# Patient Record
Sex: FEMALE | Race: WHITE | NOT HISPANIC OR LATINO | Employment: OTHER | URBAN - METROPOLITAN AREA
[De-identification: names, ages, dates, MRNs, and addresses within clinical notes are randomized per-mention and may not be internally consistent; named-entity substitution may affect disease eponyms.]

---

## 2017-02-19 ENCOUNTER — DOCUMENTATION ONLY (OUTPATIENT)
Dept: OTHER | Facility: CLINIC | Age: 67
End: 2017-02-19

## 2017-02-19 DIAGNOSIS — Z71.89 ACP (ADVANCE CARE PLANNING): Chronic | ICD-10-CM

## 2017-04-13 ENCOUNTER — TRANSFERRED RECORDS (OUTPATIENT)
Dept: HEALTH INFORMATION MANAGEMENT | Facility: CLINIC | Age: 67
End: 2017-04-13

## 2017-05-09 DIAGNOSIS — R25.1 TREMOR: Primary | ICD-10-CM

## 2017-05-09 DIAGNOSIS — R20.0 NUMBNESS OF FEET: ICD-10-CM

## 2017-05-10 ENCOUNTER — HOSPITAL ENCOUNTER (OUTPATIENT)
Dept: LAB | Facility: CLINIC | Age: 67
Discharge: HOME | End: 2017-05-10
Attending: INTERNAL MEDICINE | Admitting: PSYCHIATRY & NEUROLOGY
Payer: MEDICARE

## 2017-05-10 ENCOUNTER — TELEPHONE (OUTPATIENT)
Dept: PEDIATRICS | Facility: CLINIC | Age: 67
End: 2017-05-10

## 2017-05-10 ENCOUNTER — RADIANT APPOINTMENT (OUTPATIENT)
Dept: GENERAL RADIOLOGY | Facility: CLINIC | Age: 67
End: 2017-05-10
Attending: INTERNAL MEDICINE
Payer: COMMERCIAL

## 2017-05-10 ENCOUNTER — OFFICE VISIT (OUTPATIENT)
Dept: PEDIATRICS | Facility: CLINIC | Age: 67
End: 2017-05-10
Payer: COMMERCIAL

## 2017-05-10 VITALS
BODY MASS INDEX: 30.03 KG/M2 | SYSTOLIC BLOOD PRESSURE: 100 MMHG | WEIGHT: 163.2 LBS | DIASTOLIC BLOOD PRESSURE: 72 MMHG | HEIGHT: 62 IN | HEART RATE: 100 BPM | TEMPERATURE: 97.5 F

## 2017-05-10 DIAGNOSIS — I48.20 CHRONIC ATRIAL FIBRILLATION (H): ICD-10-CM

## 2017-05-10 DIAGNOSIS — Z01.818 PREOP GENERAL PHYSICAL EXAM: Primary | ICD-10-CM

## 2017-05-10 DIAGNOSIS — I48.91 ATRIAL FIBRILLATION, UNSPECIFIED TYPE (H): ICD-10-CM

## 2017-05-10 DIAGNOSIS — Z01.818 PREOP GENERAL PHYSICAL EXAM: ICD-10-CM

## 2017-05-10 DIAGNOSIS — R20.0 NUMBNESS OF FEET: ICD-10-CM

## 2017-05-10 DIAGNOSIS — S83.207D TEAR OF MENISCUS OF LEFT KNEE AS CURRENT INJURY, UNSPECIFIED MENISCUS, UNSPECIFIED TEAR TYPE, SUBSEQUENT ENCOUNTER: ICD-10-CM

## 2017-05-10 DIAGNOSIS — R25.1 TREMOR: ICD-10-CM

## 2017-05-10 DIAGNOSIS — I42.9 CARDIOMYOPATHY, UNSPECIFIED (H): ICD-10-CM

## 2017-05-10 LAB
ALBUMIN SERPL-MCNC: 3.6 G/DL (ref 3.4–5)
ALP SERPL-CCNC: 71 U/L (ref 40–150)
ALT SERPL W P-5'-P-CCNC: 24 U/L (ref 0–50)
ANION GAP SERPL CALCULATED.3IONS-SCNC: 8 MMOL/L (ref 3–14)
AST SERPL W P-5'-P-CCNC: 19 U/L (ref 0–45)
BILIRUB SERPL-MCNC: 1 MG/DL (ref 0.2–1.3)
BUN SERPL-MCNC: 19 MG/DL (ref 7–30)
CALCIUM SERPL-MCNC: 9.1 MG/DL (ref 8.5–10.1)
CHLORIDE SERPL-SCNC: 106 MMOL/L (ref 94–109)
CO2 SERPL-SCNC: 27 MMOL/L (ref 20–32)
CREAT SERPL-MCNC: 0.8 MG/DL (ref 0.52–1.04)
ERYTHROCYTE [DISTWIDTH] IN BLOOD BY AUTOMATED COUNT: 12.5 % (ref 10–15)
ERYTHROCYTE [SEDIMENTATION RATE] IN BLOOD BY WESTERGREN METHOD: 47 MM/H (ref 0–30)
GFR SERPL CREATININE-BSD FRML MDRD: 72 ML/MIN/1.7M2
GLUCOSE SERPL-MCNC: 98 MG/DL (ref 70–99)
HCT VFR BLD AUTO: 38.5 % (ref 35–47)
HGB BLD-MCNC: 12.5 G/DL (ref 11.7–15.7)
INR PPP: 1.01 (ref 0.86–1.14)
MCH RBC QN AUTO: 31.4 PG (ref 26.5–33)
MCHC RBC AUTO-ENTMCNC: 32.5 G/DL (ref 31.5–36.5)
MCV RBC AUTO: 97 FL (ref 78–100)
PLATELET # BLD AUTO: 234 10E9/L (ref 150–450)
POTASSIUM SERPL-SCNC: 4.2 MMOL/L (ref 3.4–5.3)
PROT SERPL-MCNC: 7.2 G/DL (ref 6.8–8.8)
RBC # BLD AUTO: 3.98 10E12/L (ref 3.8–5.2)
SODIUM SERPL-SCNC: 141 MMOL/L (ref 133–144)
TSH SERPL DL<=0.005 MIU/L-ACNC: 0.41 MU/L (ref 0.4–4)
WBC # BLD AUTO: 5.7 10E9/L (ref 4–11)

## 2017-05-10 PROCEDURE — 85610 PROTHROMBIN TIME: CPT | Performed by: PSYCHIATRY & NEUROLOGY

## 2017-05-10 PROCEDURE — 36415 COLL VENOUS BLD VENIPUNCTURE: CPT | Performed by: PSYCHIATRY & NEUROLOGY

## 2017-05-10 PROCEDURE — 85652 RBC SED RATE AUTOMATED: CPT | Performed by: PSYCHIATRY & NEUROLOGY

## 2017-05-10 PROCEDURE — 99215 OFFICE O/P EST HI 40 MIN: CPT | Performed by: INTERNAL MEDICINE

## 2017-05-10 PROCEDURE — 83880 ASSAY OF NATRIURETIC PEPTIDE: CPT | Performed by: INTERNAL MEDICINE

## 2017-05-10 PROCEDURE — 93000 ELECTROCARDIOGRAM COMPLETE: CPT | Performed by: INTERNAL MEDICINE

## 2017-05-10 PROCEDURE — 71020 XR CHEST 2 VW: CPT

## 2017-05-10 PROCEDURE — 85027 COMPLETE CBC AUTOMATED: CPT | Performed by: PSYCHIATRY & NEUROLOGY

## 2017-05-10 PROCEDURE — 84443 ASSAY THYROID STIM HORMONE: CPT | Performed by: PSYCHIATRY & NEUROLOGY

## 2017-05-10 PROCEDURE — 80053 COMPREHEN METABOLIC PANEL: CPT | Performed by: PSYCHIATRY & NEUROLOGY

## 2017-05-10 ASSESSMENT — ANXIETY QUESTIONNAIRES
IF YOU CHECKED OFF ANY PROBLEMS ON THIS QUESTIONNAIRE, HOW DIFFICULT HAVE THESE PROBLEMS MADE IT FOR YOU TO DO YOUR WORK, TAKE CARE OF THINGS AT HOME, OR GET ALONG WITH OTHER PEOPLE: NOT DIFFICULT AT ALL
5. BEING SO RESTLESS THAT IT IS HARD TO SIT STILL: NOT AT ALL
2. NOT BEING ABLE TO STOP OR CONTROL WORRYING: NOT AT ALL
1. FEELING NERVOUS, ANXIOUS, OR ON EDGE: NOT AT ALL
GAD7 TOTAL SCORE: 0
3. WORRYING TOO MUCH ABOUT DIFFERENT THINGS: NOT AT ALL
7. FEELING AFRAID AS IF SOMETHING AWFUL MIGHT HAPPEN: NOT AT ALL
6. BECOMING EASILY ANNOYED OR IRRITABLE: NOT AT ALL

## 2017-05-10 ASSESSMENT — PATIENT HEALTH QUESTIONNAIRE - PHQ9: 5. POOR APPETITE OR OVEREATING: NOT AT ALL

## 2017-05-10 NOTE — PROGRESS NOTES
[facility]  [address]  [address]  [place] [address]  [phone]  Dept: [phone]    PRE-OP EVALUATION:  Today's date: 5/10/2017    Ely Humphreys (: 1950) presents for pre-operative evaluation assessment as requested by Dr. Jaison Eugene.  She requires evaluation and anesthesia risk assessment prior to undergoing surgery/procedure for treatment of left knee, torn meniscus .  Proposed procedure: scope of left knee with repair of meniscus    Date of Surgery/ Procedure: 17  Time of Surgery/ Procedure: 940  Hospital/Surgical Facility: [facility]  Fax number for surgical facility:   Primary Physician: Shadia Munroe  Type of Anesthesia Anticipated: to be determined    Patient has a Health Care Directive or Living Will:  YES     Preop Questions 5/10/2017   1.  Do you have a history of heart attack, stroke, stent, bypass or surgery on an artery in the head, neck, heart or legs? No   2.  Do you ever have any pain or discomfort in your chest? YES - with stress gets chest tightness   3.  Do you have a history of  Heart Failure? No   4.   Are you troubled by shortness of breath when:  walking on a level surface, or up a slight hill, or at night? No   5.  Do you currently have a cold, bronchitis or other respiratory infection? No   6.  Do you have a cough, shortness of breath, or wheezing? No   7.  Do you sometimes get pains in the calves of your legs when you walk? No   8. Do you or anyone in your family have previous history of blood clots? YES - her Dad   9.  Do you or does anyone in your family have a serious bleeding problem such as prolonged bleeding following surgeries or cuts? No   10. Have you ever had problems with anemia or been told to take iron pills? YES - long time ago was on iron pills   11. Have you had any abnormal blood loss such as black, tarry or bloody stools, or abnormal vaginal bleeding? No   12. Have you ever had a blood transfusion? No   13. Have you  or any of your relatives ever had problems with anesthesia? YES - her Mom had hallej     14. Do you have sleep apnea, excessive snoring or daytime drowsiness? YES - snoring   15. Do you have any prosthetic heart valves? No   16. Do you have prosthetic joints? No   17. Is there any chance that you may be pregnant? No         HPI:                                                      Brief HPI related to upcoming procedure: 3 years of knee pain. In the last week in March went to [place] and did a lot of walking. Had worsening of her L knee pain.     Brings in [facility] screening results today from 11/22/2016. Was noted at that time to be in atrial fibrillation. Has not come in for evaluation. Does report occasional palpitations, usually lasting only a few minutes. Seems to be stress-related and slows down with deep breathing. No chest pain or shortness of breath. No nausea or diaphoresis.       See problem list for active medical problems.  Problems all longstanding and stable, except as noted/documented.  See ROS for pertinent symptoms related to these conditions.                                                                                                    HYPOTHYROIDISM - Patient has a longstanding history of chronic Hypothyroidism. Patient has been doing well, noting no tremor, insomnia, hair loss or changes in skin texture. Last TSH value of   TSH   Date Value Ref Range Status   09/06/2016 1.14 0.40 - 4.00 mU/L Final   11/09/2015 0.71 0.40 - 4.00 mU/L Final   10/19/2011 1.49 0.4 - 5.0 mU/L Final   07/23/2004 5.39 (H) 0.4 - 5.0 mU/L Final   07/28/2003 <0.03 (L) 0.4 - 5.0 mU/L Final     . Continues to take medications as directed, without adverse reactions or side effects.                                                                                                                                                                                                                          A-FIB - Patient has a  new diagnosis of atrial fibrillation, not currently anticoagulated.                                                                                                                            .    MEDICAL HISTORY:                                                      Patient Active Problem List    Diagnosis Date Noted     Hypothyroidism, unspecified type 08/29/2016     Priority: Medium     Meralgia paresthetica 07/30/2012     Priority: Medium     ACP (advance care planning) 10/19/2011     Priority: Medium     Advance Care Planning 2/19/2017: Receipt of ACP document:  Received: Health Care Directive which was witnessed or notarized on 11/17/14.  Document previously scanned on 12/7/16.  Validation form completed and sent to be scanned.  Code Status reflects choices in most recent ACP document.  Confirmed/documented designated decision maker(s).  Added by Ericka Baker   Advance Care Planning Liaison  Advance Care Planning 11/15/2016: ACP Review of Chart / Resources Provided:  Reviewed chart for advance care plan.  Ely Humphreys has no plan or code status on file however states presence of ACP document. Copy requested. Code status updated and sent to provider for review pending receipt of document/advance care plan review.  Confirmed/documented legally designated decision makers.  Added by Bailey Ndiaye  Advance Care Planning 10/19/2011:  Patient states has Advance Directive and will bring in a copy to clinic.        Health Care Home 08/22/2011     Priority: Medium     X  EMERGENCY CARE PLAN  Presenting Problem Signs and Symptoms Treatment Plan    Questions or conerns during clinic hours    I will call the clinic directly     Questions or conerns outside clinic hours    I will call the 24 hour nurse line at [phone]    Patient needs to schedule an appointment    I will call the 24 hour scheduling team at [phone] or clinic directly    Same day treatment     I will call the clinic first, nurse line if after  hours, urgent care and express care if needed                            DX V65.8 REPLACED WITH 35417 HEALTH CARE HOME (04/08/2013)       Abnormal Pap smear 11/16/2010     Priority: Medium     Moderate recurrent major depression (H) 09/20/2010     Priority: Medium     HYPERLIPIDEMIA LDL GOAL <160 02/10/2010     Priority: Medium     Invasive ductal carcinoma of breast (H)      Priority: Medium     [place] grade 2 fo 3. T1b Clinical N, N0, M0, stGstrstastdstest:st st1st Receptor status: ER+, CA+ HER 2 IVAN - by FISH  Completed radiation and chemotherapy.  On anastrazole for at least 5 years. Needs yearly dexa scans. Followed by Dr. Marie.       Postmenopausal atrophic vaginitis 02/21/2007     Priority: Medium     Disorder of bone and cartilage 07/27/2004     Priority: Medium     Problem list name updated by automated process. Provider to review       Hypothyroidism 07/23/2004     Priority: Medium     Problem list name updated by automated process. Provider to review        Past Medical History:   Diagnosis Date     Invasive ductal carcinoma of breast (H) 6/09    left breast ca     Major depressive disorder, recurrent episode, in full remission (H) 10/19/2011     osteopenia 2002     Unspecified hypothyroidism      Past Surgical History:   Procedure Laterality Date     C NONSPECIFIC PROCEDURE      Tubal Ligation    Abstracted 07/12/02     C THYROID ABLATION       COLONOSCOPY  10/03     COLONOSCOPY  5/9/2014     COLONOSCOPY  6/9/2014    Procedure: COLONOSCOPY;  Surgeon: Garrett Villaseñor MD;  Location:  GI     HC ECHO HEART XTHORACIC, STRESS/REST  7/22/09    normal     HC EXCISION BREAST LESION, OPEN >=1  7/09    re-excision 8/17/09     Current Outpatient Prescriptions   Medication Sig Dispense Refill     levothyroxine (SYNTHROID) 137 MCG tablet Take 1 tablet (137 mcg) by mouth daily 90 tablet 1     liothyronine (CYTOMEL) 5 MCG tablet Take 1 tablet (5 mcg) by mouth daily 90 tablet 1     Probiotic Product (PROBIOTIC DAILY) CAPS Take  "1 capsule by mouth daily       Omega-3 Fatty Acids (FISH OIL) 500 MG CAPS Take 2 capsules by mouth daily.       magnesium 200 MG TABS Take 1 tablet by mouth daily.       Cholecalciferol (VITAMIN D) 1000 UNIT capsule take 2 Caps by mouth daily.       ASPIRIN 81 MG OR TABS ONE DAILY 100 3     CALCIUM 500 MG OR TABS 2 tabs daily       VITAMINS/MINERALS OR TABS 1 TABLET DAILY       OTC products: None, except as noted above    Allergies   Allergen Reactions     No Known Drug Allergies       Latex Allergy: NO    Social History   Substance Use Topics     Smoking status: Never Smoker     Smokeless tobacco: Never Used     Alcohol use Yes      Comment: wkends     History   Drug Use No       REVIEW OF SYSTEMS:                                                    C: NEGATIVE for fever, chills, change in weight  I: NEGATIVE for worrisome rashes, moles or lesions  E: NEGATIVE for vision changes or irritation  E/M: NEGATIVE for ear, mouth and throat problems  R: NEGATIVE for significant cough or SOB  B: NEGATIVE for masses, tenderness or discharge  CV: NEGATIVE for chest pain, palpitations or peripheral edema  GI: NEGATIVE for nausea, abdominal pain, heartburn, or change in bowel habits  : NEGATIVE for frequency, dysuria, or hematuria  M: NEGATIVE for significant arthralgias or myalgia  N: NEGATIVE for weakness, dizziness or paresthesias  E: NEGATIVE for temperature intolerance, skin/hair changes  H: NEGATIVE for bleeding problems  P: NEGATIVE for changes in mood or affect    EXAM:                                                    /72 (Cuff Size: Adult Regular)  Pulse 80  Temp 97.5  F (36.4  C) (Oral)  Ht 5' 2.25\" (1.581 m)  Wt 163 lb 3.2 oz (74 kg)  BMI 29.61 kg/m2  GENERAL APPEARANCE: healthy, alert and no distress  HENT: ear canals and TM's normal and nose and mouth without ulcers or lesions  RESP: lungs clear to auscultation - no rales, rhonchi or wheezes  CV: normal S1 S2, no S3 or S4, no murmur, click or rub and " irregularly irregular rhythm  ABDOMEN: soft, nontender, no HSM or masses and bowel sounds normal  NEURO: Normal strength and tone, sensory exam grossly normal, mentation intact and speech normal    DIAGNOSTICS:                                                      EKG: atrial fibrillation, rate 106, normal axis, normal intervals, no acute ST/T changes c/w ischemia, no LVH by voltage criteria, unchanged from previous tracings  Chest XRay: mild cardiomegaly  Labs Resulted Today:   Results for orders placed or performed in visit on 11/15/16   Comprehensive metabolic panel   Result Value Ref Range    Sodium 140 133 - 144 mmol/L    Potassium 4.2 3.4 - 5.3 mmol/L    Chloride 105 94 - 109 mmol/L    Carbon Dioxide 27 20 - 32 mmol/L    Anion Gap 8 3 - 14 mmol/L    Glucose 92 70 - 99 mg/dL    Urea Nitrogen 16 7 - 30 mg/dL    Creatinine 0.80 0.52 - 1.04 mg/dL    GFR Estimate 72 >60 mL/min/1.7m2    GFR Estimate If Black 87 >60 mL/min/1.7m2    Calcium 9.1 8.5 - 10.1 mg/dL    Bilirubin Total 1.6 (H) 0.2 - 1.3 mg/dL    Albumin 4.0 3.4 - 5.0 g/dL    Protein Total 7.7 6.8 - 8.8 g/dL    Alkaline Phosphatase 69 40 - 150 U/L    ALT 29 0 - 50 U/L    AST 18 0 - 45 U/L   Lipid panel reflex to direct LDL   Result Value Ref Range    Cholesterol 281 (H) <200 mg/dL    Triglycerides 99 <150 mg/dL    HDL Cholesterol 78 >49 mg/dL    LDL Cholesterol Calculated 183 (H) <100 mg/dL    Non HDL Cholesterol 203 (H) <130 mg/dL       Recent Labs   Lab Test  11/15/16   1030  09/06/16   0802  11/09/15   1047   06/04/12   0741  10/19/11   1727   HGB   --   13.6   --    --    --   13.3   PLT   --   261   --    --    --   300   NA  140   --   140   < >  144   --    POTASSIUM  4.2   --   4.2   < >  4.0   --    CR  0.80   --   0.70   < >  0.80   --    A1C   --    --   5.4   --   5.6   --     < > = values in this interval not displayed.        IMPRESSION:                                                    Reason for surgery/procedure: knee pain  Diagnosis/reason  for consult: preoperative evaluation    The proposed surgical procedure is considered INTERMEDIATE risk.    The patient has the following additional risks for perioperative complications:  No identified additional risks      ICD-10-CM    1. Preop general physical exam Z01.818        RECOMMENDATIONS:                                                      Surgery is not recommended at this time due to new atrial fibrillation, borderline heart size on CXR, and uncontrolled rate. Plan echocardiogram, initiate low dose beta blockers, and follow up with cardiology. Will also initiate anticoagulation due to her risk factors.        Signed Electronically by: Shadia Simmons MD    Copy of this evaluation report is provided to requesting physician.    [facility] Preop Guidelines

## 2017-05-10 NOTE — PATIENT INSTRUCTIONS
Before Your Surgery      Call your surgeon if there is any change in your health. This includes signs of a cold or flu (such as a sore throat, runny nose, cough, rash or fever).    Do not smoke, drink alcohol or take over the counter medicine (unless your surgeon or primary care doctor tells you to) for the 24 hours before and after surgery.    If you take prescribed drugs: Follow your doctor s orders about which medicines to take and which to stop until after surgery.    Eating and drinking prior to surgery: follow the instructions from your surgeon    Take a shower or bath the night before surgery. Use the soap your surgeon gave you to gently clean your skin. If you do not have soap from your surgeon, use your regular soap. Do not shave or scrub the surgery site.  Wear clean pajamas and have clean sheets on your bed.     Before surgery, we should do an echocardiogram. We will try to get you in to see a cardiologist.  Start eliquis 5 mg twice daily. Do not take this the night before or the morning of surgery. You can resume this after surgery with agreement from your surgeon.    Stop your aspirin now.

## 2017-05-10 NOTE — TELEPHONE ENCOUNTER
Called and lm for Ely that we set her up for an appt with cardiology on Friday 5/12/17 at 12:45 with Dr Lockhart  At [facility] in [facility] in [place] [address]   Its [address]  Lm to return call and verify that this works for her.  Jackelyn Adler LPN

## 2017-05-10 NOTE — TELEPHONE ENCOUNTER
Patient called back and verified that the appointment works for her.    Eileen Pate,   [facility]

## 2017-05-10 NOTE — MR AVS SNAPSHOT
After Visit Summary   5/10/2017    Ely Humphreys    MRN: [id number]           Patient Information     Date Of Birth          1950        Visit Information        Provider Department      5/10/2017 1:50 PM Shadia Munroe MD [facility]        Today's Diagnoses     Preop general physical exam    -  1    Tear of meniscus of left knee as current injury, unspecified meniscus, unspecified tear type, subsequent encounter        Chronic atrial fibrillation (H)          Care Instructions      Before Your Surgery      Call your surgeon if there is any change in your health. This includes signs of a cold or flu (such as a sore throat, runny nose, cough, rash or fever).    Do not smoke, drink alcohol or take over the counter medicine (unless your surgeon or primary care doctor tells you to) for the 24 hours before and after surgery.    If you take prescribed drugs: Follow your doctor s orders about which medicines to take and which to stop until after surgery.    Eating and drinking prior to surgery: follow the instructions from your surgeon    Take a shower or bath the night before surgery. Use the soap your surgeon gave you to gently clean your skin. If you do not have soap from your surgeon, use your regular soap. Do not shave or scrub the surgery site.  Wear clean pajamas and have clean sheets on your bed.     Before surgery, we should do an echocardiogram. We will try to get you in to see a cardiologist.  Start eliquis 5 mg twice daily. Do not take this the night before or the morning of surgery. You can resume this after surgery with agreement from your surgeon.    Stop your aspirin now.            Follow-ups after your visit        Future tests that were ordered for you today     Open Future Orders        Priority Expected Expires Ordered    **Comprehensive metabolic panel FUTURE anytime Routine 5/10/2017 6/10/2017 5/10/2017    **TSH with free T4 reflex FUTURE anytime Routine 5/10/2017  "6/10/2017 5/10/2017    **CBC with platelets FUTURE anytime Routine 5/10/2017 6/10/2017 5/10/2017    INR Routine  6/10/2017 5/10/2017    Echocardiogram Complete Routine  5/10/2018 5/10/2017    Erythrocyte sedimentation rate auto Routine  5/9/2018 5/9/2017            Who to contact     If you have questions or need follow up information about today's clinic visit or your schedule please contact [facility] directly at [phone].  Normal or non-critical lab and imaging results will be communicated to you by Docracyhart, letter or phone within 4 business days after the clinic has received the results. If you do not hear from us within 7 days, please contact the clinic through Deemelo or phone. If you have a critical or abnormal lab result, we will notify you by phone as soon as possible.  Submit refill requests through Deemelo or call your pharmacy and they will forward the refill request to us. Please allow 3 business days for your refill to be completed.          Additional Information About Your Visit        Deemelo Information     Deemelo gives you secure access to your electronic health record. If you see a primary care provider, you can also send messages to your care team and make appointments. If you have questions, please call your primary care clinic.  If you do not have a primary care provider, please call [phone] and they will assist you.        Care EveryWhere ID     This is your Care EveryWhere ID. This could be used by other organizations to access your [facility] medical records  [id number]        Your Vitals Were     Pulse Temperature Height BMI (Body Mass Index)          100 97.5  F (36.4  C) (Oral) 5' 2.25\" (1.581 m) 29.61 kg/m2         Blood Pressure from Last 3 Encounters:   05/10/17 100/72   11/15/16 108/74   11/09/15 118/80    Weight from Last 3 Encounters:   05/10/17 163 lb 3.2 oz (74 kg)   11/15/16 170 lb 3.2 oz (77.2 kg)   11/09/15 166 lb 9.6 oz (75.6 kg)              We " Performed the Following     EKG 12-lead complete w/read - Clinics          Today's Medication Changes          These changes are accurate as of: 5/10/17  3:33 PM.  If you have any questions, ask your nurse or doctor.               Start taking these medicines.        Dose/Directions    apixaban ANTICOAGULANT 5 MG tablet   Commonly known as:  ELIQUIS   Used for:  Chronic atrial fibrillation (H)   Started by:  Shadia Munroe MD        Dose:  5 mg   Take 1 tablet (5 mg) by mouth 2 times daily   Quantity:  60 tablet   Refills:  1            Where to get your medicines      These medications were sent to [address] [place], [place] - [address] [facility]  [address] [place] [address] [place] [address]     Phone:  [phone]     apixaban ANTICOAGULANT 5 MG tablet                Primary Care Provider Office Phone # Fax #    Shadia Munroe [phone]       [address] [place] [address]        Thank you!     Thank you for choosing [facility]  for your care. Our goal is always to provide you with excellent care. Hearing back from our patients is one way we can continue to improve our services. Please take a few minutes to complete the written survey that you may receive in the mail after your visit with us. Thank you!             Your Updated Medication List - Protect others around you: Learn how to safely use, store and throw away your medicines at www.disposemymeds.org.          This list is accurate as of: 5/10/17  3:33 PM.  Always use your most recent med list.                   Brand Name Dispense Instructions for use    apixaban ANTICOAGULANT 5 MG tablet    ELIQUIS    60 tablet    Take 1 tablet (5 mg) by mouth 2 times daily       aspirin 81 MG tablet     100    ONE DAILY       calcium carbonate 500 MG tablet    OS-MARTITA 500 mg [place]. [place]     2 tabs daily       Fish Oil 500 MG Caps      Take 2 capsules by mouth daily.       levothyroxine 137 MCG tablet     SYNTHROID    90 tablet    Take 1 tablet (137 mcg) by mouth daily       liothyronine 5 MCG tablet    CYTOMEL    90 tablet    Take 1 tablet (5 mcg) by mouth daily       magnesium 200 MG Tabs      Take 1 tablet by mouth daily.       PROBIOTIC DAILY Caps      Take 1 capsule by mouth daily       vitamin  s/Minerals Tabs      1 TABLET DAILY       vitamin D 1000 UNITS capsule      take 2 Caps by mouth daily.

## 2017-05-10 NOTE — NURSING NOTE
"Chief Complaint   Patient presents with     Pre-Op Exam       Initial /72 (Cuff Size: Adult Regular)  Pulse 80  Temp 97.5  F (36.4  C) (Oral)  Ht 5' 2.25\" (1.581 m)  Wt 163 lb 3.2 oz (74 kg)  BMI 29.61 kg/m2 Estimated body mass index is 29.61 kg/(m^2) as calculated from the following:    Height as of this encounter: 5' 2.25\" (1.581 m).    Weight as of this encounter: 163 lb 3.2 oz (74 kg).  Medication Reconciliation: complete   Jackelyn Adler LPN    "

## 2017-05-11 ASSESSMENT — PATIENT HEALTH QUESTIONNAIRE - PHQ9: SUM OF ALL RESPONSES TO PHQ QUESTIONS 1-9: 1

## 2017-05-11 ASSESSMENT — ANXIETY QUESTIONNAIRES: GAD7 TOTAL SCORE: 0

## 2017-05-12 ENCOUNTER — OFFICE VISIT (OUTPATIENT)
Dept: CARDIOLOGY | Facility: CLINIC | Age: 67
End: 2017-05-12
Attending: INTERNAL MEDICINE
Payer: COMMERCIAL

## 2017-05-12 ENCOUNTER — HOSPITAL ENCOUNTER (OUTPATIENT)
Dept: CARDIOLOGY | Facility: CLINIC | Age: 67
Discharge: HOME | End: 2017-05-12
Attending: INTERNAL MEDICINE | Admitting: INTERNAL MEDICINE
Payer: MEDICARE

## 2017-05-12 VITALS
SYSTOLIC BLOOD PRESSURE: 119 MMHG | BODY MASS INDEX: 29.79 KG/M2 | HEART RATE: 81 BPM | WEIGHT: 161.9 LBS | HEIGHT: 62 IN | DIASTOLIC BLOOD PRESSURE: 80 MMHG

## 2017-05-12 DIAGNOSIS — E78.5 HYPERLIPIDEMIA LDL GOAL <160: ICD-10-CM

## 2017-05-12 DIAGNOSIS — I48.91 ATRIAL FIBRILLATION, UNSPECIFIED TYPE (H): ICD-10-CM

## 2017-05-12 DIAGNOSIS — I48.20 CHRONIC ATRIAL FIBRILLATION (H): ICD-10-CM

## 2017-05-12 DIAGNOSIS — I42.9 CARDIOMYOPATHY, UNSPECIFIED (H): ICD-10-CM

## 2017-05-12 DIAGNOSIS — I48.91 ATRIAL FIBRILLATION, UNSPECIFIED TYPE (H): Primary | ICD-10-CM

## 2017-05-12 DIAGNOSIS — R53.83 FATIGUE, UNSPECIFIED TYPE: ICD-10-CM

## 2017-05-12 DIAGNOSIS — R06.83 SNORING: ICD-10-CM

## 2017-05-12 LAB — NT-PROBNP SERPL-MCNC: 2202 PG/ML (ref 0–125)

## 2017-05-12 PROCEDURE — 93306 TTE W/DOPPLER COMPLETE: CPT | Mod: 26 | Performed by: INTERNAL MEDICINE

## 2017-05-12 PROCEDURE — 93227 XTRNL ECG REC<48 HR R&I: CPT | Performed by: INTERNAL MEDICINE

## 2017-05-12 PROCEDURE — 93306 TTE W/DOPPLER COMPLETE: CPT

## 2017-05-12 PROCEDURE — 93225 XTRNL ECG REC<48 HRS REC: CPT

## 2017-05-12 PROCEDURE — 99204 OFFICE O/P NEW MOD 45 MIN: CPT | Performed by: INTERNAL MEDICINE

## 2017-05-12 RX ORDER — METOPROLOL TARTRATE 25 MG/1
25 TABLET, FILM COATED ORAL 2 TIMES DAILY
Qty: 30 TABLET | Refills: 1 | Status: SHIPPED | OUTPATIENT
Start: 2017-05-12 | End: 2017-05-17

## 2017-05-12 NOTE — MR AVS SNAPSHOT
After Visit Summary   5/12/2017    Ely Humphreys    MRN: [id number]           Patient Information     Date Of Birth          1950        Visit Information        Provider Department      5/12/2017 2:15 PM Juan Manuel Don MD [facility] [place]        Today's Diagnoses     Atrial fibrillation, unspecified type (H)    -  1    Cardiomyopathy, unspecified (H)        Fatigue, unspecified type        Snoring        Hyperlipidemia LDL goal <160          Care Instructions    If you have any questions or concerns, please call my nurse Ely Deyanira at [phone].    1.  I recommend postponing the elective knee surgery until we adequately treat the atrial fibrillation and cardiomyopathy.  2.  Start metoprolol tartrate, 25 mg, one tablet two times daily after you have handed individual Holter.  3.  24 hour heart rhythm or Holter monitor.  To be put on today.  4.  Referral for sleep study.  5.  Blood test to be done today.  INTproBNP.  We will check if this can be added to your recent lab draw.  6.  I agree with the anticoagulation that your doctor as recommended.  7.  Follow up with the advanced practice provide at [facility], arterial assess rate control and go up on the metoprolol dosage, if needed.  ECG to be done prior to visit.  8.  Follow-up with me in a month.  9.  Transesophageal echocardiogram and cardioversion in 6 weeks.  10. Cut back on alcohol intake.        Follow-ups after your visit        Additional Services     Follow-Up with Cardiac Advanced Practice Provider       Please schedule with Ainsley ALCANTARA on Monday, 5/22 at [facility], preferably in the afternoon.            Follow-Up with Cardiologist                 Your next 10 appointments already scheduled     May 12, 2017  2:15 PM CDT   New Visit with Juan Manuel Don MD   [facility] [place] ([facility])    [address] [place] [id number]    [phone]              Future tests that were ordered for you today     Open Future Orders        Priority Expected Expires Ordered    EKG 12-lead complete w/read - Clinics (to be scheduled) Routine 5/22/2017 5/22/2017 5/12/2017    N terminal pro BNP outpatient Routine 5/12/2017 5/14/2017 5/12/2017    Holter Monitor 24 hour - Adult Routine 5/12/2017 5/12/2018 5/12/2017    Follow-Up with Cardiac Advanced Practice Provider Routine 5/22/2017 5/22/2017 5/12/2017    Follow-Up with Cardiologist Routine 6/12/2017 5/12/2018 5/12/2017    Transesophageal Echocardiogram Routine 6/19/2017 5/12/2018 5/12/2017    Cardioversion Routine 5/19/2017 5/12/2018 5/12/2017            Who to contact     If you have questions or need follow up information about today's clinic visit or your schedule please contact [facility] directly at [phone].  Normal or non-critical lab and imaging results will be communicated to you by [employer], letter or phone within 4 business days after the clinic has received the results. If you do not hear from us within 7 days, please contact the clinic through [employer] or phone. If you have a critical or abnormal lab result, we will notify you by phone as soon as possible.  Submit refill requests through [employer] or call your pharmacy and they will forward the refill request to us. Please allow 3 business days for your refill to be completed.          Additional Information About Your Visit        [employer] Information     [employer] gives you secure access to your electronic health record. If you see a primary care provider, you can also send messages to your care team and make appointments. If you have questions, please call your primary care clinic.  If you do not have a primary care provider, please call [phone] and they will assist you.        Care EveryWhere ID     This is your Care EveryWhere ID. This could be used by other organizations to access your  "[place] medical records  [id number]        Your Vitals Were     Pulse Height BMI (Body Mass Index)             81 1.581 m (5' 2.25\") 29.37 kg/m2          Blood Pressure from Last 3 Encounters:   05/12/17 119/80   05/10/17 100/72   11/15/16 108/74    Weight from Last 3 Encounters:   05/12/17 73.4 kg (161 lb 14.4 oz)   05/10/17 74 kg (163 lb 3.2 oz)   11/15/16 77.2 kg (170 lb 3.2 oz)                 Today's Medication Changes          These changes are accurate as of: 5/12/17  1:37 PM.  If you have any questions, ask your nurse or doctor.               Start taking these medicines.        Dose/Directions    metoprolol 25 MG tablet   Commonly known as:  LOPRESSOR   Used for:  Atrial fibrillation, unspecified type (H)   Started by:  Juan Manuel Don MD        Dose:  25 mg   Take 1 tablet (25 mg) by mouth 2 times daily   Quantity:  30 tablet   Refills:  1         Stop taking these medicines if you haven't already. Please contact your care team if you have questions.     Fish Oil 500 MG Caps   Stopped by:  Juan Manuel Don MD           magnesium 200 MG Tabs   Stopped by:  Juan Manuel Don MD                Where to get your medicines      These medications were sent to [facility] [address] [place] [id number]     Phone:  [phone]     metoprolol 25 MG tablet                Primary Care Provider Office Phone # Fax #    Shadia Munroe [phone]       [address] [place] [address]        Thank you!     Thank you for choosing [facility] [place]  for your care. Our goal is always to provide you with excellent care. Hearing back from our patients is one way we can continue to improve our services. Please take a few minutes to complete the written survey that you may receive in the mail after your visit with us. Thank you!             Your Updated Medication List - Protect " others around you: Learn how to safely use, store and throw away your medicines at www.disposemymeds.org.          This list is accurate as of: 5/12/17  1:37 PM.  Always use your most recent med list.                   Brand Name Dispense Instructions for use    apixaban ANTICOAGULANT 5 MG tablet    ELIQUIS    60 tablet    Take 1 tablet (5 mg) by mouth 2 times daily       calcium carbonate 500 MG tablet    OS-MARTITA 500 mg [place]. Ca     2 tabs daily       levothyroxine 137 MCG tablet    SYNTHROID    90 tablet    Take 1 tablet (137 mcg) by mouth daily       liothyronine 5 MCG tablet    CYTOMEL    90 tablet    Take 1 tablet (5 mcg) by mouth daily       metoprolol 25 MG tablet    LOPRESSOR    30 tablet    Take 1 tablet (25 mg) by mouth 2 times daily       PROBIOTIC DAILY Caps      Take 1 capsule by mouth daily       vitamin  s/Minerals Tabs      1 TABLET DAILY       vitamin D 1000 UNITS capsule      take 2 Caps by mouth daily.

## 2017-05-12 NOTE — PATIENT INSTRUCTIONS
If you have any questions or concerns, please call my nurse Ely Mcmillan at [phone].    1.  I recommend postponing the elective knee surgery until we adequately treat the atrial fibrillation and cardiomyopathy.  2.  Start metoprolol tartrate, 25 mg, one tablet two times daily after you have handed individual Holter.  3.  24 hour heart rhythm or Holter monitor.  To be put on today.  4.  Referral for sleep study.  5.  Blood test to be done today.  INTproBNP.  We will check if this can be added to your recent lab draw.  6.  I agree with the anticoagulation that your doctor as recommended.  7.  Follow up with the advanced practice provide at [facility], arterial assess rate control and go up on the metoprolol dosage, if needed.  ECG to be done prior to visit.  8.  Follow-up with me in a month.  9.  Transesophageal echocardiogram and cardioversion in 6 weeks.  10. Cut back on alcohol intake.

## 2017-05-12 NOTE — LETTER
5/12/2017    Shadia Munroe MD  [address] [place] [address]    RE: Ely Humphreys       Dear Colleague,    I had the pleasure of seeing Ely Humphreys in the [facility].    REFERRAL SOURCE:  Shadia Munroe MD      REASON FOR REFERRAL:     1.  New diagnosis of atrial fibrillation.   2.  Preoperative cardiac evaluation prior to elective left knee meniscal tear repair scheduled for 05/17/2017.      Patient is a delightful 67-year-old  lady who was accompanied by her  today.  Her medical history is significant for Graves disease status post radioablation several years ago and stable on thyroxine replacement therapy and a left meniscal tear a few months ago for which she is scheduled to have elective surgical repair next week.  She is a lifelong never smoker.  BMI 29.4 kg/m2.      1.  New diagnosis of atrial fibrillation.   2.  Preoperative cardiac evaluation.      The patient is scheduled for the above surgery on 05/17/2017.  As part of the preop eval, she was seen by Dr. Shadia Munroe a couple of days ago on 05/10/2017.  The patient did not report any symptoms but routine ECG showed a new diagnosis of atrial fibrillation with ventricular rate in the 100-110 BPM.  She was commenced on anticoagulation with apixaban 5 mg two times daily and referred to Cardiology.  The patient's primary symptom is that of knee pain which limits her exercise.  In fact, they recently returned from a trip to [place] where she did extensive walking and hiking causing aggravation of her knee pain, but she had no cardiac limitation.  She does report that intermittently over several years she has had brief palpitations lasting a few seconds and not associated with any other symptoms.  They classically happen under situations of significant emotional stress but never during exercise and have not woken her up from sleep.  She denies chest pain, exertional dyspnea,  dizziness, presyncope.  Never been syncopal in the past.  She is not noted to be hypertensive or diabetic and has never had a stroke, although there is a strong family history of stroke in both parents and paternal grandmother.     The patient was evaluated for palpitations in 2013 by my cardiologist colleague, Dr. Pereira and underwent an exercise echocardiogram in 07/2009 which showed above average exercise capacity of 13.0 METS, normal valves and no evidence of myocardial ischemia. Likewise, she had a 24-hour Holter which showed sinus rhythm throughout and no patient reported symptoms.  She does not consume much in terms of caffeine, has never used tobacco products, walks regularly.  She does drink about 1 to 2 glasses of wine during the weekdays and 2-3 during the weekends.      3.  History of snoring.   4.  History of fatigue and daytime somnolence.      The patient has had these symptoms for several years.  She reports nonrestorative sleep, daytime somnolence, easy drowsability.  She was advised sleep screening in the past but did not proceed because she had private insurance but is willing to do it now.      PHYSICAL EXAMINATION:   VITAL SIGNS:  Pulse 100-120 beats per minute at rest.  Blood pressure 124/80 mmHg.  Height 1.5 meters, weight 73.4 kg, BMI 29.3 kg/m2, respiratory rate 14 per minute.   A detailed examination is documented below.   GENERAL:  In brief, she appeared very comfortable at rest, cooperative, alert, well developed, well nourished.   NECK:  No jugular venous distention.  Carotid pulses are full and equal bilaterally.   CARDIOVASCULAR:  Apical impulse is normal to palpation without parasternal heave or thrill.  Heart sounds irregularly irregular, consistent with atrial fibrillation.  She has a soft pansystolic murmur at the left lower sternal border and at the apex consistent with mild to moderate tricuspid and mitral regurgitation.  No S3, S4 or friction rub.   LUNGS:  Bilateral normal breath  sounds with no rales or wheeze.   EXTREMITIES:  No edema.      DIAGNOSTIC DATA:  I reviewed lab data and imaging studies including ECG, echocardiogram and previous tests.  Results were discussed with the patient and .      LABORATORY DATA:  Total cholesterol 281, triglycerides 99, HDL 78, , hemoglobin 12.5, platelets 234,000.  Sodium 141, potassium 4.2, BUN 19, creatinine 0.8 with an estimated GFR 74, hemoglobin A1c 5.4, INR 1.01.      ECG -- 05/10/2017 -- atrial fibrillation.  Ventricular rate 110 beats per minute.  The atrial fibrillation is new compared to the previous ECG in 08/2013.      Transthoracic echocardiogram was done today and I personally reviewed the images.  It is consistent with nonischemic, probably tachycardia-mediated cardiomyopathy.  The left ventricle is borderline dilated with moderately reduced systolic function and biplane ejection fraction of 40% with moderate global hypokinesis of the left ventricle.  Normal right ventricular size and systolic function.  Estimated RVSP is about 20 mmHg.  The left atrium is moderate to severely dilated.  There is moderate mitral regurgitation, moderate tricuspid regurgitation.  Compared to the stress echocardiogram in 08/2013 when she was in sinus rhythm the LVEF has decreased from 60% to 40%.      DIAGNOSES:     1.  New diagnosis of atrial fibrillation with rapid ventricular rates.    2.  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   3.  New diagnosis of mitral regurgitation.   4.  New diagnosis of moderate tricuspid regurgitation.   5.  Preoperative cardiac evaluation prior to elective left knee meniscal repair surgery.   6.  Hypothyroidism, status post radioiodine treatment of Graves disease with therapeutic TSH.   7.  History of snoring and daytime somnolence with likely suspicion of sleep apnea.     Outpatient Encounter Prescriptions as of  5/12/2017   Medication Sig Dispense Refill     [DISCONTINUED] metoprolol (LOPRESSOR) 25 MG tablet Take 1 tablet (25 mg) by mouth 2 times daily 30 tablet 1     apixaban ANTICOAGULANT (ELIQUIS) 5 MG tablet Take 1 tablet (5 mg) by mouth 2 times daily 60 tablet 1     [DISCONTINUED] levothyroxine (SYNTHROID) 137 MCG tablet Take 1 tablet (137 mcg) by mouth daily 90 tablet 1     [DISCONTINUED] liothyronine (CYTOMEL) 5 MCG tablet Take 1 tablet (5 mcg) by mouth daily 90 tablet 1     Probiotic Product (PROBIOTIC DAILY) CAPS Take 1 capsule by mouth daily       Cholecalciferol (VITAMIN D) 1000 UNIT capsule take 2 Caps by mouth daily.       CALCIUM 500 MG OR TABS 2 tabs daily       VITAMINS/MINERALS OR TABS 1 TABLET DAILY       [DISCONTINUED] Omega-3 Fatty Acids (FISH OIL) 500 MG CAPS Take 2 capsules by mouth daily.       [DISCONTINUED] magnesium 200 MG TABS Take 1 tablet by mouth daily.       No facility-administered encounter medications on file as of 5/12/2017.       ASSESSMENT:    This is a 67-year-old lady with a new diagnosis of largely asymptomatic atrial fibrillation and left ventricular systolic cardiomyopathy with an LVEF of 40%.  Her resting heart rate is 100-110 beats per minute.  I think the etiology of the cardiomyopathy is most likely tachycardia mediated.  Since she has had longstanding occasional palpitations, it is hard to know the onset of the arrhythmia.  Therefore, I agree with anticoagulating her, rate controlling and down the line attempting cardioversion for restoration of sinus rhythm.  In the interim, non-urgent elective orthopedic surgery should be postponed for a few weeks.  She is agreeable with this.      CHADS-VASc2 score is 3 for age, cardiomyopathy and female gender and she would be considered high risk.  Yearly risk of stroke without anticoagulation is 3.2%.  She also has a strong family history of stroke in both parents.      PLAN:     1.  Postpone knee surgery until her cardiac status is  optimized.   2.  Start metoprolol tartrate 25 mg two times daily.  Up titrate as tolerated.   3.  A 24-hour Holter to assess rate control prior to initiation of medication so we would have a baseline for her tachycardia-mediated cardiomyopathy.   4.  NT-proBNP to be done today.   5.  She will visit with DOROTHEA at [facility] in a week and the metoprolol can be doubled to 50 mg b.i.d.  We should aim for a resting heart rate of 60 BPM or less and an average heart rate on 24-hour Holter of 80 or less.   6.  I will see her back in a month, and if she remains in sinus rhythm, we will schedule a SCOTTIE-guided cardioversion.   7.  I have advised her to cut back on her alcohol intake.   8.  Referral for sleep study.      Again, thank you for allowing me to participate in the care of your patient.      Sincerely,    Juan Manuel Don MD     [facility]

## 2017-05-12 NOTE — PROGRESS NOTES
HPI and Plan:   See dictation - [id number]    REFERRING PROVIDER:  Shadia Munroe MD  [address] [place] [address]    PR[facility] C[facility] PROVIDER:  [facility] [address] [place] [address]      REVIEW OF SYSTEMS:  A comprehensive 10-point review of systems was completed and the pertinent positives are documented in the history of present illness.    Skin:  Negative     Eyes:  Negative    ENT:  Negative    Respiratory:  Negative    Cardiovascular:    Positive for;palpitations;edema (edema in right ankle)  Gastroenterology: Negative    Genitourinary:  Negative    Musculoskeletal:  Negative    Neurologic:  Positive for headaches  Psychiatric:  Negative    Heme/Lymph/Imm:  Negative    Endocrine:  Negative      CURRENT MEDICATIONS:  Current Outpatient Prescriptions   Medication Sig Dispense Refill     metoprolol (LOPRESSOR) 25 MG tablet Take 1 tablet (25 mg) by mouth 2 times daily 30 tablet 1     apixaban ANTICOAGULANT (ELIQUIS) 5 MG tablet Take 1 tablet (5 mg) by mouth 2 times daily 60 tablet 1     levothyroxine (SYNTHROID) 137 MCG tablet Take 1 tablet (137 mcg) by mouth daily 90 tablet 1     liothyronine (CYTOMEL) 5 MCG tablet Take 1 tablet (5 mcg) by mouth daily 90 tablet 1     Probiotic Product (PROBIOTIC DAILY) CAPS Take 1 capsule by mouth daily       Cholecalciferol (VITAMIN D) 1000 UNIT capsule take 2 Caps by mouth daily.       CALCIUM 500 MG OR TABS 2 tabs daily       VITAMINS/MINERALS OR TABS 1 TABLET DAILY         MEDICATIONS STARTED ON CURRENT VISIT:  Orders Placed This Encounter   Medications     metoprolol (LOPRESSOR) 25 MG tablet     Sig: Take 1 tablet (25 mg) by mouth 2 times daily     Dispense:  30 tablet     Refill:  1       MEDICATIONS DISCONTINUED ON CURRENT VISIT:  Medications Discontinued During This Encounter   Medication Reason     magnesium 200 MG TABS      Omega-3 Fatty Acids (FISH OIL) 500 MG CAPS   "      ALLERGIES:  Allergies   Allergen Reactions     No Known Drug Allergies        PAST MEDICAL HISTORY:  1.  Benign essential tremor.  2.  Status post successful treatment of the invasive ductal carcinoma of the left breast.  3.  Osteopenia.  4.  Prior history of Graves' disease status post radioactive therapy and currently on replacement thyroxine.  5.  She had major depressive disorder several decades ago, but has been in full remission and stable for several years.    PAST SURGICAL HISTORY:  Status post thyroid ablation surgery, tubal ligation in , previous colonoscopies, and excision of left breast carcinoma in .    FAMILY HISTORY:  History of stroke in the mother (TIA, nontreated), in father (in his late 70s), and paternal grandmother.  There is no family history of atrial fibrillation or other arrhythmias.    Family History   Problem Relation Age of Onset     CANCER Mother      82 yo Breast & Melanoma     CEREBROVASCULAR DISEASE Mother 92     TIA     C.A.D. Mother      CEREBROVASCULAR DISEASE Father       85 yo Alzheimers, CHF     C.A.D. Father      possible MI in age 60's     CANCER Maternal Aunt       40's Breast     CEREBROVASCULAR DISEASE Paternal Grandmother       late 40's - early 50's     Blood Disease Maternal Aunt       51 yo Lupus     Cancer - colorectal No family hx of        SOCIAL HISTORY:  .  Lives with her  Judah who accompanied her to the visit today.  Two children.  Retired [employer].  Never used tobacco.  Does consume about 1-2 glasses of wine on most evenings, probably 2-3 over the weekends.  No recreational drug use.  Exercises regularly by walking and hiking, but this has been a challenge since she has had left knee meniscal injury.    PHYSICAL EXAM:    Vitals: /80  Pulse 81  Ht 1.581 m (5' 2.25\")  Wt 73.4 kg (161 lb 14.4 oz)  BMI 29.37 kg/m2    Constitutional: Comfortable at rest. Cooperative, alert and " oriented, well developed, well nourished.  Eyes: Pupils equal and round, conjunctivae and lids unremarkable, sclera white, no xanthalasma,   ENT: Satisfactory dentition.  No cyanosis or pallor.  Neck: Carotid pulses are full and equal bilaterally, no carotid bruit, no thyromegaly     Respiratory: Normal respiratory effort with symmetrical chest wall movements and no use of accessory muscles. Good air entry with normal breath sounds and no rales or wheeze.   GI: Soft, nontender, no hepatosplenomegaly, no masses, active bowel sounds.  No surgical scars.  Lymph/Hematologic: Not examined.  Skin: No rash, erythema, ecchymosis.  Musculoskeletal: Normal muscle tone and strength. Normal gait. No spinal deformities.  Neuropsychiatric: Oriented to time place and person.  Affect normal.  No gross motor deficits.  Extremities: No edema. No clubbing or deformities.    TRANSTHORACIC ECHOCARDIOGRAM: done today.  The rhythm was atrial fibrillation.   1. The left ventricle is borderline dilated with moderately reduced systolic function. Biplane ejection fraction 40%.  2. There is moderate global hypokinesia of the left ventricle.   3. The right ventricle is normal in size and function. The right ventricular systolic pressure is approximated at 16.6 mmHg plus the right atrial pressure.   4. The left atrium is moderate to severely dilated.  5. There is moderate (2+) mitral regurgitation. (2 jets, the predominat jet is posteriorly directed). Effective regurgitant orifice area 0.3 cm2.   6.  There is moderate (2+) tricuspid regurgitation.     Compared to the stress echocardiogram dated 08/26/2013 (when the patient was in sinus rhythm), the left ventricular ejection fraction has decreased from 60% to 40%.      It was a pleasure to visit with the patient.  I spent 60 minutes with the patient, greater than 50% of the time was spent in counseling and coordination of care. Questions were  answered to the patient's satisfaction.  Encounter  Diagnoses   Name Primary?     Atrial fibrillation, unspecified type (H) Yes     Cardiomyopathy, unspecified (H)      Fatigue, unspecified type      Snoring      Hyperlipidemia LDL goal <160        Orders Placed This Encounter   Procedures     N terminal pro BNP outpatient     Follow-Up with Cardiac Advanced Practice Provider     Follow-Up with Cardiologist     EKG 12-lead complete w/read - Clinics (to be scheduled)     Holter Monitor 24 hour - Adult     Transesophageal Echocardiogram     Cardioversion       CC  REFERRING PROVIDER:  Shadia Munroe MD  [place] [facility] [address] [place] [address]

## 2017-05-15 ENCOUNTER — PRE VISIT (OUTPATIENT)
Dept: CARDIOLOGY | Facility: CLINIC | Age: 67
End: 2017-05-15

## 2017-05-15 ENCOUNTER — TRANSFERRED RECORDS (OUTPATIENT)
Dept: HEALTH INFORMATION MANAGEMENT | Facility: CLINIC | Age: 67
End: 2017-05-15

## 2017-05-15 NOTE — PROGRESS NOTES
REFERRAL SOURCE:  Shadia Munroe MD      REASON FOR REFERRAL:     1.  New diagnosis of atrial fibrillation.   2.  Preoperative cardiac evaluation prior to elective left knee meniscal tear repair scheduled for 05/17/2017.      HISTORY OF PRESENT ILLNESS:  Patient is a delightful 67-year-old  lady who was accompanied by her  today.  Her medical history is significant for Graves disease status post radioablation several years ago and stable on thyroxine replacement therapy and a left meniscal tear a few months ago for which she is scheduled to have elective surgical repair next week.  She is a lifelong never smoker.  BMI 29.4 kg/m2.      1.  New diagnosis of atrial fibrillation.   2.  Preoperative cardiac evaluation.      The patient is scheduled for the above surgery on 05/17/2017.  As part of the preop eval, she was seen by Dr. Shadia Munroe a couple of days ago on 05/10/2017.  The patient did not report any symptoms but routine ECG showed a new diagnosis of atrial fibrillation with ventricular rate in the 100-110 BPM.  She was commenced on anticoagulation with apixaban 5 mg two times daily and referred to Cardiology.  The patient's primary symptom is that of knee pain which limits her exercise.  In fact, they recently returned from a trip to [place] where she did extensive walking and hiking causing aggravation of her knee pain, but she had no cardiac limitation.  She does report that intermittently over several years she has had brief palpitations lasting a few seconds and not associated with any other symptoms.  They classically happen under situations of significant emotional stress but never during exercise and have not woken her up from sleep.  She denies chest pain, exertional dyspnea, dizziness, presyncope.  Never been syncopal in the past.  She is not noted to be hypertensive or diabetic and has never had a stroke, although there is a strong family history of stroke in both parents  and paternal grandmother.     The patient was evaluated for palpitations in 2013 by my cardiologist colleague, Dr. Pereira and underwent an exercise echocardiogram in 07/2009 which showed above average exercise capacity of 13.0 METS, normal valves and no evidence of myocardial ischemia. Likewise, she had a 24-hour Holter which showed sinus rhythm throughout and no patient reported symptoms.  She does not consume much in terms of caffeine, has never used tobacco products, walks regularly.  She does drink about 1 to 2 glasses of wine during the weekdays and 2-3 during the weekends.      3.  History of snoring.   4.  History of fatigue and daytime somnolence.      The patient has had these symptoms for several years.  She reports nonrestorative sleep, daytime somnolence, easy drowsability.  She was advised sleep screening in the past but did not proceed because she had private insurance but is willing to do it now.      PHYSICAL EXAMINATION:   VITAL SIGNS:  Pulse 100-120 beats per minute at rest.  Blood pressure 124/80 mmHg.  Height 1.5 meters, weight 73.4 kg, BMI 29.3 kg/m2, respiratory rate 14 per minute.   A detailed examination is documented below.   GENERAL:  In brief, she appeared very comfortable at rest, cooperative, alert, well developed, well nourished.   NECK:  No jugular venous distention.  Carotid pulses are full and equal bilaterally.   CARDIOVASCULAR:  Apical impulse is normal to palpation without parasternal heave or thrill.  Heart sounds irregularly irregular, consistent with atrial fibrillation.  She has a soft pansystolic murmur at the left lower sternal border and at the apex consistent with mild to moderate tricuspid and mitral regurgitation.  No S3, S4 or friction rub.   LUNGS:  Bilateral normal breath sounds with no rales or wheeze.   EXTREMITIES:  No edema.      DIAGNOSTIC DATA:  I reviewed lab data and imaging studies including ECG, echocardiogram and previous tests.  Results were discussed with the  patient and .      LABORATORY DATA:  Total cholesterol 281, triglycerides 99, HDL 78, , hemoglobin 12.5, platelets 234,000.  Sodium 141, potassium 4.2, BUN 19, creatinine 0.8 with an estimated GFR 74, hemoglobin A1c 5.4, INR 1.01.      ECG -- 05/10/2017 -- atrial fibrillation.  Ventricular rate 110 beats per minute.  The atrial fibrillation is new compared to the previous ECG in 08/2013.      Transthoracic echocardiogram was done today and I personally reviewed the images.  It is consistent with nonischemic, probably tachycardia-mediated cardiomyopathy.  The left ventricle is borderline dilated with moderately reduced systolic function and biplane ejection fraction of 40% with moderate global hypokinesis of the left ventricle.  Normal right ventricular size and systolic function.  Estimated RVSP is about 20 mmHg.  The left atrium is moderate to severely dilated.  There is moderate mitral regurgitation, moderate tricuspid regurgitation.  Compared to the stress echocardiogram in 08/2013 when she was in sinus rhythm the LVEF has decreased from 60% to 40%.      DIAGNOSES:     1.  New diagnosis of atrial fibrillation with rapid ventricular rates.    2.  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   3.  New diagnosis of mitral regurgitation.   4.  New diagnosis of moderate tricuspid regurgitation.   5.  Preoperative cardiac evaluation prior to elective left knee meniscal repair surgery.   6.  Hypothyroidism, status post radioiodine treatment of Graves disease with therapeutic TSH.   7.  History of snoring and daytime somnolence with likely suspicion of sleep apnea.      ASSESSMENT:    This is a 67-year-old lady with a new diagnosis of largely asymptomatic atrial fibrillation and left ventricular systolic cardiomyopathy with an LVEF of 40%.  Her resting heart rate is 100-110 beats per minute.  I think the  etiology of the cardiomyopathy is most likely tachycardia mediated.  Since she has had longstanding occasional palpitations, it is hard to know the onset of the arrhythmia.  Therefore, I agree with anticoagulating her, rate controlling and down the line attempting cardioversion for restoration of sinus rhythm.  In the interim, non-urgent elective orthopedic surgery should be postponed for a few weeks.  She is agreeable with this.      CHADS-VASc2 score is 3 for age, cardiomyopathy and female gender and she would be considered high risk.  Yearly risk of stroke without anticoagulation is 3.2%.  She also has a strong family history of stroke in both parents.      PLAN:     1.  Postpone knee surgery until her cardiac status is optimized.   2.  Start metoprolol tartrate 25 mg two times daily.  Up titrate as tolerated.   3.  A 24-hour Holter to assess rate control prior to initiation of medication so we would have a baseline for her tachycardia-mediated cardiomyopathy.   4.  NT-proBNP to be done today.   5.  She will visit with DOROTHEA at [facility] in a week and the metoprolol can be doubled to 50 mg b.i.d.  We should aim for a resting heart rate of 60 BPM or less and an average heart rate on 24-hour Holter of 80 or less.   6.  I will see her back in a month, and if she remains in sinus rhythm, we will schedule a SCOTTIE-guided cardioversion.   7.  I have advised her to cut back on her alcohol intake.   8.  Referral for sleep study.      cc:   Shadia Munroe MD   [address]  [address]         [facility] LACHELLE CELAYA MD             D: 2017 16:31   T: 05/15/2017 11:12   MT: elvis      Name:     NYA KOROMA   MRN:      [id number]        Account:      [id number]   :      1950           Service Date: 2017      Document: [id number]

## 2017-05-17 ENCOUNTER — OFFICE VISIT (OUTPATIENT)
Dept: CARDIOLOGY | Facility: CLINIC | Age: 67
End: 2017-05-17
Attending: INTERNAL MEDICINE
Payer: COMMERCIAL

## 2017-05-17 VITALS
BODY MASS INDEX: 29.44 KG/M2 | SYSTOLIC BLOOD PRESSURE: 102 MMHG | WEIGHT: 160 LBS | HEIGHT: 62 IN | DIASTOLIC BLOOD PRESSURE: 72 MMHG | HEART RATE: 84 BPM

## 2017-05-17 DIAGNOSIS — I48.91 ATRIAL FIBRILLATION, UNSPECIFIED TYPE (H): ICD-10-CM

## 2017-05-17 DIAGNOSIS — I42.9 CARDIOMYOPATHY, UNSPECIFIED (H): ICD-10-CM

## 2017-05-17 PROCEDURE — 93000 ELECTROCARDIOGRAM COMPLETE: CPT | Performed by: NURSE PRACTITIONER

## 2017-05-17 PROCEDURE — 99214 OFFICE O/P EST MOD 30 MIN: CPT | Mod: 25 | Performed by: NURSE PRACTITIONER

## 2017-05-17 RX ORDER — METOPROLOL TARTRATE 25 MG/1
50 TABLET, FILM COATED ORAL 2 TIMES DAILY
Qty: 120 TABLET | Refills: 11 | Status: SHIPPED | OUTPATIENT
Start: 2017-05-17 | End: 2017-06-29

## 2017-05-17 NOTE — PROGRESS NOTES
HPI and Plan:   See dictation    Orders Placed This Encounter   Procedures     EKG 12-lead complete w/read - Clinics (performed today)     EKG 12-lead complete w/read - Clinics (performed today)       Orders Placed This Encounter   Medications     metoprolol (LOPRESSOR) 25 MG tablet     Sig: Take 2 tablets (50 mg) by mouth 2 times daily     Dispense:  120 tablet     Refill:  11       Medications Discontinued During This Encounter   Medication Reason     metoprolol (LOPRESSOR) 25 MG tablet Reorder         Encounter Diagnoses   Name Primary?     Atrial fibrillation, unspecified type (H)      Cardiomyopathy, unspecified (H)        CURRENT MEDICATIONS:  Current Outpatient Prescriptions   Medication Sig Dispense Refill     metoprolol (LOPRESSOR) 25 MG tablet Take 2 tablets (50 mg) by mouth 2 times daily 120 tablet 11     apixaban ANTICOAGULANT (ELIQUIS) 5 MG tablet Take 1 tablet (5 mg) by mouth 2 times daily 60 tablet 1     levothyroxine (SYNTHROID) 137 MCG tablet Take 1 tablet (137 mcg) by mouth daily 90 tablet 1     liothyronine (CYTOMEL) 5 MCG tablet Take 1 tablet (5 mcg) by mouth daily 90 tablet 1     Probiotic Product (PROBIOTIC DAILY) CAPS Take 1 capsule by mouth daily       Cholecalciferol (VITAMIN D) 1000 UNIT capsule take 2 Caps by mouth daily.       CALCIUM 500 MG OR TABS 2 tabs daily       VITAMINS/MINERALS OR TABS 1 TABLET DAILY       [DISCONTINUED] metoprolol (LOPRESSOR) 25 MG tablet Take 1 tablet (25 mg) by mouth 2 times daily 30 tablet 1       ALLERGIES     Allergies   Allergen Reactions     No Known Drug Allergies        PAST MEDICAL HISTORY:  Past Medical History:   Diagnosis Date     Atrial fibrillation (H)      Benign essential tremor     Chronic     Invasive ductal carcinoma of breast (H) 6/09    left breast ca     Major depressive disorder, recurrent episode, in full remission (H) 10/19/2011    Stable for decades     osteopenia 2002     Palpitations      Unspecified hypothyroidism        PAST  SURGICAL HISTORY:  Past Surgical History:   Procedure Laterality Date     C NONSPECIFIC PROCEDURE      Tubal Ligation    Abstracted 02     C THYROID ABLATION       COLONOSCOPY  10/03     COLONOSCOPY  2014     COLONOSCOPY  2014    Procedure: COLONOSCOPY;  Surgeon: Garrett Villaseñor MD;  Location: [facility]     HC ECHO HEART XTHORACIC, STRESS/REST  09    normal     HC EXCISION BREAST LESION, OPEN >=1      re-excision 09       FAMILY HISTORY:  Family History   Problem Relation Age of Onset     CANCER Mother      84 yo Breast & Melanoma     CEREBROVASCULAR DISEASE Mother 92     TIA     C.A.D. Mother      CEREBROVASCULAR DISEASE Father       85 yo Alzheimers, CHF     C.A.D. Father      possible MI in age 60's     CANCER Maternal Aunt       40's Breast     CEREBROVASCULAR DISEASE Paternal Grandmother       late 40's - early 50's     Blood Disease Maternal Aunt       51 yo Lupus     Cancer - colorectal No family hx of        SOCIAL HISTORY:  Social History     Social History     Marital status:      Spouse name: Judah     Number of children: 2     Years of education: 18     Occupational History      [employer]     health      Social History Main Topics     Smoking status: Never Smoker     Smokeless tobacco: Never Used     Alcohol use Yes      Comment: 1 or 2 glasses of wine most evenings     Drug use: No     Sexual activity: Not Currently     Birth control/ protection: Surgical     Other Topics Concern     None     Social History Narrative       Review of Systems:  Skin:  Negative       Eyes:  Positive for   reading glasses  ENT:  Negative      Respiratory:  Negative       Cardiovascular:    Positive for;palpitations;lightheadedness;edema;fatigue more fatigued  Gastroenterology: Negative      Genitourinary:  Negative      Musculoskeletal:  Positive for   torn meniscus in left knee  Neurologic:  Positive for headaches;numbness or tingling of  "feet neuropathy  Psychiatric:  Positive for sleep disturbances    Heme/Lymph/Imm:  Negative      Endocrine:  Negative        Physical Exam:  Vitals: /72 (BP Location: Right arm, Patient Position: Chair, Cuff Size: Adult Regular)  Pulse 84  Ht 1.581 m (5' 2.25\")  Wt 72.6 kg (160 lb)  BMI 29.03 kg/m2    Constitutional:           Skin:           Head:           Eyes:           ENT:           Neck:           Chest:             Cardiac:                    Abdomen:           Vascular:                                          Extremities and Back:                 Neurological:                 CC  Dineshilal Keli Don MD  [facility]  [address] [place] [id number]              "

## 2017-05-17 NOTE — LETTER
5/17/2017    Shadia Simmons MD  [address] [place] [address]    RE: Ely Humphreys       Dear Colleague,    I had the pleasure of seeing Ely Humphreys in the [facility].    Ely Humphreys is a pleasant 67-year-old female presenting in clinic today for a followup visit regarding atrial fibrillation.  She saw Dr. Don on 05/12/2017 for evaluation regarding new onset atrial fibrillation and preoperative cardiac clearance prior to left knee meniscal repair previously scheduled for 05/17.  In addition, her past medical history is significant for Graves' disease status post radioablation several years ago which has been stable on thyroxine replacement.  She suffered a meniscus tear a few months back and she was scheduled to have surgical repair on 05/17.  During her prep evaluation, she was seen by her primary care doctor and a routine EKG showed new diagnosis of atrial fibrillation.  Her ventricular rate at that time was around 110.  She was started on apixaban 5 mg twice daily.  She was referred to see Dr. Don and had an echocardiogram that day showing a cardiomyopathy with a left ventricular ejection fraction around 40% with moderate global hypokinesis.  RV was normal.  RVSP was 20.  Left atrium is moderately to severely dilated.  There was moderate MR and moderate TR.  This was compared to a stress echo in 2013 at which time she is in sinus rhythm and her ejection fraction was 60%.      Dr. Don ordered a Holter monitor which showed persistent atrial fibrillation with rapid ventricular response with an average heart rate of 110, minimum of 54 and maximum 185.  She has been started on metoprolol 25 mg twice daily and asked to follow up.        In clinic today, Ely tells me that she has been feeling fatigued.  This was ongoing even before starting the metoprolol, but has become worse with initiation of metoprolol.  She has been referred to have a sleep study, but has  not yet contacted the Sleep Clinic.  She plans on doing so in the near future.  She otherwise denies any feeling of lightheadedness, dizziness, breathlessness, palpitation.  She has chronic lower extremity edema on the right which has not worsened.      Additionally, I reviewed blood work including a basic metabolic panel which is within normal limits.  NT proBNP was elevated at 2202 on 05/10.  TSH was within normal limits.  CBC was also within normal limits.      Outpatient Encounter Prescriptions as of 5/17/2017   Medication Sig Dispense Refill     [DISCONTINUED] metoprolol (LOPRESSOR) 25 MG tablet Take 2 tablets (50 mg) by mouth 2 times daily 120 tablet 11     apixaban ANTICOAGULANT (ELIQUIS) 5 MG tablet Take 1 tablet (5 mg) by mouth 2 times daily 60 tablet 1     [DISCONTINUED] levothyroxine (SYNTHROID) 137 MCG tablet Take 1 tablet (137 mcg) by mouth daily 90 tablet 1     [DISCONTINUED] liothyronine (CYTOMEL) 5 MCG tablet Take 1 tablet (5 mcg) by mouth daily 90 tablet 1     Probiotic Product (PROBIOTIC DAILY) CAPS Take 1 capsule by mouth daily       Cholecalciferol (VITAMIN D) 1000 UNIT capsule take 2 Caps by mouth daily.       CALCIUM 500 MG OR TABS 2 tabs daily       VITAMINS/MINERALS OR TABS 1 TABLET DAILY       [DISCONTINUED] metoprolol (LOPRESSOR) 25 MG tablet Take 1 tablet (25 mg) by mouth 2 times daily 30 tablet 1     No facility-administered encounter medications on file as of 5/17/2017.      IMPRESSION AND PLAN:  Ely Humphreys is a delightful 67-year-old female presenting in clinic today for followup regarding new onset atrial fibrillation.  This was found incidentally during a pre-op physical for repair of a meniscal tear.  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  She is currently on metoprolol 25 mg twice daily.  Resting heart rate in clinic today was 88 by EKG.  We are going to increase metoprolol to 50 mg twice daily.  She will come back on Monday for followup  EKG to ascertain that she has better rate control.  Long-term plan will be for her to be anticoagulated for a month and see Dr. Don back with EKG.  If she continues to be in atrial fibrillation, she will have cardioversion.  Additionally, she is going to schedule her sleep study.  She will need a repeat echocardiogram down the road when she has restored sinus rhythm.  Her meniscal tear surgery is on hold for the time being.      It was my pleasure to participate in the care of Ms. Humphreys in clinic today.  We will be in contact with her on Monday regarding the results of her ECG and to see how she is tolerating the metoprolol.  Certainly if she has more fatigue, we may need to consider adjusting her medications.     Again, thank you for allowing me to participate in the care of your patient.      Sincerely,    Yareli Mathew NP, APRN CNP     [facility]

## 2017-05-17 NOTE — PATIENT INSTRUCTIONS
-Increase metoprolol to 50 mg twice daily  -Keep EKG on Monday  -We will call you on Monday after your EKG to see how you are doing and discuss any medication changes which may be necessary

## 2017-05-17 NOTE — MR AVS SNAPSHOT
After Visit Summary   5/17/2017    Ely Humphreys    MRN: [id number]           Patient Information     Date Of Birth          1950        Visit Information        Provider Department      5/17/2017 3:10 PM Yareli Mathew APRN CNP [facility] [place]        Today's Diagnoses     Atrial fibrillation, unspecified type (H)        Cardiomyopathy, unspecified (H)          Care Instructions    -Increase metoprolol to 50 mg twice daily  -Keep EKG on Monday  -We will call you on Monday after your EKG to see how you are doing and discuss any medication changes which may be necessary        Follow-ups after your visit        Your next 10 appointments already scheduled     May 22, 2017 10:30 AM CDT   ecg with RU LAB   [facility]    [address]  [facility] [id number] [phone]            Jun 19, 2017 11:00 AM CDT   Return Visit with Juan Manuel Don MD   [facility] ([facility])    [address]  [facility] [id number] [phone]              Who to contact     If you have questions or need follow up information about today's clinic visit or your schedule please contact [facility] directly at [phone].  Normal or non-critical lab and imaging results will be communicated to you by MyChart, letter or phone within 4 business days after the clinic has received the results. If you do not hear from us within 7 days, please contact the clinic through MyChart or phone. If you have a critical or abnormal lab result, we will notify you by phone as soon as possible.  Submit refill requests through [employer] or call your pharmacy and they will forward the refill request to us. Please allow 3 business days for your refill to be completed.          Additional Information About  "Your Visit        MyChart Information     [employer] gives you secure access to your electronic health record. If you see a primary care provider, you can also send messages to your care team and make appointments. If you have questions, please call your primary care clinic.  If you do not have a primary care provider, please call [phone] and they will assist you.        Care EveryWhere ID     This is your Care EveryWhere ID. This could be used by other organizations to access your [facility] medical records  [id number]        Your Vitals Were     Pulse Height BMI (Body Mass Index)             84 1.581 m (5' 2.25\") 29.03 kg/m2          Blood Pressure from Last 3 Encounters:   05/17/17 102/72   05/12/17 119/80   05/10/17 100/72    Weight from Last 3 Encounters:   05/17/17 72.6 kg (160 lb)   05/12/17 73.4 kg (161 lb 14.4 oz)   05/10/17 74 kg (163 lb 3.2 oz)              We Performed the Following     EKG 12-lead complete w/read - Clinics (performed today)     EKG 12-lead complete w/read - Clinics (performed today)     Follow-Up with Cardiac Advanced Practice Provider          Today's Medication Changes          These changes are accurate as of: 5/17/17  3:59 PM.  If you have any questions, ask your nurse or doctor.               These medicines have changed or have updated prescriptions.        Dose/Directions    metoprolol 25 MG tablet   Commonly known as:  LOPRESSOR   This may have changed:  how much to take   Used for:  Atrial fibrillation, unspecified type (H)   Changed by:  Yareli Mathew APRN CNP        Dose:  50 mg   Take 2 tablets (50 mg) by mouth 2 times daily   Quantity:  120 tablet   Refills:  11            Where to get your medicines      These medications were sent to [facility] [address] [place] [id number]     Phone:  [phone]     metoprolol 25 MG tablet                Primary Care Provider Office Phone # Fax #    Shadia Munroe MD " [phone]       [place] [facility] [address] [place] [address]        Thank you!     Thank you for choosing [facility] [place]  for your care. Our goal is always to provide you with excellent care. Hearing back from our patients is one way we can continue to improve our services. Please take a few minutes to complete the written survey that you may receive in the mail after your visit with us. Thank you!             Your Updated Medication List - Protect others around you: Learn how to safely use, store and throw away your medicines at www.disposemymeds.org.          This list is accurate as of: 5/17/17  3:59 PM.  Always use your most recent med list.                   Brand Name Dispense Instructions for use    apixaban ANTICOAGULANT 5 MG tablet    ELIQUIS    60 tablet    Take 1 tablet (5 mg) by mouth 2 times daily       calcium carbonate 500 MG tablet    OS-MARTITA 500 mg [place]in. Ca     2 tabs daily       levothyroxine 137 MCG tablet    SYNTHROID    90 tablet    Take 1 tablet (137 mcg) by mouth daily       liothyronine 5 MCG tablet    CYTOMEL    90 tablet    Take 1 tablet (5 mcg) by mouth daily       metoprolol 25 MG tablet    LOPRESSOR    120 tablet    Take 2 tablets (50 mg) by mouth 2 times daily       PROBIOTIC DAILY Caps      Take 1 capsule by mouth daily       vitamin  s/Minerals Tabs      1 TABLET DAILY       vitamin D 1000 UNITS capsule      take 2 Caps by mouth daily.

## 2017-05-18 ENCOUNTER — DOCUMENTATION ONLY (OUTPATIENT)
Dept: CARDIOLOGY | Facility: CLINIC | Age: 67
End: 2017-05-18

## 2017-05-18 NOTE — PROGRESS NOTES
I reviewed patient's recent testing results.    24-hour Holter - done prior to initiation of metoprolol therapy.  Showed suboptimally controlled atrial fibrillation with an average heart rate of 110 BPM (range 54 - 185 bpm).  Patient reported two episodes of palpitations during which her ventricular rates were 104 and 134 BPM.    NT-pro BNP is elevated at 2200.    I see that she was seen by one of the cardiology advanced practice providers yesterday.  Her note has not been transcribed but the metoprolol dosage has been increased from 25 mg b.i.d. to 50 mg b.i.d.    I will follow-up with the patient as scheduled.

## 2017-05-18 NOTE — PROGRESS NOTES
HISTORY OF PRESENT ILLNESS:  Ely Humphreys is a pleasant 67-year-old female presenting in clinic today for a followup visit regarding atrial fibrillation.  She saw Dr. Don on 05/12/2017 for evaluation regarding new onset atrial fibrillation and preoperative cardiac clearance prior to left knee meniscal repair previously scheduled for 05/17.  In addition, her past medical history is significant for Graves' disease status post radioablation several years ago which has been stable on thyroxine replacement.  She suffered a meniscus tear a few months back and she was scheduled to have surgical repair on 05/17.  During her prep evaluation, she was seen by her primary care doctor and a routine EKG showed new diagnosis of atrial fibrillation.  Her ventricular rate at that time was around 110.  She was started on apixaban 5 mg twice daily.  She was referred to see Dr. Don and had an echocardiogram that day showing a cardiomyopathy with a left ventricular ejection fraction around 40% with moderate global hypokinesis.  RV was normal.  RVSP was 20.  Left atrium is moderately to severely dilated.  There was moderate MR and moderate TR.  This was compared to a stress echo in 2013 at which time she is in sinus rhythm and her ejection fraction was 60%.      Dr. Don ordered a Holter monitor which showed persistent atrial fibrillation with rapid ventricular response with an average heart rate of 110, minimum of 54 and maximum 185.  She has been started on metoprolol 25 mg twice daily and asked to follow up.        In clinic today, Ely tells me that she has been feeling fatigued.  This was ongoing even before starting the metoprolol, but has become worse with initiation of metoprolol.  She has been referred to have a sleep study, but has not yet contacted the [facility].  She plans on doing so in the near future.  She otherwise denies any feeling of lightheadedness, dizziness, breathlessness, palpitation.  She has chronic lower  extremity edema on the right which has not worsened.      Additionally, I reviewed blood work including a basic metabolic panel which is within normal limits.  NT proBNP was elevated at 2202 on 05/10.  TSH was within normal limits.  CBC was also within normal limits.      IMPRESSION AND PLAN:  Nya Humphreys is a delightful 67-year-old female presenting in clinic today for followup regarding new onset atrial fibrillation.  This was found incidentally during a pre-op physical for repair of a meniscal tear.  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  She is currently on metoprolol 25 mg twice daily.  Resting heart rate in clinic today was 88 by EKG.  We are going to increase metoprolol to 50 mg twice daily.  She will come back on Monday for followup EKG to ascertain that she has better rate control.  Long-term plan will be for her to be anticoagulated for a month and see Dr. Don back with EKG.  If she continues to be in atrial fibrillation, she will have cardioversion.  Additionally, she is going to schedule her sleep study.  She will need a repeat echocardiogram down the road when she has restored sinus rhythm.  Her meniscal tear surgery is on hold for the time being.      It was my pleasure to participate in the care of Ms. Humphreys in clinic today.  We will be in contact with her on Monday regarding the results of her ECG and to see how she is tolerating the metoprolol.  Certainly if she has more fatigue, we may need to consider adjusting her medications.         SARAH SHEIKH, LELIA             D: 2017 16:05   T: 2017 14:42   MT: MAURICIO      Name:     YNA HUMPHREYS   MRN:      [id number]        Account:      [id number]   :      1950           Service Date: 2017      Document: [id number]

## 2017-05-22 DIAGNOSIS — I42.9 CARDIOMYOPATHY, UNSPECIFIED (H): ICD-10-CM

## 2017-05-22 DIAGNOSIS — I48.91 ATRIAL FIBRILLATION, UNSPECIFIED TYPE (H): ICD-10-CM

## 2017-05-22 PROCEDURE — 93000 ELECTROCARDIOGRAM COMPLETE: CPT | Performed by: INTERNAL MEDICINE

## 2017-05-23 ENCOUNTER — TRANSFERRED RECORDS (OUTPATIENT)
Dept: HEALTH INFORMATION MANAGEMENT | Facility: CLINIC | Age: 67
End: 2017-05-23

## 2017-05-23 LAB — PHQ9 SCORE: 1

## 2017-05-26 ENCOUNTER — MYC MEDICAL ADVICE (OUTPATIENT)
Dept: PEDIATRICS | Facility: CLINIC | Age: 67
End: 2017-05-26

## 2017-05-26 DIAGNOSIS — E03.9 ACQUIRED HYPOTHYROIDISM: ICD-10-CM

## 2017-05-26 RX ORDER — LEVOTHYROXINE SODIUM 137 UG/1
137 TABLET ORAL DAILY
Qty: 90 TABLET | Refills: 3 | Status: SHIPPED | OUTPATIENT
Start: 2017-05-26 | End: 2018-03-13

## 2017-05-26 RX ORDER — LIOTHYRONINE SODIUM 5 UG/1
5 TABLET ORAL DAILY
Qty: 90 TABLET | Refills: 3 | Status: SHIPPED | OUTPATIENT
Start: 2017-05-26 | End: 2018-03-13

## 2017-05-26 NOTE — TELEPHONE ENCOUNTER
levothyroxine and liothyronine  Last Written Prescription Date: 11/15/16  Last Quantity: 90, # refills: 1 on both  Last Office Visit with [facility], [facility] or [facility] prescribing provider: 5/10/17   Next 5 appointments (look out 90 days)     Jun 19, 2017 11:00 AM CDT   Return Visit with Juan Manuel Don MD   [facility] ([facility])    [address] [place] [address] [id number] [phone]                 TSH   Date Value Ref Range Status   05/10/2017 0.41 0.40 - 4.00 mU/L Final     Prescription approved per [facility] Refill Protocol.  Juani Nevarez RN  Message handled by Nurse Triage.

## 2017-06-02 DIAGNOSIS — R25.1 TREMOR: Primary | ICD-10-CM

## 2017-06-02 DIAGNOSIS — R20.0 NUMBNESS OF FEET: ICD-10-CM

## 2017-06-12 ENCOUNTER — RADIANT APPOINTMENT (OUTPATIENT)
Dept: MAMMOGRAPHY | Facility: CLINIC | Age: 67
End: 2017-06-12
Attending: INTERNAL MEDICINE
Payer: COMMERCIAL

## 2017-06-12 DIAGNOSIS — Z12.31 VISIT FOR SCREENING MAMMOGRAM: ICD-10-CM

## 2017-06-12 PROCEDURE — 77063 BREAST TOMOSYNTHESIS BI: CPT | Mod: TC

## 2017-06-12 PROCEDURE — G0202 SCR MAMMO BI INCL CAD: HCPCS | Mod: TC

## 2017-06-13 ENCOUNTER — OFFICE VISIT (OUTPATIENT)
Dept: SLEEP MEDICINE | Facility: CLINIC | Age: 67
End: 2017-06-13
Attending: INTERNAL MEDICINE
Payer: COMMERCIAL

## 2017-06-13 VITALS
HEIGHT: 62 IN | DIASTOLIC BLOOD PRESSURE: 79 MMHG | SYSTOLIC BLOOD PRESSURE: 108 MMHG | RESPIRATION RATE: 14 BRPM | WEIGHT: 160 LBS | HEART RATE: 70 BPM | BODY MASS INDEX: 29.44 KG/M2 | OXYGEN SATURATION: 97 %

## 2017-06-13 DIAGNOSIS — R06.83 SNORING: ICD-10-CM

## 2017-06-13 DIAGNOSIS — G47.9 SLEEP DISTURBANCE: Primary | ICD-10-CM

## 2017-06-13 DIAGNOSIS — E66.3 OVERWEIGHT: ICD-10-CM

## 2017-06-13 PROCEDURE — 99204 OFFICE O/P NEW MOD 45 MIN: CPT | Performed by: INTERNAL MEDICINE

## 2017-06-13 RX ORDER — ZOLPIDEM TARTRATE 5 MG/1
TABLET ORAL
Qty: 1 TABLET | Refills: 0 | Status: SHIPPED | OUTPATIENT
Start: 2017-06-13 | End: 2017-09-13

## 2017-06-13 NOTE — PATIENT INSTRUCTIONS
"MY TREATMENT INFORMATION FOR SLEEP DISTURBANCE-  Ely Humphreys    DOCTOR : Doc [facility]  SLEEP CENTER :  [place]  MY CONTACT NUMBER:[phone]        If I haven't had a sleep study yet, what can I expect?  A personal story from [place]  [email or web address].com/watch?v=[email or web address]    \    Suspected sleep apnea: Sleep study ordered.    Follow up in sleep clinic 1-2 weeks after sleep study to discuss results of sleep study and treatment options.    Patient was advised not to drive if drowsy or sleepy.    Frequently asked questions:  1. What is Obstructive Sleep Apnea (MIKEL)? MIKEL is the most common type of sleep apnea. Apnea literally means, \"without breath.\" It is characterized by repetitive pauses in breathing, despite continued effort to breathe, and is usually associated with a reduction in blood oxygen saturation. Apneas can last 10 to over 60 seconds. It is caused by narrowing or collapse of the upper airway as muscles relax during sleep. Severity of sleep apnea is determined by frequency of breathing events and their effect on your sleep and oxygen levels determined during sleep testing.   2. What are the consequences of MIKEL? Symptoms include: daytime sleepiness- possibly increasing the risk of falling asleep while driving, unrefreshing/restless sleep, snoring, insomnia, waking frequently to urinate, waking with heartburn or reflux, reduced concentration and memory, and morning headaches. Other health consequences may include development of high blood pressure and other cardiovascular disease in persons who are susceptible. Untreated MIKEL  can contribute to heart disease, stroke and diabetes.   3. What are the treatment options? In most situations, sleep apnea is a lifelong disease that must be managed with daily therapy. Medications are not effective for sleep apnea and surgery is generally not performed until other therapies have been tried. Therapy is usually tailored to the individual patient based on " many factors including your wishes as well as severity of sleep apnea and severity of obesity. Continuous Positive Airway (CPAP) is the most reliable treatment. An oral device to hold your jaw forward is usually the next most reliable option. Other options include postioning devices (to keep you off your back), weight loss, and surgery including a tongue pacing device. There is more detail about some of these options below.            1. CPAP-  WHAT DOES IT DO AND HOW CAN I LEARN TO WEAR IT?                               BEFORE I START, CAN I WATCH A MOVIE TO GET A PLAN ON HOW TO USE CPAP?  https://www.Invidio.com/watch?s=b6E76hu703P      Continuous positive airway pressure, or CPAP, is the most effective treatment for obstructive sleep apnea. It works by blowing room air, through a mask, to hold your throat open. A decision to use CPAP is a major step forward in the pursuit of a healthier life. The successful use of CPAP will help you breathe easier, sleep better and live healthier. You can choose CPAP equipment from any durable medical equipment provider that meets your needs.  Using CPAP can be a positive experience if you keep these dougherty points in mind:  1. Commitment  CPAP is not a quick fix for your problem. It involves a long-term commitment to improve your sleep and your health.    2. Communication  Stay in close communication with both your sleep doctor and your CPAP supplier. Ask lots of questions and seek help when you need it.    3. Consistency  Use CPAP all night, every night and for every nap. You will receive the maximum health benefits from CPAP when you use it every time that you sleep. This will also make it easier for your body to adjust to the treatment.    4. Correction  The first machine and mask that you try may not be the best ones for you. Work with your sleep doctor and your CPAP supplier to make corrections to your equipment selection. Ask about trying a different type of machine or mask if  "you have ongoing problems. Make sure that your mask is a good fit and learn to use your equipment properly.    5. Challenge  Tell a family member or close friend to ask you each morning if you used your CPAP the previous night. Have someone to challenge you to give it your best effort.    6. Connection   Your adjustment to CPAP will be easier if you are able to connect with others who use the same treatment. Ask your sleep doctor if there is a support group in your area for people who have sleep apnea, or look for one on the Internet.  7. Comfort   Increase your level of comfort by using a saline spray, decongestant or heated humidifier if CPAP irritates your nose, mouth or throat. Use your unit's \"ramp\" setting to slowly get used to the air pressure level. There may be soft pads you can buy that will fit over your mask straps. Look on www.CPAP.com for accessories that can help make CPAP use more comfortable.  8. Cleaning   Clean your mask, tubing and headgear on a regular basis. Put this time in your schedule so that you don't forget to do it. Check and replace the filters for your CPAP unit and humidifier.    9. Completion   Although you are never finished with CPAP therapy, you should reward yourself by celebrating the completion of your first month of treatment. Expect this first month to be your hardest period of adjustment. It will involve some trial and error as you find the machine, mask and pressure settings that are right for you.    10. Continuation  After your first month of treatment, continue to make a daily commitment to use your CPAP all night, every night and for every nap.    CPAP-Tips to starting with success:  Begin using your CPAP for short periods of time during the day while you watch TV or read.    Use CPAP every night and for every nap. Using it less often reduces the health benefits and makes it harder for your body to get used to it.    Make small adjustments to your mask, tubing, straps " and headgear until you get the right fit. Tightening the mask may actually worsen the leak.  If it leaves significant marks on your face or irritates the bridge of your nose, it may not be the best mask for you.  Speak with the person who supplied the mask and consider trying other masks. Insurances will allow you to try different masks during the first month of starting CPAP.  Insurance also covers a new mask, hose and filter about every 6 months.    Use a saline nasal spray to ease mild nasal congestion. Neti-Pot or saline nasal rinses may also help. Nasal gel sprays can help reduce nasal dryness.  Biotene mouthwash can be helpful to protect your teeth if you experience frequent dry mouth.  Dry mouth may be a sign of air escaping out of your mouth or out of the mask in the case of a full face mask.  Speak with your provider if you expect that is the case.     Take a nasal decongestant to relieve more severe nasal or sinus congestion.  Do not use Afrin (oxymetazoline) nasal spray more than 3 days in a row.  Speak with your sleep doctor if your nasal congestion is chronic.    Use a heated humidifier that fits your CPAP model to enhance your breathing comfort. Adjust the heat setting up if you get a dry nose or throat, down if you get condensation in the hose or mask.  Position the CPAP lower than you so that any condensation in the hose drains back into the machine rather than towards the mask.    Try a system that uses nasal pillows if traditional masks give you problems.    Clean your mask, tubing and headgear once a week. Make sure the equipment dries fully.    Regularly check and replace the filters for your CPAP unit and humidifier.    Work closely with your sleep provider and your CPAP supplier to make sure that you have the machine, mask and air pressure setting that works best for you. It is better to stop using it and call your provider to solve problems than to lay awake all night frustrated with the  device.    BESIDES CPAP, WHAT OTHER THERAPIES ARE THERE?      Positioning Device  Positioning devices are generally used when sleep apnea is mild and only occurs on your back.This example shows a pillow that straps around the waist. It may be appropriate for those whose sleep study shows milder sleep apnea that occurs primarily when lying flat on one's back. Preliminary studies have shown benefit but effectiveness at home may need to be verified by a home sleep test. These devices are generally not covered by medical insurance.                      Oral Appliance  What is oral appliance therapy?  An oral appliance is a small acrylic device that fits over the upper and lower teeth or tongue (similar to an orthodontic retainer or a mouth guard). This device slightly advances the lower jaw or tongue, which moves the base of the tongue forward, opens the airway, improves breathing and can effectively treat snoring and obstructive sleep apnea sleep apnea. The appliance is fabricated and customized by a qualified dentist with experience in treating snoring and sleep apnea. Oral appliances are usually well tolerated and have relatively high compliance by patients1, 2, 3.  When is an oral appliance indicated?  Oral appliance therapy is recommended as a first-line treatment for patients with primary snoring, mild sleep apnea, and for patients with moderate sleep apnea who prefer appliance therapy to use of CPAP4, 5. Severity of sleep apnea is determined by sleep testing and is based on the number of respiratory events per hour of sleep.   How successful is oral appliance therapy?  The success rate of oral appliance therapy in patients with mild sleep apnea is 75-80% while in patients with moderate sleep apnea it is 50-70%. The chance of success in patients with severe sleep apnea is 40-50%. The research also shows that oral appliances have a beneficial effect on the cardiovascular health of MIKEL patients at the same magnitude  as CPAP therapy7.  Oral appliances should be a second-line treatment in cases of severe sleep apnea, but if not completely successful then a combination therapy utilizing CPAP plus oral appliance therapy may be effective. Oral appliances tend to be effective in a broad range of patients although studies show that the patients who have the highest success are females, younger patients, those with milder disease, and less severe obesity. 3, 6.   The chances of success are lower in patients who have more severe MIKEL, are older, and those who are morbidly obese.     Example of an oral appliance   Finding a dentist that practices dental sleep medicine  Specific training is available through the American Academy of Dental Sleep Medicine for dentists interested in working in the field of sleep. To find a dentist who is educated in the field of sleep and the use of oral appliances, near you, visit the Web site of the American Academy of Dental Sleep Medicine; also see   http://www.accpstorage.org/newOrganization/patients/oralAppliances.pdf  To search for a dentist certified in these practices:  Http://aadsm.org/FindADentist.aspx?1  1. Tino et al. Objectively measured vs self-reported compliance during oral appliance therapy for sleep-disordered breathing. Chest 2013; 144(5): 0992-1997.  2. Reema, et al. Objective measurement of compliance during oral appliance therapy for sleep-disordered breathing. Thorax 2013; 68(1): 91-96.  3. Yasemin, et al. Mandibular advancement devices in 620 men and women with MIKEL and snoring: tolerability and predictors of treatment success. Chest 2004; 125: 9671-4659.  4. Miguel, et al. Oral appliances for snoring and MIKEL: a review. Sleep 2006; 29: 244-262.  5. Krissy, et al. Oral appliance treatment for MIKEL: an update. J Clin Sleep Med 2014; 10(2): 215-227.  6. Kemar et al. Predictors of OSAH treatment outcome. J Dent Res 2007; 86: 2876-5671.    Nasal Valves                  Nasal valves may not be effective if you have frequent nasal congestion or have difficulty breathing through your nose. They may be an option for mild apnea if other options are not well tolerated. The efficacy of these devices is generally less than CPAP or oral appliances.      Weight Loss:    Weight management is a personal decision.  If you are interested in exploring weight loss strategies, the following discussion covers the impact on weight loss on sleep apnea and the approaches that may be successful.    Weight loss decreases severity of sleep apnea in most people with obesity. For those with mild obesity who have developed snoring with weight gain, even 15-30 pound weight loss can improve and occasionally eliminate sleep apnea.  Structured and life-long dietary and health habits are necessary to lose weight and keep healthier weight levels.     Though there may be significant health benefits from weight loss, long-term weight loss is very difficult to achieve- studies show success with dietary management in less than 10% of people. In addition, substantial weight loss may require years of dietary control and may be difficult if patients have severe obesity. In these cases, surgical management may be considered.  Finally, older individuals who have tolerated obesity without health complications may be less likely to benefit from weight loss strategies.    Your BMI is Body mass index is 29.04 kg/(m^2).  Body mass index (BMI) is one way to tell whether you are at a healthy weight, overweight, or obese. It measures your weight in relation to your height.  A BMI of 18.5 to 24.9 is in the healthy range. A person with a BMI of 25 to 29.9 is considered overweight, and someone with a BMI of 30 or greater is considered obese. More than two-thirds of American adults are considered overweight or obese.  Being overweight or obese increases the risk for further weight gain. Excess weight may lead to heart disease and  diabetes.  Creating and following plans for healthy eating and physical activity may help you improve your health.  Weight control is part of healthy lifestyle and includes exercise, emotional health, and healthy eating habits. Careful eating habits lifelong are the mainstay of weight control. Though there are significant health benefits from weight loss, long-term weight loss with diet alone may be very difficult to achieve- studies show long-term success with dietary management in less than 10% of people. Attaining a healthy weight may be especially difficult to achieve in those with severe obesity. In some cases, medications, devices and surgical management might be considered.  What can you do?  If you are overweight or obese and are interested in methods for weight loss, you should discuss this with your provider.     Consider reducing daily calorie intake by 500 calories.     Keep a food journal.     Avoiding skipping meals, consider cutting portions instead.    Diet combined with exercise helps maintain muscle while optimizing fat loss. Strength training is particularly important for building and maintaining muscle mass. Exercise helps reduce stress, increase energy, and improves fitness. Increasing exercise without diet control, however, may not burn enough calories to loose weight.       Start walking three days a week 10-20 minutes at a time    Work towards walking thirty minutes five days a week     Eventually, increase the speed of your walking for 1-2 minutes at time    In addition, we recommend that you review healthy lifestyles and methods for weight loss available through the National Institutes of Health patient information sites:  http://win.niddk.nih.gov/publications/index.htm    And look into health and wellness programs that may be available through your health insurance provider, employer, local community center, or manuela club.    Weight management plan: Patient was referred to their PCP to  discuss a diet and exercise plan.    Surgery:    Upper Airway Surgery for MIKEL  Surgery for MIKEL is a second-line treatment option in the management of sleep apnea.  Surgery should be considered for patients who are having a difficult time tolerating CPAP.    Surgery for MIKEL is directed at areas that are responsible for narrowing or complete obstruction of the airway during sleep.  There are a wide range of procedures available to enlarge and/or stabilize the airway to prevent blockage of breathing in the three major areas where it can occur: the palate, tongue, and nasal regions.  Successful surgical treatment depends on the accurate identification of the factors responsible for obstructive sleep apnea in each person.  A personalized approach is required because there is no single treatment that works well for everyone.  Because of anatomic variation, consultation with an examination by a sleep surgeon is a critical first step in determining what surgical options are best for each patient.  In some cases, examination during sedation may be recommended in order to guide the selection of procedures.  Patients will be counseled about risks and benefits as well as the typical recovery course after surgery. Surgery is typically not a cure for a person s MIKEL.  However, surgery will often significantly improve one s MIKEL severity (termed  success rate ).  Even in the absence of a cure, surgery will decrease the cardiovascular risk associated with OSA7; improve overall quality of life8 (sleepiness, functionality, sleep quality, etc).          Palate Procedures:  Patients with MIKEL often have narrowing of their airway in the region of their tonsils and uvula.  The goals of palate procedures are to widen the airway in this region as well as to help the tissues resist collapse.  Modern palate procedure techniques focus on tissue conservation and soft tissue rearrangement, rather than tissue removal.  Often the uvula is preserved  in this procedure. Residual sleep apnea is common in patient after pharyngoplasty with an average reduction in sleep apnea events of 33%2.      Tongue Procedures:  While patients are awake, the muscles that surround the throat are active and keep this region open for breathing. These muscles relax during sleep, allowing the tongue and other structures to collapse and block breathing.  There are several different tongue procedures available.  Selection of a tongue base procedure depends on characteristics seen on physical exam.  Generally, procedures are aimed at removing bulky tissues in this area or preventing the back of the tongue from falling back during sleep.  Success rates for tongue surgery range from 50-62%3.    Hypoglossal Nerve Stimulation:  Hypoglossal nerve stimulation has recently received approval from the [place] Food and Drug Administration for the treatment of obstructive sleep apnea.  This is based on research showing that the system was safe and effective in treating sleep apnea6.  Results showed that the median AHI score decreased 68%, from 29.3 to 9.0. This therapy uses an implant system that senses breathing patterns and delivers mild stimulation to airway muscles, which keeps the airway open during sleep.  The system consists of three fully implanted components: a small generator (similar in size to a pacemaker), a breathing sensor, and a stimulation lead.  Using a small handheld remote, a patient turns the therapy on before bed and off upon awakening.    Candidates for this device must be greater than 22 years of age, have moderate to severe MIKEL (AHI between 20-65), BMI less than 32, have tried CPAP/oral appliance without success, and have appropriate upper airway anatomy (determined by a sleep endoscopy performed by Dr. Guy).    Hypoglossal Nerve Stimulation Pathway:    The sleep surgeon s office will work with the patient through the insurance prior-authorization process (including  communications and appeals).    Nasal Procedures:  Nasal obstruction can interfere with nasal breathing during the day and night.  Studies have shown that relief of nasal obstruction can improve the ability of some patients to tolerate positive airway pressure therapy for obstructive sleep apnea1.  Treatment options include medications such as nasal saline, topical corticosteroid and antihistamine sprays, and oral medications such as antihistamines or decongestants. Non-surgical treatments can include external nasal dilators for selected patients. If these are not successful by themselves, surgery can improve the nasal airway either alone or in combination with these other options.      Combination Procedures:  Combination of surgical procedures and other treatments may be recommended, particularly if patients have more than one area of narrowing or persistent positional disease.  The success rate of combination surgery ranges from 66-80%2,3.      1. Lizzeth MCCULLOUGH. The Role of the Nose in Snoring and Obstructive Sleep Apnoea: An Update.  Eur Arch Otorhinolaryngol. 2011; 268: 1365-73.  2.  Aster SM; Mariusz JA; Lydia JR; Pallanch JF; Willie MB; Julio Cesar SG; Kristen WU. Surgical modifications of the upper airway for obstructive sleep apnea in adults: a systematic review and meta-analysis. SLEEP 2010;33(10):9506-5014. Caron VILLAGRAN. Hypopharyngeal surgery in obstructive sleep apnea: an evidence-based medicine review.  Arch Otolaryngol Head Neck Surg. 2006 Feb;132(2):206-13.  3. Dustin YH1, Waqas Y, Jose Alejandro KIT. The efficacy of anatomically based multilevel surgery for obstructive sleep apnea. Otolaryngol Head Neck Surg. 2003 Oct;129(4):327-35.  4. Kezirian E, Goldberg A. Hypopharyngeal Surgery in Obstructive Sleep Apnea: An Evidence-Based Medicine Review. Arch Otolaryngol Head Neck Surg. 2006 Feb;132(2):206-13.  5. Laila SELF et al. Upper-Airway Stimulation for Obstructive Sleep Apnea.  N Engl J Med. 2014 Jan  9;370(2):139-49.  6. Marizol Y et al. Increased Incidence of Cardiovascular Disease in Middle-aged Men with Obstructive Sleep Apnea. Am J Respir Crit Care Med; 2002 166: 159-165  7. Laz CABRERA et al. Studying Life Effects and Effectiveness of Palatopharyngoplasty (SLEEP) study: Subjective Outcomes of Isolated Uvulopalatopharyngoplasty. Otolaryngol Head Neck Surg. 2011; 144: 623-631.

## 2017-06-13 NOTE — MR AVS SNAPSHOT
"              After Visit Summary   6/13/2017    Ely Humphreys    MRN: [id number]           Patient Information     Date Of Birth          1950        Visit Information        Provider Department      6/13/2017 9:00 AM Doc Nash MD [facility] [place]        Today's Diagnoses     Sleep disturbance    -  1    Weight gain        Snoring          Care Instructions    MY TREATMENT INFORMATION FOR SLEEP DISTURBANCE-  Ely VILLAGRAN Italiapari    DOCTOR : Doc Nash  SLEEP CENTER :  [place]  MY CONTACT NUMBER:[phone]        If I haven't had a sleep study yet, what can I expect?  A personal story from [employer]  [email or web address].com/watch?[email or web address]    \    Suspected sleep apnea: Sleep study ordered.    Follow up in sleep clinic 1-2 weeks after sleep study to discuss results of sleep study and treatment options.    Patient was advised not to drive if drowsy or sleepy.    Frequently asked questions:  1. What is Obstructive Sleep Apnea (MIKEL)? MIKEL is the most common type of sleep apnea. Apnea literally means, \"without breath.\" It is characterized by repetitive pauses in breathing, despite continued effort to breathe, and is usually associated with a reduction in blood oxygen saturation. Apneas can last 10 to over 60 seconds. It is caused by narrowing or collapse of the upper airway as muscles relax during sleep. Severity of sleep apnea is determined by frequency of breathing events and their effect on your sleep and oxygen levels determined during sleep testing.   2. What are the consequences of MIKEL? Symptoms include: daytime sleepiness- possibly increasing the risk of falling asleep while driving, unrefreshing/restless sleep, snoring, insomnia, waking frequently to urinate, waking with heartburn or reflux, reduced concentration and memory, and morning headaches. Other health consequences may include development of high blood pressure and other cardiovascular disease in persons who are " susceptible. Untreated MIKEL  can contribute to heart disease, stroke and diabetes.   3. What are the treatment options? In most situations, sleep apnea is a lifelong disease that must be managed with daily therapy. Medications are not effective for sleep apnea and surgery is generally not performed until other therapies have been tried. Therapy is usually tailored to the individual patient based on many factors including your wishes as well as severity of sleep apnea and severity of obesity. Continuous Positive Airway (CPAP) is the most reliable treatment. An oral device to hold your jaw forward is usually the next most reliable option. Other options include postioning devices (to keep you off your back), weight loss, and surgery including a tongue pacing device. There is more detail about some of these options below.            1. CPAP-  WHAT DOES IT DO AND HOW CAN I LEARN TO WEAR IT?                               BEFORE I START, CAN I WATCH A MOVIE TO GET A PLAN ON HOW TO USE CPAP?  https://www.KDS.com/watch?k=[email or web address]      Continuous positive airway pressure, or CPAP, is the most effective treatment for obstructive sleep apnea. It works by blowing room air, through a mask, to hold your throat open. A decision to use CPAP is a major step forward in the pursuit of a healthier life. The successful use of CPAP will help you breathe easier, sleep better and live healthier. You can choose CPAP equipment from any durable medical equipment provider that meets your needs.  Using CPAP can be a positive experience if you keep these dougherty points in mind:  1. Commitment  CPAP is not a quick fix for your problem. It involves a long-term commitment to improve your sleep and your health.    2. Communication  Stay in close communication with both your sleep doctor and your CPAP supplier. Ask lots of questions and seek help when you need it.    3. Consistency  Use CPAP all night, every night and for every nap. You will receive  "the maximum health benefits from CPAP when you use it every time that you sleep. This will also make it easier for your body to adjust to the treatment.    4. Correction  The first machine and mask that you try may not be the best ones for you. Work with your sleep doctor and your CPAP supplier to make corrections to your equipment selection. Ask about trying a different type of machine or mask if you have ongoing problems. Make sure that your mask is a good fit and learn to use your equipment properly.    5. Challenge  Tell a family member or close friend to ask you each morning if you used your CPAP the previous night. Have someone to challenge you to give it your best effort.    6. Connection   Your adjustment to CPAP will be easier if you are able to connect with others who use the same treatment. Ask your sleep doctor if there is a support group in your area for people who have sleep apnea, or look for one on the Internet.  7. Comfort   Increase your level of comfort by using a saline spray, decongestant or heated humidifier if CPAP irritates your nose, mouth or throat. Use your unit's \"ramp\" setting to slowly get used to the air pressure level. There may be soft pads you can buy that will fit over your mask straps. Look on www.CPAP.com for accessories that can help make CPAP use more comfortable.  8. Cleaning   Clean your mask, tubing and headgear on a regular basis. Put this time in your schedule so that you don't forget to do it. Check and replace the filters for your CPAP unit and humidifier.    9. Completion   Although you are never finished with CPAP therapy, you should reward yourself by celebrating the completion of your first month of treatment. Expect this first month to be your hardest period of adjustment. It will involve some trial and error as you find the machine, mask and pressure settings that are right for you.    10. Continuation  After your first month of treatment, continue to make a daily " commitment to use your CPAP all night, every night and for every nap.    CPAP-Tips to starting with success:  Begin using your CPAP for short periods of time during the day while you watch TV or read.    Use CPAP every night and for every nap. Using it less often reduces the health benefits and makes it harder for your body to get used to it.    Make small adjustments to your mask, tubing, straps and headgear until you get the right fit. Tightening the mask may actually worsen the leak.  If it leaves significant marks on your face or irritates the bridge of your nose, it may not be the best mask for you.  Speak with the person who supplied the mask and consider trying other masks. Insurances will allow you to try different masks during the first month of starting CPAP.  Insurance also covers a new mask, hose and filter about every 6 months.    Use a saline nasal spray to ease mild nasal congestion. Neti-Pot or saline nasal rinses may also help. Nasal gel sprays can help reduce nasal dryness.  Biotene mouthwash can be helpful to protect your teeth if you experience frequent dry mouth.  Dry mouth may be a sign of air escaping out of your mouth or out of the mask in the case of a full face mask.  Speak with your provider if you expect that is the case.     Take a nasal decongestant to relieve more severe nasal or sinus congestion.  Do not use Afrin (oxymetazoline) nasal spray more than 3 days in a row.  Speak with your sleep doctor if your nasal congestion is chronic.    Use a heated humidifier that fits your CPAP model to enhance your breathing comfort. Adjust the heat setting up if you get a dry nose or throat, down if you get condensation in the hose or mask.  Position the CPAP lower than you so that any condensation in the hose drains back into the machine rather than towards the mask.    Try a system that uses nasal pillows if traditional masks give you problems.    Clean your mask, tubing and headgear once a  week. Make sure the equipment dries fully.    Regularly check and replace the filters for your CPAP unit and humidifier.    Work closely with your sleep provider and your CPAP supplier to make sure that you have the machine, mask and air pressure setting that works best for you. It is better to stop using it and call your provider to solve problems than to lay awake all night frustrated with the device.    BESIDES CPAP, WHAT OTHER THERAPIES ARE THERE?      Positioning Device  Positioning devices are generally used when sleep apnea is mild and only occurs on your back.This example shows a pillow that straps around the waist. It may be appropriate for those whose sleep study shows milder sleep apnea that occurs primarily when lying flat on one's back. Preliminary studies have shown benefit but effectiveness at home may need to be verified by a home sleep test. These devices are generally not covered by medical insurance.                      Oral Appliance  What is oral appliance therapy?  An oral appliance is a small acrylic device that fits over the upper and lower teeth or tongue (similar to an orthodontic retainer or a mouth guard). This device slightly advances the lower jaw or tongue, which moves the base of the tongue forward, opens the airway, improves breathing and can effectively treat snoring and obstructive sleep apnea sleep apnea. The appliance is fabricated and customized by a qualified dentist with experience in treating snoring and sleep apnea. Oral appliances are usually well tolerated and have relatively high compliance by patients1, 2, 3.  When is an oral appliance indicated?  Oral appliance therapy is recommended as a first-line treatment for patients with primary snoring, mild sleep apnea, and for patients with moderate sleep apnea who prefer appliance therapy to use of CPAP4, 5. Severity of sleep apnea is determined by sleep testing and is based on the number of respiratory events per hour of  sleep.   How successful is oral appliance therapy?  The success rate of oral appliance therapy in patients with mild sleep apnea is 75-80% while in patients with moderate sleep apnea it is 50-70%. The chance of success in patients with severe sleep apnea is 40-50%. The research also shows that oral appliances have a beneficial effect on the cardiovascular health of MIKEL patients at the same magnitude as CPAP therapy7.  Oral appliances should be a second-line treatment in cases of severe sleep apnea, but if not completely successful then a combination therapy utilizing CPAP plus oral appliance therapy may be effective. Oral appliances tend to be effective in a broad range of patients although studies show that the patients who have the highest success are females, younger patients, those with milder disease, and less severe obesity. 3, 6.   The chances of success are lower in patients who have more severe MIKEL, are older, and those who are morbidly obese.     Example of an oral appliance   Finding a dentist that practices dental sleep medicine  Specific training is available through the American Academy of Dental Sleep Medicine for dentists interested in working in the field of sleep. To find a dentist who is educated in the field of sleep and the use of oral appliances, near you, visit the Web site of the American Academy of Dental Sleep Medicine; also see   http://www.accpstorage.org/newOrganization/patients/oralAppliances.pdf  To search for a dentist certified in these practices:  Http://aadsm.org/FindADentist.aspx?1  1. Tino, et al. Objectively measured vs self-reported compliance during oral appliance therapy for sleep-disordered breathing. Chest 2013; 144(5): 0470-0433.  2. Reema et al. Objective measurement of compliance during oral appliance therapy for sleep-disordered breathing. Thorax 2013; 68(1): 91-96.  3. Yasemin et al. Mandibular advancement devices in 620 men and women with MIKEL and  snoring: tolerability and predictors of treatment success. Chest 2004; 125: 4581-0261.  4. Miguel et al. Oral appliances for snoring and MIKEL: a review. Sleep 2006; 29: 244-262.  5. Krissy et al. Oral appliance treatment for MIKEL: an update. J Clin Sleep Med 2014; 10(2): 215-227.  6. Kemar et al. Predictors of OSAH treatment outcome. J Dent Res 2007; 86: 8803-1043.    Nasal Valves                 Nasal valves may not be effective if you have frequent nasal congestion or have difficulty breathing through your nose. They may be an option for mild apnea if other options are not well tolerated. The efficacy of these devices is generally less than CPAP or oral appliances.      Weight Loss:    Weight management is a personal decision.  If you are interested in exploring weight loss strategies, the following discussion covers the impact on weight loss on sleep apnea and the approaches that may be successful.    Weight loss decreases severity of sleep apnea in most people with obesity. For those with mild obesity who have developed snoring with weight gain, even 15-30 pound weight loss can improve and occasionally eliminate sleep apnea.  Structured and life-long dietary and health habits are necessary to lose weight and keep healthier weight levels.     Though there may be significant health benefits from weight loss, long-term weight loss is very difficult to achieve- studies show success with dietary management in less than 10% of people. In addition, substantial weight loss may require years of dietary control and may be difficult if patients have severe obesity. In these cases, surgical management may be considered.  Finally, older individuals who have tolerated obesity without health complications may be less likely to benefit from weight loss strategies.    Your BMI is Body mass index is 29.04 kg/(m^2).  Body mass index (BMI) is one way to tell whether you are at a healthy weight, overweight, or obese. It  measures your weight in relation to your height.  A BMI of 18.5 to 24.9 is in the healthy range. A person with a BMI of 25 to 29.9 is considered overweight, and someone with a BMI of 30 or greater is considered obese. More than two-thirds of American adults are considered overweight or obese.  Being overweight or obese increases the risk for further weight gain. Excess weight may lead to heart disease and diabetes.  Creating and following plans for healthy eating and physical activity may help you improve your health.  Weight control is part of healthy lifestyle and includes exercise, emotional health, and healthy eating habits. Careful eating habits lifelong are the mainstay of weight control. Though there are significant health benefits from weight loss, long-term weight loss with diet alone may be very difficult to achieve- studies show long-term success with dietary management in less than 10% of people. Attaining a healthy weight may be especially difficult to achieve in those with severe obesity. In some cases, medications, devices and surgical management might be considered.  What can you do?  If you are overweight or obese and are interested in methods for weight loss, you should discuss this with your provider.     Consider reducing daily calorie intake by 500 calories.     Keep a food journal.     Avoiding skipping meals, consider cutting portions instead.    Diet combined with exercise helps maintain muscle while optimizing fat loss. Strength training is particularly important for building and maintaining muscle mass. Exercise helps reduce stress, increase energy, and improves fitness. Increasing exercise without diet control, however, may not burn enough calories to loose weight.       Start walking three days a week 10-20 minutes at a time    Work towards walking thirty minutes five days a week     Eventually, increase the speed of your walking for 1-2 minutes at time    In addition, we recommend that  you review healthy lifestyles and methods for weight loss available through the National Institutes of Health patient information sites:  http://win.niddk.nih.gov/publications/index.htm    And look into health and wellness programs that may be available through your health insurance provider, employer, local community center, or manuela club.    Weight management plan: Patient was referred to their PCP to discuss a diet and exercise plan.    Surgery:    Upper Airway Surgery for MIKEL  Surgery for MIKEL is a second-line treatment option in the management of sleep apnea.  Surgery should be considered for patients who are having a difficult time tolerating CPAP.    Surgery for MIKEL is directed at areas that are responsible for narrowing or complete obstruction of the airway during sleep.  There are a wide range of procedures available to enlarge and/or stabilize the airway to prevent blockage of breathing in the three major areas where it can occur: the palate, tongue, and nasal regions.  Successful surgical treatment depends on the accurate identification of the factors responsible for obstructive sleep apnea in each person.  A personalized approach is required because there is no single treatment that works well for everyone.  Because of anatomic variation, consultation with an examination by a sleep surgeon is a critical first step in determining what surgical options are best for each patient.  In some cases, examination during sedation may be recommended in order to guide the selection of procedures.  Patients will be counseled about risks and benefits as well as the typical recovery course after surgery. Surgery is typically not a cure for a person s MIKEL.  However, surgery will often significantly improve one s MIKEL severity (termed  success rate ).  Even in the absence of a cure, surgery will decrease the cardiovascular risk associated with OSA7; improve overall quality of life8 (sleepiness, functionality, sleep  quality, etc).          Palate Procedures:  Patients with MIKEL often have narrowing of their airway in the region of their tonsils and uvula.  The goals of palate procedures are to widen the airway in this region as well as to help the tissues resist collapse.  Modern palate procedure techniques focus on tissue conservation and soft tissue rearrangement, rather than tissue removal.  Often the uvula is preserved in this procedure. Residual sleep apnea is common in patient after pharyngoplasty with an average reduction in sleep apnea events of 33%2.      Tongue Procedures:  While patients are awake, the muscles that surround the throat are active and keep this region open for breathing. These muscles relax during sleep, allowing the tongue and other structures to collapse and block breathing.  There are several different tongue procedures available.  Selection of a tongue base procedure depends on characteristics seen on physical exam.  Generally, procedures are aimed at removing bulky tissues in this area or preventing the back of the tongue from falling back during sleep.  Success rates for tongue surgery range from 50-62%3.    Hypoglossal Nerve Stimulation:  Hypoglossal nerve stimulation has recently received approval from the [place] Food and Drug Administration for the treatment of obstructive sleep apnea.  This is based on research showing that the system was safe and effective in treating sleep apnea6.  Results showed that the median AHI score decreased 68%, from 29.3 to 9.0. This therapy uses an implant system that senses breathing patterns and delivers mild stimulation to airway muscles, which keeps the airway open during sleep.  The system consists of three fully implanted components: a small generator (similar in size to a pacemaker), a breathing sensor, and a stimulation lead.  Using a small handheld remote, a patient turns the therapy on before bed and off upon awakening.    Candidates for this  device must be greater than 22 years of age, have moderate to severe MIKEL (AHI between 20-65), BMI less than 32, have tried CPAP/oral appliance without success, and have appropriate upper airway anatomy (determined by a sleep endoscopy performed by Dr. Guy).    Hypoglossal Nerve Stimulation Pathway:    The sleep surgeon s office will work with the patient through the insurance prior-authorization process (including communications and appeals).    Nasal Procedures:  Nasal obstruction can interfere with nasal breathing during the day and night.  Studies have shown that relief of nasal obstruction can improve the ability of some patients to tolerate positive airway pressure therapy for obstructive sleep apnea1.  Treatment options include medications such as nasal saline, topical corticosteroid and antihistamine sprays, and oral medications such as antihistamines or decongestants. Non-surgical treatments can include external nasal dilators for selected patients. If these are not successful by themselves, surgery can improve the nasal airway either alone or in combination with these other options.      Combination Procedures:  Combination of surgical procedures and other treatments may be recommended, particularly if patients have more than one area of narrowing or persistent positional disease.  The success rate of combination surgery ranges from 66-80%2,3.      1. Lizzeth MCCULLOUGH. The Role of the Nose in Snoring and Obstructive Sleep Apnoea: An Update.  Eur Arch Otorhinolaryngol. 2011; 268: 1365-73.  2.  Aster SM; Mariusz JA; Lydia JR; Pallanch JF; Willie MB; Julio Cesar SG; Kristen WU. Surgical modifications of the upper airway for obstructive sleep apnea in adults: a systematic review and meta-analysis. SLEEP 2010;33(10):7470-5514. Caron VILLAGRAN. Hypopharyngeal surgery in obstructive sleep apnea: an evidence-based medicine review.  Arch Otolaryngol Head Neck Surg. 2006 Feb;132(2):206-13.  3. Dustin YH1, Waqas Y, Jose Alejandro KIT. The  efficacy of anatomically based multilevel surgery for obstructive sleep apnea. Otolaryngol Head Neck Surg. 2003 Oct;129(4):327-35.  4. Caron VILLAGRAN, Goldberg A. Hypopharyngeal Surgery in Obstructive Sleep Apnea: An Evidence-Based Medicine Review. Arch Otolaryngol Head Neck Surg. 2006 Feb;132(2):206-13.  5. Laila PJ et al. Upper-Airway Stimulation for Obstructive Sleep Apnea.  N Engl J Med. 2014 Jan 9;370(2):139-49.  6. Marizol Y et al. Increased Incidence of Cardiovascular Disease in Middle-aged Men with Obstructive Sleep Apnea. Am J Respir Crit Care Med; 2002 166: 159-165  7. Nesbittmaryjane CABRERA et al. Studying Life Effects and Effectiveness of Palatopharyngoplasty (SLEEP) study: Subjective Outcomes of Isolated Uvulopalatopharyngoplasty. Otolaryngol Head Neck Surg. 2011; 144: 623-631.                Follow-ups after your visit        Your next 10 appointments already scheduled     Jun 19, 2017 11:00 AM CDT   Return Visit with Juan Manuel Don MD   [facility] PHYSICIANS [facility] AT [place] ([facility])    [address] [place] [id number] [phone]              Future tests that were ordered for you today     Open Future Orders        Priority Expected Expires Ordered    Comprehensive Sleep Study Routine  12/10/2017 6/13/2017            Who to contact     If you have questions or need follow up information about today's clinic visit or your schedule please contact [facility] directly at [phone].  Normal or non-critical lab and imaging results will be communicated to you by MyChart, letter or phone within 4 business days after the clinic has received the results. If you do not hear from us within 7 days, please contact the clinic through MyChart or phone. If you have a critical or abnormal lab result, we will notify you by phone as soon as possible.  Submit refill requests through [facility] or call your pharmacy and they will forward the refill request  "to us. Please allow 3 business days for your refill to be completed.          Additional Information About Your Visit        [employer] Information     [employer] gives you secure access to your electronic health record. If you see a primary care provider, you can also send messages to your care team and make appointments. If you have questions, please call your primary care clinic.  If you do not have a primary care provider, please call [phone] and they will assist you.        Care EveryWhere ID     This is your Care EveryWhere ID. This could be used by other organizations to access your [facility] medical records  [id number]        Your Vitals Were     Pulse Respirations Height Pulse Oximetry BMI (Body Mass Index)       70 14 1.581 m (5' 2.24\") 97% 29.04 kg/m2        Blood Pressure from Last 3 Encounters:   06/13/17 108/79   05/17/17 102/72   05/12/17 119/80    Weight from Last 3 Encounters:   06/13/17 72.6 kg (160 lb)   05/17/17 72.6 kg (160 lb)   05/12/17 73.4 kg (161 lb 14.4 oz)              We Performed the Following     SLEEP EVALUATION & MANAGEMENT REFERRAL - ADULT        Primary Care Provider Office Phone # Fax #    Shadia Munroe [phone]       [address] [place] [address]        Thank you!     Thank you for choosing [facility]  for your care. Our goal is always to provide you with excellent care. Hearing back from our patients is one way we can continue to improve our services. Please take a few minutes to complete the written survey that you may receive in the mail after your visit with us. Thank you!             Your Updated Medication List - Protect others around you: Learn how to safely use, store and throw away your medicines at www.disposemymeds.org.          This list is accurate as of: 6/13/17  9:41 AM.  Always use your most recent med list.                   Brand Name Dispense Instructions for use    apixaban " ANTICOAGULANT 5 MG tablet    ELIQUIS    60 tablet    Take 1 tablet (5 mg) by mouth 2 times daily       calcium carbonate 1250 MG tablet    OS-MARTITA 500 mg [place]. Ca     2 tabs daily       levothyroxine 137 MCG tablet    SYNTHROID    90 tablet    Take 1 tablet (137 mcg) by mouth daily       liothyronine 5 MCG tablet    CYTOMEL    90 tablet    Take 1 tablet (5 mcg) by mouth daily       metoprolol 25 MG tablet    LOPRESSOR    120 tablet    Take 2 tablets (50 mg) by mouth 2 times daily       PROBIOTIC DAILY Caps      Take 1 capsule by mouth daily       vitamin  s/Minerals Tabs      1 TABLET DAILY       vitamin D 1000 UNITS capsule      take 2 Caps by mouth daily.

## 2017-06-13 NOTE — PROGRESS NOTES
Sleep Center [facility]  Outpatient Sleep Medicine Consultation  June 13, 2017      Name: Ely Humphreys MRN# [id number]   Age: 67 year old YOB: 1950     Date of Consultation: June 13, 2017  Consultation is requested by: Shadia Munroe MD  [address] [place] [address]  Primary care provider: Shadia Munroe  Home clinic: [facility]        Reason for Sleep Consult:     Ely Humphreys is a 67 year old female nightly apnea, snoring and excessive daytime sleepiness and tiredness.         Assessment and Plan:     Summary Sleep Diagnoses/Recommendations:    1. Sleep Disturbance/hypersomnia:  High suspicion of sleep disordered breathing based on patient's symptoms (snoring, excessive daytime sleepiness, witnessed apneas), high BMI and oropharyngeal examination) in setting of atrial fibrillation and systolic CHF EF 40%. Will schedule PSG with PAP titration in second half if patient meets criteria for MIKEL in first half of PSG with TCM CO2. If central apnea, no ASV but trial of oxygen therapy. We also discussed the pathophysiology of sleep disordered breathing and the importance of treating it if S/he should have it. Patient is advised not to drive if he/she feels drowsy or sleepy.  Follow up after sleep study to discuss the result of sleep study and treatment options.    2. Overweight:  Counseled regarding weight loss through diet modification and increased physical activity. Patient was given instuctions of weight loss and advised to follow up her PCP for further weight loss interventions.      Orders Placed This Encounter   Procedures     Comprehensive Sleep Study       Summary Counseling:  See instructions    Counseling included a comprehensive review of diagnostic and therapeutic strategies as well as risks of inadequate therapy.  Educational materials provided in instructions.    All questions were answered.  The patient indicates understanding  of the above issues and agrees with the plan set forth.           History of Present Illness:     Ely Humphreys is a 67 year old female with history of atrial fibrillation, systolic CHF with EF 40%, hx of Graves s/p I ablation with subsequent hypothyroidism, depression, breast cancer and essential tremor who presents to the [facility] in [place] with complains of sleep disturbance. I was asked to see Ms. Humphreys regarding snoring by Dr. Munroe. Patient complains loud snoring and witnessed apnea but denies waking up gasping air. She feels tired and excessive daytime sleepiness. She has occasional morning headache and reflux. 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    Please see below for sleep ROS details.    PREVIOUS IN- LAB or HOME SLEEP STUDIES:   none    SLEEP-WAKE SCHEDULE:     Ely Humphreys     -Describes themself as a morning person;      -ON WEEKDAYS and ON WEEKENDS, goes to sleep at 10:00 PM during the week; awakens  7:00 AM without an alarm; falls asleep in 30 minutes; denies difficulty falling asleep.     -Awakens 3-4 times a night for 15 minutes before falling back to sleep; awakens to go to the bathroom.      -Total sleep time: 7-8 hours per night.    -Naps 0 times/days per week for 30 minutes, feels refreshed after naps; takes some inadvertant naps.       BEDTIME ACTIVITIES AND SHIFT WORK:    Ely Humphreys    -does use electronics in bed and read in bed and does not watch TV in bed.     -does not do shift work.  She is retired.       SCALES       SLEEP APNEA: [place] score: 5       INSOMNIA:  Insomnia severity score: N/A       SLEEPINESS: [place] sleepiness scale (ESS):  8   Drowsy driving/near accidents: No          PHQ9: N/A    SLEEP COMPLAINTS:   Snoring- 7 days/week  Witness apnea: Yes  Gasping/Choking: No  Excessive daytime sleep: Yes  Toss/turn: No  Excessive tiredness/fatigue:  Yes  Morning headaches: Yes  Dry mouth/throat: Yes  Dyspnea:  Yes  Coexisting Lung disease: No    Coexisting Heart disease: Yes    Does patient have a bed partner: Yes  Has bed partner been sleeping separately because of snoring:  No            RLS Screen: When you try to relax in the evening or sleep at  night, do you ever have unpleasant, restless feelings in your  legs that can be relieved by walking or movement? No    Periodic limb movement: No    Narcolepsy:       denies sudden urges of sleep attacks     denies cataplexy     denies sleep paralysis      denies hallucinations     Sleep Behaviors:     denies leg symptoms/movements     denies motor restlessness     denies night terrors     denies bruxism     denies automatic behaviors    Other subjective complaints:     denies anxiety or rumination      denies pain and discomfort at  night     denies waking up with heart pounding or racing     Yes GERD/heartburn         Parasomnia:   NREM - denies recurrent persistent confusional arousal, night eating, sleep walking or sleep terrors   REM  - denies dream enactment; injuries     Safety: None             Medications:     Current Outpatient Prescriptions   Medication Sig     liothyronine (CYTOMEL) 5 MCG tablet Take 1 tablet (5 mcg) by mouth daily     levothyroxine (SYNTHROID) 137 MCG tablet Take 1 tablet (137 mcg) by mouth daily     metoprolol (LOPRESSOR) 25 MG tablet Take 2 tablets (50 mg) by mouth 2 times daily     apixaban ANTICOAGULANT (ELIQUIS) 5 MG tablet Take 1 tablet (5 mg) by mouth 2 times daily     Probiotic Product (PROBIOTIC DAILY) CAPS Take 1 capsule by mouth daily     Cholecalciferol (VITAMIN D) 1000 UNIT capsule take 2 Caps by mouth daily.     CALCIUM 500 MG OR TABS 2 tabs daily     VITAMINS/MINERALS OR TABS 1 TABLET DAILY     No current facility-administered medications for this visit.         Medication that can affect sleep: No    Allergies   Allergen Reactions     No Known Drug Allergies             Past Medical History:     Does not need 02 supplement at  night     Past Medical History:   Diagnosis Date     Atrial fibrillation (H)      Benign essential tremor     Chronic     Invasive ductal carcinoma of breast (H)     left breast ca     Major depressive disorder, recurrent episode, in full remission (H) 10/19/2011    Stable for decades     osteopenia 2002     Palpitations      Unspecified hypothyroidism                Past Surgical History:    No previous upper airway surgery     Past Surgical History:   Procedure Laterality Date     C NONSPECIFIC PROCEDURE      Tubal Ligation    Abstracted 02     C THYROID ABLATION       COLONOSCOPY  10/03     COLONOSCOPY  2014     COLONOSCOPY  2014    Procedure: COLONOSCOPY;  Surgeon: Garrett Villaseñor MD;  Location: [facility]     HC ECHO HEART XTHORACIC, STRESS/REST  09    normal     HC EXCISION BREAST LESION, OPEN >=1      re-excision 09            Social History:     Social History   Substance Use Topics     Smoking status: Never Smoker     Smokeless tobacco: Never Used     Alcohol use Yes      Comment: 1 or 2 glasses of wine most evenings         Chemical History:     Tobacco: No     Uses 1-2 cups/day of coffee. Last caffeine intake is usually before 11 am    EtOH: Yes, 1-2 wine a day   Recreational Drugs: No    Psych Hx:   Depression, stable for decades    Current dangers to self or others: None           Family History:     Family History   Problem Relation Age of Onset     CANCER Mother      82 yo Breast & Melanoma     CEREBROVASCULAR DISEASE Mother 92     TIA     C.A.D. Mother      CEREBROVASCULAR DISEASE Father       85 yo Alzheimers, CHF     C.A.D. Father      possible MI in age 60's     CANCER Maternal Aunt       40's Breast     CEREBROVASCULAR DISEASE Paternal Grandmother       late 40's - early 50's     Blood Disease Maternal Aunt       51 yo Lupus     Cancer - colorectal No family hx of         Sleep Family Hx:        RLS- No  MIKEL - Yes, brother  Insomnia -  "Yes, father  Parasomnia - Yes, brother when he was young         Review of Systems:     A complete 10 point review of systems was negative other than HPI or as commented below:   Patient denies chest pain,  wheezing, abdominal pain, n&v, fever, chills, dysuria, leg pain or swelling. Patient is also denies ear pain, sore throat, postnasal drip, running nose, dry cough.  Patient has headache, fast heart rate, numbness of rt thigh and dyspnea with activity. She has neuropathy of feet related to chemotherapy.      Ely Humphreys has gained 15 pounds in 10 years.            Physical Examination:   /79  Pulse 70  Resp 14  Ht 1.581 m (5' 2.24\")  Wt 72.6 kg (160 lb)  SpO2 97%  BMI 29.04 kg/m2     Neck Circumference: 36 cm   Constitutional: . Awake, alert, cooperative, in no apparent distress  Mood: euthymic; affect congruent with full range and intensity.  Attention/Concentration:  Normal   Eyes: Pupils round and reactive. No icterus.  ENT: Mallampati Class: IV.   Tonsillar Stage: 1  hidden by pillars  Clear nasal passages. Enlarged inferior turbinates. No deviated septum.  Oropharynx: No high arched palate. No pharyngeal erythema or exudates, elongated uvula. No lateral narrowing  Tongue: No macroglossia   Dentition: Good. overjet  Dentures: None  Neck: Supple, no thyroid enlargement.   Cardiovascular: irregular S1 and S2, no gallops or murmurs.   Pulmonary:  Chest symmetric, lungs clear bilaterally and no crackles, wheezes or rales.  Abdomen: Soft, obese, non tender.  Extremities:  No pedal edema.  Muscle/joint: Strength and tone normal   Skin:  No rash or significant lesions.   Neurologic: Alert, oriented x3, no focal neurological deficit.           Data: All pertinent previous laboratory data reviewed     No results found for: PH, PHARTERIAL, PO2, [id number], SAT, PCO2, HCO3, BASEEXCESS, KWADWO, BEB  Lab Results   Component Value Date    TSH 0.41 05/10/2017    TSH 1.14 09/06/2016     Lab Results   Component " Value Date    GLC 98 05/10/2017    GLC 92 11/15/2016     Lab Results   Component Value Date    HGB 12.5 05/10/2017    HGB 13.6 09/06/2016     Lab Results   Component Value Date    BUN 19 05/10/2017    BUN 16 11/15/2016    CR 0.80 05/10/2017    CR 0.80 11/15/2016     Lab Results   Component Value Date    CO2 27 05/10/2017    CO2 27 11/15/2016     No results found for: HELIO      Echocardiography: 5/22/17  The rhythm was atrial fibrillation.     1. The left ventricle is borderline dilated with moderately reduced systolic  function. Biplane ejection fraction 40%.  2. There is moderate global hypokinesia of the left ventricle.  3. The right ventricle is normal in size and function. The right ventricular  systolic pressure is approximated at 16.6 mmHg plus the right atrial pressure.   4. The left atrium is moderate to severely dilated.  5. There is moderate (2+) mitral regurgitation. (2 jets, the predominat jet is  posteriorly directed). Effective regurgitant orifice area 0.3 cm2.  6. There is moderate (2+) tricuspid regurgitation.   Compared to the stress echocardiogram dated 08/26/2013 (when the patient was  in sinus rhythm), the left ventricular ejection fraction has decreased from  60% to 40%.    Chest x-ray: 5/10/2017 3:21 PM      IMPRESSION: PA and lateral views of the chest. Lungs are clear. In the  interval since the prior exam the cardiac silhouette has enlarged  suggesting the possibility of developing cardiomyopathy. No effusions  are evident. No pneumothorax    PFT: No        Copy to: Shadia Munroe MD 6/13/2017   [place] [address], [place] [address]   [phone] Clinic    Total time spent with patient: 42 minutes with this patient today in which 25 minutes was spent in counseling/coordination of care and going over planned testing and recommendations.

## 2017-06-13 NOTE — NURSING NOTE
"Chief Complaint   Patient presents with     Consult     referral from cardiologist sleep study, snores loudly, no gasping, no shortness of breath, always tired during the day       Initial /79  Pulse 70  Resp 14  Ht 1.581 m (5' 2.24\")  Wt 72.6 kg (160 lb)  SpO2 97%  BMI 29.04 kg/m2 Estimated body mass index is 29.04 kg/(m^2) as calculated from the following:    Height as of this encounter: 1.581 m (5' 2.24\").    Weight as of this encounter: 72.6 kg (160 lb).  Medication Reconciliation: complete     NEck circumference 36 cm, 14.25 inches  ESS 8    Vanessa Pepper CNA, Sleep Clinic Specialist  "

## 2017-06-15 ENCOUNTER — THERAPY VISIT (OUTPATIENT)
Dept: SLEEP MEDICINE | Facility: CLINIC | Age: 67
End: 2017-06-15
Payer: COMMERCIAL

## 2017-06-15 DIAGNOSIS — G47.9 SLEEP DISTURBANCE: ICD-10-CM

## 2017-06-15 PROCEDURE — 95811 POLYSOM 6/>YRS CPAP 4/> PARM: CPT | Performed by: INTERNAL MEDICINE

## 2017-06-15 NOTE — MR AVS SNAPSHOT
After Visit Summary   6/15/2017    Ely Humphreys    MRN: [id number]           Patient Information     Date Of Birth          1950        Visit Information        Provider Department      6/15/2017 8:30 PM BED 3 SH SLEEP [facility]        Today's Diagnoses     Sleep disturbance           Follow-ups after your visit        Your next 10 appointments already scheduled     Jun 19, 2017 11:00 AM CDT   Return Visit with Juan Manuel Don MD   [facility] PHYSICIANS [facility] [place] ([facility])    [address]  [facility] [id number] [phone]            Jun 21, 2017  8:30 AM CDT   Return Sleep Patient with Doc Nash MD   [facility] ([facility])    [address]  [facility] [id number] [phone]              Who to contact     If you have questions or need follow up information about today's clinic visit or your schedule please contact [facility] directly at [phone].  Normal or non-critical lab and imaging results will be communicated to you by [employer], letter or phone within 4 business days after the clinic has received the results. If you do not hear from us within 7 days, please contact the clinic through [employer] or phone. If you have a critical or abnormal lab result, we will notify you by phone as soon as possible.  Submit refill requests through [employer] or call your pharmacy and they will forward the refill request to us. Please allow 3 business days for your refill to be completed.          Additional Information About Your Visit        MyChart Information     [employer] gives you secure access to your electronic health record. If you see a primary care provider, you can also send messages to your care team and make appointments. If you have questions, please call your primary care clinic.  If you do not have a primary care provider,  please call [phone] and they will assist you.        Care EveryWhere ID     This is your Care EveryWhere ID. This could be used by other organizations to access your [facility] medical records  [id number]         Blood Pressure from Last 3 Encounters:   06/13/17 108/79   05/17/17 102/72   05/12/17 119/80    Weight from Last 3 Encounters:   06/13/17 72.6 kg (160 lb)   05/17/17 72.6 kg (160 lb)   05/12/17 73.4 kg (161 lb 14.4 oz)              We Performed the Following     Comprehensive Sleep Study        Primary Care Provider Office Phone # Fax #    Shadia Munroe [phone]       [address] [place] [address]        Thank you!     Thank you for choosing [facility] [place]  for your care. Our goal is always to provide you with excellent care. Hearing back from our patients is one way we can continue to improve our services. Please take a few minutes to complete the written survey that you may receive in the mail after your visit with us. Thank you!             Your Updated Medication List - Protect others around you: Learn how to safely use, store and throw away your medicines at www.disposemymeds.org.          This list is accurate as of: 6/15/17 11:59 PM.  Always use your most recent med list.                   Brand Name Dispense Instructions for use    apixaban ANTICOAGULANT 5 MG tablet    ELIQUIS    60 tablet    Take 1 tablet (5 mg) by mouth 2 times daily       calcium carbonate 1250 MG tablet    OS-MARTITA 500 mg [place]. Ca     2 tabs daily       levothyroxine 137 MCG tablet    SYNTHROID    90 tablet    Take 1 tablet (137 mcg) by mouth daily       liothyronine 5 MCG tablet    CYTOMEL    90 tablet    Take 1 tablet (5 mcg) by mouth daily       metoprolol 25 MG tablet    LOPRESSOR    120 tablet    Take 2 tablets (50 mg) by mouth 2 times daily       PROBIOTIC DAILY Caps      Take 1 capsule by mouth daily       vitamin  s/Minerals Tabs      1 TABLET  DAILY       vitamin D 1000 UNITS capsule      take 2 Caps by mouth daily.       zolpidem 5 MG tablet    AMBIEN    1 tablet    Take tablet by mouth 15 minutes prior to sleep, for Sleep Study

## 2017-06-19 ENCOUNTER — OFFICE VISIT (OUTPATIENT)
Dept: CARDIOLOGY | Facility: CLINIC | Age: 67
End: 2017-06-19
Attending: INTERNAL MEDICINE
Payer: COMMERCIAL

## 2017-06-19 VITALS
HEART RATE: 70 BPM | DIASTOLIC BLOOD PRESSURE: 84 MMHG | SYSTOLIC BLOOD PRESSURE: 117 MMHG | WEIGHT: 161.2 LBS | HEIGHT: 62 IN | OXYGEN SATURATION: 100 % | BODY MASS INDEX: 29.66 KG/M2

## 2017-06-19 DIAGNOSIS — I48.91 ATRIAL FIBRILLATION, UNSPECIFIED TYPE (H): ICD-10-CM

## 2017-06-19 DIAGNOSIS — I42.9 CARDIOMYOPATHY, UNSPECIFIED (H): ICD-10-CM

## 2017-06-19 PROCEDURE — 99214 OFFICE O/P EST MOD 30 MIN: CPT | Performed by: INTERNAL MEDICINE

## 2017-06-19 NOTE — PROCEDURES
"SLEEP STUDY INTERPRETATION  SPLIT-NIGHT STUDY      Patient: Ely Humphreys  YOB: 1950  Study Date: 6/15/2017  MRN: [id number]  Referring Provider: MD Munroe Erin  Ordering Provider: MD Nash Abdullahi    Indications for Polysomnography: The patient is a 67 y old Female who is 5' 2\" and weighs 160.0 lbs.  Her BMI is 29.4, [place] sleepiness scale is 8.0 and neck size is 36.0.  Relevant medical history includes systolic CHF EF 40%, atrial fibrillation, hypothyroidism post-Graves disease therapy, depression, essential tremors, breast cancer and excessive daytime sleepiness.  A diagnostic polysomnogram was performed to evaluate for Sleep Apnea, PLMS, CSA, Hypoventilation and hypoxemia.  After 260.5 minutes of sleep time the patient exhibited sufficient respiratory events qualifying her for a CPAP trial which was then initiated.      Polysomnogram Data:  A full night polysomnogram recorded the standard physiologic parameters including EEG, EOG, EMG, ECG, nasal and oral airflow.  Respiratory parameters of chest and abdominal movements were recorded with respiratory inductance plethysmography.  Oxygen saturation was recorded by pulse oximetry.  Transcutaneous CO2 monitoring was recorded.    Diagnostic PSG  Sleep Architecture: There was reduced REM sleep. There were increased sleep disruptions and poor sleep efficiency.  The total recording time of the diagnostic portion of the study was 315.1 minutes.  The total sleep time was 260.5 minutes.  During the diagnostic portion of the study the sleep latency was decreased at 3.6 minutes with the use of a sleep aid.  REM latency was 204.0 minutes.  Arousal index was increased at 47.4 arousals per hour.  Sleep efficiency was decreased at 82.7%.  Wake after sleep onset was 48.5 minutes.   The patient spent 11.9% of total sleep time in Stage N1, 62.4% in Stage N2, 24.2% in Stage N3 and 1.5% in REM.       Respiration: There was moderate obstructive sleep apnea " without sleep related hypoxemia/hypoventilation. The sleep disordered breathing was underestimated by reduced REM sleep and supine sleep.    Events - During the diagnostic portion of the study, the polysomnogram revealed a presence of 9 obstructive, 0 central, and 0 mixed apneas resulting in an apnea index of 2.1 events per hour.  There were 98 hypopneas resulting in a hypopnea index of 22.6 events per hour.  The combined apnea/hypopnea index was 24.6 events per hour.  The REM AHI was 75.0 events per hour.  The supine AHI was 27.2 events per hour.  The RERA index was 6.0 events per hour.  The RDI was 30.6 events per hour.     Snoring - was reported as mild and intermittent.    Respiratory rate and pattern - was notable for normal respiratory rate and pattern.    Sustained Sleep Associated Hypoventilation - Transcutaneous carbon dioxide monitoring was used, however significant hypoventilation was not present with a maximum change of 3 mmHg.    Sleep Associated Hypoxemia - (Greater than 5 minutes O2 sat below 89%) was not present.  Baseline oxygen saturation was 94.0%. Lowest oxygen saturation was 83.8%.  Time spent less than or equal to 88% was 1.3 minutes.  Time spent less than or equal to 89% was 1.9 minutes.  30.6 6.0 24.6     Treatment PSG  Sleep Architecture:   At 04:02:52 AM the patient was placed on CPAP treatment and was titrated at pressures ranging from 5 cmH2O up to 6 cmH2O.  The total recording time of the treatment portion of the study was 194.0 minutes.  The total sleep time was 145.5 minutes.  During the treatment portion of the study the sleep latency was 17.0 minutes.  REM latency was 59.0 minutes.  Arousal index was increased at 28.5 arousals per hour.  Sleep efficiency was decreased at 75.0%.  Wake after sleep onset was 29.5 minutes.  The patient spent 8.9% of total sleep time in Stage N1, 58.8% in Stage N2, 9.3% in Stage N3 and 23.0% in REM.       Respiration:   The optimal pressure was 6 cmH2O  with an AHI of 0.6 events per hour.  Time in REM supine on final pressure was 0 minutes. There was flow limitation on this pressure.  This titration was considered: adequate (residual AHII with 75% decrease or above constraints without REM-supine sleep at final pressure).    Movement Activity: There were minimal periodic leg movements without significant arousals. There was no abnormal nocturnal sleep behavior.    Periodic Limb Movements  o During the diagnostic portion of the study, there were 23 PLMs recorded. The PLM index was 5.3 movements per hour.  The PLM Arousal Index was 0.2 per hour.  o During the treatment portion of the study, there were 0 PLMs recorded. The PLM index was 0 movements per hour.      REM EMG Activity - Excessive transient / sustained muscle activity was not present.    Nocturnal Behavior - Abnormal sleep related behaviors were not noted during NREM / REM sleep.     Bruxism - None apparent.    Cardiac Summary: Atrial fibrillation with controlled ventricular rate throughout the night.  During the diagnostic portion of the study, the average pulse rate was 81.9 bpm.  The minimum pulse rate was 53.5 bpm while the maximum pulse rate was 115.8 bpm.    During the treatment portion of the study, the average pulse rate was 75.8 bpm.  The minimum pulse rate was 49.6 bpm while the maximum pulse rate was 117.4 bpm.     Arrhythmias were noted.  Cardiac Comments: Atrial Fibrillation.    Assessment:     Obstructive Sleep Apnea G47.33    Recommendations:    Treatment of MIKEL with a trial of CPAP at 8 cmH2O.  Recommend clinical follow up with sleep management team, for coaching and review of effectiveness and measures.    Advise regarding the risks of drowsy driving.    Suggest optimizing sleep schedule and avoiding sleep deprivation.    Weight management (if BMI > 30).    Follow up primary care doctor and cardiologist as scheduled.        _____________________________________   electronically signed by:   KVNG GERONIMO MD (6/19/17)     CC: Shadia Munroe MD          Table of Oximetry Distribution    Range(%) Time in range (min) Time in range (%) Time in or below range (min) Time in or below range (%)   0.0 - 88.0 1.3 0.2% 1.3 0.2%   0.0 - 89.0 1.9 0.4% 1.9 0.4%

## 2017-06-19 NOTE — PATIENT INSTRUCTIONS
1.  You are still in atrial fibrillation but the heart rate control is much better.  Continue metoprolol titrate 50 mg, one tablet two times daily.    2.  Repeat heart ultrasound (echocardiogram) to check function of your heart pump.    3.  We will contact you with the results of your heart ultrasound.  If you heart pump function is the same or improved, you should be able to schedule your knee surgery.    4.  Stop the Eliquis three days before your knee surgery, and restart it at the same dosage when a value surgeon thinks it is safe to do so.    5.  I will see you in clinic in three months with a repeat EKG.  If you are still in atrial fibrillation at that time, we will plan for an electrical cardioversion.    6.  I'll up with you sleep doctor in start CPAP therapy, if needed.    If you have any questions or concerns, please call my nurse Ely Mcmillan at [phone].

## 2017-06-19 NOTE — LETTER
6/19/2017    Shadia Simmons MD  [address] [place] [address]      RE: Ely Humphreys       Dear Colleague,    I had the pleasure of seeing Ely Humphreys in the [facility].    PRIMARY CARE PROVIDER:  [facility]      REASON FOR VISIT:   1.  Followup of recent diagnosis of atrial fibrillation.   2.  Followup of mild cardiomyopathy, presumed tachycardia mediated.   3.  Preoperative cardiac clearance prior to left knee meniscal repair.      Ely is a very pleasant, 67-year-old  lady who was accompanied by her  today.  I saw her in initial consultation approximately a month ago on 05/12/2017 for a new diagnosis of atrial fibrillation and preoperative cardiac evaluation prior to elective left knee meniscal tear repair.  Her medical history is significant for Graves disease, status post radioablation several years ago and stable on thyroxine therapy.  She is a lifelong never smoker.  BMI 29.4 kg/m2.     1.  Recent diagnosis of atrial fibrillation.   2.  Preoperative cardiac evaluation.   3.  Mild left ventricular cardiomyopathy, LVEF 40%, presumed tachycardia mediated.      I am seeing the patient because a previous routine preoperative ECG showed a new diagnosis of atrial fibrillation with ventricular rates in the 100-110 BPM.  She had no symptoms from the atrial fibrillation, certainly no palpitations or exertional dyspnea.  She has long-standing fatigue and is being evaluated by a sleep study.  Her echocardiogram showed a borderline dilated left ventricle with moderately reduced systolic function, but biplane LVEF of 40% with global hypokinesis and normal right ventricular size and function with moderate mitral regurgitation and moderate tricuspid regurgitation.  Her previous stress echocardiogram in 2013 had a normal LVEF of 60%.      My thoughts were that the patient had a tachycardia-mediated cardiomyopathy and undiagnosed  sleep apnea.  I started her on metoprolol tartrate, currently on 50 mg b.i.d., anticoagulation with Eliquis 5 mg b.i.d. (CHADS-VASc2 score of 3 for age, cardiomyopathy and female gender) and obtained a 24 hour Holter, which showed that she was in persistent atrial fibrillation with an average heart rate of 110 BPM (ranging from 54 to maximum of 185 BPM).  At the time she reported palpitations, she was in AFib at 104 and 134 BPM.      The patient has tolerated medications well.  Her fatigue (which preceded metoprolol) is stable.  Her predominant symptom is left knee pain.      MEDICATIONS:   1.  Levothyroxine 137 mcg daily.   2.  Metoprolol 50 mg twice daily.   3.  Apixaban 5 mg twice daily.      ALLERGIES:  No known allergies.      PAST MEDICAL HISTORY:   1.  Atrial fibrillation with rapid ventricular rates, diagnosed on routine preoperative ECG on 05/12/2017.   2.  History of Graves disease, status post radioablation, on replacement thyroxine therapy.   3.  Benign essential tremor.   4.  Invasive ductal carcinoma of the left breast.   5.  Major depressive disorder, recurrent, in full remission and stable for decades.        PAST SURGICAL HISTORY:     1.  Status post tubal ligation in 2002.     2.  Colonoscopy and excision of left breast lesion in 2009.      FAMILY HISTORY:  Reviewed.         SOCIAL HISTORY:  .  Lives with her , Judah.  Two children.  She used to work as a .  Has never smoked or used tobacco.  Has 1-2 glasses of wine most evenings.  She used to be regularly active prior to having a left knee meniscal tear.      REVIEW OF SYSTEMS:  A 10 point review of systems was completed and documented below.      PHYSICAL EXAMINATION:   VITAL SIGNS:  Blood pressure 117/84 mmHg, pulse 70-80 beats per minute, irregularly irregular, consistent with atrial fibrillation, height 1.5 m, weight 73.1 kg, BMI 29.2 kg/m2, respiratory rate 16 per minute and regular, saturations 100% on room air.    CONSTITUTIONAL:  Comfortable at rest, slim body habitus, cooperative, alert, oriented, well developed, well nourished.    EYES:  Pupils are round and equal.  Conjunctivae and lids are unremarkable.  Sclerae are white.  No xanthelasma.   ENT:  Satisfactory dentition.  No cyanosis or pallor.   NECK:  No carotid bruit.  No thyromegaly.  Bilateral full and equal carotid pulses.   CARDIOVASCULAR:  Apical impulses normal to palpation.  No parasternal heave or thrill.  Irregular heart sounds consistent with atrial fibrillation.  No audible murmur.  No S3 or S4. No pericardial friction rub.   RESPIRATORY:  Normal respiratory effort with a symmetrical chest wall movements.  Bilateral good air entry with normal breath sounds.  No rales or wheezes.     ABDOMEN:  Soft, nontender.  No hepatosplenomegaly.  No masses.  Active bowel sounds.  No surgical scars.   NEUROPSYCHIATRIC:  Alert and oriented x3.  Affect normal.  No gross motor deficits.   EXTREMITIES:  No edema, no clubbing deformities.   SKIN:  No rash, erythema or ecchymosis.      DIAGNOSTIC DATA:  I reviewed her labs, ECG done today, a 24 hour Holter and echocardiogram.  Results were discussed with the patient.      LABORATORIES:  Sodium 141, potassium 4.2, BUN 19, creatinine 0.8, A1c 5.4, hemoglobin 12.5, TSH 0.41.      ECG done today showed atrial fibrillation that is rate controlled at 83 BPM.      A 24 hour Holter (05/15/2017):  Suboptimally rate-controlled atrial fibrillation.  This was done prior to optimization of beta-blocker therapy.  Details in HPI.      Transthoracic echocardiogram:  Per HPI.      DIAGNOSES:     1.  Asymptomatic atrial fibrillation:  Rate control and anticoagulation strategy.  CHADS-VASc score of 3.   2.  Moderate left ventricular systolic cardiomyopathy, LVEF 40%, likely tachycardia mediated, NYHA class I symptoms, euvolemic.  Weight 73.1 kg.   3.  Preoperative cardiac evaluation prior to elective left knee meniscal repair surgery.   4.   Diagnosis of obstructive sleep apnea, awaiting initiation of CPAP therapy.   5.  Treated hypothyroidism, status post radioablation treatment of Graves disease with therapeutic TSH.      ASSESSMENT:    The patient remains in atrial fibrillation for the last 5 weeks.  Resting ECG proves she is adequately rate controlled on 50 mg b.i.d. of metoprolol tartrate.  Tolerating Eliquis anticoagulation without any bleeding issues.  She has just completed a sleep study 2 days ago and was told that her AHI is approximately 20.  There are no clinic notes yet, and she is scheduled to see them tomorrow.      PLAN:   1.  Obtain repeat transthoracic echocardiogram to assess LV function following rate control.   2.  Continue metoprolol tartrate 50 mg b.i.d.   3.  Continue Eliquis 5 mg b.i.d.     4.  I do not think we need to cardiovert her until her sleep apnea has been adequately treated for about 8 weeks at least, or it might result in recurrence of the atrial fibrillation.   5.  If the echocardiogram shows LV function is stable or improved, she is cardiovascularly cleared to proceed with her intermediate-risk orthopedic surgery under regional or general anesthesia.  I will follow up on the results and contact the patient.   6.  CPAP initiation per Sleep Clinic.   7.  Follow up in 3 months.  If she remains in atrial fibrillation at that point, proceed to electrical cardioversion.      It was a pleasure to visit with the patient.      I spent 30 minutes with her; greater than 50% of the time was spent in counseling and coordination of care.     Sincerely,    Juan Manuel Don MD     [facility]

## 2017-06-19 NOTE — MR AVS SNAPSHOT
After Visit Summary   6/19/2017    Ely Humphreys    MRN: [id number]           Patient Information     Date Of Birth          1950        Visit Information        Provider Department      6/19/2017 11:00 AM Juan Manuel Don MD [facility] [place]        Today's Diagnoses     Atrial fibrillation, unspecified type (H)        Cardiomyopathy, unspecified (H)          Care Instructions    1.  You are still in atrial fibrillation but the heart rate control is much better.  Continue metoprolol titrate 50 mg, one tablet two times daily.    2.  Repeat heart ultrasound (echocardiogram) to check function of your heart pump.    3.  We will contact you with the results of your heart ultrasound.  If you heart pump function is the same or improved, you should be able to schedule your knee surgery.    4.  Stop the Eliquis three days before your knee surgery, and restart it at the same dosage when a value surgeon thinks it is safe to do so.    5.  I will see you in clinic in three months with a repeat EKG.  If you are still in atrial fibrillation at that time, we will plan for an electrical cardioversion.    6.  I'll up with you sleep doctor in start CPAP therapy, if needed.    If you have any questions or concerns, please call my nurse Ely Deyanira at [phone].          Follow-ups after your visit        Additional Services     Follow-Up with Cardiologist                 Your next 10 appointments already scheduled     Jun 21, 2017  8:30 AM CDT   Return Sleep Patient with Doc Nash MD   [facility] ([facility]    [address] [address] [id number] [phone]              Future tests that were ordered for you today     Open Future Orders        Priority Expected Expires Ordered    EKG 12-lead complete w/read - Clinics Routine 9/17/2017 6/19/2018 6/19/2017    Follow-Up with Cardiologist  "Routine 9/17/2017 6/19/2018 6/19/2017    Echocardiogram Routine 6/19/2017 6/19/2018 6/19/2017            Who to contact     If you have questions or need follow up information about today's clinic visit or your schedule please contact [facility] [place] directly at [phone].  Normal or non-critical lab and imaging results will be communicated to you by MyChart, letter or phone within 4 business days after the clinic has received the results. If you do not hear from us within 7 days, please contact the clinic through [employer] or phone. If you have a critical or abnormal lab result, we will notify you by phone as soon as possible.  Submit refill requests through [employer] or call your pharmacy and they will forward the refill request to us. Please allow 3 business days for your refill to be completed.          Additional Information About Your Visit        [employer] Information     [employer] gives you secure access to your electronic health record. If you see a primary care provider, you can also send messages to your care team and make appointments. If you have questions, please call your primary care clinic.  If you do not have a primary care provider, please call [phone] and they will assist you.        Care EveryWhere ID     This is your Care EveryWhere ID. This could be used by other organizations to access your [place] medical records  [id number]        Your Vitals Were     Pulse Height Pulse Oximetry BMI (Body Mass Index)          70 1.581 m (5' 2.25\") 100% 29.25 kg/m2         Blood Pressure from Last 3 Encounters:   06/19/17 117/84   06/13/17 108/79   05/17/17 102/72    Weight from Last 3 Encounters:   06/19/17 73.1 kg (161 lb 3.2 oz)   06/13/17 72.6 kg (160 lb)   05/17/17 72.6 kg (160 lb)              We Performed the Following     Follow-Up with Cardiologist        Primary Care Provider Office Phone # Fax #    Shadia Munroe [phone]       " [facility] [address] [place] [address]        Thank you!     Thank you for choosing [facility] [place]  for your care. Our goal is always to provide you with excellent care. Hearing back from our patients is one way we can continue to improve our services. Please take a few minutes to complete the written survey that you may receive in the mail after your visit with us. Thank you!             Your Updated Medication List - Protect others around you: Learn how to safely use, store and throw away your medicines at www.disposemymeds.org.          This list is accurate as of: 6/19/17 11:50 AM.  Always use your most recent med list.                   Brand Name Dispense Instructions for use    apixaban ANTICOAGULANT 5 MG tablet    ELIQUIS    60 tablet    Take 1 tablet (5 mg) by mouth 2 times daily       calcium carbonate 1250 MG tablet    OS-MARTITA 500 mg [place]. Ca     2 tabs daily       levothyroxine 137 MCG tablet    SYNTHROID    90 tablet    Take 1 tablet (137 mcg) by mouth daily       liothyronine 5 MCG tablet    CYTOMEL    90 tablet    Take 1 tablet (5 mcg) by mouth daily       metoprolol 25 MG tablet    LOPRESSOR    120 tablet    Take 2 tablets (50 mg) by mouth 2 times daily       PROBIOTIC DAILY Caps      Take 1 capsule by mouth daily       vitamin  s/Minerals Tabs      1 TABLET DAILY       vitamin D 1000 UNITS capsule      take 2 Caps by mouth daily.       zolpidem 5 MG tablet    AMBIEN    1 tablet    Take tablet by mouth 15 minutes prior to sleep, for Sleep Study

## 2017-06-19 NOTE — PROGRESS NOTES
HPI and Plan:   See dictation    PRIMARY CARE PROVIDER:  Shadia Munroe CLINIC [address] [place] [address]    REFERRING PROVIDER:  Juan Manuel Don MD  [facility]  [address] [place] [id number]      CURRENT MEDICATIONS:  Current Outpatient Prescriptions   Medication Sig Dispense Refill     zolpidem (AMBIEN) 5 MG tablet Take tablet by mouth 15 minutes prior to sleep, for Sleep Study 1 tablet 0     liothyronine (CYTOMEL) 5 MCG tablet Take 1 tablet (5 mcg) by mouth daily 90 tablet 3     levothyroxine (SYNTHROID) 137 MCG tablet Take 1 tablet (137 mcg) by mouth daily 90 tablet 3     metoprolol (LOPRESSOR) 25 MG tablet Take 2 tablets (50 mg) by mouth 2 times daily 120 tablet 11     apixaban ANTICOAGULANT (ELIQUIS) 5 MG tablet Take 1 tablet (5 mg) by mouth 2 times daily 60 tablet 1     Probiotic Product (PROBIOTIC DAILY) CAPS Take 1 capsule by mouth daily       Cholecalciferol (VITAMIN D) 1000 UNIT capsule take 2 Caps by mouth daily.       CALCIUM 500 MG OR TABS 2 tabs daily       VITAMINS/MINERALS OR TABS 1 TABLET DAILY         DISCONTINUED MEDICATIONS:  There are no discontinued medications.    MEDICATIONS STARTED ON CURRENT VISIT:  No orders of the defined types were placed in this encounter.      ALLERGIES:  Allergies   Allergen Reactions     No Known Drug Allergies        FAMILY HISTORY:    Family History   Problem Relation Age of Onset     CANCER Mother      84 yo Breast & Melanoma     CEREBROVASCULAR DISEASE Mother 92     TIA     C.A.D. Mother      CEREBROVASCULAR DISEASE Father       87 yo Alzheimers, CHF     C.A.D. Father      possible MI in age 60's     CANCER Maternal Aunt       40's Breast     CEREBROVASCULAR DISEASE Paternal Grandmother       late 40's - early 50's     Blood Disease Maternal Aunt       53 yo Lupus     Cancer - colorectal No family hx of        SOCIAL HISTORY:    Social History     Social History      Marital status:      Spouse name: Judah     Number of children: 2     Years of education: 18     Occupational History      [employer]     health      Social History Main Topics     Smoking status: Never Smoker     Smokeless tobacco: Never Used     Alcohol use Yes      Comment: 1 or 2 glasses of wine most evenings     Drug use: No     Sexual activity: Not Currently     Birth control/ protection: Surgical     Other Topics Concern     None     Social History Narrative       REVIEW OF SYSTEMS:    Skin:  Negative     Eyes:  Positive for    ENT:  Negative    Respiratory:  Negative    Cardiovascular:    Positive for;fatigue  Gastroenterology: Negative    Genitourinary:  Negative    Musculoskeletal:  Positive for    Neurologic:  Positive for headaches;numbness or tingling of feet  Psychiatric:  Positive for sleep disturbances  Heme/Lymph/Imm:  Negative    Endocrine:  Negative        Encounter Diagnoses   Name Primary?     Atrial fibrillation, unspecified type (H)      Cardiomyopathy, unspecified (H)        Orders Placed This Encounter   Procedures     Follow-Up with Cardiologist     EKG 12-lead complete w/read - Clinics     Echocardiogram       CC  Juan Manuel Don MD  [facility]  [address] [place] [id number]

## 2017-06-20 ENCOUNTER — HOSPITAL ENCOUNTER (OUTPATIENT)
Dept: CARDIOLOGY | Facility: CLINIC | Age: 67
Discharge: HOME | End: 2017-06-20
Attending: INTERNAL MEDICINE | Admitting: INTERNAL MEDICINE
Payer: MEDICARE

## 2017-06-20 DIAGNOSIS — I42.9 CARDIOMYOPATHY, UNSPECIFIED (H): ICD-10-CM

## 2017-06-20 DIAGNOSIS — I48.91 ATRIAL FIBRILLATION, UNSPECIFIED TYPE (H): ICD-10-CM

## 2017-06-20 PROCEDURE — 93321 DOPPLER ECHO F-UP/LMTD STD: CPT | Mod: 26 | Performed by: INTERNAL MEDICINE

## 2017-06-20 PROCEDURE — 93308 TTE F-UP OR LMTD: CPT | Mod: 26 | Performed by: INTERNAL MEDICINE

## 2017-06-20 PROCEDURE — 93308 TTE F-UP OR LMTD: CPT

## 2017-06-20 PROCEDURE — 93325 DOPPLER ECHO COLOR FLOW MAPG: CPT | Mod: 26 | Performed by: INTERNAL MEDICINE

## 2017-06-20 NOTE — PROGRESS NOTES
PRIMARY CARE PROVIDER:  Shadia Munroe [facility]      REASON FOR VISIT:   1.  Followup of recent diagnosis of atrial fibrillation.   2.  Followup of mild cardiomyopathy, presumed tachycardia mediated.   3.  Preoperative cardiac clearance prior to left knee meniscal repair.      HISTORY OF PRESENT ILLNESS:    Ely is a very pleasant, 67-year-old  lady who was accompanied by her  today.  I saw her in initial consultation approximately a month ago on 05/12/2017 for a new diagnosis of atrial fibrillation and preoperative cardiac evaluation prior to elective left knee meniscal tear repair.  Her medical history is significant for Graves disease, status post radioablation several years ago and stable on thyroxine therapy.  She is a lifelong never smoker.  BMI 29.4 kg/m2.     1.  Recent diagnosis of atrial fibrillation.   2.  Preoperative cardiac evaluation.   3.  Mild left ventricular cardiomyopathy, LVEF 40%, presumed tachycardia mediated.      I am seeing the patient because a previous routine preoperative ECG showed a new diagnosis of atrial fibrillation with ventricular rates in the 100-110 BPM.  She had no symptoms from the atrial fibrillation, certainly no palpitations or exertional dyspnea.  She has long-standing fatigue and is being evaluated by a sleep study.  Her echocardiogram showed a borderline dilated left ventricle with moderately reduced systolic function, but biplane LVEF of 40% with global hypokinesis and normal right ventricular size and function with moderate mitral regurgitation and moderate tricuspid regurgitation.  Her previous stress echocardiogram in 2013 had a normal LVEF of 60%.      My thoughts were that the patient had a tachycardia-mediated cardiomyopathy and undiagnosed sleep apnea.  I started her on metoprolol tartrate, currently on 50 mg b.i.d., anticoagulation with Eliquis 5 mg b.i.d. (CHADS-VASc2 score of 3 for age, cardiomyopathy and female gender) and  obtained a 24 hour Holter, which showed that she was in persistent atrial fibrillation with an average heart rate of 110 BPM (ranging from 54 to maximum of 185 BPM).  At the time she reported palpitations, she was in AFib at 104 and 134 BPM.      The patient has tolerated medications well.  Her fatigue (which preceded metoprolol) is stable.  Her predominant symptom is left knee pain.      MEDICATIONS:   1.  Levothyroxine 137 mcg daily.   2.  Metoprolol 50 mg twice daily.   3.  Apixaban 5 mg twice daily.      ALLERGIES:  No known allergies.      PAST MEDICAL HISTORY:   1.  Atrial fibrillation with rapid ventricular rates, diagnosed on routine preoperative ECG on 05/12/2017.   2.  History of Graves disease, status post radioablation, on replacement thyroxine therapy.   3.  Benign essential tremor.   4.  Invasive ductal carcinoma of the left breast.   5.  Major depressive disorder, recurrent, in full remission and stable for decades.        PAST SURGICAL HISTORY:     1.  Status post tubal ligation in 2002.     2.  Colonoscopy and excision of left breast lesion in 2009.      FAMILY HISTORY:  Reviewed.         SOCIAL HISTORY:  .  Lives with her , Judah.  Two children.  She used to work as a .  Has never smoked or used tobacco.  Has 1-2 glasses of wine most evenings.  She used to be regularly active prior to having a left knee meniscal tear.      REVIEW OF SYSTEMS:  A 10 point review of systems was completed and documented below.      PHYSICAL EXAMINATION:   VITAL SIGNS:  Blood pressure 117/84 mmHg, pulse 70-80 beats per minute, irregularly irregular, consistent with atrial fibrillation, height 1.5 m, weight 73.1 kg, BMI 29.2 kg/m2, respiratory rate 16 per minute and regular, saturations 100% on room air.   CONSTITUTIONAL:  Comfortable at rest, slim body habitus, cooperative, alert, oriented, well developed, well nourished.    EYES:  Pupils are round and equal.  Conjunctivae and lids are  unremarkable.  Sclerae are white.  No xanthelasma.   ENT:  Satisfactory dentition.  No cyanosis or pallor.   NECK:  No carotid bruit.  No thyromegaly.  Bilateral full and equal carotid pulses.   CARDIOVASCULAR:  Apical impulses normal to palpation.  No parasternal heave or thrill.  Irregular heart sounds consistent with atrial fibrillation.  No audible murmur.  No S3 or S4. No pericardial friction rub.   RESPIRATORY:  Normal respiratory effort with a symmetrical chest wall movements.  Bilateral good air entry with normal breath sounds.  No rales or wheezes.     ABDOMEN:  Soft, nontender.  No hepatosplenomegaly.  No masses.  Active bowel sounds.  No surgical scars.   NEUROPSYCHIATRIC:  Alert and oriented x3.  Affect normal.  No gross motor deficits.   EXTREMITIES:  No edema, no clubbing deformities.   SKIN:  No rash, erythema or ecchymosis.      DIAGNOSTIC DATA:  I reviewed her labs, ECG done today, a 24 hour Holter and echocardiogram.  Results were discussed with the patient.      LABORATORIES:  Sodium 141, potassium 4.2, BUN 19, creatinine 0.8, A1c 5.4, hemoglobin 12.5, TSH 0.41.      ECG done today showed atrial fibrillation that is rate controlled at 83 BPM.      A 24 hour Holter (05/15/2017):  Suboptimally rate-controlled atrial fibrillation.  This was done prior to optimization of beta-blocker therapy.  Details in HPI.      Transthoracic echocardiogram:  Per HPI.      DIAGNOSES:     1.  Asymptomatic atrial fibrillation:  Rate control and anticoagulation strategy.  CHADS-VASc score of 3.   2.  Moderate left ventricular systolic cardiomyopathy, LVEF 40%, likely tachycardia mediated, NYHA class I symptoms, euvolemic.  Weight 73.1 kg.   3.  Preoperative cardiac evaluation prior to elective left knee meniscal repair surgery.   4.  Diagnosis of obstructive sleep apnea, awaiting initiation of CPAP therapy.   5.  Treated hypothyroidism, status post radioablation treatment of Graves disease with therapeutic TSH.       ASSESSMENT:    The patient remains in atrial fibrillation for the last 5 weeks.  Resting ECG proves she is adequately rate controlled on 50 mg b.i.d. of metoprolol tartrate.  Tolerating Eliquis anticoagulation without any bleeding issues.  She has just completed a sleep study 2 days ago and was told that her AHI is approximately 20.  There are no clinic notes yet, and she is scheduled to see them tomorrow.      PLAN:   1.  Obtain repeat transthoracic echocardiogram to assess LV function following rate control.   2.  Continue metoprolol tartrate 50 mg b.i.d.   3.  Continue Eliquis 5 mg b.i.d.     4.  I do not think we need to cardiovert her until her sleep apnea has been adequately treated for about 8 weeks at least, or it might result in recurrence of the atrial fibrillation.   5.  If the echocardiogram shows LV function is stable or improved, she is cardiovascularly cleared to proceed with her intermediate-risk orthopedic surgery under regional or general anesthesia.  I will follow up on the results and contact the patient.   6.  CPAP initiation per [facility].   7.  Follow up in 3 months.  If she remains in atrial fibrillation at that point, proceed to electrical cardioversion.      It was a pleasure to visit with the patient.      I spent 30 minutes with her; greater than 50% of the time was spent in counseling and coordination of care.      cc:   Shadia Munroe MD   [address] LACHELLE CELAYA MD             D: 2017 13:08   T: 2017 07:25   MT: lianna      Name:     NYA KOROMA   MRN:      [id number]        Account:      [id number]   :      1950           Service Date: 2017      Document: [id number]

## 2017-06-21 ENCOUNTER — TELEPHONE (OUTPATIENT)
Dept: CARDIOLOGY | Facility: CLINIC | Age: 67
End: 2017-06-21

## 2017-06-21 ENCOUNTER — OFFICE VISIT (OUTPATIENT)
Dept: SLEEP MEDICINE | Facility: CLINIC | Age: 67
End: 2017-06-21
Payer: COMMERCIAL

## 2017-06-21 VITALS — HEIGHT: 62 IN | HEART RATE: 83 BPM | WEIGHT: 160 LBS | BODY MASS INDEX: 29.44 KG/M2 | OXYGEN SATURATION: 95 %

## 2017-06-21 DIAGNOSIS — I48.91 ATRIAL FIBRILLATION (H): Primary | ICD-10-CM

## 2017-06-21 DIAGNOSIS — G47.33 OSA (OBSTRUCTIVE SLEEP APNEA): Primary | ICD-10-CM

## 2017-06-21 PROCEDURE — 99214 OFFICE O/P EST MOD 30 MIN: CPT | Performed by: INTERNAL MEDICINE

## 2017-06-21 NOTE — TELEPHONE ENCOUNTER
Forwarded Echo to DR Celaya for review - done post visit 2017 to recheck EF post rate control of atrial fib. Prior echo 2017 with Biplane ejection fraction 40%.   Nya Deyanira Josselin DE LA ROSA 17 1:02 PM       Recent Results (from the past 4320 hour(s))   Echocardiogram Limited    Narrative    [id number]  ECH11  [id number]  [id number]^YOMI^GELY^LACHELLE        [facility]  [facility]  Echocardi[facility] Laboratory  [address]  [address]  [place] [address]  Phone [phone]  Fax [phone]        Name: NYA KOROMA  MRN: [id number]  : 1950  Study Date: 2017 08:45 AM  Age: 67 yrs  Gender: Female  Patient Location: [facility]  Reason For Study: Unspecified atrial fibrillation, Cardiomyopathy, unspecified  Ordering Physician: GELY CELAYA  Referring Physician: GELY CELAYA  Performed By: Pilar Bragg RDCS     BSA: 1.7 m2  Height: 62 in  Weight: 161 lb  HR: 85  BP: 100/70 mmHg  _____________________________________________________________________________  __     Procedure  Limited Echo Adult. Complete Echo Adult.     _____________________________________________________________________________  __        Interpretation Summary     Left ventricular systolic function is mild to moderately reduced. The visual  ejection fraction is estimated at 40-45% (biplane 48% overestimates EF). There  is moderate global hypokinesia of the left ventricle. There is mild concentric  left ventricular hypertrophy.  The left atrium is moderate to severely dilated.  There is at least moderate (2+ to 2-3+) mitral regurgitation.  There is moderate to mod-severe (2-3+) tricuspid regurgitation.  Mild aortic root dilatation. Sinuses are 3.9 cm and ascending aorta is 4.1 cm.  Compared to prior study, there is no significant change. Limited views were  obtained. Visually, the LVEF and MR are similar to the prior  echo.  _____________________________________________________________________________  __        Left Ventricle  The left ventricle is normal in size. There is mild concentric left  ventricular hypertrophy. Left ventricular systolic function is mild to  moderately reduced. The visual ejection fraction is estimated at 40-45%. E  prime velocity may be unreliable due to technical difficulties. There is  moderate global hypokinesia of the left ventricle.     Right Ventricle  The right ventricle is mildly dilated. The right ventricular systolic function  is mild to moderately reduced.     Atria  The left atrium is moderate to severely dilated. The right atrium is  moderately dilated. There is no atrial shunt seen.     Mitral Valve  There is moderate (2+) mitral regurgitation. The mitral regurgitant jet is  eccentrically directed. The mitral regurgitant jet is posteriorly directed,  which is consistent with anterior leaflet pathology.     Tricuspid Valve  There is moderate to mod-severe (2-3+) tricuspid regurgitation. Right  ventricular systolic pressure is normal. Normal IVC (1.5-2.5cm) with <50%  respiratory collapse; right atrial pressure is estimated at 10-15mmHg.        Aortic Valve  No aortic regurgitation is present. No PW/CW Doppler done.     Pulmonic Valve  The pulmonic valve is not well visualized.     Vessels  Mild aortic root dilatation. Sinuses are 3.9 cm and ascending aorta is 4.1 cm.     Pericardium  There is pericardial thickening and/or a small pericardial effusion.     Rhythm  The rhythm was atrial fibrillation.     _____________________________________________________________________________  __  MMode/2D Measurements & Calculations  Ao root diam: 4.1 cm  asc Aorta Diam: 3.8 cm        Doppler Measurements & Calculations  TR max boom: 205.3 cm/sec  TR max P.9 mmHg              _____________________________________________________________________________  __           Report approved by: Barby Colon  Larry 2017 05:30 PM      Echocardiogram Complete    Narrative    [id number]  ECH19  [id number]  [id number]^SASKIA^ANDREA^SPARKLE           [facility]  Echocardiography Laboratory  [address] [place] [address]        Name: NYA KOROMA  MRN: [id number]  : 1950  Study Date: 2017 11:03 AM  Age: 67 yrs  Gender: Female  Patient Location: [facility]  Reason For Study: , Chronic atrial fibrillation  History: Atrial Fibrillation  Ordering Physician: ANDREA KRUGER  Referring Physician: ANDREA KRUGER  Performed By: Rodrigo Moran MD     BSA: 1.7 m2  Height: 63 in  Weight: 155 lb  HR: 123  BP: 100/70 mmHg  _____________________________________________________________________________  __        Procedure  Complete Echo Adult.  _____________________________________________________________________________  __        Interpretation Summary     The rhythm was atrial fibrillation.     1. The left ventricle is borderline dilated with moderately reduced systolic  function. Biplane ejection fraction 40%.  2. There is moderate global hypokinesia of the left ventricle.  3. The right ventricle is normal in size and function. The right ventricular  systolic pressure is approximated at 16.6 mmHg plus the right atrial pressure.     4. The left atrium is moderate to severely dilated.  5. There is moderate (2+) mitral regurgitation. (2 jets, the predominat jet is  posteriorly directed). Effective regurgitant orifice area 0.3 cm2.  6. There is moderate (2+) tricuspid regurgitation.     Compared to the stress echocardiogram dated 2013 (when the patient was  in sinus rhythm), the left ventricular ejection fraction has decreased from  60% to 40%.  _____________________________________________________________________________  __        Left Ventricle  The left ventricle is borderline dilated. Left ventricular systolic function  is moderately reduced. The visual ejection fraction is estimated at  35-40%.  Left ventricular diastolic function is indeterminate. There is moderate global  hypokinesia of the left ventricle.     Right Ventricle  The right ventricle is normal in size and function.     Atria  The left atrium is moderate to severely dilated. The right atrium is  moderately dilated.     Mitral Valve  The mitral valve leaflets appear normal. There is no evidence of stenosis,  fluttering, or prolapse. There is moderate (2+) mitral regurgitation. (2 jets,  the predominat jet is posteriorly directed). Effective regurgitant orific area  0.3 cm2.        Tricuspid Valve  Normal tricuspid valve. There is moderate (2+) tricuspid regurgitation. The  right ventricular systolic pressure is approximated at 16.6 mmHg plus the  right atrial pressure.     Aortic Valve  The aortic valve is trileaflet. There is trace aortic regurgitation. No  hemodynamically significant valvular aortic stenosis.     Pulmonic Valve  The pulmonic valve is not well seen, but is grossly normal. There is trace  pulmonic valvular regurgitation.     Vessels  Normal size ascending aorta. 3.8 cm. Dilation of the inferior vena cava is  present with normal respiratory variation in diameter.     Pericardium  There is no pericardial effusion.        Rhythm  The rhythm was atrial fibrillation.  _____________________________________________________________________________  __  MMode/2D Measurements & Calculations  IVSd: 1.0 cm     LVIDd: 5.1 cm  LVIDs: 4.4 cm  LVPWd: 0.77 cm  FS: 14.8 %  EDV(Teich): 124.8 ml  ESV(Teich): 85.9 ml  LV mass(C)d: 165.3 grams  LV mass(C)dI: 95.2 grams/m2  Ao root diam: 3.4 cm  LA dimension: 4.5 cm  asc Aorta Diam: 3.8 cm  LA/Ao: 1.3  LA Volume (BP): 86.0 ml  LA Volume Index (BP): 49.4 ml/m2           Doppler Measurements & Calculations  MR ERO: 0.35 cm2  MR volume: 45.3 ml  TR max boom: 203.5 cm/sec  TR max P.6 mmHg           _____________________________________________________________________________  __            Report approved by: Dr Juan Manuel Colon 05/12/2017 12:43 PM         Office visit 6/19/2017 excerpt:  PLAN:   1.  Obtain repeat transthoracic echocardiogram to assess LV function following rate control.   2.  Continue metoprolol tartrate 50 mg b.i.d.   3.  Continue Eliquis 5 mg b.i.d.     4.  I do not think we need to cardiovert her until her sleep apnea has been adequately treated for about 8 weeks at least, or it might result in recurrence of the atrial fibrillation.   5.  If the echocardiogram shows LV function is stable or improved, she is cardiovascularly cleared to proceed with her intermediate-risk orthopedic surgery under regional or general anesthesia.  I will follow up on the results and contact the patient.   6.  CPAP initiation per [facility].   7.  Follow up in 3 months.  If she remains in atrial fibrillation at that point, proceed to electrical cardioversion.   It was a pleasure to visit with the patient.   I spent 30 minutes with her; greater than 50% of the time was spent in counseling and coordination of care.   JUAN MANUEL DON MD    D: 06/19/2017 13:08   T: 06/20/2017 07:25   MT: mm       330 pm Dr Don reviewed - slight improvement of EF to 45% and stable, give more time for remodeling on metoprolol. Ok from echocardiogram/EF standpoint to proceed with knee surgery but recommend to reassess for rate control of atrial fibrillation via 24 holter this week; if HR's are over 100 will plan for cardioversion; continue Eliquis; OK for [place] trip.  Called to patient - left message w/  for patient to call back tomorrow to discuss Dr Don's plan.   Ely Schwab RN 06/21/17 4:52 PM

## 2017-06-21 NOTE — PATIENT INSTRUCTIONS
MY TREATMENT INFORMATION FOR SLEEP APNEA-  Ely Humphreys    MY CONTACT NUMBERS ARE:  [phone]  DOCTOR : Doc RAE [facility]  SLEEP CENTER :  [place]  CPAP EQUIPMENT     You have sleep apnea, CPAP is prescribed for you.    You will be provided with an  CPAP with a pressure range of 8 cmH2O with heated humidity to limit nasal congestion. Adjust the heat level on humidifier to find a setting that prevents dry nose but does not cause condensation in the hose or mask. Use distilled water in the humidifier.    The CPAP has a ramp function that starts the pressure lower than your prescribed pressure and gradually increases it over a number of minutes.  This may make it easier to fall asleep.                  Try to use the CPAP every-night, all night, at least 7-8 hours each night.  Daytime naps are not advised, but use CPAP if taking naps. Many insurances require that we prove you are using the CPAP at least 4 hours on at least 70% of nights over a 30 day period. We have 90 days to meet those criteria.    Objective measure goal  Compliance  Goal >70%  Leak   Goal < 10%  AHI  Goal < 5 events per hour   Usage  Goal >420 minutes       Patient was advised not to drive if drowsy or sleepy.                Discussed weight management and the impact of weight gain on sleep apnea.  Let me know if you snore or feel the pressure is too high.    You can get new supplies (mask, hose and filter) for your CPAP every 3-6 months, covered by insurance. You do not need to get supplies that often, but they are available if you would like them.  You may exchange the mask once within the first month if you feel the initial mask does not fit well.  Contact your medical equipment provider for equipment issues.  Please let me know if you have any return of snoring, daytime sleepiness or poor sleep quality. We will want to make sure your CPAP is adequately treating your apnea.  There is a website called CPAP.com that has accessories that  "may make CPAP use easier. Please visit it at your convenience.    Our phone number is [phone]    Follow-up 4-6 weeks after PAP usage.  Bring your CPAP machine with you to the follow up appointment.               Frequently asked questions:  1. What is Obstructive Sleep Apnea (MIKEL)? MIKEL is the most common type of sleep apnea. Apnea literally means, \"without breath.\" It is characterized by repetitive pauses in breathing, despite continued effort to breathe, and is usually associated with a reduction in blood oxygen saturation. Apneas can last 10 to over 60 seconds. It is caused by narrowing or collapse of the upper airway as muscles relax during sleep. Severity of sleep apnea is determined by frequency of breathing events and their effect on your sleep and oxygen levels determined during sleep testing.   2. What are the consequences of MIKEL? Symptoms include: daytime sleepiness- possibly increasing the risk of falling asleep while driving, unrefreshing/restless sleep, snoring, insomnia, waking frequently to urinate, waking with heartburn or reflux, reduced concentration and memory, and morning headaches. Other health consequences may include development of high blood pressure and other cardiovascular disease in persons who are susceptible. Untreated MIKEL  can contribute to heart disease, stroke and diabetes.   3. What are the treatment options? In most situations, sleep apnea is a lifelong disease that must be managed with daily therapy. Medications are not effective for sleep apnea and surgery is generally not performed until other therapies have been tried. Therapy is usually tailored to the individual patient based on many factors including your wishes as well as severity of sleep apnea and severity of obesity. Continuous Positive Airway (CPAP) is the most reliable treatment. An oral device to hold your jaw forward is usually      IF I HAVE SLEEP APNEA.....  WHERE CAN I FIND MORE INFORMATION?    American Academy " of Sleep Medicine Patient information on sleep disorders:  http://yoursleep.aasmnet.org    THINGS I SHOULD REMEMBER  In most situations, sleep apnea is a lifelong disease that must be managed with daily therapy. Untreated disease, when severe, may result in an increased risk for an array of problems from heart disease to mood changes, car accidents and shorter lifespan.    CPAP-  WHY AND HOW?                                    Continuous positive airway pressure, or CPAP, is the most effective treatment for obstructive sleep apnea. A decision to use CPAP is a major step forward in the pursuit of a healthier life. The successful use of CPAP will help you breathe easier, sleep better and live healthier. Using CPAP can be a positive experience if you keep these dougherty points in mind:  1. Commitment  CPAP is not a quick fix for your problem. It involves a long-term commitment to improve your sleep and your health.    2. Communication  Stay in close communication with both your sleep doctor and your CPAP supplier. Ask lots of questions and seek help when you need it.    3. Consistency  Use CPAP all night, every night and for every nap. You will receive the maximum health benefits from CPAP when you use it every time that you sleep. This will also make it easier for your body to adjust to the treatment.    4. Correction  The first machine and mask that you try may not be the best ones for you. Work with your sleep doctor and your CPAP supplier to make corrections to your equipment selection. Ask about trying a different type of machine or mask if you have ongoing problems. Make sure that your mask is a good fit and learn to use your equipment properly.    5. Challenge  Tell a family member or close friend to ask you each morning if you used your CPAP the previous night. Have someone to challenge you to give it your best effort.    6. Connection   Your adjustment to CPAP will be easier if you are able to connect with others  "who use the same treatment. Ask your sleep doctor if there is a support group in your area for people who have sleep apnea, or look for one on the Internet.    7. Comfort   Increase your level of comfort by using a saline spray, decongestant or heated humidifier if CPAP irritates your nose, mouth or throat. Use your unit's \"ramp\" setting to slowly get used to the air pressure level. There may be soft pads you can buy that will fit over your mask straps. Look on www.CPAP.com for accessories such as these straps, a pillow contoured for side-sleeping with CPAP, longer hoses, hose covers to reduce condensation, or stands to keep the hose out of your way.                                                              8. Cleaning   Clean your mask, tubing and headgear on a regular basis. Put this time in your schedule so that you don't forget to do it. Check and replace the filters for your CPAP unit and humidifier.    9. Completion   Although you are never finished with CPAP therapy, you should reward yourself by celebrating the completion of your first month of treatment. Expect this first month to be your hardest period of adjustment. It will involve some trial and error as you find the machine, mask and pressure settings that are right for you.    10. Continuation  After your first month of treatment, continue to make a daily commitment to use your CPAP all night, every night and for every nap.    CPAP-Tips to starting with success:  Begin using your CPAP for short periods of time during the day while you watch TV or read.    Use CPAP every night and for every nap. Using it less often reduces the health benefits and makes it harder for your body to get used to it.    Newer CPAP models are virtually silent; however, if you find the sound of your CPAP machine to be bothersome, place the unit under your bed to dampen the sound.     Make small adjustments to your mask, tubing, straps and headgear until you get the right " "fit. Tightening the mask may actually worsen the leak.  If it leaves significant marks on your face or irritates the bridge of your nose, it may not be the best mask for you.  Speak with the person who supplied the mask and consider trying other masks.    Use a saline nasal spray to ease mild nasal congestion. Neti-Pot or saline nasal rinses may also help. Nasal gel sprays can help reduce nasal dryness.  Biotene mouthwash can be helpful to protect your teeth if you experience frequent dry mouth.  Dry mouth may be a sign of air escaping out of your mouth or out of the mask in the case of a full face mask.  Speak with your provider if you expect that is the case.     Take a nasal decongestant to relieve more severe nasal or sinus congestion.  Do not use Afrin (oxymetazoline) nasal spray more than 3 days in a row.  Speak with your sleep doctor if your nasal congestion is chronic.    Use a heated humidifier that fits your CPAP model to enhance your breathing comfort. Adjust the heat setting up if you get a dry nose or throat, down if you get condensation in the hose or mask.  Position the CPAP lower than you so that any condensation in the hose drains back into the machine rather than towards the mask.    Try a system that uses nasal pillows if traditional masks give you problems.    Clean your mask, tubing and headgear once a week. Make sure the equipment dries fully.    Regularly check and replace the filters for your CPAP unit and humidifier.    Work closely with your sleep doctor and your CPAP supplier to make sure that you have the machine, mask and air pressure setting that works best for you.        MASK DESENSITIZATION:    If you are experiencing some anxiety about trying a PAP mask or breathing with pressurized air try the following steps in sequence to get used to PAP. This is called \"desensitization\".    1.  Wear mask (disconnected from the device) for 60 minutes daily awake and use a distraction: Sit and " watch TV, read, or listen to music with the mask on. Take the mask off and put it back on, as needed. This can be in a chair in the living room, for example.    2.  When you can use the mask without taking it off for 60 minutes and without anxiety, then proceed to step 3.    3.  Attach the mask to the PAP device, and switch the unit  on  and practice breathing through the mask for one hour while watching television, reading or performing some other sedentary, distracting activity.     4.  Once you are comfortable wearing PAP for 30-60 minutes without anxiety while distracted with an activity, try to use it in bed. You can continue distraction in bed by watching TV, reading, listening to music or an audio book, etc.  The main goal is to  not think about using PAP  but pay attention to relaxing.    5. Use the PAP during scheduled one-hour naps at home.    6. Use PAP during initial 3-4 hours of nocturnal sleep.    7. Use PAP through an entire night of sleep.      IF YOU FAIL CPAP THERAPY, WE HAPPY DISCUSS WITH YOU THE OTHER TREATMENT OPTIONS FOR SLEEP APNEA INCLUDING ORAL APPLIANCE etc.    Your BMI is Body mass index is 29.26 kg/(m^2).  Weight management is a personal decision.  If you are interested in exploring weight loss strategies, the following discussion covers the approaches that may be successful. Body mass index (BMI) is one way to tell whether you are at a healthy weight, overweight, or obese. It measures your weight in relation to your height.  A BMI of 18.5 to 24.9 is in the healthy range. A person with a BMI of 25 to 29.9 is considered overweight, and someone with a BMI of 30 or greater is considered obese. More than two-thirds of American adults are considered overweight or obese.  Being overweight or obese increases the risk for further weight gain. Excess weight may lead to heart disease and diabetes.  Creating and following plans for healthy eating and physical activity may help you improve your  health.  Weight control is part of healthy lifestyle and includes exercise, emotional health, and healthy eating habits. Careful eating habits lifelong are the mainstay of weight control. Though there are significant health benefits from weight loss, long-term weight loss with diet alone may be very difficult to achieve- studies show long-term success with dietary management in less than 10% of people. Attaining a healthy weight may be especially difficult to achieve in those with severe obesity. In some cases, medications, devices and surgical management might be considered.  What can you do?  If you are overweight or obese and are interested in methods for weight loss, you should discuss this with your provider.     Consider reducing daily calorie intake by 500 calories.     Keep a food journal.     Avoiding skipping meals, consider cutting portions instead.    Diet combined with exercise helps maintain muscle while optimizing fat loss. Strength training is particularly important for building and maintaining muscle mass. Exercise helps reduce stress, increase energy, and improves fitness. Increasing exercise without diet control, however, may not burn enough calories to loose weight.       Start walking three days a week 10-20 minutes at a time    Work towards walking thirty minutes five days a week     Eventually, increase the speed of your walking for 1-2 minutes at time    In addition, we recommend that you review healthy lifestyles and methods for weight loss available through the National Institutes of Health patient information sites:  http://win.niddk.nih.gov/publications/index.htm    And look into health and wellness programs that may be available through your health insurance provider, employer, local community center, or [employer] club.

## 2017-06-21 NOTE — PROGRESS NOTES
Sleep Study Follow-Up Visit:    Date on this visit: 2017    ASSESSMENT / PLAN:    MIKEL (obstructive sleep apnea)    Discussed with patient the recent sleep study and treatment options, we recommend CPAP 8 cmH2O in addition to weight loss and avoiding sleeping supine position. Patient is recommended to improve his sleep-wake schedule and good sleep hygiene. Patient was advised to use PAP therapy for at least 7-8 hours and during naps (naps are not recommended).  Instructions given. Follow up 4-6 weeks after PAP use.  Counseled regarding weight loss through diet modification and increased physical activity. Patient was given instuctions of weight loss and advised to follow up her PCP for further weight loss interventions.      All questions were answered.  The patient indicates understanding of the above issues and agrees with the plan set forth.    Orders Placed This Encounter   Procedures     Comprehensive DME       She will follow up with me in about 4-6 week(s).       BRIEF SUMMARY:    Ely Humphreys is a 67 year old female with history of atrial fibrillation, systolic CHF with EF 40%, hx of Graves s/p I ablation with subsequent hypothyroidism, depression, breast cancer and essential tremor  comes in today for follow-up of her sleep study done on 6/15/17 at the [facility] for result of sleep study.    PS/15/17  Sleep latency 3.6 minutes with sleep aid.    REM achieved.   REM latency 204 minutes.    Sleep efficiency 82.7%. Total sleep time 260.5 minutes.  Sleep architecture:  Stage 1, 11.9% (5%), stage 2, 62.4% (45-55%), stage 3, 2.4% (15-20%), stage REM, 1.5% (20-25%).    AHI was 24.6/hour.   RDI 30.6/hour.    REM AHI 75/hour (due to limited REM sleep).    Supine AHI 27.2/hour.    Lowest O2 saturation: 83.8%  S/He spent 1.9 minutes below 89% SpO2.   Periodic Limb Movement Index 5.3/hour.       CPAP titration:    Sleep latency 17 minutes.    REM achieved. REM latency 59 minutes.    Sleep  efficiency 75%. Total sleep time 194 minutes.   Sleep architecture:  Stage 1, 8.9% (5%), stage 2, 58.8% (45-55%), stage 3, 9.3% (15-20%), stage REM, 23% (20-25%).    Periodic Limb Movement Index 0/hour.   CPAP was titrated to 6 cmH2O with elimination of apneas, hypopneas and desaturations.   Supine REM was not noted at this pressure.     These findings were reviewed with the patient and copy of the sleep study result was given.    Comorbidities include: atrial fibrillation, systolic CHF with EF 40%, hx of Graves s/p I ablation with subsequent hypothyroidism, depression, breast cancer and essential tremor     Past medical/surgical history, family history, social history, medications and allergies were reviewed.      Problem List:  Patient Active Problem List    Diagnosis Date Noted     Hypothyroidism, unspecified type 08/29/2016     Priority: Medium     Meralgia paresthetica 07/30/2012     ACP (advance care planning) 10/19/2011     Advance Care Planning 2/19/2017: Receipt of ACP document:  Received: Health Care Directive which was witnessed or notarized on 11/17/14.  Document previously scanned on 12/7/16.  Validation form completed and sent to be scanned.  Code Status reflects choices in most recent ACP document.  Confirmed/documented designated decision maker(s).  Added by Ericka Baker   Advance Care Planning Liaison  Advance Care Planning 11/15/2016: ACP Review of Chart / Resources Provided:  Reviewed chart for advance care plan.  Ely Humphreys has no plan or code status on file however states presence of ACP document. Copy requested. Code status updated and sent to provider for review pending receipt of document/advance care plan review.  Confirmed/documented legally designated decision makers.  Added by Bailey Ndiaye  Advance Care Planning 10/19/2011:  Patient states has Advance Directive and will bring in a copy to clinic.        Health Care Home 08/22/2011     X  EMERGENCY CARE PLAN  Presenting Problem  Signs and Symptoms Treatment Plan    Questions or conerns during clinic hours    I will call the clinic directly     Questions or conerns outside clinic hours    I will call the 24 hour nurse line at [phone]    Patient needs to schedule an appointment    I will call the 24 hour scheduling team at [phone] or clinic directly    Same day treatment     I will call the clinic first, nurse line if after hours, urgent care and express care if needed                            DX V65.8 REPLACED WITH [id number] (04/08/2013)       Abnormal Pap smear 11/16/2010     Moderate recurrent major depression (H) 09/20/2010     HYPERLIPIDEMIA LDL GOAL <160 02/10/2010     Invasive ductal carcinoma of breast (H)      [place] grade 2 fo 3. T1b Clinical N, N0, M0, stGstrstastdstest:st st1st Receptor status: ER+, MI+ HER 2 IVAN - by FISH  Completed radiation and chemotherapy.  On anastrazole for at least 5 years. Needs yearly dexa scans. Followed by Dr. Marie.       Postmenopausal atrophic vaginitis 02/21/2007     Disorder of bone and cartilage 07/27/2004     Problem list name updated by automated process. Provider to review       Hypothyroidism 07/23/2004     Problem list name updated by automated process. Provider to review               Medications:     Current Outpatient Prescriptions   Medication Sig     zolpidem (AMBIEN) 5 MG tablet Take tablet by mouth 15 minutes prior to sleep, for Sleep Study     liothyronine (CYTOMEL) 5 MCG tablet Take 1 tablet (5 mcg) by mouth daily     levothyroxine (SYNTHROID) 137 MCG tablet Take 1 tablet (137 mcg) by mouth daily     metoprolol (LOPRESSOR) 25 MG tablet Take 2 tablets (50 mg) by mouth 2 times daily     apixaban ANTICOAGULANT (ELIQUIS) 5 MG tablet Take 1 tablet (5 mg) by mouth 2 times daily     Probiotic Product (PROBIOTIC DAILY) CAPS Take 1 capsule by mouth daily     Cholecalciferol (VITAMIN D) 1000 UNIT capsule take 2 Caps by mouth daily.     CALCIUM 500 MG OR TABS 2 tabs daily      "VITAMINS/MINERALS OR TABS 1 TABLET DAILY     No current facility-administered medications for this visit.           PHYSICAL EXAMINATION:  Pulse 83  Ht 1.575 m (5' 2\")  Wt 72.6 kg (160 lb)  SpO2 95%  BMI 29.26 kg/m2          Data: All pertinent previous laboratory data reviewed     No results found for: PH, PHARTERIAL, PO2, [id number], SAT, PCO2, HCO3, BASEEXCESS, KWADWO, BEB  Lab Results   Component Value Date    TSH 0.41 05/10/2017    TSH 1.14 09/06/2016     Lab Results   Component Value Date    GLC 98 05/10/2017    GLC 92 11/15/2016     Lab Results   Component Value Date    HGB 12.5 05/10/2017    HGB 13.6 09/06/2016     Lab Results   Component Value Date    BUN 19 05/10/2017    BUN 16 11/15/2016    CR 0.80 05/10/2017    CR 0.80 11/15/2016     No results found for: HELIO     Twenty-five minutes spent with patient, all of which were spent face-to-face counseling, consulting, coordinating plan of care, going over sleep test results and chart review.          Doc Nash MD 6/21/2017   [facility]  [address], [place] [address] [phone] Clinic        Copy to: Shadia Munroe    "

## 2017-06-21 NOTE — MR AVS SNAPSHOT
After Visit Summary   6/21/2017    Ely Humphreys    MRN: [id number]           Patient Information     Date Of Birth          1950        Visit Information        Provider Department      6/21/2017 8:30 AM Doc Nash MD [facility] [place]        Today's Diagnoses     MIKEL (obstructive sleep apnea)    -  1      Care Instructions    MY TREATMENT INFORMATION FOR SLEEP APNEA-  Ely Humphreys    MY CONTACT NUMBERS ARE:  [phone]  DOCTOR : Doc MARIE. Charity  SLEEP CENTER :  [place]  CPAP EQUIPMENT     You have sleep apnea, CPAP is prescribed for you.    You will be provided with an  CPAP with a pressure range of 8 cmH2O with heated humidity to limit nasal congestion. Adjust the heat level on humidifier to find a setting that prevents dry nose but does not cause condensation in the hose or mask. Use distilled water in the humidifier.    The CPAP has a ramp function that starts the pressure lower than your prescribed pressure and gradually increases it over a number of minutes.  This may make it easier to fall asleep.                  Try to use the CPAP every-night, all night, at least 7-8 hours each night.  Daytime naps are not advised, but use CPAP if taking naps. Many insurances require that we prove you are using the CPAP at least 4 hours on at least 70% of nights over a 30 day period. We have 90 days to meet those criteria.    Objective measure goal  Compliance  Goal >70%  Leak   Goal < 10%  AHI  Goal < 5 events per hour   Usage  Goal >420 minutes       Patient was advised not to drive if drowsy or sleepy.                Discussed weight management and the impact of weight gain on sleep apnea.  Let me know if you snore or feel the pressure is too high.    You can get new supplies (mask, hose and filter) for your CPAP every 3-6 months, covered by insurance. You do not need to get supplies that often, but they are available if you would like them.  You may exchange the  "mask once within the first month if you feel the initial mask does not fit well.  Contact your medical equipment provider for equipment issues.  Please let me know if you have any return of snoring, daytime sleepiness or poor sleep quality. We will want to make sure your CPAP is adequately treating your apnea.  There is a website called CPAP.com that has accessories that may make CPAP use easier. Please visit it at your convenience.    Our phone number is [phone]    Follow-up 4-6 weeks after PAP usage.  Bring your CPAP machine with you to the follow up appointment.               Frequently asked questions:  1. What is Obstructive Sleep Apnea (MIKEL)? MIKEL is the most common type of sleep apnea. Apnea literally means, \"without breath.\" It is characterized by repetitive pauses in breathing, despite continued effort to breathe, and is usually associated with a reduction in blood oxygen saturation. Apneas can last 10 to over 60 seconds. It is caused by narrowing or collapse of the upper airway as muscles relax during sleep. Severity of sleep apnea is determined by frequency of breathing events and their effect on your sleep and oxygen levels determined during sleep testing.   2. What are the consequences of MIKEL? Symptoms include: daytime sleepiness- possibly increasing the risk of falling asleep while driving, unrefreshing/restless sleep, snoring, insomnia, waking frequently to urinate, waking with heartburn or reflux, reduced concentration and memory, and morning headaches. Other health consequences may include development of high blood pressure and other cardiovascular disease in persons who are susceptible. Untreated MIKEL  can contribute to heart disease, stroke and diabetes.   3. What are the treatment options? In most situations, sleep apnea is a lifelong disease that must be managed with daily therapy. Medications are not effective for sleep apnea and surgery is generally not performed until other therapies have " been tried. Therapy is usually tailored to the individual patient based on many factors including your wishes as well as severity of sleep apnea and severity of obesity. Continuous Positive Airway (CPAP) is the most reliable treatment. An oral device to hold your jaw forward is usually      IF I HAVE SLEEP APNEA.....  WHERE CAN I FIND MORE INFORMATION?    American Academy of Sleep Medicine Patient information on sleep disorders:  http://yoursleep.aasmnet.org    THINGS I SHOULD REMEMBER  In most situations, sleep apnea is a lifelong disease that must be managed with daily therapy. Untreated disease, when severe, may result in an increased risk for an array of problems from heart disease to mood changes, car accidents and shorter lifespan.    CPAP-  WHY AND HOW?                                    Continuous positive airway pressure, or CPAP, is the most effective treatment for obstructive sleep apnea. A decision to use CPAP is a major step forward in the pursuit of a healthier life. The successful use of CPAP will help you breathe easier, sleep better and live healthier. Using CPAP can be a positive experience if you keep these dougherty points in mind:  1. Commitment  CPAP is not a quick fix for your problem. It involves a long-term commitment to improve your sleep and your health.    2. Communication  Stay in close communication with both your sleep doctor and your CPAP supplier. Ask lots of questions and seek help when you need it.    3. Consistency  Use CPAP all night, every night and for every nap. You will receive the maximum health benefits from CPAP when you use it every time that you sleep. This will also make it easier for your body to adjust to the treatment.    4. Correction  The first machine and mask that you try may not be the best ones for you. Work with your sleep doctor and your CPAP supplier to make corrections to your equipment selection. Ask about trying a different type of machine or mask if you have  "ongoing problems. Make sure that your mask is a good fit and learn to use your equipment properly.    5. Challenge  Tell a family member or close friend to ask you each morning if you used your CPAP the previous night. Have someone to challenge you to give it your best effort.    6. Connection   Your adjustment to CPAP will be easier if you are able to connect with others who use the same treatment. Ask your sleep doctor if there is a support group in your area for people who have sleep apnea, or look for one on the Internet.    7. Comfort   Increase your level of comfort by using a saline spray, decongestant or heated humidifier if CPAP irritates your nose, mouth or throat. Use your unit's \"ramp\" setting to slowly get used to the air pressure level. There may be soft pads you can buy that will fit over your mask straps. Look on www.CPAP.com for accessories such as these straps, a pillow contoured for side-sleeping with CPAP, longer hoses, hose covers to reduce condensation, or stands to keep the hose out of your way.                                                              8. Cleaning   Clean your mask, tubing and headgear on a regular basis. Put this time in your schedule so that you don't forget to do it. Check and replace the filters for your CPAP unit and humidifier.    9. Completion   Although you are never finished with CPAP therapy, you should reward yourself by celebrating the completion of your first month of treatment. Expect this first month to be your hardest period of adjustment. It will involve some trial and error as you find the machine, mask and pressure settings that are right for you.    10. Continuation  After your first month of treatment, continue to make a daily commitment to use your CPAP all night, every night and for every nap.    CPAP-Tips to starting with success:  Begin using your CPAP for short periods of time during the day while you watch TV or read.    Use CPAP every night and " for every nap. Using it less often reduces the health benefits and makes it harder for your body to get used to it.    Newer CPAP models are virtually silent; however, if you find the sound of your CPAP machine to be bothersome, place the unit under your bed to dampen the sound.     Make small adjustments to your mask, tubing, straps and headgear until you get the right fit. Tightening the mask may actually worsen the leak.  If it leaves significant marks on your face or irritates the bridge of your nose, it may not be the best mask for you.  Speak with the person who supplied the mask and consider trying other masks.    Use a saline nasal spray to ease mild nasal congestion. Neti-Pot or saline nasal rinses may also help. Nasal gel sprays can help reduce nasal dryness.  Biotene mouthwash can be helpful to protect your teeth if you experience frequent dry mouth.  Dry mouth may be a sign of air escaping out of your mouth or out of the mask in the case of a full face mask.  Speak with your provider if you expect that is the case.     Take a nasal decongestant to relieve more severe nasal or sinus congestion.  Do not use Afrin (oxymetazoline) nasal spray more than 3 days in a row.  Speak with your sleep doctor if your nasal congestion is chronic.    Use a heated humidifier that fits your CPAP model to enhance your breathing comfort. Adjust the heat setting up if you get a dry nose or throat, down if you get condensation in the hose or mask.  Position the CPAP lower than you so that any condensation in the hose drains back into the machine rather than towards the mask.    Try a system that uses nasal pillows if traditional masks give you problems.    Clean your mask, tubing and headgear once a week. Make sure the equipment dries fully.    Regularly check and replace the filters for your CPAP unit and humidifier.    Work closely with your sleep doctor and your CPAP supplier to make sure that you have the machine, mask and  "air pressure setting that works best for you.        MASK DESENSITIZATION:    If you are experiencing some anxiety about trying a PAP mask or breathing with pressurized air try the following steps in sequence to get used to PAP. This is called \"desensitization\".    1.  Wear mask (disconnected from the device) for 60 minutes daily awake and use a distraction: Sit and watch TV, read, or listen to music with the mask on. Take the mask off and put it back on, as needed. This can be in a chair in the living room, for example.    2.  When you can use the mask without taking it off for 60 minutes and without anxiety, then proceed to step 3.    3.  Attach the mask to the PAP device, and switch the unit  on  and practice breathing through the mask for one hour while watching television, reading or performing some other sedentary, distracting activity.     4.  Once you are comfortable wearing PAP for 30-60 minutes without anxiety while distracted with an activity, try to use it in bed. You can continue distraction in bed by watching TV, reading, listening to music or an audio book, etc.  The main goal is to  not think about using PAP  but pay attention to relaxing.    5. Use the PAP during scheduled one-hour naps at home.    6. Use PAP during initial 3-4 hours of nocturnal sleep.    7. Use PAP through an entire night of sleep.      IF YOU FAIL CPAP THERAPY, WE HAPPY DISCUSS WITH YOU THE OTHER TREATMENT OPTIONS FOR SLEEP APNEA INCLUDING ORAL APPLIANCE etc.    Your BMI is Body mass index is 29.26 kg/(m^2).  Weight management is a personal decision.  If you are interested in exploring weight loss strategies, the following discussion covers the approaches that may be successful. Body mass index (BMI) is one way to tell whether you are at a healthy weight, overweight, or obese. It measures your weight in relation to your height.  A BMI of 18.5 to 24.9 is in the healthy range. A person with a BMI of 25 to 29.9 is considered " overweight, and someone with a BMI of 30 or greater is considered obese. More than two-thirds of American adults are considered overweight or obese.  Being overweight or obese increases the risk for further weight gain. Excess weight may lead to heart disease and diabetes.  Creating and following plans for healthy eating and physical activity may help you improve your health.  Weight control is part of healthy lifestyle and includes exercise, emotional health, and healthy eating habits. Careful eating habits lifelong are the mainstay of weight control. Though there are significant health benefits from weight loss, long-term weight loss with diet alone may be very difficult to achieve- studies show long-term success with dietary management in less than 10% of people. Attaining a healthy weight may be especially difficult to achieve in those with severe obesity. In some cases, medications, devices and surgical management might be considered.  What can you do?  If you are overweight or obese and are interested in methods for weight loss, you should discuss this with your provider.     Consider reducing daily calorie intake by 500 calories.     Keep a food journal.     Avoiding skipping meals, consider cutting portions instead.    Diet combined with exercise helps maintain muscle while optimizing fat loss. Strength training is particularly important for building and maintaining muscle mass. Exercise helps reduce stress, increase energy, and improves fitness. Increasing exercise without diet control, however, may not burn enough calories to loose weight.       Start walking three days a week 10-20 minutes at a time    Work towards walking thirty minutes five days a week     Eventually, increase the speed of your walking for 1-2 minutes at time    In addition, we recommend that you review healthy lifestyles and methods for weight loss available through the National Institutes of Health patient information  sites:  http://win.niddk.nih.gov/publications/index.htm    And look into health and wellness programs that may be available through your health insurance provider, employer, local community center, or manuela club.                Follow-ups after your visit        Your next 10 appointments already scheduled     Sep 18, 2017  9:45 AM CDT   Return Visit with Juan Manuel Don MD   [facility] [place] ([facility])    [address] [place] [address] [id number] [phone]              Future tests that were ordered for you today     Open Future Orders        Priority Expected Expires Ordered    Echocardiogram Limited Routine 6/19/2017 6/19/2018 6/19/2017            Who to contact     If you have questions or need follow up information about today's clinic visit or your schedule please contact [facility] directly at [phone].  Normal or non-critical lab and imaging results will be communicated to you by MyChart, letter or phone within 4 business days after the clinic has received the results. If you do not hear from us within 7 days, please contact the clinic through [facility] or phone. If you have a critical or abnormal lab result, we will notify you by phone as soon as possible.  Submit refill requests through [employer] or call your pharmacy and they will forward the refill request to us. Please allow 3 business days for your refill to be completed.          Additional Information About Your Visit        [facility] Information     [employer] gives you secure access to your electronic health record. If you see a primary care provider, you can also send messages to your care team and make appointments. If you have questions, please call your primary care clinic.  If you do not have a primary care provider, please call [phone] and they will assist you.        Care EveryWhere ID     This is your Care EveryWhere ID. This could be used by other  "organizations to access your [place] medical records  [id number]        Your Vitals Were     Pulse Height Pulse Oximetry BMI (Body Mass Index)          83 1.575 m (5' 2\") 95% 29.26 kg/m2         Blood Pressure from Last 3 Encounters:   06/19/17 117/84   06/13/17 108/79   05/17/17 102/72    Weight from Last 3 Encounters:   06/21/17 72.6 kg (160 lb)   06/19/17 73.1 kg (161 lb 3.2 oz)   06/13/17 72.6 kg (160 lb)              We Performed the Following     Comprehensive DME        Primary Care Provider Office Phone # Fax #    Shadia SPARKLE MD [place] [phone]       [facility] [address] [place] [address]        Equal Access to Services     STEVE LUNDY : Hadii deshawn hannah hadasho Soomaali, waaxda luqadaha, qaybta kaalmada adeegyada, dori cortes . So [facility] [phone].    ATENCIÓN: Si habla español, tiene a saenz disposición servicios gratuitos de asistencia lingüística. Mike al [phone].    We comply with applicable federal civil rights laws and [place] laws. We do not discriminate on the basis of race, color, national origin, age, disability sex, sexual orientation or gender identity.            Thank you!     Thank you for choosing [facility]  for your care. Our goal is always to provide you with excellent care. Hearing back from our patients is one way we can continue to improve our services. Please take a few minutes to complete the written survey that you may receive in the mail after your visit with us. Thank you!             Your Updated Medication List - Protect others around you: Learn how to safely use, store and throw away your medicines at www.disposemymeds.org.          This list is accurate as of: 6/21/17  8:56 AM.  Always use your most recent med list.                   Brand Name Dispense Instructions for use Diagnosis    apixaban ANTICOAGULANT 5 MG tablet    ELIQUIS    60 tablet    Take 1 tablet (5 mg) by " mouth 2 times daily    Chronic atrial fibrillation (H)       calcium carbonate 1250 MG tablet    OS-MARTITA 500 mg [place] Ca     2 tabs daily        levothyroxine 137 MCG tablet    SYNTHROID    90 tablet    Take 1 tablet (137 mcg) by mouth daily    Acquired hypothyroidism       liothyronine 5 MCG tablet    CYTOMEL    90 tablet    Take 1 tablet (5 mcg) by mouth daily    Acquired hypothyroidism       metoprolol 25 MG tablet    LOPRESSOR    120 tablet    Take 2 tablets (50 mg) by mouth 2 times daily    Atrial fibrillation, unspecified type (H)       PROBIOTIC DAILY Caps      Take 1 capsule by mouth daily        vitamin  s/Minerals Tabs      1 TABLET DAILY        vitamin D 1000 UNITS capsule      take 2 Caps by mouth daily.        zolpidem 5 MG tablet    AMBIEN    1 tablet    Take tablet by mouth 15 minutes prior to sleep, for Sleep Study    Sleep disturbance

## 2017-06-22 NOTE — TELEPHONE ENCOUNTER
Call back from patient, reviewed echo results and plan as outlined in note 6/21/2017.  Patient states understanding and has set up holter next available with clinic on 6/30/2017.  Patient is going on [place] trip mid-august and would like to schedule knee surgery prior to that w/planned 6 week recovery. Message to nurse team to watch for holter results when I am out of office week of July 3rd.  Ely Schwab RN 06/22/17 9:46 AM

## 2017-06-26 ENCOUNTER — TELEPHONE (OUTPATIENT)
Dept: SLEEP MEDICINE | Facility: CLINIC | Age: 67
End: 2017-06-26

## 2017-06-28 DIAGNOSIS — E78.5 HYPERLIPIDEMIA LDL GOAL <160: ICD-10-CM

## 2017-06-28 DIAGNOSIS — R25.1 TREMOR: ICD-10-CM

## 2017-06-28 DIAGNOSIS — R20.0 NUMBNESS OF FEET: ICD-10-CM

## 2017-06-28 LAB
ALBUMIN SERPL-MCNC: 3.8 G/DL (ref 3.4–5)
ALP SERPL-CCNC: 70 U/L (ref 40–150)
ALT SERPL W P-5'-P-CCNC: 20 U/L (ref 0–50)
AST SERPL W P-5'-P-CCNC: 17 U/L (ref 0–45)
BILIRUB DIRECT SERPL-MCNC: 0.2 MG/DL (ref 0–0.2)
BILIRUB SERPL-MCNC: 1.5 MG/DL (ref 0.2–1.3)
CHOLEST SERPL-MCNC: 284 MG/DL
ERYTHROCYTE [SEDIMENTATION RATE] IN BLOOD BY WESTERGREN METHOD: 20 MM/H (ref 0–30)
HDLC SERPL-MCNC: 78 MG/DL
LDLC SERPL CALC-MCNC: 186 MG/DL
NONHDLC SERPL-MCNC: 206 MG/DL
PROT SERPL-MCNC: 7.4 G/DL (ref 6.8–8.8)
TRIGL SERPL-MCNC: 100 MG/DL

## 2017-06-28 PROCEDURE — 80076 HEPATIC FUNCTION PANEL: CPT | Performed by: INTERNAL MEDICINE

## 2017-06-28 PROCEDURE — 36415 COLL VENOUS BLD VENIPUNCTURE: CPT | Performed by: INTERNAL MEDICINE

## 2017-06-28 PROCEDURE — 85652 RBC SED RATE AUTOMATED: CPT | Performed by: INTERNAL MEDICINE

## 2017-06-28 PROCEDURE — 80061 LIPID PANEL: CPT | Performed by: INTERNAL MEDICINE

## 2017-06-29 DIAGNOSIS — I48.91 ATRIAL FIBRILLATION, UNSPECIFIED TYPE (H): ICD-10-CM

## 2017-06-29 RX ORDER — METOPROLOL TARTRATE 50 MG
50 TABLET ORAL 2 TIMES DAILY
Qty: 180 TABLET | Refills: 3 | Status: SHIPPED | OUTPATIENT
Start: 2017-06-29 | End: 2017-07-06 | Stop reason: DRUGHIGH

## 2017-06-29 NOTE — TELEPHONE ENCOUNTER
Received refill request for:  Metoprolol tartrate 50mg tab request  Last OV was: 6/19/2017 with Dr. Don  Labs/EKG: n/a  F/U scheduled: 9/12/2017 with Dr. Don  New script sent to: [facility]

## 2017-06-30 ENCOUNTER — DOCUMENTATION ONLY (OUTPATIENT)
Dept: SLEEP MEDICINE | Facility: CLINIC | Age: 67
End: 2017-06-30

## 2017-06-30 ENCOUNTER — HOSPITAL ENCOUNTER (OUTPATIENT)
Dept: CARDIOLOGY | Facility: CLINIC | Age: 67
Discharge: HOME | End: 2017-06-30
Attending: INTERNAL MEDICINE | Admitting: INTERNAL MEDICINE
Payer: MEDICARE

## 2017-06-30 DIAGNOSIS — I48.91 ATRIAL FIBRILLATION (H): ICD-10-CM

## 2017-06-30 PROBLEM — I48.20 CHRONIC ATRIAL FIBRILLATION (H): Status: ACTIVE | Noted: 2017-06-30

## 2017-06-30 PROCEDURE — 93227 XTRNL ECG REC<48 HR R&I: CPT | Performed by: INTERNAL MEDICINE

## 2017-06-30 PROCEDURE — 93225 XTRNL ECG REC<48 HRS REC: CPT | Performed by: INTERNAL MEDICINE

## 2017-06-30 NOTE — PROGRESS NOTES
Patient was offered choice of vendor and chose [facility].  Patient Ely Humphreys was set up at [facility] on June 30, 2017. Patient received a Resmed AirSense 10 CPAP. Pressures were set at 8 cm H2O.   Patient s ramp is 5 cm H2O for 15 min and FLEX/EPR is EPR.  Patient received a Kiet Respironics Mask name: DREAMWEAR  Nasal mask Size Small, heated tubing and heated humidifier.  Patient is enrolled in the STM Program and does not need to meet compliance. Patient has a follow up on 08/09/2017 with Dr. Nash.    Flor Terrazas

## 2017-07-05 ENCOUNTER — DOCUMENTATION ONLY (OUTPATIENT)
Dept: SLEEP MEDICINE | Facility: CLINIC | Age: 67
End: 2017-07-05

## 2017-07-06 ENCOUNTER — TELEPHONE (OUTPATIENT)
Dept: CARDIOLOGY | Facility: CLINIC | Age: 67
End: 2017-07-06

## 2017-07-06 ENCOUNTER — DOCUMENTATION ONLY (OUTPATIENT)
Dept: CARDIOLOGY | Facility: CLINIC | Age: 67
End: 2017-07-06

## 2017-07-06 DIAGNOSIS — I48.19 PERSISTENT ATRIAL FIBRILLATION (H): Primary | ICD-10-CM

## 2017-07-06 RX ORDER — METOPROLOL TARTRATE 50 MG
50 TABLET ORAL 3 TIMES DAILY
Qty: 60 TABLET | COMMUNITY
Start: 2017-07-06 | End: 2017-07-14 | Stop reason: DRUGHIGH

## 2017-07-06 NOTE — TELEPHONE ENCOUNTER
Called pt - left voice message to return call to review echo and holter results and recommendations by Dr. Don.  Echo with EF 40-45% stable but continued decreased from normal- felt to be related to tachycardia  Holter showing continued afib

## 2017-07-06 NOTE — PROGRESS NOTES
I reviewed patient's repeat transthoracic echocardiogram and 24-hour Holter.    Her LVEF remains unchanged at 40-45% with global hypokinesis, moderate mitral regurgitation and moderate to severe tricuspid regurgitation.    The 24-hour Holter confirmed she is in atrial fibrillation throughout with an average heart rate of 96 BPM (range 47 - 152 BPM).  There were no significant pauses.  However it suggests that her atrial fibrillation is suboptimally rate controlled with heart rates as high as 152 BPM at daytime hours, during which she did not report any symptoms.    RECOMMENDATIONS:    1.  I suspect her LV function has not improved because she is not adequately rate controlled.  She is currently on metoprolol tartrate 50 MG b.i.d.  Increase to 50 MG three times a day.  2.  If she tolerates this well, the goal would be to go up to 75 MG three times daily, and subsequently transition to metoprolol succinate (Toprol-XL).  3.  She is really keen on having her orthopedic surgery.  She is asymptomatic and being beta blocked and her LV function is decreased but stable. Okay to proceed with orthopedic surgery.  4.  Recommend that Eliquis be held 3 days before surgery and restarted when deemed safe by her surgeon.  Bridging with heparin is not indicated.

## 2017-07-06 NOTE — PROGRESS NOTES
3 DAY STM VISIT    Patient contacted for 3 day STM visit  Subjective measures:  Patient states her mask leaks when she is on her side. Patient also states her  has noticed her machine being louder the last few nights.     Current settings:  EPAP Min Auto CPAP: 8 (The minimum allowable pressure in cmH2O)       EPAP Max Auto CPAP: 8 (The maximum allowable pressure in cmH2O)       Assessment: Nightly usage over four hours. Recommended CPAP pillow if she likes to sleep how her side. Patient will bring in  Her machine if it continues to be loud.   Action plan: Pt to have f/u 14 day STM visit.

## 2017-07-06 NOTE — TELEPHONE ENCOUNTER
Pt called-  Discussed echo- EF 40-45% diminished but stable.  Holter showing continued afib- ventricular rate not adequately controlled.  Increase metoprolol tartrate to 50mg three times per day- am, mid day, pm  When needed call for new rx  Pt denies excessive fatigue at this time.  States she is comfortable with Activities as desired.    Okay to proceed with orthopedic surgery from cardiology standpoint per Dr. Don.  Hold eliquis 3 days pre op and restart as soon as surgeon agrees.  No need for bridging with other anticoagulation per Dr. Don.    Pt plans trip to [place] end of Aug 2017.  Will schedule follow up holter monitor and follow up OV with Dr. Don once she returns.  Call with any questions.  Call if increasing fatigue limits activities.    Pt given Nelsy RN telephone number if questions arise.  [phone]

## 2017-07-14 DIAGNOSIS — I48.20 CHRONIC ATRIAL FIBRILLATION (H): Primary | ICD-10-CM

## 2017-07-14 RX ORDER — METOPROLOL TARTRATE 25 MG/1
50 TABLET, FILM COATED ORAL 3 TIMES DAILY
Qty: 175 TABLET | Refills: 11 | Status: SHIPPED | OUTPATIENT
Start: 2017-07-14 | End: 2017-08-17

## 2017-07-17 ENCOUNTER — DOCUMENTATION ONLY (OUTPATIENT)
Dept: SLEEP MEDICINE | Facility: CLINIC | Age: 67
End: 2017-07-17

## 2017-07-17 NOTE — PROGRESS NOTES
14 DAY STM VISIT    Subjective measures:  Pt states things are going well and has no issues or complaints.  Pt is benefiting from therapy.      Assessment: Pt meeting objective benchmarks.  Patient meeting subjective benchmarks.   Action plan: Pt to have 30 day STM visit.   Device settings: CPAP 8.0       Objective measures: 14 day rolling measure     % compliance greater than four hours rolling average 14 days:100  %     95% OF Leak in litres Rolling Average 14 Days: 12.8 lpm last data upload        AHI Rolling Average 14 Day: 1.39  last data upload        Time mask on face 14 day average: 495 min         Objective measure goal  Compliance   Goal >70%  Leak   Goal < 10%  AHI  Goal < 5  Usage  Goal >240

## 2017-08-01 ENCOUNTER — DOCUMENTATION ONLY (OUTPATIENT)
Dept: SLEEP MEDICINE | Facility: CLINIC | Age: 67
End: 2017-08-01

## 2017-08-01 NOTE — PROGRESS NOTES
30 DAY STM VISIT    Message left for patient to return call.     Assessment: Pt meeting objective benchmarks.     Action plan: Waiting for patient to return call and pt to have 6 month [facility] visit.  Patient has a follow up visit with Dr. Nash on 8/9/2017.   Device type: CPAP  PAP settings: CPAP fixed 8 cm  H20    95th% pressure 0 cm  H20   Objective measures: 14 day rolling measures      Compliance  100 %      Leak  9.36 lpm  last  upload      AHI 1.38   last  upload      Average number of minutes 491    Diagnostic AHI: 22.1 2        Objective measure goal  Compliance   Goal >70%  Leak   Goal < 24 lpm  AHI  Goal < 5  Usage  Goal >240

## 2017-08-02 ENCOUNTER — TRANSFERRED RECORDS (OUTPATIENT)
Dept: HEALTH INFORMATION MANAGEMENT | Facility: CLINIC | Age: 67
End: 2017-08-02

## 2017-08-07 ENCOUNTER — TELEPHONE (OUTPATIENT)
Dept: PEDIATRICS | Facility: CLINIC | Age: 67
End: 2017-08-07

## 2017-08-07 DIAGNOSIS — I48.20 CHRONIC ATRIAL FIBRILLATION (H): ICD-10-CM

## 2017-08-07 NOTE — TELEPHONE ENCOUNTER
Patient calling that she was just told that her Eloquis is now requiring a PA as it is now a tier 3 medication. Her insurance is [employer] the phone number is [phone] or through Cover My Meds.   Alyssa Cortez RN

## 2017-08-09 ENCOUNTER — OFFICE VISIT (OUTPATIENT)
Dept: SLEEP MEDICINE | Facility: CLINIC | Age: 67
End: 2017-08-09
Payer: COMMERCIAL

## 2017-08-09 VITALS
DIASTOLIC BLOOD PRESSURE: 74 MMHG | HEART RATE: 77 BPM | RESPIRATION RATE: 14 BRPM | SYSTOLIC BLOOD PRESSURE: 102 MMHG | OXYGEN SATURATION: 96 % | WEIGHT: 160 LBS | BODY MASS INDEX: 29.44 KG/M2 | HEIGHT: 62 IN

## 2017-08-09 DIAGNOSIS — G47.33 OSA (OBSTRUCTIVE SLEEP APNEA): Primary | ICD-10-CM

## 2017-08-09 DIAGNOSIS — E66.3 OVERWEIGHT: ICD-10-CM

## 2017-08-09 PROCEDURE — 99213 OFFICE O/P EST LOW 20 MIN: CPT | Performed by: INTERNAL MEDICINE

## 2017-08-09 NOTE — PATIENT INSTRUCTIONS
MY TREATMENT INFORMATION FOR SLEEP APNEA-  Ely Humphreys    MY CONTACT NUMBERS ARE:  [phone]  DOCTOR : Doc RAE [facility]  SLEEP CENTER :  [place]  CPAP EQUIPMENT     Your sleep apnea is controlled with CPAP.   Continue excellent use of CPAP:  - Recommend using CPAP every night for at least 7-8 hours each night  - Daytime naps are not advised, but use CPAP if taking naps.    You met all objective measure goal  Compliance  Goal >70%  Leak   Goal < 10%  AHI  Goal < 5 events per hour   Usage  Goal >420 minutes      Patient was advised not to drive if drowsy or sleepy.      Follow up in 12 months.    Your BMI is Body mass index is 29.26 kg/(m^2).  Weight management is a personal decision.  If you are interested in exploring weight loss strategies, the following discussion covers the approaches that may be successful. Body mass index (BMI) is one way to tell whether you are at a healthy weight, overweight, or obese. It measures your weight in relation to your height.  A BMI of 18.5 to 24.9 is in the healthy range. A person with a BMI of 25 to 29.9 is considered overweight, and someone with a BMI of 30 or greater is considered obese. More than two-thirds of American adults are considered overweight or obese.  Being overweight or obese increases the risk for further weight gain. Excess weight may lead to heart disease and diabetes.  Creating and following plans for healthy eating and physical activity may help you improve your health.  Weight control is part of healthy lifestyle and includes exercise, emotional health, and healthy eating habits. Careful eating habits lifelong are the mainstay of weight control. Though there are significant health benefits from weight loss, long-term weight loss with diet alone may be very difficult to achieve- studies show long-term success with dietary management in less than 10% of people. Attaining a healthy weight may be especially difficult to achieve in those with severe  obesity. In some cases, medications, devices and surgical management might be considered.  What can you do?  If you are overweight or obese and are interested in methods for weight loss, you should discuss this with your provider.     Consider reducing daily calorie intake by 500 calories.     Keep a food journal.     Avoiding skipping meals, consider cutting portions instead.    Diet combined with exercise helps maintain muscle while optimizing fat loss. Strength training is particularly important for building and maintaining muscle mass. Exercise helps reduce stress, increase energy, and improves fitness. Increasing exercise without diet control, however, may not burn enough calories to loose weight.       Start walking three days a week 10-20 minutes at a time    Work towards walking thirty minutes five days a week     Eventually, increase the speed of your walking for 1-2 minutes at time    In addition, we recommend that you review healthy lifestyles and methods for weight loss available through the National Institutes of Health patient information sites:  http://win.niddk.nih.gov/publications/index.htm    And look into health and wellness programs that may be available through your health insurance provider, employer, local community center, or manuela club.    Weight management plan: Patient was referred to their PCP to discuss a diet and exercise plan.     Your blood pressure was checked while you were in clinic today.  Please read the guidelines below about what these numbers mean and what you should do about them.  Your systolic blood pressure is the top number.  This is the pressure when the heart is pumping.  Your diastolic blood pressure is the bottom number.  This is the pressure in between beats.  If your systolic blood pressure is less than 120 and your diastolic blood pressure is less than 80, then your blood pressure is normal. There is nothing more that you need to do about it  If your systolic blood  pressure is 120-139 or your diastolic blood pressure is 80-89, your blood pressure may be higher than it should be.  You should have your blood pressure re-checked within a year by a primary care provider.  If your systolic blood pressure is 140 or greater or your diastolic blood pressure is 90 or greater, you may have high blood pressure.  High blood pressure is treatable, but if left untreated over time it can put you at risk for heart attack, stroke, or kidney failure.  You should have your blood pressure re-checked by a primary care provider within the next four weeks.

## 2017-08-09 NOTE — PROGRESS NOTES
[facility]  Out[facility] Medicine Follow-up  2017      Name: Ely Humphreys MRN# [id number]   Age: 67 year old YOB: 1950   Date of visit: 2017  Primary care provider: Shadia Munroe         Assessment and Plan:     1. Obstructive Sleep Apnea:  - Full compliance of PAP use.  - Clinical response: good  - Recommend using CPAP every night for at least 7-8 hours each night  - Daytime naps are not advised, but use CPAP if taking naps  - Patient was advised not to drive if drowsy or sleepy.  - Follow up in sleep clinic in 12 months.    2. Overweight: Encouraged patient to lose weight. Counseled regarding weight loss through diet modification and increased physical activity. Patient was given nstuctions of weight loss and advised to follow up her PCP for further weight loss interventions. Weight loss instructions given.      No orders of the defined types were placed in this encounter.        Summary Counseling:  New sleep schedule recommendation: given    Check out http://yoursleep.aasmnet.org/    All questions were answered.  The patient indicates understanding of the above issues and agrees with the plan set forth.           Chief Complaint:    Routine Follow up            History of Present Illness:     Ely Humphreys is a 67 year old female with history of MIKEL, atrial fibrillation, systolic CHF with EF 40%, hx of Graves s/p I ablation with subsequent hypothyroidism, depression, breast cancer and essential tremor who comes to [facility] for follow up. Patient was diagnosed moderate sleep apnea and was prescribed CPAP 8 cmH2O which was setup on 17. She is using her PAP therapy every night and 100% compliance. She has benefits and no complains.      PREVIOUS SLEEP STUDIES:  PS/15/17  Sleep latency 3.6 minutes with sleep aid.    REM achieved.   REM latency 204 minutes.    Sleep efficiency 82.7%. Total sleep time 260.5 minutes.  Sleep  architecture:  Stage 1, 11.9% (5%), stage 2, 62.4% (45-55%), stage 3, 2.4% (15-20%), stage REM, 1.5% (20-25%).    AHI was 24.6/hour.   RDI 30.6/hour.    REM AHI 75/hour (due to limited REM sleep).    Supine AHI 27.2/hour.    Lowest O2 saturation: 83.8%  S/He spent 1.9 minutes below 89% SpO2.   Periodic Limb Movement Index 5.3/hour.        CPAP titration:    Sleep latency 17 minutes.    REM achieved. REM latency 59 minutes.    Sleep efficiency 75%. Total sleep time 194 minutes.   Sleep architecture:  Stage 1, 8.9% (5%), stage 2, 58.8% (45-55%), stage 3, 9.3% (15-20%), stage REM, 23% (20-25%).    Periodic Limb Movement Index 0/hour.   CPAP was titrated to 6 cmH2O with elimination of apneas, hypopneas and desaturations.   Supine REM was not noted at this pressure.     CPAP Compliance:  Dates: 7/9 /2017- 8/8 /2017       100 % >4hour use   Days used: 30/30  Average daily use (days used): 8.9 hrs  Leak 95 percentile: 11.1L/min  Residual AHI: 1.3/hr  Pressure:  8 cmH2O    [place] Sleepiness Scale score today: 5/24  Pre-PAP [place] Sleepiness Scale score: 8/24    PAP machine: ResMed    Interface:  Mask: nasal mask  Chin strap: No  Leak: No  Humidity: Yes    Difficulties with therapy:    [-] Difficulty tolerating the pressure  [-] Epistaxis:   [-] Nasal congestion   [-] Dry mouth:  [-] Mouth breathing:   [-] Pain/skin breakdown:     Improvements noted with CPAP:  [+] waking up more refreshed  [+] sleeping better with less arousals  [+] nocturia improved   [+] improved energy level during the day    SLEEP-WAKE SCHEDULE: unchanged    Other sleep problems: None     Drowsy driving / near incidents: No    Medications that affect sleep: No            Medications:     Current Outpatient Prescriptions   Medication Sig     metoprolol (LOPRESSOR) 25 MG tablet Take 2 tablets (50 mg) by mouth 3 times daily     order for DME Equipment ordered: RESMED CPAP Mask type: Nasal Settings: 8 cmH2O     zolpidem (AMBIEN) 5 MG tablet Take tablet by  mouth 15 minutes prior to sleep, for Sleep Study     liothyronine (CYTOMEL) 5 MCG tablet Take 1 tablet (5 mcg) by mouth daily     levothyroxine (SYNTHROID) 137 MCG tablet Take 1 tablet (137 mcg) by mouth daily     apixaban ANTICOAGULANT (ELIQUIS) 5 MG tablet Take 1 tablet (5 mg) by mouth 2 times daily     Probiotic Product (PROBIOTIC DAILY) CAPS Take 1 capsule by mouth daily     Cholecalciferol (VITAMIN D) 1000 UNIT capsule take 2 Caps by mouth daily.     CALCIUM 500 MG OR TABS 2 tabs daily     VITAMINS/MINERALS OR TABS 1 TABLET DAILY     No current facility-administered medications for this visit.         Allergies   Allergen Reactions     No Known Drug Allergies             Past Medical History:     Past Medical History:   Diagnosis Date     Atrial fibrillation (H)      Benign essential tremor     Chronic     Invasive ductal carcinoma of breast (H)     left breast ca     Major depressive disorder, recurrent episode, in full remission (H) 10/19/2011    Stable for decades     osteopenia 2002     Palpitations      Unspecified hypothyroidism              Past Surgical History:      Past Surgical History:   Procedure Laterality Date     C NONSPECIFIC PROCEDURE      Tubal Ligation    Abstracted 02     C THYROID ABLATION       COLONOSCOPY  10/03     COLONOSCOPY  2014     COLONOSCOPY  2014    Procedure: COLONOSCOPY;  Surgeon: Garrett Villaseñor MD;  Location: [facility]     HC ECHO HEART XTHORACIC, STRESS/REST  09    normal     HC EXCISION BREAST LESION, OPEN >=1      re-excision 09       Social History   Substance Use Topics     Smoking status: Never Smoker     Smokeless tobacco: Never Used     Alcohol use Yes      Comment: 1 or 2 glasses of wine most evenings       Family History   Problem Relation Age of Onset     CANCER Mother      84 yo Breast & Melanoma     CEREBROVASCULAR DISEASE Mother 92     TIA     C.A.D. Mother      CEREBROVASCULAR DISEASE Father       87 yo Alzheimers, CHF  "    SADIA.A.NELSON. Father      possible MI in age 60's     CANCER Maternal Aunt       40's Breast     CEREBROVASCULAR DISEASE Paternal Grandmother       late 40's - early 50's     Blood Disease Maternal Aunt       51 yo Lupus     Cancer - colorectal No family hx of             Physical Examination:   /74  Pulse 77  Resp 14  Ht 1.575 m (5' 2\")  Wt 72.6 kg (160 lb)  SpO2 96%  BMI 29.26 kg/m2            Data: All pertinent previous laboratory data reviewed     No results found for: PH, PHARTERIAL, PO2, [id number], SAT, PCO2, HCO3, BASEEXCESS, KWADWO, BEB  Lab Results   Component Value Date    TSH 0.41 05/10/2017    TSH 1.14 2016     Lab Results   Component Value Date    GLC 98 05/10/2017    GLC 92 11/15/2016     Lab Results   Component Value Date    HGB 12.5 05/10/2017    HGB 13.6 2016     Lab Results   Component Value Date    BUN 19 05/10/2017    BUN 16 11/15/2016    CR 0.80 05/10/2017    CR 0.80 11/15/2016     No results found for: HELIO      She will follow up with me in about 12 month(s).         Copy to: Shadia Munroe MD 2017     [facility]  [id number] [place] [id number]       Fifteen minutes spent with patient, all of which were spent face-to-face counseling, consulting, coordinating plan of care and going over PAP download/sleep study and chart review.          "

## 2017-08-09 NOTE — TELEPHONE ENCOUNTER
Key: [id number]  If you have any questions about your PA submission, contact [facility] at [phone].    Jackelyn Adler LPN

## 2017-08-09 NOTE — MR AVS SNAPSHOT
After Visit Summary   8/9/2017    Ely Humphreys    MRN: [id number]           Patient Information     Date Of Birth          1950        Visit Information        Provider Department      8/9/2017 10:00 AM Doc Nash MD [facility]        Care Instructions    MY TREATMENT INFORMATION FOR SLEEP APNEA-  Ely Humphreys    MY CONTACT NUMBERS ARE:  [phone]  DOCTOR : Doc Nash  SLEEP CENTER :  [facility]  CPAP EQUIPMENT     Your sleep apnea is controlled with CPAP.   Continue excellent use of CPAP:  - Recommend using CPAP every night for at least 7-8 hours each night  - Daytime naps are not advised, but use CPAP if taking naps.    You met all objective measure goal  Compliance  Goal >70%  Leak   Goal < 10%  AHI  Goal < 5 events per hour   Usage  Goal >420 minutes      Patient was advised not to drive if drowsy or sleepy.      Follow up in 12 months.    Your BMI is Body mass index is 29.26 kg/(m^2).  Weight management is a personal decision.  If you are interested in exploring weight loss strategies, the following discussion covers the approaches that may be successful. Body mass index (BMI) is one way to tell whether you are at a healthy weight, overweight, or obese. It measures your weight in relation to your height.  A BMI of 18.5 to 24.9 is in the healthy range. A person with a BMI of 25 to 29.9 is considered overweight, and someone with a BMI of 30 or greater is considered obese. More than two-thirds of American adults are considered overweight or obese.  Being overweight or obese increases the risk for further weight gain. Excess weight may lead to heart disease and diabetes.  Creating and following plans for healthy eating and physical activity may help you improve your health.  Weight control is part of healthy lifestyle and includes exercise, emotional health, and healthy eating habits. Careful eating habits lifelong are the mainstay of weight  control. Though there are significant health benefits from weight loss, long-term weight loss with diet alone may be very difficult to achieve- studies show long-term success with dietary management in less than 10% of people. Attaining a healthy weight may be especially difficult to achieve in those with severe obesity. In some cases, medications, devices and surgical management might be considered.  What can you do?  If you are overweight or obese and are interested in methods for weight loss, you should discuss this with your provider.     Consider reducing daily calorie intake by 500 calories.     Keep a food journal.     Avoiding skipping meals, consider cutting portions instead.    Diet combined with exercise helps maintain muscle while optimizing fat loss. Strength training is particularly important for building and maintaining muscle mass. Exercise helps reduce stress, increase energy, and improves fitness. Increasing exercise without diet control, however, may not burn enough calories to loose weight.       Start walking three days a week 10-20 minutes at a time    Work towards walking thirty minutes five days a week     Eventually, increase the speed of your walking for 1-2 minutes at time    In addition, we recommend that you review healthy lifestyles and methods for weight loss available through the National Institutes of Health patient information sites:  http://win.niddk.nih.gov/publications/index.htm    And look into health and wellness programs that may be available through your health insurance provider, employer, local community center, or manuela club.    Weight management plan: Patient was referred to their PCP to discuss a diet and exercise plan.     Your blood pressure was checked while you were in clinic today.  Please read the guidelines below about what these numbers mean and what you should do about them.  Your systolic blood pressure is the top number.  This is the pressure when the heart is  pumping.  Your diastolic blood pressure is the bottom number.  This is the pressure in between beats.  If your systolic blood pressure is less than 120 and your diastolic blood pressure is less than 80, then your blood pressure is normal. There is nothing more that you need to do about it  If your systolic blood pressure is 120-139 or your diastolic blood pressure is 80-89, your blood pressure may be higher than it should be.  You should have your blood pressure re-checked within a year by a primary care provider.  If your systolic blood pressure is 140 or greater or your diastolic blood pressure is 90 or greater, you may have high blood pressure.  High blood pressure is treatable, but if left untreated over time it can put you at risk for heart attack, stroke, or kidney failure.  You should have your blood pressure re-checked by a primary care provider within the next four weeks.              Follow-ups after your visit        Your next 10 appointments already scheduled     Sep 01, 2017  9:30 AM CDT   Holter Monitor with [facility] DEVICE [facility]    [address]  [facility] [id number] [phone]           LOCATION - [address], [address] [place] [id number] **Please check-in at the [place] Registration Office, [address], in the [facility] building. When you are finished registering, please go to [address] and have a seat.            Sep 12, 2017  9:45 AM CDT   Return Visit with Juan Manuel Don MD   [facility] [place] ([facility])    [address] [place] [address] [address]  [facility] [id number] [phone]              Who to contact     If you have questions or need follow up information about today's clinic visit or your schedule please contact [facility] directly at [phone].  Normal or non-critical lab and imaging results will be  "communicated to you by MyChart, letter or phone within 4 business days after the clinic has received the results. If you do not hear from us within 7 days, please contact the clinic through [employer] or phone. If you have a critical or abnormal lab result, we will notify you by phone as soon as possible.  Submit refill requests through [employer] or call your pharmacy and they will forward the refill request to us. Please allow 3 business days for your refill to be completed.          Additional Information About Your Visit        S[employer]umi Information     [employer] gives you secure access to your electronic health record. If you see a primary care provider, you can also send messages to your care team and make appointments. If you have questions, please call your primary care clinic.  If you do not have a primary care provider, please call [phone] and they will assist you.        Care EveryWhere ID     This is your Care EveryWhere ID. This could be used by other organizations to access your [facility] medical records  [id number]        Your Vitals Were     Pulse Respirations Height Pulse Oximetry BMI (Body Mass Index)       77 14 1.575 m (5' 2\") 96% 29.26 kg/m2        Blood Pressure from Last 3 Encounters:   08/09/17 102/74   06/19/17 117/84   06/13/17 108/79    Weight from Last 3 Encounters:   08/09/17 72.6 kg (160 lb)   06/21/17 72.6 kg (160 lb)   06/19/17 73.1 kg (161 lb 3.2 oz)              Today, you had the following     No orders found for display       Primary Care Provider Office Phone # Fax #    Shadia Munroe [phone] [address] [place] [address]        Equal Access to Services     [facility]: Hadii aad brielle hadrupa Sorancho, waaxda luqadaha, qaybta kaalmada brettyada, dori cortes . So [facility] [phone].    ATENCIÓN: Si habla español, tiene a saenz disposición servicios gratuitos de asistencia lingüística. Llame al [phone].    We " comply with applicable federal civil rights laws and [place] laws. We do not discriminate on the basis of race, color, national origin, age, disability sex, sexual orientation or gender identity.            Thank you!     Thank you for choosing [facility]  for your care. Our goal is always to provide you with excellent care. Hearing back from our patients is one way we can continue to improve our services. Please take a few minutes to complete the written survey that you may receive in the mail after your visit with us. Thank you!             Your Updated Medication List - Protect others around you: Learn how to safely use, store and throw away your medicines at www.disposemymeds.org.          This list is accurate as of: 8/9/17 10:28 AM.  Always use your most recent med list.                   Brand Name Dispense Instructions for use Diagnosis    apixaban ANTICOAGULANT 5 MG tablet    ELIQUIS    60 tablet    Take 1 tablet (5 mg) by mouth 2 times daily    Chronic atrial fibrillation (H)       calcium carbonate 1250 MG tablet    OS-MARTITA 500 mg [place]. Ca     2 tabs daily        levothyroxine 137 MCG tablet    SYNTHROID    90 tablet    Take 1 tablet (137 mcg) by mouth daily    Acquired hypothyroidism       liothyronine 5 MCG tablet    CYTOMEL    90 tablet    Take 1 tablet (5 mcg) by mouth daily    Acquired hypothyroidism       metoprolol 25 MG tablet    LOPRESSOR    175 tablet    Take 2 tablets (50 mg) by mouth 3 times daily    Chronic atrial fibrillation (H)       order for DME      Equipment ordered: RESMED CPAP Mask type: Nasal Settings: 8 cmH2O        PROBIOTIC DAILY Caps      Take 1 capsule by mouth daily        vitamin  s/Minerals Tabs      1 TABLET DAILY        vitamin D 1000 UNITS capsule      take 2 Caps by mouth daily.        zolpidem 5 MG tablet    AMBIEN    1 tablet    Take tablet by mouth 15 minutes prior to sleep, for Sleep Study    Sleep disturbance

## 2017-08-09 NOTE — NURSING NOTE
"Chief Complaint   Patient presents with     RECHECK     f/u cpap       Initial /74  Pulse 77  Resp 14  Ht 1.575 m (5' 2\")  Wt 72.6 kg (160 lb)  SpO2 96%  BMI 29.26 kg/m2 Estimated body mass index is 29.26 kg/(m^2) as calculated from the following:    Height as of this encounter: 1.575 m (5' 2\").    Weight as of this encounter: 72.6 kg (160 lb).  Medication Reconciliation: complete         Karey Edmondson LPN/MA  "

## 2017-08-10 ENCOUNTER — CARE COORDINATION (OUTPATIENT)
Dept: CARDIOLOGY | Facility: CLINIC | Age: 67
End: 2017-08-10

## 2017-08-10 NOTE — PROGRESS NOTES
Pt called and LVM stating her Metoprolol was increased about 4 weeks ago from 50mg BID to 50mg TID by Dr. Don. Pt states since that time she is very tired and dizzy with standing. She is leaving for a trip to [place] on Tuesday next week through the end of the month. She would like to know if she can go back to 50mg BID or what she should do. Reviewed chart, pt had 24 hour Holter on 7/3, which showed average HR 96 bpm, and high daytime HR's. Dr. Don recommended increasing Metoprolol to 50mg TID. Pt is scheduled for repeat Holter on 9/1, and follow up with Dr. Don on 9/12. Pt seen at sleep clinic yesterday, BP was 102/74, and HR 77. Called pt to review, she has not checked BP or HR at all at home. Dizziness is just when she first stands up and then lingers a little bit, she waits and takes some deep breaths then feels better. Pt very fatigued with increase in Metoprolol. She is concerned about symptoms since going out of town next week. Told pt I would review with Dr. Don and call her back. Pt stated understanding. Messaged Dr. Don for review. ESTRELLA Falcon

## 2017-08-14 NOTE — TELEPHONE ENCOUNTER
Form received from [employer] that PA needs to be done under [employer]  New PA done for [employer] on cover my meds.  Contact plan to follow up on [id number]    Jackelyn Adler LPN

## 2017-08-14 NOTE — PROGRESS NOTES
8/14/2017 Message from patient this am ie she has not heard from Dr Don and she is leaving for [place] trip in am tomorrow, returning 8/29/20107.     Reviewed viewed previous RN note from 8/10/2017 w/Dr Don.  Plan per DR Don to reduce metoprolol to 50 mg am 25 mg afternoon and 50 mg pm. Keep visit as scheduled 9/12/2017.    Called hanna espinosa w/ plan - she states understanding and agreement to plan. Dr Don will be considering Cardioversion in the future.  Patient states had torn meniscus left knee repair on 7/17/2017 and had visit 8/9/2017 (note in EPIC) for check on sleep apnea/CPAP use and doing well on same.   Patient agrees to contact us post [place] trip if still issues or concerns prior to visit.  Ely Schwab RN 08/14/17 11:46 AM

## 2017-08-15 NOTE — TELEPHONE ENCOUNTER
Could get pradaxa. I do not want her to have to take warfarin and get multiple INR levels.  Please appeal for eliquis or pradaxa.  Shadia Munroe M.D.

## 2017-08-16 NOTE — TELEPHONE ENCOUNTER
I checked on this appeal and the provider needs to write a letter to appeal this denial of eloquis and we fax appeal letter to [phone]  Routing to provider.  Jackelyn Adler LPN

## 2017-08-17 DIAGNOSIS — I48.20 CHRONIC ATRIAL FIBRILLATION (H): ICD-10-CM

## 2017-08-17 RX ORDER — METOPROLOL TARTRATE 25 MG/1
TABLET, FILM COATED ORAL
Qty: 175 TABLET | Refills: 11 | Status: ON HOLD | COMMUNITY
Start: 2017-08-17 | End: 2017-09-20

## 2017-08-17 NOTE — TELEPHONE ENCOUNTER
I called [employer] myself. They state the eliquis is covered but it is a tier 3 medication. Looks like they have denied a formulary tier exception to lower her copay. She is able to get her medication, but the only less expensive option for her is warfarin. Please let her know we have tried our best with this.  Shadia Munroe M.D.

## 2017-08-28 NOTE — TELEPHONE ENCOUNTER
Patient calls back.  She picked up eliquis and is taking it.    Juani Nevarez RN  Message handled by Nurse Triage.

## 2017-09-01 ENCOUNTER — HOSPITAL ENCOUNTER (OUTPATIENT)
Dept: CARDIOLOGY | Facility: CLINIC | Age: 67
Discharge: HOME | End: 2017-09-01
Attending: INTERNAL MEDICINE | Admitting: INTERNAL MEDICINE
Payer: MEDICARE

## 2017-09-01 DIAGNOSIS — I48.19 PERSISTENT ATRIAL FIBRILLATION (H): ICD-10-CM

## 2017-09-01 PROCEDURE — 93225 XTRNL ECG REC<48 HRS REC: CPT | Performed by: INTERNAL MEDICINE

## 2017-09-01 PROCEDURE — 93227 XTRNL ECG REC<48 HR R&I: CPT | Performed by: INTERNAL MEDICINE

## 2017-09-11 DIAGNOSIS — I48.20 CHRONIC ATRIAL FIBRILLATION (H): ICD-10-CM

## 2017-09-11 NOTE — TELEPHONE ENCOUNTER
apixaban ANTICOAGULANT (ELIQUIS) 5 MG tablet           Last Written Prescription Date: 5/10/2017  Last Fill Quantity: 60, # refills: 1    Last Office Visit with G, [facility] or [facility] prescribing provider:  5/10/2017   Future Office Visit:    Next 5 appointments (look out 90 days)     Sep 13, 2017  2:45 PM CDT   Return Visit with Juan Manuel Don MD   [facility] ([facility])    [address] [place] [id number] [phone]                   Lab Results   Component Value Date    WBC 5.7 05/10/2017     Lab Results   Component Value Date    RBC 3.98 05/10/2017     Lab Results   Component Value Date    HGB 12.5 05/10/2017     Lab Results   Component Value Date    HCT 38.5 05/10/2017     No components found for: MCT  Lab Results   Component Value Date    MCV 97 05/10/2017     Lab Results   Component Value Date    MCH 31.4 05/10/2017     Lab Results   Component Value Date    MCHC 32.5 05/10/2017     Lab Results   Component Value Date    RDW 12.5 05/10/2017     Lab Results   Component Value Date     05/10/2017     Lab Results   Component Value Date    AST 17 06/28/2017     Lab Results   Component Value Date    ALT 20 06/28/2017     Creatinine   Date Value Ref Range Status   05/10/2017 0.80 0.52 - 1.04 mg/dL Final   ]

## 2017-09-13 ENCOUNTER — HOSPITAL ENCOUNTER (OUTPATIENT)
Facility: CLINIC | Age: 67
End: 2017-09-13
Admitting: INTERNAL MEDICINE
Payer: MEDICARE

## 2017-09-13 ENCOUNTER — OFFICE VISIT (OUTPATIENT)
Dept: CARDIOLOGY | Facility: CLINIC | Age: 67
End: 2017-09-13
Attending: INTERNAL MEDICINE
Payer: COMMERCIAL

## 2017-09-13 VITALS
WEIGHT: 166.6 LBS | SYSTOLIC BLOOD PRESSURE: 115 MMHG | OXYGEN SATURATION: 96 % | HEART RATE: 90 BPM | BODY MASS INDEX: 30.66 KG/M2 | DIASTOLIC BLOOD PRESSURE: 73 MMHG | HEIGHT: 62 IN

## 2017-09-13 DIAGNOSIS — I48.20 CHRONIC ATRIAL FIBRILLATION (H): Primary | ICD-10-CM

## 2017-09-13 DIAGNOSIS — G47.33 OBSTRUCTIVE SLEEP APNEA SYNDROME: ICD-10-CM

## 2017-09-13 DIAGNOSIS — I42.9 CARDIOMYOPATHY, UNSPECIFIED (H): ICD-10-CM

## 2017-09-13 DIAGNOSIS — I51.9 LEFT VENTRICULAR SYSTOLIC DYSFUNCTION WITHOUT HEART FAILURE: ICD-10-CM

## 2017-09-13 PROCEDURE — 99214 OFFICE O/P EST MOD 30 MIN: CPT | Performed by: INTERNAL MEDICINE

## 2017-09-13 PROCEDURE — 93000 ELECTROCARDIOGRAM COMPLETE: CPT | Performed by: INTERNAL MEDICINE

## 2017-09-13 RX ORDER — APIXABAN 5 MG/1
TABLET, FILM COATED ORAL
Qty: 60 TABLET | Refills: 5 | Status: SHIPPED | OUTPATIENT
Start: 2017-09-13 | End: 2018-01-31

## 2017-09-13 NOTE — MR AVS SNAPSHOT
After Visit Summary   9/13/2017    Ely Humphreys    MRN: [id number]           Patient Information     Date Of Birth          1950        Visit Information        Provider Department      9/13/2017 2:45 PM Juan Manuel Don MD [facility]        Today's Diagnoses     Chronic atrial fibrillation (H)    -  1    Cardiomyopathy, unspecified (H)        Obstructive sleep apnea syndrome        Left ventricular systolic dysfunction without heart failure          Care Instructions    Visit Summary:    Today we discussed: We will arrange for cardioversion, as you are still in atrial fibrillation.    Medication changes:  NONE  We will continue to investigate options for alternate blood thinners for you. Do NOT miss doses of your blood thinners.     Test results: EKG shows you remain in afib.    Follow up:  Follow up with Dr. Don after cardioversion.     Please call my nurse Ely at [phone] with any questions or concerns. Scheduling phone number: [phone] if needed.                 Follow-ups after your visit        Additional Services     Follow-Up with Cardiac Advanced Practice Provider                 Future tests that were ordered for you today     Open Future Orders        Priority Expected Expires Ordered    EKG 12-lead complete w/read - Clinics (to be scheduled) Routine 9/20/2017 9/13/2018 9/13/2017    Follow-Up with Cardiac Advanced Practice Provider Routine 9/20/2017 9/13/2018 9/13/2017    Cardioversion Routine 9/18/2017 9/13/2018 9/13/2017            Who to contact     If you have questions or need follow up information about today's clinic visit or your schedule please contact [facility] directly at [phone].  Normal or non-critical lab and imaging results will be communicated to you by MyChart, letter or phone within 4 business days after the clinic has received the results. If you do not hear from  "us within 7 days, please contact the clinic through [employer] or phone. If you have a critical or abnormal lab result, we will notify you by phone as soon as possible.  Submit refill requests through [employer] or call your pharmacy and they will forward the refill request to us. Please allow 3 business days for your refill to be completed.          Additional Information About Your Visit        [employer] Information     [employer] gives you secure access to your electronic health record. If you see a primary care provider, you can also send messages to your care team and make appointments. If you have questions, please call your primary care clinic.  If you do not have a primary care provider, please call [phone] and they will assist you.        Care EveryWhere ID     This is your Care EveryWhere ID. This could be used by other organizations to access your [facility] medical records  [id number]        Your Vitals Were     Pulse Height Pulse Oximetry BMI (Body Mass Index)          90 1.575 m (5' 2\") 96% 30.47 kg/m2         Blood Pressure from Last 3 Encounters:   09/13/17 115/73   08/09/17 102/74   06/19/17 117/84    Weight from Last 3 Encounters:   09/13/17 75.6 kg (166 lb 9.6 oz)   08/09/17 72.6 kg (160 lb)   06/21/17 72.6 kg (160 lb)              We Performed the Following     EKG 12-lead complete w/read - Clinics     Follow-Up with Cardiologist          Today's Medication Changes          These changes are accurate as of: 9/13/17  4:06 PM.  If you have any questions, ask your nurse or doctor.               Stop taking these medicines if you haven't already. Please contact your care team if you have questions.     zolpidem 5 MG tablet   Commonly known as:  AMBIEN   Stopped by:  Juan Manuel Don MD                    Primary Care Provider Office Phone # Fax #    Shadia Munroe [phone] [address] [place] [id number]        Equal Access to Services     [facility]: " Hadii aad ku hadbenyo Soismaelali, waaxda luqadaha, qaybta kaalmada opal, dori conniein hayaaleo hobbsferdinand matta laaustinleo gustabo. So [facility] [phone].    ATENCIÓN: Si leonora jenkins, tiene a saenz disposición servicios gratuitos de asistencia lingüística. Kemiame al [phone].    We comply with applicable federal civil rights laws and [place] laws. We do not discriminate on the basis of race, color, national origin, age, disability sex, sexual orientation or gender identity.            Thank you!     Thank you for choosing [facility]  for your care. Our goal is always to provide you with excellent care. Hearing back from our patients is one way we can continue to improve our services. Please take a few minutes to complete the written survey that you may receive in the mail after your visit with us. Thank you!             Your Updated Medication List - Protect others around you: Learn how to safely use, store and throw away your medicines at www.disposemymeds.org.          This list is accurate as of: 9/13/17  4:06 PM.  Always use your most recent med list.                   Brand Name Dispense Instructions for use Diagnosis    calcium carbonate 1250 MG tablet    OS-MARTITA 500 mg [place]. Ca     2 tabs daily        ELIQUIS 5 MG tablet   Generic drug:  apixaban ANTICOAGULANT     60 tablet    TAKE ONE TABLET BY MOUTH TWICE DAILY    Chronic atrial fibrillation (H)       levothyroxine 137 MCG tablet    SYNTHROID    90 tablet    Take 1 tablet (137 mcg) by mouth daily    Acquired hypothyroidism       liothyronine 5 MCG tablet    CYTOMEL    90 tablet    Take 1 tablet (5 mcg) by mouth daily    Acquired hypothyroidism       metoprolol 25 MG tablet    LOPRESSOR    175 tablet    25 mg tablets - take 1 tablets (50 mg)  in am, 1/2 tablet (25 mg) in afternoon, and 1 tablets (50 mg) in pm.    Chronic atrial fibrillation (H)       order for DME      Equipment ordered: RESMED CPAP Mask type: Nasal Settings: 8  cmH2O        PROBIOTIC DAILY Caps      Take 1 capsule by mouth daily        vitamin  s/Minerals Tabs      1 TABLET DAILY        vitamin D 1000 UNITS capsule      take 2 Caps by mouth daily.

## 2017-09-13 NOTE — LETTER
9/13/2017    Shadia Munroe MD  [address] [place] [address] [address] [place] [address]    RE: Ely Humphreys       Dear Colleague,    I had the pleasure of seeing Ely Humphreys in the [facility].    LOCATIONS:  [facility], [facility] in [place].      PRIMARY CARE PROVIDER:     Shadia Munroe MD    [facility]    [address] [place]  [address]      REASON FOR VISIT:   1.  Followup of chronic atrial fibrillation.   2.  Review test results -- 24-hour Holter.   3.  Followup of mild tachycardia-mediated cardiomyopathy.      The patient is well known to me from previous visits.  She was accompanied by her female cousin today.  She is a pleasant 67-year-old  lady whom I had seen in initial consultation on 05/12/2017 for a new diagnosis of atrial fibrillation and preoperative cardiac evaluation prior to elective left knee meniscal tear repair.  At the time, ECG had shown atrial fibrillation with ventricular rates in the 100-110 BPM.  She had no symptoms from the atrial fibrillation.  However, her echocardiogram showed a borderline dilated left ventricle with moderately reduced systolic function and an LVEF of 40% with global hypokinesis, normal right ventricular size and systolic function with moderate mitral and tricuspid regurgitation.  Her previous echocardiogram in 2013 had a normal LVEF of 60%.  My impression was that she likely had a tachycardia-mediated cardiomyopathy.  The patient also has a history of Graves' disease status post radioablation several years ago and has stable TSH on thyroxine therapy.  She is a lifelong nonsmoker.  Her BMI is 30.54 kg/m2.      There was a strong clinical suspicion of undiagnosed sleep apnea.  My strategy was to rate control and anticoagulate her, appropriately diagnose and treat the sleep apnea and subsequently send her for rhythm control strategy with DC cardioversion.  The patient also wanted to complete  her meniscal tear knee surgery.  She has successfully completed her surgery a couple of months ago, has finished a much awaited walking trip to [place], and has been on Eliquis anticoagulation 5 mg b.i.d. without any bleeding issues.  Unfortunately, we have not been able to achieve optimal rate control with beta blocker therapy.  The challenges to this are primarily low blood pressure with dizziness on attempted increase in beta blockade.  Her most recent Holter shows that her average heart rate is 95, but on reviewing the histograms, her heart rate is well above 100 during much of daytime hours.  Her only symptoms are that of mild fatigue and dizziness 1-2 hours after she takes the beta blocker.  She is currently on metoprolol tartrate 50 mg a.m., 25 mg afternoon, 50 mg p.m.  Did not tolerate up-titration of dosages in the past.  Most recent TSH was 0.41.      Reassuringly, she has been consistent with CPAP therapy for the last 2 months.  However, she continues to remain fatigued.  ECG today confirms atrial fibrillation with a ventricular rate of 100 BP.  Her repeat echocardiogram on 06/20 shows that her LVEF has not improved much after a month of medical therapy and LVEF is 40%-45%.      She also has problems with affordability of the Eliquis and is wondering if she can be bridged to a different anticoagulant.      CURRENT MEDICATIONS:   1.  Eliquis 5 mg twice daily.   2.  Metoprolol tartrate 50 mg in a.m., 25 mg afternoon, 50 mg p.m.   3.  Liothyronine 137 mcg and 5 mcg daily.   4.  Vitamin D supplements, calcium supplements, daily multivitamin.      ALLERGIES:  No known medical allergies.      Past medical history, surgical history, social history per previous note.      PHYSICAL EXAMINATION:   VITAL SIGNS:  Blood pressure 105/73, pulse 100-110 per minute, irregularly irregular, height 1.575 meters, weight 75.6 kg.  Respiratory rate 16 per minute, saturations 96% on room air.  BMI 30.47 kg/m2.   CONSTITUTIONAL:   Comfortable at rest, cooperative, alert, oriented, well-developed, well-nourished.   NECK:  Normal carotid pulse.  Normal jugular venous pressure.  Absent A waves due to atrial fibrillation.  No carotid bruits.  No thyromegaly.   RESPIRATORY:  Normal respiratory effort.  Normal chest wall movements.  Bilateral normal breath sounds.  No rales or wheeze.   CARDIOVASCULAR:  The apical impulse is normal to palpation.  No parasternal heave or thrill.  Heart sounds are irregularly irregular consistent with atrial fibrillation, no audible murmur.  No S3, S4.  No friction rub.   GASTROINTESTINAL:  Soft, nontender, no hepatosplenomegaly, no masses.  Active bowel sounds.   MUSCULOSKELETAL:  Normal muscle tone and strength.  Normal gait.  No spinal deformities.   NEUROPSYCHIATRIC:  Oriented to time, place and person.  Normal affect.  No gross motor deficits.   EXTREMITIES:  Mild 1+ edema.  No clubbing or deformities.      DIAGNOSTIC DATA:  I reviewed her labs, ECG done today, previous transthoracic echocardiogram and recent 24-hour Holter.  These have been documented in HPI.   Labs -- Sodium 141, potassium 4.2, BUN 19, creatinine 0.8. Hemoglobin 12.5.      DIAGNOSES:   1.  Chronic atrial fibrillation with suboptimal rate control due to side effects with beta blockers.  Anticoagulated with Eliquis (apixaban).  CHADS-VASc score of 3.   2.  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   3.  Treated hypothyroidism, status post radioablation therapy of Graves' disease with current therapeutic TSH.   4.  Recently diagnosed obstructive sleep apnea with consistent CPAP use for the last 2 months.        ASSESSMENT:    The patient remains in chronic atrial fibrillation with suboptimal rate control.  We are unable to up-titrate metoprolol therapy due to side effects and low blood pressure.  She is tolerating anticoagulation  well.  She has completed her knee surgery and has had consistent CPAP use for the last 2 months.      PLAN:   1.  Referral for DC cardioversion.  She has been on anticoagulation for over 2 months that has been uninterrupted.  Her knee surgery was at the end of June.  SCOTTIE is not indicated.   2.  Since she has affordability issues with Eliquis, we provided her with samples for 2 months.  We will look into switching her to rivaroxaban 20 mg daily or warfarin.  Reiterated that she should be on uninterrupted anticoagulation for at least 3 weeks before and after cardioversion, to prevent the risk of thromboembolic stroke.   3.  Continue current dosage of metoprolol 50 mg a.m., 25 mg at noon, 50 mg p.m.   4.  I congratulated the patient on consistent use of CPAP therapy.   5.  Followup -- she is off to a cruise into [place] on 09/27.  We will try to coordinate cardioversion and followup so that it can be completed prior to her upcoming vacation.   6.  I will see her back in my clinic in about 3 months.  She will need a repeat transthoracic echocardiogram and a 24-hour Holter at that time.      It was a pleasure to visit with the patient.  I spent 35 minutes with her, greater than 50% of the time was spent in counseling and coordination of care.     Sincerely,    Juan Manuel Don MD     [facility]

## 2017-09-13 NOTE — TELEPHONE ENCOUNTER
Prescription approved per [facility] Protocol.  Labs are current.  Megan Bansal, RN  Triage Nurse

## 2017-09-13 NOTE — PROGRESS NOTES
Dictation reference number - [id number]      PRIMARY CARE PROVIDER:  Shadia Munroe  [address] [place] [address]        REVIEW OF SYSTEMS:  A comprehensive 10-point review of systems was completed and the pertinent positives are documented in the history of present illness.    Skin:  Negative     Eyes:  Positive for    ENT:  Negative    Respiratory:  Negative    Cardiovascular:    Positive for;dizziness;edema (chest heaviness makes it hard to breathe)  Gastroenterology: Negative    Genitourinary:  Negative    Musculoskeletal:  Positive for    Neurologic:  Positive for headaches;numbness or tingling of feet  Psychiatric:  Negative    Heme/Lymph/Imm:  Negative    Endocrine:  Negative      CURRENT MEDICATIONS:  Current Outpatient Prescriptions   Medication Sig Dispense Refill     ELIQUIS 5 MG tablet TAKE ONE TABLET BY MOUTH TWICE DAILY  60 tablet 5     metoprolol (LOPRESSOR) 25 MG tablet 25 mg tablets - take 1 tablets (50 mg)  in am, 1/2 tablet (25 mg) in afternoon, and 1 tablets (50 mg) in pm. 175 tablet 11     order for DME Equipment ordered: RESMED CPAP Mask type: Nasal Settings: 8 cmH2O       liothyronine (CYTOMEL) 5 MCG tablet Take 1 tablet (5 mcg) by mouth daily 90 tablet 3     levothyroxine (SYNTHROID) 137 MCG tablet Take 1 tablet (137 mcg) by mouth daily 90 tablet 3     Probiotic Product (PROBIOTIC DAILY) CAPS Take 1 capsule by mouth daily       Cholecalciferol (VITAMIN D) 1000 UNIT capsule take 2 Caps by mouth daily.       CALCIUM 500 MG OR TABS 2 tabs daily       VITAMINS/MINERALS OR TABS 1 TABLET DAILY           ALLERGIES:  Allergies   Allergen Reactions     No Known Drug Allergies        PAST MEDICAL HISTORY:    Past Medical History:   Diagnosis Date     Atrial fibrillation (H)      Benign essential tremor     Chronic     Invasive ductal carcinoma of breast (H) 6/09    left breast ca     Major depressive disorder, recurrent episode, in full remission (H) 10/19/2011    Stable for decades      "osteopenia 2002     Palpitations      Unspecified hypothyroidism        PAST SURGICAL HISTORY:    Past Surgical History:   Procedure Laterality Date     C NONSPECIFIC PROCEDURE      Tubal Ligation    Abstracted 02     C THYROID ABLATION       COLONOSCOPY  10/03     COLONOSCOPY  2014     COLONOSCOPY  2014    Procedure: COLONOSCOPY;  Surgeon: Garrett Villaseñor MD;  Location: [facility]     HC ECHO HEART XTHORACIC, STRESS/REST  09    normal     HC EXCISION BREAST LESION, OPEN >=1      re-excision 09       FAMILY HISTORY:    Family History   Problem Relation Age of Onset     CANCER Mother      82 yo Breast & Melanoma     CEREBROVASCULAR DISEASE Mother 92     TIA     C.A.D. Mother      CEREBROVASCULAR DISEASE Father       85 yo Alzheimers, CHF     C.A.D. Father      possible MI in age 60's     CANCER Maternal Aunt       40's Breast     CEREBROVASCULAR DISEASE Paternal Grandmother       late 40's - early 50's     Blood Disease Maternal Aunt       51 yo Lupus     Cancer - colorectal No family hx of        SOCIAL HISTORY:    Social History     Social History     Marital status:      Spouse name: Judah     Number of children: 2     Years of education: 18     Occupational History      [employer]     health      Social History Main Topics     Smoking status: Never Smoker     Smokeless tobacco: Never Used     Alcohol use Yes      Comment: 1 or 2 glasses of wine most evenings     Drug use: No     Sexual activity: Not Currently     Birth control/ protection: Surgical     Other Topics Concern     None     Social History Narrative       PHYSICAL EXAM:    Vitals: /73  Pulse 90  Ht 1.575 m (5' 2\")  Wt 75.6 kg (166 lb 9.6 oz)  SpO2 96%  BMI 30.47 kg/m2          Encounter Diagnoses   Name Primary?     Chronic atrial fibrillation (H) Yes     Cardiomyopathy, unspecified (H)      Obstructive sleep apnea syndrome      Left ventricular systolic " dysfunction without heart failure        Orders Placed This Encounter   Procedures     EKG 12-lead complete w/read - Clinics     Cardioversion       CC  REFERRING PROVIDER:  Juan Manuel Don MD  [address] [place] [id number]

## 2017-09-13 NOTE — PATIENT INSTRUCTIONS
Visit Summary:    Today we discussed: We will arrange for cardioversion, as you are still in atrial fibrillation.    Medication changes:  NONE  We will continue to investigate options for alternate blood thinners for you. Do NOT miss doses of your blood thinners.     Test results: EKG shows you remain in afib.    Follow up:  Follow up with Dr. Don after cardioversion.     Please call my nurse Ely at [phone] with any questions or concerns. Scheduling phone number: [phone] if needed.

## 2017-09-14 ENCOUNTER — CARE COORDINATION (OUTPATIENT)
Dept: CARDIOLOGY | Facility: CLINIC | Age: 67
End: 2017-09-14

## 2017-09-14 DIAGNOSIS — I48.91 ATRIAL FIBRILLATION (H): Primary | ICD-10-CM

## 2017-09-14 NOTE — PROGRESS NOTES
LOCATIONS:  [facility], [facility] in [place].      PRIMARY CARE PROVIDER:     Shadia Munroe MD    [facility]    [address] [place]  [address]      REASON FOR VISIT:   1.  Followup of chronic atrial fibrillation.   2.  Review test results -- 24-hour Holter.   3.  Followup of mild tachycardia-mediated cardiomyopathy.      HISTORY OF PRESENT ILLNESS:  The patient is well known to me from previous visits.  She was accompanied by her female cousin today.  She is a pleasant 67-year-old  lady whom I had seen in initial consultation on 05/12/2017 for a new diagnosis of atrial fibrillation and preoperative cardiac evaluation prior to elective left knee meniscal tear repair.  At the time, ECG had shown atrial fibrillation with ventricular rates in the 100-110 BPM.  She had no symptoms from the atrial fibrillation.  However, her echocardiogram showed a borderline dilated left ventricle with moderately reduced systolic function and an LVEF of 40% with global hypokinesis, normal right ventricular size and systolic function with moderate mitral and tricuspid regurgitation.  Her previous echocardiogram in 2013 had a normal LVEF of 60%.  My impression was that she likely had a tachycardia-mediated cardiomyopathy.  The patient also has a history of Graves' disease status post radioablation several years ago and has stable TSH on thyroxine therapy.  She is a lifelong nonsmoker.  Her BMI is 30.54 kg/m2.      There was a strong clinical suspicion of undiagnosed sleep apnea.  My strategy was to rate control and anticoagulate her, appropriately diagnose and treat the sleep apnea and subsequently send her for rhythm control strategy with DC cardioversion.  The patient also wanted to complete her meniscal tear knee surgery.  She has successfully completed her surgery a couple of months ago, has finished a much awaited walking trip to [place], and has been on Eliquis anticoagulation 5 mg  b.i.d. without any bleeding issues.  Unfortunately, we have not been able to achieve optimal rate control with beta blocker therapy.  The challenges to this are primarily low blood pressure with dizziness on attempted increase in beta blockade.  Her most recent Holter shows that her average heart rate is 95, but on reviewing the histograms, her heart rate is well above 100 during much of daytime hours.  Her only symptoms are that of mild fatigue and dizziness 1-2 hours after she takes the beta blocker.  She is currently on metoprolol tartrate 50 mg a.m., 25 mg afternoon, 50 mg p.m.  Did not tolerate up-titration of dosages in the past.  Most recent TSH was 0.41.      Reassuringly, she has been consistent with CPAP therapy for the last 2 months.  However, she continues to remain fatigued.  ECG today confirms atrial fibrillation with a ventricular rate of 100 BP.  Her repeat echocardiogram on 06/20 shows that her LVEF has not improved much after a month of medical therapy and LVEF is 40%-45%.      She also has problems with affordability of the Eliquis and is wondering if she can be bridged to a different anticoagulant.      CURRENT MEDICATIONS:   1.  Eliquis 5 mg twice daily.   2.  Metoprolol tartrate 50 mg in a.m., 25 mg afternoon, 50 mg p.m.   3.  Liothyronine 137 mcg and 5 mcg daily.   4.  Vitamin D supplements, calcium supplements, daily multivitamin.      ALLERGIES:  No known medical allergies.      Past medical history, surgical history, social history per previous note.      PHYSICAL EXAMINATION:   VITAL SIGNS:  Blood pressure 105/73, pulse 100-110 per minute, irregularly irregular, height 1.575 meters, weight 75.6 kg.  Respiratory rate 16 per minute, saturations 96% on room air.  BMI 30.47 kg/m2.   CONSTITUTIONAL:  Comfortable at rest, cooperative, alert, oriented, well-developed, well-nourished.   NECK:  Normal carotid pulse.  Normal jugular venous pressure.  Absent A waves due to atrial fibrillation.  No  carotid bruits.  No thyromegaly.   RESPIRATORY:  Normal respiratory effort.  Normal chest wall movements.  Bilateral normal breath sounds.  No rales or wheeze.   CARDIOVASCULAR:  The apical impulse is normal to palpation.  No parasternal heave or thrill.  Heart sounds are irregularly irregular consistent with atrial fibrillation, no audible murmur.  No S3, S4.  No friction rub.   GASTROINTESTINAL:  Soft, nontender, no hepatosplenomegaly, no masses.  Active bowel sounds.   MUSCULOSKELETAL:  Normal muscle tone and strength.  Normal gait.  No spinal deformities.   NEUROPSYCHIATRIC:  Oriented to time, place and person.  Normal affect.  No gross motor deficits.   EXTREMITIES:  Mild 1+ edema.  No clubbing or deformities.      DIAGNOSTIC DATA:  I reviewed her labs, ECG done today, previous transthoracic echocardiogram and recent 24-hour Holter.  These have been documented in HPI.   Labs -- Sodium 141, potassium 4.2, BUN 19, creatinine 0.8. Hemoglobin 12.5.      DIAGNOSES:   1.  Chronic atrial fibrillation with suboptimal rate control due to side effects with beta blockers.  Anticoagulated with Eliquis (apixaban).  CHADS-VASc score of 3.   2.  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   3.  Treated hypothyroidism, status post radioablation therapy of Graves' disease with current therapeutic TSH.   4.  Recently diagnosed obstructive sleep apnea with consistent CPAP use for the last 2 months.        ASSESSMENT:    The patient remains in chronic atrial fibrillation with suboptimal rate control.  We are unable to up-titrate metoprolol therapy due to side effects and low blood pressure.  She is tolerating anticoagulation well.  She has completed her knee surgery and has had consistent CPAP use for the last 2 months.      PLAN:   1.  Referral for DC cardioversion.  She has been on anticoagulation for over 2  months that has been uninterrupted.  Her knee surgery was at the end of .  SCOTTIE is not indicated.   2.  Since she has affordability issues with Eliquis, we provided her with samples for 2 months.  We will look into switching her to rivaroxaban 20 mg daily or warfarin.  Reiterated that she should be on uninterrupted anticoagulation for at least 3 weeks before and after cardioversion, to prevent the risk of thromboembolic stroke.   3.  Continue current dosage of metoprolol 50 mg a.m., 25 mg at noon, 50 mg p.m.   4.  I congratulated the patient on consistent use of CPAP therapy.   5.  Followup -- she is off to a cruise into [place] on .  We will try to coordinate cardioversion and followup so that it can be completed prior to her upcoming vacation.   6.  I will see her back in my clinic in about 3 months.  She will need a repeat transthoracic echocardiogram and a 24-hour Holter at that time.      It was a pleasure to visit with the patient.  I spent 35 minutes with her, greater than 50% of the time was spent in counseling and coordination of care.         GELY CELAYA MD             D: 2017 16:07   T: 2017 22:56   MT: GIOVANI      Name:     NYA KOROMA   MRN:      [id number]        Account:      [id number]   :      1950           Service Date: 2017      Document: [id number]

## 2017-09-14 NOTE — PROGRESS NOTES
Call from patient - cardioversion scheduled for 9/19/2017 but DOROTHEA visit was moved out to October. DR Don requested 1 week visit w/DOROTHEA with ECG post cardioversion and prior to trip to [place] from 9/27 to 10/12/2017. Rescheduled visit to 9/26/2017 w/ Yareli Mathew NP.  Ely Schwab RN 09/14/17 12:13 PM

## 2017-09-15 NOTE — PROGRESS NOTES
Call to patient to review pre-procedure instructions for Cardioversion on 9/19/2017.   Reviewed the following with patient and patient states understanding of instructions:  Blood thinners: Eliquis -  No missed doses per patient  Diabetic:  No  NPO status reviewed  Patient has transportation to and from procedure.   Patient has arranged for someone to stay with them 24 hours post procedure.  Ely Schwab RN 09/15/17 2:01 PM

## 2017-09-19 ENCOUNTER — APPOINTMENT (OUTPATIENT)
Dept: CARDIOLOGY | Facility: CLINIC | Age: 67
End: 2017-09-19
Attending: INTERNAL MEDICINE
Payer: MEDICARE

## 2017-09-19 ENCOUNTER — HOSPITAL ENCOUNTER (OUTPATIENT)
Dept: SURGERY | Facility: CLINIC | Age: 67
End: 2017-09-19
Attending: INTERNAL MEDICINE | Admitting: INTERNAL MEDICINE
Payer: MEDICARE

## 2017-09-19 ENCOUNTER — HOSPITAL ENCOUNTER (OUTPATIENT)
Facility: CLINIC | Age: 67
Discharge: HOME | End: 2017-09-19
Attending: INTERNAL MEDICINE | Admitting: INTERNAL MEDICINE
Payer: MEDICARE

## 2017-09-19 ENCOUNTER — ANESTHESIA EVENT (OUTPATIENT)
Dept: SURGERY | Facility: CLINIC | Age: 67
End: 2017-09-19
Payer: MEDICARE

## 2017-09-19 ENCOUNTER — ANESTHESIA (OUTPATIENT)
Dept: SURGERY | Facility: CLINIC | Age: 67
End: 2017-09-19
Payer: MEDICARE

## 2017-09-19 VITALS
TEMPERATURE: 98 F | SYSTOLIC BLOOD PRESSURE: 129 MMHG | RESPIRATION RATE: 11 BRPM | DIASTOLIC BLOOD PRESSURE: 86 MMHG | OXYGEN SATURATION: 98 %

## 2017-09-19 DIAGNOSIS — I42.9 CARDIOMYOPATHY, UNSPECIFIED (H): ICD-10-CM

## 2017-09-19 DIAGNOSIS — I51.9 LEFT VENTRICULAR SYSTOLIC DYSFUNCTION WITHOUT HEART FAILURE: ICD-10-CM

## 2017-09-19 DIAGNOSIS — G47.33 OBSTRUCTIVE SLEEP APNEA SYNDROME: ICD-10-CM

## 2017-09-19 DIAGNOSIS — I48.20 CHRONIC ATRIAL FIBRILLATION (H): ICD-10-CM

## 2017-09-19 LAB
MAGNESIUM SERPL-MCNC: 2.5 MG/DL (ref 1.6–2.3)
POTASSIUM SERPL-SCNC: 3.9 MMOL/L (ref 3.4–5.3)

## 2017-09-19 PROCEDURE — 92960 CARDIOVERSION ELECTRIC EXT: CPT

## 2017-09-19 PROCEDURE — 36415 COLL VENOUS BLD VENIPUNCTURE: CPT | Performed by: INTERNAL MEDICINE

## 2017-09-19 PROCEDURE — 92960 CARDIOVERSION ELECTRIC EXT: CPT | Performed by: INTERNAL MEDICINE

## 2017-09-19 PROCEDURE — 93320 DOPPLER ECHO COMPLETE: CPT | Mod: 26 | Performed by: INTERNAL MEDICINE

## 2017-09-19 PROCEDURE — 83735 ASSAY OF MAGNESIUM: CPT | Performed by: INTERNAL MEDICINE

## 2017-09-19 PROCEDURE — 76377 3D RENDER W/INTRP POSTPROCES: CPT

## 2017-09-19 PROCEDURE — 93325 DOPPLER ECHO COLOR FLOW MAPG: CPT | Mod: 26 | Performed by: INTERNAL MEDICINE

## 2017-09-19 PROCEDURE — 37000009 ZZH ANESTHESIA TECHNICAL FEE, EACH ADDTL 15 MIN

## 2017-09-19 PROCEDURE — 25000128 H RX IP 250 OP 636: Performed by: NURSE ANESTHETIST, CERTIFIED REGISTERED

## 2017-09-19 PROCEDURE — 37000008 ZZH ANESTHESIA TECHNICAL FEE, 1ST 30 MIN

## 2017-09-19 PROCEDURE — 40000010 ZZH STATISTIC ANES STAT CODE-CRNA PER MINUTE

## 2017-09-19 PROCEDURE — 25000128 H RX IP 250 OP 636: Performed by: ANESTHESIOLOGY

## 2017-09-19 PROCEDURE — 93325 DOPPLER ECHO COLOR FLOW MAPG: CPT

## 2017-09-19 PROCEDURE — 93312 ECHO TRANSESOPHAGEAL: CPT | Mod: 26 | Performed by: INTERNAL MEDICINE

## 2017-09-19 PROCEDURE — 84132 ASSAY OF SERUM POTASSIUM: CPT | Performed by: INTERNAL MEDICINE

## 2017-09-19 RX ORDER — SODIUM CHLORIDE, SODIUM LACTATE, POTASSIUM CHLORIDE, CALCIUM CHLORIDE 600; 310; 30; 20 MG/100ML; MG/100ML; MG/100ML; MG/100ML
500 INJECTION, SOLUTION INTRAVENOUS CONTINUOUS
Status: DISCONTINUED | OUTPATIENT
Start: 2017-09-19 | End: 2017-09-20 | Stop reason: HOSPADM

## 2017-09-19 RX ORDER — PROPOFOL 10 MG/ML
INJECTION, EMULSION INTRAVENOUS PRN
Status: DISCONTINUED | OUTPATIENT
Start: 2017-09-19 | End: 2017-09-19

## 2017-09-19 RX ORDER — FENTANYL CITRATE 50 UG/ML
INJECTION, SOLUTION INTRAMUSCULAR; INTRAVENOUS PRN
Status: DISCONTINUED | OUTPATIENT
Start: 2017-09-19 | End: 2017-09-19

## 2017-09-19 RX ADMIN — SODIUM CHLORIDE, SODIUM LACTATE, POTASSIUM CHLORIDE, CALCIUM CHLORIDE: 600; 310; 30; 20 INJECTION, SOLUTION INTRAVENOUS at 11:02

## 2017-09-19 RX ADMIN — PROPOFOL 20 MG: 10 INJECTION, EMULSION INTRAVENOUS at 11:07

## 2017-09-19 RX ADMIN — PROPOFOL 20 MG: 10 INJECTION, EMULSION INTRAVENOUS at 11:33

## 2017-09-19 RX ADMIN — SODIUM CHLORIDE, SODIUM LACTATE, POTASSIUM CHLORIDE, CALCIUM CHLORIDE: 600; 310; 30; 20 INJECTION, SOLUTION INTRAVENOUS at 11:27

## 2017-09-19 RX ADMIN — PROPOFOL 10 MG: 10 INJECTION, EMULSION INTRAVENOUS at 11:23

## 2017-09-19 RX ADMIN — MIDAZOLAM HYDROCHLORIDE 1 MG: 1 INJECTION, SOLUTION INTRAMUSCULAR; INTRAVENOUS at 11:06

## 2017-09-19 RX ADMIN — FENTANYL CITRATE 25 MCG: 50 INJECTION, SOLUTION INTRAMUSCULAR; INTRAVENOUS at 11:06

## 2017-09-19 RX ADMIN — PROPOFOL 20 MG: 10 INJECTION, EMULSION INTRAVENOUS at 11:25

## 2017-09-19 RX ADMIN — SODIUM CHLORIDE, SODIUM LACTATE, POTASSIUM CHLORIDE, CALCIUM CHLORIDE 500 ML: 600; 310; 30; 20 INJECTION, SOLUTION INTRAVENOUS at 09:22

## 2017-09-19 RX ADMIN — PROPOFOL 10 MG: 10 INJECTION, EMULSION INTRAVENOUS at 11:19

## 2017-09-19 RX ADMIN — PROPOFOL 50 MG: 10 INJECTION, EMULSION INTRAVENOUS at 11:06

## 2017-09-19 ASSESSMENT — ENCOUNTER SYMPTOMS
DYSRHYTHMIAS: 1
SEIZURES: 0

## 2017-09-19 ASSESSMENT — LIFESTYLE VARIABLES: TOBACCO_USE: 0

## 2017-09-19 NOTE — OR NURSING
Patient tolerated procedure well.  Only mild discomfort where the pad was.  VSS; HR 70's A-fib.  Instructions reviewed with spouse and patient.  Patient discharged in stable condition.

## 2017-09-19 NOTE — PROCEDURES
Direct Current Cardioversion Procedure Note    Indication:     Atrial Fibrillation    Procedure note:     After opening informed consent, direct current cardioversion was performed with 150 followed by 200 biphasic joules in a synchronized fashion using anterior and posterior pads. Manual pressure  was applied to the anterior pad and position of the pads were adjusted as needed to improve success rate. There were no apparent complications. Anesthesia was provided by the anesthetist.     Results:     Unsuccessful cardioversion using 150 followed by 200 biphasic joules in a synchronized fashion.4 attempts made. Manual pressure applied, anterior pad position switched.   Patient to continue anticoagulation for at least 4 weeks or longer as recommended by the primary referring cardiologist.  SCOTTIE done prior to procedure and showed no clots. 3+ MR noted.  No sinus beats noted with cardioversion attempts.

## 2017-09-19 NOTE — ANESTHESIA PREPROCEDURE EVALUATION
Anesthesia Evaluation     . Pt has had prior anesthetic.     No history of anesthetic complications          ROS/MED HX    ENT/Pulmonary:     (+)sleep apnea, uses CPAP , . .   (-) tobacco use   Neurologic:      (-) seizures and CVA   Cardiovascular:     (+) ----. Taking blood thinners : . . . :. dysrhythmias a-fib, .       METS/Exercise Tolerance:     Hematologic:         Musculoskeletal:         GI/Hepatic:        (-) GERD and liver disease   Renal/Genitourinary:      (-) renal disease   Endo:     (+) thyroid problem hypothyroidism, .   (-) Type II DM and chronic steroid usage   Psychiatric:         Infectious Disease:         Malignancy:         Other:                     Physical Exam  Normal systems: cardiovascular, pulmonary and dental    Airway   Mallampati: II  TM distance: >3 FB  Neck ROM: full    Dental     Cardiovascular       Pulmonary                     Anesthesia Plan      History & Physical Review  History and physical reviewed and following examination; no interval change.    ASA Status:  3 .    NPO Status:  > 8 hours    Plan for General with Intravenous induction.          Postoperative Care      Consents  Anesthetic plan, risks, benefits and alternatives discussed with:  Patient..            Procedure: SCOTTIE/Cardioversion   Preop diagnosis: afib    Allergies   Allergen Reactions     No Known Drug Allergies      Past Medical History:   Diagnosis Date     Atrial fibrillation (H)      Benign essential tremor     Chronic     Invasive ductal carcinoma of breast (H) 6/09    left breast ca     Major depressive disorder, recurrent episode, in full remission (H) 10/19/2011    Stable for decades     osteopenia 2002     Palpitations      Unspecified hypothyroidism      Past Surgical History:   Procedure Laterality Date     C NONSPECIFIC PROCEDURE      Tubal Ligation    Abstracted 07/12/02     C THYROID ABLATION       COLONOSCOPY  10/03     COLONOSCOPY  5/9/2014     COLONOSCOPY  6/9/2014    Procedure:  COLONOSCOPY;  Surgeon: Garrett Villaseñor MD;  Location: [facility]     HC ECHO HEART XTHORACIC, STRESS/REST  7/22/09    normal     HC EXCISION BREAST LESION, OPEN >=1  7/09    re-excision 8/17/09     Prior to Admission medications    Medication Sig Start Date End Date Taking? Authorizing Provider   ELIQUIS 5 MG tablet TAKE ONE TABLET BY MOUTH TWICE DAILY  9/13/17   Shadia Munroe MD   metoprolol (LOPRESSOR) 25 MG tablet 25 mg tablets - take 1 tablets (50 mg)  in am, 1/2 tablet (25 mg) in afternoon, and 1 tablets (50 mg) in pm. 8/17/17   Juan Manuel Don MD   order for DME Equipment ordered: RESMED CPAP Mask type: Nasal Settings: 8 cmH2O    Reported, Patient   liothyronine (CYTOMEL) 5 MCG tablet Take 1 tablet (5 mcg) by mouth daily 5/26/17   Shadia Munroe MD   levothyroxine (SYNTHROID) 137 MCG tablet Take 1 tablet (137 mcg) by mouth daily 5/26/17   Shadia Munroe MD   Probiotic Product (PROBIOTIC DAILY) CAPS Take 1 capsule by mouth daily    Reported, Patient   Cholecalciferol (VITAMIN D) 1000 UNIT capsule take 2 Caps by mouth daily.    Reported, Patient   CALCIUM 500 MG OR TABS 2 tabs daily       VITAMINS/MINERALS OR TABS 1 TABLET DAILY         Current Facility-Administered Medications Ordered in Epic   Medication Dose Route Frequency Last Rate Last Dose     lidocaine 1 % 1 mL  1 mL Other Q1H PRN         lactated ringers infusion  500 mL Intravenous Continuous 25 mL/hr at 09/19/17 0922       Give   of usual dose of LONG ACTING insulin AM of procedure IF diabetic   Does not apply Continuous PRN         HOLD: Insulin - RAPID/SHORT acting AM of procedure IF diabetic   Does not apply HOLD         HOLD: Insulin - REGULAR AM of procedure IF diabetic   Does not apply HOLD         HOLD: Oral hypoglycemics AM of procedure IF diabetic   Does not apply HOLD         May take oral meds with a sip of water, the morning of SCOTTIE procedure.   Does not apply Continuous PRN         sodium chloride (PF) 0.9% PF flush 3  mL  3 mL Intravenous Q1H PRN         sodium chloride (PF) 0.9% PF flush 3 mL  3 mL Intravenous Q8H         No current Epic-ordered outpatient prescriptions on file.     Wt Readings from Last 1 Encounters:   09/13/17 75.6 kg (166 lb 9.6 oz)     Temp Readings from Last 1 Encounters:   09/19/17 36.7  C (98  F) (Temporal)     BP Readings from Last 6 Encounters:   09/19/17 129/86   09/13/17 115/73   08/09/17 102/74   06/19/17 117/84   06/13/17 108/79   05/17/17 102/72     Pulse Readings from Last 4 Encounters:   09/13/17 90   08/09/17 77   06/21/17 83   06/19/17 70     Resp Readings from Last 1 Encounters:   09/19/17 11     SpO2 Readings from Last 1 Encounters:   09/19/17 98%     Recent Labs   Lab Test  09/19/17   0915  05/10/17   1705  11/15/16   1030   NA   --   141  140   POTASSIUM  3.9  4.2  4.2   CHLORIDE   --   106  105   CO2   --   27  27   ANIONGAP   --   8  8   GLC   --   98  92   BUN   --   19  16   CR   --   0.80  0.80   MARTITA   --   9.1  9.1     Recent Labs   Lab Test  05/10/17   1705  09/06/16   0802   WBC  5.7  6.9   HGB  12.5  13.6   PLT  234  261     Recent Labs   Lab Test  05/10/17   1705   INR  1.01      RECENT LABS:   ECG:   ECHO:   CXR:                  .

## 2017-09-19 NOTE — PROGRESS NOTES
9/19/2017 Dr Don asks patient to see EP MD in consult post failed cardioversion today. SCOTTIE w/moderate MR. Set up visit prior to trip to [place] - leaving 9/27/2017.  Transferred scheduling to arrange visit.   Called to patient - patient states understanding of rationale for visit with Dr Shaffer on 9/20/2017 and agrees to same.  Cancelled DOROTHEA visit 9/26/2017 as no longer needed.  Ely Schwab RN 09/19/17 1:43 PM

## 2017-09-20 ENCOUNTER — OFFICE VISIT (OUTPATIENT)
Dept: CARDIOLOGY | Facility: CLINIC | Age: 67
End: 2017-09-20
Attending: INTERNAL MEDICINE
Payer: COMMERCIAL

## 2017-09-20 VITALS
DIASTOLIC BLOOD PRESSURE: 72 MMHG | SYSTOLIC BLOOD PRESSURE: 103 MMHG | HEART RATE: 87 BPM | WEIGHT: 166.5 LBS | HEIGHT: 62 IN | BODY MASS INDEX: 30.64 KG/M2

## 2017-09-20 DIAGNOSIS — I42.9 CARDIOMYOPATHY, UNSPECIFIED (H): ICD-10-CM

## 2017-09-20 DIAGNOSIS — I48.20 CHRONIC ATRIAL FIBRILLATION (H): Primary | ICD-10-CM

## 2017-09-20 DIAGNOSIS — I51.9 LEFT VENTRICULAR SYSTOLIC DYSFUNCTION WITHOUT HEART FAILURE: ICD-10-CM

## 2017-09-20 PROCEDURE — 93000 ELECTROCARDIOGRAM COMPLETE: CPT | Performed by: INTERNAL MEDICINE

## 2017-09-20 PROCEDURE — 99205 OFFICE O/P NEW HI 60 MIN: CPT | Mod: 25 | Performed by: INTERNAL MEDICINE

## 2017-09-20 RX ORDER — AMIODARONE HYDROCHLORIDE 200 MG/1
TABLET ORAL
Qty: 60 TABLET | Refills: 3 | Status: SHIPPED | OUTPATIENT
Start: 2017-09-20 | End: 2018-03-14

## 2017-09-20 RX ORDER — METOPROLOL TARTRATE 50 MG
TABLET ORAL
Status: ON HOLD | COMMUNITY
End: 2017-09-20

## 2017-09-20 NOTE — PROGRESS NOTES
REASON FOR VISIT:  Evaluation of persistent atrial fibrillation.      HISTORY OF PRESENT ILLNESS:  Ms. Humphreys is a pleasant 67-year-old lady with a history of Graves' disease (14 years ago, status post radiotherapy) and breast cancer (left-sided lumpectomy 7 years ago), who is here for evaluation of persistent atrial fibrillation.      The patient was found to have persistent AFib on preop evaluation for elective left knee meniscal tear repair.  She was evaluated by my colleague, Dr. Don and was found to have a cardiomyopathy as well with an EF around 40%.  She had a stress echo done in 2013 which was within normal limits.  She was then started on metoprolol and Eliquis and underwent a SCOTTIE with attempt of cardioversion yesterday.  SCOTTIE ruled out the presence of clot and revealed moderate to moderate-severe MR with an EF around 40%-45%.  Unfortunately, cardioversion was unsuccessful; therefore, she was referred here for evaluation.      At the moment, she is doing well.  She seems to be minimally symptomatic with AFib.  She is not able to notice palpitations; however, she complains of having intermittent lightheadedness at times.  EKG done here shows AFib with good heart rate control.      ASSESSMENT AND PLAN:   1.  Persistent atrial fibrillation in the setting of cardiomyopathy (possible tachycardia-mediated cardiomyopathy component).  I explained to her the fact that she has cardiomyopathy which it will limit antiarrhythmic therapy.      At this time, I recommend to discontinue metoprolol and start amiodarone.  After 1-2 weeks of amiodarone therapy, she should undergo another attempt of cardioversion.  She will follow up with me in a month after cardioversion with a limited echo to evaluate cardiac function.  At that time, we will reevaluate need for amio therapy or possibility of switching to a different antiarrhythmic.   2.  Embolic prevention.  CHADS-VASc score of 3 (age, gender, possible cardiomyopathy).  Will  continue anticoagulation with Eliquis indefinitely.   3.  Followup care.  Follow up in clinic with me in a month or earlier as needed.         BESSY CARTY MD             D: 2017 10:24   T: 2017 13:43   MT: GIOVANI      Name:     NYA KOROMA   MRN:      [id number]        Account:      [id number]   :      1950           Service Date: 2017      Document: [id number]

## 2017-09-20 NOTE — PATIENT INSTRUCTIONS
- Continue Eliquis indefinitely.    - Stop Metoprolol and start Amiodarone (load dose).    - After 1-2 week of amiodarone therapy, pursue cardioversion.    - Follow up in clinic with me in 1 month after CDV with a limited echo.

## 2017-09-20 NOTE — PROGRESS NOTES
"[id number]    Electrophysiology/ Clinic Note         H&P and Plan:       Physical Exam:  Vitals: /72  Pulse 87  Ht 1.575 m (5' 2\")  Wt 75.5 kg (166 lb 8 oz)  BMI 30.45 kg/m2    Constitutional:  AAO x3.  Pt is in NAD.  HEAD: normocephalic.  SKIN: Skin normal color, texture and turgor with no lesions or eruptions.  Eyes: PERRL, EOMI.  ENT:  Supple, normal JVP. No lymphadenopathy or thyroid enlargement.  Chest:  CTAB.  Cardiac:  IIR, variable sound of S1 and Normal S2.  Systolic murmur in LLSB II/VI.  Abdomen:  Normal BS.  Soft, non-tender and non-distended.  No rebound or guarding.    Extremities:  Pedious pulses palpable B/L.  No LE edema noticed.   Neurological: Strength and sensation grossly symmetric and intact throughout.       CURRENT MEDICATIONS:  Current Outpatient Prescriptions   Medication Sig Dispense Refill     amiodarone (PACERONE/CODARONE) 200 MG tablet Amiodarone 400 mg PO BID for 4 days, then decrease to 400 mg PO daily for 4 days, and then decrease to 200 mg PO daily. 60 tablet 3       ALLERGIES     Allergies   Allergen Reactions     No Known Drug Allergies        PAST MEDICAL HISTORY:  Past Medical History:   Diagnosis Date     Benign essential tremor     Chronic     Invasive ductal carcinoma of breast (H) 6/09    left breast ca     Major depressive disorder, recurrent episode, in full remission (H) 10/19/2011    Stable for decades     osteopenia 2002     Palpitations      Persistent atrial fibrillation (H) 05/10/2017     Unspecified hypothyroidism        PAST SURGICAL HISTORY:  Past Surgical History:   Procedure Laterality Date     C NONSPECIFIC PROCEDURE      Tubal Ligation    Abstracted 07/12/02     C THYROID ABLATION       COLONOSCOPY  10/03     COLONOSCOPY  5/9/2014     COLONOSCOPY  6/9/2014    Procedure: COLONOSCOPY;  Surgeon: Garrett Villaseñor MD;  Location: [facility]     HC ECHO HEART XTHORACIC, STRESS/REST  7/22/09    normal     HC EXCISION BREAST LESION, OPEN >=1  7/09    re-excision " 09       FAMILY HISTORY:  Family History   Problem Relation Age of Onset     CANCER Mother      82 yo Breast & Melanoma     CEREBROVASCULAR DISEASE Mother 92     TIA     C.A.D. Mother      CEREBROVASCULAR DISEASE Father       87 yo Alzheimers, CHF     C.A.D. Father      possible MI in age 60's     CANCER Maternal Aunt       40's Breast     CEREBROVASCULAR DISEASE Paternal Grandmother       late 40's - early 50's     Blood Disease Maternal Aunt       53 yo Lupus     Cancer - colorectal No family hx of        SOCIAL HISTORY:  Social History     Social History     Marital status:      Spouse name: Jduah     Number of children: 2     Years of education: 18     Occupational History      [employer]     health      Social History Main Topics     Smoking status: Never Smoker     Smokeless tobacco: Never Used     Alcohol use Yes      Comment: 1 or 2 glasses of wine most evenings     Drug use: No     Sexual activity: Not Currently     Birth control/ protection: Surgical     Other Topics Concern     None     Social History Narrative       Review of Systems:  Skin:  Negative     Eyes:  Positive for    ENT:  Negative    Respiratory:  Negative    Cardiovascular:    Positive for;dizziness;edema;heaviness;palpitations;lightheadedness  Gastroenterology: Negative    Genitourinary:  Negative    Musculoskeletal:  Negative    Neurologic:  Positive for numbness or tingling of feet  Psychiatric:  Negative    Heme/Lymph/Imm:  Negative    Endocrine:  Positive for thyroid disorder      Recent Lab Results:  LIPID RESULTS:  Lab Results   Component Value Date    CHOL 284 (H) 2017    HDL 78 2017     (H) 2017    TRIG 100 2017    CHOLHDLRATIO 3.3 2015       LIVER ENZYME RESULTS:  Lab Results   Component Value Date    AST 17 2017    ALT 20 2017       CBC RESULTS:  Lab Results   Component Value Date    WBC 5.7 05/10/2017    RBC 3.98 05/10/2017     HGB 12.5 05/10/2017    HCT 38.5 05/10/2017    MCV 97 05/10/2017    MCH 31.4 05/10/2017    MCHC 32.5 05/10/2017    RDW 12.5 05/10/2017     05/10/2017       BMP RESULTS:  Lab Results   Component Value Date     05/10/2017    POTASSIUM 3.9 2017    CHLORIDE 106 05/10/2017    CO2 27 05/10/2017    ANIONGAP 8 05/10/2017    GLC 98 05/10/2017    BUN 19 05/10/2017    CR 0.80 05/10/2017    GFRESTIMATED 72 05/10/2017    GFRESTBLACK 87 05/10/2017    MARTITA 9.1 05/10/2017        A1C RESULTS:  Lab Results   Component Value Date    A1C 5.4 2015       INR RESULTS:  Lab Results   Component Value Date    INR 1.01 05/10/2017         ECHOCARDIOGRAM  Recent Results (from the past 8760 hour(s))   Echocardiogram Limited    Narrative    [id number]  [facility]  [id number]  [id number]^YOMI^GELY^LACHELLE        [facility]  Echocardiography Laboratory  [address] [place] [address]  [address]  [place] [address]  Phone [phone]  Fax [phone]        Name: NYA KOROMA  MRN: [id number]  : 1950  Study Date: 2017 08:45 AM  Age: 67 yrs  Gender: Female  Patient Location: [facility]  Reason For Study: Unspecified atrial fibrillation, Cardiomyopathy, unspecified  Ordering Physician: GELY CELAYA  Referring Physician: GELY CELAYA  Performed By: Pilar Bragg RDCS     BSA: 1.7 m2  Height: 62 in  Weight: 161 lb  HR: 85  BP: 100/70 mmHg  _____________________________________________________________________________  __     Procedure  Limited Echo Adult. Complete Echo Adult.     _____________________________________________________________________________  __        Interpretation Summary     Left ventricular systolic function is mild to moderately reduced. The visual  ejection fraction is estimated at 40-45% (biplane 48% overestimates EF). There  is moderate global hypokinesia of the left ventricle. There is mild concentric  left ventricular hypertrophy.  The left  atrium is moderate to severely dilated.  There is at least moderate (2+ to 2-3+) mitral regurgitation.  There is moderate to mod-severe (2-3+) tricuspid regurgitation.  Mild aortic root dilatation. Sinuses are 3.9 cm and ascending aorta is 4.1 cm.  Compared to prior study, there is no significant change. Limited views were  obtained. Visually, the LVEF and MR are similar to the prior echo.  _____________________________________________________________________________  __        Left Ventricle  The left ventricle is normal in size. There is mild concentric left  ventricular hypertrophy. Left ventricular systolic function is mild to  moderately reduced. The visual ejection fraction is estimated at 40-45%. E  prime velocity may be unreliable due to technical difficulties. There is  moderate global hypokinesia of the left ventricle.     Right Ventricle  The right ventricle is mildly dilated. The right ventricular systolic function  is mild to moderately reduced.     Atria  The left atrium is moderate to severely dilated. The right atrium is  moderately dilated. There is no atrial shunt seen.     Mitral Valve  There is moderate (2+) mitral regurgitation. The mitral regurgitant jet is  eccentrically directed. The mitral regurgitant jet is posteriorly directed,  which is consistent with anterior leaflet pathology.     Tricuspid Valve  There is moderate to mod-severe (2-3+) tricuspid regurgitation. Right  ventricular systolic pressure is normal. Normal IVC (1.5-2.5cm) with <50%  respiratory collapse; right atrial pressure is estimated at 10-15mmHg.        Aortic Valve  No aortic regurgitation is present. No PW/CW Doppler done.     Pulmonic Valve  The pulmonic valve is not well visualized.     Vessels  Mild aortic root dilatation. Sinuses are 3.9 cm and ascending aorta is 4.1 cm.     Pericardium  There is pericardial thickening and/or a small pericardial effusion.     Rhythm  The rhythm was atrial fibrillation.      _____________________________________________________________________________  __  MMode/2D Measurements & Calculations  Ao root diam: 4.1 cm  asc Aorta Diam: 3.8 cm        Doppler Measurements & Calculations  TR max boom: 205.3 cm/sec  TR max P.9 mmHg              _____________________________________________________________________________  __           Report approved by: Larry Gunter 2017 05:30 PM      Echocardiogram Complete    Narrative    [id number]  [facility]  [id number]  [id number]^SASKIA^DUYE           [facility]  Echocardiography Laboratory  [address] [place] [address]        Name: NYA KOROMA  MRN: [id number]  : 1950  Study Date: 2017 11:03 AM  Age: 67 yrs  Gender: Female  Patient Location: [facility]  Reason For Study: , Chronic atrial fibrillation  History: Atrial Fibrillation  Ordering Physician: ANDREA KRUGER  Referring Physician: ANDREA KRUGER  Performed By: Rodrigo Moran MD     BSA: 1.7 m2  Height: 63 in  Weight: 155 lb  HR: 123  BP: 100/70 mmHg  _____________________________________________________________________________  __        Procedure  Complete Echo Adult.  _____________________________________________________________________________  __        Interpretation Summary     The rhythm was atrial fibrillation.     1. The left ventricle is borderline dilated with moderately reduced systolic  function. Biplane ejection fraction 40%.  2. There is moderate global hypokinesia of the left ventricle.  3. The right ventricle is normal in size and function. The right ventricular  systolic pressure is approximated at 16.6 mmHg plus the right atrial pressure.     4. The left atrium is moderate to severely dilated.  5. There is moderate (2+) mitral regurgitation. (2 jets, the predominat jet is  posteriorly directed). Effective regurgitant orifice area 0.3 cm2.  6. There is moderate (2+) tricuspid regurgitation.     Compared to the  stress echocardiogram dated 08/26/2013 (when the patient was  in sinus rhythm), the left ventricular ejection fraction has decreased from  60% to 40%.  _____________________________________________________________________________  __        Left Ventricle  The left ventricle is borderline dilated. Left ventricular systolic function  is moderately reduced. The visual ejection fraction is estimated at 35-40%.  Left ventricular diastolic function is indeterminate. There is moderate global  hypokinesia of the left ventricle.     Right Ventricle  The right ventricle is normal in size and function.     Atria  The left atrium is moderate to severely dilated. The right atrium is  moderately dilated.     Mitral Valve  The mitral valve leaflets appear normal. There is no evidence of stenosis,  fluttering, or prolapse. There is moderate (2+) mitral regurgitation. (2 jets,  the predominat jet is posteriorly directed). Effective regurgitant orific area  0.3 cm2.        Tricuspid Valve  Normal tricuspid valve. There is moderate (2+) tricuspid regurgitation. The  right ventricular systolic pressure is approximated at 16.6 mmHg plus the  right atrial pressure.     Aortic Valve  The aortic valve is trileaflet. There is trace aortic regurgitation. No  hemodynamically significant valvular aortic stenosis.     Pulmonic Valve  The pulmonic valve is not well seen, but is grossly normal. There is trace  pulmonic valvular regurgitation.     Vessels  Normal size ascending aorta. 3.8 cm. Dilation of the inferior vena cava is  present with normal respiratory variation in diameter.     Pericardium  There is no pericardial effusion.        Rhythm  The rhythm was atrial fibrillation.  _____________________________________________________________________________  __  MMode/2D Measurements & Calculations  IVSd: 1.0 cm     LVIDd: 5.1 cm  LVIDs: 4.4 cm  LVPWd: 0.77 cm  FS: 14.8 %  EDV(Teich): 124.8 ml  ESV(Teich): 85.9 ml  LV mass(C)d: 165.3  grams  LV mass(C)dI: 95.2 grams/m2  Ao root diam: 3.4 cm  LA dimension: 4.5 cm  asc Aorta Diam: 3.8 cm  LA/Ao: 1.3  LA Volume (BP): 86.0 ml  LA Volume Index (BP): 49.4 ml/m2           Doppler Measurements & Calculations  MR ERO: 0.35 cm2  MR volume: 45.3 ml  TR max boom: 203.5 cm/sec  TR max P.6 mmHg           _____________________________________________________________________________  __           Report approved by: Dr Juan Manuel Colon 2017 12:43 PM            Orders Placed This Encounter   Procedures     Follow-Up with Electrophysiologist     Cardioversion     Echocardiogram     Orders Placed This Encounter   Medications     DISCONTD: metoprolol (LOPRESSOR) 50 MG tablet     Sig: Take 1 tablet in the am, 1/2 tablet at noon, 1 tablet in pm     amiodarone (PACERONE/CODARONE) 200 MG tablet     Sig: Amiodarone 400 mg PO BID for 4 days, then decrease to 400 mg PO daily for 4 days, and then decrease to 200 mg PO daily.     Dispense:  60 tablet     Refill:  3     Medications Discontinued During This Encounter   Medication Reason     metoprolol (LOPRESSOR) 25 MG tablet Medication Reconciliation Clean Up     metoprolol (LOPRESSOR) 50 MG tablet          Encounter Diagnoses   Name Primary?     Atrial fibrillation (H)      Chronic atrial fibrillation (H) Yes     Cardiomyopathy, unspecified (H)      Left ventricular systolic dysfunction without heart failure          CC  Juan Manuel Don MD  [address] [place] [id number]

## 2017-09-20 NOTE — LETTER
9/20/2017    Shadia Simmons MD  [address] [place] [address]    RE: Ely Humphreys       Dear Colleague,    I had the pleasure of seeing Ely Humphreys in the [facility].    REASON FOR VISIT:  Evaluation of persistent atrial fibrillation.      Ms. Humphreys is a pleasant 67-year-old lady with a history of Graves' disease (14 years ago, status post radiotherapy) and breast cancer (left-sided lumpectomy 7 years ago), who is here for evaluation of persistent atrial fibrillation.      The patient was found to have persistent AFib on preop evaluation for elective left knee meniscal tear repair.  She was evaluated by my colleague, Dr. Don and was found to have a cardiomyopathy as well with an EF around 40%.  She had a stress echo done in 2013 which was within normal limits.  She was then started on metoprolol and Eliquis and underwent a SCOTTIE with attempt of cardioversion yesterday.  SCOTTIE ruled out the presence of clot and revealed moderate to moderate-severe MR with an EF around 40%-45%.  Unfortunately, cardioversion was unsuccessful; therefore, she was referred here for evaluation.      At the moment, she is doing well.  She seems to be minimally symptomatic with AFib.  She is not able to notice palpitations; however, she complains of having intermittent lightheadedness at times.  EKG done here shows AFib with good heart rate control.     Outpatient Encounter Prescriptions as of 9/20/2017   Medication Sig Dispense Refill     amiodarone (PACERONE/CODARONE) 200 MG tablet Amiodarone 400 mg PO BID for 4 days, then decrease to 400 mg PO daily for 4 days, and then decrease to 200 mg PO daily. 60 tablet 3     ELIQUIS 5 MG tablet TAKE ONE TABLET BY MOUTH TWICE DAILY  60 tablet 5     liothyronine (CYTOMEL) 5 MCG tablet Take 1 tablet (5 mcg) by mouth daily 90 tablet 3     levothyroxine (SYNTHROID) 137 MCG tablet Take 1 tablet (137 mcg) by mouth daily 90 tablet 3     Probiotic  Product (PROBIOTIC DAILY) CAPS Take 1 capsule by mouth daily       Cholecalciferol (VITAMIN D) 1000 UNIT capsule take 2 Caps by mouth daily.       CALCIUM 500 MG OR TABS 2 tabs daily       VITAMINS/MINERALS OR TABS 1 TABLET DAILY       [DISCONTINUED] metoprolol (LOPRESSOR) 50 MG tablet Take 1 tablet in the am, 1/2 tablet at noon, 1 tablet in pm       [DISCONTINUED] metoprolol (LOPRESSOR) 25 MG tablet 25 mg tablets - take 1 tablets (50 mg)  in am, 1/2 tablet (25 mg) in afternoon, and 1 tablets (50 mg) in pm. 175 tablet 11     order for DME Equipment ordered: RESMED CPAP Mask type: Nasal Settings: 8 cmH2O       No facility-administered encounter medications on file as of 9/20/2017.       ASSESSMENT AND PLAN:   1.  Persistent atrial fibrillation in the setting of cardiomyopathy (possible tachycardia-mediated cardiomyopathy component).  I explained to her the fact that she has cardiomyopathy which it will limit antiarrhythmic therapy.      At this time, I recommend to discontinue metoprolol and start amiodarone.  After 1-2 weeks of amiodarone therapy, she should undergo another attempt of cardioversion.  She will follow up with me in a month after cardioversion with a limited echo to evaluate cardiac function.  At that time, we will reevaluate need for amio therapy or possibility of switching to a different antiarrhythmic.   2.  Embolic prevention.  CHADS-VASc score of 3 (age, gender, possible cardiomyopathy).  Will continue anticoagulation with Eliquis indefinitely.   3.  Followup care.  Follow up in clinic with me in a month or earlier as needed.     Again, thank you for allowing me to participate in the care of your patient.      Sincerely,    Miguel Shaffer MD     [facility]

## 2017-09-20 NOTE — MR AVS SNAPSHOT
After Visit Summary   9/20/2017    Ely Humphreys    MRN: [id number]           Patient Information     Date Of Birth          1950        Visit Information        Provider Department      9/20/2017 9:45 AM Miguel Shaffer MD [facility] [place]        Today's Diagnoses     Chronic atrial fibrillation (H)    -  1    Cardiomyopathy, unspecified (H)        Left ventricular systolic dysfunction without heart failure          Care Instructions    - Continue Eliquis indefinitely.    - Stop Metoprolol and start Amiodarone (load dose).    - After 1-2 week of amiodarone therapy, pursue cardioversion.    - Follow up in clinic with me in 1 month after CDV with a limited echo.              Follow-ups after your visit        Additional Services     Follow-Up with Electrophysiologist                 Your next 10 appointments already scheduled     Oct 24, 2017 10:00 AM CDT   Cardioversion with  PACU/PROC ROOM   [facility] PACU ([facility])    [address] [place] [address]   [phone]           1) NPO for 8 hours prior to the procedure except for sips of water with medications. 2) Hold insulin or oral diabetic medications morning of procedure. May take   dose long acting insulin. Follow further instructions of PMD. 3) Continue daily Coumadin. ~ If taking Digoxin, hold AM of procedure. ~ Plan to have a responsible adult take you home after the exam. You may not drive, take a bus or taxi by yourself. ~ A responsible adult must stay with you for 24 hours after the test.              Future tests that were ordered for you today     Open Future Orders        Priority Expected Expires Ordered    Echocardiogram Routine 10/20/2017 9/20/2018 9/20/2017    Follow-Up with Electrophysiologist Routine 10/20/2017 9/20/2018 9/20/2017    Cardioversion Routine 9/20/2017 9/20/2018 9/20/2017            Who to contact     If you have questions or need follow  "up information about today's clinic visit or your schedule please contact [facility] [place] directly at [phone].  Normal or non-critical lab and imaging results will be communicated to you by MyChart, letter or phone within 4 business days after the clinic has received the results. If you do not hear from us within 7 days, please contact the clinic through [employer] or phone. If you have a critical or abnormal lab result, we will notify you by phone as soon as possible.  Submit refill requests through [employer] or call your pharmacy and they will forward the refill request to us. Please allow 3 business days for your refill to be completed.          Additional Information About Your Visit        [employer] Information     [employer] gives you secure access to your electronic health record. If you see a primary care provider, you can also send messages to your care team and make appointments. If you have questions, please call your primary care clinic.  If you do not have a primary care provider, please call [phone] and they will assist you.        Care EveryWhere ID     This is your Care EveryWhere ID. This could be used by other organizations to access your [facility] medical records  [id number]        Your Vitals Were     Pulse Height BMI (Body Mass Index)             87 1.575 m (5' 2\") 30.45 kg/m2          Blood Pressure from Last 3 Encounters:   09/20/17 103/72   09/19/17 129/86   09/13/17 115/73    Weight from Last 3 Encounters:   09/20/17 75.5 kg (166 lb 8 oz)   09/13/17 75.6 kg (166 lb 9.6 oz)   08/09/17 72.6 kg (160 lb)              We Performed the Following     EKG 12-lead complete w/read - Clinics (to be scheduled)     Follow-Up with Electrophysiologist          Today's Medication Changes      Notice     This visit is during an admission. Changes to the med list made in this visit will be reflected in the After Visit Summary of the admission.             Primary Care " Provider Office Phone # Fax #    Shadia Munroe [phone] [address] [place] [address]        Equal Access to Services     STEVE LUNDY : Hadhedy deshawn hannah eleuterioo Soismaelali, waaxda luqadaha, qaybta kaalmada mercyda, dori smart fabyferdinand matta laaustinleo gustabo. So [facility] [phone].    ATENCIÓN: Si habla español, tiene a saenz disposición servicios gratuitos de asistencia lingüística. Llame al [phone].    We comply with applicable federal civil rights laws and [place] laws. We do not discriminate on the basis of race, color, national origin, age, disability sex, sexual orientation or gender identity.            Thank you!     Thank you for choosing [facility] [place]  for your care. Our goal is always to provide you with excellent care. Hearing back from our patients is one way we can continue to improve our services. Please take a few minutes to complete the written survey that you may receive in the mail after your visit with us. Thank you!             Your Updated Medication List - Protect others around you: Learn how to safely use, store and throw away your medicines at www.disposemymeds.org.      Notice     This visit is during an admission. Changes to the med list made in this visit will be reflected in the After Visit Summary of the admission.

## 2017-10-18 ENCOUNTER — OFFICE VISIT (OUTPATIENT)
Dept: CARDIOLOGY | Facility: CLINIC | Age: 67
End: 2017-10-18
Payer: COMMERCIAL

## 2017-10-18 VITALS
HEART RATE: 101 BPM | SYSTOLIC BLOOD PRESSURE: 136 MMHG | WEIGHT: 170.5 LBS | BODY MASS INDEX: 31.38 KG/M2 | HEIGHT: 62 IN | DIASTOLIC BLOOD PRESSURE: 91 MMHG

## 2017-10-18 DIAGNOSIS — I48.20 CHRONIC ATRIAL FIBRILLATION (H): ICD-10-CM

## 2017-10-18 DIAGNOSIS — E78.5 HYPERLIPIDEMIA LDL GOAL <160: Primary | ICD-10-CM

## 2017-10-18 DIAGNOSIS — I48.19 PERSISTENT ATRIAL FIBRILLATION (H): ICD-10-CM

## 2017-10-18 PROCEDURE — 93000 ELECTROCARDIOGRAM COMPLETE: CPT | Performed by: NURSE PRACTITIONER

## 2017-10-18 PROCEDURE — 99214 OFFICE O/P EST MOD 30 MIN: CPT | Performed by: NURSE PRACTITIONER

## 2017-10-18 NOTE — MR AVS SNAPSHOT
After Visit Summary   10/18/2017    Ely Humphreys    MRN: [id number]           Patient Information     Date Of Birth          1950        Visit Information        Provider Department      10/18/2017 1:00 PM Yareli Mathew APRN CNP [facility] [place]        Today's Diagnoses     Hyperlipidemia LDL goal <160    -  1    Chronic atrial fibrillation (H)        Persistent atrial fibrillation (H)           Follow-ups after your visit        Your next 10 appointments already scheduled     Oct 24, 2017 10:00 AM CDT   Cardioversion with  PACU/PROC ROOM   [facility] PACU ([facility])    [address] [place] [id number] [phone]           1) NPO for 8 hours prior to the procedure except for sips of water with medications. 2) Hold insulin or oral diabetic medications morning of procedure. May take   dose long acting insulin. Follow further instructions of PMD. 3) Continue daily Coumadin. ~ If taking Digoxin, hold AM of procedure. ~ Plan to have a responsible adult take you home after the exam. You may not drive, take a bus or taxi by yourself. ~ A responsible adult must stay with you for 24 hours after the test.            Nov 21, 2017  1:00 PM CST   Ech Complete with [address] ([facility])    [address]  [facility] [id number] [phone]           1. Please bring or wear a comfortable two-piece outfit. 2. You may eat, drink and take your normal medicines. 3. For any questions that cannot be answered, please contact the ordering physician ***Please check-in at the [facility] Registration Office located in [address] in the [facility] building. When you are finished registering, please go to [address] and have a seat. The technician will call your name for the test.            Nov 27, 2017  1:45 PM CST   [facility] EP RETURN with Miguel  "Heriberto Shaffer MD   [facility] [place] ([facility])    [address]  [place] [address] [phone]              Who to contact     If you have questions or need follow up information about today's clinic visit or your schedule please contact [facility] [place] directly at [phone].  Normal or non-critical lab and imaging results will be communicated to you by [employer], letter or phone within 4 business days after the clinic has received the results. If you do not hear from us within 7 days, please contact the clinic through [employer] / [employer] or phone. If you have a critical or abnormal lab result, we will notify you by phone as soon as possible.  Submit refill requests through "[employer] or call your pharmacy and they will forward the refill request to us. Please allow 3 business days for your refill to be completed.          Additional Information About Your Visit        [employer] Information     "[employer] gives you secure access to your electronic health record. If you see a primary care provider, you can also send messages to your care team and make appointments. If you have questions, please call your primary care clinic.  If you do not have a primary care provider, please call [phone] and they will assist you.        Care EveryWhere ID     This is your Care EveryWhere ID. This could be used by other organizations to access your [facility] medical records  [id number]        Your Vitals Were     Pulse Height BMI (Body Mass Index)             101 1.575 m (5' 2.01\") 31.18 kg/m2          Blood Pressure from Last 3 Encounters:   10/18/17 (!) 136/91   09/20/17 103/72   09/19/17 129/86    Weight from Last 3 Encounters:   10/18/17 77.3 kg (170 lb 8 oz)   09/20/17 75.5 kg (166 lb 8 oz)   09/13/17 75.6 kg (166 lb 9.6 oz)              We Performed the Following     EKG 12-lead complete w/read - Clinics (performed today)        Primary " Care Provider Office Phone # Fax #    Shadia Munroe [phone] [address] [place] [address]        Equal Access to Services     MARLEERAMON CHAVARRIAMICHELLE : Hadhedy deshawn ku hadbenyo Soismaelali, waaxda luqadaha, qaybta kaalmada opal, dori davidleo gustabo. So [facility] [phone].    ATENCIÓN: Si habla español, tiene a saenz disposición servicios gratuitos de asistencia lingüística. Llame al [phone].    We comply with applicable federal civil rights laws and [place] laws. We do not discriminate on the basis of race, color, national origin, age, disability, sex, sexual orientation, or gender identity.            Thank you!     Thank you for choosing [facility] [place]  for your care. Our goal is always to provide you with excellent care. Hearing back from our patients is one way we can continue to improve our services. Please take a few minutes to complete the written survey that you may receive in the mail after your visit with us. Thank you!             Your Updated Medication List - Protect others around you: Learn how to safely use, store and throw away your medicines at www.disposemymeds.org.          This list is accurate as of: 10/18/17 11:59 PM.  Always use your most recent med list.                   Brand Name Dispense Instructions for use Diagnosis    amiodarone 200 MG tablet    PACERONE/CODARONE    60 tablet    Amiodarone 400 mg PO BID for 4 days, then decrease to 400 mg PO daily for 4 days, and then decrease to 200 mg PO daily.    Chronic atrial fibrillation (H)       calcium carbonate 1250 MG tablet    OS-MARTITA 500 mg [place]. Ca     2 tabs daily        CALCIUM CARBONATE PO      Take 1,250 mg by mouth        ELIQUIS 5 MG tablet   Generic drug:  apixaban ANTICOAGULANT     60 tablet    TAKE ONE TABLET BY MOUTH TWICE DAILY    Chronic atrial fibrillation (H)       levothyroxine 137 MCG tablet    SYNTHROID    90 tablet    Take 1  tablet (137 mcg) by mouth daily    Acquired hypothyroidism       liothyronine 5 MCG tablet    CYTOMEL    90 tablet    Take 1 tablet (5 mcg) by mouth daily    Acquired hypothyroidism       order for DME      Equipment ordered: RESMED CPAP Mask type: Nasal Settings: 8 cmH2O        PROBIOTIC DAILY Caps      Take 1 capsule by mouth daily        vitamin  s/Minerals Tabs      1 TABLET DAILY        vitamin D 1000 UNITS capsule      take 2 Caps by mouth daily.

## 2017-10-18 NOTE — LETTER
10/18/2017    Shadia Simmons MD  [address] [place] [address]      RE: Ely Humphreys       Dear Colleague,    I had the pleasure of seeing Ely Humphreys in the [facility].    HISTORY OF PRESENT ILLNESS:  Ms. Humphreys is a pleasant 67-year-old lady with a history of Graves' disease (14 years ago, status post radiotherapy) and breast cancer (left-sided lumpectomy 7 years ago), who is here for evaluation of persistent atrial fibrillation. She is a patient of Dr. Shaffer's.     The patient was found to have persistent AFib on preop evaluation for elective left knee meniscal tear repair back in May.  TSH was WNL.  She was initially evaluated by Dr. Don and was found to have a cardiomyopathy with an EF of around 40%.   She was started on metoprolol and Eliquis and underwent a SCOTTIE with attempt of cardioversion.  SCOTTIE ruled out the presence of clot and revealed moderate to moderate-severe MR with an EF around 40%-45%.  Unfortunately, cardioversion was unsuccessful; therefore, she was referred to Dr. Shaffer. He suggested starting amiodarone with repeat attempt at DCCV and an echo a month after.     She is minimally symptomatic with AFib.  She is tolerating amiodarone and has been compliant with anticoagulation and not missed any doses.      EKG done here shows AFib with adequate rate control.      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  She failed initial DCCV.  Now is on amiodarone per the recommendation of Dr. Shaffer with a loading dose and now on just 200 mg daily.  Will undergo repeat attempt at DCCV next week.  Has been Yazdanism about doses of her Eliquis. CHADS-VASc score of 3 (age, gender, possible cardiomyopathy).  Follow up with Dr. Shaffer and  echo one month post DCCV.      Orders Placed This Encounter   Procedures     EKG 12-lead complete w/read - Clinics (performed today)       Orders Placed This Encounter   Medications     CALCIUM CARBONATE PO     Sig: Take 1,250 mg by mouth       There are no discontinued medications.      Encounter Diagnoses   Name Primary?     Hyperlipidemia LDL goal <160 Yes     Chronic atrial fibrillation (H)      Persistent atrial fibrillation (H)        CURRENT MEDICATIONS:  Current Outpatient Prescriptions   Medication Sig Dispense Refill     CALCIUM CARBONATE PO Take 1,250 mg by mouth       amiodarone (PACERONE/CODARONE) 200 MG tablet Amiodarone 400 mg PO BID for 4 days, then decrease to 400 mg PO daily for 4 days, and then decrease to 200 mg PO daily. 60 tablet 3     ELIQUIS 5 MG tablet TAKE ONE TABLET BY MOUTH TWICE DAILY  60 tablet 5     liothyronine (CYTOMEL) 5 MCG tablet Take 1 tablet (5 mcg) by mouth daily 90 tablet 3     levothyroxine (SYNTHROID) 137 MCG tablet Take 1 tablet (137 mcg) by mouth daily 90 tablet 3     Probiotic Product (PROBIOTIC DAILY) CAPS Take 1 capsule by mouth daily       Cholecalciferol (VITAMIN D) 1000 UNIT capsule take 2 Caps by mouth daily.       CALCIUM 500 MG OR TABS 2 tabs daily       VITAMINS/MINERALS OR TABS 1 TABLET DAILY       order for DME Equipment ordered: RESMED CPAP Mask type: Nasal Settings: 8 cmH2O         ALLERGIES     Allergies   Allergen Reactions     No Known Drug Allergies        PAST MEDICAL HISTORY:  Past Medical History:   Diagnosis Date     Benign essential tremor     Chronic     Hyperlipidemia LDL goal <160 2/10/2010     Invasive ductal carcinoma of breast (H) 6/09    left breast ca     Major depressive disorder, recurrent episode, in full remission (H) 10/19/2011    Stable for decades     osteopenia 2002     Palpitations      Persistent atrial fibrillation (H) 05/10/2017     Unspecified hypothyroidism        PAST SURGICAL HISTORY:  Past Surgical History:   Procedure  Laterality Date     C NONSPECIFIC PROCEDURE      Tubal Ligation    Abstracted 02     C THYROID ABLATION       COLONOSCOPY  10/03     COLONOSCOPY  2014     COLONOSCOPY  2014    Procedure: COLONOSCOPY;  Surgeon: Garrett Villaseñor MD;  Location: [facility]     HC ECHO HEART XTHORACIC, STRESS/REST  09    normal     HC EXCISION BREAST LESION, OPEN >=1      re-excision 09       FAMILY HISTORY:  Family History   Problem Relation Age of Onset     CANCER Mother      84 yo Breast & Melanoma     CEREBROVASCULAR DISEASE Mother 92     TIA     C.A.D. Mother      CEREBROVASCULAR DISEASE Father       87 yo Alzheimers, CHF     C.A.D. Father      possible MI in age 60's     CANCER Maternal Aunt       40's Breast     CEREBROVASCULAR DISEASE Paternal Grandmother       late 40's - early 50's     Blood Disease Maternal Aunt       53 yo Lupus     Cancer - colorectal No family hx of        SOCIAL HISTORY:  Social History     Social History     Marital status:      Spouse name: Judah     Number of children: 2     Years of education: 18     Occupational History      [employer]     health      Social History Main Topics     Smoking status: Never Smoker     Smokeless tobacco: Never Used     Alcohol use Yes      Comment: 1 or 2 glasses of wine most evenings     Drug use: No     Sexual activity: Not Currently     Birth control/ protection: Surgical     Other Topics Concern     None     Social History Narrative       Review of Systems:  Skin:  Negative       Eyes:  Positive for   reading glasses  ENT:  Negative      Respiratory:  Negative       Cardiovascular:    Positive for;dizziness;edema;heaviness;palpitations;lightheadedness    Gastroenterology: Negative      Genitourinary:  Negative      Musculoskeletal:  Negative      Neurologic:  Positive for numbness or tingling of feet neuropathy  Psychiatric:  Negative      Heme/Lymph/Imm:  Negative      Endocrine:  Positive  "for thyroid disorder      Physical Exam:  Vitals: BP (!) 136/91  Pulse 101  Ht 1.575 m (5' 2.01\")  Wt 77.3 kg (170 lb 8 oz)  BMI 31.18 kg/m2          Thank you for allowing me to participate in the care of your patient.    Sincerely,     Yareli Mathew, SALTY, APRN CNP     [facility]    "

## 2017-10-23 NOTE — PROGRESS NOTES
HISTORY OF PRESENT ILLNESS:  Ms. Humphreys is a pleasant 67-year-old lady with a history of Graves' disease (14 years ago, status post radiotherapy) and breast cancer (left-sided lumpectomy 7 years ago), who is here for evaluation of persistent atrial fibrillation. She is a patient of Dr. Shaffer's.     The patient was found to have persistent AFib on preop evaluation for elective left knee meniscal tear repair back in May.  TSH was WNL.  She was initially evaluated by Dr. Don and was found to have a cardiomyopathy with an EF of around 40%.   She was started on metoprolol and Eliquis and underwent a SCOTTIE with attempt of cardioversion.  SCOTTIE ruled out the presence of clot and revealed moderate to moderate-severe MR with an EF around 40%-45%.  Unfortunately, cardioversion was unsuccessful; therefore, she was referred to Dr. Shaffer. He suggested starting amiodarone with repeat attempt at DCCV and an echo a month after.     She is minimally symptomatic with AFib.  She is tolerating amiodarone and has been compliant with anticoagulation and not missed any doses.      EKG done here shows AFib with adequate rate control.      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  She failed initial DCCV.  Now is on amiodarone per the recommendation of Dr. Shaffer with a loading dose and now on just 200 mg daily.  Will undergo repeat attempt at DCCV next week.  Has been Religion about doses of her Eliquis. CHADS-VASc score of 3 (age, gender, possible cardiomyopathy).  Follow up with Dr. Shaffer and echo one month post DCCV.      Orders Placed This Encounter   Procedures     EKG 12-lead complete w/read - Clinics (performed today)       Orders Placed This Encounter   Medications     CALCIUM CARBONATE PO     Sig: Take 1,250 mg  by mouth       There are no discontinued medications.      Encounter Diagnoses   Name Primary?     Hyperlipidemia LDL goal <160 Yes     Chronic atrial fibrillation (H)      Persistent atrial fibrillation (H)        CURRENT MEDICATIONS:  Current Outpatient Prescriptions   Medication Sig Dispense Refill     CALCIUM CARBONATE PO Take 1,250 mg by mouth       amiodarone (PACERONE/CODARONE) 200 MG tablet Amiodarone 400 mg PO BID for 4 days, then decrease to 400 mg PO daily for 4 days, and then decrease to 200 mg PO daily. 60 tablet 3     ELIQUIS 5 MG tablet TAKE ONE TABLET BY MOUTH TWICE DAILY  60 tablet 5     liothyronine (CYTOMEL) 5 MCG tablet Take 1 tablet (5 mcg) by mouth daily 90 tablet 3     levothyroxine (SYNTHROID) 137 MCG tablet Take 1 tablet (137 mcg) by mouth daily 90 tablet 3     Probiotic Product (PROBIOTIC DAILY) CAPS Take 1 capsule by mouth daily       Cholecalciferol (VITAMIN D) 1000 UNIT capsule take 2 Caps by mouth daily.       CALCIUM 500 MG OR TABS 2 tabs daily       VITAMINS/MINERALS OR TABS 1 TABLET DAILY       order for DME Equipment ordered: RESMED CPAP Mask type: Nasal Settings: 8 cmH2O         ALLERGIES     Allergies   Allergen Reactions     No Known Drug Allergies        PAST MEDICAL HISTORY:  Past Medical History:   Diagnosis Date     Benign essential tremor     Chronic     Hyperlipidemia LDL goal <160 2/10/2010     Invasive ductal carcinoma of breast (H) 6/09    left breast ca     Major depressive disorder, recurrent episode, in full remission (H) 10/19/2011    Stable for decades     osteopenia 2002     Palpitations      Persistent atrial fibrillation (H) 05/10/2017     Unspecified hypothyroidism        PAST SURGICAL HISTORY:  Past Surgical History:   Procedure Laterality Date     C NONSPECIFIC PROCEDURE      Tubal Ligation    Abstracted 07/12/02     C THYROID ABLATION       COLONOSCOPY  10/03     COLONOSCOPY  5/9/2014     COLONOSCOPY  6/9/2014    Procedure: COLONOSCOPY;  Surgeon: Kasi  "Garrett EDWARDS MD;  Location: [facility]     HC ECHO HEART XTHORACIC, STRESS/REST  09    normal     HC EXCISION BREAST LESION, OPEN >=1      re-excision 09       FAMILY HISTORY:  Family History   Problem Relation Age of Onset     CANCER Mother      82 yo Breast & Melanoma     CEREBROVASCULAR DISEASE Mother 92     TIA     C.A.D. Mother      CEREBROVASCULAR DISEASE Father       87 yo Alzheimers, CHF     C.A.D. Father      possible MI in age 60's     CANCER Maternal Aunt       40's Breast     CEREBROVASCULAR DISEASE Paternal Grandmother       late 40's - early 50's     Blood Disease Maternal Aunt       53 yo Lupus     Cancer - colorectal No family hx of        SOCIAL HISTORY:  Social History     Social History     Marital status:      Spouse name: Judah     Number of children: 2     Years of education: 18     Occupational History      [employer]     health      Social History Main Topics     Smoking status: Never Smoker     Smokeless tobacco: Never Used     Alcohol use Yes      Comment: 1 or 2 glasses of wine most evenings     Drug use: No     Sexual activity: Not Currently     Birth control/ protection: Surgical     Other Topics Concern     None     Social History Narrative       Review of Systems:  Skin:  Negative       Eyes:  Positive for   reading glasses  ENT:  Negative      Respiratory:  Negative       Cardiovascular:    Positive for;dizziness;edema;heaviness;palpitations;lightheadedness    Gastroenterology: Negative      Genitourinary:  Negative      Musculoskeletal:  Negative      Neurologic:  Positive for numbness or tingling of feet neuropathy  Psychiatric:  Negative      Heme/Lymph/Imm:  Negative      Endocrine:  Positive for thyroid disorder      Physical Exam:  Vitals: BP (!) 136/91  Pulse 101  Ht 1.575 m (5' 2.01\")  Wt 77.3 kg (170 lb 8 oz)  BMI 31.18 kg/m2        "

## 2017-10-24 ENCOUNTER — ANESTHESIA EVENT (OUTPATIENT)
Dept: SURGERY | Facility: CLINIC | Age: 67
End: 2017-10-24

## 2017-10-24 ENCOUNTER — ANESTHESIA (OUTPATIENT)
Dept: SURGERY | Facility: CLINIC | Age: 67
End: 2017-10-24

## 2017-10-24 ENCOUNTER — HOSPITAL ENCOUNTER (OUTPATIENT)
Dept: SURGERY | Facility: CLINIC | Age: 67
Discharge: HOME | End: 2017-10-24
Attending: INTERNAL MEDICINE | Admitting: INTERNAL MEDICINE
Payer: MEDICARE

## 2017-10-24 VITALS
RESPIRATION RATE: 8 BRPM | TEMPERATURE: 96.9 F | OXYGEN SATURATION: 97 % | HEART RATE: 70 BPM | SYSTOLIC BLOOD PRESSURE: 120 MMHG | DIASTOLIC BLOOD PRESSURE: 85 MMHG

## 2017-10-24 DIAGNOSIS — I48.20 CHRONIC ATRIAL FIBRILLATION (H): ICD-10-CM

## 2017-10-24 LAB
MAGNESIUM SERPL-MCNC: 2.4 MG/DL (ref 1.6–2.3)
POTASSIUM SERPL-SCNC: 4 MMOL/L (ref 3.4–5.3)

## 2017-10-24 PROCEDURE — 84132 ASSAY OF SERUM POTASSIUM: CPT | Performed by: INTERNAL MEDICINE

## 2017-10-24 PROCEDURE — 37000008 ZZH ANESTHESIA TECHNICAL FEE, 1ST 30 MIN

## 2017-10-24 PROCEDURE — 25000128 H RX IP 250 OP 636: Performed by: INTERNAL MEDICINE

## 2017-10-24 PROCEDURE — 40000010 ZZH STATISTIC ANES STAT CODE-CRNA PER MINUTE

## 2017-10-24 PROCEDURE — 36415 COLL VENOUS BLD VENIPUNCTURE: CPT | Performed by: INTERNAL MEDICINE

## 2017-10-24 PROCEDURE — 83735 ASSAY OF MAGNESIUM: CPT | Performed by: INTERNAL MEDICINE

## 2017-10-24 PROCEDURE — 25000128 H RX IP 250 OP 636: Performed by: NURSE ANESTHETIST, CERTIFIED REGISTERED

## 2017-10-24 PROCEDURE — 92960 CARDIOVERSION ELECTRIC EXT: CPT

## 2017-10-24 PROCEDURE — 92960 CARDIOVERSION ELECTRIC EXT: CPT | Performed by: INTERNAL MEDICINE

## 2017-10-24 RX ORDER — ONDANSETRON 4 MG/1
4 TABLET, ORALLY DISINTEGRATING ORAL EVERY 30 MIN PRN
Status: DISCONTINUED | OUTPATIENT
Start: 2017-10-24 | End: 2017-10-25 | Stop reason: HOSPADM

## 2017-10-24 RX ORDER — ALBUTEROL SULFATE 0.83 MG/ML
2.5 SOLUTION RESPIRATORY (INHALATION) EVERY 4 HOURS PRN
Status: DISCONTINUED | OUTPATIENT
Start: 2017-10-24 | End: 2017-10-25 | Stop reason: HOSPADM

## 2017-10-24 RX ORDER — PROPOFOL 10 MG/ML
INJECTION, EMULSION INTRAVENOUS PRN
Status: DISCONTINUED | OUTPATIENT
Start: 2017-10-24 | End: 2017-10-24

## 2017-10-24 RX ORDER — ONDANSETRON 2 MG/ML
4 INJECTION INTRAMUSCULAR; INTRAVENOUS EVERY 30 MIN PRN
Status: DISCONTINUED | OUTPATIENT
Start: 2017-10-24 | End: 2017-10-25 | Stop reason: HOSPADM

## 2017-10-24 RX ORDER — FENTANYL CITRATE 50 UG/ML
25-50 INJECTION, SOLUTION INTRAMUSCULAR; INTRAVENOUS
Status: DISCONTINUED | OUTPATIENT
Start: 2017-10-24 | End: 2017-10-25 | Stop reason: HOSPADM

## 2017-10-24 RX ORDER — SODIUM CHLORIDE, SODIUM LACTATE, POTASSIUM CHLORIDE, CALCIUM CHLORIDE 600; 310; 30; 20 MG/100ML; MG/100ML; MG/100ML; MG/100ML
INJECTION, SOLUTION INTRAVENOUS CONTINUOUS
Status: DISCONTINUED | OUTPATIENT
Start: 2017-10-24 | End: 2017-10-25 | Stop reason: HOSPADM

## 2017-10-24 RX ORDER — MEPERIDINE HYDROCHLORIDE 25 MG/ML
12.5 INJECTION INTRAMUSCULAR; INTRAVENOUS; SUBCUTANEOUS
Status: DISCONTINUED | OUTPATIENT
Start: 2017-10-24 | End: 2017-10-25 | Stop reason: HOSPADM

## 2017-10-24 RX ORDER — HYDRALAZINE HYDROCHLORIDE 20 MG/ML
2.5-5 INJECTION INTRAMUSCULAR; INTRAVENOUS EVERY 10 MIN PRN
Status: DISCONTINUED | OUTPATIENT
Start: 2017-10-24 | End: 2017-10-25 | Stop reason: HOSPADM

## 2017-10-24 RX ORDER — NALOXONE HYDROCHLORIDE 0.4 MG/ML
.1-.4 INJECTION, SOLUTION INTRAMUSCULAR; INTRAVENOUS; SUBCUTANEOUS
Status: DISCONTINUED | OUTPATIENT
Start: 2017-10-24 | End: 2017-10-25 | Stop reason: HOSPADM

## 2017-10-24 RX ORDER — HYDROMORPHONE HYDROCHLORIDE 1 MG/ML
.3-.5 INJECTION, SOLUTION INTRAMUSCULAR; INTRAVENOUS; SUBCUTANEOUS EVERY 10 MIN PRN
Status: DISCONTINUED | OUTPATIENT
Start: 2017-10-24 | End: 2017-10-25 | Stop reason: HOSPADM

## 2017-10-24 RX ADMIN — SODIUM CHLORIDE, POTASSIUM CHLORIDE, SODIUM LACTATE AND CALCIUM CHLORIDE: 600; 310; 30; 20 INJECTION, SOLUTION INTRAVENOUS at 09:12

## 2017-10-24 RX ADMIN — PROPOFOL 80 MG: 10 INJECTION, EMULSION INTRAVENOUS at 10:54

## 2017-10-24 ASSESSMENT — ENCOUNTER SYMPTOMS
DYSRHYTHMIAS: 1
SEIZURES: 0

## 2017-10-24 ASSESSMENT — LIFESTYLE VARIABLES: TOBACCO_USE: 0

## 2017-10-24 ASSESSMENT — COPD QUESTIONNAIRES: COPD: 0

## 2017-10-24 NOTE — ANESTHESIA CARE TRANSFER NOTE
Patient: Ely Humphreys    * No procedures listed *    Diagnosis: * No pre-op diagnosis entered *  Diagnosis Additional Information: No value filed.    Anesthesia Type:   MAC     Note:  Airway :Face Mask  Patient transferred to:PACU  Comments: Patient to PACU, 10L o2 via face mask, exchanging well, VSS; Stable transfer of care, report to RN.  Handoff Report: Identifed the Patient, Identified the Reponsible Provider, Reviewed the pertinent medical history, Discussed the surgical course, Reviewed Intra-OP anesthesia mangement and issues during anesthesia, Set expectations for post-procedure period and Allowed opportunity for questions and acknowledgement of understanding      Vitals: (Last set prior to Anesthesia Care Transfer)    CRNA VITALS  10/24/2017 1031 - 10/24/2017 1101      10/24/2017             NIBP: 121/82    Pulse: 56    NIBP Mean: 98    SpO2: 98 %    Resp Rate (observed): 14    Resp Rate (set): 10                Electronically Signed By: SARAH Castellano CRNA  October 24, 2017  11:01 AM

## 2017-10-24 NOTE — ANESTHESIA POSTPROCEDURE EVALUATION
Patient: Ely Humphreys    * No procedures listed *    Diagnosis:* No pre-op diagnosis entered *  Diagnosis Additional Information: No value filed.    Anesthesia Type:  MAC    Note:  Anesthesia Post Evaluation    Patient location during evaluation: PACU  Patient participation: Able to fully participate in evaluation  Level of consciousness: awake and alert  Pain management: adequate  Airway patency: patent  Cardiovascular status: acceptable, hemodynamically stable and blood pressure returned to baseline  Respiratory status: acceptable  Hydration status: acceptable  PONV: none     Anesthetic complications: None          Last vitals:  Vitals:    10/24/17 1130 10/24/17 1135 10/24/17 1140   BP: 122/80 125/81    Pulse:      Resp: 16 17 16   Temp:      SpO2: 96% 94% 93%         Electronically Signed By: Aziza Acharya MD  October 24, 2017  11:43 AM

## 2017-10-24 NOTE — IP AVS SNAPSHOT
MRN:[id number]                      After Visit Summary   10/24/2017    Ely Humphreys    MRN: [id number]           Thank you!     Thank you for choosing [facility] for your care. Our goal is always to provide you with excellent care. Hearing back from our patients is one way we can continue to improve our services. Please take a few minutes to complete the written survey that you may receive in the mail after you visit with us. Thank you!        Patient Information     Date Of Birth          1950        About your hospital stay     You were admitted on:  October 24, 2017 [facility]    You were discharged on:  October 24, 2017       Who to Call     For medical emergencies, please call 911.  For non-urgent questions about your medical care, please call your primary care provider or clinic, [phone]          Attending Provider     Provider Miguel Alvarez MD Cardiology       Primary Care Provider Office Phone # Fax #    Shadia Munroe [phone]      Your next 10 appointments already scheduled     Nov 21, 2017  1:00 PM CST   Ech Complete with [facility] ([facility])    [address]  [facility] [address] [phone]           1. Please bring or wear a comfortable two-piece outfit. 2. You may eat, drink and take your normal medicines. 3. For any questions that cannot be answered, please contact the ordering physician ***Please check-in at the [facility] located in [address] in the [facility] building. When you are finished registering, please go to [address] and have a seat. The technician will call your name for the test.            Nov 27, 2017  1:45 PM CST   [facility] EP RETURN with Miguel Shaffer MD   [facility] [place] ([facility])    [address] [facility] [address]  [place] [address]   [phone]              Pending Results     Date and Time Order Name Status Description    10/24/2017 0852 Potassium level In process     10/24/2017 0852 Magnesium level In process             Admission Information     Date & Time Provider Department Dept. Phone    10/24/2017 Miguel Shaffer MD [facility] PACU [phone]      Your Vitals Were     Blood Pressure Pulse Temperature Respirations Pulse Oximetry       134/92 70 96.9  F (36.1  C) (Temporal) 16 95%       MyChart Information     [facility] gives you secure access to your electronic health record. If you see a primary care provider, you can also send messages to your care team and make appointments. If you have questions, please call your primary care clinic.  If you do not have a primary care provider, please call [phone] and they will assist you.        Care EveryWhere ID     This is your Care EveryWhere ID. This could be used by other organizations to access your [facility] medical records  [id number]        Equal Access to Services     STEVE LUNDY AH: Hadii aad ku hadasho Soomaali, waaxda luqadaha, qaybta kaalmada adeegyada, dori montejo. So [facility] [phone].    ATENCIÓN: Si habla español, tiene a saenz disposición servicios gratuitos de asistencia lingüística. Llame al [phone].    We comply with applicable federal civil rights laws and [place] laws. We do not discriminate on the basis of race, color, national origin, age, disability, sex, sexual orientation, or gender identity.               Review of your medicines      UNREVIEWED medicines. Ask your doctor about these medicines        Dose / Directions    amiodarone 200 MG tablet   Commonly known as:  PACERONE/CODARONE   Used for:  Chronic atrial fibrillation (H)        Amiodarone 400 mg PO BID for 4 days, then decrease to 400 mg PO daily for 4 days, and then decrease to 200 mg PO daily.   Quantity:  60 tablet    Refills:  3       calcium carbonate 1250 MG tablet   Commonly known as:  OS-MARTITA 500 mg [place]. Ca        2 tabs daily   Refills:  0       CALCIUM CARBONATE PO        Dose:  1250 mg   Take 1,250 mg by mouth   Refills:  0       ELIQUIS 5 MG tablet   Used for:  Chronic atrial fibrillation (H)   Generic drug:  apixaban ANTICOAGULANT        TAKE ONE TABLET BY MOUTH TWICE DAILY   Quantity:  60 tablet   Refills:  5       levothyroxine 137 MCG tablet   Commonly known as:  SYNTHROID   Used for:  Acquired hypothyroidism        Dose:  137 mcg   Take 1 tablet (137 mcg) by mouth daily   Quantity:  90 tablet   Refills:  3       liothyronine 5 MCG tablet   Commonly known as:  CYTOMEL   Used for:  Acquired hypothyroidism        Dose:  5 mcg   Take 1 tablet (5 mcg) by mouth daily   Quantity:  90 tablet   Refills:  3       PROBIOTIC DAILY Caps        Dose:  1 capsule   Take 1 capsule by mouth daily   Refills:  0       vitamin  s/Minerals Tabs        1 TABLET DAILY   Refills:  0       vitamin D 1000 UNITS capsule        Dose:  2 capsule   take 2 Caps by mouth daily.   Refills:  0         CONTINUE these medicines which have NOT CHANGED        Dose / Directions    order for DME        Equipment ordered: RESMED CPAP Mask type: Nasal Settings: 8 cmH2O   Refills:  0                Protect others around you: Learn how to safely use, store and throw away your medicines at www.disposemymeds.org.             Medication List: This is a list of all your medications and when to take them. Check marks below indicate your daily home schedule. Keep this list as a reference.      Medications           Morning Afternoon Evening Bedtime As Needed    amiodarone 200 MG tablet   Commonly known as:  PACERONE/CODARONE   Amiodarone 400 mg PO BID for 4 days, then decrease to 400 mg PO daily for 4 days, and then decrease to 200 mg PO daily.                                calcium carbonate 1250 MG tablet   Commonly known as:  OS-MARTITA 500 mg [place]. Ca   2 tabs  daily                                CALCIUM CARBONATE PO   Take 1,250 mg by mouth                                ELIQUIS 5 MG tablet   TAKE ONE TABLET BY MOUTH TWICE DAILY   Generic drug:  apixaban ANTICOAGULANT                                levothyroxine 137 MCG tablet   Commonly known as:  SYNTHROID   Take 1 tablet (137 mcg) by mouth daily                                liothyronine 5 MCG tablet   Commonly known as:  CYTOMEL   Take 1 tablet (5 mcg) by mouth daily                                order for DME   Equipment ordered: RESMED CPAP Mask type: Nasal Settings: 8 cmH2O                                PROBIOTIC DAILY Caps   Take 1 capsule by mouth daily                                vitamin  s/Minerals Tabs   1 TABLET DAILY                                vitamin D 1000 UNITS capsule   take 2 Caps by mouth daily.                                          More Information        Discharge Instructions for Atrial Fibrillation  You have been diagnosed with an abnormal heart rhythm called atrial fibrillation. With this condition, your heart s 2 upper chambers quiver rather than squeeze the blood out in a normal pattern. This leads to an irregular and sometimes rapid heartbeat. Some people will develop associated symptoms such as a flip-flopping heartbeat, chest pain, lightheadedness, or shortness of breath. Other people may have no symptoms at all. Atrial fibrillation is serious because it affects the heart s ability to fill with blood as it should. Blood clots may form. This increases the risk for stroke. Untreated atrial fibrillation can also lead to heart failure. Atrial fibrillation can be controlled. With treatment, most people with atrial fibrillation lead normal lives.  Treatment options  Recommended treatment for atrial fibrillation depends on your age, symptoms, how long you have had atrial fibrillation, and other factors. You will have a complete evaluation to find out if you have any abnormalities that  caused your heart to go into atrial fibrillation. This might be blocked heart arteries or a thyroid problem. Your doctor will assess your particular case and discuss choices with you.  Treatment choices may include:    Treating an underlying disorder that puts you at risk for atrial fibrillation. For example, correcting an abnormal thyroid or electrolyte problem, or treating a blocked heart artery.    Restoring a normal heart rhythm with an electrical shock (cardioversion) or with an antiarrhythmic medicine (chemical cardioversion)    Using medicine to control your heart rate in atrial fibrillation.    Preventing the risk for blood clot and stroke using blood-thinning medicines. Your doctor will tell you what he or she recommends. Choices may include aspirin, clopidogrel, warfarin, dabigatran, rivaroxaban, apixaban, and edoxaban.    Doing catheter ablation or a surgical maze procedure. These use different methods to destroy certain areas of heart tissue. This interrupts the electrical signals causing atrial fibrillation. One of these procedures may be a choice when medicines do not work, or as an alternative to long-term medicine.    Other treatment choices may be recommended for you by your doctor.  Managing risk factors for stroke and preventing heart failure are important parts of any treatment plan for atrial fibrillation.  Home care    Take your medicines exactly as directed. Don t skip doses.    Work with your doctor to find the right medicines and doses for you.    Learn to take your own pulse. Keep a record of your results. Ask your doctor which pulse rates mean that you need medical attention. Slowing your pulse is often the goal of treatment. Ask your doctor if it s OK for you to use an automatic machine to check your pulse at home. Sometimes these machines don t count the pulse correctly when you have atrial fibrillation.    Limit your intake of coffee, tea, cola, and other beverages with caffeine.  Talk with your doctor about whether you should eliminate caffeine.    Avoid over-the-counter medicines that have caffeine in them.    Let your doctor know what medicines you take, including prescription and over-the-counter medicines, as well as any supplements. They interfere with some medicines given for atrial fibrillation.    Ask your doctor about whether you can drink alcohol. Some people need to avoid alcohol to better treat atrial fibrillation. If you are taking blood-thinner medicines, alcohol may interfere with them by increasing their effect.    Never take stimulants such as amphetamines or cocaine. These drugs can speed up your heart rate and trigger atrial fibrillation.  Follow-up care  Follow up with your doctor, or as advised.     When should I call my healthcare provider  Call your healthcare provider right away if you have any of the following:    Weakness    Dizziness    Fainting    Fatigue    Shortness of breath    Chest pain with increased activity    A change in the usual regularity of your heartbeat, or an unusually fast heartbeat   Date Last Reviewed: 4/23/2016 2000-2017 [employer]. [address] [place] [address]. All rights reserved. This information is not intended as a substitute for professional medical care. Always follow your healthcare professional's instructions.                Electrical Cardioversion     Cut away image of heart showing SA node, right atrium, AV node, and left atrium   You had a cardioversion today. This is an electrical shock applied to the chest. The shock reset your heart rhythm back to normal. Your chest wall and chest muscles may feel sore for a few days. Some redness may appear on the skin on your chest where the cardioversion patches were applied. That will go away within a week.  To get ready for this procedure, you may have been given medicine to help you relax and to reduce pain. Depending on the medicine used, it could take up  to 8 hours for the effect to wear off.  Home care  Follow these guidelines when caring for yourself at home:    You should be watched by a responsible adult for the next 8 hours. This is in case your condition gets worse.    Don t take any oral medicine for pain or sleep during the next 4 hours. These medicines might react with the medicines you were given in the hospital. This can cause a much stronger response than usual.    Don t drink any alcohol for the next 24 hours.    Don t drive or operate dangerous machinery during the next 24 hours.    Your healthcare provider will have prescribed medicines to stop the abnormal heart rhythm from coming back. Take these medicines as directed. Expect to take blood thinners for at least 4 weeks. This course of blood thinners should not be interrupted. Do not schedule other invasive procedures during this time. Interrupting this medicine could increase your risk for a stroke after a cardioversion.    You may use acetaminophen or ibuprofen to control pain, unless another pain medicine was prescribed. If you have chronic liver or kidney disease, talk with your provider before taking these medicines. Also talk with your provider if you ve had a stomach ulcer or gastrointestinal bleeding.  Follow-up care  Follow up with your healthcare provider, or as advised, if you aren t alert and back to your usual level of activity within 12 hours.   Call 911  Call 911 or seek emergency medical attention if you have:     Pain in your chest, arm, shoulder, neck or upper back    You have problems speaking or seeing    Weakness in an arm or leg    You are unable to move your arm or leg on one side of your body    You have uncrontrolled bleeding from blood thinning medicines   When to seek medical advice  Call your healthcare provider right away if any of these occur:    Weakness, dizziness, lightheadedness, or fainting    Shortness of breath    You feel like your heart is fluttering or beating  fast, hard, or irregularly (palpitations)    More than minor skin discomfort or redness where the cardioversion pads were placed   Date Last Reviewed: 1/1/2017 2000-2017 [employer]. [address] [place] [address]. All rights reserved. This information is not intended as a substitute for professional medical care. Always follow your healthcare professional's instructions.                Discharge Instructions for Cardioversion  Your healthcare provider performed a procedure called cardioversion. Your healthcare provider used a controlled electric shock or a medicine to briefly stop all electrical activity in your heart. This helped restore your heart s normal rhythm. Here are some instructions to follow while you recover.  Home care    Because cardioversion typically requires sedation, you won't be able to drive home. You will need a ride. Wait at least 24 hours before driving a car or operating heavy machinery after receiving sedating medicines.    Don t be alarmed if the skin on your chest is irritated or feels like it is sunburned. Your healthcare provider may prescribe a soothing lotion to relieve this discomfort. These minor symptoms will go away in a few days.    Ask your healthcare provider about medicines to keep your heart rhythm steady.    If you were prescribed medicine, take it as instructed by your healthcare provider. Don t skip doses or take double doses. Cardioversion requires blood thinners for at least 4 weeks to prevent a delayed risk of stroke when treating atrial fibrillation or atrial flutter. Be sure you discuss which medicine you are taking to prevent stroke. Ask when you need to have your medicine levels checked, and whether you may be able to stop taking it in the future or whether it is recommended that you take it for life. Some of these blood-thinning medicines will have the dose adjusted, and interact with other medicines or foods. Your healthcare team will  give you full instructions on what to watch out for. Report bleeding or symptoms of stroke immediately to your healthcare team and seek emergency medical attention.    Learn to take your own pulse. Keep a record of your results. Ask your healthcare provider when you should seek emergency medical attention. He or she will tell you which pulse rate reading is dangerous.     Keep in mind this procedure may need to be repeated if the abnormal heart rhythm returns. After the procedure, your healthcare provider will tell you if the treatment worked or if you will need further treatments or medication.  Follow-up care  Make a follow-up appointment, or as directed by our staff.     When to call your healthcare provider  Call 911 right away if you have:    Chest pain    Shortness of breath    Loss of vision, speech, or strength or coordination in any body part  Otherwise, call your healthcare provider immediately if you have:    Fainting, dizzy, or lightheaded    Chest pain with increased activity    Irregular heartbeat or fast pulse    Bleeding issues from blood-thinning medicines   Date Last Reviewed: 3/1/2017    7137-3370 [employer]. [address] [place] [address], [place] [address]. All rights reserved. This information is not intended as a substitute for professional medical care. Always follow your healthcare professional's instructions.

## 2017-10-24 NOTE — ANESTHESIA PREPROCEDURE EVALUATION
Procedure: * No procedures listed *  Preop diagnosis: * No pre-op diagnosis entered *    Allergies   Allergen Reactions     No Known Drug Allergies      Past Medical History:   Diagnosis Date     Benign essential tremor     Chronic     Hyperlipidemia LDL goal <160 2/10/2010     Invasive ductal carcinoma of breast (H) 6/09    left breast ca     Major depressive disorder, recurrent episode, in full remission (H) 10/19/2011    Stable for decades     osteopenia 2002     Palpitations      Persistent atrial fibrillation (H) 05/10/2017     Unspecified hypothyroidism      Past Surgical History:   Procedure Laterality Date     C NONSPECIFIC PROCEDURE      Tubal Ligation    Abstracted 07/12/02     C THYROID ABLATION       COLONOSCOPY  10/03     COLONOSCOPY  5/9/2014     COLONOSCOPY  6/9/2014    Procedure: COLONOSCOPY;  Surgeon: Garrett Villaseñor MD;  Location: [facility]     HC ECHO HEART XTHORACIC, STRESS/REST  7/22/09    normal     HC EXCISION BREAST LESION, OPEN >=1  7/09    re-excision 8/17/09     Prior to Admission medications    Medication Sig Start Date End Date Taking? Authorizing Provider   CALCIUM CARBONATE PO Take 1,250 mg by mouth   Yes Reported, Patient   levothyroxine (SYNTHROID) 137 MCG tablet Take 1 tablet (137 mcg) by mouth daily 5/26/17  Yes Shadia Munroe MD   CALCIUM 500 MG OR TABS 2 tabs daily   Yes    amiodarone (PACERONE/CODARONE) 200 MG tablet Amiodarone 400 mg PO BID for 4 days, then decrease to 400 mg PO daily for 4 days, and then decrease to 200 mg PO daily. 9/20/17   Miguel Shaffer MD   ELIQUIS 5 MG tablet TAKE ONE TABLET BY MOUTH TWICE DAILY  9/13/17   Shadia Munroe MD   order for DME Equipment ordered: RESMED CPAP Mask type: Nasal Settings: 8 cmH2O    Reported, Patient   liothyronine (CYTOMEL) 5 MCG tablet Take 1 tablet (5 mcg) by mouth daily 5/26/17   Shadia Munroe MD   Probiotic Product (PROBIOTIC DAILY) CAPS Take 1 capsule by mouth daily    Reported, Patient    Cholecalciferol (VITAMIN D) 1000 UNIT capsule take 2 Caps by mouth daily.    Reported, Patient   VITAMINS/MINERALS OR TABS 1 TABLET DAILY         Current Outpatient Prescriptions Ordered in Epic   Medication     CALCIUM CARBONATE PO     levothyroxine (SYNTHROID) 137 MCG tablet     CALCIUM 500 MG OR TABS     amiodarone (PACERONE/CODARONE) 200 MG tablet     ELIQUIS 5 MG tablet     order for DME     liothyronine (CYTOMEL) 5 MCG tablet     Probiotic Product (PROBIOTIC DAILY) CAPS     Cholecalciferol (VITAMIN D) 1000 UNIT capsule     VITAMINS/MINERALS OR TABS     Current Facility-Administered Medications Ordered in Epic   Medication Dose Route Frequency Last Rate Last Dose     sodium chloride (PF) 0.9% PF flush 3 mL  3 mL Intravenous Q1H PRN         sodium chloride (PF) 0.9% PF flush 3 mL  3 mL Intravenous Q8H PRN         lactated ringers infusion   Intravenous Continuous 10 mL/hr at 10/24/17 0912       Wt Readings from Last 1 Encounters:   10/18/17 77.3 kg (170 lb 8 oz)     Temp Readings from Last 1 Encounters:   10/24/17 36.1  C (96.9  F) (Temporal)     BP Readings from Last 6 Encounters:   10/24/17 (!) 134/92   10/18/17 (!) 136/91   09/20/17 103/72   09/19/17 129/86   09/13/17 115/73   08/09/17 102/74     Pulse Readings from Last 4 Encounters:   10/24/17 70   10/18/17 101   09/20/17 87   09/13/17 90     Resp Readings from Last 1 Encounters:   10/24/17 16     SpO2 Readings from Last 1 Encounters:   10/24/17 95%     Recent Labs   Lab Test  09/19/17   0915  05/10/17   1705  11/15/16   1030   NA   --   141  140   POTASSIUM  3.9  4.2  4.2   CHLORIDE   --   106  105   CO2   --   27  27   ANIONGAP   --   8  8   GLC   --   98  92   BUN   --   19  16   CR   --   0.80  0.80   MARTITA   --   9.1  9.1     Recent Labs   Lab Test  05/10/17   1705  09/06/16   0802   WBC  5.7  6.9   HGB  12.5  13.6   PLT  234  261     Recent Labs   Lab Test  05/10/17   1705   INR  1.01        ECG: Afib, no st or twave abnormalities.   ECHO:  Interpretation Summary     Left ventricular systolic function is mildly reduced.  The visual ejection fraction is estimated at 40-45%.  Mildly decreased right ventricular systolic function  No thrombus is detected in the left atrial appendage.  There is moderate to mod-severe (2-3+) mitral regurgitation.  Compared to 5/17 TTE , no significant change. The study was technically  adequate.  _____________________________________________________________________________  __        SCOTTIE  Prior to the exam, the oral cavity was checked and no overcrowding was noted.  There were no complications associated with this procedure. The  transesophageal probe was passed without difficulty. IV propofol given by  anesthetist.     Left Ventricle  The left ventricle is normal in size. There is normal left ventricular wall  thickness. The visual ejection fraction is estimated at 40-45%. Left  ventricular systolic function is mildly reduced. There is borderline-mild  global hypokinesia of the left ventricle.     Right Ventricle  The right ventricle is normal size. Mildly decreased right ventricular  systolic function.     Atria  No thrombus is detected in the left atrial appendage. The left atrium is  severely dilated. Right atrial size is normal. The right atrium is mild to  moderately dilated. Left to right atrial shunt, small. Bubble study not  performed. A patent foramen ovale is suspected.        Mitral Valve  The mitral valve leaflets are mildly thickened. 3D imaging of the MV was  performed. There is no evidence of mitral valve prolapse. There is moderate to  mod-severe (2-3+) mitral regurgitation. ERO is 0.22cm2 and RV was 45cc by PISA  of one of the jet. Two jets of MR noted. No evidence of systolic reversal  noted on doppler of pulmonary vein.     Tricuspid Valve  There is mild (1+) tricuspid regurgitation. The right ventricular systolic  pressure is approximated at 28.2 mmHg plus the right atrial pressure.     Aortic Valve  The  aortic valve is trileaflet. There is trace aortic regurgitation. No aortic  stenosis is present.     Pulmonic Valve  There is trace pulmonic valvular regurgitation.     Vessels  The ascending aorta is Borderline dilated.      Anesthesia Evaluation     . Pt has had prior anesthetic.     No history of anesthetic complications          ROS/MED HX    ENT/Pulmonary:     (+)sleep apnea, uses CPAP , . .   (-) tobacco use, asthma and COPD   Neurologic:      (-) seizures and CVA   Cardiovascular:     (+) Dyslipidemia, ----. Taking blood thinners : . . . :. dysrhythmias a-fib, valvular problems/murmurs type: MR .      (-) hypertension, CAD and HERNÁNDEZ   METS/Exercise Tolerance:  3 - Able to walk 1-2 blocks without stopping   Hematologic:        (-) history of blood clots, anemia and other hematologic disorder   Musculoskeletal:        (-) arthritis   GI/Hepatic:        (-) GERD and liver disease   Renal/Genitourinary:      (-) renal disease and nephrolithiasis   Endo:     (+) thyroid problem hypothyroidism, .   (-) Type II DM, chronic steroid usage and obesity   Psychiatric:     (+) psychiatric history depression      Infectious Disease:        (-) Recent Fever   Malignancy:   (+) Malignancy History of Breast  Breast CA Remission status post.         Other:                     Physical Exam  Normal systems: cardiovascular, pulmonary and dental    Airway   Mallampati: II  TM distance: >3 FB  Neck ROM: full    Dental     Cardiovascular   Rhythm and rate: regular and normal  (-) no murmur    Pulmonary                     Anesthesia Plan      History & Physical Review      ASA Status:  3 .    NPO Status:  > 8 hours    Plan for MAC Reason for MAC:  Deep or markedly invasive procedure (G8)    Bolus Propofol      Postoperative Care      Consents  Anesthetic plan, risks, benefits and alternatives discussed with:  Patient..                          .

## 2017-10-24 NOTE — DISCHARGE INSTRUCTIONS
Discharge instructions given to . Handouts to reinforce teaching. Plan to follow up with future appointments. Dc to home with

## 2017-10-24 NOTE — IP AVS SNAPSHOT
[place] [address]    [place] [address]    Phone:  [phone]                                       After Visit Summary   10/24/2017    Ely Humphreys    MRN: [id number]           After Visit Summary Signature Page     I have received my discharge instructions, and my questions have been answered. I have discussed any challenges I see with this plan with the nurse or doctor.    ..........................................................................................................................................  Patient/Patient Representative Signature      ..........................................................................................................................................  Patient Representative Print Name and Relationship to Patient    ..................................................               ................................................  Date                                            Time    ..........................................................................................................................................  Reviewed by Signature/Title    ...................................................              ..............................................  Date                                                            Time

## 2017-10-25 ENCOUNTER — TELEPHONE (OUTPATIENT)
Dept: PEDIATRICS | Facility: CLINIC | Age: 67
End: 2017-10-25

## 2017-10-25 DIAGNOSIS — Z11.59 NEED FOR HEPATITIS C SCREENING TEST: Primary | ICD-10-CM

## 2017-10-25 NOTE — TELEPHONE ENCOUNTER
Patient received notice via [email or web address] for Hep C screening, requesting to have lab completed on 11/1/17. Please place orders.  -Nessa Miguel

## 2017-10-25 NOTE — OP NOTE
DATE OF PROCEDURE:  10/24/2017      PROCEDURE: Cardioversion.     INDICATIONS:  Nya Humphreys is a lady with persistent atrial fibrillation and ejection fraction 40-45%.  She is on Eliquis as well as amiodarone.  After anesthesia was expertly administered, a single synchronized shock of 120 joules restored normal sinus rhythm.  The patient tolerated the procedure well.         JONAH ELIZALDE MD, [facility]             D: 10/24/2017 16:07   T: 10/24/2017 20:33   MT: DEBORAH#[id number]      Name:     NYA HUMPHREYS   MRN:      [id number]        Account:        [id number]   :      1950           Procedure Date: 10/24/2017      Document: [id number]

## 2017-11-01 ENCOUNTER — ALLIED HEALTH/NURSE VISIT (OUTPATIENT)
Dept: NURSING | Facility: CLINIC | Age: 67
End: 2017-11-01
Payer: COMMERCIAL

## 2017-11-01 DIAGNOSIS — Z23 NEED FOR PROPHYLACTIC VACCINATION AND INOCULATION AGAINST INFLUENZA: Primary | ICD-10-CM

## 2017-11-01 DIAGNOSIS — Z11.59 NEED FOR HEPATITIS C SCREENING TEST: ICD-10-CM

## 2017-11-01 LAB — HCV AB SERPL QL IA: NONREACTIVE

## 2017-11-01 PROCEDURE — 90670 PCV13 VACCINE IM: CPT

## 2017-11-01 PROCEDURE — 90662 IIV NO PRSV INCREASED AG IM: CPT

## 2017-11-01 PROCEDURE — G0009 ADMIN PNEUMOCOCCAL VACCINE: HCPCS

## 2017-11-01 PROCEDURE — 36415 COLL VENOUS BLD VENIPUNCTURE: CPT | Performed by: INTERNAL MEDICINE

## 2017-11-01 PROCEDURE — G0008 ADMIN INFLUENZA VIRUS VAC: HCPCS

## 2017-11-01 PROCEDURE — 99207 ZZC NO CHARGE NURSE ONLY: CPT

## 2017-11-01 PROCEDURE — 86803 HEPATITIS C AB TEST: CPT | Performed by: INTERNAL MEDICINE

## 2017-11-01 NOTE — MR AVS SNAPSHOT
After Visit Summary   11/1/2017    Ely Humphreys    MRN: [id number]           Patient Information     Date Of Birth          1950        Visit Information        Provider Department      11/1/2017 10:30 AM DUNG NURSE AB [facility]        Today's Diagnoses     Need for prophylactic vaccination and inoculation against influenza    -  1       Follow-ups after your visit        Your next 10 appointments already scheduled     Nov 21, 2017  1:00 PM CST   Ech Complete with [id number] ([facility])    [address]  [facility] [place] [id number] [phone]           1. Please bring or wear a comfortable two-piece outfit. 2. You may eat, drink and take your normal medicines. 3. For any questions that cannot be answered, please contact the ordering physician ***Please check-in at the [place] Registration Office located in [address] in the [facility] building. When you are finished registering, please go to [address] and have a seat. The technician will call your name for the test.            Nov 27, 2017  1:45 PM CST   UMP EP RETURN with Miguel Shaffer MD   [facility] ([facility])    [facility]  [facility] [id number] [phone]              Who to contact     If you have questions or need follow up information about today's clinic visit or your schedule please contact [facility] directly at [phone].  Normal or non-critical lab and imaging results will be communicated to you by MyChart, letter or phone within 4 business days after the clinic has received the results. If you do not hear from us within 7 days, please contact the clinic through MyChart or phone. If you have a critical or abnormal lab result, we will notify you by phone as soon as possible.  Submit refill requests through [employer] or call your pharmacy and  they will forward the refill request to us. Please allow 3 business days for your refill to be completed.          Additional Information About Your Visit        [employer] Information     [employer] gives you secure access to your electronic health record. If you see a primary care provider, you can also send messages to your care team and make appointments. If you have questions, please call your primary care clinic.  If you do not have a primary care provider, please call [phone] and they will assist you.        Care EveryWhere ID     This is your Care EveryWhere ID. This could be used by other organizations to access your [place] medical records  [id number]         Blood Pressure from Last 3 Encounters:   10/24/17 120/85   10/18/17 (!) 136/91   09/20/17 103/72    Weight from Last 3 Encounters:   10/18/17 170 lb 8 oz (77.3 kg)   09/20/17 166 lb 8 oz (75.5 kg)   09/13/17 166 lb 9.6 oz (75.6 kg)              We Performed the Following     FLU VACCINE, INCREASED ANTIGEN, PRESV FREE, AGE 65+ [94079]     Pneumococcal vaccine 13 valent PCV13 IM (Prevnar) [99629]     Vaccine Administration, Each Additional [81018]     Vaccine Administration, Initial [46793]        Primary Care Provider Office Phone # Fax #    Shadia Munroe [phone] [address] [place] [address]        Equal Access to Services     [facility]: Hadii aad brielle hadasho Soismaelali, waaxda luqadaha, qaybta kaalmada opal, dori cortes . So [facility] [phone].    ATENCIÓN: Si habla español, tiene a saenz disposición servicios gratuitos de asistencia lingüística. Llame al [phone].    We comply with applicable federal civil rights laws and [place] laws. We do not discriminate on the basis of race, color, national origin, age, disability, sex, sexual orientation, or gender identity.            Thank you!     Thank you for choosing [facility]  for your care. Our goal is  always to provide you with excellent care. Hearing back from our patients is one way we can continue to improve our services. Please take a few minutes to complete the written survey that you may receive in the mail after your visit with us. Thank you!             Your Updated Medication List - Protect others around you: Learn how to safely use, store and throw away your medicines at www.disposemymeds.org.          This list is accurate as of: 11/1/17  4:02 PM.  Always use your most recent med list.                   Brand Name Dispense Instructions for use Diagnosis    amiodarone 200 MG tablet    PACERONE/CODARONE    60 tablet    Amiodarone 400 mg PO BID for 4 days, then decrease to 400 mg PO daily for 4 days, and then decrease to 200 mg PO daily.    Chronic atrial fibrillation (H)       calcium carbonate 1250 MG tablet    OS-MARTITA 500 mg [place]. Ca     2 tabs daily        CALCIUM CARBONATE PO      Take 1,250 mg by mouth        ELIQUIS 5 MG tablet   Generic drug:  apixaban ANTICOAGULANT     60 tablet    TAKE ONE TABLET BY MOUTH TWICE DAILY    Chronic atrial fibrillation (H)       levothyroxine 137 MCG tablet    SYNTHROID    90 tablet    Take 1 tablet (137 mcg) by mouth daily    Acquired hypothyroidism       liothyronine 5 MCG tablet    CYTOMEL    90 tablet    Take 1 tablet (5 mcg) by mouth daily    Acquired hypothyroidism       order for DME      Equipment ordered: RESMED CPAP Mask type: Nasal Settings: 8 cmH2O        PROBIOTIC DAILY Caps      Take 1 capsule by mouth daily        vitamin  s/Minerals Tabs      1 TABLET DAILY        vitamin D 1000 UNITS capsule      take 2 Caps by mouth daily.

## 2017-11-01 NOTE — PROGRESS NOTES
Injectable Influenza Immunization Documentation    1.  Is the person to be vaccinated sick today?   No    2. Does the person to be vaccinated have an allergy to a component   of the vaccine?   No  Egg Allergy Algorithm Link    3. Has the person to be vaccinated ever had a serious reaction   to influenza vaccine in the past?   No    4. Has the person to be vaccinated ever had Guillain-Barré syndrome?   No    Form completed by Sangeetha Bahena MA// November 1, 2017 3:55 PM

## 2017-11-21 ENCOUNTER — TRANSFERRED RECORDS (OUTPATIENT)
Dept: HEALTH INFORMATION MANAGEMENT | Facility: CLINIC | Age: 67
End: 2017-11-21

## 2017-11-21 ENCOUNTER — HOSPITAL ENCOUNTER (OUTPATIENT)
Dept: CARDIOLOGY | Facility: CLINIC | Age: 67
Discharge: HOME | End: 2017-11-21
Attending: INTERNAL MEDICINE | Admitting: INTERNAL MEDICINE
Payer: MEDICARE

## 2017-11-21 DIAGNOSIS — I48.20 CHRONIC ATRIAL FIBRILLATION (H): ICD-10-CM

## 2017-11-21 PROCEDURE — 93306 TTE W/DOPPLER COMPLETE: CPT | Mod: 26 | Performed by: INTERNAL MEDICINE

## 2017-11-21 PROCEDURE — 93306 TTE W/DOPPLER COMPLETE: CPT

## 2017-11-27 ENCOUNTER — OFFICE VISIT (OUTPATIENT)
Dept: CARDIOLOGY | Facility: CLINIC | Age: 67
End: 2017-11-27
Attending: INTERNAL MEDICINE
Payer: COMMERCIAL

## 2017-11-27 ENCOUNTER — HOSPITAL ENCOUNTER (OUTPATIENT)
Facility: CLINIC | Age: 67
End: 2017-11-27
Attending: INTERNAL MEDICINE | Admitting: INTERNAL MEDICINE
Payer: MEDICARE

## 2017-11-27 VITALS
HEIGHT: 62 IN | BODY MASS INDEX: 32.11 KG/M2 | SYSTOLIC BLOOD PRESSURE: 120 MMHG | HEART RATE: 100 BPM | DIASTOLIC BLOOD PRESSURE: 80 MMHG | WEIGHT: 174.5 LBS

## 2017-11-27 DIAGNOSIS — I48.20 CHRONIC ATRIAL FIBRILLATION (H): ICD-10-CM

## 2017-11-27 DIAGNOSIS — E78.5 HYPERLIPIDEMIA LDL GOAL <160: ICD-10-CM

## 2017-11-27 DIAGNOSIS — I48.19 PERSISTENT ATRIAL FIBRILLATION (H): Primary | ICD-10-CM

## 2017-11-27 PROCEDURE — 99214 OFFICE O/P EST MOD 30 MIN: CPT | Performed by: INTERNAL MEDICINE

## 2017-11-27 PROCEDURE — 93000 ELECTROCARDIOGRAM COMPLETE: CPT | Performed by: INTERNAL MEDICINE

## 2017-11-27 NOTE — PROGRESS NOTES
"[id number]    Electrophysiology/ Clinic Note         H&P and Plan:           Miguel Shaffer MD    Physical Exam:  Vitals: /80  Pulse 100  Ht 1.575 m (5' 2.01\")  Wt 79.2 kg (174 lb 8 oz)  BMI 31.91 kg/m2    Constitutional:  AAO x3.  Pt is in NAD.  HEAD: normocephalic.  SKIN: Skin normal color, texture and turgor with no lesions or eruptions.  Eyes: PERRL, EOMI.  ENT:  Supple, normal JVP. No lymphadenopathy or thyroid enlargement.  Chest:  CTAB.  Cardiac: RRR, normal  S1 and S2.  No murmurs rubs or gallop.   Abdomen:  Normal BS.  Soft, non-tender and non-distended.  No rebound or guarding.    Extremities:  Pedious pulses palpable B/L.  No LE edema noticed.   Neurological: Strength and sensation grossly symmetric and intact throughout.       CURRENT MEDICATIONS:  Current Outpatient Prescriptions   Medication Sig Dispense Refill     CALCIUM CARBONATE PO Take 1,250 mg by mouth       amiodarone (PACERONE/CODARONE) 200 MG tablet Amiodarone 400 mg PO BID for 4 days, then decrease to 400 mg PO daily for 4 days, and then decrease to 200 mg PO daily. 60 tablet 3     ELIQUIS 5 MG tablet TAKE ONE TABLET BY MOUTH TWICE DAILY  60 tablet 5     liothyronine (CYTOMEL) 5 MCG tablet Take 1 tablet (5 mcg) by mouth daily 90 tablet 3     levothyroxine (SYNTHROID) 137 MCG tablet Take 1 tablet (137 mcg) by mouth daily 90 tablet 3     Probiotic Product (PROBIOTIC DAILY) CAPS Take 1 capsule by mouth daily       Cholecalciferol (VITAMIN D) 1000 UNIT capsule take 2 Caps by mouth daily.       CALCIUM 500 MG OR TABS 2 tabs daily       VITAMINS/MINERALS OR TABS 1 TABLET DAILY       order for DME Equipment ordered: RESMED CPAP Mask type: Nasal Settings: 8 cmH2O         ALLERGIES     Allergies   Allergen Reactions     No Known Drug Allergies        PAST MEDICAL HISTORY:  Past Medical History:   Diagnosis Date     Benign essential tremor     Chronic     Hyperlipidemia LDL goal <160 2/10/2010     Invasive ductal carcinoma of breast (H) 6/09    " left breast ca     Major depressive disorder, recurrent episode, in full remission (H) 10/19/2011    Stable for decades     osteopenia 2002     Palpitations      Persistent atrial fibrillation (H) 05/10/2017     Unspecified hypothyroidism        PAST SURGICAL HISTORY:  Past Surgical History:   Procedure Laterality Date     C NONSPECIFIC PROCEDURE      Tubal Ligation    Abstracted 02     C THYROID ABLATION       COLONOSCOPY  10/03     COLONOSCOPY  2014     COLONOSCOPY  2014    Procedure: COLONOSCOPY;  Surgeon: Garrett Villaseñor MD;  Location: [facility]     HC ECHO HEART XTHORACIC, STRESS/REST  09    normal     HC EXCISION BREAST LESION, OPEN >=1      re-excision 09       FAMILY HISTORY:  Family History   Problem Relation Age of Onset     CANCER Mother      82 yo Breast & Melanoma     CEREBROVASCULAR DISEASE Mother 92     TIA     C.A.D. Mother      CEREBROVASCULAR DISEASE Father       85 yo Alzheimers, CHF     C.A.D. Father      possible MI in age 60's     CANCER Maternal Aunt       40's Breast     CEREBROVASCULAR DISEASE Paternal Grandmother       late 40's - early 50's     Blood Disease Maternal Aunt       51 yo Lupus     Cancer - colorectal No family hx of        SOCIAL HISTORY:  Social History     Social History     Marital status:      Spouse name: Judah     Number of children: 2     Years of education: 18     Occupational History      [employer]     health      Social History Main Topics     Smoking status: Never Smoker     Smokeless tobacco: Never Used     Alcohol use Yes      Comment: 1 or 2 glasses of wine most evenings     Drug use: No     Sexual activity: Not Currently     Birth control/ protection: Surgical     Other Topics Concern     None     Social History Narrative       Review of Systems:  Skin:  Negative     Eyes:  Positive for    ENT:  Negative    Respiratory:  Negative    Cardiovascular:    Positive  for;dizziness;edema;heaviness;palpitations;lightheadedness;fatigue  Gastroenterology: Negative    Genitourinary:  Negative    Musculoskeletal:  Negative    Neurologic:  Positive for numbness or tingling of feet  Psychiatric:  Negative    Heme/Lymph/Imm:  Negative    Endocrine:  Positive for thyroid disorder      Recent Lab Results:  LIPID RESULTS:  Lab Results   Component Value Date    CHOL 284 (H) 2017    HDL 78 2017     (H) 2017    TRIG 100 2017    CHOLHDLRATIO 3.3 2015       LIVER ENZYME RESULTS:  Lab Results   Component Value Date    AST 17 2017    ALT 20 2017       CBC RESULTS:  Lab Results   Component Value Date    WBC 5.7 05/10/2017    RBC 3.98 05/10/2017    HGB 12.5 05/10/2017    HCT 38.5 05/10/2017    MCV 97 05/10/2017    MCH 31.4 05/10/2017    MCHC 32.5 05/10/2017    RDW 12.5 05/10/2017     05/10/2017       BMP RESULTS:  Lab Results   Component Value Date     05/10/2017    POTASSIUM 4.0 10/24/2017    CHLORIDE 106 05/10/2017    CO2 27 05/10/2017    ANIONGAP 8 05/10/2017    GLC 98 05/10/2017    BUN 19 05/10/2017    CR 0.80 05/10/2017    GFRESTIMATED 72 05/10/2017    GFRESTBLACK 87 05/10/2017    MARTITA 9.1 05/10/2017        A1C RESULTS:  Lab Results   Component Value Date    A1C 5.4 2015       INR RESULTS:  Lab Results   Component Value Date    INR 1.01 05/10/2017         ECHOCARDIOGRAM  Recent Results (from the past 8760 hour(s))   Echocardiogram    Narrative    [id number]  [facility]  [id number]  [id number]^MACHELLE^BESSY^[facility]  Echocardiography Laboratory  [address] [place] [address]        Name: NYA KOROMA  MRN: [id number]  : 1950  Study Date: 2017 01:04 PM  Age: 67 yrs  Gender: Female  Patient Location: [facility]  Reason For Study: Chronic atrial fibrillation (H)  Ordering Physician: BESSY CARTY  Referring Physician: Shadia Munroe  Performed By: Anny Laura RDCS     BSA: 1.8  m2  Height: 62 in  Weight: 170 lb  HR: 84  BP: 118/72 mmHg  _____________________________________________________________________________  __        Procedure  Complete Echo Adult.  _____________________________________________________________________________  __        Interpretation Summary     The visual ejection fraction is estimated at 40-45%.  There is mild-moderate global hypokinesia of the left ventricle.  The right ventricular systolic function is borderline reduced.  The IVC is normal in size and reactivity with respiration, suggesting normal  central venous pressure.  The ascending aorta is Mildly dilated.  The left atrium is mildly dilated.  There is moderate (2+) mitral regurgitation.  The mitral regurgitant jet is eccentrically directed. SCOTTIE likely a more  accurate assessment of MR.  Suspect LA volume measurments undersestimate LA size.  Compared to prior study- no significant change.  The rhythm was atrial fibrillation.  _____________________________________________________________________________  __        Left Ventricle  The left ventricle is normal in size. There is normal left ventricular wall  thickness. The visual ejection fraction is estimated at 40-45%. E by E prime  ratio is between 8 and 15, which is indeterminate for assessment of left  ventricular filling pressures. Left ventricular diastolic function is  indeterminate. There is mild-moderate global hypokinesia of the left  ventricle.     Right Ventricle  The right ventricle is normal size. The right ventricular systolic function is  borderline reduced.     Atria  The left atrium is mildly dilated. The right atrium is mildly dilated.     Mitral Valve  There is moderate (2+) mitral regurgitation. The mitral regurgitant jet is  eccentrically directed.        Tricuspid Valve  The tricuspid valve is normal in structure and function. There is mild to  moderate (1-2+) tricuspid regurgitation. The right ventricular systolic  pressure is  approximated at 19.0 mmHg plus the right atrial pressure. Normal  IVC (1.5-2.5cm) with >50% respiratory collapse; right atrial pressure is  estimated at 5-10mmHg. Right ventricle systolic pressure estimate normal.     Aortic Valve  There is trivial trileaflet aortic sclerosis. There is trace aortic  regurgitation.     Pulmonic Valve  The pulmonic valve is not well seen, but is grossly normal. There is trace  pulmonic valvular regurgitation. Normal pulmonic valve velocity.     Vessels  The aortic root is normal size. The ascending aorta is Mildly dilated. The IVC  is normal in size and reactivity with respiration, suggesting normal central  venous pressure.     Pericardium  There is no pericardial effusion.        Rhythm  The rhythm was atrial fibrillation.  _____________________________________________________________________________  __  MMode/2D Measurements & Calculations  IVSd: 1.1 cm     LVIDd: 4.4 cm  LVIDs: 3.1 cm  LVPWd: 1.1 cm  IVC diam: 1.9 cm  FS: 30.5 %  EDV(Teich): 88.4 ml  ESV(Teich): 37.0 ml  LV mass(C)d: 176.5 grams  LV mass(C)dI: 98.9 grams/m2  Ao root diam: 3.6 cm  asc Aorta Diam: 4.1 cm  LA volume (AL/bp): 60.5 cm3     LA Volume Indexed (AL/bp): 33.9 ml/m2  RWT: 0.52        Doppler Measurements & Calculations  MV E max ti: 78.8 cm/sec  MV A max ti: 0.35 cm/sec  MV E/A: 225.1  MV dec slope: 629.2 cm/sec2  MV dec time: 0.13 sec  MR ERO: 0.27 cm2  TV max P.0 mmHg  TR max ti: 217.8 cm/sec  TR max P.0 mmHg  E/E' av.0  Lateral E/e': 8.7  Medial E/e': 7.2  Peak E' Ti: 10.1 cm/sec              _____________________________________________________________________________  __           Report approved by: Larry Sharma 2017 04:46 PM      Echocardiogram Limited    Narrative    [id number]  [facility]  [id number]  [id number]^YOMI^GELY^LACHELLE        [facility]  [facility]  Echocardiography Laboratory  [address]  [place]  [address]  Phone [phone]  Fax [phone]        Name: NYA KOROMA  MRN: [id number]  : 1950  Study Date: 2017 08:45 AM  Age: 67 yrs  Gender: Female  Patient Location: [facility]  Reason For Study: Unspecified atrial fibrillation, Cardiomyopathy, unspecified  Ordering Physician: GELY CELAYA  Referring Physician: GELY CELAYA  Performed By: Pilar Bragg RDCS     BSA: 1.7 m2  Height: 62 in  Weight: 161 lb  HR: 85  BP: 100/70 mmHg  _____________________________________________________________________________  __     Procedure  Limited Echo Adult. Complete Echo Adult.     _____________________________________________________________________________  __        Interpretation Summary     Left ventricular systolic function is mild to moderately reduced. The visual  ejection fraction is estimated at 40-45% (biplane 48% overestimates EF). There  is moderate global hypokinesia of the left ventricle. There is mild concentric  left ventricular hypertrophy.  The left atrium is moderate to severely dilated.  There is at least moderate (2+ to 2-3+) mitral regurgitation.  There is moderate to mod-severe (2-3+) tricuspid regurgitation.  Mild aortic root dilatation. Sinuses are 3.9 cm and ascending aorta is 4.1 cm.  Compared to prior study, there is no significant change. Limited views were  obtained. Visually, the LVEF and MR are similar to the prior echo.  _____________________________________________________________________________  __        Left Ventricle  The left ventricle is normal in size. There is mild concentric left  ventricular hypertrophy. Left ventricular systolic function is mild to  moderately reduced. The visual ejection fraction is estimated at 40-45%. E  prime velocity may be unreliable due to technical difficulties. There is  moderate global hypokinesia of the left ventricle.     Right Ventricle  The right ventricle is mildly dilated. The right ventricular systolic function  is  mild to moderately reduced.     Atria  The left atrium is moderate to severely dilated. The right atrium is  moderately dilated. There is no atrial shunt seen.     Mitral Valve  There is moderate (2+) mitral regurgitation. The mitral regurgitant jet is  eccentrically directed. The mitral regurgitant jet is posteriorly directed,  which is consistent with anterior leaflet pathology.     Tricuspid Valve  There is moderate to mod-severe (2-3+) tricuspid regurgitation. Right  ventricular systolic pressure is normal. Normal IVC (1.5-2.5cm) with <50%  respiratory collapse; right atrial pressure is estimated at 10-15mmHg.        Aortic Valve  No aortic regurgitation is present. No PW/CW Doppler done.     Pulmonic Valve  The pulmonic valve is not well visualized.     Vessels  Mild aortic root dilatation. Sinuses are 3.9 cm and ascending aorta is 4.1 cm.     Pericardium  There is pericardial thickening and/or a small pericardial effusion.     Rhythm  The rhythm was atrial fibrillation.     _____________________________________________________________________________  __  MMode/2D Measurements & Calculations  Ao root diam: 4.1 cm  asc Aorta Diam: 3.8 cm        Doppler Measurements & Calculations  TR max boom: 205.3 cm/sec  TR max P.9 mmHg              _____________________________________________________________________________  __           Report approved by: Larry Gunter 2017 05:30 PM      Echocardiogram Complete    Narrative    [id number]  [id number]  [id number]  [id number]^SASKIA^ANDREA^SPARKLE           [facility]  Echocardiography Laboratory  [address] [place] [address]        Name: NYA KOROMA  MRN: [id number]  : 1950  Study Date: 2017 11:03 AM  Age: 67 yrs  Gender: Female  Patient Location: [facility]  Reason For Study: , Chronic atrial fibrillation  History: Atrial Fibrillation  Ordering Physician: ANDREA KRUGER  Referring Physician: ANDREA KRUGER  Performed By:  Rodrigo Moran MD     BSA: 1.7 m2  Height: 63 in  Weight: 155 lb  HR: 123  BP: 100/70 mmHg  _____________________________________________________________________________  __        Procedure  Complete Echo Adult.  _____________________________________________________________________________  __        Interpretation Summary     The rhythm was atrial fibrillation.     1. The left ventricle is borderline dilated with moderately reduced systolic  function. Biplane ejection fraction 40%.  2. There is moderate global hypokinesia of the left ventricle.  3. The right ventricle is normal in size and function. The right ventricular  systolic pressure is approximated at 16.6 mmHg plus the right atrial pressure.     4. The left atrium is moderate to severely dilated.  5. There is moderate (2+) mitral regurgitation. (2 jets, the predominat jet is  posteriorly directed). Effective regurgitant orifice area 0.3 cm2.  6. There is moderate (2+) tricuspid regurgitation.     Compared to the stress echocardiogram dated 08/26/2013 (when the patient was  in sinus rhythm), the left ventricular ejection fraction has decreased from  60% to 40%.  _____________________________________________________________________________  __        Left Ventricle  The left ventricle is borderline dilated. Left ventricular systolic function  is moderately reduced. The visual ejection fraction is estimated at 35-40%.  Left ventricular diastolic function is indeterminate. There is moderate global  hypokinesia of the left ventricle.     Right Ventricle  The right ventricle is normal in size and function.     Atria  The left atrium is moderate to severely dilated. The right atrium is  moderately dilated.     Mitral Valve  The mitral valve leaflets appear normal. There is no evidence of stenosis,  fluttering, or prolapse. There is moderate (2+) mitral regurgitation. (2 jets,  the predominat jet is posteriorly directed). Effective regurgitant orific area  0.3  cm2.        Tricuspid Valve  Normal tricuspid valve. There is moderate (2+) tricuspid regurgitation. The  right ventricular systolic pressure is approximated at 16.6 mmHg plus the  right atrial pressure.     Aortic Valve  The aortic valve is trileaflet. There is trace aortic regurgitation. No  hemodynamically significant valvular aortic stenosis.     Pulmonic Valve  The pulmonic valve is not well seen, but is grossly normal. There is trace  pulmonic valvular regurgitation.     Vessels  Normal size ascending aorta. 3.8 cm. Dilation of the inferior vena cava is  present with normal respiratory variation in diameter.     Pericardium  There is no pericardial effusion.        Rhythm  The rhythm was atrial fibrillation.  _____________________________________________________________________________  __  MMode/2D Measurements & Calculations  IVSd: 1.0 cm     LVIDd: 5.1 cm  LVIDs: 4.4 cm  LVPWd: 0.77 cm  FS: 14.8 %  EDV(Teich): 124.8 ml  ESV(Teich): 85.9 ml  LV mass(C)d: 165.3 grams  LV mass(C)dI: 95.2 grams/m2  Ao root diam: 3.4 cm  LA dimension: 4.5 cm  asc Aorta Diam: 3.8 cm  LA/Ao: 1.3  LA Volume (BP): 86.0 ml  LA Volume Index (BP): 49.4 ml/m2           Doppler Measurements & Calculations  MR ERO: 0.35 cm2  MR volume: 45.3 ml  TR max boom: 203.5 cm/sec  TR max P.6 mmHg           _____________________________________________________________________________  __           Report approved by: Dr Juan Manuel Colon 2017 12:43 PM            Orders Placed This Encounter   Procedures     Follow-Up with Cardiac Advanced Practice Provider     EKG 12-lead complete w/read - Clinics (performed today)     Cardioversion     No orders of the defined types were placed in this encounter.    There are no discontinued medications.      Encounter Diagnoses   Name Primary?     Chronic atrial fibrillation (H)      Persistent atrial fibrillation (H) Yes     Hyperlipidemia LDL goal <160          CC  Miguel Shaffer MD  [address]  [address]  [place] [id number]

## 2017-11-27 NOTE — MR AVS SNAPSHOT
After Visit Summary   11/27/2017    Ely Humphreys    MRN: [id number]           Patient Information     Date Of Birth          1950        Visit Information        Provider Department      11/27/2017 1:45 PM Miguel Shaffer MD [facility]        Today's Diagnoses     Persistent atrial fibrillation (H)    -  1    Chronic atrial fibrillation (H)        Hyperlipidemia LDL goal <160           Follow-ups after your visit        Additional Services     Follow-Up with Cardiac Advanced Practice Provider                 Your next 10 appointments already scheduled     Jan 03, 2018  9:00 AM CST   [facility] EP RETURN with SARAH Gomez CNP   [facility] ([facility])    [address] [address] [address] [phone]              Future tests that were ordered for you today     Open Future Orders        Priority Expected Expires Ordered    Follow-Up with Cardiac Advanced Practice Provider Routine 12/27/2017 11/27/2018 11/27/2017    Cardioversion Routine 11/27/2017 11/27/2018 11/27/2017            Who to contact     If you have questions or need follow up information about today's clinic visit or your schedule please contact [facility]   [place] directly at [phone].  Normal or non-critical lab and imaging results will be communicated to you by MyChart, letter or phone within 4 business days after the clinic has received the results. If you do not hear from us within 7 days, please contact the clinic through MyChart or phone. If you have a critical or abnormal lab result, we will notify you by phone as soon as possible.  Submit refill requests through [employer] or call your pharmacy and they will forward the refill request to us. Please allow 3 business days for your refill to be completed.          Additional Information About Your Visit        MyChart  "Information     [facility] gives you secure access to your electronic health record. If you see a primary care provider, you can also send messages to your care team and make appointments. If you have questions, please call your primary care clinic.  If you do not have a primary care provider, please call [phone] and they will assist you.        Care EveryWhere ID     This is your Care EveryWhere ID. This could be used by other organizations to access your [place] medical records  [id number]        Your Vitals Were     Pulse Height BMI (Body Mass Index)             100 1.575 m (5' 2.01\") 31.91 kg/m2          Blood Pressure from Last 3 Encounters:   11/27/17 120/80   10/24/17 120/85   10/18/17 (!) 136/91    Weight from Last 3 Encounters:   11/27/17 79.2 kg (174 lb 8 oz)   10/18/17 77.3 kg (170 lb 8 oz)   09/20/17 75.5 kg (166 lb 8 oz)              We Performed the Following     EKG 12-lead complete w/read - Clinics (performed today)     Follow-Up with Electrophysiologist        Primary Care Provider Office Phone # Fax #    Shadia Munroe [phone] [address] [place] [address]        Equal Access to Services     STEVE LUNDY : Hadii aad ku hadasho Soomaali, waaxda luqadaha, qaybta kaalmada adeegyada, waxay lori montejo. So [facility] [phone].    ATENCIÓN: Si habla español, tiene a saenz disposición servicios gratuitos de asistencia lingüística. Llame al [phone].    We comply with applicable federal civil rights laws and [place] laws. We do not discriminate on the basis of race, color, national origin, age, disability, sex, sexual orientation, or gender identity.            Thank you!     Thank you for choosing [facility]  for your care. Our goal is always to provide you with excellent care. Hearing back from our patients is one way we can continue to improve our services. Please take a few minutes to " complete the written survey that you may receive in the mail after your visit with us. Thank you!             Your Updated Medication List - Protect others around you: Learn how to safely use, store and throw away your medicines at www.disposemymeds.org.          This list is accurate as of: 11/27/17  2:21 PM.  Always use your most recent med list.                   Brand Name Dispense Instructions for use Diagnosis    amiodarone 200 MG tablet    PACERONE/CODARONE    60 tablet    Amiodarone 400 mg PO BID for 4 days, then decrease to 400 mg PO daily for 4 days, and then decrease to 200 mg PO daily.    Chronic atrial fibrillation (H)       calcium carbonate 1250 MG tablet    OS-MARTITA 500 mg [place]. Ca     2 tabs daily        CALCIUM CARBONATE PO      Take 1,250 mg by mouth        ELIQUIS 5 MG tablet   Generic drug:  apixaban ANTICOAGULANT     60 tablet    TAKE ONE TABLET BY MOUTH TWICE DAILY    Chronic atrial fibrillation (H)       levothyroxine 137 MCG tablet    SYNTHROID    90 tablet    Take 1 tablet (137 mcg) by mouth daily    Acquired hypothyroidism       liothyronine 5 MCG tablet    CYTOMEL    90 tablet    Take 1 tablet (5 mcg) by mouth daily    Acquired hypothyroidism       order for DME      Equipment ordered: RESMED CPAP Mask type: Nasal Settings: 8 cmH2O        PROBIOTIC DAILY Caps      Take 1 capsule by mouth daily        vitamin  s/Minerals Tabs      1 TABLET DAILY        vitamin D 1000 UNITS capsule      take 2 Caps by mouth daily.

## 2017-11-27 NOTE — PROGRESS NOTES
REASON FOR VISIT:  Evaluation of persistent atrial fibrillation.      HISTORY OF PRESENT ILLNESS:  Ms. Humphreys is a delightful 67-year-old lady with a history of Graves disease (14 years ago status post radiotherapy) and breast cancer (left-sided lumpectomy 7 years ago), who is here for evaluation of persistent atrial fibrillation.      The patient was found to have persistent fibrillation on preop evaluation for elective left knee meniscal tear repair surgery.  She was evaluated by Dr. Don and was found to have cardiomyopathy with EF around 40%.  A stress echo done in 2013 was within normal limits.  She was started on metoprolol and Eliquis, and underwent SCOTTIE cardioversion which was unsuccessful.  Later, she saw me in September.  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  I recommended amiodarone load preceded by cardioversion.  She underwent another attempt of cardioversion done on 10/24 which was successful and is here today for routine followup.      At the moment, she feels okay.  She has a difficult time to evaluate whether or not cardioversion helped with any symptoms, as she seems to be mainly asymptomatic.  An echocardiogram was repeated on 11/21 and EF was down to 40%-45%.  However, she was found to be back in AFib during the echo.  She was also found to have moderate MR based on the echo.  EKG done here today confirms atrial fibrillation with a heart rate around 104 beats per minute.      ASSESSMENT AND PLAN:     1.  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  We discussed that although cardioversion was successful, she is back in atrial fibrillation.  Therefore, we cannot confirm whether or not she has a tachycardia-mediated cardiomyopathy.      We discussed options including continued rate control versus another attempt at cardioversion.  She would like to give it one more attempt of cardioversion while taking amiodarone.  We will make arrangements  to have it done.      She will follow up with us in a month to reevaluate whether or not she remains in sinus rhythm        2.  Embolic prevention.  CHADS-VASc score of 3.  Continue anticoagulation with Eliquis indefinitely.         BESSY CARTY MD             D: 2017 14:18   T: 2017 15:02   MT: TORREY      Name:     NYA KOROMA   MRN:      [id number]        Account:      [id number]   :      1950           Service Date: 2017      Document: [id number]

## 2017-11-28 ENCOUNTER — HOSPITAL ENCOUNTER (OUTPATIENT)
Dept: CARDIOLOGY | Facility: CLINIC | Age: 67
End: 2017-11-28
Attending: INTERNAL MEDICINE
Payer: MEDICARE

## 2017-11-28 ENCOUNTER — ANESTHESIA EVENT (OUTPATIENT)
Dept: SURGERY | Facility: CLINIC | Age: 67
End: 2017-11-28
Payer: MEDICARE

## 2017-11-28 ENCOUNTER — SURGERY (OUTPATIENT)
Age: 67
End: 2017-11-28

## 2017-11-28 ENCOUNTER — HOSPITAL ENCOUNTER (OUTPATIENT)
Facility: CLINIC | Age: 67
Discharge: HOME | End: 2017-11-28
Attending: INTERNAL MEDICINE
Payer: MEDICARE

## 2017-11-28 ENCOUNTER — ANESTHESIA (OUTPATIENT)
Dept: SURGERY | Facility: CLINIC | Age: 67
End: 2017-11-28
Payer: MEDICARE

## 2017-11-28 VITALS
OXYGEN SATURATION: 97 % | HEART RATE: 64 BPM | SYSTOLIC BLOOD PRESSURE: 128 MMHG | DIASTOLIC BLOOD PRESSURE: 82 MMHG | RESPIRATION RATE: 15 BRPM

## 2017-11-28 DIAGNOSIS — I48.19 PERSISTENT ATRIAL FIBRILLATION (H): ICD-10-CM

## 2017-11-28 LAB
MAGNESIUM SERPL-MCNC: 2.4 MG/DL (ref 1.6–2.3)
POTASSIUM SERPL-SCNC: 4.1 MMOL/L (ref 3.4–5.3)

## 2017-11-28 PROCEDURE — 92960 CARDIOVERSION ELECTRIC EXT: CPT | Performed by: INTERNAL MEDICINE

## 2017-11-28 PROCEDURE — 93010 ELECTROCARDIOGRAM REPORT: CPT | Performed by: INTERNAL MEDICINE

## 2017-11-28 PROCEDURE — 84132 ASSAY OF SERUM POTASSIUM: CPT | Performed by: INTERNAL MEDICINE

## 2017-11-28 PROCEDURE — 25000128 H RX IP 250 OP 636: Performed by: NURSE ANESTHETIST, CERTIFIED REGISTERED

## 2017-11-28 PROCEDURE — 37000008 ZZH ANESTHESIA TECHNICAL FEE, 1ST 30 MIN

## 2017-11-28 PROCEDURE — 83735 ASSAY OF MAGNESIUM: CPT | Performed by: INTERNAL MEDICINE

## 2017-11-28 RX ORDER — POTASSIUM CHLORIDE 1500 MG/1
40 TABLET, EXTENDED RELEASE ORAL
Status: DISCONTINUED | OUTPATIENT
Start: 2017-11-28 | End: 2017-11-29 | Stop reason: HOSPADM

## 2017-11-28 RX ORDER — NALOXONE HYDROCHLORIDE 0.4 MG/ML
.1-.4 INJECTION, SOLUTION INTRAMUSCULAR; INTRAVENOUS; SUBCUTANEOUS
Status: DISCONTINUED | OUTPATIENT
Start: 2017-11-28 | End: 2017-11-29 | Stop reason: HOSPADM

## 2017-11-28 RX ORDER — FLUMAZENIL 0.1 MG/ML
0.2 INJECTION, SOLUTION INTRAVENOUS
Status: DISCONTINUED | OUTPATIENT
Start: 2017-11-28 | End: 2017-11-29 | Stop reason: HOSPADM

## 2017-11-28 RX ORDER — POTASSIUM CHLORIDE 1500 MG/1
20 TABLET, EXTENDED RELEASE ORAL
Status: DISCONTINUED | OUTPATIENT
Start: 2017-11-28 | End: 2017-11-29 | Stop reason: HOSPADM

## 2017-11-28 RX ORDER — PROPOFOL 10 MG/ML
INJECTION, EMULSION INTRAVENOUS PRN
Status: DISCONTINUED | OUTPATIENT
Start: 2017-11-28 | End: 2017-11-28

## 2017-11-28 RX ORDER — ATROPINE SULFATE 0.1 MG/ML
.5-1 INJECTION INTRAVENOUS
Status: DISCONTINUED | OUTPATIENT
Start: 2017-11-28 | End: 2017-11-29 | Stop reason: HOSPADM

## 2017-11-28 RX ORDER — SODIUM CHLORIDE 9 MG/ML
INJECTION, SOLUTION INTRAVENOUS CONTINUOUS
Status: DISCONTINUED | OUTPATIENT
Start: 2017-11-28 | End: 2017-11-29 | Stop reason: HOSPADM

## 2017-11-28 RX ADMIN — PROPOFOL 40 MG: 10 INJECTION, EMULSION INTRAVENOUS at 11:54

## 2017-11-28 ASSESSMENT — ENCOUNTER SYMPTOMS: DYSRHYTHMIAS: 1

## 2017-11-28 NOTE — IP AVS SNAPSHOT
MRN:[id number]                      After Visit Summary   11/28/2017    Ely Humphreys    MRN: [id number]           Visit Information        Provider Department      11/28/2017 11:30 AM RH PROCEDURE ROOM [facility]           Review of your medicines      Notice     This visit is during an admission. Changes to the med list made in this visit will be reflected in the After Visit Summary of the admission.             Protect others around you: Learn how to safely use, store and throw away your medicines at www.disposemymeds.org.         Follow-ups after your visit        Your next 10 appointments already scheduled     Jan 03, 2018  9:00 AM CST   [facility] EP RETURN with SARAH Gomez CNP   [facility] ([facility])    [address] [address] [address] [phone]               Care Instructions        Further instructions from your care team         Taking Care of Yourself after  Cardioversion    Patient's Name: Ely Humphreys  Today's Date: November 28, 2017    You had a:  Cardioversion  Your doctor was Dr. Mckay    You may start taking your usual medicines again. Always follow your doctor s orders.    Call your family doctor if you have any of these problems:    Shortness of breath, neck pain, chest pain, fever, chills, coughing up blood, or other unusual signs.  If you had cardioversion:    The skin on your chest or back may feel tender for 48 hours. If your skin is tender, you may:  - Use cold packs.  - Apply 1% hydrocortisone cream to the skin (sold at drug stores).  - Take Advil (ibuprofen) or Tylenol (acetaminophen). Follow your doctor s orders.    Call your doctor right a way if you have an irregular heartbeat, shortness of breath or feel dizzy.  Follow-up visits:  Keep all scheduled appoinments     Additional Information About Your Visit        MyChart Information     Vis[facility] gives you  secure access to your electronic health record. If you see a primary care provider, you can also send messages to your care team and make appointments. If you have questions, please call your primary care clinic.  If you do not have a primary care provider, please call [phone] and they will assist you.        Care EveryWhere ID     This is your Care EveryWhere ID. This could be used by other organizations to access your [facility] medical records  [id number]        Your Vitals Were     Blood Pressure Pulse Respirations Pulse Oximetry          126/81 76 14 95%         Primary Care Provider Office Phone # Fax #    Shadia VILLAGRAN MD [place] [phone]      Equal Access to Services     STEVE LUNDY : Hadii deshawn Kirby, waaxda liangadaha, qaybta kaalmada opal, dori cortes . So [facility] [phone].    ATENCIÓN: Si habla español, tiene a saenz disposición servicios gratuitos de asistencia lingüística. Llame al [phone].    We comply with applicable federal civil rights laws and [place] laws. We do not discriminate on the basis of race, color, national origin, age, disability, sex, sexual orientation, or gender identity.            Thank you!     Thank you for choosing [facility] for your care. Our goal is always to provide you with excellent care. Hearing back from our patients is one way we can continue to improve our services. Please take a few minutes to complete the written survey that you may receive in the mail after you visit. If you would like to speak to someone directly about your visit please contact Patient Relations at [phone]. Thank you!               Medication List: This is a list of all your medications and when to take them. Check marks below indicate your daily home schedule. Keep this list as a reference.      Notice     This visit is during an admission. Changes to the med list made in this visit will be reflected in the  After Visit Summary of the admission.

## 2017-11-28 NOTE — ANESTHESIA PREPROCEDURE EVALUATION
PAC NOTE:  PAC Discussion and Assessment    ASA Classification: 2  Case is suitable for:   Anesthetic techniques and relevant risks discussed: MAC with GA as backup  Invasive monitoring and risk discussed: No  Types:   Possibility and Risk of blood transfusion discussed: No  NPO instructions given: No solids 6 hours before surgery, stop clear liquids 2 hours before surgery  Additional anesthetic preparation and risks discussed:   Needs early admission to pre-op area: No  Other:     PAC Resident/NP Anesthesia Assessment:        Mid-Level Provider/Resident:   Date:   Time:     Attending Anesthesiologist Anesthesia Assessment:        Anesthesiologist:   Date:   Time:   Pass/Fail:   Disposition:     PAC Pharmacist Assessment:        Pharmacist:   Date:   Time:      ANESTHESIA PRE EVALUATION:  Anesthesia Evaluation     .             ROS/MED HX    ENT/Pulmonary:  - neg pulmonary ROS     Neurologic:  - neg neurologic ROS     Cardiovascular:     (+) ----. : . . . :. dysrhythmias a-fib, Irregular Heartbeat/Palpitations, valvular problems/murmurs type: MR . No previous cardiac testing       METS/Exercise Tolerance:     Hematologic:  - neg hematologic  ROS       Musculoskeletal:         GI/Hepatic:         Renal/Genitourinary:         Endo:         Psychiatric:  - neg psychiatric ROS       Infectious Disease:         Malignancy:         Other:                     Physical Exam  Normal systems: pulmonary and dental    Airway   Mallampati: II  TM distance: <3 FB  Neck ROM: full    Dental     Cardiovascular   Rhythm and rate: irregular and normal      Pulmonary              Anesthesia Plan      History & Physical Review      ASA Status:  2 .        Plan for MAC          Postoperative Care      Consents                            .

## 2017-11-28 NOTE — DISCHARGE INSTRUCTIONS
Taking Care of Yourself after  Cardioversion    Patient's Name: Ely Humphreys  Today's Date: November 28, 2017    You had a:  Cardioversion  Your doctor was Dr. Mckay    You may start taking your usual medicines again. Always follow your doctor s orders.    Call your family doctor if you have any of these problems:    Shortness of breath, neck pain, chest pain, fever, chills, coughing up blood, or other unusual signs.  If you had cardioversion:    The skin on your chest or back may feel tender for 48 hours. If your skin is tender, you may:  - Use cold packs.  - Apply 1% hydrocortisone cream to the skin (sold at drug stores).  - Take Advil (ibuprofen) or Tylenol (acetaminophen). Follow your doctor s orders.    Call your doctor right a way if you have an irregular heartbeat, shortness of breath or feel dizzy.  Follow-up visits:  Keep all scheduled appoinments

## 2017-11-28 NOTE — PROGRESS NOTES
Pt here for elective cardioversion, Dr Mckay at bedside, procedure explained and all questions were answered, consent obtained, pt was successfully cardioverted to sinus rhythm with one 120 joule synchronized shock, adequate sedation per ABDIEL ALCANTARA, 40 mg of IV propofol given, post procedure pt was monitored until fully awake, taking PO fluids, ambulated in room, pt and  stated understanding of D/C instructions, pt verbalized understanding of taking all medications as previously prescribed.

## 2017-11-28 NOTE — IP AVS SNAPSHOT
[facility] Card[facility]    [address]    [facility] [address]    Phone:  [phone]                                       After Visit Summary   11/28/2017    Ely Humphreys    MRN: [id number]           After Visit Summary Signature Page     I have received my discharge instructions, and my questions have been answered. I have discussed any challenges I see with this plan with the nurse or doctor.    ..........................................................................................................................................  Patient/Patient Representative Signature      ..........................................................................................................................................  Patient Representative Print Name and Relationship to Patient    ..................................................               ................................................  Date                                            Time    ..........................................................................................................................................  Reviewed by Signature/Title    ...................................................              ..............................................  Date                                                            Time

## 2017-11-28 NOTE — PROCEDURES
Cardioversion Note    Informed consent was signed by the patient.  A timeout was taken.    Anesthesia was present for cardioversion, see separate documentation for their sedation.    Baseline rhythm: afib, rate 80's  Synchronized cardioversion performed at 120J, x1  Post-DCCV rhythm: sinus, rate 50's    No complications.    Reym Mckay MD  Cardiology - [facility]  Pager: [phone]  Text Page  November 28, 2017

## 2017-11-29 ENCOUNTER — TELEPHONE (OUTPATIENT)
Dept: CARDIOLOGY | Facility: CLINIC | Age: 67
End: 2017-11-29

## 2017-11-29 LAB — INTERPRETATION ECG - MUSE: NORMAL

## 2017-11-29 NOTE — TELEPHONE ENCOUNTER
Message  Received: Yesterday       Miguel Shaffer MD  [facility] Heart Ep Nurse                     Normal labs.  Please update pt on results.     Miguel       Writer attempted to call pt with results as above, but not home. Brief review provided to  after consent to communicate verified. He will update pt. GAETANO Ovalle RN.

## 2018-01-03 ENCOUNTER — OFFICE VISIT (OUTPATIENT)
Dept: CARDIOLOGY | Facility: CLINIC | Age: 68
End: 2018-01-03
Attending: INTERNAL MEDICINE
Payer: COMMERCIAL

## 2018-01-03 VITALS
BODY MASS INDEX: 31.94 KG/M2 | HEART RATE: 72 BPM | DIASTOLIC BLOOD PRESSURE: 70 MMHG | SYSTOLIC BLOOD PRESSURE: 116 MMHG | HEIGHT: 62 IN | WEIGHT: 173.6 LBS

## 2018-01-03 DIAGNOSIS — I48.19 PERSISTENT ATRIAL FIBRILLATION (H): ICD-10-CM

## 2018-01-03 DIAGNOSIS — G47.33 OBSTRUCTIVE SLEEP APNEA SYNDROME: ICD-10-CM

## 2018-01-03 DIAGNOSIS — I48.20 CHRONIC ATRIAL FIBRILLATION (H): ICD-10-CM

## 2018-01-03 DIAGNOSIS — I51.9 LEFT VENTRICULAR SYSTOLIC DYSFUNCTION WITHOUT HEART FAILURE: ICD-10-CM

## 2018-01-03 LAB — TSH SERPL DL<=0.005 MIU/L-ACNC: 3.41 MU/L (ref 0.4–4)

## 2018-01-03 PROCEDURE — 84443 ASSAY THYROID STIM HORMONE: CPT | Performed by: NURSE PRACTITIONER

## 2018-01-03 PROCEDURE — 93000 ELECTROCARDIOGRAM COMPLETE: CPT | Performed by: NURSE PRACTITIONER

## 2018-01-03 PROCEDURE — 36415 COLL VENOUS BLD VENIPUNCTURE: CPT | Performed by: NURSE PRACTITIONER

## 2018-01-03 PROCEDURE — 99215 OFFICE O/P EST HI 40 MIN: CPT | Mod: 25 | Performed by: NURSE PRACTITIONER

## 2018-01-03 NOTE — LETTER
1/3/2018      Shadia Simmons MD  [address] [place] [address]      RE: Ely Humphreys       Dear Colleague,    I had the pleasure of seeing Ely Humphreys in the [facility].    HISTORY OF PRESENT ILLNESS:  Ely is a delightful 67-year-old presenting in clinic today for a routine followup visit.  She has a history of Graves' disease and is status post radiotherapy as well as breast cancer and persistent atrial fibrillation.      She was found to have persistent fibrillation on a preop evaluation for elective left meniscal tear repair surgery recently.  She was first seen by Dr. Don.  She has a cardiomyopathy with an ejection fraction around 40%.  She had a stress echo in 2013, which showed normal limits and stress testing was not repeated.  She was started on metoprolol and Eliquis.  She underwent SCOTTIE cardioversion which was unsuccessful.      She then saw Dr. Shaffer with September.  He started on amiodarone and she underwent another attempt at cardioversion on 10/24, which was successful.      When she returned to see him on 11/27, she was back in atrial fibrillation.  At that time she had an echocardiogram showing that her ejection fraction remained 40%-45% with moderate MR.  Her heart rate at that time was 104.  Dr. Shaffer recommended a repeat attempt at cardioversion on the amiodarone.      She had that repeated at the end of November.  It was successfully in restoring sinus rhythm.      In clinic today, Ely tells me she has been feeling well.  She has had no evidence of palpitations or any recurrence of atrial fibrillation since her cardioversion.  Indeed, her EKG which confirmed sinus rhythm today.  She continues on the amiodarone and the Eliquis for a CHADS-VASc score of 3.      PHYSICAL EXAMINATION:   VITAL SIGNS:  Blood pressure is 116/70, heart rate 72.     GENERAL:  Well-appearing female in no acute distress.   HEENT:  Normocephalic, atraumatic.   NECK:   Supple.   LUNGS:  Clear.   HEART:  S1, S2, with a regular rate and rhythm with no murmurs, rubs, gallops.   ABDOMEN:  Soft.   EXTREMITIES:  Trace edema of the rate, no edema on the left.      IMPRESSION AND PLAN:  Ely is a delightful 67-year-old female with a history of Graves' disease, breast cancer and atrial fibrillation which was found incidentally on a preop physical this spring.  She has undergone her third cardioversion.  This is her second cardioversion with being on amiodarone therapy and appears to have been successful.  She is in sinus rhythm.  She is feeling very well.        We will continue this medicine for another few months and hopefully recovery of her cardiomyopathy.  She will see Dr. Shaffer in 2 months with an echocardiogram.  At that point in time, he can decide if he wants to switch her to an antiarrhythmic with fewer side effects.        It was a pleasure participating in the care of Ely in clinic today.       Outpatient Encounter Prescriptions as of 1/3/2018   Medication Sig Dispense Refill     calcium 500 MG CHEW Take 2 chew tab by mouth daily       amiodarone (PACERONE/CODARONE) 200 MG tablet Amiodarone 400 mg PO BID for 4 days, then decrease to 400 mg PO daily for 4 days, and then decrease to 200 mg PO daily. (Patient taking differently: 200 mg daily Amiodarone 400 mg PO BID for 4 days, then decrease to 400 mg PO daily for 4 days, and then decrease to 200 mg PO daily.) 60 tablet 3     ELIQUIS 5 MG tablet TAKE ONE TABLET BY MOUTH TWICE DAILY  60 tablet 5     order for DME Equipment ordered: RESMED CPAP Mask type: Nasal Settings: 8 cmH2O       liothyronine (CYTOMEL) 5 MCG tablet Take 1 tablet (5 mcg) by mouth daily 90 tablet 3     levothyroxine (SYNTHROID) 137 MCG tablet Take 1 tablet (137 mcg) by mouth daily 90 tablet 3     Probiotic Product (PROBIOTIC DAILY) CAPS Take 1 capsule by mouth daily       Cholecalciferol (VITAMIN D) 1000 UNIT capsule take 2 Caps by mouth daily.        VITAMINS/MINERALS OR TABS 1 TABLET DAILY       [DISCONTINUED] CALCIUM CARBONATE PO Take 1,250 mg by mouth       [DISCONTINUED] CALCIUM 500 MG OR TABS 2 tabs daily       No facility-administered encounter medications on file as of 1/3/2018.        Again, thank you for allowing me to participate in the care of your patient.      Sincerely,    Yareli Mathew, SALTY, APRN [facility]

## 2018-01-03 NOTE — PROGRESS NOTES
HISTORY OF PRESENT ILLNESS:  Ely is a delightful 67-year-old presenting in clinic today for a routine followup visit.  She has a history of Graves' disease and is status post radiotherapy as well as breast cancer and persistent atrial fibrillation.      She was found to have persistent fibrillation on a preop evaluation for elective left meniscal tear repair surgery recently.  She was first seen by Dr. Don.  She has a cardiomyopathy with an ejection fraction around 40%.  She had a stress echo in 2013, which showed normal limits and stress testing was not repeated.  She was started on metoprolol and Eliquis.  She underwent SCOTTIE cardioversion which was unsuccessful.      She then saw Dr. Shaffer with September.  He started on amiodarone and she underwent another attempt at cardioversion on 10/24, which was successful.      When she returned to see him on 11/27, she was back in atrial fibrillation.  At that time she had an echocardiogram showing that her ejection fraction remained 40%-45% with moderate MR.  Her heart rate at that time was 104.  Dr. Shaffer recommended a repeat attempt at cardioversion on the amiodarone.      She had that repeated at the end of November.  It was successfully in restoring sinus rhythm.      In clinic today, Ely tells me she has been feeling well.  She has had no evidence of palpitations or any recurrence of atrial fibrillation since her cardioversion.  Indeed, her EKG which confirmed sinus rhythm today.  She continues on the amiodarone and the Eliquis for a CHADS-VASc score of 3.      PHYSICAL EXAMINATION:   VITAL SIGNS:  Blood pressure is 116/70, heart rate 72.     GENERAL:  Well-appearing female in no acute distress.   HEENT:  Normocephalic, atraumatic.   NECK:  Supple.   LUNGS:  Clear.   HEART:  S1, S2, with a regular rate and rhythm with no murmurs, rubs, gallops.   ABDOMEN:  Soft.   EXTREMITIES:  Trace edema of the rate, no edema on the left.      IMPRESSION AND PLAN:  Ely is a  delightful 67-year-old female with a history of Graves' disease, breast cancer and atrial fibrillation which was found incidentally on a preop physical this spring.  She has undergone her third cardioversion.  This is her second cardioversion with being on amiodarone therapy and appears to have been successful.  She is in sinus rhythm.  She is feeling very well.        We will continue this medicine for another few months and hopefully recovery of her cardiomyopathy.  She will see Dr. Shaffer in 2 months with an echocardiogram.  At that point in time, he can decide if he wants to switch her to an antiarrhythmic with fewer side effects.        It was a pleasure participating in the care of Nya in clinic today.         SARAH SHEIKH, LELIA             D: 2018 09:46   T: 2018 10:07   MT: MANNY      Name:     NYA KOROMA   MRN:      [id number]        Account:      [id number]   :      1950           Service Date: 2018      Document: [id number]

## 2018-01-03 NOTE — PROGRESS NOTES
HPI and Plan:   See dictation    Orders Placed This Encounter   Procedures     TSH with free T4 reflex     Follow-Up with Cardiologist     Echocardiogram Complete       Orders Placed This Encounter   Medications     calcium 500 MG CHEW     Sig: Take 2 chew tab by mouth daily       Medications Discontinued During This Encounter   Medication Reason     CALCIUM 500 MG OR TABS Medication Reconciliation Clean Up     CALCIUM CARBONATE PO Medication Reconciliation Clean Up         Encounter Diagnoses   Name Primary?     Persistent atrial fibrillation (H)      Chronic atrial fibrillation (H)      Cardiomyopathy (H)      Obstructive sleep apnea syndrome      Left ventricular systolic dysfunction without heart failure        CURRENT MEDICATIONS:  Current Outpatient Prescriptions   Medication Sig Dispense Refill     calcium 500 MG CHEW Take 2 chew tab by mouth daily       amiodarone (PACERONE/CODARONE) 200 MG tablet Amiodarone 400 mg PO BID for 4 days, then decrease to 400 mg PO daily for 4 days, and then decrease to 200 mg PO daily. (Patient taking differently: 200 mg daily Amiodarone 400 mg PO BID for 4 days, then decrease to 400 mg PO daily for 4 days, and then decrease to 200 mg PO daily.) 60 tablet 3     ELIQUIS 5 MG tablet TAKE ONE TABLET BY MOUTH TWICE DAILY  60 tablet 5     order for DME Equipment ordered: RESMED CPAP Mask type: Nasal Settings: 8 cmH2O       liothyronine (CYTOMEL) 5 MCG tablet Take 1 tablet (5 mcg) by mouth daily 90 tablet 3     levothyroxine (SYNTHROID) 137 MCG tablet Take 1 tablet (137 mcg) by mouth daily 90 tablet 3     Probiotic Product (PROBIOTIC DAILY) CAPS Take 1 capsule by mouth daily       Cholecalciferol (VITAMIN D) 1000 UNIT capsule take 2 Caps by mouth daily.       VITAMINS/MINERALS OR TABS 1 TABLET DAILY         ALLERGIES     Allergies   Allergen Reactions     No Known Drug Allergies        PAST MEDICAL HISTORY:  Past Medical History:   Diagnosis Date     Benign essential tremor      Chronic     Hyperlipidemia LDL goal <160 2/10/2010     Invasive ductal carcinoma of breast (H)     left breast ca     Major depressive disorder, recurrent episode, in full remission (H) 10/19/2011    Stable for decades     osteopenia 2002     Palpitations      Persistent atrial fibrillation (H) 05/10/2017     Unspecified hypothyroidism        PAST SURGICAL HISTORY:  Past Surgical History:   Procedure Laterality Date     ANESTHESIA CARDIOVERSION N/A 2017    Procedure: ANESTHESIA CARDIOVERSION;  Cardioversion;  Surgeon: GENERIC ANESTHESIA PROVIDER;  Location: [facility] OR     C NONSPECIFIC PROCEDURE      Tubal Ligation    Abstracted 02     C THYROID ABLATION       COLONOSCOPY  10/03     COLONOSCOPY  2014     COLONOSCOPY  2014    Procedure: COLONOSCOPY;  Surgeon: Garrett Villaseñor MD;  Location: [facility] GI     HC ECHO HEART XTHORACIC, STRESS/REST  09    normal     HC EXCISION BREAST LESION, OPEN >=1      re-excision 09       FAMILY HISTORY:  Family History   Problem Relation Age of Onset     CANCER Mother      82 yo Breast & Melanoma     CEREBROVASCULAR DISEASE Mother 92     TIA     C.A.D. Mother      CEREBROVASCULAR DISEASE Father       87 yo Alzheimers, CHF     C.A.D. Father      possible MI in age 60's     CANCER Maternal Aunt       40's Breast     CEREBROVASCULAR DISEASE Paternal Grandmother       late 40's - early 50's     Blood Disease Maternal Aunt       53 yo Lupus     Cancer - colorectal No family hx of        SOCIAL HISTORY:  Social History     Social History     Marital status:      Spouse name: Judah     Number of children: 2     Years of education: 18     Occupational History      [employer]     health      Social History Main Topics     Smoking status: Never Smoker     Smokeless tobacco: Never Used     Alcohol use Yes      Comment: 1 or 2 glasses of wine most evenings     Drug use: No     Sexual activity: Not Currently      "Birth control/ protection: Surgical     Other Topics Concern     None     Social History Narrative       Review of Systems:  Skin:  Negative       Eyes:  Positive for glasses reading glasses  ENT:  Negative      Respiratory:  Positive for sleep apnea;CPAP     Cardiovascular:    lightheadedness;Positive for;fatigue;edema a little fatigue ,  decreased lightheadedness ,  right ankle edema   Gastroenterology: Negative      Genitourinary:  Negative      Musculoskeletal:  Negative      Neurologic:  Positive for numbness or tingling of feet;headaches neuropathy,  occas headaches   Psychiatric:  Negative      Heme/Lymph/Imm:  Negative      Endocrine:  Positive for thyroid disorder      Physical Exam:  Vitals: /70 (BP Location: Right arm, Patient Position: Sitting, Cuff Size: Adult Regular)  Pulse 72  Ht 1.575 m (5' 2\")  Wt 78.7 kg (173 lb 9.6 oz)  BMI 31.75 kg/m2    Constitutional:           Skin:             Head:           Eyes:           Lymph:      ENT:           Neck:           Respiratory:            Cardiac:                                                           GI:           Extremities and Muscular Skeletal:                 Neurological:           Psych:           CC  Shadia Munroe MD  [address] [place] [address]              "

## 2018-01-03 NOTE — MR AVS SNAPSHOT
After Visit Summary   1/3/2018    Ely Humphreys    MRN: [id number]           Patient Information     Date Of Birth          1950        Visit Information        Provider Department      1/3/2018 9:00 AM Yareli Mathew APRN CNP [facility]        Today's Diagnoses     Persistent atrial fibrillation (H)        Chronic atrial fibrillation (H)        Cardiomyopathy (H)        Obstructive sleep apnea syndrome        Left ventricular systolic dysfunction without heart failure           Follow-ups after your visit        Additional Services     Follow-Up with Cardiologist                 Your next 10 appointments already scheduled     Mar 14, 2018 11:00 AM CDT   Ech Complete with [address] ([facility])    [address]  [facility] [address] [phone]           1. Please bring or wear a comfortable two-piece outfit. 2. You may eat, drink and take your normal medicines. 3. For any questions that cannot be answered, please contact the ordering physician ***Please check-in at the [place] Registration Office located in [address] in the [facility] building. When you are finished registering, please go to [address] and have a seat. The technician will call your name for the test.            Mar 14, 2018  2:15 PM CDT   [facility] EP RETURN with Miguel Shaffer MD   [facility] ([facility])    [address]  [facility] [address] [phone]              Future tests that were ordered for you today     Open Future Orders        Priority Expected Expires Ordered    Echocardiogram Complete Routine 3/3/2018 1/3/2019 1/3/2018    TSH with free T4 reflex Routine 1/3/2018 1/3/2019 1/3/2018    Follow-Up with Cardiologist Routine 3/3/2018 1/3/2020 1/3/2018            Who to contact     If you have  "questions or need follow up information about today's clinic visit or your schedule please contact [facility] directly at [phone].  Normal or non-critical lab and imaging results will be communicated to you by MyChart, letter or phone within 4 business days after the clinic has received the results. If you do not hear from us within 7 days, please contact the clinic through [email or web address] or phone. If you have a critical or abnormal lab result, we will notify you by phone as soon as possible.  Submit refill requests through [email or web address] or call your pharmacy and they will forward the refill request to us. Please allow 3 business days for your refill to be completed.          Additional Information About Your Visit        [email or web address] Information     [email or web address] gives you secure access to your electronic health record. If you see a primary care provider, you can also send messages to your care team and make appointments. If you have questions, please call your primary care clinic.  If you do not have a primary care provider, please call [phone] and they will assist you.        Care EveryWhere ID     This is your Care EveryWhere ID. This could be used by other organizations to access your [facility] medical records  [id number]        Your Vitals Were     Pulse Height BMI (Body Mass Index)             72 1.575 m (5' 2\") 31.75 kg/m2          Blood Pressure from Last 3 Encounters:   01/03/18 116/70   11/28/17 128/82   11/27/17 120/80    Weight from Last 3 Encounters:   01/03/18 78.7 kg (173 lb 9.6 oz)   11/27/17 79.2 kg (174 lb 8 oz)   10/18/17 77.3 kg (170 lb 8 oz)              We Performed the Following     EKG 12-lead complete w/read - Clinics     Follow-Up with Cardiac Advanced Practice Provider          Today's Medication Changes          These changes are accurate as of: 1/3/18  9:31 AM.  If you have any questions, ask your nurse or doctor.               These medicines have changed " or have updated prescriptions.        Dose/Directions    amiodarone 200 MG tablet   Commonly known as:  PACERONE/CODARONE   This may have changed:    - how much to take  - when to take this  - additional instructions   Used for:  Chronic atrial fibrillation (H)        Amiodarone 400 mg PO BID for 4 days, then decrease to 400 mg PO daily for 4 days, and then decrease to 200 mg PO daily.   Quantity:  60 tablet   Refills:  3                Primary Care Provider Office Phone # Fax #    Shadia Munroe [phone] [address] [place] [address]        Equal Access to Services     [facility]: Hadii aad ku hadasho Soomaali, waaxda luqadaha, qaybta kaalmada adeegyada, waxana sandoval hayaan adeferdinand cortes . So [facility] [phone].    ATENCIÓN: Si habla español, tiene a saenz disposición servicios gratuitos de asistencia lingüística. [facility] [phone].    We comply with applicable federal civil rights laws and [place] laws. We do not discriminate on the basis of race, color, national origin, age, disability, sex, sexual orientation, or gender identity.            Thank you!     Thank you for choosing [facility]  for your care. Our goal is always to provide you with excellent care. Hearing back from our patients is one way we can continue to improve our services. Please take a few minutes to complete the written survey that you may receive in the mail after your visit with us. Thank you!             Your Updated Medication List - Protect others around you: Learn how to safely use, store and throw away your medicines at www.disposemymeds.org.          This list is accurate as of: 1/3/18  9:31 AM.  Always use your most recent med list.                   Brand Name Dispense Instructions for use Diagnosis    amiodarone 200 MG tablet    PACERONE/CODARONE    60 tablet    Amiodarone 400 mg PO BID for 4 days, then decrease to 400 mg PO daily  for 4 days, and then decrease to 200 mg PO daily.    Chronic atrial fibrillation (H)       calcium 500 MG Chew      Take 2 chew tab by mouth daily        ELIQUIS 5 MG tablet   Generic drug:  apixaban ANTICOAGULANT     60 tablet    TAKE ONE TABLET BY MOUTH TWICE DAILY    Chronic atrial fibrillation (H)       levothyroxine 137 MCG tablet    SYNTHROID    90 tablet    Take 1 tablet (137 mcg) by mouth daily    Acquired hypothyroidism       liothyronine 5 MCG tablet    CYTOMEL    90 tablet    Take 1 tablet (5 mcg) by mouth daily    Acquired hypothyroidism       order for DME      Equipment ordered: RESMED CPAP Mask type: Nasal Settings: 8 cmH2O        PROBIOTIC DAILY Caps      Take 1 capsule by mouth daily        vitamin  s/Minerals Tabs      1 TABLET DAILY        vitamin D 1000 UNITS capsule      take 2 Caps by mouth daily.

## 2018-01-06 ENCOUNTER — HEALTH MAINTENANCE LETTER (OUTPATIENT)
Age: 68
End: 2018-01-06

## 2018-01-09 ENCOUNTER — DOCUMENTATION ONLY (OUTPATIENT)
Dept: SLEEP MEDICINE | Facility: CLINIC | Age: 68
End: 2018-01-09

## 2018-01-09 NOTE — PROGRESS NOTES
6 month [facility] Recheck Visit     Data only recheck     Assessment: Pt meeting objective benchmarks.     Action plan:   pt to follow up per provider request (1-2 yrs)    Diagnostic AHI: 22.1     Device type: CPAP  PAP settings: CPAP fixed 8 cm  H20  Objective measures: 14 day rolling measures      Compliance  100 %      Leak  8.4 lpm  last  upload      AHI 3.15   last  upload      Average number of minutes 537      Objective measure goal  Compliance   Goal >70%  Leak   Goal < 24 lpm  AHI  Goal < 5  Usage  Goal >240

## 2018-01-31 ENCOUNTER — MYC MEDICAL ADVICE (OUTPATIENT)
Dept: PEDIATRICS | Facility: CLINIC | Age: 68
End: 2018-01-31

## 2018-01-31 DIAGNOSIS — I48.20 CHRONIC ATRIAL FIBRILLATION (H): ICD-10-CM

## 2018-01-31 NOTE — TELEPHONE ENCOUNTER
Requested Prescriptions   Pending Prescriptions Disp Refills     apixaban ANTICOAGULANT (ELIQUIS) 5 MG tablet 60 tablet 5     Sig: Take 1 tablet (5 mg) by mouth 2 times daily    There is no refill protocol information for this order        Routing to myself to pull in the protocol.  Juani Nevarez, RN  Message handled by Nurse Triage.

## 2018-01-31 NOTE — TELEPHONE ENCOUNTER
Normal ALT on file in past 12 months        Normal HGB on file in past 12 months        Normal AST on file in past 12 months        Normal Platelets on file in past 12 months        Recent or future visit with authorizing provider's specialty        Patient is age 18 or older        No active pregnancy on record        Normal serum creatinine on file in past 12 months        No positive pregnancy test in past 12 months   Medication is being filled for 1 time refill only due to:  Patient needs to be seen because it has been more than one year since last visit.  Patient is due for a physical.  Juani Nevarez RN  Message handled by Nurse Triage.

## 2018-03-11 ASSESSMENT — ACTIVITIES OF DAILY LIVING (ADL)
CURRENT_FUNCTION: NO ASSISTANCE NEEDED
I_NEED_ASSISTANCE_FOR_THE_FOLLOWING_DAILY_ACTIVITIES:: NO ASSISTANCE IS NEEDED

## 2018-03-13 ENCOUNTER — OFFICE VISIT (OUTPATIENT)
Dept: PEDIATRICS | Facility: CLINIC | Age: 68
End: 2018-03-13
Payer: COMMERCIAL

## 2018-03-13 VITALS
TEMPERATURE: 97.4 F | HEIGHT: 63 IN | BODY MASS INDEX: 30.28 KG/M2 | DIASTOLIC BLOOD PRESSURE: 72 MMHG | SYSTOLIC BLOOD PRESSURE: 110 MMHG | WEIGHT: 170.9 LBS | OXYGEN SATURATION: 99 % | HEART RATE: 70 BPM

## 2018-03-13 DIAGNOSIS — H61.23 BILATERAL IMPACTED CERUMEN: ICD-10-CM

## 2018-03-13 DIAGNOSIS — Z00.00 MEDICARE ANNUAL WELLNESS VISIT, INITIAL: Primary | ICD-10-CM

## 2018-03-13 DIAGNOSIS — Z86.79 HISTORY OF ATRIAL FIBRILLATION: ICD-10-CM

## 2018-03-13 DIAGNOSIS — N95.2 POSTMENOPAUSAL ATROPHIC VAGINITIS: ICD-10-CM

## 2018-03-13 DIAGNOSIS — R29.6 FALLS FREQUENTLY: ICD-10-CM

## 2018-03-13 DIAGNOSIS — E03.9 ACQUIRED HYPOTHYROIDISM: ICD-10-CM

## 2018-03-13 PROCEDURE — G0402 INITIAL PREVENTIVE EXAM: HCPCS | Performed by: INTERNAL MEDICINE

## 2018-03-13 RX ORDER — LIOTHYRONINE SODIUM 5 UG/1
5 TABLET ORAL DAILY
Qty: 90 TABLET | Refills: 1 | Status: SHIPPED | OUTPATIENT
Start: 2018-03-13 | End: 2018-12-06

## 2018-03-13 RX ORDER — LEVOTHYROXINE SODIUM 137 UG/1
137 TABLET ORAL DAILY
Qty: 90 TABLET | Refills: 1 | Status: SHIPPED | OUTPATIENT
Start: 2018-03-13 | End: 2018-12-06

## 2018-03-13 ASSESSMENT — PATIENT HEALTH QUESTIONNAIRE - PHQ9: 5. POOR APPETITE OR OVEREATING: NOT AT ALL

## 2018-03-13 ASSESSMENT — ANXIETY QUESTIONNAIRES
GAD7 TOTAL SCORE: 0
5. BEING SO RESTLESS THAT IT IS HARD TO SIT STILL: NOT AT ALL
2. NOT BEING ABLE TO STOP OR CONTROL WORRYING: NOT AT ALL
7. FEELING AFRAID AS IF SOMETHING AWFUL MIGHT HAPPEN: NOT AT ALL
3. WORRYING TOO MUCH ABOUT DIFFERENT THINGS: NOT AT ALL
IF YOU CHECKED OFF ANY PROBLEMS ON THIS QUESTIONNAIRE, HOW DIFFICULT HAVE THESE PROBLEMS MADE IT FOR YOU TO DO YOUR WORK, TAKE CARE OF THINGS AT HOME, OR GET ALONG WITH OTHER PEOPLE: NOT DIFFICULT AT ALL
6. BECOMING EASILY ANNOYED OR IRRITABLE: NOT AT ALL
1. FEELING NERVOUS, ANXIOUS, OR ON EDGE: NOT AT ALL

## 2018-03-13 ASSESSMENT — ACTIVITIES OF DAILY LIVING (ADL): CURRENT_FUNCTION: NO ASSISTANCE NEEDED

## 2018-03-13 NOTE — PROGRESS NOTES
SUBJECTIVE:   Ely Humphreys is a 67 year old female who presents for Preventive Visit.      Are you in the first 12 months of your Medicare coverage?  Yes,  Visual Acuity:  Right Eye: 20/32   Left Eye: 20/25  Both Eyes: 20/25    Physical   Annual:     Getting at least 3 servings of Calcium per day::  NO    Bi-annual eye exam::  Yes    Dental care twice a year::  Yes    Sleep apnea or symptoms of sleep apnea::  Sleep apnea    Diet::  Regular (no restrictions)    Frequency of exercise::  4-5 days/week    Duration of exercise::  30-45 minutes    Taking medications regularly::  Yes    Medication side effects::  Not applicable    Ability to successfully perform activities of daily living: no assistance needed  Home Safety:  Throw rugs in the hallway  Hearing Impairment: no hearing concerns        Fall risk:  Fallen 2 or more times in the past year?: Yes  Any fall with injury in the past year?: No  click delete button to remove this line now  COGNITIVE SCREEN  1) Repeat 3 items (Banana, Sunrise, Chair)    2) Clock draw: NORMAL  3) 3 item recall: Recalls 3 objects  Results: 3 items recalled: COGNITIVE IMPAIRMENT LESS LIKELY    Mini-CogTM Copyright S Leonor. Licensed by the author for use in [facility]; reprinted with permission (eros@[facility]). All rights reserved.        Reviewed and updated as needed this visit by clinical staff  Tobacco  Allergies  Meds  Med Hx  Surg Hx  Fam Hx  Soc Hx        Reviewed and updated as needed this visit by Provider        Social History   Substance Use Topics     Smoking status: Never Smoker     Smokeless tobacco: Never Used     Alcohol use Yes      Comment: 1 or 2 glasses of wine most evenings       No flowsheet data found.    Reports she has had 2 falls. Once when walking down stairs in a movie theater. Fell backwards. Didn't hit head or lose consciousness, had felt lightheaded just at time of fall. Continues on amiodarone since her afib. Had 3 cardioversions. Last one  was noted to be successful initially. Sees cardiology tomorrow.    Saw homeopath recently. Hasn't started any treatments yet.     Has been off medication for a long time now for her breast cancer. arimidex made her very achy.     Using CPAP nightly.    Hypothyroidism Follow-up      Since last visit, patient describes the following symptoms: Weight stable, no hair loss, no skin changes, no constipation, no loose stools      Today's PHQ-2 Score:   PHQ-2 ( 1999 Pfizer) 3/11/2018   Q1: Little interest or pleasure in doing things 0   Q2: Feeling down, depressed or hopeless 0   PHQ-2 Score 0   Q1: Little interest or pleasure in doing things Not at all   Q2: Feeling down, depressed or hopeless Not at all   PHQ-2 Score 0     Reports no symptoms of depression in several years.    Do you feel safe in your environment - Yes    Do you have a Health Care Directive?: Yes: Advance Directive has been received and scanned.    Current providers sharing in care for this patient include:   Patient Care Team:  Shadia Munroe MD as PCP - General    The following health maintenance items are reviewed in Epic and correct as of today:  Health Maintenance   Topic Date Due     FALL RISK ASSESSMENT  05/06/2017     PAP Q3 YR  11/03/2017     DEPRESSION ACTION PLAN Q1 YR  11/15/2017     PHQ-9 Q6 MONTHS  11/23/2017     MAMMO Q1 YR  06/12/2018     INFLUENZA VACCINE (SYSTEM ASSIGNED)  09/01/2018     PNEUMOCOCCAL (2 of 2 - PPSV23) 11/01/2018     TSH Q1 YEAR  01/03/2019     DEXA Q3 YR  01/13/2019     TETANUS Q10 YR  06/01/2019     ADVANCE DIRECTIVE PLANNING Q5 YRS  02/19/2022     LIPID MONITORING Q5 YEARS  06/28/2022     COLONOSCOPY Q10 YR  06/09/2024     HEPATITIS C SCREENING  Completed     Labs reviewed in EPIC  BP Readings from Last 3 Encounters:   03/14/18 124/76   03/13/18 110/72   01/03/18 116/70    Wt Readings from Last 3 Encounters:   03/14/18 173 lb 6.4 oz (78.7 kg)   03/13/18 170 lb 14.4 oz (77.5 kg)   01/03/18 173 lb 9.6 oz (78.7 kg)  "                   Review of Systems  Constitutional, HEENT, cardiovascular, pulmonary, GI, , musculoskeletal, neuro, skin, endocrine and psych systems are negative, except as otherwise noted.    OBJECTIVE:   /72 (Cuff Size: Adult Regular)  Pulse 70  Temp 97.4  F (36.3  C)  Ht 5' 2.6\" (1.59 m)  Wt 170 lb 14.4 oz (77.5 kg)  SpO2 99%  BMI 30.66 kg/m2 Estimated body mass index is 30.66 kg/(m^2) as calculated from the following:    Height as of this encounter: 5' 2.6\" (1.59 m).    Weight as of this encounter: 170 lb 14.4 oz (77.5 kg).  Physical Exam  GENERAL: healthy, alert and no distress  EYES: Eyes grossly normal to inspection, PERRL and conjunctivae and sclerae normal  HENT: ear canals and TM's normal, nose and mouth without ulcers or lesions  NECK: no adenopathy, no asymmetry, masses, or scars and thyroid normal to palpation  RESP: lungs clear to auscultation - no rales, rhonchi or wheezes  BREAST: normal without masses, tenderness or nipple discharge and no palpable axillary masses or adenopathy. L inferior breast with surgical scar.   CV: regular rate and rhythm, normal S1 S2, no S3 or S4, no murmur, click or rub, no peripheral edema and peripheral pulses strong  ABDOMEN: soft, nontender, no hepatosplenomegaly, no masses and bowel sounds normal  MS: no gross musculoskeletal defects noted, no edema  SKIN: no suspicious lesions or rashes  NEURO: Normal strength and tone, mentation intact and speech normal  PSYCH: mentation appears normal, affect normal/bright    ASSESSMENT / PLAN:   1. Medicare annual wellness visit, initial      2. Acquired hypothyroidism  Watch labs and refill.  - liothyronine (CYTOMEL) 5 MCG tablet; Take 1 tablet (5 mcg) by mouth daily  Dispense: 90 tablet; Refill: 1  - levothyroxine (SYNTHROID) 137 MCG tablet; Take 1 tablet (137 mcg) by mouth daily  Dispense: 90 tablet; Refill: 1    3. Bilateral impacted cerumen  Ear wash today with resolution of symptoms    4. Postmenopausal " "atrophic vaginitis      5. History of atrial fibrillation  EKG not done today but regular rhythm noted on exam. Has had several recent falls without LOC. Has felt occasional brief lightheadedness on her current medications. Since she sees cardiologist tomorrow and appears to be potentially still in nsr, may be able to stop her amiodarone.      End of Life Planning:  Patient currently has an advanced directive: Yes.  Practitioner is supportive of decision.    COUNSELING:  Reviewed preventive health counseling, as reflected in patient instructions        Estimated body mass index is 30.66 kg/(m^2) as calculated from the following:    Height as of this encounter: 5' 2.6\" (1.59 m).    Weight as of this encounter: 170 lb 14.4 oz (77.5 kg).  Weight management plan: Discussed healthy diet and exercise guidelines and patient will follow up in 6 months in clinic to re-evaluate.   reports that she has never smoked. She has never used smokeless tobacco.      Appropriate preventive services were discussed with this patient, including applicable screening as appropriate for cardiovascular disease, diabetes, osteopenia/osteoporosis, and glaucoma.  As appropriate for age/gender, discussed screening for colorectal cancer, prostate cancer, breast cancer, and cervical cancer. Checklist reviewing preventive services available has been given to the patient.    Reviewed patients plan of care and provided an AVS. The Intermediate Care Plan ( asthma action plan, low back pain action plan, and migraine action plan) for Ely meets the Care Plan requirement. This Care Plan has been established and reviewed with the Patient.    Counseling Resources:  ATP IV Guidelines  Pooled Cohorts Equation Calculator  Breast Cancer Risk Calculator  FRAX Risk Assessment  ICSI Preventive Guidelines  Dietary Guidelines for Americans, 2010  USDA's MyPlate  ASA Prophylaxis  Lung CA Screening    Shadia Simmons MD  [facility]  "

## 2018-03-13 NOTE — MR AVS SNAPSHOT
After Visit Summary   3/13/2018    Ely Humphreys    MRN: [id number]           Patient Information     Date Of Birth          1950        Visit Information        Provider Department      3/13/2018 8:10 AM Shadia Munroe MD [facility]        Today's Diagnoses     Medicare annual wellness visit, initial    -  1    Acquired hypothyroidism        Bilateral impacted cerumen        Postmenopausal atrophic vaginitis        History of atrial fibrillation          Care Instructions      Preventive Health Recommendations    Female Ages 65 +    Yearly exam:     See your health care provider every year in order to  o Review health changes.   o Discuss preventive care.    o Review your medicines if your doctor has prescribed any.      You no longer need a yearly Pap test unless you've had an abnormal Pap test in the past 10 years. If you have vaginal symptoms, such as bleeding or discharge, be sure to talk with your provider about a Pap test.      Every 1 to 2 years, have a mammogram.  If you are over 69, talk with your health care provider about whether or not you want to continue having screening mammograms.      Every 10 years, have a colonoscopy. Or, have a yearly FIT test (stool test). These exams will check for colon cancer.       Have a cholesterol test every 5 years, or more often if your doctor advises it.       Have a diabetes test (fasting glucose) every three years. If you are at risk for diabetes, you should have this test more often.       At age 65, have a bone density scan (DEXA) to check for osteoporosis (brittle bone disease).    Shots:    Get a flu shot each year.    Get a tetanus shot every 10 years.    Talk to your doctor about your pneumonia vaccines. There are now two you should receive - Pneumovax (PPSV 23) and Prevnar (PCV 13).    Talk to your doctor about the shingles vaccine.    Talk to your doctor about the hepatitis B vaccine.    Nutrition:     Eat at least 5  servings of fruits and vegetables each day.      Eat whole-grain bread, whole-wheat pasta and brown rice instead of white grains and rice.      Talk to your provider about Calcium and Vitamin D.     Lifestyle    Exercise at least 150 minutes a week (30 minutes a day, 5 days a week). This will help you control your weight and prevent disease.      Limit alcohol to one drink per day.      No smoking.       Wear sunscreen to prevent skin cancer.       See your dentist twice a year for an exam and cleaning.      See your eye doctor every 1 to 2 years to screen for conditions such as glaucoma, macular degeneration and cataracts.          Follow-ups after your visit        Your next 10 appointments already scheduled     Mar 14, 2018 11:00 AM CDT   Ech Complete with [address] ([facility])    [address]  [place] [address] [phone]           1. Please bring or wear a comfortable two-piece outfit. 2. You may eat, drink and take your normal medicines. 3. For any questions that cannot be answered, please contact the ordering physician ***Please check-in at the [facility] located in [address] in the [facility] building. When you are finished registering, please go to [address] and have a seat. The technician will call your name for the test.            Mar 14, 2018  2:15 PM CDT   [facility] EP RETURN with Miguel Shaffer MD   [facility] ([facility])    [address]  [facility] [id number] [phone]              Who to contact     If you have questions or need follow up information about today's clinic visit or your schedule please contact [facility] directly at [phone].  Normal or non-critical lab and imaging results will be communicated to you by MyChart, letter or phone within 4 business days after the clinic has  "received the results. If you do not hear from us within 7 days, please contact the clinic through [facility] or phone. If you have a critical or abnormal lab result, we will notify you by phone as soon as possible.  Submit refill requests through [employer] or call your pharmacy and they will forward the refill request to us. Please allow 3 business days for your refill to be completed.          Additional Information About Your Visit        [facility] Information     [facility] gives you secure access to your electronic health record. If you see a primary care provider, you can also send messages to your care team and make appointments. If you have questions, please call your primary care clinic.  If you do not have a primary care provider, please call [phone] and they will assist you.        Care EveryWhere ID     This is your Care EveryWhere ID. This could be used by other organizations to access your [facility] medical records  [id number]        Your Vitals Were     Pulse Temperature Height Pulse Oximetry BMI (Body Mass Index)       70 97.4  F (36.3  C) 5' 2.6\" (1.59 m) 99% 30.66 kg/m2        Blood Pressure from Last 3 Encounters:   03/13/18 110/72   01/03/18 116/70   11/28/17 128/82    Weight from Last 3 Encounters:   03/13/18 170 lb 14.4 oz (77.5 kg)   01/03/18 173 lb 9.6 oz (78.7 kg)   11/27/17 174 lb 8 oz (79.2 kg)              Today, you had the following     No orders found for display         Today's Medication Changes          These changes are accurate as of 3/13/18  9:25 AM.  If you have any questions, ask your nurse or doctor.               These medicines have changed or have updated prescriptions.        Dose/Directions    amiodarone 200 MG tablet   Commonly known as:  PACERONE/CODARONE   This may have changed:    - how much to take  - when to take this  - additional instructions   Used for:  Chronic atrial fibrillation (H)        Amiodarone 400 mg PO BID for 4 days, then decrease to 400 mg PO daily for 4 " days, and then decrease to 200 mg PO daily.   Quantity:  60 tablet   Refills:  3            Where to get your medicines      These medications were sent to [facility] [place], [place] - [address]  [address] [address]     Phone:  [phone]     levothyroxine 137 MCG tablet    liothyronine 5 MCG tablet                Primary Care Provider Office Phone # Fax #    Shadia Munroe [phone]       [facility] [place] [address]        Equal Access to Services     [facility]: Hadii aad ku hadasho Soomaali, waaxda luqadaha, qaybta kaalmada adeegyada, waxay idiin hayaan brett cortes . So [facility] [phone].    ATENCIÓN: Si habla español, tiene a saenz disposición servicios gratuitos de asistencia lingüística. [facility] [phone].    We comply with applicable federal civil rights laws and [place] laws. We do not discriminate on the basis of race, color, national origin, age, disability, sex, sexual orientation, or gender identity.            Thank you!     Thank you for choosing [facility]  for your care. Our goal is always to provide you with excellent care. Hearing back from our patients is one way we can continue to improve our services. Please take a few minutes to complete the written survey that you may receive in the mail after your visit with us. Thank you!             Your Updated Medication List - Protect others around you: Learn how to safely use, store and throw away your medicines at www.disposemymeds.org.          This list is accurate as of 3/13/18  9:25 AM.  Always use your most recent med list.                   Brand Name Dispense Instructions for use Diagnosis    amiodarone 200 MG tablet    PACERONE/CODARONE    60 tablet    Amiodarone 400 mg PO BID for 4 days, then decrease to 400 mg PO daily for 4 days, and then decrease to 200 mg PO daily.    Chronic atrial fibrillation (H)       apixaban ANTICOAGULANT 5 MG tablet     ELIQUIS    60 tablet    Take 1 tablet (5 mg) by mouth 2 times daily    Chronic atrial fibrillation (H)       calcium 500 MG Chew      Take 2 chew tab by mouth daily        levothyroxine 137 MCG tablet    SYNTHROID    90 tablet    Take 1 tablet (137 mcg) by mouth daily    Acquired hypothyroidism       liothyronine 5 MCG tablet    CYTOMEL    90 tablet    Take 1 tablet (5 mcg) by mouth daily    Acquired hypothyroidism       order for DME      Equipment ordered: RESMED CPAP Mask type: Nasal Settings: 8 cmH2O        PROBIOTIC DAILY Caps      Take 1 capsule by mouth daily        vitamin  s/Minerals Tabs      1 TABLET DAILY        vitamin D 1000 UNITS capsule      take 2 Caps by mouth daily.

## 2018-03-14 ENCOUNTER — HOSPITAL ENCOUNTER (OUTPATIENT)
Dept: CARDIOLOGY | Facility: CLINIC | Age: 68
Discharge: HOME | End: 2018-03-14
Attending: NURSE PRACTITIONER | Admitting: NURSE PRACTITIONER
Payer: MEDICARE

## 2018-03-14 ENCOUNTER — OFFICE VISIT (OUTPATIENT)
Dept: CARDIOLOGY | Facility: CLINIC | Age: 68
End: 2018-03-14
Attending: NURSE PRACTITIONER
Payer: COMMERCIAL

## 2018-03-14 ENCOUNTER — TELEPHONE (OUTPATIENT)
Dept: CARDIOLOGY | Facility: CLINIC | Age: 68
End: 2018-03-14

## 2018-03-14 VITALS
BODY MASS INDEX: 31.91 KG/M2 | HEIGHT: 62 IN | HEART RATE: 68 BPM | WEIGHT: 173.4 LBS | DIASTOLIC BLOOD PRESSURE: 76 MMHG | SYSTOLIC BLOOD PRESSURE: 124 MMHG

## 2018-03-14 DIAGNOSIS — I48.19 PERSISTENT ATRIAL FIBRILLATION (H): ICD-10-CM

## 2018-03-14 PROCEDURE — 99214 OFFICE O/P EST MOD 30 MIN: CPT | Mod: 25 | Performed by: INTERNAL MEDICINE

## 2018-03-14 PROCEDURE — 93306 TTE W/DOPPLER COMPLETE: CPT

## 2018-03-14 PROCEDURE — 93306 TTE W/DOPPLER COMPLETE: CPT | Mod: 26 | Performed by: INTERNAL MEDICINE

## 2018-03-14 RX ORDER — AMIODARONE HYDROCHLORIDE 200 MG/1
200 TABLET ORAL DAILY
COMMUNITY
End: 2018-03-14

## 2018-03-14 ASSESSMENT — ANXIETY QUESTIONNAIRES: GAD7 TOTAL SCORE: 0

## 2018-03-14 ASSESSMENT — PATIENT HEALTH QUESTIONNAIRE - PHQ9: SUM OF ALL RESPONSES TO PHQ QUESTIONS 1-9: 1

## 2018-03-14 NOTE — PROGRESS NOTES
"[id number]    Electrophysiology/ Clinic Note         H&P and Plan:           Miguel Shaffer MD    Physical Exam:  Vitals: /76 (BP Location: Right arm, Patient Position: Sitting, Cuff Size: Adult Regular)  Pulse 68  Ht 1.575 m (5' 2\")  Wt 78.7 kg (173 lb 6.4 oz)  Breastfeeding? No  BMI 31.72 kg/m2    Constitutional:  AAO x3.  Pt is in NAD.  HEAD: normocephalic.  SKIN: Skin normal color, texture and turgor with no lesions or eruptions.  Eyes: PERRL, EOMI.  ENT:  Supple, normal JVP. No lymphadenopathy or thyroid enlargement.  Chest:  CTAB.  Cardiac:    RRR, normal  S1 and S2.  No murmurs rubs or gallop.   Abdomen:  Normal BS.  Soft, non-tender and non-distended.  No rebound or guarding.    Extremities:  Pedious pulses palpable B/L.  No LE edema noticed.   Neurological: Strength and sensation grossly symmetric and intact throughout.       CURRENT MEDICATIONS:  Current Outpatient Prescriptions   Medication Sig Dispense Refill     amiodarone (PACERONE/CODARONE) 200 MG tablet Take 200 mg by mouth daily       liothyronine (CYTOMEL) 5 MCG tablet Take 1 tablet (5 mcg) by mouth daily 90 tablet 1     levothyroxine (SYNTHROID) 137 MCG tablet Take 1 tablet (137 mcg) by mouth daily 90 tablet 1     apixaban ANTICOAGULANT (ELIQUIS) 5 MG tablet Take 1 tablet (5 mg) by mouth 2 times daily 60 tablet 0     calcium 500 MG CHEW Take 2 chew tab by mouth daily       order for DME Equipment ordered: RESMED CPAP Mask type: Nasal Settings: 8 cmH2O       Probiotic Product (PROBIOTIC DAILY) CAPS Take 1 capsule by mouth daily       Cholecalciferol (VITAMIN D) 1000 UNIT capsule take 2 Caps by mouth daily.       VITAMINS/MINERALS OR TABS 1 TABLET DAILY         ALLERGIES     Allergies   Allergen Reactions     No Known Drug Allergies        PAST MEDICAL HISTORY:  Past Medical History:   Diagnosis Date     Benign essential tremor     Chronic     Hyperlipidemia LDL goal <160 2/10/2010     Invasive ductal carcinoma of breast (H) 6/09    left " breast ca     Major depressive disorder, recurrent episode, in full remission (H) 10/19/2011    Stable for decades     osteopenia 2002     Palpitations      Persistent atrial fibrillation (H) 05/10/2017     Unspecified hypothyroidism        PAST SURGICAL HISTORY:  Past Surgical History:   Procedure Laterality Date     ANESTHESIA CARDIOVERSION N/A 2017    Procedure: ANESTHESIA CARDIOVERSION;  Cardioversion;  Surgeon: GENERIC ANESTHESIA PROVIDER;  Location: [facility] OR     C NONSPECIFIC PROCEDURE      Tubal Ligation    Abstracted 02     C THYROID ABLATION       COLONOSCOPY  10/03     COLONOSCOPY  2014     COLONOSCOPY  2014    Procedure: COLONOSCOPY;  Surgeon: Garertt Villaseñor MD;  Location: [facility]     HC ECHO HEART XTHORACIC, STRESS/REST  09    normal     HC EXCISION BREAST LESION, OPEN >=1      re-excision 09       FAMILY HISTORY:  Family History   Problem Relation Age of Onset     CANCER Mother      82 yo Breast & Melanoma     CEREBROVASCULAR DISEASE Mother 92     TIA     C.A.D. Mother      CEREBROVASCULAR DISEASE Father       87 yo Alzheimers, CHF     C.A.D. Father      possible MI in age 60's     CANCER Maternal Aunt       40's Breast     CEREBROVASCULAR DISEASE Paternal Grandmother       late 40's - early 50's     Blood Disease Maternal Aunt       51 yo Lupus     Cancer - colorectal No family hx of        SOCIAL HISTORY:  Social History     Social History     Marital status:      Spouse name: Judah     Number of children: 2     Years of education: 18     Occupational History      [employer]     health      Social History Main Topics     Smoking status: Never Smoker     Smokeless tobacco: Never Used     Alcohol use Yes      Comment: 1 or 2 glasses of wine most evenings     Drug use: No     Sexual activity: Not Currently     Birth control/ protection: Surgical     Other Topics Concern     None     Social History Narrative        Review of Systems:  Skin:  Negative     Eyes:  Positive for glasses  ENT:  Negative    Respiratory:  Positive for sleep apnea;CPAP  Cardiovascular:    lightheadedness;fatigue;Positive for;edema  Gastroenterology: Negative    Genitourinary:  Negative    Musculoskeletal:  Negative    Neurologic:  Positive for headaches  Psychiatric:  Negative    Heme/Lymph/Imm:  Negative    Endocrine:  Positive for thyroid disorder      Recent Lab Results:  LIPID RESULTS:  Lab Results   Component Value Date    CHOL 284 (H) 2017    HDL 78 2017     (H) 2017    TRIG 100 2017    CHOLHDLRATIO 3.3 2015       LIVER ENZYME RESULTS:  Lab Results   Component Value Date    AST 17 2017    ALT 20 2017       CBC RESULTS:  Lab Results   Component Value Date    WBC 5.7 05/10/2017    RBC 3.98 05/10/2017    HGB 12.5 05/10/2017    HCT 38.5 05/10/2017    MCV 97 05/10/2017    MCH 31.4 05/10/2017    MCHC 32.5 05/10/2017    RDW 12.5 05/10/2017     05/10/2017       BMP RESULTS:  Lab Results   Component Value Date     05/10/2017    POTASSIUM 4.1 2017    CHLORIDE 106 05/10/2017    CO2 27 05/10/2017    ANIONGAP 8 05/10/2017    GLC 98 05/10/2017    BUN 19 05/10/2017    CR 0.80 05/10/2017    GFRESTIMATED 72 05/10/2017    GFRESTBLACK 87 05/10/2017    MARTITA 9.1 05/10/2017        A1C RESULTS:  Lab Results   Component Value Date    A1C 5.4 2015       INR RESULTS:  Lab Results   Component Value Date    INR 1.01 05/10/2017         ECHOCARDIOGRAM  Recent Results (from the past 8760 hour(s))   Echocardiogram Complete    Narrative    [id number]  ECH19  [id number]  [id number]^ROMEO^ESTEE^[facility]  Echocardiography Laboratory  [address] [place] [address]        Name: NYA KOROMA  MRN: [id number]  : 1950  Study Date: 2018 11:16 AM  Age: 67 yrs  Gender: Female  [place] [facility] [place]  Reason For Study: , Persistent atrial  fibrillation (H)  Ordering Physician: ESTEE WALTERS  Referring Physician: ESTEE WALTERS  Performed By: Virginia Acuna     BSA: 1.8 m2  Height: 63 in  Weight: 170 lb  HR: 62  BP: 130/85 mmHg  _____________________________________________________________________________  __        Procedure  Complete Echo Adult.  _____________________________________________________________________________  __        Interpretation Summary     There is borderline concentric left ventricular hypertrophy.  The visual ejection fraction is estimated at 49-53%.  There is borderline-mild global hypokinesia of the left ventricle.  There is borderline/mild global hypokinesis of the LV and may be more  prominant along distal septum and posteior walls  The ascending aorta is Mildly dilated.  Sinus rhythm was noted.  _____________________________________________________________________________  __        Left Ventricle  The left ventricle is normal in size. There is borderline concentric left  ventricular hypertrophy. The visual ejection fraction is estimated at 49-53%.  Grade I or early diastolic dysfunction. Diastolic Doppler findings (E/E' ratio  and/or other parameters) suggest left ventricular filling pressures are  normal. There is borderline-mild global hypokinesia of the left ventricle.  There is borderline/mild global hypokinesis of the LV and may be more  prominant along distal septum and posteior walls. There is no thrombus seen in  the left ventricle.     Right Ventricle  The right ventricle is normal in structure, function and size.     Atria  The left atrium is mildly dilated. Right atrial size is normal. There is no  color Doppler evidence of an atrial shunt.     Mitral Valve  There is trace to mild mitral regurgitation.        Tricuspid Valve  There is trace tricuspid regurgitation. Doppler findings do not suggest  pulmonary hypertension.     Aortic Valve  The aortic valve is trileaflet. There is trace to mild  aortic regurgitation.  No hemodynamically significant valvular aortic stenosis.     Pulmonic Valve  The pulmonic valve is not well seen, but is grossly normal.     Vessels  The ascending aorta is Mildly dilated. Sinus Valsalva 37mm, ascending aorta  38mm. The IVC is normal in size and reactivity with respiration, suggesting  normal central venous pressure.     Pericardium  The pericardium appears normal.        Rhythm  Sinus rhythm was noted.  _____________________________________________________________________________  __  MMode/2D Measurements & Calculations  RVDd: 3.4 cm  IVSd: 1.2 cm     LVIDd: 5.2 cm  LVIDs: 3.2 cm  LVPWd: 0.98 cm  FS: 38.0 %  LV mass(C)d: 226.6 grams  LV mass(C)dI: 125.5 grams/m2  Ao root diam: 3.7 cm  LA dimension: 4.0 cm  asc Aorta Diam: 3.8 cm  LA/Ao: 1.1  LA Volume (BP): 54.8 ml  LA Volume Index (BP): 30.4 ml/m2  RWT: 0.38           Doppler Measurements & Calculations  MV E max boom: 42.7 cm/sec  MV A max boom: 62.1 cm/sec  MV E/A: 0.69  MV dec time: 0.45 sec  LV V1 max P.3 mmHg  LV V1 max: 75.2 cm/sec  LV V1 VTI: 15.0 cm  TR max boom: 223.2 cm/sec  TR max P.9 mmHg  E/E' av.9  Lateral E/e': 7.4  Medial E/e': 10.3           _____________________________________________________________________________  __           Report approved by: Larry Ford 2018 01:40 PM      Echocardiogram    Narrative    [id number]  ECH19  [id number]  [id number]^MACHELLE^BESSY^[facility]  Echocardiography Laboratory  [address] [place] [address]        Name: NYA KOROMA  MRN: [id number]  : 1950  Study Date: 2017 01:04 PM  Age: 67 yrs  Gender: Female  Patient Location: [facility] [place]  Reason For Study: Chronic atrial fibrillation (H)  Ordering Physician: BESSY CARTY  Referring Physician: Shadia Munroe  Performed By: Anny Laura RDCS     BSA: 1.8 m2  Height: 62 in  Weight: 170 lb  HR: 84  BP: 118/72  mmHg  _____________________________________________________________________________  __        Procedure  Complete Echo Adult.  _____________________________________________________________________________  __        Interpretation Summary     The visual ejection fraction is estimated at 40-45%.  There is mild-moderate global hypokinesia of the left ventricle.  The right ventricular systolic function is borderline reduced.  The IVC is normal in size and reactivity with respiration, suggesting normal  central venous pressure.  The ascending aorta is Mildly dilated.  The left atrium is mildly dilated.  There is moderate (2+) mitral regurgitation.  The mitral regurgitant jet is eccentrically directed. SCOTTIE likely a more  accurate assessment of MR.  Suspect LA volume measurments undersestimate LA size.  Compared to prior study- no significant change.  The rhythm was atrial fibrillation.  _____________________________________________________________________________  __        Left Ventricle  The left ventricle is normal in size. There is normal left ventricular wall  thickness. The visual ejection fraction is estimated at 40-45%. E by E prime  ratio is between 8 and 15, which is indeterminate for assessment of left  ventricular filling pressures. Left ventricular diastolic function is  indeterminate. There is mild-moderate global hypokinesia of the left  ventricle.     Right Ventricle  The right ventricle is normal size. The right ventricular systolic function is  borderline reduced.     Atria  The left atrium is mildly dilated. The right atrium is mildly dilated.     Mitral Valve  There is moderate (2+) mitral regurgitation. The mitral regurgitant jet is  eccentrically directed.        Tricuspid Valve  The tricuspid valve is normal in structure and function. There is mild to  moderate (1-2+) tricuspid regurgitation. The right ventricular systolic  pressure is approximated at 19.0 mmHg plus the right atrial pressure.  Normal  IVC (1.5-2.5cm) with >50% respiratory collapse; right atrial pressure is  estimated at 5-10mmHg. Right ventricle systolic pressure estimate normal.     Aortic Valve  There is trivial trileaflet aortic sclerosis. There is trace aortic  regurgitation.     Pulmonic Valve  The pulmonic valve is not well seen, but is grossly normal. There is trace  pulmonic valvular regurgitation. Normal pulmonic valve velocity.     Vessels  The aortic root is normal size. The ascending aorta is Mildly dilated. The IVC  is normal in size and reactivity with respiration, suggesting normal central  venous pressure.     Pericardium  There is no pericardial effusion.        Rhythm  The rhythm was atrial fibrillation.  _____________________________________________________________________________  __  MMode/2D Measurements & Calculations  IVSd: 1.1 cm     LVIDd: 4.4 cm  LVIDs: 3.1 cm  LVPWd: 1.1 cm  IVC diam: 1.9 cm  FS: 30.5 %  EDV(Teich): 88.4 ml  ESV(Teich): 37.0 ml  LV mass(C)d: 176.5 grams  LV mass(C)dI: 98.9 grams/m2  Ao root diam: 3.6 cm  asc Aorta Diam: 4.1 cm  LA volume (AL/bp): 60.5 cm3     LA Volume Indexed (AL/bp): 33.9 ml/m2  RWT: 0.52        Doppler Measurements & Calculations  MV E max ti: 78.8 cm/sec  MV A max ti: 0.35 cm/sec  MV E/A: 225.1  MV dec slope: 629.2 cm/sec2  MV dec time: 0.13 sec  MR ERO: 0.27 cm2  TV max P.0 mmHg  TR max ti: 217.8 cm/sec  TR max P.0 mmHg  E/E' av.0  Lateral E/e': 8.7  Medial E/e': 7.2  Peak E' Ti: 10.1 cm/sec              _____________________________________________________________________________  __           Report approved by: Larry Sharma 2017 04:46 PM      Echocardiogram Limited    Narrative    [id number]  [facility]  [id number]  [id number]^YOMI^GELY^LACHELLE        [facility]  [facility]  Echocardiography Laboratory  [address] [place] [address] [address]  [place] [address]  Phone [phone]  Fax [phone]        Name:  NYA KOROMA  MRN: [id number]  : 1950  Study Date: 2017 08:45 AM  Age: 67 yrs  Gender: Female  Patient Location: [facility] [place]  Reason For Study: Unspecified atrial fibrillation, Cardiomyopathy, unspecified  Ordering Physician: GELY CELAYA  Referring Physician: GELY CELAYA  Performed By: Pilar Bragg RDCS     BSA: 1.7 m2  Height: 62 in  Weight: 161 lb  HR: 85  BP: 100/70 mmHg  _____________________________________________________________________________  __     Procedure  Limited Echo Adult. Complete Echo Adult.     _____________________________________________________________________________  __        Interpretation Summary     Left ventricular systolic function is mild to moderately reduced. The visual  ejection fraction is estimated at 40-45% (biplane 48% overestimates EF). There  is moderate global hypokinesia of the left ventricle. There is mild concentric  left ventricular hypertrophy.  The left atrium is moderate to severely dilated.  There is at least moderate (2+ to 2-3+) mitral regurgitation.  There is moderate to mod-severe (2-3+) tricuspid regurgitation.  Mild aortic root dilatation. Sinuses are 3.9 cm and ascending aorta is 4.1 cm.  Compared to prior study, there is no significant change. Limited views were  obtained. Visually, the LVEF and MR are similar to the prior echo.  _____________________________________________________________________________  __        Left Ventricle  The left ventricle is normal in size. There is mild concentric left  ventricular hypertrophy. Left ventricular systolic function is mild to  moderately reduced. The visual ejection fraction is estimated at 40-45%. E  prime velocity may be unreliable due to technical difficulties. There is  moderate global hypokinesia of the left ventricle.     Right Ventricle  The right ventricle is mildly dilated. The right ventricular systolic function  is mild to moderately reduced.     Atria  The left atrium is  moderate to severely dilated. The right atrium is  moderately dilated. There is no atrial shunt seen.     Mitral Valve  There is moderate (2+) mitral regurgitation. The mitral regurgitant jet is  eccentrically directed. The mitral regurgitant jet is posteriorly directed,  which is consistent with anterior leaflet pathology.     Tricuspid Valve  There is moderate to mod-severe (2-3+) tricuspid regurgitation. Right  ventricular systolic pressure is normal. Normal IVC (1.5-2.5cm) with <50%  respiratory collapse; right atrial pressure is estimated at 10-15mmHg.        Aortic Valve  No aortic regurgitation is present. No PW/CW Doppler done.     Pulmonic Valve  The pulmonic valve is not well visualized.     Vessels  Mild aortic root dilatation. Sinuses are 3.9 cm and ascending aorta is 4.1 cm.     Pericardium  There is pericardial thickening and/or a small pericardial effusion.     Rhythm  The rhythm was atrial fibrillation.     _____________________________________________________________________________  __  MMode/2D Measurements & Calculations  Ao root diam: 4.1 cm  asc Aorta Diam: 3.8 cm        Doppler Measurements & Calculations  TR max boom: 205.3 cm/sec  TR max P.9 mmHg              _____________________________________________________________________________  __           Report approved by: Larry Gunter 2017 05:30 PM      Echocardiogram Complete    Narrative    [id number]  ECH19  [id number]  [id number]^SASKIA^ANDREA^SPARKLE           [facility]  Echocardiography Laboratory  [address] [place] [address]        Name: NYA KOROMA  MRN: [id number]  : 1950  Study Date: 2017 11:03 AM  Age: 67 yrs  Gender: Female  Patient Location: [facility]  Reason For Study: , Chronic atrial fibrillation  History: Atrial Fibrillation  Ordering Physician: ANDREA KRUGER  Referring Physician: ANDREA KRUGER  Performed By: Rodrigo Moran MD     BSA: 1.7 m2  Height: 63  in  Weight: 155 lb  HR: 123  BP: 100/70 mmHg  _____________________________________________________________________________  __        Procedure  Complete Echo Adult.  _____________________________________________________________________________  __        Interpretation Summary     The rhythm was atrial fibrillation.     1. The left ventricle is borderline dilated with moderately reduced systolic  function. Biplane ejection fraction 40%.  2. There is moderate global hypokinesia of the left ventricle.  3. The right ventricle is normal in size and function. The right ventricular  systolic pressure is approximated at 16.6 mmHg plus the right atrial pressure.     4. The left atrium is moderate to severely dilated.  5. There is moderate (2+) mitral regurgitation. (2 jets, the predominat jet is  posteriorly directed). Effective regurgitant orifice area 0.3 cm2.  6. There is moderate (2+) tricuspid regurgitation.     Compared to the stress echocardiogram dated 08/26/2013 (when the patient was  in sinus rhythm), the left ventricular ejection fraction has decreased from  60% to 40%.  _____________________________________________________________________________  __        Left Ventricle  The left ventricle is borderline dilated. Left ventricular systolic function  is moderately reduced. The visual ejection fraction is estimated at 35-40%.  Left ventricular diastolic function is indeterminate. There is moderate global  hypokinesia of the left ventricle.     Right Ventricle  The right ventricle is normal in size and function.     Atria  The left atrium is moderate to severely dilated. The right atrium is  moderately dilated.     Mitral Valve  The mitral valve leaflets appear normal. There is no evidence of stenosis,  fluttering, or prolapse. There is moderate (2+) mitral regurgitation. (2 jets,  the predominat jet is posteriorly directed). Effective regurgitant orific area  0.3 cm2.        Tricuspid Valve  Normal tricuspid  valve. There is moderate (2+) tricuspid regurgitation. The  right ventricular systolic pressure is approximated at 16.6 mmHg plus the  right atrial pressure.     Aortic Valve  The aortic valve is trileaflet. There is trace aortic regurgitation. No  hemodynamically significant valvular aortic stenosis.     Pulmonic Valve  The pulmonic valve is not well seen, but is grossly normal. There is trace  pulmonic valvular regurgitation.     Vessels  Normal size ascending aorta. 3.8 cm. Dilation of the inferior vena cava is  present with normal respiratory variation in diameter.     Pericardium  There is no pericardial effusion.        Rhythm  The rhythm was atrial fibrillation.  _____________________________________________________________________________  __  MMode/2D Measurements & Calculations  IVSd: 1.0 cm     LVIDd: 5.1 cm  LVIDs: 4.4 cm  LVPWd: 0.77 cm  FS: 14.8 %  EDV(Teich): 124.8 ml  ESV(Teich): 85.9 ml  LV mass(C)d: 165.3 grams  LV mass(C)dI: 95.2 grams/m2  Ao root diam: 3.4 cm  LA dimension: 4.5 cm  asc Aorta Diam: 3.8 cm  LA/Ao: 1.3  LA Volume (BP): 86.0 ml  LA Volume Index (BP): 49.4 ml/m2           Doppler Measurements & Calculations  MR ERO: 0.35 cm2  MR volume: 45.3 ml  TR max boom: 203.5 cm/sec  TR max P.6 mmHg           _____________________________________________________________________________  __           Report approved by: Dr Juan Manuel Colon 2017 12:43 PM            No orders of the defined types were placed in this encounter.    Orders Placed This Encounter   Medications     amiodarone (PACERONE/CODARONE) 200 MG tablet     Sig: Take 200 mg by mouth daily     Medications Discontinued During This Encounter   Medication Reason     amiodarone (PACERONE/CODARONE) 200 MG tablet Medication Reconciliation Clean Up         Encounter Diagnosis   Name Primary?     Persistent atrial fibrillation (H)          YEN Mathew, APRN CNP  [address]  [place] [address]

## 2018-03-14 NOTE — TELEPHONE ENCOUNTER
----- Message from Sandra Ovalle RN sent at 3/14/2018  4:07 PM CDT -----      ----- Message -----     From: Miguel Shaffer MD     Sent: 3/14/2018   2:55 PM       To: [facility] Heart Ep Nurse    I reviewed echo images with Dr. Plaza.  Cardiac function is back to normal.  I recommend the following:    - Stop Amio  - F/u with PA in 1 week with ECG  - If ECG is normal, start flecainide     Please update pt on results/ plan.    Miguel

## 2018-03-14 NOTE — LETTER
3/14/2018      Shadia Simmons MD  [address] [place] [address]      RE: Ely Humphreys       Dear Colleague,    I had the pleasure of seeing Ely Humphreys in the [facility].    Service Date: 03/14/2018      REASON FOR VISIT:  Evaluation of persistent AFib.      HISTORY OF PRESENT ILLNESS:  Ms. Humphreys is a pleasant 67-year-old lady with a history of Graves' disease (14 years ago, status post radiotherapy) and breast cancer (left side lumpectomy 7 years ago) who is here for evaluation of persistent AFib.      The patient was initially found to have AFib as an incidental finding on a preop evaluation for elective knee surgery.  She was evaluated by my colleague, Dr. Don, and was found to have cardiomyopathy (EF 40%).  Stress echo done in 2013 was negative for ischemia.  She underwent cardioversion; however, she failed cardioversion.  Later she was evaluated by me in September and I reloaded her with amiodarone and she underwent successful cardioversion; however, she had recurrence of AFib.  I saw her in November and recommended a third attempt of cardioversion and at that time it was successful.      At the moment, she is doing well.  She informs that since she restored back in normal rhythm, she feels less short of breath with activities, although she complains of some days being more fatigued than usual.  She denies any other symptoms such as chest pain or shortness of breath.      EKG done on 01/03 revealed normal sinus rhythm with T-wave inversions in precordial leads.  Echocardiogram obtained today revealed EF around 49%-50% with borderline to mild global hypokinesis.      ASSESSMENT AND PLAN:   1.  Persistent AFib/with signs of tachycardia-mediated cardiomyopathy.      Echo showed improvement in cardiac function.  Based on the echo report, it seems to be back to normal levels.  I am going to review the echo images myself.  If cardiac function is back to  normal, we may consider stopping amiodarone for a week and bringing the patient back to repeat an EKG and possibly start flecainide.      2.  Embolic prevention.  CHADS-VASc score of 3.  Continue anticoagulation indefinitely.         MIGUEL CARTY MD             D: 2018   T: 2018   MT: GIOVANI      Name:     NYA KOROMA   MRN:      [id number]        Account:      [id number]   :      1950           Service Date: 2018      Document: [id number]         Outpatient Encounter Prescriptions as of 3/14/2018   Medication Sig Dispense Refill     [DISCONTINUED] amiodarone (PACERONE/CODARONE) 200 MG tablet Take 200 mg by mouth daily       liothyronine (CYTOMEL) 5 MCG tablet Take 1 tablet (5 mcg) by mouth daily 90 tablet 1     levothyroxine (SYNTHROID) 137 MCG tablet Take 1 tablet (137 mcg) by mouth daily 90 tablet 1     apixaban ANTICOAGULANT (ELIQUIS) 5 MG tablet Take 1 tablet (5 mg) by mouth 2 times daily 60 tablet 0     calcium 500 MG CHEW Take 2 chew tab by mouth daily       order for DME Equipment ordered: RESMED CPAP Mask type: Nasal Settings: 8 cmH2O       Probiotic Product (PROBIOTIC DAILY) CAPS Take 1 capsule by mouth daily       Cholecalciferol (VITAMIN D) 1000 UNIT capsule take 2 Caps by mouth daily.       VITAMINS/MINERALS OR TABS 1 TABLET DAILY       [DISCONTINUED] amiodarone (PACERONE/CODARONE) 200 MG tablet Amiodarone 400 mg PO BID for 4 days, then decrease to 400 mg PO daily for 4 days, and then decrease to 200 mg PO daily. (Patient taking differently: 200 mg daily Amiodarone 400 mg PO BID for 4 days, then decrease to 400 mg PO daily for 4 days, and then decrease to 200 mg PO daily.) 60 tablet 3     No facility-administered encounter medications on file as of 3/14/2018.        Again, thank you for allowing me to participate in the care of your patient.      Sincerely,    Miguel Carty MD     [facility]

## 2018-03-14 NOTE — PROGRESS NOTES
Service Date: 03/14/2018      REASON FOR VISIT:  Evaluation of persistent AFib.      HISTORY OF PRESENT ILLNESS:  Ms. Humphreys is a pleasant 67-year-old lady with a history of Graves' disease (14 years ago, status post radiotherapy) and breast cancer (left side lumpectomy 7 years ago), who is here for evaluation of persistent AFib.      The patient was initially found to have AFib as an incidental finding during preop evaluation for elective knee surgery.  She was evaluated by my colleague, Dr. Don, and was found to have cardiomyopathy (EF 40%).  Stress echo done in 2013 was negative for ischemia.  She underwent cardioversion; however, she failed cardioversion.  Later she was evaluated by me in September.  I reloaded her with amiodarone and she underwent successful cardioversion; however, she had recurrence of AFib.  I saw her in November and recommended a third attempt of cardioversion, which was successful.      At the moment, she is doing well.  She informs that since she restored back in normal rhythm, she feels less short of breath with activities, although she complains of fatigue.  She denies any other symptoms such as chest pain or shortness of breath.      EKG done on 01/03 revealed normal sinus rhythm with T-wave inversions in precordial leads.  Echocardiogram obtained today revealed EF around 49%-50% with borderline to mild global hypokinesis.      ASSESSMENT AND PLAN:   1.  Persistent AFib/with signs of tachycardia-mediated cardiomyopathy.      Echo showed improvement in cardiac function.  Based on the echo report, it seems to be back to normal levels.  I am going to review the echo images myself.  If cardiac function is back to normal, we may consider stopping amiodarone for a week and bringing patient back to repeat an EKG and possibly start flecainide.      2.  Embolic prevention.  CHADS-VASc score of 3.  Continue anticoagulation indefinitely.         BESSY CARTY MD             D: 03/14/2018    T: 2018   MT: GIOVANI      Name:     NYA KOROMA   MRN:      [id number]        Account:      [id number]   :      1950           Service Date: 2018      Document: [id number]

## 2018-03-14 NOTE — TELEPHONE ENCOUNTER
Phone call to patient to review recommendations from Dr. Shaffer. I told her that her heart function has returned to normal. Thus, she can stop amiodarone. He would like her to return in one week with an DOROTHEA visit and EKG. If EKG is normal then she is to start flecainide. She will come in on 3/23 at 1:10 to see Perri with an EKG. Patient was agreeable to this plan and had no questions for me.I removed amiodarone from her medication list. Jhoan

## 2018-03-14 NOTE — MR AVS SNAPSHOT
"              After Visit Summary   3/14/2018    Ely Humphreys    MRN: [id number]           Patient Information     Date Of Birth          1950        Visit Information        Provider Department      3/14/2018 2:15 PM Miguel Shaffer MD [facility]        Today's Diagnoses     Persistent atrial fibrillation (H)           Follow-ups after your visit        Who to contact     If you have questions or need follow up information about today's clinic visit or your schedule please contact [facility] directly at [phone].  Normal or non-critical lab and imaging results will be communicated to you by [email or web address], letter or phone within 4 business days after the clinic has received the results. If you do not hear from us within 7 days, please contact the clinic through [employer] or phone. If you have a critical or abnormal lab result, we will notify you by phone as soon as possible.  Submit refill requests through [employer] or call your pharmacy and they will forward the refill request to us. Please allow 3 business days for your refill to be completed.          Additional Information About Your Visit        MyChart Information     [employer] gives you secure access to your electronic health record. If you see a primary care provider, you can also send messages to your care team and make appointments. If you have questions, please call your primary care clinic.  If you do not have a primary care provider, please call [phone] and they will assist you.        Care EveryWhere ID     This is your Care EveryWhere ID. This could be used by other organizations to access your [facility] medical records  [id number]        Your Vitals Were     Pulse Height Breastfeeding? BMI (Body Mass Index)          68 1.575 m (5' 2\") No 31.72 kg/m2         Blood Pressure from Last 3 Encounters:   03/14/18 124/76   03/13/18 110/72   01/03/18 116/70    " Weight from Last 3 Encounters:   03/14/18 78.7 kg (173 lb 6.4 oz)   03/13/18 77.5 kg (170 lb 14.4 oz)   01/03/18 78.7 kg (173 lb 9.6 oz)              We Performed the Following     Follow-Up with Cardiologist        Primary Care Provider Office Phone # Fax #    Shadia Munroe [phone] [address] [place] [address]        Equal Access to Services     [facility] CenterMICHELLE : Hadii aad ku hadasho Soomaali, waaxda luqadaha, qaybta kaalmada adeegyada, waxay idiin hayaan adeeg kharash la'aan . [facility] [phone].    ATENCIÓN: Si habla español, tiene a saenz disposición servicios gratuitos de asistencia lingüística. [facility] [phone].    We comply with applicable federal civil rights laws and [place] laws. We do not discriminate on the basis of race, color, national origin, age, disability, sex, sexual orientation, or gender identity.            Thank you!     Thank you for choosing [facility]  for your care. Our goal is always to provide you with excellent care. Hearing back from our patients is one way we can continue to improve our services. Please take a few minutes to complete the written survey that you may receive in the mail after your visit with us. Thank you!             Your Updated Medication List - Protect others around you: Learn how to safely use, store and throw away your medicines at www.disposemymeds.org.          This list is accurate as of 3/14/18  2:43 PM.  Always use your most recent med list.                   Brand Name Dispense Instructions for use Diagnosis    amiodarone 200 MG tablet    PACERONE/CODARONE     Take 200 mg by mouth daily        apixaban ANTICOAGULANT 5 MG tablet    ELIQUIS    60 tablet    Take 1 tablet (5 mg) by mouth 2 times daily    Chronic atrial fibrillation (H)       calcium 500 MG Chew      Take 2 chew tab by mouth daily        levothyroxine 137 MCG tablet    SYNTHROID    90 tablet    Take 1  tablet (137 mcg) by mouth daily    Acquired hypothyroidism       liothyronine 5 MCG tablet    CYTOMEL    90 tablet    Take 1 tablet (5 mcg) by mouth daily    Acquired hypothyroidism       order for DME      Equipment ordered: RESMED CPAP Mask type: Nasal Settings: 8 cmH2O        PROBIOTIC DAILY Caps      Take 1 capsule by mouth daily        vitamin  s/Minerals Tabs      1 TABLET DAILY        vitamin D 1000 UNITS capsule      take 2 Caps by mouth daily.

## 2018-03-14 NOTE — LETTER
"3/14/2018    Shadia Simmons MD  [address] [place] [address]    RE: AnithaSPARKLE Humphreys       Dear Colleague,    I had the pleasure of seeing Ely VILLAGRAN Petr in the [facility].    [id number]    Electrophysiology/ Clinic Note         H&P and Plan:           Miguel Shaffer MD    Physical Exam:  Vitals: /76 (BP Location: Right arm, Patient Position: Sitting, Cuff Size: Adult Regular)  Pulse 68  Ht 1.575 m (5' 2\")  Wt 78.7 kg (173 lb 6.4 oz)  Breastfeeding? No  BMI 31.72 kg/m2    Constitutional:  AAO x3.  Pt is in NAD.  HEAD: normocephalic.  SKIN: Skin normal color, texture and turgor with no lesions or eruptions.  Eyes: PERRL, EOMI.  ENT:  Supple, normal JVP. No lymphadenopathy or thyroid enlargement.  Chest:  CTAB.  Cardiac:    RRR, normal  S1 and S2.  No murmurs rubs or gallop.   Abdomen:  Normal BS.  Soft, non-tender and non-distended.  No rebound or guarding.    Extremities:  Pedious pulses palpable B/L.  No LE edema noticed.   Neurological: Strength and sensation grossly symmetric and intact throughout.       CURRENT MEDICATIONS:  Current Outpatient Prescriptions   Medication Sig Dispense Refill     amiodarone (PACERONE/CODARONE) 200 MG tablet Take 200 mg by mouth daily       liothyronine (CYTOMEL) 5 MCG tablet Take 1 tablet (5 mcg) by mouth daily 90 tablet 1     levothyroxine (SYNTHROID) 137 MCG tablet Take 1 tablet (137 mcg) by mouth daily 90 tablet 1     apixaban ANTICOAGULANT (ELIQUIS) 5 MG tablet Take 1 tablet (5 mg) by mouth 2 times daily 60 tablet 0     calcium 500 MG CHEW Take 2 chew tab by mouth daily       order for DME Equipment ordered: RESMED CPAP Mask type: Nasal Settings: 8 cmH2O       Probiotic Product (PROBIOTIC DAILY) CAPS Take 1 capsule by mouth daily       Cholecalciferol (VITAMIN D) 1000 UNIT capsule take 2 Caps by mouth daily.       VITAMINS/MINERALS OR TABS 1 TABLET DAILY         ALLERGIES     Allergies   Allergen Reactions     No Known " Drug Allergies        PAST MEDICAL HISTORY:  Past Medical History:   Diagnosis Date     Benign essential tremor     Chronic     Hyperlipidemia LDL goal <160 2/10/2010     Invasive ductal carcinoma of breast (H)     left breast ca     Major depressive disorder, recurrent episode, in full remission (H) 10/19/2011    Stable for decades     osteopenia 2002     Palpitations      Persistent atrial fibrillation (H) 05/10/2017     Unspecified hypothyroidism        PAST SURGICAL HISTORY:  Past Surgical History:   Procedure Laterality Date     ANESTHESIA CARDIOVERSION N/A 2017    Procedure: ANESTHESIA CARDIOVERSION;  Cardioversion;  Surgeon: GENERIC ANESTHESIA PROVIDER;  Location: [facility] OR     C NONSPECIFIC PROCEDURE      Tubal Ligation    Abstracted 02     C THYROID ABLATION       COLONOSCOPY  10/03     COLONOSCOPY  2014     COLONOSCOPY  2014    Procedure: COLONOSCOPY;  Surgeon: Garrett Villaseñor MD;  Location: [facility] GI     HC ECHO HEART XTHORACIC, STRESS/REST  09    normal     HC EXCISION BREAST LESION, OPEN >=1      re-excision 09       FAMILY HISTORY:  Family History   Problem Relation Age of Onset     CANCER Mother      84 yo Breast & Melanoma     CEREBROVASCULAR DISEASE Mother 92     TIA     C.A.D. Mother      CEREBROVASCULAR DISEASE Father       87 yo Alzheimers, CHF     C.A.D. Father      possible MI in age 60's     CANCER Maternal Aunt       40's Breast     CEREBROVASCULAR DISEASE Paternal Grandmother       late 40's - early 50's     Blood Disease Maternal Aunt       51 yo Lupus     Cancer - colorectal No family hx of        SOCIAL HISTORY:  Social History     Social History     Marital status:      Spouse name: Judah     Number of children: 2     Years of education: 18     Occupational History      [employer]     health      Social History Main Topics     Smoking status: Never Smoker     Smokeless tobacco: Never Used     Alcohol  use Yes      Comment: 1 or 2 glasses of wine most evenings     Drug use: No     Sexual activity: Not Currently     Birth control/ protection: Surgical     Other Topics Concern     None     Social History Narrative       Review of Systems:  Skin:  Negative     Eyes:  Positive for glasses  ENT:  Negative    Respiratory:  Positive for sleep apnea;CPAP  Cardiovascular:    lightheadedness;fatigue;Positive for;edema  Gastroenterology: Negative    Genitourinary:  Negative    Musculoskeletal:  Negative    Neurologic:  Positive for headaches  Psychiatric:  Negative    Heme/Lymph/Imm:  Negative    Endocrine:  Positive for thyroid disorder      Recent Lab Results:  LIPID RESULTS:  Lab Results   Component Value Date    CHOL 284 (H) 06/28/2017    HDL 78 06/28/2017     (H) 06/28/2017    TRIG 100 06/28/2017    CHOLHDLRATIO 3.3 11/09/2015       LIVER ENZYME RESULTS:  Lab Results   Component Value Date    AST 17 06/28/2017    ALT 20 06/28/2017       CBC RESULTS:  Lab Results   Component Value Date    WBC 5.7 05/10/2017    RBC 3.98 05/10/2017    HGB 12.5 05/10/2017    HCT 38.5 05/10/2017    MCV 97 05/10/2017    MCH 31.4 05/10/2017    MCHC 32.5 05/10/2017    RDW 12.5 05/10/2017     05/10/2017       BMP RESULTS:  Lab Results   Component Value Date     05/10/2017    POTASSIUM 4.1 11/28/2017    CHLORIDE 106 05/10/2017    CO2 27 05/10/2017    ANIONGAP 8 05/10/2017    GLC 98 05/10/2017    BUN 19 05/10/2017    CR 0.80 05/10/2017    GFRESTIMATED 72 05/10/2017    GFRESTBLACK 87 05/10/2017    MARTITA 9.1 05/10/2017        A1C RESULTS:  Lab Results   Component Value Date    A1C 5.4 11/09/2015       INR RESULTS:  Lab Results   Component Value Date    INR 1.01 05/10/2017         ECHOCARDIOGRAM  Recent Results (from the past 8760 hour(s))   Echocardiogram Complete    Narrative    [id number]  [facility]  [id number]  [id number]^ROMEO^ESTEE^[facility]  Echocardiography Laboratory  [address]  [place] [address]        Name: NYA KOROMA  MRN: [id number]  : 1950  Study Date: 2018 11:16 AM  Age: 67 yrs  Gender: Female  [place] [facility] [place] For Study: , Persistent atrial fibrillation (H)  Ordering Physician: ESTEE WALTERS  Referring Physician: ESTEE WALTERS  Performed By: Virginia Acuna     BSA: 1.8 m2  Height: 63 in  Weight: 170 lb  HR: 62  BP: 130/85 mmHg  _____________________________________________________________________________  __        Procedure  Complete Echo Adult.  _____________________________________________________________________________  __        Interpretation Summary     There is borderline concentric left ventricular hypertrophy.  The visual ejection fraction is estimated at 49-53%.  There is borderline-mild global hypokinesia of the left ventricle.  There is borderline/mild global hypokinesis of the LV and may be more  prominant along distal septum and posteior walls  The ascending aorta is Mildly dilated.  Sinus rhythm was noted.  _____________________________________________________________________________  __        Left Ventricle  The left ventricle is normal in size. There is borderline concentric left  ventricular hypertrophy. The visual ejection fraction is estimated at 49-53%.  Grade I or early diastolic dysfunction. Diastolic Doppler findings (E/E' ratio  and/or other parameters) suggest left ventricular filling pressures are  normal. There is borderline-mild global hypokinesia of the left ventricle.  There is borderline/mild global hypokinesis of the LV and may be more  prominant along distal septum and posteior walls. There is no thrombus seen in  the left ventricle.     Right Ventricle  The right ventricle is normal in structure, function and size.     Atria  The left atrium is mildly dilated. Right atrial size is normal. There is no  color Doppler evidence of an atrial shunt.     Mitral Valve  There is trace to mild  mitral regurgitation.        Tricuspid Valve  There is trace tricuspid regurgitation. Doppler findings do not suggest  pulmonary hypertension.     Aortic Valve  The aortic valve is trileaflet. There is trace to mild aortic regurgitation.  No hemodynamically significant valvular aortic stenosis.     Pulmonic Valve  The pulmonic valve is not well seen, but is grossly normal.     Vessels  The ascending aorta is Mildly dilated. Sinus Valsalva 37mm, ascending aorta  38mm. The IVC is normal in size and reactivity with respiration, suggesting  normal central venous pressure.     Pericardium  The pericardium appears normal.        Rhythm  Sinus rhythm was noted.  _____________________________________________________________________________  __  MMode/2D Measurements & Calculations  RVDd: 3.4 cm  IVSd: 1.2 cm     LVIDd: 5.2 cm  LVIDs: 3.2 cm  LVPWd: 0.98 cm  FS: 38.0 %  LV mass(C)d: 226.6 grams  LV mass(C)dI: 125.5 grams/m2  Ao root diam: 3.7 cm  LA dimension: 4.0 cm  asc Aorta Diam: 3.8 cm  LA/Ao: 1.1  LA Volume (BP): 54.8 ml  LA Volume Index (BP): 30.4 ml/m2  RWT: 0.38           Doppler Measurements & Calculations  MV E max boom: 42.7 cm/sec  MV A max boom: 62.1 cm/sec  MV E/A: 0.69  MV dec time: 0.45 sec  LV V1 max P.3 mmHg  LV V1 max: 75.2 cm/sec  LV V1 VTI: 15.0 cm  TR max boom: 223.2 cm/sec  TR max P.9 mmHg  E/E' av.9  Lateral E/e': 7.4  Medial E/e': 10.3           _____________________________________________________________________________  __           Report approved by: Larry Ford 2018 01:40 PM      Echocardiogram    Narrative    [id number]  [facility]  [id number]  [id number]^MACHELLE^BESSY^[facility]  Echocardiography Laboratory  [address] [place] [address]        Name: NYA KOROMA  MRN: [id number]  : 1950  Study Date: 2017 01:04 PM  Age: 67 yrs  Gender: Female  [place] [facility] [place]  Reason For Study: Chronic atrial fibrillation  (H)  Ordering Physician: BESSY CARTY  Referring Physician: Shadia Munroe  Performed By: Anny Laura RDCS     BSA: 1.8 m2  Height: 62 in  Weight: 170 lb  HR: 84  BP: 118/72 mmHg  _____________________________________________________________________________  __        Procedure  Complete Echo Adult.  _____________________________________________________________________________  __        Interpretation Summary     The visual ejection fraction is estimated at 40-45%.  There is mild-moderate global hypokinesia of the left ventricle.  The right ventricular systolic function is borderline reduced.  The IVC is normal in size and reactivity with respiration, suggesting normal  central venous pressure.  The ascending aorta is Mildly dilated.  The left atrium is mildly dilated.  There is moderate (2+) mitral regurgitation.  The mitral regurgitant jet is eccentrically directed. SCOTTIE likely a more  accurate assessment of MR.  Suspect LA volume measurments undersestimate LA size.  Compared to prior study- no significant change.  The rhythm was atrial fibrillation.  _____________________________________________________________________________  __        Left Ventricle  The left ventricle is normal in size. There is normal left ventricular wall  thickness. The visual ejection fraction is estimated at 40-45%. E by E prime  ratio is between 8 and 15, which is indeterminate for assessment of left  ventricular filling pressures. Left ventricular diastolic function is  indeterminate. There is mild-moderate global hypokinesia of the left  ventricle.     Right Ventricle  The right ventricle is normal size. The right ventricular systolic function is  borderline reduced.     Atria  The left atrium is mildly dilated. The right atrium is mildly dilated.     Mitral Valve  There is moderate (2+) mitral regurgitation. The mitral regurgitant jet is  eccentrically directed.        Tricuspid Valve  The tricuspid valve is normal in  structure and function. There is mild to  moderate (1-2+) tricuspid regurgitation. The right ventricular systolic  pressure is approximated at 19.0 mmHg plus the right atrial pressure. Normal  IVC (1.5-2.5cm) with >50% respiratory collapse; right atrial pressure is  estimated at 5-10mmHg. Right ventricle systolic pressure estimate normal.     Aortic Valve  There is trivial trileaflet aortic sclerosis. There is trace aortic  regurgitation.     Pulmonic Valve  The pulmonic valve is not well seen, but is grossly normal. There is trace  pulmonic valvular regurgitation. Normal pulmonic valve velocity.     Vessels  The aortic root is normal size. The ascending aorta is Mildly dilated. The IVC  is normal in size and reactivity with respiration, suggesting normal central  venous pressure.     Pericardium  There is no pericardial effusion.        Rhythm  The rhythm was atrial fibrillation.  _____________________________________________________________________________  __  MMode/2D Measurements & Calculations  IVSd: 1.1 cm     LVIDd: 4.4 cm  LVIDs: 3.1 cm  LVPWd: 1.1 cm  IVC diam: 1.9 cm  FS: 30.5 %  EDV(Teich): 88.4 ml  ESV(Teich): 37.0 ml  LV mass(C)d: 176.5 grams  LV mass(C)dI: 98.9 grams/m2  Ao root diam: 3.6 cm  asc Aorta Diam: 4.1 cm  LA volume (AL/bp): 60.5 cm3     LA Volume Indexed (AL/bp): 33.9 ml/m2  RWT: 0.52        Doppler Measurements & Calculations  MV E max ti: 78.8 cm/sec  MV A max ti: 0.35 cm/sec  MV E/A: 225.1  MV dec slope: 629.2 cm/sec2  MV dec time: 0.13 sec  MR ERO: 0.27 cm2  TV max P.0 mmHg  TR max ti: 217.8 cm/sec  TR max P.0 mmHg  E/E' av.0  Lateral E/e': 8.7  Medial E/e': 7.2  Peak E' Ti: 10.1 cm/sec              _____________________________________________________________________________  __           Report approved by: Larry Sharma 2017 04:46 PM      Echocardiogram Limited    Narrative    [id number]  Critical access hospital  [id number]  [id number]^YOMI^MOTHILAL^LACHELLE Johnson  [facility]  Echocardiography Laboratory  [address]  [place] [address]  Phone [phone]  Fax [phone]        Name: NYA KOROMA  MRN: [id number]  : 1950  Study Date: 2017 08:45 AM  Age: 67 yrs  Gender: Female  [place] [facility] [place]  Reason For Study: Unspecified atrial fibrillation, Cardiomyopathy, unspecified  Ordering Physician: GELY CELAYA  Referring Physician: GELY CELAYA  Performed By: Pilar Bragg RDCS     BSA: 1.7 m2  Height: 62 in  Weight: 161 lb  HR: 85  BP: 100/70 mmHg  _____________________________________________________________________________  __     Procedure  Limited Echo Adult. Complete Echo Adult.     _____________________________________________________________________________  __        Interpretation Summary     Left ventricular systolic function is mild to moderately reduced. The visual  ejection fraction is estimated at 40-45% (biplane 48% overestimates EF). There  is moderate global hypokinesia of the left ventricle. There is mild concentric  left ventricular hypertrophy.  The left atrium is moderate to severely dilated.  There is at least moderate (2+ to 2-3+) mitral regurgitation.  There is moderate to mod-severe (2-3+) tricuspid regurgitation.  Mild aortic root dilatation. Sinuses are 3.9 cm and ascending aorta is 4.1 cm.  Compared to prior study, there is no significant change. Limited views were  obtained. Visually, the LVEF and MR are similar to the prior echo.  _____________________________________________________________________________  __        Left Ventricle  The left ventricle is normal in size. There is mild concentric left  ventricular hypertrophy. Left ventricular systolic function is mild to  moderately reduced. The visual ejection fraction is estimated at 40-45%. E  prime velocity may be unreliable due to technical difficulties. There is  moderate global hypokinesia  of the left ventricle.     Right Ventricle  The right ventricle is mildly dilated. The right ventricular systolic function  is mild to moderately reduced.     Atria  The left atrium is moderate to severely dilated. The right atrium is  moderately dilated. There is no atrial shunt seen.     Mitral Valve  There is moderate (2+) mitral regurgitation. The mitral regurgitant jet is  eccentrically directed. The mitral regurgitant jet is posteriorly directed,  which is consistent with anterior leaflet pathology.     Tricuspid Valve  There is moderate to mod-severe (2-3+) tricuspid regurgitation. Right  ventricular systolic pressure is normal. Normal IVC (1.5-2.5cm) with <50%  respiratory collapse; right atrial pressure is estimated at 10-15mmHg.        Aortic Valve  No aortic regurgitation is present. No PW/CW Doppler done.     Pulmonic Valve  The pulmonic valve is not well visualized.     Vessels  Mild aortic root dilatation. Sinuses are 3.9 cm and ascending aorta is 4.1 cm.     Pericardium  There is pericardial thickening and/or a small pericardial effusion.     Rhythm  The rhythm was atrial fibrillation.     _____________________________________________________________________________  __  MMode/2D Measurements & Calculations  Ao root diam: 4.1 cm  asc Aorta Diam: 3.8 cm        Doppler Measurements & Calculations  TR max boom: 205.3 cm/sec  TR max P.9 mmHg              _____________________________________________________________________________  __           Report approved by: Larry Gunter 2017 05:30 PM      Echocardiogram Complete    Narrative    [id number]  [id number]  [id number]  [id number]^SASKIA^ANDREA^SPARKLE           [facility]  Echocardiography Laboratory  [address] [place] [address]        Name: NYA KOROMA  MRN: [id number]  : 1950  Study Date: 2017 11:03 AM  Age: 67 yrs  Gender: Female  [place] [facility] [place]  Reason For Study: , Chronic atrial  fibrillation  History: Atrial Fibrillation  Ordering Physician: ANDREA KRUGER  Referring Physician: ANDREA KRUGER  Performed By: Rodrigo Moran MD     BSA: 1.7 m2  Height: 63 in  Weight: 155 lb  HR: 123  BP: 100/70 mmHg  _____________________________________________________________________________  __        Procedure  Complete Echo Adult.  _____________________________________________________________________________  __        Interpretation Summary     The rhythm was atrial fibrillation.     1. The left ventricle is borderline dilated with moderately reduced systolic  function. Biplane ejection fraction 40%.  2. There is moderate global hypokinesia of the left ventricle.  3. The right ventricle is normal in size and function. The right ventricular  systolic pressure is approximated at 16.6 mmHg plus the right atrial pressure.     4. The left atrium is moderate to severely dilated.  5. There is moderate (2+) mitral regurgitation. (2 jets, the predominat jet is  posteriorly directed). Effective regurgitant orifice area 0.3 cm2.  6. There is moderate (2+) tricuspid regurgitation.     Compared to the stress echocardiogram dated 08/26/2013 (when the patient was  in sinus rhythm), the left ventricular ejection fraction has decreased from  60% to 40%.  _____________________________________________________________________________  __        Left Ventricle  The left ventricle is borderline dilated. Left ventricular systolic function  is moderately reduced. The visual ejection fraction is estimated at 35-40%.  Left ventricular diastolic function is indeterminate. There is moderate global  hypokinesia of the left ventricle.     Right Ventricle  The right ventricle is normal in size and function.     Atria  The left atrium is moderate to severely dilated. The right atrium is  moderately dilated.     Mitral Valve  The mitral valve leaflets appear normal. There is no evidence of stenosis,  fluttering, or prolapse.  There is moderate (2+) mitral regurgitation. (2 jets,  the predominat jet is posteriorly directed). Effective regurgitant orific area  0.3 cm2.        Tricuspid Valve  Normal tricuspid valve. There is moderate (2+) tricuspid regurgitation. The  right ventricular systolic pressure is approximated at 16.6 mmHg plus the  right atrial pressure.     Aortic Valve  The aortic valve is trileaflet. There is trace aortic regurgitation. No  hemodynamically significant valvular aortic stenosis.     Pulmonic Valve  The pulmonic valve is not well seen, but is grossly normal. There is trace  pulmonic valvular regurgitation.     Vessels  Normal size ascending aorta. 3.8 cm. Dilation of the inferior vena cava is  present with normal respiratory variation in diameter.     Pericardium  There is no pericardial effusion.        Rhythm  The rhythm was atrial fibrillation.  _____________________________________________________________________________  __  MMode/2D Measurements & Calculations  IVSd: 1.0 cm     LVIDd: 5.1 cm  LVIDs: 4.4 cm  LVPWd: 0.77 cm  FS: 14.8 %  EDV(Teich): 124.8 ml  ESV(Teich): 85.9 ml  LV mass(C)d: 165.3 grams  LV mass(C)dI: 95.2 grams/m2  Ao root diam: 3.4 cm  LA dimension: 4.5 cm  asc Aorta Diam: 3.8 cm  LA/Ao: 1.3  LA Volume (BP): 86.0 ml  LA Volume Index (BP): 49.4 ml/m2           Doppler Measurements & Calculations  MR ERO: 0.35 cm2  MR volume: 45.3 ml  TR max boom: 203.5 cm/sec  TR max P.6 mmHg           _____________________________________________________________________________  __           Report approved by: Dr Juan Manuel Colon 2017 12:43 PM            No orders of the defined types were placed in this encounter.    Orders Placed This Encounter   Medications     amiodarone (PACERONE/CODARONE) 200 MG tablet     Sig: Take 200 mg by mouth daily     Medications Discontinued During This Encounter   Medication Reason     amiodarone (PACERONE/CODARONE) 200 MG tablet Medication Reconciliation Clean Up          Encounter Diagnosis   Name Primary?     Persistent atrial fibrillation (H)          CC  Yareli Mathew, APRN CNP  [address]  [place] [id number]                Thank you for allowing me to participate in the care of your patient.      Sincerely,     Miguel Shaffer MD     [facility]    cc:   Yareli Mathew, APRN CNP  [address]  [place] [id number]

## 2018-03-23 ENCOUNTER — TELEPHONE (OUTPATIENT)
Dept: CARDIOLOGY | Facility: CLINIC | Age: 68
End: 2018-03-23

## 2018-03-23 ENCOUNTER — OFFICE VISIT (OUTPATIENT)
Dept: CARDIOLOGY | Facility: CLINIC | Age: 68
End: 2018-03-23
Payer: COMMERCIAL

## 2018-03-23 VITALS
DIASTOLIC BLOOD PRESSURE: 64 MMHG | WEIGHT: 177 LBS | HEIGHT: 62 IN | SYSTOLIC BLOOD PRESSURE: 118 MMHG | BODY MASS INDEX: 32.57 KG/M2 | HEART RATE: 68 BPM

## 2018-03-23 DIAGNOSIS — I42.9 CARDIOMYOPATHY, UNSPECIFIED TYPE (H): ICD-10-CM

## 2018-03-23 DIAGNOSIS — I48.19 PERSISTENT ATRIAL FIBRILLATION (H): Primary | ICD-10-CM

## 2018-03-23 DIAGNOSIS — I48.91 ATRIAL FIBRILLATION, UNSPECIFIED TYPE (H): ICD-10-CM

## 2018-03-23 PROCEDURE — 99214 OFFICE O/P EST MOD 30 MIN: CPT | Mod: 25 | Performed by: NURSE PRACTITIONER

## 2018-03-23 PROCEDURE — 93000 ELECTROCARDIOGRAM COMPLETE: CPT | Performed by: INTERNAL MEDICINE

## 2018-03-23 RX ORDER — FLECAINIDE ACETATE 50 MG/1
50 TABLET ORAL 2 TIMES DAILY
Qty: 60 TABLET | Refills: 0 | Status: SHIPPED | OUTPATIENT
Start: 2018-03-23 | End: 2018-03-27

## 2018-03-23 RX ORDER — METOPROLOL SUCCINATE 25 MG/1
12.5 TABLET, EXTENDED RELEASE ORAL DAILY
Qty: 30 TABLET | Refills: 3 | Status: SHIPPED | OUTPATIENT
Start: 2018-03-23 | End: 2018-11-07

## 2018-03-23 NOTE — TELEPHONE ENCOUNTER
Received voicemail from [facility] asking if amiodarone will be discontinued when pt starts the new prescription for flecainide. Chart reviewed, amiodarone was discontinued on 3/14/2018. Called [facility] back at [phone] and let them know.

## 2018-03-23 NOTE — PROGRESS NOTES
HPI and Plan:   Ely Humphreys is a  67-year-old female who presents for atrial fibrillation.  She is a patient of Dr. Shaffer's and Dr Don.  Her past medical history includes Graves' disease (14 years ago, status post radiotherapy) and breast cancer (left side lumpectomy 7 years ago).    She was initially found to have AFib as an incidental finding on a preop evaluation for electve knee surgery and found to have cardiomyopathy (EF 40-45%).  Stress echo done in 2013 was negative for ischemia.  She underwent cardioversion (9/2017); however, she failed cardioversion. She was then placed on amiodarone (/2017) and she underwent successful DCCV, however, she had recurrence of AFib and a third DCCV was successful.  She saw Dr. Shaffer 3/14 with complaints of more fatigued than usual but no SOB/HERNÁNDEZ her repeat ECHO (3/14) revealed EF around 49%-53% with borderline to mild global hypokinesis, left atrium is mildly dilated, trace tricuspid regurgitation. AT this visit he stopped her amiodarone.          Today Ely Humphreys presents with her  Judah stating she continues to feel fatigue.  She does wear her CPAP, drinks 1-2 alcoholic beverages 3-4 times a week, exercises for 30 minutes 5 days a week no CV symptoms.  Denies chest pain or pressure, headaches, dizziness, syncope, angina, dyspnea at rest or with exertion, dry cough, palpitations, orthopnea, PND, abdominal pain, abdominal edema, pedal edema, or claudication.  Denies easy bruising or bleeding, hematuria, hematochezia, and epistaxis. Denies signs/symptoms of stroke such as visual disturbance, difficulty speaking, facial drooping, confusion, problems with gait, or any new numbness or weakness.    Presenting EKG: Sinus rhythm with ventricular rate of 63 bpm    ASSESSMENT AND PLAN    (I48.1) Persistent atrial fibrillation (H)  (primary encounter diagnosis)    Plan: flecainide (TAMBOCOR) 50 MG tablet,   EKG 12-lead complete w/read - Clinics (to be scheduled),    Follow-Up with Cardiac Advanced Practice Provider in 5 days with EKG.  If QRS has not widened will increase flecainide to 75 mg twice a day, once optimal doses found will perform a exercise treadmill test  metoprolol succinate (TOPROL-XL) 25 MG 24 hr tablet      (I42.9) Cardiomyopathy (H)  We will continue to monitor    Patient expresses understanding and agreement with the plan.     I appreciate the chance to help with Ely Humphreys Please let me know if you have any questions or concerns.    Perri Allen, APRN, CNP    This note was completed in part using Dragon voice recognition software. Although reviewed after completion, some word and grammatical errors may occur.    Orders Placed This Encounter   Procedures     Follow-Up with Cardiac Advanced Practice Provider     EKG 12-lead complete w/read - Clinics (to be scheduled)     Orders Placed This Encounter   Medications     flecainide (TAMBOCOR) 50 MG tablet     Sig: Take 1 tablet (50 mg) by mouth 2 times daily     Dispense:  60 tablet     Refill:  0     metoprolol succinate (TOPROL-XL) 25 MG 24 hr tablet     Sig: Take 0.5 tablets (12.5 mg) by mouth daily Take at night     Dispense:  30 tablet     Refill:  3     There are no discontinued medications.      Encounter Diagnoses   Name Primary?     Persistent atrial fibrillation (H) Yes     Atrial fibrillation, unspecified type (H)      Cardiomyopathy (H)        CURRENT MEDICATIONS:  Current Outpatient Prescriptions   Medication Sig Dispense Refill     flecainide (TAMBOCOR) 50 MG tablet Take 1 tablet (50 mg) by mouth 2 times daily 60 tablet 0     metoprolol succinate (TOPROL-XL) 25 MG 24 hr tablet Take 0.5 tablets (12.5 mg) by mouth daily Take at night 30 tablet 3     liothyronine (CYTOMEL) 5 MCG tablet Take 1 tablet (5 mcg) by mouth daily 90 tablet 1     levothyroxine (SYNTHROID) 137 MCG tablet Take 1 tablet (137 mcg) by mouth daily 90 tablet 1     apixaban ANTICOAGULANT (ELIQUIS) 5 MG tablet Take 1 tablet (5  mg) by mouth 2 times daily 60 tablet 0     calcium 500 MG CHEW Take 2 chew tab by mouth daily       order for DME Equipment ordered: RESMED CPAP Mask type: Nasal Settings: 8 cmH2O       Cholecalciferol (VITAMIN D) 1000 UNIT capsule take 2 Caps by mouth daily.       VITAMINS/MINERALS OR TABS 1 TABLET DAILY       Probiotic Product (PROBIOTIC DAILY) CAPS Take 1 capsule by mouth daily         ALLERGIES     Allergies   Allergen Reactions     No Known Drug Allergies        PAST MEDICAL HISTORY:  Past Medical History:   Diagnosis Date     Benign essential tremor     Chronic     Hyperlipidemia LDL goal <160 2/10/2010     Invasive ductal carcinoma of breast (H)     left breast ca     Major depressive disorder, recurrent episode, in full remission (H) 10/19/2011    Stable for decades     osteopenia 2002     Palpitations      Persistent atrial fibrillation (H) 05/10/2017     Unspecified hypothyroidism        PAST SURGICAL HISTORY:  Past Surgical History:   Procedure Laterality Date     ANESTHESIA CARDIOVERSION N/A 2017    Procedure: ANESTHESIA CARDIOVERSION;  Cardioversion;  Surgeon: GENERIC ANESTHESIA PROVIDER;  Location: [facility] OR     C NONSPECIFIC PROCEDURE      Tubal Ligation    Abstracted 02     C THYROID ABLATION       COLONOSCOPY  10/03     COLONOSCOPY  2014     COLONOSCOPY  2014    Procedure: COLONOSCOPY;  Surgeon: Garrett Villaseñor MD;  Location: [facility]     HC ECHO HEART XTHORACIC, STRESS/REST  09    normal     HC EXCISION BREAST LESION, OPEN >=1      re-excision 09       FAMILY HISTORY:  Family History   Problem Relation Age of Onset     CANCER Mother      82 yo Breast & Melanoma     CEREBROVASCULAR DISEASE Mother 92     TIA     C.A.D. Mother      CEREBROVASCULAR DISEASE Father       87 yo Alzheimers, CHF     C.A.D. Father      possible MI in age 60's     CANCER Maternal Aunt       40's Breast     CEREBROVASCULAR DISEASE Paternal Grandmother       late 40's -  "early 50's     Blood Disease Maternal Aunt       51 yo Lupus     Cancer - colorectal No family hx of        SOCIAL HISTORY:  Social History     Social History     Marital status:      Spouse name: Judah     Number of children: 2     Years of education: 18     Occupational History      [employer]     health      Social History Main Topics     Smoking status: Never Smoker     Smokeless tobacco: Never Used     Alcohol use Yes      Comment: 1 or 2 glasses of wine most evenings     Drug use: No     Sexual activity: Not Currently     Birth control/ protection: Surgical     Other Topics Concern     None     Social History Narrative       Review of Systems:  Skin:  Negative     Eyes:  Positive for glasses  ENT:  Negative    Respiratory:  Positive for sleep apnea;CPAP  Cardiovascular:    lightheadedness;fatigue;Positive for;edema  Gastroenterology: Negative    Genitourinary:  Negative    Musculoskeletal:  Negative    Neurologic:  Positive for headaches  Psychiatric:  Negative    Heme/Lymph/Imm:  Negative    Endocrine:  Positive for thyroid disorder    Physical Exam:  Vitals: /64  Pulse 68  Ht 1.575 m (5' 2\")  Wt 80.3 kg (177 lb)  BMI 32.37 kg/m2    Constitutional:  cooperative, alert and oriented, well developed, well nourished, in no acute distress        Skin:  warm and dry to the touch, no apparent skin lesions or masses noted        Head:  normocephalic, no masses or lesions        Eyes:  pupils equal and round, conjunctivae and lids unremarkable, sclera white, no xanthalasma, EOMS intact, no nystagmus        ENT:           Neck:  carotid pulses are full and equal bilaterally, JVP normal, no carotid bruit        Chest:  normal breath sounds, clear to auscultation, normal A-P diameter, normal symmetry, normal respiratory excursion, no use of accessory muscles        Cardiac: regular rhythm, normal S1/S2, no S3 or S4, apical impulse not displaced, no murmurs, gallops or rubs          "         Abdomen:  abdomen soft;BS normoactive obese      Vascular:       right radial artery;2+       right dorsalis pedis artery;2+     left radial artery;2+       left dorsalis pedis artery;2+          Extremities and Back:  no edema;normal muscle strength and tone        Neurological:  no gross motor deficits          Recent Lab Results:  LIPID RESULTS:  Lab Results   Component Value Date    CHOL 284 (H) 06/28/2017    HDL 78 06/28/2017     (H) 06/28/2017    TRIG 100 06/28/2017    CHOLHDLRATIO 3.3 11/09/2015       LIVER ENZYME RESULTS:  Lab Results   Component Value Date    AST 17 06/28/2017    ALT 20 06/28/2017       CBC RESULTS:  Lab Results   Component Value Date    WBC 5.7 05/10/2017    RBC 3.98 05/10/2017    HGB 12.5 05/10/2017    HCT 38.5 05/10/2017    MCV 97 05/10/2017    MCH 31.4 05/10/2017    MCHC 32.5 05/10/2017    RDW 12.5 05/10/2017     05/10/2017       BMP RESULTS:  Lab Results   Component Value Date     05/10/2017    POTASSIUM 4.1 11/28/2017    CHLORIDE 106 05/10/2017    CO2 27 05/10/2017    ANIONGAP 8 05/10/2017    GLC 98 05/10/2017    BUN 19 05/10/2017    CR 0.80 05/10/2017    GFRESTIMATED 72 05/10/2017    GFRESTBLACK 87 05/10/2017    MARTITA 9.1 05/10/2017        A1C RESULTS:  Lab Results   Component Value Date    A1C 5.4 11/09/2015       INR RESULTS:  Lab Results   Component Value Date    INR 1.01 05/10/2017           CC  No referring provider defined for this encounter.

## 2018-03-23 NOTE — LETTER
3/23/2018    Shadia Simmons MD  [address] [place] [address]    RE: Ely Humphreys       Dear Colleague,    I had the pleasure of seeing Ely Humphreys in the [facility].    HPI and Plan:   Ely Humphreys is a  67-year-old female who presents for atrial fibrillation.  She is a patient of Dr. Shaffer's and Dr Don.  Her past medical history includes Graves' disease (14 years ago, status post radiotherapy) and breast cancer (left side lumpectomy 7 years ago).    She was initially found to have AFib as an incidental finding on a preop evaluation for electve knee surgery and found to have cardiomyopathy (EF 40-45%).  Stress echo done in 2013 was negative for ischemia.  She underwent cardioversion (9/2017); however, she failed cardioversion. She was then placed on amiodarone (/2017) and she underwent successful DCCV, however, she had recurrence of AFib and a third DCCV was successful.  She saw Dr. Shaffer 3/14 with complaints of more fatigued than usual but no SOB/HERNÁNDEZ her repeat ECHO (3/14) revealed EF around 49%-53% with borderline to mild global hypokinesis, left atrium is mildly dilated, trace tricuspid regurgitation. AT this visit he stopped her amiodarone.          Today Ely Humphreys presents with her  Judah stating she continues to feel fatigue.  She does wear her CPAP, drinks 1-2 alcoholic beverages 3-4 times a week, exercises for 30 minutes 5 days a week no CV symptoms.  Denies chest pain or pressure, headaches, dizziness, syncope, angina, dyspnea at rest or with exertion, dry cough, palpitations, orthopnea, PND, abdominal pain, abdominal edema, pedal edema, or claudication.  Denies easy bruising or bleeding, hematuria, hematochezia, and epistaxis. Denies signs/symptoms of stroke such as visual disturbance, difficulty speaking, facial drooping, confusion, problems with gait, or any new numbness or weakness.    Presenting EKG: Sinus rhythm with ventricular  rate of 63 bpm    ASSESSMENT AND PLAN    (I48.1) Persistent atrial fibrillation (H)  (primary encounter diagnosis)    Plan: flecainide (TAMBOCOR) 50 MG tablet,   EKG 12-lead complete w/read - Clinics (to be scheduled),   Follow-Up with Cardiac Advanced Practice Provider in 5 days with EKG.  If QRS has not widened will increase flecainide to 75 mg twice a day, once optimal doses found will perform a exercise treadmill test  metoprolol succinate (TOPROL-XL) 25 MG 24 hr tablet      (I42.9) Cardiomyopathy (H)  We will continue to monitor    Patient expresses understanding and agreement with the plan.     I appreciate the chance to help with Ely Humphreys Please let me know if you have any questions or concerns.    SARAH De, CNP    This note was completed in part using Dragon voice recognition software. Although reviewed after completion, some word and grammatical errors may occur.    Orders Placed This Encounter   Procedures     Follow-Up with Cardiac Advanced Practice Provider     EKG 12-lead complete w/read - Clinics (to be scheduled)     Orders Placed This Encounter   Medications     flecainide (TAMBOCOR) 50 MG tablet     Sig: Take 1 tablet (50 mg) by mouth 2 times daily     Dispense:  60 tablet     Refill:  0     metoprolol succinate (TOPROL-XL) 25 MG 24 hr tablet     Sig: Take 0.5 tablets (12.5 mg) by mouth daily Take at night     Dispense:  30 tablet     Refill:  3     There are no discontinued medications.      Encounter Diagnoses   Name Primary?     Persistent atrial fibrillation (H) Yes     Atrial fibrillation, unspecified type (H)      Cardiomyopathy (H)        CURRENT MEDICATIONS:  Current Outpatient Prescriptions   Medication Sig Dispense Refill     flecainide (TAMBOCOR) 50 MG tablet Take 1 tablet (50 mg) by mouth 2 times daily 60 tablet 0     metoprolol succinate (TOPROL-XL) 25 MG 24 hr tablet Take 0.5 tablets (12.5 mg) by mouth daily Take at night 30 tablet 3     liothyronine (CYTOMEL) 5 MCG  tablet Take 1 tablet (5 mcg) by mouth daily 90 tablet 1     levothyroxine (SYNTHROID) 137 MCG tablet Take 1 tablet (137 mcg) by mouth daily 90 tablet 1     apixaban ANTICOAGULANT (ELIQUIS) 5 MG tablet Take 1 tablet (5 mg) by mouth 2 times daily 60 tablet 0     calcium 500 MG CHEW Take 2 chew tab by mouth daily       order for DME Equipment ordered: RESMED CPAP Mask type: Nasal Settings: 8 cmH2O       Cholecalciferol (VITAMIN D) 1000 UNIT capsule take 2 Caps by mouth daily.       VITAMINS/MINERALS OR TABS 1 TABLET DAILY       Probiotic Product (PROBIOTIC DAILY) CAPS Take 1 capsule by mouth daily         ALLERGIES     Allergies   Allergen Reactions     No Known Drug Allergies        PAST MEDICAL HISTORY:  Past Medical History:   Diagnosis Date     Benign essential tremor     Chronic     Hyperlipidemia LDL goal <160 2/10/2010     Invasive ductal carcinoma of breast (H) 6/09    left breast ca     Major depressive disorder, recurrent episode, in full remission (H) 10/19/2011    Stable for decades     osteopenia 2002     Palpitations      Persistent atrial fibrillation (H) 05/10/2017     Unspecified hypothyroidism        PAST SURGICAL HISTORY:  Past Surgical History:   Procedure Laterality Date     ANESTHESIA CARDIOVERSION N/A 11/28/2017    Procedure: ANESTHESIA CARDIOVERSION;  Cardioversion;  Surgeon: GENERIC ANESTHESIA PROVIDER;  Location: [facility]     C NONSPECIFIC PROCEDURE      Tubal Ligation    Abstracted 07/12/02     C THYROID ABLATION       COLONOSCOPY  10/03     COLONOSCOPY  5/9/2014     COLONOSCOPY  6/9/2014    Procedure: COLONOSCOPY;  Surgeon: Garrett Villaseñor MD;  Location: [facility]     HC ECHO HEART XTHORACIC, STRESS/REST  7/22/09    normal     HC EXCISION BREAST LESION, OPEN >=1  7/09    re-excision 8/17/09       FAMILY HISTORY:  Family History   Problem Relation Age of Onset     CANCER Mother      82 yo Breast & Melanoma     CEREBROVASCULAR DISEASE Mother 92     TIA     C.A.D. Mother      CEREBROVASCULAR  "DISEASE Father       85 yo Alzheimers, CHF     C.A.D. Father      possible MI in age 60's     CANCER Maternal Aunt       40's Breast     CEREBROVASCULAR DISEASE Paternal Grandmother       late 40's - early 50's     Blood Disease Maternal Aunt       51 yo Lupus     Cancer - colorectal No family hx of        SOCIAL HISTORY:  Social History     Social History     Marital status:      Spouse name: Judah     Number of children: 2     Years of education: 18     Occupational History      [employer]     health      Social History Main Topics     Smoking status: Never Smoker     Smokeless tobacco: Never Used     Alcohol use Yes      Comment: 1 or 2 glasses of wine most evenings     Drug use: No     Sexual activity: Not Currently     Birth control/ protection: Surgical     Other Topics Concern     None     Social History Narrative       Review of Systems:  Skin:  Negative     Eyes:  Positive for glasses  ENT:  Negative    Respiratory:  Positive for sleep apnea;CPAP  Cardiovascular:    lightheadedness;fatigue;Positive for;edema  Gastroenterology: Negative    Genitourinary:  Negative    Musculoskeletal:  Negative    Neurologic:  Positive for headaches  Psychiatric:  Negative    Heme/Lymph/Imm:  Negative    Endocrine:  Positive for thyroid disorder    Physical Exam:  Vitals: /64  Pulse 68  Ht 1.575 m (5' 2\")  Wt 80.3 kg (177 lb)  BMI 32.37 kg/m2    Constitutional:  cooperative, alert and oriented, well developed, well nourished, in no acute distress        Skin:  warm and dry to the touch, no apparent skin lesions or masses noted        Head:  normocephalic, no masses or lesions        Eyes:  pupils equal and round, conjunctivae and lids unremarkable, sclera white, no xanthalasma, EOMS intact, no nystagmus        ENT:           Neck:  carotid pulses are full and equal bilaterally, JVP normal, no carotid bruit        Chest:  normal breath sounds, clear to " auscultation, normal A-P diameter, normal symmetry, normal respiratory excursion, no use of accessory muscles        Cardiac: regular rhythm, normal S1/S2, no S3 or S4, apical impulse not displaced, no murmurs, gallops or rubs                  Abdomen:  abdomen soft;BS normoactive obese      Vascular:       right radial artery;2+       right dorsalis pedis artery;2+     left radial artery;2+       left dorsalis pedis artery;2+          Extremities and Back:  no edema;normal muscle strength and tone        Neurological:  no gross motor deficits          Recent Lab Results:  LIPID RESULTS:  Lab Results   Component Value Date    CHOL 284 (H) 06/28/2017    HDL 78 06/28/2017     (H) 06/28/2017    TRIG 100 06/28/2017    CHOLHDLRATIO 3.3 11/09/2015       LIVER ENZYME RESULTS:  Lab Results   Component Value Date    AST 17 06/28/2017    ALT 20 06/28/2017       CBC RESULTS:  Lab Results   Component Value Date    WBC 5.7 05/10/2017    RBC 3.98 05/10/2017    HGB 12.5 05/10/2017    HCT 38.5 05/10/2017    MCV 97 05/10/2017    MCH 31.4 05/10/2017    MCHC 32.5 05/10/2017    RDW 12.5 05/10/2017     05/10/2017       BMP RESULTS:  Lab Results   Component Value Date     05/10/2017    POTASSIUM 4.1 11/28/2017    CHLORIDE 106 05/10/2017    CO2 27 05/10/2017    ANIONGAP 8 05/10/2017    GLC 98 05/10/2017    BUN 19 05/10/2017    CR 0.80 05/10/2017    GFRESTIMATED 72 05/10/2017    GFRESTBLACK 87 05/10/2017    MARTITA 9.1 05/10/2017        A1C RESULTS:  Lab Results   Component Value Date    A1C 5.4 11/09/2015       INR RESULTS:  Lab Results   Component Value Date    INR 1.01 05/10/2017           CC  No referring provider defined for this encounter.                  Thank you for allowing me to participate in the care of your patient.      Sincerely,     SARAH Donahue [facility]

## 2018-03-23 NOTE — MR AVS SNAPSHOT
After Visit Summary   3/23/2018    Ely Humphreys    MRN: [id number]           Patient Information     Date Of Birth          1950        Visit Information        Provider Department      3/23/2018 1:10 PM Perri Allen APRN CNP [facility]   [place]        Today's Diagnoses     Persistent atrial fibrillation (H)    -  1    Atrial fibrillation, unspecified type (H)        Cardiomyopathy (H)          Care Instructions    Cardiology Provider you saw in clinic today: SARAH De CNP    Medication Changes:     Start taking flecainide 50 mg twice a day  Follow up in 3-5 days with EKG       Further Instructions:      You will receive all normal lab and testing results via MyChart or mail if not reviewed in clinic today. Please contact our office if you need assistance with setting up MyChart.    If you need a medication refill please contact your pharmacy. Please allow 3 business days for your refill to be completed.     As always, thank you for trusting us with your health care needs!    If you have any questions regarding your visit please contact your care team:   Cardiology  Telephone Number    Afib RNs  India Cardenas, and Naty [phone]     Call for EP procedure scheduling concerns  Flor Miller [phone]   Device Clinic (Pacemakers, ICDs, Loop)   RN's :      Marci Flores Eva, Lynda, MJ, Alma Arboleda During business hours:   [phone]                     Follow-ups after your visit        Additional Services     Follow-Up with Cardiac Advanced Practice Provider                 Future tests that were ordered for you today     Open Future Orders        Priority Expected Expires Ordered    EKG 12-lead complete w/read - Clinics (to be scheduled) Routine 3/30/2018 3/23/2019 3/23/2018    Follow-Up with Cardiac Advanced Practice Provider Routine 3/30/2018 3/23/2019 3/23/2018            Who to contact     If you have questions or need follow up  "information about today's clinic visit or your schedule please contact [facility] HE[facility] directly at [phone].  Normal or non-critical lab and imaging results will be communicated to you by [employer]erhart, letter or phone within 4 business days after the clinic has received the results. If you do not hear from us within 7 days, please contact the clinic through Q[employer]erhart or phone. If you have a critical or abnormal lab result, we will notify you by phone as soon as possible.  Submit refill requests through Chute or call your pharmacy and they will forward the refill request to us. Please allow 3 business days for your refill to be completed.          Additional Information About Your Visit        [employer] [facility] Information     Chute gives you secure access to your electronic health record. If you see a primary care provider, you can also send messages to your care team and make appointments. If you have questions, please call your primary care clinic.  If you do not have a primary care provider, please call [phone] and they will assist you.        Care EveryWhere ID     This is your Care EveryWhere ID. This could be used by other organizations to access your [place] medical records  [id number]        Your Vitals Were     Pulse Height BMI (Body Mass Index)             68 1.575 m (5' 2\") 32.37 kg/m2          Blood Pressure from Last 3 Encounters:   03/23/18 118/64   03/14/18 124/76   03/13/18 110/72    Weight from Last 3 Encounters:   03/23/18 80.3 kg (177 lb)   03/14/18 78.7 kg (173 lb 6.4 oz)   03/13/18 77.5 kg (170 lb 14.4 oz)              We Performed the Following     EKG 12-lead complete w/read - Clinics          Today's Medication Changes          These changes are accurate as of 3/23/18  1:56 PM.  If you have any questions, ask your nurse or doctor.               Start taking these medicines.        Dose/Directions    flecainide 50 MG tablet   Commonly known as:  TAMBOCOR   Used " for:  Persistent atrial fibrillation (H)   Started by:  Perri Allen APRN CNP        Dose:  50 mg   Take 1 tablet (50 mg) by mouth 2 times daily   Quantity:  60 tablet   Refills:  0            Where to get your medicines      These medications were sent to [facility] [place], [place] - [address]  [address], [place] [address]     Phone:  [phone]     flecainide 50 MG tablet                Primary Care Provider Office Phone # Fax #    Shadia Munroe [phone] [address] [place] [address]        Equal Access to Services     [facility]: Hadii aad ku hadasho Sorancho, waaxda luqadaha, qaybta kaalmada adeferdinandyada, dori cortes . So [facility] [phone].    ATENCIÓN: Si habla español, tiene a saenz disposición servicios gratuitos de asistencia lingüística. [facility] [phone].    We comply with applicable federal civil rights laws and [place] laws. We do not discriminate on the basis of race, color, national origin, age, disability, sex, sexual orientation, or gender identity.            Thank you!     Thank you for choosing [facility]  for your care. Our goal is always to provide you with excellent care. Hearing back from our patients is one way we can continue to improve our services. Please take a few minutes to complete the written survey that you may receive in the mail after your visit with us. Thank you!             Your Updated Medication List - Protect others around you: Learn how to safely use, store and throw away your medicines at www.disposemymeds.org.          This list is accurate as of 3/23/18  1:56 PM.  Always use your most recent med list.                   Brand Name Dispense Instructions for use Diagnosis    apixaban ANTICOAGULANT 5 MG tablet    ELIQUIS    60 tablet    Take 1 tablet (5 mg) by mouth 2 times daily    Chronic atrial fibrillation (H)       calcium  500 MG Chew      Take 2 chew tab by mouth daily        flecainide 50 MG tablet    TAMBOCOR    60 tablet    Take 1 tablet (50 mg) by mouth 2 times daily    Persistent atrial fibrillation (H)       levothyroxine 137 MCG tablet    SYNTHROID    90 tablet    Take 1 tablet (137 mcg) by mouth daily    Acquired hypothyroidism       liothyronine 5 MCG tablet    CYTOMEL    90 tablet    Take 1 tablet (5 mcg) by mouth daily    Acquired hypothyroidism       order for DME      Equipment ordered: RESMED CPAP Mask type: Nasal Settings: 8 cmH2O        PROBIOTIC DAILY Caps      Take 1 capsule by mouth daily        vitamin  s/Minerals Tabs      1 TABLET DAILY        vitamin D 1000 UNITS capsule      take 2 Caps by mouth daily.

## 2018-03-23 NOTE — PATIENT INSTRUCTIONS
Cardiology Provider you saw in clinic today: SARAH De CNP    Medication Changes:     Start taking flecainide 50 mg twice a day  Follow up in 3-5 days with EKG       Further Instructions:      You will receive all normal lab and testing results via [employer] or mail if not reviewed in clinic today. Please contact our office if you need assistance with setting up MyChart.    If you need a medication refill please contact your pharmacy. Please allow 3 business days for your refill to be completed.     As always, thank you for trusting us with your health care needs!    If you have any questions regarding your visit please contact your care team:   Cardiology  Telephone Number    Afib RNs  Ronald, India, and Naty [phone]     Call for EP procedure scheduling concerns  Flor Miller [phone]   Device Clinic (Pacemakers, ICDs, Loop)   RN's :      Marci Flores Eva, Lynda, MJ, Alma Arboleda During business hours:   [phone]             Flecainide Acetate Oral tablet  What is this medicine?  FLECAINIDE (FLEK a nide) is an antiarrhythmic drug. This medicine is used to prevent irregular heart rhythm. It can also slow down fast heartbeats called tachycardia.  This medicine may be used for other purposes; ask your health care provider or pharmacist if you have questions.  What should I tell my health care provider before I take this medicine?  They need to know if you have any of these conditions:    abnormal levels of potassium in the blood    heart disease including heart rhythm and heart rate problems    kidney or liver disease    recent heart attack    an unusual or allergic reaction to flecainide, local anesthetics, other medicines, foods, dyes, or preservatives    pregnant or trying to get pregnant    breast-feeding  How should I use this medicine?  Take this medicine by mouth with a glass of water. Follow the directions on the prescription label. You can take this medicine with or without food. Take  your doses at regular intervals. Do not take your medicine more often than directed. Do not stop taking this medicine suddenly. This may cause serious, heart-related side effects. If your doctor wants you to stop the medicine, the dose may be slowly lowered over time to avoid any side effects.  Talk to your pediatrician regarding the use of this medicine in children. While this drug may be prescribed for children as young as 1 year of age for selected conditions, precautions do apply.  Overdosage: If you think you have taken too much of this medicine contact a poison control center or emergency room at once.  NOTE: This medicine is only for you. Do not share this medicine with others.  What if I miss a dose?  If you miss a dose, take it as soon as you can. If it is almost time for your next dose, take only that dose. Do not take double or extra doses.  What may interact with this medicine?  Do not take this medicine with any of the following medications:    amoxapine    arsenic trioxide    certain antibiotics like clarithromycin, erythromycin, gatifloxacin, gemifloxacin, levofloxacin, moxifloxacin, sparfloxacin, or troleandomycin    certain antidepressants called tricyclic antidepressants like amitriptyline, imipramine, or nortriptyline    certain medicines to control heart rhythm like disopyramide, dofetilide, encainide, moricizine, procainamide, propafenone, and quinidine    cisapride    cyclobenzaprine    delavirdine    droperidol    haloperidol    hawthorn    imatinib    levomethadyl    maprotiline    medicines for malaria like chloroquine and halofantrine    pentamidine    phenothiazines like chlorpromazine, mesoridazine, prochlorperazine, thioridazine    pimozide    quinine    ranolazine    ritonavir    sertindole    ziprasidone  This medicine may also interact with the following medications:    cimetidine    medicines for angina or high blood pressure    medicines to control heart rhythm like amiodarone and  digoxin  This list may not describe all possible interactions. Give your health care provider a list of all the medicines, herbs, non-prescription drugs, or dietary supplements you use. Also tell them if you smoke, drink alcohol, or use illegal drugs. Some items may interact with your medicine.  What should I watch for while using this medicine?  Visit your doctor or health care professional for regular checks on your progress. Because your condition and the use of this medicine carries some risk, it is a good idea to carry an identification card, necklace or bracelet with details of your condition, medications and doctor or health care professional.  Check your blood pressure and pulse rate regularly. Ask your health care professional what your blood pressure and pulse rate should be, and when you should contact him or her. Your doctor or health care professional also may schedule regular blood tests and electrocardiograms to check your progress.  You may get drowsy or dizzy. Do not drive, use machinery, or do anything that needs mental alertness until you know how this medicine affects you. Do not stand or sit up quickly, especially if you are an older patient. This reduces the risk of dizzy or fainting spells. Alcohol can make you more dizzy, increase flushing and rapid heartbeats. Avoid alcoholic drinks.  What side effects may I notice from receiving this medicine?  Side effects that you should report to your doctor or health care professional as soon as possible:    chest pain, continued irregular heartbeats    difficulty breathing    swelling of the legs or feet    trembling, shaking    unusually weak or tired  Side effects that usually do not require medical attention (report to your doctor or health care professional if they continue or are bothersome):    blurred vision    constipation    headache    nausea, vomiting    stomach pain  This list may not describe all possible side effects. Call your doctor for  medical advice about side effects. You may report side effects to FDA at 2-380-XCP-5242.  Where should I keep my medicine?  Keep out of the reach of children.  Store at room temperature between 15 and 30 degrees C (59 and 86 degrees F). Protect from light. Keep container tightly closed. Throw away any unused medicine after the expiration date.  NOTE:This sheet is a summary. It may not cover all possible information. If you have questions about this medicine, talk to your doctor, pharmacist, or health care provider. Copyright  2016 [employer] Standard

## 2018-03-27 ENCOUNTER — OFFICE VISIT (OUTPATIENT)
Dept: CARDIOLOGY | Facility: CLINIC | Age: 68
End: 2018-03-27
Attending: NURSE PRACTITIONER
Payer: COMMERCIAL

## 2018-03-27 VITALS
BODY MASS INDEX: 30.79 KG/M2 | DIASTOLIC BLOOD PRESSURE: 60 MMHG | HEART RATE: 60 BPM | SYSTOLIC BLOOD PRESSURE: 110 MMHG | HEIGHT: 63 IN | WEIGHT: 173.8 LBS

## 2018-03-27 DIAGNOSIS — I48.19 PERSISTENT ATRIAL FIBRILLATION (H): ICD-10-CM

## 2018-03-27 DIAGNOSIS — I48.91 ATRIAL FIBRILLATION, UNSPECIFIED TYPE (H): ICD-10-CM

## 2018-03-27 PROCEDURE — 99214 OFFICE O/P EST MOD 30 MIN: CPT | Performed by: NURSE PRACTITIONER

## 2018-03-27 PROCEDURE — 93000 ELECTROCARDIOGRAM COMPLETE: CPT | Performed by: NURSE PRACTITIONER

## 2018-03-27 RX ORDER — FLECAINIDE ACETATE 50 MG/1
100 TABLET ORAL 2 TIMES DAILY
Qty: 60 TABLET | Refills: 0
Start: 2018-03-27 | End: 2018-04-06

## 2018-03-27 NOTE — MR AVS SNAPSHOT
After Visit Summary   3/27/2018    Ely Humphreys    MRN: [id number]           Patient Information     Date Of Birth          1950        Visit Information        Provider Department      3/27/2018 3:10 PM Perri Allen APRN [facility]        Today's Diagnoses     Persistent atrial fibrillation (H)        Atrial fibrillation, unspecified type (H)          Care Instructions    Please increase your Flecainide to 100 mg twice a day.    (2 tablets)     Continue metoprolol XL 12.5 mg daily  (1/2 tablet)   Follow up in 1 week with Tori Fisher or Gabi Gates next week.      Call if you have questions.      You will receive all normal lab and testing results via [employer] or mail if not reviewed in clinic today. Please contact our office if you need assistance with setting up [employer]hart.    If you need a medication refill please contact your pharmacy. Please allow 3 business days for your refill to be completed.     As always, thank you for trusting us with your health care needs!    If you have any questions regarding your visit please contact your care team:   Cardiology  Telephone Number    Afib RNs  India Cardenas, and Naty [phone]     Call for Electrophysiology procedure scheduling concerns  Flor Miller [phone]   Device Clinic (Pacemakers, ICDs, Loop)   RN's :      Marci Flores Eva, Lynda, MJ, Alma Arboleda During business hours:   [phone]                     Follow-ups after your visit        Additional Services     Follow-Up with Cardiac Advanced Practice Provider                 Future tests that were ordered for you today     Open Future Orders        Priority Expected Expires Ordered    EKG 12-lead complete w/read - Clinics (to be scheduled) Routine 4/3/2018 3/27/2019 3/27/2018    Follow-Up with Cardiac Advanced Practice Provider Routine 4/3/2018 3/27/2019 3/27/2018            Who to contact     If you have questions or need follow  "up information about today's clinic visit or your schedule please contact [facility] directly at [phone].  Normal or non-critical lab and imaging results will be communicated to you by MyChart, letter or phone within 4 business days after the clinic has received the results. If you do not hear from us within 7 days, please contact the clinic through Zibbyhart or phone. If you have a critical or abnormal lab result, we will notify you by phone as soon as possible.  Submit refill requests through [employer] or call your pharmacy and they will forward the refill request to us. Please allow 3 business days for your refill to be completed.          Additional Information About Your Visit        [employer] Information     [employer] gives you secure access to your electronic health record. If you see a primary care provider, you can also send messages to your care team and make appointments. If you have questions, please call your primary care clinic.  If you do not have a primary care provider, please call [phone] and they will assist you.        Care EveryWhere ID     This is your Care EveryWhere ID. This could be used by other organizations to access your [place] medical records  [id number]        Your Vitals Were     Pulse Height BMI (Body Mass Index)             60 1.6 m (5' 3\") 30.79 kg/m2          Blood Pressure from Last 3 Encounters:   03/27/18 110/60   03/23/18 118/64   03/14/18 124/76    Weight from Last 3 Encounters:   03/27/18 78.8 kg (173 lb 12.8 oz)   03/23/18 80.3 kg (177 lb)   03/14/18 78.7 kg (173 lb 6.4 oz)              We Performed the Following     EKG 12-lead complete w/read - Clinics (performed today)     Follow-Up with Cardiac Advanced Practice Provider          Today's Medication Changes          These changes are accurate as of 3/27/18  3:38 PM.  If you have any questions, ask your nurse or doctor.               These medicines have changed or have updated " prescriptions.        Dose/Directions    flecainide 50 MG tablet   Commonly known as:  TAMBOCOR   This may have changed:  how much to take   Used for:  Persistent atrial fibrillation (H)   Changed by:  Perri Allen APRN CNP        Dose:  100 mg   Take 2 tablets (100 mg) by mouth 2 times daily   Quantity:  60 tablet   Refills:  0            Where to get your medicines      Some of these will need a paper prescription and others can be bought over the counter.  Ask your nurse if you have questions.     You don't need a prescription for these medications     flecainide 50 MG tablet                Primary Care Provider Office Phone # Fax #    Shadia Munroe [phone] [address] [place] [address]        Equal Access to Services     [facility] : Hadii aad ku hadasho Soomaali, waaxda luqadaha, qaybta kaalmada adeegyada, waxay idiin haypricila cortes . So [facility] [phone].    ATENCIÓN: Si habla español, tiene a saenz disposición servicios gratuitos de asistencia lingüística. [facility] [phone].    We comply with applicable federal civil rights laws and [place] laws. We do not discriminate on the basis of race, color, national origin, age, disability, sex, sexual orientation, or gender identity.            Thank you!     Thank you for choosing [facility]  for your care. Our goal is always to provide you with excellent care. Hearing back from our patients is one way we can continue to improve our services. Please take a few minutes to complete the written survey that you may receive in the mail after your visit with us. Thank you!             Your Updated Medication List - Protect others around you: Learn how to safely use, store and throw away your medicines at www.disposemymeds.org.          This list is accurate as of 3/27/18  3:38 PM.  Always use your most recent med list.                   Brand Name Dispense  Instructions for use Diagnosis    apixaban ANTICOAGULANT 5 MG tablet    ELIQUIS    60 tablet    Take 1 tablet (5 mg) by mouth 2 times daily    Chronic atrial fibrillation (H)       calcium 500 MG Chew      Take 2 chew tab by mouth daily        flecainide 50 MG tablet    TAMBOCOR    60 tablet    Take 2 tablets (100 mg) by mouth 2 times daily    Persistent atrial fibrillation (H)       levothyroxine 137 MCG tablet    SYNTHROID    90 tablet    Take 1 tablet (137 mcg) by mouth daily    Acquired hypothyroidism       liothyronine 5 MCG tablet    CYTOMEL    90 tablet    Take 1 tablet (5 mcg) by mouth daily    Acquired hypothyroidism       metoprolol succinate 25 MG 24 hr tablet    TOPROL-XL    30 tablet    Take 0.5 tablets (12.5 mg) by mouth daily Take at night    Persistent atrial fibrillation (H)       order for DME      Equipment ordered: RESMED CPAP Mask type: Nasal Settings: 8 cmH2O        PROBIOTIC DAILY Caps      Take 1 capsule by mouth daily        vitamin  s/Minerals Tabs      1 TABLET DAILY        vitamin D 1000 UNITS capsule      take 2 Caps by mouth daily.

## 2018-03-27 NOTE — LETTER
3/27/2018    Shadia Simmons MD  [address] [place] [address]    RE: Ely Humphreys       Dear Colleague,    I had the pleasure of seeing Ely Humphreys in the [facility].    HPI:  Ely Humphreys is a  67-year-old female who presents for EKG for flecainide loading for atrial fibrillation.  She is a patient of Dr. Shaffer's and Dr Don.  Her past medical history includes Graves' disease (14 years ago, status post radiotherapy) and breast cancer (left side lumpectomy 7 years ago).    She was initially found to have AFib as an incidental finding on a preop evaluation for electve knee surgery and found to have cardiomyopathy (EF 40-45%).  Stress echo done in 2013 was negative for ischemia.  She underwent cardioversion (9/2017); however, she failed cardioversion. She was then placed on amiodarone (/2017) and she underwent successful DCCV, however, she had recurrence of AFib and a third DCCV was successful.  She saw Dr. Shaffer 3/14 with complaints of more fatigued than usual but no SOB/HERNÁNDEZ her repeat ECHO (3/14) revealed EF around 49%-53% with borderline to mild global hypokinesis, left atrium is mildly dilated, trace tricuspid regurgitation. AT this visit he stopped her amiodarone.        Today Ely Humphreys presents with her  Judah stating she continues to feel fatigue she has been taking 50 mg flecainide every 12 hours with no problems.  She does wear her CPAP, drinks 1-2 alcoholic beverages 3-4 times a week, exercises for 30 minutes 5 days a week no CV symptoms.  Denies chest pain or pressure, headaches, dizziness, syncope, angina, dyspnea at rest or with exertion, dry cough, palpitations, orthopnea, PND, abdominal pain, abdominal edema, pedal edema, or claudication.  Denies easy bruising or bleeding, hematuria, hematochezia, and epistaxis. Denies signs/symptoms of stroke such as visual disturbance, difficulty speaking, facial drooping, confusion, problems with  gait, or any new numbness or weakness.     Presenting EKG:   Sinus rhythm, Vent rate , AK interval 0.2, QT/QTc .43/.43      ASSESSMENT AND PLAN    (I48.1) Persistent atrial fibrillation (H)  Today's EKG sinus with QTc 434 msec   Increase Flecainide to 100 mg twice daily      Follow-Up with Cardiac Advanced Practice Provider in 5 days with EKG.   once optimal doses found will perform a exercise treadmill test   Continue metoprolol succinate (TOPROL-XL) 25 MG 24 hr tablet      Patient expresses understanding and agreement with the plan.     I appreciate the chance to help with Ely Humphreys Please let me know if you have any questions or concerns.    Perri Allen, APRN, CNP    This note was completed in part using Dragon voice recognition software. Although reviewed after completion, some word and grammatical errors may occur.    Orders Placed This Encounter   Procedures     Follow-Up with Cardiac Advanced Practice Provider     EKG 12-lead complete w/read - Clinics (performed today)     EKG 12-lead complete w/read - Clinics (to be scheduled)     Orders Placed This Encounter   Medications     flecainide (TAMBOCOR) 50 MG tablet     Sig: Take 2 tablets (100 mg) by mouth 2 times daily     Dispense:  60 tablet     Refill:  0     Medications Discontinued During This Encounter   Medication Reason     flecainide (TAMBOCOR) 50 MG tablet Reorder         Encounter Diagnoses   Name Primary?     Persistent atrial fibrillation (H)      Atrial fibrillation, unspecified type (H)        CURRENT MEDICATIONS:  Current Outpatient Prescriptions   Medication Sig Dispense Refill     flecainide (TAMBOCOR) 50 MG tablet Take 2 tablets (100 mg) by mouth 2 times daily 60 tablet 0     metoprolol succinate (TOPROL-XL) 25 MG 24 hr tablet Take 0.5 tablets (12.5 mg) by mouth daily Take at night 30 tablet 3     liothyronine (CYTOMEL) 5 MCG tablet Take 1 tablet (5 mcg) by mouth daily 90 tablet 1     levothyroxine (SYNTHROID) 137 MCG tablet Take 1  tablet (137 mcg) by mouth daily 90 tablet 1     apixaban ANTICOAGULANT (ELIQUIS) 5 MG tablet Take 1 tablet (5 mg) by mouth 2 times daily 60 tablet 0     calcium 500 MG CHEW Take 2 chew tab by mouth daily       order for DME Equipment ordered: RESMED CPAP Mask type: Nasal Settings: 8 cmH2O       Probiotic Product (PROBIOTIC DAILY) CAPS Take 1 capsule by mouth daily       Cholecalciferol (VITAMIN D) 1000 UNIT capsule take 2 Caps by mouth daily.       VITAMINS/MINERALS OR TABS 1 TABLET DAILY         ALLERGIES     Allergies   Allergen Reactions     No Known Drug Allergies        PAST MEDICAL HISTORY:  Past Medical History:   Diagnosis Date     Benign essential tremor     Chronic     Hyperlipidemia LDL goal <160 2/10/2010     Invasive ductal carcinoma of breast (H)     left breast ca     Major depressive disorder, recurrent episode, in full remission (H) 10/19/2011    Stable for decades     osteopenia 2002     Palpitations      Persistent atrial fibrillation (H) 05/10/2017     Unspecified hypothyroidism        PAST SURGICAL HISTORY:  Past Surgical History:   Procedure Laterality Date     ANESTHESIA CARDIOVERSION N/A 2017    Procedure: ANESTHESIA CARDIOVERSION;  Cardioversion;  Surgeon: GENERIC ANESTHESIA PROVIDER;  Location: [facility] OR     C NONSPECIFIC PROCEDURE      Tubal Ligation    Abstracted 02     C THYROID ABLATION       COLONOSCOPY  10/03     COLONOSCOPY  2014     COLONOSCOPY  2014    Procedure: COLONOSCOPY;  Surgeon: Garrett Villaseñor MD;  Location: [facility]     HC ECHO HEART XTHORACIC, STRESS/REST  09    normal     HC EXCISION BREAST LESION, OPEN >=1      re-excision 09       FAMILY HISTORY:  Family History   Problem Relation Age of Onset     CANCER Mother      82 yo Breast & Melanoma     CEREBROVASCULAR DISEASE Mother 92     TIA     C.A.D. Mother      CEREBROVASCULAR DISEASE Father       85 yo Alzheimers, CHF     C.A.D. Father      possible MI in age 60's     CANCER  "Maternal Aunt       40's Breast     CEREBROVASCULAR DISEASE Paternal Grandmother       late 40's - early 50's     Blood Disease Maternal Aunt       53 yo Lupus     Cancer - colorectal No family hx of        SOCIAL HISTORY:  Social History     Social History     Marital status:      Spouse name: Judah     Number of children: 2     Years of education: 18     Occupational History      [employer]     health      Social History Main Topics     Smoking status: Never Smoker     Smokeless tobacco: Never Used     Alcohol use Yes      Comment: 1 or 2 glasses of wine most evenings     Drug use: No     Sexual activity: Not Currently     Birth control/ protection: Surgical     Other Topics Concern     None     Social History Narrative       Review of Systems:  Skin:  Negative     Eyes:  Positive for glasses  ENT:  Negative    Respiratory:  Positive for sleep apnea;CPAP  Cardiovascular:  Negative;palpitations;chest pain;lightheadedness;dizziness;syncope or near-syncope;cyanosis;exercise intolerance Positive for;fatigue;edema  Gastroenterology: Negative    Genitourinary:  Negative    Musculoskeletal:  Negative    Neurologic:  Positive for    Psychiatric:  Negative    Heme/Lymph/Imm:  Negative    Endocrine:  Positive for thyroid disorder    Physical Exam:  Vitals: /60  Pulse 60  Ht 1.6 m (5' 3\")  Wt 78.8 kg (173 lb 12.8 oz)  BMI 30.79 kg/m2    Constitutional:  cooperative, alert and oriented, well developed, well nourished, in no acute distress        Skin:  warm and dry to the touch, no apparent skin lesions or masses noted        Head:  normocephalic, no masses or lesions        Eyes:  pupils equal and round, conjunctivae and lids unremarkable, sclera white, no xanthalasma, EOMS intact, no nystagmus        ENT:  no pallor or cyanosis, dentition good;dentition good        Neck:  carotid pulses are full and equal bilaterally, JVP normal, no carotid bruit        Chest:       "     Cardiac: regular rhythm, normal S1/S2, no S3 or S4, apical impulse not displaced, no murmurs, gallops or rubs                  Abdomen:  abdomen soft;BS normoactive        Vascular:       right radial artery;2+       right dorsalis pedis artery;2+     left radial artery;2+       left dorsalis pedis artery;2+          Extremities and Back:  no deformities, clubbing, cyanosis, erythema observed        Neurological:  no gross motor deficits          Recent Lab Results:  LIPID RESULTS:  Lab Results   Component Value Date    CHOL 284 (H) 06/28/2017    HDL 78 06/28/2017     (H) 06/28/2017    TRIG 100 06/28/2017    CHOLHDLRATIO 3.3 11/09/2015       LIVER ENZYME RESULTS:  Lab Results   Component Value Date    AST 17 06/28/2017    ALT 20 06/28/2017       CBC RESULTS:  Lab Results   Component Value Date    WBC 5.7 05/10/2017    RBC 3.98 05/10/2017    HGB 12.5 05/10/2017    HCT 38.5 05/10/2017    MCV 97 05/10/2017    MCH 31.4 05/10/2017    MCHC 32.5 05/10/2017    RDW 12.5 05/10/2017     05/10/2017       BMP RESULTS:  Lab Results   Component Value Date     05/10/2017    POTASSIUM 4.1 11/28/2017    CHLORIDE 106 05/10/2017    CO2 27 05/10/2017    ANIONGAP 8 05/10/2017    GLC 98 05/10/2017    BUN 19 05/10/2017    CR 0.80 05/10/2017    GFRESTIMATED 72 05/10/2017    GFRESTBLACK 87 05/10/2017    MARTITA 9.1 05/10/2017        A1C RESULTS:  Lab Results   Component Value Date    A1C 5.4 11/09/2015       INR RESULTS:  Lab Results   Component Value Date    INR 1.01 05/10/2017           CC  Perri Allen, SARAH CNP  [address]  [place] [address]                  Thank you for allowing me to participate in the care of your patient.      Sincerely,     SARAH Donahue CNP     [facility]

## 2018-03-27 NOTE — PATIENT INSTRUCTIONS
Please increase your Flecainide to 100 mg twice a day.    (2 tablets)     Continue metoprolol XL 12.5 mg daily  (1/2 tablet)   Follow up in 1 week with Tori Fisher or Gabi Gates next week.      Call if you have questions.      You will receive all normal lab and testing results via [facility] or mail if not reviewed in clinic today. Please contact our office if you need assistance with setting up [facility].    If you need a medication refill please contact your pharmacy. Please allow 3 business days for your refill to be completed.     As always, thank you for trusting us with your health care needs!    If you have any questions regarding your visit please contact your care team:   Cardiology  Telephone Number    Afib RNs  India Cardenas, and Naty [phone]     Call for Electrophysiology procedure scheduling concerns  Flor Miller [phone]   Device Clinic (Pacemakers, ICDs, Loop)   RN's :      Marci Flores Eva, EAN Mayen, Alma Arboleda During business hours:   [phone]

## 2018-03-27 NOTE — PROGRESS NOTES
HPI:  Ely Humphreys is a  67-year-old female who presents for EKG for flecainide loading for atrial fibrillation.  She is a patient of Dr. Shaffer's and Dr Don.  Her past medical history includes Graves' disease (14 years ago, status post radiotherapy) and breast cancer (left side lumpectomy 7 years ago).    She was initially found to have AFib as an incidental finding on a preop evaluation for electve knee surgery and found to have cardiomyopathy (EF 40-45%).  Stress echo done in 2013 was negative for ischemia.  She underwent cardioversion (9/2017); however, she failed cardioversion. She was then placed on amiodarone (/2017) and she underwent successful DCCV, however, she had recurrence of AFib and a third DCCV was successful.  She saw Dr. Shaffer 3/14 with complaints of more fatigued than usual but no SOB/HERNÁNDEZ her repeat ECHO (3/14) revealed EF around 49%-53% with borderline to mild global hypokinesis, left atrium is mildly dilated, trace tricuspid regurgitation. AT this visit he stopped her amiodarone.        Today Ely Humphreys presents with her  Judah stating she continues to feel fatigue she has been taking 50 mg flecainide every 12 hours with no problems.  She does wear her CPAP, drinks 1-2 alcoholic beverages 3-4 times a week, exercises for 30 minutes 5 days a week no CV symptoms.  Denies chest pain or pressure, headaches, dizziness, syncope, angina, dyspnea at rest or with exertion, dry cough, palpitations, orthopnea, PND, abdominal pain, abdominal edema, pedal edema, or claudication.  Denies easy bruising or bleeding, hematuria, hematochezia, and epistaxis. Denies signs/symptoms of stroke such as visual disturbance, difficulty speaking, facial drooping, confusion, problems with gait, or any new numbness or weakness.     Presenting EKG:   Sinus rhythm, Vent rate , CO interval 0.2, QT/QTc .43/.43      ASSESSMENT AND PLAN    (I48.1) Persistent atrial fibrillation (H)  Today's EKG sinus with QTc 434 msec    Increase Flecainide to 100 mg twice daily      Follow-Up with Cardiac Advanced Practice Provider in 5 days with EKG.   once optimal doses found will perform a exercise treadmill test   Continue metoprolol succinate (TOPROL-XL) 25 MG 24 hr tablet      Patient expresses understanding and agreement with the plan.     I appreciate the chance to help with Ely Humphreys Please let me know if you have any questions or concerns.    Perri Allen, APRN, CNP    This note was completed in part using Dragon voice recognition software. Although reviewed after completion, some word and grammatical errors may occur.    Orders Placed This Encounter   Procedures     Follow-Up with Cardiac Advanced Practice Provider     EKG 12-lead complete w/read - Clinics (performed today)     EKG 12-lead complete w/read - Clinics (to be scheduled)     Orders Placed This Encounter   Medications     flecainide (TAMBOCOR) 50 MG tablet     Sig: Take 2 tablets (100 mg) by mouth 2 times daily     Dispense:  60 tablet     Refill:  0     Medications Discontinued During This Encounter   Medication Reason     flecainide (TAMBOCOR) 50 MG tablet Reorder         Encounter Diagnoses   Name Primary?     Persistent atrial fibrillation (H)      Atrial fibrillation, unspecified type (H)        CURRENT MEDICATIONS:  Current Outpatient Prescriptions   Medication Sig Dispense Refill     flecainide (TAMBOCOR) 50 MG tablet Take 2 tablets (100 mg) by mouth 2 times daily 60 tablet 0     metoprolol succinate (TOPROL-XL) 25 MG 24 hr tablet Take 0.5 tablets (12.5 mg) by mouth daily Take at night 30 tablet 3     liothyronine (CYTOMEL) 5 MCG tablet Take 1 tablet (5 mcg) by mouth daily 90 tablet 1     levothyroxine (SYNTHROID) 137 MCG tablet Take 1 tablet (137 mcg) by mouth daily 90 tablet 1     apixaban ANTICOAGULANT (ELIQUIS) 5 MG tablet Take 1 tablet (5 mg) by mouth 2 times daily 60 tablet 0     calcium 500 MG CHEW Take 2 chew tab by mouth daily       order for DME  Equipment ordered: RESMED CPAP Mask type: Nasal Settings: 8 cmH2O       Probiotic Product (PROBIOTIC DAILY) CAPS Take 1 capsule by mouth daily       Cholecalciferol (VITAMIN D) 1000 UNIT capsule take 2 Caps by mouth daily.       VITAMINS/MINERALS OR TABS 1 TABLET DAILY         ALLERGIES     Allergies   Allergen Reactions     No Known Drug Allergies        PAST MEDICAL HISTORY:  Past Medical History:   Diagnosis Date     Benign essential tremor     Chronic     Hyperlipidemia LDL goal <160 2/10/2010     Invasive ductal carcinoma of breast (H)     left breast ca     Major depressive disorder, recurrent episode, in full remission (H) 10/19/2011    Stable for decades     osteopenia 2002     Palpitations      Persistent atrial fibrillation (H) 05/10/2017     Unspecified hypothyroidism        PAST SURGICAL HISTORY:  Past Surgical History:   Procedure Laterality Date     ANESTHESIA CARDIOVERSION N/A 2017    Procedure: ANESTHESIA CARDIOVERSION;  Cardioversion;  Surgeon: GENERIC ANESTHESIA PROVIDER;  Location: [facility] OR     C NONSPECIFIC PROCEDURE      Tubal Ligation    Abstracted 02     C THYROID ABLATION       COLONOSCOPY  10/03     COLONOSCOPY  2014     COLONOSCOPY  2014    Procedure: COLONOSCOPY;  Surgeon: Garrett Villaseñor MD;  Location: [facility]     HC ECHO HEART XTHORACIC, STRESS/REST  09    normal     HC EXCISION BREAST LESION, OPEN >=1      re-excision 09       FAMILY HISTORY:  Family History   Problem Relation Age of Onset     CANCER Mother      82 yo Breast & Melanoma     CEREBROVASCULAR DISEASE Mother 92     TIA     C.A.D. Mother      CEREBROVASCULAR DISEASE Father       87 yo Alzheimers, CHF     C.A.D. Father      possible MI in age 60's     CANCER Maternal Aunt       40's Breast     CEREBROVASCULAR DISEASE Paternal Grandmother       late 40's - early 50's     Blood Disease Maternal Aunt       53 yo Lupus     Cancer - colorectal No family hx of   "      SOCIAL HISTORY:  Social History     Social History     Marital status:      Spouse name: Judah     Number of children: 2     Years of education: 18     Occupational History      [employer]     health      Social History Main Topics     Smoking status: Never Smoker     Smokeless tobacco: Never Used     Alcohol use Yes      Comment: 1 or 2 glasses of wine most evenings     Drug use: No     Sexual activity: Not Currently     Birth control/ protection: Surgical     Other Topics Concern     None     Social History Narrative       Review of Systems:  Skin:  Negative     Eyes:  Positive for glasses  ENT:  Negative    Respiratory:  Positive for sleep apnea;CPAP  Cardiovascular:  Negative;palpitations;chest pain;lightheadedness;dizziness;syncope or near-syncope;cyanosis;exercise intolerance Positive for;fatigue;edema  Gastroenterology: Negative    Genitourinary:  Negative    Musculoskeletal:  Negative    Neurologic:  Positive for    Psychiatric:  Negative    Heme/Lymph/Imm:  Negative    Endocrine:  Positive for thyroid disorder    Physical Exam:  Vitals: /60  Pulse 60  Ht 1.6 m (5' 3\")  Wt 78.8 kg (173 lb 12.8 oz)  BMI 30.79 kg/m2    Constitutional:  cooperative, alert and oriented, well developed, well nourished, in no acute distress        Skin:  warm and dry to the touch, no apparent skin lesions or masses noted        Head:  normocephalic, no masses or lesions        Eyes:  pupils equal and round, conjunctivae and lids unremarkable, sclera white, no xanthalasma, EOMS intact, no nystagmus        ENT:  no pallor or cyanosis, dentition good;dentition good        Neck:  carotid pulses are full and equal bilaterally, JVP normal, no carotid bruit        Chest:           Cardiac: regular rhythm, normal S1/S2, no S3 or S4, apical impulse not displaced, no murmurs, gallops or rubs                  Abdomen:  abdomen soft;BS normoactive        Vascular:       right radial artery;2+       right " dorsalis pedis artery;2+     left radial artery;2+       left dorsalis pedis artery;2+          Extremities and Back:  no deformities, clubbing, cyanosis, erythema observed        Neurological:  no gross motor deficits          Recent Lab Results:  LIPID RESULTS:  Lab Results   Component Value Date    CHOL 284 (H) 06/28/2017    HDL 78 06/28/2017     (H) 06/28/2017    TRIG 100 06/28/2017    CHOLHDLRATIO 3.3 11/09/2015       LIVER ENZYME RESULTS:  Lab Results   Component Value Date    AST 17 06/28/2017    ALT 20 06/28/2017       CBC RESULTS:  Lab Results   Component Value Date    WBC 5.7 05/10/2017    RBC 3.98 05/10/2017    HGB 12.5 05/10/2017    HCT 38.5 05/10/2017    MCV 97 05/10/2017    MCH 31.4 05/10/2017    MCHC 32.5 05/10/2017    RDW 12.5 05/10/2017     05/10/2017       BMP RESULTS:  Lab Results   Component Value Date     05/10/2017    POTASSIUM 4.1 11/28/2017    CHLORIDE 106 05/10/2017    CO2 27 05/10/2017    ANIONGAP 8 05/10/2017    GLC 98 05/10/2017    BUN 19 05/10/2017    CR 0.80 05/10/2017    GFRESTIMATED 72 05/10/2017    GFRESTBLACK 87 05/10/2017    MARTITA 9.1 05/10/2017        A1C RESULTS:  Lab Results   Component Value Date    A1C 5.4 11/09/2015       INR RESULTS:  Lab Results   Component Value Date    INR 1.01 05/10/2017           CC  Perri Allen, APRN CNP  [address]  [place] [address]

## 2018-04-06 ENCOUNTER — OFFICE VISIT (OUTPATIENT)
Dept: CARDIOLOGY | Facility: CLINIC | Age: 68
End: 2018-04-06
Attending: NURSE PRACTITIONER
Payer: COMMERCIAL

## 2018-04-06 VITALS
HEART RATE: 56 BPM | HEIGHT: 62 IN | DIASTOLIC BLOOD PRESSURE: 68 MMHG | WEIGHT: 175 LBS | SYSTOLIC BLOOD PRESSURE: 124 MMHG | BODY MASS INDEX: 32.2 KG/M2

## 2018-04-06 DIAGNOSIS — I48.91 ATRIAL FIBRILLATION, UNSPECIFIED TYPE (H): ICD-10-CM

## 2018-04-06 DIAGNOSIS — I48.19 PERSISTENT ATRIAL FIBRILLATION (H): Primary | ICD-10-CM

## 2018-04-06 PROCEDURE — 99214 OFFICE O/P EST MOD 30 MIN: CPT | Performed by: PHYSICIAN ASSISTANT

## 2018-04-06 PROCEDURE — 93000 ELECTROCARDIOGRAM COMPLETE: CPT | Performed by: PHYSICIAN ASSISTANT

## 2018-04-06 RX ORDER — FLECAINIDE ACETATE 100 MG/1
100 TABLET ORAL 2 TIMES DAILY
Qty: 180 TABLET | Refills: 3 | Status: SHIPPED | OUTPATIENT
Start: 2018-04-06 | End: 2020-01-15

## 2018-04-06 NOTE — LETTER
4/6/2018    Shadia Munroe MD  [address] [place] [address]    RE: Ely Humphreys       Dear Colleague,    I had the pleasure of seeing Ely Humphreys in the [facility].    HPI and Plan:   See dictation #[id number]    Orders Placed This Encounter   Procedures     Follow-Up with Electrophysiologist     EKG 12-lead complete w/read - Clinics (performed today)     Exercise Stress Test (Stress ECG)       Orders Placed This Encounter   Medications     flecainide (TAMBOCOR) 100 MG tablet     Sig: Take 1 tablet (100 mg) by mouth 2 times daily     Dispense:  180 tablet     Refill:  3       Medications Discontinued During This Encounter   Medication Reason     flecainide (TAMBOCOR) 50 MG tablet Reorder         Encounter Diagnoses   Name Primary?     Persistent atrial fibrillation (H)      Atrial fibrillation, unspecified type (H)        CURRENT MEDICATIONS:  Current Outpatient Prescriptions   Medication Sig Dispense Refill     flecainide (TAMBOCOR) 100 MG tablet Take 1 tablet (100 mg) by mouth 2 times daily 180 tablet 3     metoprolol succinate (TOPROL-XL) 25 MG 24 hr tablet Take 0.5 tablets (12.5 mg) by mouth daily Take at night 30 tablet 3     liothyronine (CYTOMEL) 5 MCG tablet Take 1 tablet (5 mcg) by mouth daily 90 tablet 1     levothyroxine (SYNTHROID) 137 MCG tablet Take 1 tablet (137 mcg) by mouth daily 90 tablet 1     apixaban ANTICOAGULANT (ELIQUIS) 5 MG tablet Take 1 tablet (5 mg) by mouth 2 times daily 60 tablet 0     calcium 500 MG CHEW Take 2 chew tab by mouth daily       Probiotic Product (PROBIOTIC DAILY) CAPS Take 1 capsule by mouth daily       Cholecalciferol (VITAMIN D) 1000 UNIT capsule take 2 Caps by mouth daily.       VITAMINS/MINERALS OR TABS 1 TABLET DAILY       [DISCONTINUED] flecainide (TAMBOCOR) 50 MG tablet Take 2 tablets (100 mg) by mouth 2 times daily 60 tablet 0     order for DME Equipment ordered: RESMED CPAP Mask type: Nasal Settings: 8 cmH2O          ALLERGIES     Allergies   Allergen Reactions     No Known Drug Allergies        PAST MEDICAL HISTORY:  Past Medical History:   Diagnosis Date     Benign essential tremor     Chronic     Hyperlipidemia LDL goal <160 2/10/2010     Invasive ductal carcinoma of breast (H)     left breast ca     Major depressive disorder, recurrent episode, in full remission (H) 10/19/2011    Stable for decades     osteopenia 2002     Palpitations      Persistent atrial fibrillation (H) 05/10/2017     Unspecified hypothyroidism        PAST SURGICAL HISTORY:  Past Surgical History:   Procedure Laterality Date     ANESTHESIA CARDIOVERSION N/A 2017    Procedure: ANESTHESIA CARDIOVERSION;  Cardioversion;  Surgeon: GENERIC ANESTHESIA PROVIDER;  Location: [facility] OR     C NONSPECIFIC PROCEDURE      Tubal Ligation    Abstracted 02     C THYROID ABLATION       COLONOSCOPY  10/03     COLONOSCOPY  2014     COLONOSCOPY  2014    Procedure: COLONOSCOPY;  Surgeon: Garrett Villaseñor MD;  Location: [facility] GI     HC ECHO HEART XTHORACIC, STRESS/REST  09    normal     HC EXCISION BREAST LESION, OPEN >=1      re-excision 09       FAMILY HISTORY:  Family History   Problem Relation Age of Onset     CANCER Mother      82 yo Breast & Melanoma     CEREBROVASCULAR DISEASE Mother 92     TIA     C.A.D. Mother      CEREBROVASCULAR DISEASE Father       85 yo Alzheimers, CHF     C.A.D. Father      possible MI in age 60's     CANCER Maternal Aunt       40's Breast     CEREBROVASCULAR DISEASE Paternal Grandmother       late 40's - early 50's     Blood Disease Maternal Aunt       51 yo Lupus     Cancer - colorectal No family hx of        SOCIAL HISTORY:  Social History     Social History     Marital status:      Spouse name: Judah     Number of children: 2     Years of education: 18     Occupational History      [employer]     health      Social History Main Topics     Smoking  "status: Never Smoker     Smokeless tobacco: Never Used     Alcohol use Yes      Comment: 1 or 2 glasses of wine most evenings     Drug use: No     Sexual activity: Not Currently     Birth control/ protection: Surgical     Other Topics Concern     None     Social History Narrative       Review of Systems:  Skin:  Negative       Eyes:  Positive for glasses    ENT:  Negative      Respiratory:  Positive for sleep apnea;CPAP     Cardiovascular:  palpitations;chest pain;lightheadedness;dizziness;syncope or near-syncope;cyanosis;exercise intolerance;Negative for;fatigue   Fatigue improved 4/6/2018  Gastroenterology: Negative for melena;hematochezia    Genitourinary:  Negative      Musculoskeletal:  Negative      Neurologic:  Positive for   headaches in mornings  Psychiatric:  Negative      Heme/Lymph/Imm:  Negative      Endocrine:  Positive for thyroid disorder      Physical Exam:  Vitals: /68  Pulse 56  Ht 1.575 m (5' 2.01\")  Wt 79.4 kg (175 lb)  BMI 32 kg/m2    Constitutional:  cooperative, alert and oriented, well developed, well nourished, in no acute distress        Skin:  warm and dry to the touch, no apparent skin lesions or masses noted          Head:  normocephalic, no masses or lesions        Eyes:  pupils equal and round;conjunctivae and lids unremarkable;sclera white;no xanthalasma        Lymph:      ENT:  no pallor or cyanosis, dentition good;dentition good        Neck:  JVP normal;no carotid bruit        Respiratory:  normal breath sounds, clear to auscultation, normal A-P diameter, normal symmetry, normal respiratory excursion, no use of accessory muscles         Cardiac: regular rhythm, normal S1/S2, no S3 or S4, apical impulse not displaced, no murmurs, gallops or rubs                                                         GI:  not assessed this visit        Extremities and Muscular Skeletal:  no deformities, clubbing, cyanosis, erythema observed     trace trace      Neurological:  no gross " motor deficits        Psych:  Alert and Oriented x 3                Thank you for allowing me to participate in the care of your patient.      Sincerely,     Heaven Fisher PA-C     [facility]    cc:   Perri Allen, APRN CNP  [address]  [place] [address]

## 2018-04-06 NOTE — LETTER
4/6/2018      Shadia Munroe MD  [address] [place] [address]      RE: lEy Humphreys       Dear Colleague,    I had the pleasure of seeing Ely Humphreys in the [facility].    Service Date: 04/06/2018      HISTORY OF PRESENT ILLNESS:  I had the pleasure of seeing Ely today when she came for followup of her recent medication changes.  She is a very pleasant 67-year-old with a history of Graves disease, status post radiotherapy roughly 14 years ago as well as breast cancer, status post left lumpectomy 7 years ago.  She was initially found to have atrial fibrillation as an incidental finding for preoperative evaluation prior to any surgery in 05/2017.  At that time her heart rate was under good control.  The ejection fraction was slightly low at 40%-45%.        She underwent cardioversion in 09/2017.  Though this was initially successful, when she was seen back in clinic she was back in atrial fibrillation.  She was then loaded with amiodarone and underwent repeat cardioversion in 10/2017.  Again, this was initially successful, but when she saw Dr. Shaffer later she was back in atrial fibrillation.  They discussed options and opted at that time to try 1 more cardioversion which was done 11/2017.  This was successful and she has been  maintaining sinus rhythm since that time.      An up-to-date echocardiogram was done in 03/2018 showing an improved ejection fraction to roughly 50%.  Dr. Shaffer recommended a switch to flecainide therapy and discontinued amiodarone.      Her amiodarone was initially started at 50 mg twice daily and has been up titrated most recently on 03/27/2018 to 100 mg twice daily.  She comes in today for repeat EKG to check QRS widening.      Ely tells me that she is actually feeling pretty good on this current dose of flecainide 100 mg twice daily.  The fatigue that she has complained about quite a bit has improved.  She feels like she is getting  "relatively good sleep.  She has less \"droopy.\"  She denies any problems with chest discomfort, tightness or pressure.  Denies change in her mild chronic lower extremity edema.  Denies orthopnea or PND.  Denies lightheadedness, brandt syncope or palpitations.  She continues to exercise on the treadmill multiple times per week.  Overall, she is really doing better.      EKG today, which I overread, confirms sinus rhythm at 60 beats per minute.  Her QRS duration was 102 milliseconds.  EKG before initiation of flecainide therapy on 03/23 showed a sinus rhythm at 63 with a QRS duration of 94 milliseconds.      She has been maintained on anticoagulation with Eliquis for a CHADS-VASc score of 3 (cardiomyopathy, age and female sex).  Last stress test was a stress echo in 2013 which was negative.        ASSESSMENT AND PLAN:      1. Atrial fibrillation.  This was first diagnosed in 05/2017.  This was an incidental finding and she has now been able to maintain sinus rhythm after her third cardioversion in 11/2017.  Ejection fraction has improved and her amiodarone was discontinued in favor of class 1C antiarrhythmic drug therapy.  She has been up titrated to flecainide 100 mg twice daily.  QRS duration was within acceptable limits at 102 milliseconds.      At this time, we will have her continue flecainide 100 mg twice daily and metoprolol succinate 12.5 mg daily.  We will obtain a proarrhythmic exercise stress test, ideally down in [place], to ensure that she has no significant ventricular arrhythmias on this dose of flecainide therapy.      At this time, we will plan to have her continue Eliquis.  She will see Dr. Shaffer or Perri nazario in [place] in roughly 3 months and I anticipate spreading her visits out a bit as she continues to do well.      It has been a pleasure to see her in clinic.  I have recommended that she contact us with any issues or concerns.         DARREN ROSA PA-C             D: 04/06/2018   " T: 2018   MT: MANNY      Name:     NYA KOROMA   MRN:      [id number]        Account:      [id number]   :      1950           Service Date: 2018      Document: [id number]           Outpatient Encounter Prescriptions as of 2018   Medication Sig Dispense Refill     flecainide (TAMBOCOR) 100 MG tablet Take 1 tablet (100 mg) by mouth 2 times daily 180 tablet 3     metoprolol succinate (TOPROL-XL) 25 MG 24 hr tablet Take 0.5 tablets (12.5 mg) by mouth daily Take at night 30 tablet 3     liothyronine (CYTOMEL) 5 MCG tablet Take 1 tablet (5 mcg) by mouth daily 90 tablet 1     levothyroxine (SYNTHROID) 137 MCG tablet Take 1 tablet (137 mcg) by mouth daily 90 tablet 1     apixaban ANTICOAGULANT (ELIQUIS) 5 MG tablet Take 1 tablet (5 mg) by mouth 2 times daily 60 tablet 0     calcium 500 MG CHEW Take 2 chew tab by mouth daily       Probiotic Product (PROBIOTIC DAILY) CAPS Take 1 capsule by mouth daily       Cholecalciferol (VITAMIN D) 1000 UNIT capsule take 2 Caps by mouth daily.       VITAMINS/MINERALS OR TABS 1 TABLET DAILY       [DISCONTINUED] flecainide (TAMBOCOR) 50 MG tablet Take 2 tablets (100 mg) by mouth 2 times daily 60 tablet 0     order for DME Equipment ordered: RESMED CPAP Mask type: Nasal Settings: 8 cmH2O       No facility-administered encounter medications on file as of 2018.        Again, thank you for allowing me to participate in the care of your patient.      Sincerely,    Heaven Fisher PA-C     [facility]

## 2018-04-06 NOTE — PROGRESS NOTES
"Service Date: 04/06/2018      HISTORY OF PRESENT ILLNESS:  I had the pleasure of seeing Ely today when she came for followup of her recent medication changes.  She is a very pleasant 67-year-old with a history of Graves disease, status post radiotherapy roughly 14 years ago as well as breast cancer, status post left lumpectomy 7 years ago.  She was initially found to have atrial fibrillation as an incidental finding for preoperative evaluation prior to any surgery in 05/2017.  At that time her heart rate was under good control.  The ejection fraction was slightly low at 40%-45%.        She underwent cardioversion in 09/2017.  Though this was initially successful, when she was seen back in clinic she was back in atrial fibrillation.  She was then loaded with amiodarone and underwent repeat cardioversion in 10/2017.  Again, this was initially successful, but when she saw Dr. Shaffer later she was back in atrial fibrillation.  They discussed options and opted at that time to try 1 more cardioversion which was done 11/2017.  This was successful and she has been  maintaining sinus rhythm since that time.      An up-to-date echocardiogram was done in 03/2018 showing an improved ejection fraction to roughly 50%.  Dr. Shaffer recommended a switch to flecainide therapy and discontinued amiodarone.      Her amiodarone was initially started at 50 mg twice daily and has been up titrated most recently on 03/27/2018 to 100 mg twice daily.  She comes in today for repeat EKG to check QRS widening.      Ely tells me that she is actually feeling pretty good on this current dose of flecainide 100 mg twice daily.  The fatigue that she has complained about quite a bit has improved.  She feels like she is getting relatively good sleep.  She has less \"droopy.\"  She denies any problems with chest discomfort, tightness or pressure.  Denies change in her mild chronic lower extremity edema.  Denies orthopnea or PND.  Denies lightheadedness, " brandt syncope or palpitations.  She continues to exercise on the treadmill multiple times per week.  Overall, she is really doing better.      EKG today, which I overread, confirms sinus rhythm at 60 beats per minute.  Her QRS duration was 102 milliseconds.  EKG before initiation of flecainide therapy on  showed a sinus rhythm at 63 with a QRS duration of 94 milliseconds.      She has been maintained on anticoagulation with Eliquis for a CHADS-VASc score of 3 (cardiomyopathy, age and female sex).  Last stress test was a stress echo in  which was negative.        ASSESSMENT AND PLAN:      1. Atrial fibrillation.  This was first diagnosed in 2017.  This was an incidental finding and she has now been able to maintain sinus rhythm after her third cardioversion in 2017.  Ejection fraction has improved and her amiodarone was discontinued in favor of class 1C antiarrhythmic drug therapy.  She has been up titrated to flecainide 100 mg twice daily.  QRS duration was within acceptable limits at 102 milliseconds.      At this time, we will have her continue flecainide 100 mg twice daily and metoprolol succinate 12.5 mg daily.  We will obtain a proarrhythmic exercise stress test, ideally down in [place], to ensure that she has no significant ventricular arrhythmias on this dose of flecainide therapy.      At this time, we will plan to have her continue Eliquis.  She will see Dr. Shaffer or Perri nazario in [place] in roughly 3 months and I anticipate spreading her visits out a bit as she continues to do well.      It has been a pleasure to see her in clinic.  I have recommended that she contact us with any issues or concerns.         DARREN ROSA PA-C             D: 2018   T: 2018   MT: MANNY      Name:     NYA KOROMA   MRN:      [id number]        Account:      [id number]   :      1950           Service Date: 2018      Document: [id number]

## 2018-04-06 NOTE — PATIENT INSTRUCTIONS
"1. Reviewed EKG today - your intervals look good on this dose of flecainide.    2. Continue flecainide 100 mg twice a day. Get \"proarrhythmia\" stress test to see what your heart rate dose with exercise next week    3. I sent new Rx for flecainide to [facility].    4. Call if any issues with dizziness, lightheadedness, AFib, etc - our AFib nurses [phone] (Naty Osborne and Ronald)    5. See Dr. Shaffer or Perri in 3 months but call if issues prior  "

## 2018-04-06 NOTE — MR AVS SNAPSHOT
"              After Visit Summary   4/6/2018    Ely Humphreys    MRN: [id number]           Patient Information     Date Of Birth          1950        Visit Information        Provider Department      4/6/2018 3:30 PM Heaven Fisher PA-C [facility]        Today's Diagnoses     Persistent atrial fibrillation (H)        Atrial fibrillation, unspecified type (H)          Care Instructions    1. Reviewed EKG today - your intervals look good on this dose of flecainide.    2. Continue flecainide 100 mg twice a day. Get \"proarrhythmia\" stress test to see what your heart rate dose with exercise next week    3. I sent new Rx for flecainide to [facility].    4. Call if any issues with dizziness, lightheadedness, AFib, etc - our AFib nurses [phone] (Naty Osborne and Ronald)    5. See Dr. Shaffer or Perri in 3 months but call if issues prior          Follow-ups after your visit        Additional Services     Follow-Up with Electrophysiologist                 Your next 10 appointments already scheduled     Jun 13, 2018 10:30 AM CDT   (Arrive by 10:15 AM)   MA SCREENING DIGITAL BILATERAL with EAMA1   [facility]    [address] ,[address]  [place] [address] [phone]           Do not use any powder, lotion or deodorant under your arms or on your breast. If you do, we will ask you to remove it before your exam.  Wear comfortable, two-piece clothing.  If you have any allergies, tell your care team.  Bring any previous mammograms from other facilities or have them mailed to the breast center. Three-dimensional (3D) mammograms are available at [place] locations in [facility], [facility], [facility], [facility], [place], [place], and [place]. [facility] locations include [place] [facility] in [place]. Benefits of 3D mammograms include: - Improved rate of cancer detection - Decreases your chance of " "having to go back for more tests, which means fewer: - \"False-positive\" results (This means that there is an abnormal area but it isn't cancer.) - Invasive testing procedures, such as a biopsy or surgery - Can provide clearer images of the breast if you have dense breast tissue. 3D mammography is an optional exam that anyone can have with a 2D mammogram. It doesn't replace or take the place of a 2D mammogram. 2D mammograms remain an effective screening test for all women.  Not all insurance companies cover the cost of a 3D mammogram. Check with your insurance.              Future tests that were ordered for you today     Open Future Orders        Priority Expected Expires Ordered    Follow-Up with Electrophysiologist Routine 7/6/2018 4/6/2019 4/6/2018    Exercise Stress Test (Stress ECG) Routine 4/13/2018 4/6/2019 4/6/2018            Who to contact     If you have questions or need follow up information about today's clinic visit or your schedule please contact [facility] directly at [phone].  Normal or non-critical lab and imaging results will be communicated to you by Nevigohart, letter or phone within 4 business days after the clinic has received the results. If you do not hear from us within 7 days, please contact the clinic through yWorld or phone. If you have a critical or abnormal lab result, we will notify you by phone as soon as possible.  Submit refill requests through yWorld or call your pharmacy and they will forward the refill request to us. Please allow 3 business days for your refill to be completed.          Additional Information About Your Visit        yWorld Information     yWorld gives you secure access to your electronic health record. If you see a primary care provider, you can also send messages to your care team and make appointments. If you have questions, please call your primary care clinic.  If you do not have a primary care provider, please " "call [phone] and they will assist you.        Care EveryWhere ID     This is your Care EveryWhere ID. This could be used by other organizations to access your [facility] medical records  [id number]        Your Vitals Were     Pulse Height BMI (Body Mass Index)             56 1.575 m (5' 2.01\") 32 kg/m2          Blood Pressure from Last 3 Encounters:   04/06/18 124/68   03/27/18 110/60   03/23/18 118/64    Weight from Last 3 Encounters:   04/06/18 79.4 kg (175 lb)   03/27/18 78.8 kg (173 lb 12.8 oz)   03/23/18 80.3 kg (177 lb)              We Performed the Following     EKG 12-lead complete w/read - Clinics (performed today)     Follow-Up with Cardiac Advanced Practice Provider          Today's Medication Changes          These changes are accurate as of 4/6/18  3:54 PM.  If you have any questions, ask your nurse or doctor.               These medicines have changed or have updated prescriptions.        Dose/Directions    flecainide 100 MG tablet   Commonly known as:  TAMBOCOR   This may have changed:  medication strength   Used for:  Persistent atrial fibrillation (H)   Changed by:  Heaven Fisher PA-C        Dose:  100 mg   Take 1 tablet (100 mg) by mouth 2 times daily   Quantity:  180 tablet   Refills:  3            Where to get your medicines      These medications were sent to [facility] [address] [place] [address]     Phone:  [phone]     flecainide 100 MG tablet                Primary Care Provider Office Phone # Fax #    Shadia Munroe [phone] [address] [place] [address]        Equal Access to Services     [facility] KAMRON AH: Hadii deshawn Kirby, waseeda lujimadaha, qaybta kaalmada opal, dori montejo. So [facility] [phone].    ATENCIÓN: Si habla español, tiene a saenz disposición servicios gratuitos de asistencia lingüística. Llame al [phone].    We comply with " applicable federal civil rights laws and [place] laws. We do not discriminate on the basis of race, color, national origin, age, disability, sex, sexual orientation, or gender identity.            Thank you!     Thank you for choosing [facility]  for your care. Our goal is always to provide you with excellent care. Hearing back from our patients is one way we can continue to improve our services. Please take a few minutes to complete the written survey that you may receive in the mail after your visit with us. Thank you!             Your Updated Medication List - Protect others around you: Learn how to safely use, store and throw away your medicines at www.disposemymeds.org.          This list is accurate as of 4/6/18  3:54 PM.  Always use your most recent med list.                   Brand Name Dispense Instructions for use Diagnosis    apixaban ANTICOAGULANT 5 MG tablet    ELIQUIS    60 tablet    Take 1 tablet (5 mg) by mouth 2 times daily    Chronic atrial fibrillation (H)       calcium 500 MG Chew      Take 2 chew tab by mouth daily        flecainide 100 MG tablet    TAMBOCOR    180 tablet    Take 1 tablet (100 mg) by mouth 2 times daily    Persistent atrial fibrillation (H)       levothyroxine 137 MCG tablet    SYNTHROID    90 tablet    Take 1 tablet (137 mcg) by mouth daily    Acquired hypothyroidism       liothyronine 5 MCG tablet    CYTOMEL    90 tablet    Take 1 tablet (5 mcg) by mouth daily    Acquired hypothyroidism       metoprolol succinate 25 MG 24 hr tablet    TOPROL-XL    30 tablet    Take 0.5 tablets (12.5 mg) by mouth daily Take at night    Persistent atrial fibrillation (H)       order for DME      Equipment ordered: RESMED CPAP Mask type: Nasal Settings: 8 cmH2O        PROBIOTIC DAILY Caps      Take 1 capsule by mouth daily        vitamin  s/Minerals Tabs      1 TABLET DAILY        vitamin D 1000 UNITS capsule      take 2 Caps by mouth daily.

## 2018-04-06 NOTE — PROGRESS NOTES
HPI and Plan:   See dictation #[id number]    Orders Placed This Encounter   Procedures     Follow-Up with Electrophysiologist     EKG 12-lead complete w/read - Clinics (performed today)     Exercise Stress Test (Stress ECG)       Orders Placed This Encounter   Medications     flecainide (TAMBOCOR) 100 MG tablet     Sig: Take 1 tablet (100 mg) by mouth 2 times daily     Dispense:  180 tablet     Refill:  3       Medications Discontinued During This Encounter   Medication Reason     flecainide (TAMBOCOR) 50 MG tablet Reorder         Encounter Diagnoses   Name Primary?     Persistent atrial fibrillation (H)      Atrial fibrillation, unspecified type (H)        CURRENT MEDICATIONS:  Current Outpatient Prescriptions   Medication Sig Dispense Refill     flecainide (TAMBOCOR) 100 MG tablet Take 1 tablet (100 mg) by mouth 2 times daily 180 tablet 3     metoprolol succinate (TOPROL-XL) 25 MG 24 hr tablet Take 0.5 tablets (12.5 mg) by mouth daily Take at night 30 tablet 3     liothyronine (CYTOMEL) 5 MCG tablet Take 1 tablet (5 mcg) by mouth daily 90 tablet 1     levothyroxine (SYNTHROID) 137 MCG tablet Take 1 tablet (137 mcg) by mouth daily 90 tablet 1     apixaban ANTICOAGULANT (ELIQUIS) 5 MG tablet Take 1 tablet (5 mg) by mouth 2 times daily 60 tablet 0     calcium 500 MG CHEW Take 2 chew tab by mouth daily       Probiotic Product (PROBIOTIC DAILY) CAPS Take 1 capsule by mouth daily       Cholecalciferol (VITAMIN D) 1000 UNIT capsule take 2 Caps by mouth daily.       VITAMINS/MINERALS OR TABS 1 TABLET DAILY       [DISCONTINUED] flecainide (TAMBOCOR) 50 MG tablet Take 2 tablets (100 mg) by mouth 2 times daily 60 tablet 0     order for DME Equipment ordered: RESMED CPAP Mask type: Nasal Settings: 8 cmH2O         ALLERGIES     Allergies   Allergen Reactions     No Known Drug Allergies        PAST MEDICAL HISTORY:  Past Medical History:   Diagnosis Date     Benign essential tremor     Chronic     Hyperlipidemia LDL goal <160  2/10/2010     Invasive ductal carcinoma of breast (H)     left breast ca     Major depressive disorder, recurrent episode, in full remission (H) 10/19/2011    Stable for decades     osteopenia 2002     Palpitations      Persistent atrial fibrillation (H) 05/10/2017     Unspecified hypothyroidism        PAST SURGICAL HISTORY:  Past Surgical History:   Procedure Laterality Date     ANESTHESIA CARDIOVERSION N/A 2017    Procedure: ANESTHESIA CARDIOVERSION;  Cardioversion;  Surgeon: GENERIC ANESTHESIA PROVIDER;  Location: [facility] OR     C NONSPECIFIC PROCEDURE      Tubal Ligation    Abstracted 02     C THYROID ABLATION       COLONOSCOPY  10/03     COLONOSCOPY  2014     COLONOSCOPY  2014    Procedure: COLONOSCOPY;  Surgeon: Garrett Villaseñor MD;  Location: [facility]     HC ECHO HEART XTHORACIC, STRESS/REST  09    normal     HC EXCISION BREAST LESION, OPEN >=1      re-excision 09       FAMILY HISTORY:  Family History   Problem Relation Age of Onset     CANCER Mother      84 yo Breast & Melanoma     CEREBROVASCULAR DISEASE Mother 92     TIA     C.A.D. Mother      CEREBROVASCULAR DISEASE Father       85 yo Alzheimers, CHF     C.A.D. Father      possible MI in age 60's     CANCER Maternal Aunt       40's Breast     CEREBROVASCULAR DISEASE Paternal Grandmother       late 40's - early 50's     Blood Disease Maternal Aunt       53 yo Lupus     Cancer - colorectal No family hx of        SOCIAL HISTORY:  Social History     Social History     Marital status:      Spouse name: Judah     Number of children: 2     Years of education: 18     Occupational History      [employer]     health      Social History Main Topics     Smoking status: Never Smoker     Smokeless tobacco: Never Used     Alcohol use Yes      Comment: 1 or 2 glasses of wine most evenings     Drug use: No     Sexual activity: Not Currently     Birth control/ protection: Surgical  "    Other Topics Concern     None     Social History Narrative       Review of Systems:  Skin:  Negative       Eyes:  Positive for glasses    ENT:  Negative      Respiratory:  Positive for sleep apnea;CPAP     Cardiovascular:  palpitations;chest pain;lightheadedness;dizziness;syncope or near-syncope;cyanosis;exercise intolerance;Negative for;fatigue   Fatigue improved 4/6/2018  Gastroenterology: Negative for melena;hematochezia    Genitourinary:  Negative      Musculoskeletal:  Negative      Neurologic:  Positive for   headaches in mornings  Psychiatric:  Negative      Heme/Lymph/Imm:  Negative      Endocrine:  Positive for thyroid disorder      Physical Exam:  Vitals: /68  Pulse 56  Ht 1.575 m (5' 2.01\")  Wt 79.4 kg (175 lb)  BMI 32 kg/m2    Constitutional:  cooperative, alert and oriented, well developed, well nourished, in no acute distress        Skin:  warm and dry to the touch, no apparent skin lesions or masses noted          Head:  normocephalic, no masses or lesions        Eyes:  pupils equal and round;conjunctivae and lids unremarkable;sclera white;no xanthalasma        Lymph:      ENT:  no pallor or cyanosis, dentition good;dentition good        Neck:  JVP normal;no carotid bruit        Respiratory:  normal breath sounds, clear to auscultation, normal A-P diameter, normal symmetry, normal respiratory excursion, no use of accessory muscles         Cardiac: regular rhythm, normal S1/S2, no S3 or S4, apical impulse not displaced, no murmurs, gallops or rubs                                                         GI:  not assessed this visit        Extremities and Muscular Skeletal:  no deformities, clubbing, cyanosis, erythema observed     trace trace      Neurological:  no gross motor deficits        Psych:  Alert and Oriented x 3              "

## 2018-04-12 ENCOUNTER — HOSPITAL ENCOUNTER (OUTPATIENT)
Dept: CARDIOLOGY | Facility: CLINIC | Age: 68
Discharge: HOME | End: 2018-04-12
Attending: PHYSICIAN ASSISTANT | Admitting: PHYSICIAN ASSISTANT
Payer: MEDICARE

## 2018-04-12 DIAGNOSIS — I48.91 ATRIAL FIBRILLATION, UNSPECIFIED TYPE (H): ICD-10-CM

## 2018-04-12 PROCEDURE — 93017 CV STRESS TEST TRACING ONLY: CPT

## 2018-04-12 PROCEDURE — 93018 CV STRESS TEST I&R ONLY: CPT | Performed by: INTERNAL MEDICINE

## 2018-04-12 PROCEDURE — 93016 CV STRESS TEST SUPVJ ONLY: CPT | Performed by: INTERNAL MEDICINE

## 2018-04-25 ENCOUNTER — OFFICE VISIT (OUTPATIENT)
Dept: OPHTHALMOLOGY | Facility: CLINIC | Age: 68
End: 2018-04-25
Attending: OPHTHALMOLOGY
Payer: MEDICARE

## 2018-04-25 DIAGNOSIS — H47.10 PAPILLEDEMA: Primary | ICD-10-CM

## 2018-04-25 DIAGNOSIS — R29.818 OTHER SYMPTOMS AND SIGNS INVOLVING THE NERVOUS SYSTEM: ICD-10-CM

## 2018-04-25 PROCEDURE — G0463 HOSPITAL OUTPT CLINIC VISIT: HCPCS | Mod: ZF

## 2018-04-25 PROCEDURE — 92083 EXTENDED VISUAL FIELD XM: CPT | Mod: ZF | Performed by: INTERNAL MEDICINE

## 2018-04-25 PROCEDURE — 92133 CPTRZD OPH DX IMG PST SGM ON: CPT | Mod: ZF | Performed by: INTERNAL MEDICINE

## 2018-04-25 ASSESSMENT — VISUAL ACUITY
METHOD: SNELLEN - LINEAR
OS_SC: 20/20
OD_SC: 20/20

## 2018-04-25 ASSESSMENT — TONOMETRY
IOP_METHOD: ICARE
OS_IOP_MMHG: 10
OD_IOP_MMHG: 09

## 2018-04-25 ASSESSMENT — CONF VISUAL FIELD
OS_NORMAL: 1
OD_INFERIOR_NASAL_RESTRICTION: 1

## 2018-04-25 ASSESSMENT — SLIT LAMP EXAM - LIDS
COMMENTS: NORMAL
COMMENTS: NORMAL

## 2018-04-25 NOTE — MR AVS SNAPSHOT
"              After Visit Summary   4/25/2018    Ely Humphreys    MRN: [id number]           Patient Information     Date Of Birth          1950        Visit Information        Provider Department      4/25/2018 1:45 PM Nessa Olivares MD [facility]        Today's Diagnoses     Papilledema - Both Eyes    -  1    Other symptoms and signs involving the nervous system            Follow-ups after your visit        Follow-up notes from your care team     Return for Follow Up as scheduled with Dr. Blount .      Your next 10 appointments already scheduled     May 08, 2018  1:30 PM CDT   NEW NEURO with Randolph Blount MD   Eye Clinic ([facility])    [address]  [address] [address]  [facility] [address]   [phone]            Jun 13, 2018 10:30 AM CDT   (Arrive by 10:15 AM)   MA SCREENING DIGITAL BILATERAL with EAMA1   [facility]    [address] ,[address]  [facility] [address] [phone]           Do not use any powder, lotion or deodorant under your arms or on your breast. If you do, we will ask you to remove it before your exam.  Wear comfortable, two-piece clothing.  If you have any allergies, tell your care team.  Bring any previous mammograms from other facilities or have them mailed to the breast center. Three-dimensional (3D) mammograms are available at [place] locations in [facility], [facility], [facility], [place], [place], and [place]. -[facility] locations include [facility] in [place]. Benefits of 3D mammograms include: - Improved rate of cancer detection - Decreases your chance of having to go back for more tests, which means fewer: - \"False-positive\" results (This means that there is an abnormal area but it isn't cancer.) - Invasive testing procedures, such as a biopsy or surgery - Can provide clearer images of the breast if you have dense breast " tissue. 3D mammography is an optional exam that anyone can have with a 2D mammogram. It doesn't replace or take the place of a 2D mammogram. 2D mammograms remain an effective screening test for all women.  Not all insurance companies cover the cost of a 3D mammogram. Check with your insurance.            Jul 11, 2018 10:45 AM CDT   [facility] EP RETURN with Miguel Shaffer MD   [facility] ([facility] Clin[facility])    [address]  [facility] [address] [phone]              Future tests that were ordered for you today     Open Future Orders        Priority Expected Expires Ordered    MRA Brain Venogram w&wo Contrast Routine  4/25/2019 4/25/2018    MR Brain and Orbits Routine  4/25/2019 4/25/2018            Who to contact     Please call your clinic at [phone] to:    Ask questions about your health    Make or cancel appointments    Discuss your medicines    Learn about your test results    Speak to your doctor            Additional Information About Your Visit        SiteWit Information     SiteWit gives you secure access to your electronic health record. If you see a primary care provider, you can also send messages to your care team and make appointments. If you have questions, please call your primary care clinic.  If you do not have a primary care provider, please call [phone] and they will assist you.      SiteWit is an electronic gateway that provides easy, online access to your medical records. With SiteWit, you can request a clinic appointment, read your test results, renew a prescription or communicate with your care team.     To access your existing account, please contact your [facility] or call [phone] for assistance.        Care EveryWhere ID     This is your Care EveryWhere ID. This could be used by other organizations to access your [employer] medical records  [id number]         Blood Pressure  from Last 3 Encounters:   04/06/18 124/68   03/27/18 110/60   03/23/18 118/64    Weight from Last 3 Encounters:   04/06/18 79.4 kg (175 lb)   03/27/18 78.8 kg (173 lb 12.8 oz)   03/23/18 80.3 kg (177 lb)              We Performed the Following     Glaucoma Top OU     OCT Optic Nerve RNFL Spectralis OU (both eyes)        Primary Care Provider Office Phone # Fax #    Shadia Munroe [phone] [address] [place] [address]        Equal Access to Services     [facility]: Hadii aad ku hadasho Sorancho, waaxda luqadaha, qaybta kaalmada adeferdinandyabarb, dori cortes . So [facility] [phone].    ATENCIÓN: Si habla español, tiene a saenz disposición servicios gratuitos de asistencia lingüística. [facility] [phone].    We comply with applicable federal civil rights laws and [place] laws. We do not discriminate on the basis of race, color, national origin, age, disability, sex, sexual orientation, or gender identity.            Thank you!     Thank you for choosing EYE CLINIC  for your care. Our goal is always to provide you with excellent care. Hearing back from our patients is one way we can continue to improve our services. Please take a few minutes to complete the written survey that you may receive in the mail after your visit with us. Thank you!             Your Updated Medication List - Protect others around you: Learn how to safely use, store and throw away your medicines at www.disposemymeds.org.          This list is accurate as of 4/25/18  4:53 PM.  Always use your most recent med list.                   Brand Name Dispense Instructions for use Diagnosis    apixaban ANTICOAGULANT 5 MG tablet    ELIQUIS    60 tablet    Take 1 tablet (5 mg) by mouth 2 times daily    Chronic atrial fibrillation (H)       calcium 500 MG Chew      Take 2 chew tab by mouth daily        flecainide 100 MG tablet    TAMBOCOR    180 tablet    Take 1 tablet (100 mg) by mouth 2  times daily    Persistent atrial fibrillation (H)       levothyroxine 137 MCG tablet    SYNTHROID    90 tablet    Take 1 tablet (137 mcg) by mouth daily    Acquired hypothyroidism       liothyronine 5 MCG tablet    CYTOMEL    90 tablet    Take 1 tablet (5 mcg) by mouth daily    Acquired hypothyroidism       metoprolol succinate 25 MG 24 hr tablet    TOPROL-XL    30 tablet    Take 0.5 tablets (12.5 mg) by mouth daily Take at night    Persistent atrial fibrillation (H)       order for DME      Equipment ordered: RESMED CPAP Mask type: Nasal Settings: 8 cmH2O        PROBIOTIC DAILY Caps      Take 1 capsule by mouth daily        vitamin  s/Minerals Tabs      1 TABLET DAILY        vitamin D 1000 units capsule      take 2 Caps by mouth daily.

## 2018-04-25 NOTE — PROGRESS NOTES
"Chief Complaints and History of Present Illnesses   Patient presents with     Consult For     Subjective visual disturbance       HPI: Patient is a 67 y.o F who presents as referral from Dr. Pickard at  Eye clinic in [place] for evaluation of right optic nerve edema. The patient explains 1 week ago she went for general eye exam with Dr. Pickard and did not perform well on her visual field test and so at that time follow up in office imaging and examination revealed optic nerve edema of right eye. She was not having any symptoms of visual field loss, or change in visual acuity prior to this appointment. However, in the days following this visit the patient abruptly noticed that she could no longer see out of the bottom portion of vision in right eye. Eyes have been comfortable without pain during this time and left eye is asymptomatic. She reports a +family history of clots in her father with \"ministrokes\", however no personal history of blood clots. She is not taking any estrogen containing medications. No recent changes to medications other than change from amiodarone which was replaced with flecainide 1 month ago. She was taking amiodarone for 6 months. she denies antibiotic, acne medication use.  She is on eliquis for atrial fibrillation. She denies history of smoking, hld, htn or Diabetes mellitus. + MIKEL using CPAP. She denies jaw claudication/ jaw fatigue, unexplained weight loss, proximal muscle weakness, temporal headaches, colored halos. Of note, she has a +breast cancer history which was treated with lumpectomy 14 years ago.  She traveled to [place] on February but otherwise has not recently been out of the country and has otherwise been in good health. No history of lung disease recent wheezing or hemoptysis. No kidney disease or history of hematuria. She denies fever, chills and URI symptoms. She reports visual symptoms in right eye have stabilized but she continues to be unable to see lower part of visual " field. She additionally notes diffuse headaches present for the last year but worsening over the last month.     Visual acuity (20/20 both eyes) and color plates full today. A sided afferent pupillary defect is present. Efferent examination including motility is grossly normal. Oct retinal nerve fiber layer demonstrates bilateral edema right eye>> left eye. Visual field right eye reveals globalized depression right eye with pattern altitudinal scotoma of inferior field. Left eye with globalized depression and pattern enlargement of the blind spot. Oct retinal nerve fiber layer demonstrates prominent  Severe swelling of right optic nerve with questionable swelling of left optic nerve which measures in upper limit of normal range.     Past medical history:  Graves disease s/p radioablation on synthroid  Atrial fibrillation   History of breast cancer s/p lumpectomy of left breast 14 years ago.   MIKEL  No history of smoking, hld, htn, Diabetes mellitus.    POHx:  Refractive error     Current Eye Medications:   None       Assessment & Plan     Ely Humphreys is a 67 year old female with the following diagnoses:   1. Papilledema - Both Eyes    2. Other symptoms and signs involving the nervous system        Bilateral optic disc edema; (RIGHT >> LEFT): The differential is broad and includes mass lesion, NAION in setting of drusen, sinus thrombosis, inflammatory, infectious, or toxic optic neuropathy    -Recommend MRI brain with and without contrast and MRV brain.  -Will obtain outside records from Dr. Pickard in [place] [facility]     -Patient with scheduled f/u with Dr. Blount May 8th. Keep appointment. Recommend B scan/FAF to assess for possible disc drusen pending results of MRI at time of follow up    Nessa Olivares MD  Ophthalmology PGY3    Attending Physician Attestation:  Complete documentation of historical and exam elements from today's encounter can be found in the full encounter summary report (not reduplicated in this  progress note).  I personally obtained the chief complaint(s) and history of present illness.  I confirmed and edited as necessary the review of systems, past medical/surgical history, family history, social history, and examination findings as documented by others; and I examined the patient myself.  I personally reviewed the relevant tests, images, and reports as documented above.  I formulated and edited as necessary the assessment and plan and discussed the findings and management plan with the patient and family. Attending Physician Image/Tesing Attestation: I personally reviewed the ophthalmic test(s) associated with this encounter, agree with the interpretation(s) as documented by the resident/fellow, and have edited the corresponding report(s) as necessary.  . - Saad Melara MD

## 2018-04-25 NOTE — NURSING NOTE
Chief Complaints and History of Present Illnesses   Patient presents with     Consult For     Subjective visual disturbance     HPI    Affected eye(s):  Both   Symptoms:        Duration:  1 week   Frequency:  Constant       Do you have eye pain now?:  No      Comments:  Pt. States that she noticed darkening in inferior VA RE about a week ago.  Noticed symptoms 2 days following Optometry appointment.  No c/o comfort BE.  Peripheral vision also seems to be worsening.   Has been getting some headaches,  Henny Olya COT 2:03 PM April 25, 2018

## 2018-04-25 NOTE — LETTER
"4/25/2018       RE: Ely Humphreys  [address] [place] [address]     Dear Colleague,    Thank you for referring your patient, Ely Humphreys, to the EYE CLINIC at [facility]. Please see a copy of my visit note below.    Chief Complaints and History of Present Illnesses   Patient presents with     Consult For     Subjective visual disturbance       HPI: Patient is a 67 y.o F who presents as referral from Dr. Pickard at  Eye clinic in [place] for evaluation of right optic nerve edema. The patient explains 1 week ago she went for general eye exam with Dr. Pickard and did not perform well on her visual field test and so at that time follow up in office imaging and examination revealed optic nerve edema of right eye. She was not having any symptoms of visual field loss, or change in visual acuity prior to this appointment. However, in the days following this visit the patient abruptly noticed that she could no longer see out of the bottom portion of vision in right eye. Eyes have been comfortable without pain during this time and left eye is asymptomatic. She reports a +family history of clots in her father with \"ministrokes\", however no personal history of blood clots. She is not taking any estrogen containing medications. No recent changes to medications other than change from amiodarone which was replaced with flecainide 1 month ago. She was taking amiodarone for 6 months. she denies antibiotic, acne medication use.  She is on eliquis for atrial fibrillation. She denies history of smoking, hld, htn or Diabetes mellitus. + MIKEL using CPAP. She denies jaw claudication/ jaw fatigue, unexplained weight loss, proximal muscle weakness, temporal headaches, colored halos. Of note, she has a +breast cancer history which was treated with lumpectomy 14 years ago.  She traveled to [place] on February but otherwise has not recently been out of the country and has otherwise been in good " health. No history of lung disease recent wheezing or hemoptysis. No kidney disease or history of hematuria. She denies fever, chills and URI symptoms. She reports visual symptoms in right eye have stabilized but she continues to be unable to see lower part of visual field. She additionally notes diffuse headaches present for the last year but worsening over the last month.     Visual acuity (20/20 both eyes) and color plates full today. A sided afferent pupillary defect is present. Efferent examination including motility is grossly normal. Oct retinal nerve fiber layer demonstrates bilateral edema right eye>> left eye. Visual field right eye reveals globalized depression right eye with pattern altitudinal scotoma of inferior field. Left eye with globalized depression and pattern enlargement of the blind spot. Oct retinal nerve fiber layer demonstrates prominent  Severe swelling of right optic nerve with questionable swelling of left optic nerve which measures in upper limit of normal range.     Past medical history:  Graves disease s/p radioablation on synthroid  Atrial fibrillation   History of breast cancer s/p lumpectomy of left breast 14 years ago.   MIKEL  No history of smoking, hld, htn, Diabetes mellitus.    POHx:  Refractive error     Current Eye Medications:   None       Assessment & Plan     Ely Humphreys is a 67 year old female with the following diagnoses:   1. Papilledema - Both Eyes    2. Other symptoms and signs involving the nervous system        Bilateral optic disc edema; (RIGHT >> LEFT): The differential is broad and includes mass lesion, NAION in setting of drusen, sinus thrombosis, inflammatory, infectious, or toxic optic neuropathy    -Recommend MRI brain with and without contrast and MRV brain.  -Will obtain outside records from Dr. Pickard in [place] [facility]     -Patient with scheduled f/u with Dr. Blount May 8th. Keep appointment. Recommend B scan/FAF to assess for possible disc drusen pending  results of MRI at time of follow up    Nessa Olivares MD  Ophthalmology PGY3    Attending Physician Attestation:  Complete documentation of historical and exam elements from today's encounter can be found in the full encounter summary report (not reduplicated in this progress note).  I personally obtained the chief complaint(s) and history of present illness.  I confirmed and edited as necessary the review of systems, past medical/surgical history, family history, social history, and examination findings as documented by others; and I examined the patient myself.  I personally reviewed the relevant tests, images, and reports as documented above.  I formulated and edited as necessary the assessment and plan and discussed the findings and management plan with the patient and family. Attending Physician Image/Tesing Attestation: I personally reviewed the ophthalmic test(s) associated with this encounter, agree with the interpretation(s) as documented by the resident/fellow, and have edited the corresponding report(s) as necessary.  . - Saad Melara MD         Again, thank you for allowing me to participate in the care of your patient.      Sincerely,    Nessa Olivares MD

## 2018-04-26 ASSESSMENT — CUP TO DISC RATIO: OS_RATIO: 0.1

## 2018-05-07 ENCOUNTER — HOSPITAL ENCOUNTER (OUTPATIENT)
Dept: MRI IMAGING | Facility: CLINIC | Age: 68
Discharge: HOME | End: 2018-05-07
Attending: OPHTHALMOLOGY | Admitting: OPHTHALMOLOGY
Payer: MEDICARE

## 2018-05-07 ENCOUNTER — HOSPITAL ENCOUNTER (OUTPATIENT)
Dept: MRI IMAGING | Facility: CLINIC | Age: 68
End: 2018-05-07
Attending: OPHTHALMOLOGY
Payer: MEDICARE

## 2018-05-07 DIAGNOSIS — H47.10 PAPILLEDEMA: ICD-10-CM

## 2018-05-07 DIAGNOSIS — R29.818 OTHER SYMPTOMS AND SIGNS INVOLVING THE NERVOUS SYSTEM: ICD-10-CM

## 2018-05-07 PROCEDURE — A9585 GADOBUTROL INJECTION: HCPCS | Performed by: RADIOLOGY

## 2018-05-07 PROCEDURE — 70546 MR ANGIOGRAPH HEAD W/O&W/DYE: CPT | Mod: XS

## 2018-05-07 PROCEDURE — 25000128 H RX IP 250 OP 636: Performed by: RADIOLOGY

## 2018-05-07 PROCEDURE — 70543 MRI ORBT/FAC/NCK W/O &W/DYE: CPT

## 2018-05-07 PROCEDURE — 70553 MRI BRAIN STEM W/O & W/DYE: CPT

## 2018-05-07 RX ORDER — GADOBUTROL 604.72 MG/ML
10 INJECTION INTRAVENOUS ONCE
Status: COMPLETED | OUTPATIENT
Start: 2018-05-07 | End: 2018-05-07

## 2018-05-07 RX ADMIN — GADOBUTROL 8 ML: 604.72 INJECTION INTRAVENOUS at 13:48

## 2018-05-08 ENCOUNTER — OFFICE VISIT (OUTPATIENT)
Dept: OPHTHALMOLOGY | Facility: CLINIC | Age: 68
End: 2018-05-08
Attending: OPHTHALMOLOGY
Payer: MEDICARE

## 2018-05-08 DIAGNOSIS — H47.10 PAPILLEDEMA: ICD-10-CM

## 2018-05-08 DIAGNOSIS — I48.20 CHRONIC ATRIAL FIBRILLATION (H): ICD-10-CM

## 2018-05-08 DIAGNOSIS — H47.10 PAPILLEDEMA: Primary | ICD-10-CM

## 2018-05-08 DIAGNOSIS — H47.322 DRUSEN OF LEFT OPTIC DISC: ICD-10-CM

## 2018-05-08 DIAGNOSIS — H47.019 AION (ACUTE ISCHEMIC OPTIC NEUROPATHY), UNSPECIFIED LATERALITY: ICD-10-CM

## 2018-05-08 DIAGNOSIS — H47.019 AION (ACUTE ISCHEMIC OPTIC NEUROPATHY), UNSPECIFIED LATERALITY: Primary | ICD-10-CM

## 2018-05-08 DIAGNOSIS — E78.5 HYPERLIPIDEMIA LDL GOAL <160: Primary | ICD-10-CM

## 2018-05-08 LAB
CRP SERPL-MCNC: 16.9 MG/L (ref 0–8)
ERYTHROCYTE [DISTWIDTH] IN BLOOD BY AUTOMATED COUNT: 12.4 % (ref 10–15)
ERYTHROCYTE [SEDIMENTATION RATE] IN BLOOD BY WESTERGREN METHOD: 28 MM/H (ref 0–30)
HCT VFR BLD AUTO: 42.1 % (ref 35–47)
HGB BLD-MCNC: 13.9 G/DL (ref 11.7–15.7)
MCH RBC QN AUTO: 32.3 PG (ref 26.5–33)
MCHC RBC AUTO-ENTMCNC: 33 G/DL (ref 31.5–36.5)
MCV RBC AUTO: 98 FL (ref 78–100)
PLATELET # BLD AUTO: 269 10E9/L (ref 150–450)
RBC # BLD AUTO: 4.3 10E12/L (ref 3.8–5.2)
WBC # BLD AUTO: 7.7 10E9/L (ref 4–11)

## 2018-05-08 PROCEDURE — 92083 EXTENDED VISUAL FIELD XM: CPT | Mod: ZF | Performed by: OPHTHALMOLOGY

## 2018-05-08 PROCEDURE — G0463 HOSPITAL OUTPT CLINIC VISIT: HCPCS | Mod: ZF | Performed by: TECHNICIAN/TECHNOLOGIST

## 2018-05-08 RX ORDER — FLECAINIDE ACETATE 50 MG/1
TABLET ORAL
Refills: 0 | COMMUNITY
Start: 2018-03-23 | End: 2018-07-03

## 2018-05-08 RX ORDER — AMIODARONE HYDROCHLORIDE 200 MG/1
TABLET ORAL
Refills: 3 | COMMUNITY
Start: 2018-03-01 | End: 2018-07-03

## 2018-05-08 RX ORDER — APIXABAN 5 MG/1
TABLET, FILM COATED ORAL
Qty: 60 TABLET | Refills: 1 | Status: SHIPPED | OUTPATIENT
Start: 2018-05-08 | End: 2018-07-11

## 2018-05-08 ASSESSMENT — TONOMETRY
OS_IOP_MMHG: 12
IOP_METHOD: ICARE
OD_IOP_MMHG: 10

## 2018-05-08 ASSESSMENT — VISUAL ACUITY
OD_SC: 20/40
METHOD: SNELLEN - LINEAR
OS_PH_SC: 20/20
OD_SC+: -3
OS_SC: 20/25
OS_SC+: -2

## 2018-05-08 ASSESSMENT — CONF VISUAL FIELD
METHOD: COUNTING FINGERS
OD_INFERIOR_TEMPORAL_RESTRICTION: 1
OD_INFERIOR_NASAL_RESTRICTION: 1

## 2018-05-08 ASSESSMENT — CUP TO DISC RATIO
OS_RATIO: 0.1
OD_RATIO: 0.1

## 2018-05-08 ASSESSMENT — EXTERNAL EXAM - LEFT EYE: OS_EXAM: NORMAL

## 2018-05-08 ASSESSMENT — SLIT LAMP EXAM - LIDS
COMMENTS: NORMAL
COMMENTS: NORMAL

## 2018-05-08 ASSESSMENT — EXTERNAL EXAM - RIGHT EYE: OD_EXAM: NORMAL

## 2018-05-08 NOTE — NURSING NOTE
Chief Complaints and History of Present Illnesses   Patient presents with     Follow Up For     IIH     HPI    Symptoms:              Comments:  Patient states vision worse since last seen on 4/25.   MRI done yesterday per patient.     Visual field defect getting worse in RIGHT eye per patient. Visual field defect inferiorly in right eye, none in left eye.     ANETA Pena 5/8/2018 2:29 PM

## 2018-05-08 NOTE — PROGRESS NOTES
Assessment & Plan     Ely Humphreys is a 68 year old female with the following diagnoses:   1. AION (acute ischemic optic neuropathy), unspecified laterality    2. Papilledema    3. Drusen of left optic disc       Patient here for evaluation of optic disc edema.  About 3 weeks ago she noticed vision loss in the right eye.  She underwent visual field testing and OCT and was noted to have optic disc edema in the right eye.  She states that her vision has aggressively worsened for the past 3 weeks.  She was on amiodarone up until about 6 weeks ago.  She has a history of obstructive sleep apnea and she uses CPAP.  She has a history of breast cancer status post lumpectomy, chemotherapy, and radiation treatment.  She underwent an MRI and MRV yesterday.Reports recent headaches with associated nausea and vomiting.  Denies jaw claudication.  Denies loss of appetite.      Visual acuity today 20/40 RIGHT eye and 20/25 LEFT eye.  Corneal verticillata of both eyes.  Nuclear sclerosis of both eyes.  Fundus exam shows optic disc edema RIGHT eye with areas of pallor and hyperemia.   Drusen of left optic nerve, but no edema.  I reviewed the MRI images personally.  There is no evidence of optic nerve enhancement.  There are no compressive lesions.  There is no evidence of a partially empty sella or transverse sinus stenosis.  She does have a widened.  Optic space.  Her visual field shows an inferior altitudinal defect in the right eye which is slightly worse than the one that she took 2 weeks ago.    It is my impression that patient has Anterior ischemic optic neuropathy (AION) in the right eye and optic disc drusen of the left eye.  She denies symptoms of giant cell arteritis.  Her temporal artery pulses are normal.  However it is unusual to lose vision for 3 weeks.  I am going to obtain a Sedimentation rate, C-reactive protein, Lyme, and RPR.      If lab work comes back normal, will follow up in 6-8 weeks. If her ESR and  CRP are high, then I will start her on prednisone and get a temporal artery biopsy.             Attending Physician Attestation:  Complete documentation of historical and exam elements from today's encounter can be found in the full encounter summary report (not reduplicated in this progress note).  I personally obtained the chief complaint(s) and history of present illness.  I confirmed and edited as necessary the review of systems, past medical/surgical history, family history, social history, and examination findings as documented by others; and I examined the patient myself.  I personally reviewed the relevant tests, images, and reports as documented above.  I formulated and edited as necessary the assessment and plan and discussed the findings and management plan with the patient and family. - Randolph Blount MD

## 2018-05-08 NOTE — LETTER
2018         RE:  :  MRN: Ely Humphreys  1950  [id number]     Dear Dr. Pickard,    Thank you for asking me to see your very pleasant patient, Ely Humphreys, in neuro-ophthalmic consultation.  I would like to thank you for sending your records and I have summarized them in the history of present illness. She presented with her spouse who provided additional history.  My assessment and plan are below.  For further details, please see my attached clinic note.      Assessment & Plan     Ely Humphreys is a 68 year old female with the following diagnoses:   1. AION (acute ischemic optic neuropathy), unspecified laterality    2. Papilledema    3. Drusen of left optic disc       Patient here for evaluation of optic disc edema.  About 3 weeks ago she noticed vision loss in the right eye.  She underwent visual field testing and OCT and was noted to have optic disc edema in the right eye.  She states that her vision has aggressively worsened for the past 3 weeks.  She was on amiodarone up until about 6 weeks ago.  She has a history of obstructive sleep apnea and she uses CPAP.  She has a history of breast cancer status post lumpectomy, chemotherapy, and radiation treatment.  She underwent an MRI and MRV yesterday.Reports recent headaches with associated nausea and vomiting.  Denies jaw claudication.  Denies loss of appetite.      Visual acuity today 20/40 RIGHT eye and 20/25 LEFT eye.  Corneal verticillata of both eyes.  Nuclear sclerosis of both eyes.  Fundus exam shows optic disc edema RIGHT eye with areas of pallor and hyperemia.   Drusen of left optic nerve, but no edema.  I reviewed the MRI images personally.  There is no evidence of optic nerve enhancement.  There are no compressive lesions.  There is no evidence of a partially empty sella or transverse sinus stenosis.  She does have a widened.  Optic space.  Her visual field shows an inferior altitudinal defect in the right eye which is slightly worse than  the one that she took 2 weeks ago.    It is my impression that patient has Anterior ischemic optic neuropathy (AION) in the right eye and optic disc drusen of the left eye.  She denies symptoms of giant cell arteritis.  Her temporal artery pulses are normal.  However it is unusual to lose vision for 3 weeks.  I am going to obtain a Sedimentation rate, C-reactive protein, Lyme, and RPR.      If lab work comes back normal, will follow up in 6-8 weeks. If her ESR and CRP are high, then I will start her on prednisone and get a temporal artery biopsy.      Again, thank you for allowing me to participate in the care of your patient.      Sincerely,    Randolph Blount MD  Professor, Neuro-Ophthalmology  Department of Ophthalmology and Visual Neurosciences  [facility]    CC: [facility]  [address]  [place] [address]  VIA Facsimile:  [phone]     Shadia Munroe MD  [address] [place] [address]  VIA In Basket       DX = AION

## 2018-05-08 NOTE — TELEPHONE ENCOUNTER
Per Cardiology  LOV note 4/6/2018.    Refilled x1. Patient will need CMP labs updated.    Prescription approved per [facility].      Stacy [place] RN, BSN, PHN  [place] [facility] RN

## 2018-05-08 NOTE — MR AVS SNAPSHOT
"              After Visit Summary   5/8/2018    Ely Humphreys    MRN: [id number]           Patient Information     Date Of Birth          1950        Visit Information        Provider Department      5/8/2018 1:30 PM [facility]        Today's Diagnoses     AION (acute ischemic optic neuropathy), unspecified laterality    -  1    Papilledema          Care Instructions                                              Follow-ups after your visit        Your next 10 appointments already scheduled     Jun 13, 2018 10:30 AM CDT   [facility] SCREEN[facility]    [address] ,[address]  [place] [address] [phone]           Do not use any powder, lotion or deodorant under your arms or on your breast. If you do, we will ask you to remove it before your exam.  Wear comfortable, two-piece clothing.  If you have any allergies, tell your care team.  Bring any previous mammograms from other facilities or have them mailed to the breast center. Three-dimensional (3D) mammograms are available at [place] locations in [facility], [facility], and [place]. [facility] locations include [place] [facility] in [place]. Benefits of 3D mammograms include: - Improved rate of cancer detection - Decreases your chance of having to go back for more tests, which means fewer: - \"False-positive\" results (This means that there is an abnormal area but it isn't cancer.) - Invasive testing procedures, such as a biopsy or surgery - Can provide clearer images of the breast if you have dense breast tissue. 3D mammography is an optional exam that anyone can have with a 2D mammogram. It doesn't replace or take the place of a 2D mammogram. 2D mammograms remain an effective screening test for all women.  Not all insurance companies cover the cost of a 3D mammogram. Check with " your insurance.            Jul 11, 2018 10:45 AM CDT   [facility] EP RETURN with Miguel Shaffer MD   [facility] ([facility])    [address] [address] [address] [phone]              Future tests that were ordered for you today     Open Future Orders        Priority Expected Expires Ordered    CBC with platelets Routine  8/6/2018 5/8/2018    Erythrocyte sedimentation rate auto Routine  8/6/2018 5/8/2018    CRP inflammation Routine  8/6/2018 5/8/2018    Lyme Disease Brandy with reflex to WB Serum Routine  8/6/2018 5/8/2018    Treponema Abs w Reflex to RPR and Titer Routine  8/6/2018 5/8/2018            Who to contact     Please call your clinic at [phone] to:    Ask questions about your health    Make or cancel appointments    Discuss your medicines    Learn about your test results    Speak to your doctor            Additional Information About Your Visit        [employer] Information     [employer] gives you secure access to your electronic health record. If you see a primary care provider, you can also send messages to your care team and make appointments. If you have questions, please call your primary care clinic.  If you do not have a primary care provider, please call [phone] and they will assist you.      [employer] is an electronic gateway that provides easy, online access to your medical records. With [employer], you can request a clinic appointment, read your test results, renew a prescription or communicate with your care team.     To access your existing account, please contact your [facility] or call [phone] for assistance.        Care EveryWhere ID     This is your Care EveryWhere ID. This could be used by other organizations to access your [facility] medical records  [id number]         Blood Pressure from Last 3 Encounters:   04/06/18 124/68   03/27/18 110/60   03/23/18 118/64    Weight from  Last 3 Encounters:   04/06/18 79.4 kg (175 lb)   03/27/18 78.8 kg (173 lb 12.8 oz)   03/23/18 80.3 kg (177 lb)              We Performed the Following     DILATED FUNDUS EXAM     Glaucoma Top OU     IOP Measurement          Today's Medication Changes          These changes are accurate as of 5/8/18  3:33 PM.  If you have any questions, ask your nurse or doctor.               These medicines have changed or have updated prescriptions.        Dose/Directions    * apixaban ANTICOAGULANT 5 MG tablet   Commonly known as:  ELIQUIS   This may have changed:  Another medication with the same name was added. Make sure you understand how and when to take each.   Used for:  Chronic atrial fibrillation (H)   Changed by:  Shadia Munroe MD        Dose:  5 mg   Take 1 tablet (5 mg) by mouth 2 times daily   Quantity:  60 tablet   Refills:  0       * ELIQUIS 5 MG tablet   This may have changed:  You were already taking a medication with the same name, and this prescription was added. Make sure you understand how and when to take each.   Used for:  Chronic atrial fibrillation (H)   Generic drug:  apixaban ANTICOAGULANT   Changed by:  Shadia Munroe MD        TAKE ONE TABLET BY MOUTH TWICE DAILY   Quantity:  60 tablet   Refills:  1       * Notice:  This list has 2 medication(s) that are the same as other medications prescribed for you. Read the directions carefully, and ask your doctor or other care provider to review them with you.         Where to get your medicines      These medications were sent to [facility] [address] [place] [address]     Phone:  [phone]     ELIQUIS 5 MG tablet                Primary Care Provider Office Phone # Fax #    Shadia Munroe [phone]       [address] [place] [address] [address] [place] [address]        Equal Access to Services     RAMON LUNDY AH: briana Morales qaybta kaalmada adeegyada, waxay  lori smart brett colon'aan ah. So [facility] [phone].    ATENCIÓN: Si leonora jenkins, tiene a saenz disposición servicios gratuitos de asistencia lingüística. Mike al [phone].    We comply with applicable federal civil rights laws and [place] laws. We do not discriminate on the basis of race, color, national origin, age, disability, sex, sexual orientation, or gender identity.            Thank you!     Thank you for choosing [facility]  for your care. Our goal is always to provide you with excellent care. Hearing back from our patients is one way we can continue to improve our services. Please take a few minutes to complete the written survey that you may receive in the mail after your visit with us. Thank you!             Your Updated Medication List - Protect others around you: Learn how to safely use, store and throw away your medicines at www.disposemymeds.org.          This list is accurate as of 5/8/18  3:33 PM.  Always use your most recent med list.                   Brand Name Dispense Instructions for use Diagnosis    amiodarone 200 MG tablet    PACERONE/CODARONE          * apixaban ANTICOAGULANT 5 MG tablet    ELIQUIS    60 tablet    Take 1 tablet (5 mg) by mouth 2 times daily    Chronic atrial fibrillation (H)       * ELIQUIS 5 MG tablet   Generic drug:  apixaban ANTICOAGULANT     60 tablet    TAKE ONE TABLET BY MOUTH TWICE DAILY    Chronic atrial fibrillation (H)       calcium 500 MG Chew      Take 2 chew tab by mouth daily        * flecainide 50 MG tablet    TAMBOCOR          * flecainide 100 MG tablet    TAMBOCOR    180 tablet    Take 1 tablet (100 mg) by mouth 2 times daily    Persistent atrial fibrillation (H)       levothyroxine 137 MCG tablet    SYNTHROID    90 tablet    Take 1 tablet (137 mcg) by mouth daily    Acquired hypothyroidism       liothyronine 5 MCG tablet    CYTOMEL    90 tablet    Take 1 tablet (5 mcg) by mouth daily    Acquired hypothyroidism       metoprolol succinate 25 MG  24 hr tablet    TOPROL-XL    30 tablet    Take 0.5 tablets (12.5 mg) by mouth daily Take at night    Persistent atrial fibrillation (H)       order for DME      Equipment ordered: RESMED CPAP Mask type: Nasal Settings: 8 cmH2O        PROBIOTIC DAILY Caps      Take 1 capsule by mouth daily        SHINGRIX injection   Generic drug:  zoster vaccine recombinant adjuvanted           vitamin  s/Minerals Tabs      1 TABLET DAILY        vitamin D 1000 units capsule      take 2 Caps by mouth daily.        * Notice:  This list has 4 medication(s) that are the same as other medications prescribed for you. Read the directions carefully, and ask your doctor or other care provider to review them with you.

## 2018-05-08 NOTE — TELEPHONE ENCOUNTER
"Requested Prescriptions   Pending Prescriptions Disp Refills     ELIQUIS 5 MG tablet [Pharmacy Med Name: Eliquis Oral Tablet 5 MG]    Last Written Prescription Date:  1/31/2018  Last Fill Quantity: 60,  # refills: 0   Last office visit: 3/13/2018 with prescribing provider:  Shadia Munroe     Future Office Visit:     60 tablet 4     Sig: TAKE ONE TABLET BY MOUTH TWICE DAILY    Platelet Inhibitors Passed    5/8/2018  7:00 AM       Passed - Normal ALT on file in past 12 months    Recent Labs   Lab Test  06/28/17   0912   ALT  20            Passed - Normal HGB on file in past 12 months    Recent Labs   Lab Test  05/10/17   1705   HGB  12.5              Passed - Normal AST on file in past 12 months    Recent Labs   Lab Test  06/28/17   0912   AST  17            Passed - Normal Platelets on file in past 12 months    Recent Labs   Lab Test  05/10/17   1705   PLT  234              Passed - Recent (12 mo) or future (30 days) visit within the authorizing provider's specialty    Patient had office visit in the last 12 months or has a visit in the next 30 days with authorizing provider or within the authorizing provider's specialty.  See \"Patient Info\" tab in inbasket, or \"Choose Columns\" in Meds & Orders section of the refill encounter.           Passed - Patient is age 18 or older       Passed - No active pregnancy on record       Passed - Normal serum creatinine on file in past 12 months    Recent Labs   Lab Test  05/10/17   1705   CR  0.80            Passed - No positive pregnancy test in past 12 months          "

## 2018-05-09 LAB
B BURGDOR IGG+IGM SER QL: 0.21 (ref 0–0.89)
T PALLIDUM AB SER QL: NONREACTIVE

## 2018-05-14 NOTE — TELEPHONE ENCOUNTER
Called and spoke with patient. Got her scheduled for fasting labs on 5/21/18 at 840am  Dian Zuniga MA 9:15 AM 5/14/2018

## 2018-05-21 DIAGNOSIS — I48.20 CHRONIC ATRIAL FIBRILLATION (H): ICD-10-CM

## 2018-05-21 DIAGNOSIS — E78.5 HYPERLIPIDEMIA LDL GOAL <160: ICD-10-CM

## 2018-05-21 LAB
ALBUMIN SERPL-MCNC: 3.7 G/DL (ref 3.4–5)
ALP SERPL-CCNC: 57 U/L (ref 40–150)
ALT SERPL W P-5'-P-CCNC: 29 U/L (ref 0–50)
ANION GAP SERPL CALCULATED.3IONS-SCNC: 8 MMOL/L (ref 3–14)
AST SERPL W P-5'-P-CCNC: 21 U/L (ref 0–45)
BILIRUB SERPL-MCNC: 0.8 MG/DL (ref 0.2–1.3)
BUN SERPL-MCNC: 13 MG/DL (ref 7–30)
CALCIUM SERPL-MCNC: 9.3 MG/DL (ref 8.5–10.1)
CHLORIDE SERPL-SCNC: 105 MMOL/L (ref 94–109)
CHOLEST SERPL-MCNC: 275 MG/DL
CO2 SERPL-SCNC: 27 MMOL/L (ref 20–32)
CREAT SERPL-MCNC: 0.9 MG/DL (ref 0.52–1.04)
GFR SERPL CREATININE-BSD FRML MDRD: 63 ML/MIN/1.7M2
GLUCOSE SERPL-MCNC: 96 MG/DL (ref 70–99)
HDLC SERPL-MCNC: 70 MG/DL
HGB BLD-MCNC: 12.8 G/DL (ref 11.7–15.7)
LDLC SERPL CALC-MCNC: 183 MG/DL
NONHDLC SERPL-MCNC: 205 MG/DL
POTASSIUM SERPL-SCNC: 4 MMOL/L (ref 3.4–5.3)
PROT SERPL-MCNC: 7.5 G/DL (ref 6.8–8.8)
SODIUM SERPL-SCNC: 140 MMOL/L (ref 133–144)
TRIGL SERPL-MCNC: 109 MG/DL

## 2018-05-21 PROCEDURE — 80053 COMPREHEN METABOLIC PANEL: CPT | Performed by: INTERNAL MEDICINE

## 2018-05-21 PROCEDURE — 36415 COLL VENOUS BLD VENIPUNCTURE: CPT | Performed by: INTERNAL MEDICINE

## 2018-05-21 PROCEDURE — 85018 HEMOGLOBIN: CPT | Performed by: INTERNAL MEDICINE

## 2018-05-21 PROCEDURE — 80061 LIPID PANEL: CPT | Performed by: INTERNAL MEDICINE

## 2018-06-11 DIAGNOSIS — I48.20 CHRONIC ATRIAL FIBRILLATION (H): ICD-10-CM

## 2018-06-13 ENCOUNTER — RADIANT APPOINTMENT (OUTPATIENT)
Dept: MAMMOGRAPHY | Facility: CLINIC | Age: 68
End: 2018-06-13
Payer: COMMERCIAL

## 2018-06-13 DIAGNOSIS — Z12.31 VISIT FOR SCREENING MAMMOGRAM: ICD-10-CM

## 2018-06-13 PROCEDURE — 77067 SCR MAMMO BI INCL CAD: CPT | Mod: TC

## 2018-06-20 ENCOUNTER — TRANSFERRED RECORDS (OUTPATIENT)
Dept: HEALTH INFORMATION MANAGEMENT | Facility: CLINIC | Age: 68
End: 2018-06-20

## 2018-07-03 ENCOUNTER — TELEPHONE (OUTPATIENT)
Dept: PEDIATRICS | Facility: CLINIC | Age: 68
End: 2018-07-03

## 2018-07-03 ENCOUNTER — OFFICE VISIT (OUTPATIENT)
Dept: PEDIATRICS | Facility: CLINIC | Age: 68
End: 2018-07-03
Payer: COMMERCIAL

## 2018-07-03 VITALS
TEMPERATURE: 98.3 F | WEIGHT: 172.6 LBS | OXYGEN SATURATION: 97 % | SYSTOLIC BLOOD PRESSURE: 108 MMHG | DIASTOLIC BLOOD PRESSURE: 66 MMHG | BODY MASS INDEX: 31.76 KG/M2 | HEIGHT: 62 IN | HEART RATE: 65 BPM

## 2018-07-03 DIAGNOSIS — I48.20 CHRONIC ATRIAL FIBRILLATION (H): ICD-10-CM

## 2018-07-03 DIAGNOSIS — H47.011 ACUTE ISCHEMIC OPTIC NEUROPATHY OF RIGHT EYE: ICD-10-CM

## 2018-07-03 DIAGNOSIS — R53.83 FATIGUE, UNSPECIFIED TYPE: ICD-10-CM

## 2018-07-03 DIAGNOSIS — H47.322 DRUSEN OF OPTIC DISC, LEFT: ICD-10-CM

## 2018-07-03 DIAGNOSIS — E78.5 HYPERLIPIDEMIA LDL GOAL <100: Primary | ICD-10-CM

## 2018-07-03 LAB
ERYTHROCYTE [DISTWIDTH] IN BLOOD BY AUTOMATED COUNT: 12.7 % (ref 10–15)
HCT VFR BLD AUTO: 39.1 % (ref 35–47)
HGB BLD-MCNC: 13 G/DL (ref 11.7–15.7)
MCH RBC QN AUTO: 33 PG (ref 26.5–33)
MCHC RBC AUTO-ENTMCNC: 33.2 G/DL (ref 31.5–36.5)
MCV RBC AUTO: 99 FL (ref 78–100)
PLATELET # BLD AUTO: 237 10E9/L (ref 150–450)
RBC # BLD AUTO: 3.94 10E12/L (ref 3.8–5.2)
T4 FREE SERPL-MCNC: 1.17 NG/DL (ref 0.76–1.46)
TSH SERPL DL<=0.005 MIU/L-ACNC: 1.97 MU/L (ref 0.4–4)
WBC # BLD AUTO: 6 10E9/L (ref 4–11)

## 2018-07-03 PROCEDURE — 36415 COLL VENOUS BLD VENIPUNCTURE: CPT | Performed by: INTERNAL MEDICINE

## 2018-07-03 PROCEDURE — 84439 ASSAY OF FREE THYROXINE: CPT | Performed by: INTERNAL MEDICINE

## 2018-07-03 PROCEDURE — 99214 OFFICE O/P EST MOD 30 MIN: CPT | Performed by: INTERNAL MEDICINE

## 2018-07-03 PROCEDURE — 85027 COMPLETE CBC AUTOMATED: CPT | Performed by: INTERNAL MEDICINE

## 2018-07-03 PROCEDURE — 84443 ASSAY THYROID STIM HORMONE: CPT | Performed by: INTERNAL MEDICINE

## 2018-07-03 RX ORDER — ROSUVASTATIN CALCIUM 5 MG/1
5 TABLET, COATED ORAL DAILY
Qty: 30 TABLET | Refills: 3 | Status: SHIPPED | OUTPATIENT
Start: 2018-07-03 | End: 2018-11-08

## 2018-07-03 NOTE — TELEPHONE ENCOUNTER
Prior Authorization Retail Medication Request    Medication/Dose: rosuvastatin (CRESTOR) 5 MG tablet  ICD code (if different than what is on RX):  Hyperlipidemia LDL goal <100 [E78.5  Previously Tried and Failed:  none  Rationale:  Hyperlipidemia    Insurance Name:  [employer]  Insurance ID:  [id number]      Pharmacy Information (if different than what is on RX)  Name:  [facility] [place]  Phone:  [phone]    Thanks so much for your help. Have a great day!

## 2018-07-03 NOTE — MR AVS SNAPSHOT
After Visit Summary   7/3/2018    Ely Humphreys    MRN: [id number]           Patient Information     Date Of Birth          1950        Visit Information        Provider Department      7/3/2018 9:30 AM Shadia Munroe MD [facility] [place]        Today's Diagnoses     Hyperlipidemia LDL goal <100    -  1    Chronic atrial fibrillation (H)          Care Instructions    Add crestor 5 mg every other day. If you tolerate this, OK to increase to once daily.  Fasting lab in 3 months.   [address] [address]   Phone: [phone]            Follow-ups after your visit        Additional Services     MENTAL HEALTH REFERRAL  - Adult; Outpatient Treatment; Individual/Couples/Family/Group Therapy/Health Psychology; Other: Behavioral Healthcare Providers [phone]; We will contact you to schedule the appointment or please call with any questi...       All scheduling is subject to the client's specific insurance plan & benefits, provider/location availability, and provider clinical specialities.  Please arrive 15 minutes early for your first appointment and bring your completed paperwork.    Please be aware that coverage of these services is subject to the terms and limitations of your health insurance plan.  Call member services at your health plan with any benefit or coverage questions.                            Follow-up notes from your care team     Return in about 3 months (around 10/3/2018) for Lab Work.      Your next 10 appointments already scheduled     Jul 11, 2018 10:45 AM CDT   [facility] EP RETURN with Miguel Shaffer MD   [facility] ([facility])    [address]  [facility] [address] [phone]              Who to contact     If you have questions or need follow up information about today's clinic visit or your schedule please contact [facility] [place] directly  "at [phone].  Normal or non-critical lab and imaging results will be communicated to you by Endgame[employer], letter or phone within 4 business days after the clinic has received the results. If you do not hear from us within 7 days, please contact the clinic through [employer] or phone. If you have a critical or abnormal lab result, we will notify you by phone as soon as possible.  Submit refill requests through [employer] or call your pharmacy and they will forward the refill request to us. Please allow 3 business days for your refill to be completed.          Additional Information About Your Visit        Endgamehart Information     [employer] gives you secure access to your electronic health record. If you see a primary care provider, you can also send messages to your care team and make appointments. If you have questions, please call your primary care clinic.  If you do not have a primary care provider, please call [phone] and they will assist you.        Care EveryWhere ID     This is your Care EveryWhere ID. This could be used by other organizations to access your [facility] medical records  [id number]        Your Vitals Were     Pulse Temperature Height Pulse Oximetry Breastfeeding? BMI (Body Mass Index)    65 98.3  F (36.8  C) (Oral) 5' 2.01\" (1.575 m) 97% No 31.56 kg/m2       Blood Pressure from Last 3 Encounters:   07/03/18 108/66   04/06/18 124/68   03/27/18 110/60    Weight from Last 3 Encounters:   07/03/18 172 lb 9.6 oz (78.3 kg)   04/06/18 175 lb (79.4 kg)   03/27/18 173 lb 12.8 oz (78.8 kg)              We Performed the Following     MENTAL HEALTH REFERRAL  - Adult; Outpatient Treatment; Individual/Couples/Family/Group Therapy/Health Psychology; Other: Behavioral Healthcare Providers [phone]; We will contact you to schedule the appointment or please call with any questi...          Today's Medication Changes          These changes are accurate as of 7/3/18 10:26 AM.  If you have any questions, ask your " nurse or doctor.               Start taking these medicines.        Dose/Directions    rosuvastatin 5 MG tablet   Commonly known as:  CRESTOR   Used for:  Hyperlipidemia LDL goal <100   Started by:  Shadia Munroe MD        Dose:  5 mg   Take 1 tablet (5 mg) by mouth daily   Quantity:  30 tablet   Refills:  3            Where to get your medicines      These medications were sent to [facility] [place], [place] - [address]  [address] [facility] [place] [address]     Phone:  [phone]     rosuvastatin 5 MG tablet                Primary Care Provider Office Phone # Fax #    Shadia Munroe [phone]       [address] [place] [address] [address] [place] [address]        Equal Access to Services     [facility]: Hadii deshawn hannah hadasho Sorancho, waaxda luqadaha, qaybta kaalmada adeegyada, dori cortes . So [facility] [phone].    ATENCIÓN: Si habla español, tiene a saenz disposición servicios gratuitos de asistencia lingüística. Llame al [phone].    We comply with applicable federal civil rights laws and [place] laws. We do not discriminate on the basis of race, color, national origin, age, disability, sex, sexual orientation, or gender identity.            Thank you!     Thank you for choosing [facility]  for your care. Our goal is always to provide you with excellent care. Hearing back from our patients is one way we can continue to improve our services. Please take a few minutes to complete the written survey that you may receive in the mail after your visit with us. Thank you!             Your Updated Medication List - Protect others around you: Learn how to safely use, store and throw away your medicines at www.disposemymeds.org.          This list is accurate as of 7/3/18 10:26 AM.  Always use your most recent med list.                   Brand Name Dispense Instructions for use Diagnosis    calcium 500 MG Chew      Take 2 chew tab by mouth daily         * ELIQUIS 5 MG tablet   Generic drug:  apixaban ANTICOAGULANT     60 tablet    TAKE ONE TABLET BY MOUTH TWICE DAILY    Chronic atrial fibrillation (H)       * apixaban ANTICOAGULANT 5 MG tablet    ELIQUIS    180 tablet    Take 1 tablet (5 mg) by mouth 2 times daily    Chronic atrial fibrillation (H)       flecainide 100 MG tablet    TAMBOCOR    180 tablet    Take 1 tablet (100 mg) by mouth 2 times daily    Persistent atrial fibrillation (H)       levothyroxine 137 MCG tablet    SYNTHROID    90 tablet    Take 1 tablet (137 mcg) by mouth daily    Acquired hypothyroidism       liothyronine 5 MCG tablet    CYTOMEL    90 tablet    Take 1 tablet (5 mcg) by mouth daily    Acquired hypothyroidism       metoprolol succinate 25 MG 24 hr tablet    TOPROL-XL    30 tablet    Take 0.5 tablets (12.5 mg) by mouth daily Take at night    Persistent atrial fibrillation (H)       order for DME      Equipment ordered: RESMED CPAP Mask type: Nasal Settings: 8 cmH2O        PROBIOTIC DAILY Caps      Take 1 capsule by mouth daily        rosuvastatin 5 MG tablet    CRESTOR    30 tablet    Take 1 tablet (5 mg) by mouth daily    Hyperlipidemia LDL goal <100       SHINGRIX injection   Generic drug:  zoster vaccine recombinant adjuvanted           vitamin  s/Minerals Tabs      1 TABLET DAILY        vitamin D 1000 units capsule      take 2 Caps by mouth daily.        * Notice:  This list has 2 medication(s) that are the same as other medications prescribed for you. Read the directions carefully, and ask your doctor or other care provider to review them with you.

## 2018-07-03 NOTE — PROGRESS NOTES
SUBJECTIVE:   Ely Humphreys is a 68 year old female who presents to clinic today for the following health issues:    Patient is here today to decision getting on a cholesterol medication.    Would also like to discuss her medications for heart.    Reviewed recent vision loss in [place] eye. Has been seeing ophthalmology. Is feeling sad and frustrated that all of these medical conditions are happening. Feels like this might be the result of medications, and would like to discuss getting off her medications.    Problem list and histories reviewed & adjusted, as indicated.  Additional history: as documented    Patient Active Problem List   Diagnosis     Hypothyroidism     Disorder of bone and cartilage     Postmenopausal atrophic vaginitis     Invasive ductal carcinoma of breast (H)     HYPERLIPIDEMIA LDL GOAL <160     Abnormal Pap smear     Health Care Home     ACP (advance care planning)     Meralgia paresthetica     Hypothyroidism, unspecified type     Chronic atrial fibrillation (H)     Persistent atrial fibrillation (H)     Past Surgical History:   Procedure Laterality Date     ANESTHESIA CARDIOVERSION N/A 11/28/2017    Procedure: ANESTHESIA CARDIOVERSION;  Cardioversion;  Surgeon: GENERIC ANESTHESIA PROVIDER;  Location: [facility] OR     C NONSPECIFIC PROCEDURE      Tubal Ligation    Abstracted 07/12/02     C THYROID ABLATION       COLONOSCOPY  10/03     COLONOSCOPY  5/9/2014     COLONOSCOPY  6/9/2014    Procedure: COLONOSCOPY;  Surgeon: Garrett Villaseñor MD;  Location: [facility] GI     ENHANCE LASER REFRACTIVE BILATERAL EXISTING PT IN PARAMETERS Bilateral 2008     HC ECHO HEART XTHORACIC, STRESS/REST  7/22/09    normal     HC EXCISION BREAST LESION, OPEN >=1  7/09    re-excision 8/17/09       Social History   Substance Use Topics     Smoking status: Never Smoker     Smokeless tobacco: Never Used     Alcohol use Yes      Comment: 1 or 2 glasses of wine most evenings     Family History   Problem Relation Age of Onset     Cancer  "Mother      82 yo Breast & Melanoma     Cerebrovascular Disease Mother 92     TIA     C.A.D. Mother      Cerebrovascular Disease Father       87 yo Alzheimers, CHF     C.A.D. Father      possible MI in age 60's     Myocardial Infarction Father      Cancer Maternal Aunt       40's Breast     Cerebrovascular Disease Paternal Grandmother       late 40's - early 50's     Blood Disease Maternal Aunt       53 yo Lupus     Cancer - colorectal No family hx of            Reviewed and updated as needed this visit by clinical staff       Reviewed and updated as needed this visit by Provider         ROS:  Constitutional, HEENT, cardiovascular, pulmonary, GI, , musculoskeletal, neuro, skin, endocrine and psych systems are negative, except as otherwise noted.    OBJECTIVE:     /66  Pulse 65  Temp 98.3  F (36.8  C) (Oral)  Ht 5' 2.01\" (1.575 m)  Wt 172 lb 9.6 oz (78.3 kg)  SpO2 97%  Breastfeeding? No  BMI 31.56 kg/m2  Body mass index is 31.56 kg/(m^2).  GENERAL: healthy, alert and no distress  NECK: no adenopathy, no asymmetry, masses, or scars and thyroid normal to palpation  RESP: lungs clear to auscultation - no rales, rhonchi or wheezes  CV: irregularly irregular, normal S1 S2, no S3 or S4, no murmur, click or rub, no peripheral edema and peripheral pulses strong  ABDOMEN: soft, nontender, no hepatosplenomegaly, no masses and bowel sounds normal    Diagnostic Test Results:  Results for orders placed or performed in visit on 18   TSH   Result Value Ref Range    TSH 1.97 0.40 - 4.00 mU/L   T4 FREE   Result Value Ref Range    T4 Free 1.17 0.76 - 1.46 ng/dL   CBC with platelets   Result Value Ref Range    WBC 6.0 4.0 - 11.0 10e9/L    RBC Count 3.94 3.8 - 5.2 10e12/L    Hemoglobin 13.0 11.7 - 15.7 g/dL    Hematocrit 39.1 35.0 - 47.0 %    MCV 99 78 - 100 fl    MCH 33.0 26.5 - 33.0 pg    MCHC 33.2 31.5 - 36.5 g/dL    RDW 12.7 10.0 - 15.0 %    Platelet Count 237 150 - 450 10e9/L "       ASSESSMENT/PLAN:     1. Hyperlipidemia LDL goal <100  Discussed risk and options. She is concerned about side effects from medication given her recent vision problems. Discussed known benefits from statin and her calculated 10 year risk for cardiovascular event.The patient is informed that this type of drug is usually highly effective to lower LDL cholesterol and is usually very well tolerated. However, statin-type drugs can potentially injure the liver. Blood tests will be required every 3 months for the first 6 months of therapy, and at 6-12 month intervals thereafter.  Damage to the liver, if detected early, can be reversed by stopping the drug.  Be alert for persistent nausea, abdominal pain, or yellow jaundice, and report such to me directly. Statin drugs may also cause skeletal muscle injury in rare cases. Be alert for pronounced persistent diffuse muscle pain and discontinue the drug immediately should such symptoms develop. Also, she should avoid grapefruit juice while taking this medication.  - rosuvastatin (CRESTOR) 5 MG tablet; Take 1 tablet (5 mg) by mouth daily (Patient not taking: Reported on 7/11/2018)  Dispense: 30 tablet; Refill: 3  - Lipid panel reflex to direct LDL Fasting; Future  - **ALT FUTURE anytime; Future    2. Chronic atrial fibrillation (H)  Discussed talking to a therapist about her multiple health conditions in the last few years. Consider [facility] or other.   - MENTAL HEALTH REFERRAL  - Adult; Outpatient Treatment; Individual/Couples/Family/Group Therapy/Health Psychology; Other: Behavioral Healthcare Providers [phone]; We will contact you to schedule the appointment or please call with any questi...    3. Fatigue, unspecified type  Check lab  - TSH  - T4 FREE  - CBC with platelets    4. 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 Drusen of optic disc, left  As above. Sees ophtho.      Patient Instructions   Add crestor  5 mg every other day. If you tolerate this, OK to increase to once daily.  Fasting lab in 3 months.   [place] [id number] [address]   Phone: [phone]        Shadia Munroe MD  [facility]

## 2018-07-03 NOTE — LETTER
My Depression Action Plan  Name: Ely Humphreys   Date of Birth 1950  Date: 7/3/2018    My doctor: Shadia Munroe   My clinic: [address]  [address]  [facility] [address] [phone]          GREEN    ZONE   Good Control    What it looks like:     Things are going generally well. You have normal up s and down s. You may even feel depressed from time to time, but bad moods usually last less than a day.   What you need to do:  1. Continue to care for yourself (see self care plan)  2. Check your depression survival kit and update it as needed  3. Follow your physician s recommendations including any medication.  4. Do not stop taking medication unless you consult with your physician first.           YELLOW         ZONE Getting Worse    What it looks like:     Depression is starting to interfere with your life.     It may be hard to get out of bed; you may be starting to isolate yourself from others.    Symptoms of depression are starting to last most all day and this has happened for several days.     You may have suicidal thoughts but they are not constant.   What you need to do:     1. Call your care team, your response to treatment will improve if you keep your care team informed of your progress. Yellow periods are signs an adjustment may need to be made.     2. Continue your self-care, even if you have to fake it!    3. Talk to someone in your support network    4. Open up your depression survival kit           RED    ZONE Medical Alert - Get Help    What it looks like:     Depression is seriously interfering with your life.     You may experience these or other symptoms: You can t get out of bed most days, can t work or engage in other necessary activities, you have trouble taking care of basic hygiene, or basic responsibilities, thoughts of suicide or death that will not go away, self-injurious behavior.     What you need to do:  1. Call your  care team and request a same-day appointment. If they are not available (weekends or after hours) call your local crisis line, emergency room or 911.            Depression Self Care Plan / Survival Kit    Self-Care for Depression  Here s the deal. Your body and mind are really not as separate as most people think.  What you do and think affects how you feel and how you feel influences what you do and think. This means if you do things that people who feel good do, it will help you feel better.  Sometimes this is all it takes.  There is also a place for medication and therapy depending on how severe your depression is, so be sure to consult with your medical provider and/ or Behavioral Health Consultant if your symptoms are worsening or not improving.     In order to better manage my stress, I will:    Exercise  Get some form of exercise, every day. This will help reduce pain and release endorphins, the  feel good  chemicals in your brain. This is almost as good as taking antidepressants!  This is not the same as joining a gym and then never going! (they count on that by the way ) It can be as simple as just going for a walk or doing some gardening, anything that will get you moving.      Hygiene   Maintain good hygiene (Get out of bed in the morning, Make your bed, Brush your teeth, Take a shower, and Get dressed like you were going to work, even if you are unemployed).  If your clothes don't fit try to get ones that do.    Diet  I will strive to eat foods that are good for me, drink plenty of water, and avoid excessive sugar, caffeine, alcohol, and other mood-altering substances.  Some foods that are helpful in depression are: complex carbohydrates, B vitamins, flaxseed, fish or fish oil, fresh fruits and vegetables.    Psychotherapy  I agree to participate in Individual Therapy (if recommended).    Medication  If prescribed medications, I agree to take them.  Missing doses can result in serious side effects.  I  understand that drinking alcohol, or other illicit drug use, may cause potential side effects.  I will not stop my medication abruptly without first discussing it with my provider.    Staying Connected With Others  I will stay in touch with my friends, family members, and my primary care provider/team.    Use your imagination  Be creative.  We all have a creative side; it doesn t matter if it s oil painting, sand castles, or mud pies! This will also kick up the endorphins.    Witness Beauty  (AKA stop and smell the roses) Take a look outside, even in mid-winter. Notice colors, textures. Watch the squirrels and birds.     Service to others  Be of service to others.  There is always someone else in need.  By helping others we can  get out of ourselves  and remember the really important things.  This also provides opportunities for practicing all the other parts of the program.    Humor  Laugh and be silly!  Adjust your TV habits for less news and crime-drama and more comedy.    Control your stress  Try breathing deep, massage therapy, biofeedback, and meditation. Find time to relax each day.     My support system    Clinic Contact:  Phone number:    Contact 1:  Phone number:    Contact 2:  Phone number:    Faith/:  Phone number:    Therapist:  Phone number:    Local crisis center:    Phone number:    Other community support:  Phone number:

## 2018-07-03 NOTE — PATIENT INSTRUCTIONS
Add crestor 5 mg every other day. If you tolerate this, OK to increase to once daily.  Fasting lab in 3 months.   [place] [id number] [address] [address]   Phone: [phone]

## 2018-07-05 NOTE — TELEPHONE ENCOUNTER
PA Initiation    Medication: rosuvastatin (CRESTOR) 5 MG tablet  Insurance Company: [employer] - Phone [phone] Fax [phone]  Pharmacy Filling the Rx: [address]  Filling Pharmacy Phone: [phone]  Filling Pharmacy Fax:    Start Date: 7/5/2018    Central Prior Authorization Team   Phone: [phone]

## 2018-07-11 ENCOUNTER — OFFICE VISIT (OUTPATIENT)
Dept: CARDIOLOGY | Facility: CLINIC | Age: 68
End: 2018-07-11
Attending: PHYSICIAN ASSISTANT
Payer: COMMERCIAL

## 2018-07-11 VITALS
BODY MASS INDEX: 32.07 KG/M2 | DIASTOLIC BLOOD PRESSURE: 80 MMHG | SYSTOLIC BLOOD PRESSURE: 118 MMHG | HEIGHT: 62 IN | HEART RATE: 64 BPM | WEIGHT: 174.3 LBS

## 2018-07-11 DIAGNOSIS — I48.91 ATRIAL FIBRILLATION, UNSPECIFIED TYPE (H): ICD-10-CM

## 2018-07-11 DIAGNOSIS — I48.19 PERSISTENT ATRIAL FIBRILLATION (H): ICD-10-CM

## 2018-07-11 PROCEDURE — 93000 ELECTROCARDIOGRAM COMPLETE: CPT | Performed by: INTERNAL MEDICINE

## 2018-07-11 PROCEDURE — 99214 OFFICE O/P EST MOD 30 MIN: CPT | Performed by: INTERNAL MEDICINE

## 2018-07-11 NOTE — PROGRESS NOTES
"[id number]    Electrophysiology/ Clinic Note         H&P and Plan:           Miguel Shaffer MD    Physical Exam:  Vitals: /80 (BP Location: Right arm, Patient Position: Sitting, Cuff Size: Adult Large)  Pulse 64  Ht 1.575 m (5' 2\")  Wt 79.1 kg (174 lb 4.8 oz)  Breastfeeding? No  BMI 31.88 kg/m2    Constitutional:  AAO x3.  Pt is in NAD.  HEAD: normocephalic.  SKIN: Skin normal color, texture and turgor with no lesions or eruptions.  Eyes: PERRL, EOMI.  ENT:  Supple, normal JVP. No lymphadenopathy or thyroid enlargement.  Chest:  CTAB.  Cardiac:  RRR, normal  S1 and S2.  No murmurs rubs or gallop.  Abdomen:  Normal BS.  Soft, non-tender and non-distended.  No rebound or guarding.    Extremities:  Pedious pulses palpable B/L.  No LE edema noticed.   Neurological: Strength and sensation grossly symmetric and intact throughout.       CURRENT MEDICATIONS:  Current Outpatient Prescriptions   Medication Sig Dispense Refill     apixaban ANTICOAGULANT (ELIQUIS) 5 MG tablet Take 1 tablet (5 mg) by mouth 2 times daily 180 tablet 3     calcium 500 MG CHEW Take 2 chew tab by mouth daily       Cholecalciferol (VITAMIN D) 1000 UNIT capsule take 2 Caps by mouth daily.       flecainide (TAMBOCOR) 100 MG tablet Take 1 tablet (100 mg) by mouth 2 times daily 180 tablet 3     levothyroxine (SYNTHROID) 137 MCG tablet Take 1 tablet (137 mcg) by mouth daily 90 tablet 1     liothyronine (CYTOMEL) 5 MCG tablet Take 1 tablet (5 mcg) by mouth daily 90 tablet 1     metoprolol succinate (TOPROL-XL) 25 MG 24 hr tablet Take 0.5 tablets (12.5 mg) by mouth daily Take at night 30 tablet 3     order for DME Equipment ordered: RESMED CPAP Mask type: Nasal Settings: 8 cmH2O       Probiotic Product (PROBIOTIC DAILY) CAPS Take 1 capsule by mouth daily       SHINGRIX injection   1     VITAMINS/MINERALS OR TABS 1 TABLET DAILY       rosuvastatin (CRESTOR) 5 MG tablet Take 1 tablet (5 mg) by mouth daily (Patient not taking: Reported on 7/11/2018) 30 " tablet 3       ALLERGIES     Allergies   Allergen Reactions     No Known Drug Allergies        PAST MEDICAL HISTORY:  Past Medical History:   Diagnosis Date     Benign essential tremor     Chronic     Hyperlipidemia LDL goal <160 2/10/2010     Invasive ductal carcinoma of breast (H)     left breast ca     Major depressive disorder, recurrent episode, in full remission (H) 10/19/2011    Stable for decades     osteopenia 2002     Palpitations      Persistent atrial fibrillation (H) 05/10/2017     Unspecified hypothyroidism        PAST SURGICAL HISTORY:  Past Surgical History:   Procedure Laterality Date     ANESTHESIA CARDIOVERSION N/A 2017    Procedure: ANESTHESIA CARDIOVERSION;  Cardioversion;  Surgeon: GENERIC ANESTHESIA PROVIDER;  Location: [facility] OR     C NONSPECIFIC PROCEDURE      Tubal Ligation    Abstracted 02     C THYROID ABLATION       COLONOSCOPY  10/03     COLONOSCOPY  2014     COLONOSCOPY  2014    Procedure: COLONOSCOPY;  Surgeon: Garrett Villaseñor MD;  Location: [facility]     ENHANCE LASER REFRACTIVE BILATERAL EXISTING PT IN PARAMETERS Bilateral      HC ECHO HEART XTHORACIC, STRESS/REST  09    normal     HC EXCISION BREAST LESION, OPEN >=1      re-excision 09       FAMILY HISTORY:  Family History   Problem Relation Age of Onset     Cancer Mother      84 yo Breast & Melanoma     Cerebrovascular Disease Mother 92     TIA     C.A.D. Mother      Cerebrovascular Disease Father       85 yo Alzheimers, CHF     C.A.D. Father      possible MI in age 60's     Myocardial Infarction Father      Cancer Maternal Aunt       40's Breast     Cerebrovascular Disease Paternal Grandmother       late 40's - early 50's     Blood Disease Maternal Aunt       51 yo Lupus     Cancer - colorectal No family hx of        SOCIAL HISTORY:  Social History     Social History     Marital status:      Spouse name: Judah     Number of children: 2     Years of  education: 18     Occupational History      [employer]     health      Social History Main Topics     Smoking status: Never Smoker     Smokeless tobacco: Never Used     Alcohol use Yes      Comment: 1 or 2 glasses of wine most evenings     Drug use: No     Sexual activity: Not Currently     Birth control/ protection: Surgical     Other Topics Concern     Parent/Sibling W/ Cabg, Mi Or Angioplasty Before 65f 55m? Yes     father around 55     Social History Narrative       Review of Systems:  Skin:  Positive for bruising   Eyes:  Positive for glasses  ENT:  Negative    Respiratory:  Positive for sleep apnea;CPAP;shortness of breath  Cardiovascular:    palpitations;Positive for  Gastroenterology: Negative    Genitourinary:  Negative    Musculoskeletal:  Positive for back pain  Neurologic:  Positive for headaches  Psychiatric:  Negative    Heme/Lymph/Imm:  Positive for easy bruising  Endocrine:  Positive for thyroid disorder      Recent Lab Results:  LIPID RESULTS:  Lab Results   Component Value Date    CHOL 275 (H) 05/21/2018    HDL 70 05/21/2018     (H) 05/21/2018    TRIG 109 05/21/2018    CHOLHDLRATIO 3.3 11/09/2015       LIVER ENZYME RESULTS:  Lab Results   Component Value Date    AST 21 05/21/2018    ALT 29 05/21/2018       CBC RESULTS:  Lab Results   Component Value Date    WBC 6.0 07/03/2018    RBC 3.94 07/03/2018    HGB 13.0 07/03/2018    HCT 39.1 07/03/2018    MCV 99 07/03/2018    MCH 33.0 07/03/2018    MCHC 33.2 07/03/2018    RDW 12.7 07/03/2018     07/03/2018       BMP RESULTS:  Lab Results   Component Value Date     05/21/2018    POTASSIUM 4.0 05/21/2018    CHLORIDE 105 05/21/2018    CO2 27 05/21/2018    ANIONGAP 8 05/21/2018    GLC 96 05/21/2018    BUN 13 05/21/2018    CR 0.90 05/21/2018    GFRESTIMATED 63 05/21/2018    GFRESTBLACK 76 05/21/2018    MARTITA 9.3 05/21/2018        A1C RESULTS:  Lab Results   Component Value Date    A1C 5.4 11/09/2015       INR RESULTS:  Lab  Results   Component Value Date    INR 1.01 05/10/2017         ECHOCARDIOGRAM  Recent Results (from the past 8760 hour(s))   Echocardiogram Complete    Narrative    [id number]  [facility]  [id number]  [id number]^ROMEO^ESTEE^CAROL           [facility]  Echocardiography Laboratory  [address] [place] [address]        Name: NYA KOROMA  MRN: [id number]  : 1950  Study Date: 2018 11:16 AM  Age: 67 yrs  Gender: Female  Patient Location: [facility]  Reason For Study: , Persistent atrial fibrillation (H)  Ordering Physician: ESTEE WALTERS  Referring Physician: ESTEE WALTERS  Performed By: Virginia Acuna     BSA: 1.8 m2  Height: 63 in  Weight: 170 lb  HR: 62  BP: 130/85 mmHg  _____________________________________________________________________________  __        Procedure  Complete Echo Adult.  _____________________________________________________________________________  __        Interpretation Summary     There is borderline concentric left ventricular hypertrophy.  The visual ejection fraction is estimated at 49-53%.  There is borderline-mild global hypokinesia of the left ventricle.  There is borderline/mild global hypokinesis of the LV and may be more  prominant along distal septum and posteior walls  The ascending aorta is Mildly dilated.  Sinus rhythm was noted.  _____________________________________________________________________________  __        Left Ventricle  The left ventricle is normal in size. There is borderline concentric left  ventricular hypertrophy. The visual ejection fraction is estimated at 49-53%.  Grade I or early diastolic dysfunction. Diastolic Doppler findings (E/E' ratio  and/or other parameters) suggest left ventricular filling pressures are  normal. There is borderline-mild global hypokinesia of the left ventricle.  There is borderline/mild global hypokinesis of the LV and may be more  prominant along distal septum and posteior walls.  There is no thrombus seen in  the left ventricle.     Right Ventricle  The right ventricle is normal in structure, function and size.     Atria  The left atrium is mildly dilated. Right atrial size is normal. There is no  color Doppler evidence of an atrial shunt.     Mitral Valve  There is trace to mild mitral regurgitation.        Tricuspid Valve  There is trace tricuspid regurgitation. Doppler findings do not suggest  pulmonary hypertension.     Aortic Valve  The aortic valve is trileaflet. There is trace to mild aortic regurgitation.  No hemodynamically significant valvular aortic stenosis.     Pulmonic Valve  The pulmonic valve is not well seen, but is grossly normal.     Vessels  The ascending aorta is Mildly dilated. Sinus Valsalva 37mm, ascending aorta  38mm. The IVC is normal in size and reactivity with respiration, suggesting  normal central venous pressure.     Pericardium  The pericardium appears normal.        Rhythm  Sinus rhythm was noted.  _____________________________________________________________________________  __  MMode/2D Measurements & Calculations  RVDd: 3.4 cm  IVSd: 1.2 cm     LVIDd: 5.2 cm  LVIDs: 3.2 cm  LVPWd: 0.98 cm  FS: 38.0 %  LV mass(C)d: 226.6 grams  LV mass(C)dI: 125.5 grams/m2  Ao root diam: 3.7 cm  LA dimension: 4.0 cm  asc Aorta Diam: 3.8 cm  LA/Ao: 1.1  LA Volume (BP): 54.8 ml  LA Volume Index (BP): 30.4 ml/m2  RWT: 0.38           Doppler Measurements & Calculations  MV E max boom: 42.7 cm/sec  MV A max boom: 62.1 cm/sec  MV E/A: 0.69  MV dec time: 0.45 sec  LV V1 max P.3 mmHg  LV V1 max: 75.2 cm/sec  LV V1 VTI: 15.0 cm  TR max boom: 223.2 cm/sec  TR max P.9 mmHg  E/E' av.9  Lateral E/e': 7.4  Medial E/e': 10.3           _____________________________________________________________________________  __           Report approved by: Larry Ford 2018 01:40 PM      Echocardiogram    Narrative    [id number]  [facility]  [id number]  [id number]^MACHELLE^BESSY^ABDULAZIZ            [facility]  Echocardiography Laboratory  [address]  [place] [address]        Name: NYA KOROMA  MRN: [id number]  : 1950  Study Date: 2017 01:04 PM  Age: 67 yrs  Gender: Female  Patient Location: [facility]  Reason For Study: Chronic atrial fibrillation (H)  Ordering Physician: BESSY CARTY  Referring Physician: Shadia Munroe  Performed By: Anny Laura RDCS     BSA: 1.8 m2  Height: 62 in  Weight: 170 lb  HR: 84  BP: 118/72 mmHg  _____________________________________________________________________________  __        Procedure  Complete Echo Adult.  _____________________________________________________________________________  __        Interpretation Summary     The visual ejection fraction is estimated at 40-45%.  There is mild-moderate global hypokinesia of the left ventricle.  The right ventricular systolic function is borderline reduced.  The IVC is normal in size and reactivity with respiration, suggesting normal  central venous pressure.  The ascending aorta is Mildly dilated.  The left atrium is mildly dilated.  There is moderate (2+) mitral regurgitation.  The mitral regurgitant jet is eccentrically directed. SCOTTIE likely a more  accurate assessment of MR.  Suspect LA volume measurments undersestimate LA size.  Compared to prior study- no significant change.  The rhythm was atrial fibrillation.  _____________________________________________________________________________  __        Left Ventricle  The left ventricle is normal in size. There is normal left ventricular wall  thickness. The visual ejection fraction is estimated at 40-45%. E by E prime  ratio is between 8 and 15, which is indeterminate for assessment of left  ventricular filling pressures. Left ventricular diastolic function is  indeterminate. There is mild-moderate global hypokinesia of the left  ventricle.     Right Ventricle  The right ventricle is normal size. The right ventricular  systolic function is  borderline reduced.     Atria  The left atrium is mildly dilated. The right atrium is mildly dilated.     Mitral Valve  There is moderate (2+) mitral regurgitation. The mitral regurgitant jet is  eccentrically directed.        Tricuspid Valve  The tricuspid valve is normal in structure and function. There is mild to  moderate (1-2+) tricuspid regurgitation. The right ventricular systolic  pressure is approximated at 19.0 mmHg plus the right atrial pressure. Normal  IVC (1.5-2.5cm) with >50% respiratory collapse; right atrial pressure is  estimated at 5-10mmHg. Right ventricle systolic pressure estimate normal.     Aortic Valve  There is trivial trileaflet aortic sclerosis. There is trace aortic  regurgitation.     Pulmonic Valve  The pulmonic valve is not well seen, but is grossly normal. There is trace  pulmonic valvular regurgitation. Normal pulmonic valve velocity.     Vessels  The aortic root is normal size. The ascending aorta is Mildly dilated. The IVC  is normal in size and reactivity with respiration, suggesting normal central  venous pressure.     Pericardium  There is no pericardial effusion.        Rhythm  The rhythm was atrial fibrillation.  _____________________________________________________________________________  __  MMode/2D Measurements & Calculations  IVSd: 1.1 cm     LVIDd: 4.4 cm  LVIDs: 3.1 cm  LVPWd: 1.1 cm  IVC diam: 1.9 cm  FS: 30.5 %  EDV(Teich): 88.4 ml  ESV(Teich): 37.0 ml  LV mass(C)d: 176.5 grams  LV mass(C)dI: 98.9 grams/m2  Ao root diam: 3.6 cm  asc Aorta Diam: 4.1 cm  LA volume (AL/bp): 60.5 cm3     LA Volume Indexed (AL/bp): 33.9 ml/m2  RWT: 0.52        Doppler Measurements & Calculations  MV E max it: 78.8 cm/sec  MV A max ti: 0.35 cm/sec  MV E/A: 225.1  MV dec slope: 629.2 cm/sec2  MV dec time: 0.13 sec  MR ERO: 0.27 cm2  TV max P.0 mmHg  TR max ti: 217.8 cm/sec  TR max P.0 mmHg  E/E' av.0  Lateral E/e': 8.7  Medial E/e': 7.2  Peak E' Ti:  10.1 cm/sec              _____________________________________________________________________________  __           Report approved by: Larry Sharma 11/21/2017 04:46 PM            Orders Placed This Encounter   Procedures     EKG 12-lead complete w/read - Clinics (performed today)     No orders of the defined types were placed in this encounter.    Medications Discontinued During This Encounter   Medication Reason     ELIQUIS 5 MG tablet Medication Reconciliation Clean Up         Encounter Diagnoses   Name Primary?     Persistent atrial fibrillation (H)      Atrial fibrillation, unspecified type (H)          GELY DudleyC  [id number]  [place] [id number]

## 2018-07-11 NOTE — LETTER
7/11/2018      Shadia Munroe MD  [address] [place] [address]      RE: Ely Humphreys       Dear Colleague,    I had the pleasure of seeing Ely Humphreys in the [facility].    Service Date: 07/11/2018      REASON FOR VISIT:  Evaluation of persistent atrial fibrillation.      HISTORY OF PRESENT ILLNESS:  Ms. Humphreys is a pleasant 68-year-old lady with a history of Graves' disease (14 years ago, status post radiotherapy), breast cancer (left-sided lumpectomy 7 years ago) and persistent atrial fibrillation with signs of tachycardia-mediated cardiomyopathy who is here for routine evaluation.      The patient initially presented with AFib which was an incidental finding during preop evaluation for elective knee surgery.  She was evaluated by Dr. Don and was found to have cardiomyopathy (EF of 40%).  She underwent cardioversion.  However, she failed cardioversion and was referred to me in 09/2017.  I loaded her with amiodarone, and she underwent successful cardioversion.  After cardioversion, cardiac function improved to the 50s, and amiodarone was discontinued and she was started on flecainide.      Today she informs that she has done well on flecainide.  She rarely has any breakthroughs of AFib.  She denies any symptoms such as chest pain, shortness of breath, lightheadedness, near-syncope or syncopal episode.      EKG done here today is very noisy, but she appears to be in normal rhythm with QRS measuring 108 milliseconds.  She had a stress test done on 04/12/2018 which was negative for ischemia or proarrhythmia.  Of note, in May patient was presented with right eye blindness.  She was evaluated by a neuro-ophthalmologist and underwent an extensive workup including brain MRI and MRA which was essentially unremarkable.  She also had an orbit MRI which was within normal limits.  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  She admits to being compliant to medical therapy with Eliquis.      ASSESSMENT AND PLAN:   1.  Recurrent persistent AFib and signs of tachycardia-mediated cardiomyopathy.  The cardiomyopathy seems to be resolved, and her symptoms are well controlled on flecainide therapy.  We will repeat an echocardiogram to evaluate cardiac function.  We will continue current therapy with flecainide.   2.  Embolic prevention.  CHADS-VASc score of 3.  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  However, we have no confirmation that this was embolic in nature, as MRI and MRA have been unremarkable.  We discussed about possibility of switching to a different blood thinner such as Coumadin so we can monitor INRs.  However, since there is a lack of proof that she actually had an ischemic embolic event, we decided to continue anticoagulation with Eliquis for now.   3.  Followup care.  Follow up in clinic in 6 months or earlier as needed.         BESSY CARTY MD             D: 2018   T: 2018   MT: GIOVANI      Name:     NYA KOROMA   MRN:      [id number]        Account:      [id number]   :      1950           Service Date: 2018      Document: [id number]         Outpatient Encounter Prescriptions as of 2018   Medication Sig Dispense Refill     apixaban ANTICOAGULANT (ELIQUIS) 5 MG tablet Take 1 tablet (5 mg) by mouth 2 times daily 180 tablet 3     calcium 500 MG CHEW Take 2 chew tab by mouth daily       Cholecalciferol (VITAMIN D) 1000 UNIT capsule take 2 Caps by mouth daily.       flecainide (TAMBOCOR) 100 MG tablet Take 1 tablet (100 mg) by mouth 2 times daily 180 tablet 3     levothyroxine (SYNTHROID) 137 MCG tablet Take 1 tablet (137 mcg) by mouth daily 90 tablet 1     liothyronine (CYTOMEL) 5 MCG tablet Take 1 tablet (5 mcg) by mouth daily 90 tablet 1     metoprolol succinate (TOPROL-XL) 25 MG 24 hr tablet Take 0.5 tablets (12.5 mg) by mouth daily Take at  night 30 tablet 3     order for DME Equipment ordered: RESMED CPAP Mask type: Nasal Settings: 8 cmH2O       Probiotic Product (PROBIOTIC DAILY) CAPS Take 1 capsule by mouth daily       SHINGRIX injection   1     VITAMINS/MINERALS OR TABS 1 TABLET DAILY       rosuvastatin (CRESTOR) 5 MG tablet Take 1 tablet (5 mg) by mouth daily (Patient not taking: Reported on 7/11/2018) 30 tablet 3     [DISCONTINUED] ELIQUIS 5 MG tablet TAKE ONE TABLET BY MOUTH TWICE DAILY  60 tablet 1     No facility-administered encounter medications on file as of 7/11/2018.        Again, thank you for allowing me to participate in the care of your patient.      Sincerely,    Miguel Shaffer MD     [facility]

## 2018-07-11 NOTE — LETTER
"7/11/2018    Shadia Munroe MD  [address] [place] [address]    RE: Ely VILLAGRAN Petr       Dear Colleague,    I had the pleasure of seeing Ely VILLAGRAN Petr in the [facility].    [id number]    Electrophysiology/ Clinic Note         H&P and Plan:           Miguel Shaffer MD    Physical Exam:  Vitals: /80 (BP Location: Right arm, Patient Position: Sitting, Cuff Size: Adult Large)  Pulse 64  Ht 1.575 m (5' 2\")  Wt 79.1 kg (174 lb 4.8 oz)  Breastfeeding? No  BMI 31.88 kg/m2    Constitutional:  AAO x3.  Pt is in NAD.  HEAD: normocephalic.  SKIN: Skin normal color, texture and turgor with no lesions or eruptions.  Eyes: PERRL, EOMI.  ENT:  Supple, normal JVP. No lymphadenopathy or thyroid enlargement.  Chest:  CTAB.  Cardiac:  RRR, normal  S1 and S2.  No murmurs rubs or gallop.  Abdomen:  Normal BS.  Soft, non-tender and non-distended.  No rebound or guarding.    Extremities:  Pedious pulses palpable B/L.  No LE edema noticed.   Neurological: Strength and sensation grossly symmetric and intact throughout.       CURRENT MEDICATIONS:  Current Outpatient Prescriptions   Medication Sig Dispense Refill     apixaban ANTICOAGULANT (ELIQUIS) 5 MG tablet Take 1 tablet (5 mg) by mouth 2 times daily 180 tablet 3     calcium 500 MG CHEW Take 2 chew tab by mouth daily       Cholecalciferol (VITAMIN D) 1000 UNIT capsule take 2 Caps by mouth daily.       flecainide (TAMBOCOR) 100 MG tablet Take 1 tablet (100 mg) by mouth 2 times daily 180 tablet 3     levothyroxine (SYNTHROID) 137 MCG tablet Take 1 tablet (137 mcg) by mouth daily 90 tablet 1     liothyronine (CYTOMEL) 5 MCG tablet Take 1 tablet (5 mcg) by mouth daily 90 tablet 1     metoprolol succinate (TOPROL-XL) 25 MG 24 hr tablet Take 0.5 tablets (12.5 mg) by mouth daily Take at night 30 tablet 3     order for DME Equipment ordered: RESMED CPAP Mask type: Nasal Settings: 8 cmH2O       Probiotic Product (PROBIOTIC DAILY) CAPS Take " 1 capsule by mouth daily       SHINGRIX injection   1     VITAMINS/MINERALS OR TABS 1 TABLET DAILY       rosuvastatin (CRESTOR) 5 MG tablet Take 1 tablet (5 mg) by mouth daily (Patient not taking: Reported on 2018) 30 tablet 3       ALLERGIES     Allergies   Allergen Reactions     No Known Drug Allergies        PAST MEDICAL HISTORY:  Past Medical History:   Diagnosis Date     Benign essential tremor     Chronic     Hyperlipidemia LDL goal <160 2/10/2010     Invasive ductal carcinoma of breast (H)     left breast ca     Major depressive disorder, recurrent episode, in full remission (H) 10/19/2011    Stable for decades     osteopenia 2002     Palpitations      Persistent atrial fibrillation (H) 05/10/2017     Unspecified hypothyroidism        PAST SURGICAL HISTORY:  Past Surgical History:   Procedure Laterality Date     ANESTHESIA CARDIOVERSION N/A 2017    Procedure: ANESTHESIA CARDIOVERSION;  Cardioversion;  Surgeon: GENERIC ANESTHESIA PROVIDER;  Location: [facility] OR     C NONSPECIFIC PROCEDURE      Tubal Ligation    Abstracted 02     C THYROID ABLATION       COLONOSCOPY  10/03     COLONOSCOPY  2014     COLONOSCOPY  2014    Procedure: COLONOSCOPY;  Surgeon: Garrett Villaseñor MD;  Location: [facility] GI     ENHANCE LASER REFRACTIVE BILATERAL EXISTING PT IN PARAMETERS Bilateral      HC ECHO HEART XTHORACIC, STRESS/REST  09    normal     HC EXCISION BREAST LESION, OPEN >=1      re-excision 09       FAMILY HISTORY:  Family History   Problem Relation Age of Onset     Cancer Mother      82 yo Breast & Melanoma     Cerebrovascular Disease Mother 92     TIA     C.A.D. Mother      Cerebrovascular Disease Father       85 yo Alzheimers, CHF     C.A.D. Father      possible MI in age 60's     Myocardial Infarction Father      Cancer Maternal Aunt       40's Breast     Cerebrovascular Disease Paternal Grandmother       late 40's - early 50's     Blood Disease Maternal  Aunt       53 yo Lupus     Cancer - colorectal No family hx of        SOCIAL HISTORY:  Social History     Social History     Marital status:      Spouse name: Judah     Number of children: 2     Years of education: 18     Occupational History      [employer]     health      Social History Main Topics     Smoking status: Never Smoker     Smokeless tobacco: Never Used     Alcohol use Yes      Comment: 1 or 2 glasses of wine most evenings     Drug use: No     Sexual activity: Not Currently     Birth control/ protection: Surgical     Other Topics Concern     Parent/Sibling W/ Cabg, Mi Or Angioplasty Before 65f 55m? Yes     father around 55     Social History Narrative       Review of Systems:  Skin:  Positive for bruising   Eyes:  Positive for glasses  ENT:  Negative    Respiratory:  Positive for sleep apnea;CPAP;shortness of breath  Cardiovascular:    palpitations;Positive for  Gastroenterology: Negative    Genitourinary:  Negative    Musculoskeletal:  Positive for back pain  Neurologic:  Positive for headaches  Psychiatric:  Negative    Heme/Lymph/Imm:  Positive for easy bruising  Endocrine:  Positive for thyroid disorder      Recent Lab Results:  LIPID RESULTS:  Lab Results   Component Value Date    CHOL 275 (H) 2018    HDL 70 2018     (H) 2018    TRIG 109 2018    CHOLHDLRATIO 3.3 2015       LIVER ENZYME RESULTS:  Lab Results   Component Value Date    AST 21 2018    ALT 29 2018       CBC RESULTS:  Lab Results   Component Value Date    WBC 6.0 2018    RBC 3.94 2018    HGB 13.0 2018    HCT 39.1 2018    MCV 99 2018    MCH 33.0 2018    MCHC 33.2 2018    RDW 12.7 2018     2018       BMP RESULTS:  Lab Results   Component Value Date     2018    POTASSIUM 4.0 2018    CHLORIDE 105 2018    CO2 27 2018    ANIONGAP 8 2018    GLC 96 2018    BUN 13  2018    CR 0.90 2018    GFRESTIMATED 63 2018    GFRESTBLACK 76 2018    MARTITA 9.3 2018        A1C RESULTS:  Lab Results   Component Value Date    A1C 5.4 2015       INR RESULTS:  Lab Results   Component Value Date    INR 1.01 05/10/2017         ECHOCARDIOGRAM  Recent Results (from the past 8760 hour(s))   Echocardiogram Complete    Narrative    [id number]  [id number]  [id number]  [id number]^ROMEO^ESTEE^CAROL           [facility]  Echocardiography Laboratory  [address] [place] [address]        Name: NYA KOROMA  MRN: [id number]  : 1950  Study Date: 2018 11:16 AM  Age: 67 yrs  Gender: Female  [place] [facility]  Reason For Study: , Persistent atrial fibrillation (H)  Ordering Physician: ESTEE WALTERS  Referring Physician: ESTEE WALTERS  Performed By: Virginia Acuna     BSA: 1.8 m2  Height: 63 in  Weight: 170 lb  HR: 62  BP: 130/85 mmHg  _____________________________________________________________________________  __        Procedure  Complete Echo Adult.  _____________________________________________________________________________  __        Interpretation Summary     There is borderline concentric left ventricular hypertrophy.  The visual ejection fraction is estimated at 49-53%.  There is borderline-mild global hypokinesia of the left ventricle.  There is borderline/mild global hypokinesis of the LV and may be more  prominant along distal septum and posteior walls  The ascending aorta is Mildly dilated.  Sinus rhythm was noted.  _____________________________________________________________________________  __        Left Ventricle  The left ventricle is normal in size. There is borderline concentric left  ventricular hypertrophy. The visual ejection fraction is estimated at 49-53%.  Grade I or early diastolic dysfunction. Diastolic Doppler findings (E/E' ratio  and/or other parameters) suggest left ventricular  filling pressures are  normal. There is borderline-mild global hypokinesia of the left ventricle.  There is borderline/mild global hypokinesis of the LV and may be more  prominant along distal septum and posteior walls. There is no thrombus seen in  the left ventricle.     Right Ventricle  The right ventricle is normal in structure, function and size.     Atria  The left atrium is mildly dilated. Right atrial size is normal. There is no  color Doppler evidence of an atrial shunt.     Mitral Valve  There is trace to mild mitral regurgitation.        Tricuspid Valve  There is trace tricuspid regurgitation. Doppler findings do not suggest  pulmonary hypertension.     Aortic Valve  The aortic valve is trileaflet. There is trace to mild aortic regurgitation.  No hemodynamically significant valvular aortic stenosis.     Pulmonic Valve  The pulmonic valve is not well seen, but is grossly normal.     Vessels  The ascending aorta is Mildly dilated. Sinus Valsalva 37mm, ascending aorta  38mm. The IVC is normal in size and reactivity with respiration, suggesting  normal central venous pressure.     Pericardium  The pericardium appears normal.        Rhythm  Sinus rhythm was noted.  _____________________________________________________________________________  __  MMode/2D Measurements & Calculations  RVDd: 3.4 cm  IVSd: 1.2 cm     LVIDd: 5.2 cm  LVIDs: 3.2 cm  LVPWd: 0.98 cm  FS: 38.0 %  LV mass(C)d: 226.6 grams  LV mass(C)dI: 125.5 grams/m2  Ao root diam: 3.7 cm  LA dimension: 4.0 cm  asc Aorta Diam: 3.8 cm  LA/Ao: 1.1  LA Volume (BP): 54.8 ml  LA Volume Index (BP): 30.4 ml/m2  RWT: 0.38           Doppler Measurements & Calculations  MV E max boom: 42.7 cm/sec  MV A max boom: 62.1 cm/sec  MV E/A: 0.69  MV dec time: 0.45 sec  LV V1 max P.3 mmHg  LV V1 max: 75.2 cm/sec  LV V1 VTI: 15.0 cm  TR max boom: 223.2 cm/sec  TR max P.9 mmHg  E/E' av.9  Lateral E/e': 7.4  Medial E/e': 10.3            _____________________________________________________________________________  __           Report approved by: Larry Ford 2018 01:40 PM      Echocardiogram    Narrative    [id number]  [facility]  [id number]  [id number]^MACHELLE^BESSY^ABDULAZIZ           [facility]  Echocardiography Laboratory  [address] [place] [address]        Name: NYA KOROMA  MRN: [id number]  : 1950  Study Date: 2017 01:04 PM  Age: 67 yrs  Gender: Female  Patient Location: [facility]  Reason For Study: Chronic atrial fibrillation (H)  Ordering Physician: BESSY CARTY  Referring Physician: Shadia Munroe  Performed By: Anny Laura RDCS     BSA: 1.8 m2  Height: 62 in  Weight: 170 lb  HR: 84  BP: 118/72 mmHg  _____________________________________________________________________________  __        Procedure  Complete Echo Adult.  _____________________________________________________________________________  __        Interpretation Summary     The visual ejection fraction is estimated at 40-45%.  There is mild-moderate global hypokinesia of the left ventricle.  The right ventricular systolic function is borderline reduced.  The IVC is normal in size and reactivity with respiration, suggesting normal  central venous pressure.  The ascending aorta is Mildly dilated.  The left atrium is mildly dilated.  There is moderate (2+) mitral regurgitation.  The mitral regurgitant jet is eccentrically directed. SCOTTIE likely a more  accurate assessment of MR.  Suspect LA volume measurments undersestimate LA size.  Compared to prior study- no significant change.  The rhythm was atrial fibrillation.  _____________________________________________________________________________  __        Left Ventricle  The left ventricle is normal in size. There is normal left ventricular wall  thickness. The visual ejection fraction is estimated at 40-45%. E by E prime  ratio is between 8 and 15, which is indeterminate for  assessment of left  ventricular filling pressures. Left ventricular diastolic function is  indeterminate. There is mild-moderate global hypokinesia of the left  ventricle.     Right Ventricle  The right ventricle is normal size. The right ventricular systolic function is  borderline reduced.     Atria  The left atrium is mildly dilated. The right atrium is mildly dilated.     Mitral Valve  There is moderate (2+) mitral regurgitation. The mitral regurgitant jet is  eccentrically directed.        Tricuspid Valve  The tricuspid valve is normal in structure and function. There is mild to  moderate (1-2+) tricuspid regurgitation. The right ventricular systolic  pressure is approximated at 19.0 mmHg plus the right atrial pressure. Normal  IVC (1.5-2.5cm) with >50% respiratory collapse; right atrial pressure is  estimated at 5-10mmHg. Right ventricle systolic pressure estimate normal.     Aortic Valve  There is trivial trileaflet aortic sclerosis. There is trace aortic  regurgitation.     Pulmonic Valve  The pulmonic valve is not well seen, but is grossly normal. There is trace  pulmonic valvular regurgitation. Normal pulmonic valve velocity.     Vessels  The aortic root is normal size. The ascending aorta is Mildly dilated. The IVC  is normal in size and reactivity with respiration, suggesting normal central  venous pressure.     Pericardium  There is no pericardial effusion.        Rhythm  The rhythm was atrial fibrillation.  _____________________________________________________________________________  __  MMode/2D Measurements & Calculations  IVSd: 1.1 cm     LVIDd: 4.4 cm  LVIDs: 3.1 cm  LVPWd: 1.1 cm  IVC diam: 1.9 cm  FS: 30.5 %  EDV(Teich): 88.4 ml  ESV(Teich): 37.0 ml  LV mass(C)d: 176.5 grams  LV mass(C)dI: 98.9 grams/m2  Ao root diam: 3.6 cm  asc Aorta Diam: 4.1 cm  LA volume (AL/bp): 60.5 cm3     LA Volume Indexed (AL/bp): 33.9 ml/m2  RWT: 0.52        Doppler Measurements & Calculations  MV E max boom: 78.8  cm/sec  MV A max ti: 0.35 cm/sec  MV E/A: 225.1  MV dec slope: 629.2 cm/sec2  MV dec time: 0.13 sec  MR ERO: 0.27 cm2  TV max P.0 mmHg  TR max ti: 217.8 cm/sec  TR max P.0 mmHg  E/E' av.0  Lateral E/e': 8.7  Medial E/e': 7.2  Peak E' Ti: 10.1 cm/sec              _____________________________________________________________________________  __           Report approved by: Larry Sharma 2017 04:46 PM            Orders Placed This Encounter   Procedures     EKG 12-lead complete w/read - Clinics (performed today)     No orders of the defined types were placed in this encounter.    Medications Discontinued During This Encounter   Medication Reason     ELIQUIS 5 MG tablet Medication Reconciliation Clean Up         Encounter Diagnoses   Name Primary?     Persistent atrial fibrillation (H)      Atrial fibrillation, unspecified type (H)          CC  Heaven Fisher PA-C  [address]  [place] [address]                Thank you for allowing me to participate in the care of your patient.      Sincerely,     Miguel Shaffer MD     [facility]    cc:   Heaven Fisher PA-C  [address]  [place] [address]

## 2018-07-11 NOTE — MR AVS SNAPSHOT
After Visit Summary   7/11/2018    Ely Humphreys    MRN: [id number]           Patient Information     Date Of Birth          1950        Visit Information        Provider Department      7/11/2018 10:45 AM Miguel Shaffer MD [facility]        Today's Diagnoses     Persistent atrial fibrillation (H)        Atrial fibrillation, unspecified type (H)           Follow-ups after your visit        Future tests that were ordered for you today     Open Future Orders        Priority Expected Expires Ordered    Echocardiogram Limited Routine 7/18/2018 7/11/2019 7/11/2018            Who to contact     If you have questions or need follow up information about today's clinic visit or your schedule please contact [facility] directly at [phone].  Normal or non-critical lab and imaging results will be communicated to you by [employer], letter or phone within 4 business days after the clinic has received the results. If you do not hear from us within 7 days, please contact the clinic through [employer] or phone. If you have a critical or abnormal lab result, we will notify you by phone as soon as possible.  Submit refill requests through [employer] [place] or call your pharmacy and they will forward the refill request to us. Please allow 3 business days for your refill to be completed.          Additional Information About Your Visit        MyChart Information     [employer] [place] gives you secure access to your electronic health record. If you see a primary care provider, you can also send messages to your care team and make appointments. If you have questions, please call your primary care clinic.  If you do not have a primary care provider, please call [phone] and they will assist you.        Care EveryWhere ID     This is your Care EveryWhere ID. This could be used by other organizations to access your [facility]  "records  [id number]        Your Vitals Were     Pulse Height Breastfeeding? BMI (Body Mass Index)          64 1.575 m (5' 2\") No 31.88 kg/m2         Blood Pressure from Last 3 Encounters:   07/11/18 118/80   07/03/18 108/66   04/06/18 124/68    Weight from Last 3 Encounters:   07/11/18 79.1 kg (174 lb 4.8 oz)   07/03/18 78.3 kg (172 lb 9.6 oz)   04/06/18 79.4 kg (175 lb)              We Performed the Following     EKG 12-lead complete w/read - Clinics (performed today)     Follow-Up with Electrophysiologist        Primary Care Provider Office Phone # Fax #    Shadia Munroe [phone] [address] [place] [address]        Equal Access to Services     [facility]: Hadii deshawn beyer Sorancho, waaxda luqadaha, qaybta kaalmabarb joseph, dori cortes . So [facility] [phone].    ATENCIÓN: Si habla español, tiene a saenz disposición servicios gratuitos de asistencia lingüística. Kemiame al [phone].    We comply with applicable federal civil rights laws and [place] laws. We do not discriminate on the basis of race, color, national origin, age, disability, sex, sexual orientation, or gender identity.            Thank you!     Thank you for choosing [facility]  for your care. Our goal is always to provide you with excellent care. Hearing back from our patients is one way we can continue to improve our services. Please take a few minutes to complete the written survey that you may receive in the mail after your visit with us. Thank you!             Your Updated Medication List - Protect others around you: Learn how to safely use, store and throw away your medicines at www.disposemymeds.org.          This list is accurate as of 7/11/18 11:23 AM.  Always use your most recent med list.                   Brand Name Dispense Instructions for use Diagnosis    apixaban ANTICOAGULANT 5 MG tablet    ELIQUIS    180 " tablet    Take 1 tablet (5 mg) by mouth 2 times daily    Chronic atrial fibrillation (H)       calcium 500 MG Chew      Take 2 chew tab by mouth daily        flecainide 100 MG tablet    TAMBOCOR    180 tablet    Take 1 tablet (100 mg) by mouth 2 times daily    Persistent atrial fibrillation (H)       levothyroxine 137 MCG tablet    SYNTHROID    90 tablet    Take 1 tablet (137 mcg) by mouth daily    Acquired hypothyroidism       liothyronine 5 MCG tablet    CYTOMEL    90 tablet    Take 1 tablet (5 mcg) by mouth daily    Acquired hypothyroidism       metoprolol succinate 25 MG 24 hr tablet    TOPROL-XL    30 tablet    Take 0.5 tablets (12.5 mg) by mouth daily Take at night    Persistent atrial fibrillation (H)       order for DME      Equipment ordered: RESMED CPAP Mask type: Nasal Settings: 8 cmH2O        PROBIOTIC DAILY Caps      Take 1 capsule by mouth daily        rosuvastatin 5 MG tablet    CRESTOR    30 tablet    Take 1 tablet (5 mg) by mouth daily    Hyperlipidemia LDL goal <100       SHINGRIX injection   Generic drug:  zoster vaccine recombinant adjuvanted           vitamin  s/Minerals Tabs      1 TABLET DAILY        vitamin D 1000 units capsule      take 2 Caps by mouth daily.

## 2018-07-11 NOTE — TELEPHONE ENCOUNTER
Called [facility] at [phone] to check the status of this prior authorization, per representative they did receive it and it is still under review for their pharmacists, their turn around time can take up to 14 days, but generally they get determinations sooner than that.

## 2018-07-12 NOTE — PROGRESS NOTES
Service Date: 07/11/2018      REASON FOR VISIT:  Evaluation of persistent atrial fibrillation.      HISTORY OF PRESENT ILLNESS:  Ms. Humphreys is a pleasant 68-year-old lady with a history of Graves' disease (14 years ago, status post radiotherapy), breast cancer (left-sided lumpectomy 7 years ago) and persistent atrial fibrillation with signs of tachycardia-mediated cardiomyopathy who is here for routine evaluation.      The patient initially presented with AFib which was an incidental finding during preop evaluation for elective knee surgery.  She was evaluated by Dr. Don and was found to have cardiomyopathy (EF of 40%).  She underwent cardioversion.  However, she failed cardioversion and was referred to me in 09/2017.  I loaded her with amiodarone, and she underwent successful cardioversion.  After cardioversion, cardiac function improved to the 50s, amiodarone was discontinued and she was started on flecainide.      Today she informs that she has done well on flecainide.  She rarely has any breakthroughs of AFib.  She denies any symptoms such as chest pain, shortness of breath, lightheadedness, near-syncope or syncopal episode.      EKG done here today is very noisy, but she appears to be in normal rhythm with QRS measuring 108 milliseconds.  She had a stress test done on 04/12/2018 which was negative for ischemia or proarrhythmia.      Of note, in May patient was presented with right eye blindness.  She was evaluated by a neuro-ophthalmologist and underwent an extensive workup including brain MRI and MRA which was essentially unremarkable.  She also had an orbit MRI which was within normal limits.  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  She admits to being compliant to medical therapy with Eliquis.      ASSESSMENT AND PLAN:   1.  Recurrent persistent AFib and signs of tachycardia-mediated cardiomyopathy.  The cardiomyopathy seems to be resolved,  and her symptoms are well controlled on flecainide therapy.  We will repeat an echocardiogram to evaluate cardiac function.  We will continue current therapy with flecainide.   2.  Embolic prevention.  CHADS-VASc score of 3.  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  However, we have no confirmation that this was embolic in nature, as MRI and MRA have been unremarkable.      We discussed about possibility of switching to a different blood thinner such as Coumadin so we can monitor INRs.  However, since there is a lack of proof that she actually had an ischemic embolic event, we decided to continue anticoagulation with Eliquis for now.   3.  Followup care.  Follow up in clinic in 6 months or earlier as needed.         BESSY CARTY MD             D: 2018   T: 2018   MT: GIOVANI      Name:     NYA KOROMA   MRN:      [id number]        Account:      [id number]   :      1950           Service Date: 2018      Document: [id number]

## 2018-07-17 PROBLEM — H47.322: Status: ACTIVE | Noted: 2018-07-17

## 2018-07-17 PROBLEM — H47.011: Status: ACTIVE | Noted: 2018-07-17

## 2018-07-18 NOTE — TELEPHONE ENCOUNTER
Response back from [employer] stated that was denied stating is covered under patient's part D, resubmitting request to [employer] for review. New CoverMyMeds Key is [id number]      PA Initiation    Medication: rosuvastatin (CRESTOR) 5 MG tablet  Insurance Company: [employer] - Phone [phone] Fax [phone]

## 2018-07-19 NOTE — TELEPHONE ENCOUNTER
Prior Authorization Approval    Authorization Effective Date: 12/30/2017  Authorization Expiration Date: 12/31/2018  Medication: rosuvastatin (CRESTOR) 5 MG tablet  Approved Dose/Quantity: 30/30 days  Reference #: [id number]   Insurance Company: [employer] - Phone [phone] Fax [phone]  Which Pharmacy is filling the prescription (Not needed for infusion/clinic administered): [facility] [address] [place] [address]  Pharmacy Notified: Yes   Patient Notified: Yes

## 2018-07-19 NOTE — TELEPHONE ENCOUNTER
Patient notified.  No further questions.  Will call back if any other questions or concerns.  GAETANO Shah RN

## 2018-07-19 NOTE — TELEPHONE ENCOUNTER
Called pt to inform her of approval of prior authorization for Crestor.  No answer.  Left a message for pt to call the clinic.  Will await her return phone call.    Shira Covarrubias RN

## 2018-08-10 ENCOUNTER — HOSPITAL ENCOUNTER (OUTPATIENT)
Dept: CARDIOLOGY | Facility: CLINIC | Age: 68
Discharge: HOME | End: 2018-08-10
Attending: INTERNAL MEDICINE | Admitting: INTERNAL MEDICINE
Payer: MEDICARE

## 2018-08-10 DIAGNOSIS — I48.19 PERSISTENT ATRIAL FIBRILLATION (H): ICD-10-CM

## 2018-08-10 PROCEDURE — 25500064 ZZH RX 255 OP 636: Performed by: INTERNAL MEDICINE

## 2018-08-10 PROCEDURE — 40000264 ECHO LIMITED WITH OPTISON

## 2018-08-10 PROCEDURE — 93325 DOPPLER ECHO COLOR FLOW MAPG: CPT | Mod: 26 | Performed by: INTERNAL MEDICINE

## 2018-08-10 PROCEDURE — 93308 TTE F-UP OR LMTD: CPT | Mod: 26 | Performed by: INTERNAL MEDICINE

## 2018-08-10 PROCEDURE — 93321 DOPPLER ECHO F-UP/LMTD STD: CPT | Mod: 26 | Performed by: INTERNAL MEDICINE

## 2018-08-10 RX ADMIN — HUMAN ALBUMIN MICROSPHERES AND PERFLUTREN 3 ML: 10; .22 INJECTION, SOLUTION INTRAVENOUS at 08:15

## 2018-08-15 ENCOUNTER — TELEPHONE (OUTPATIENT)
Dept: CARDIOLOGY | Facility: CLINIC | Age: 68
End: 2018-08-15

## 2018-08-15 NOTE — TELEPHONE ENCOUNTER
Called and notified pt of echo results. No changes now- will get a call from scheduling for follow up in a few months. ESTRELLA Tucker  Cardiac function remains within normal limits.  Please update pt on results.  No change in plans.     Miguel Shaffer

## 2018-10-01 DIAGNOSIS — G47.33 OSA (OBSTRUCTIVE SLEEP APNEA): Primary | ICD-10-CM

## 2018-11-07 DIAGNOSIS — I48.19 PERSISTENT ATRIAL FIBRILLATION (H): ICD-10-CM

## 2018-11-07 RX ORDER — METOPROLOL SUCCINATE 25 MG/1
12.5 TABLET, EXTENDED RELEASE ORAL DAILY
Qty: 45 TABLET | Refills: 1 | Status: SHIPPED | OUTPATIENT
Start: 2018-11-07 | End: 2019-07-31

## 2018-11-08 DIAGNOSIS — E78.5 HYPERLIPIDEMIA LDL GOAL <100: ICD-10-CM

## 2018-11-08 RX ORDER — ROSUVASTATIN CALCIUM 5 MG/1
TABLET, COATED ORAL
Qty: 30 TABLET | Refills: 0 | Status: SHIPPED | OUTPATIENT
Start: 2018-11-08 | End: 2018-12-11

## 2018-11-08 NOTE — TELEPHONE ENCOUNTER
"Requested Prescriptions   Pending Prescriptions Disp Refills     rosuvastatin (CRESTOR) 5 MG tablet [Pharmacy Med Name: Rosuvastatin Calcium Oral Tablet 5 MG]    Last Written Prescription Date:  7/3/2018  Last Fill Quantity: 30,  # refills: 3   Last office visit: 7/3/2018 with prescribing provider:  Shadia Munroe     Future Office Visit:     90 tablet 2     Sig: Take 1 tablet (5 mg) by mouth daily.    Statins Protocol Passed    11/8/2018  7:01 AM       Passed - LDL on file in past 12 months    Recent Labs   Lab Test  05/21/18   0840   LDL  183*            Passed - No abnormal creatine kinase in past 12 months    No lab results found.            Passed - Recent (12 mo) or future (30 days) visit within the authorizing provider's specialty    Patient had office visit in the last 12 months or has a visit in the next 30 days with authorizing provider or within the authorizing provider's specialty.  See \"Patient Info\" tab in inbasket, or \"Choose Columns\" in Meds & Orders section of the refill encounter.             Passed - Patient is age 18 or older       Passed - No active pregnancy on record       Passed - No positive pregnancy test in past 12 months          "

## 2018-11-08 NOTE — TELEPHONE ENCOUNTER
30 day supply given.  Patient is due for fasting labs since starting medication.  Please call and assist with scheduling appointment prior to next refill   Kristie Mccoy RN - Triage  [facility]

## 2018-11-16 DIAGNOSIS — E78.5 HYPERLIPIDEMIA LDL GOAL <100: ICD-10-CM

## 2018-11-16 LAB
ALT SERPL W P-5'-P-CCNC: 34 U/L (ref 0–50)
CHOLEST SERPL-MCNC: 206 MG/DL
HDLC SERPL-MCNC: 75 MG/DL
LDLC SERPL CALC-MCNC: 110 MG/DL
NONHDLC SERPL-MCNC: 131 MG/DL
TRIGL SERPL-MCNC: 105 MG/DL

## 2018-11-16 PROCEDURE — 84460 ALANINE AMINO (ALT) (SGPT): CPT | Performed by: INTERNAL MEDICINE

## 2018-11-16 PROCEDURE — 36415 COLL VENOUS BLD VENIPUNCTURE: CPT | Performed by: INTERNAL MEDICINE

## 2018-11-16 PROCEDURE — 80061 LIPID PANEL: CPT | Performed by: INTERNAL MEDICINE

## 2018-12-06 DIAGNOSIS — E03.9 ACQUIRED HYPOTHYROIDISM: ICD-10-CM

## 2018-12-06 NOTE — TELEPHONE ENCOUNTER
"Requested Prescriptions   Pending Prescriptions Disp Refills     liothyronine (CYTOMEL) 5 MCG tablet [Pharmacy Med Name: Liothyronine Sodium Oral Tablet 5 MCG]    Last Written Prescription Date:  3/13/2018  Last Fill Quantity: 90,  # refills: 1   Last office visit: 7/3/2018 with prescribing provider:  Shadia Munroe     Future Office Visit:     90 tablet 0     Sig: Take 1 tablet (5 mcg) by mouth daily.    Thyroid Protocol Passed    12/6/2018  7:00 AM       Passed - Patient is 12 years or older       Passed - Recent (12 mo) or future (30 days) visit within the authorizing provider's specialty    Patient had office visit in the last 12 months or has a visit in the next 30 days with authorizing provider or within the authorizing provider's specialty.  See \"Patient Info\" tab in inbasket, or \"Choose Columns\" in Meds & Orders section of the refill encounter.             Passed - Normal TSH on file in past 12 months    Recent Labs   Lab Test  07/03/18   1031   TSH  1.97             Passed - No active pregnancy on record    If patient is pregnant or has had a positive pregnancy test, please check TSH.         Passed - No positive pregnancy test in past 12 months    If patient is pregnant or has had a positive pregnancy test, please check TSH.          levothyroxine (SYNTHROID/LEVOTHROID) 137 MCG tablet [Pharmacy Med Name: Levothyroxine Sodium Oral Tablet 137 MCG]    Last Written Prescription Date:  3/13/2018  Last Fill Quantity: 90,  # refills: 1   Last office visit: 7/3/2018 with prescribing provider:  Shadia Munroe     Future Office Visit:     90 tablet 0     Sig: Take 1 tablet (137 mcg) by mouth daily.    Thyroid Protocol Passed    12/6/2018  7:00 AM       Passed - Patient is 12 years or older       Passed - Recent (12 mo) or future (30 days) visit within the authorizing provider's specialty    Patient had office visit in the last 12 months or has a visit in the next 30 days with authorizing provider or within " "the authorizing provider's specialty.  See \"Patient Info\" tab in inbasket, or \"Choose Columns\" in Meds & Orders section of the refill encounter.             Passed - Normal TSH on file in past 12 months    Recent Labs   Lab Test  07/03/18   1031   TSH  1.97             Passed - No active pregnancy on record    If patient is pregnant or has had a positive pregnancy test, please check TSH.         Passed - No positive pregnancy test in past 12 months    If patient is pregnant or has had a positive pregnancy test, please check TSH.            "

## 2018-12-10 RX ORDER — LEVOTHYROXINE SODIUM 137 UG/1
TABLET ORAL
Qty: 90 TABLET | Refills: 1 | Status: SHIPPED | OUTPATIENT
Start: 2018-12-10 | End: 2019-06-03

## 2018-12-10 RX ORDER — LIOTHYRONINE SODIUM 5 UG/1
TABLET ORAL
Qty: 90 TABLET | Refills: 1 | Status: SHIPPED | OUTPATIENT
Start: 2018-12-10 | End: 2019-06-03

## 2018-12-10 NOTE — TELEPHONE ENCOUNTER
Prescription approved per [facility], [facility] or [facility] refill protocol.  Kristie SALTER RN - Triage  [facility]

## 2018-12-11 DIAGNOSIS — E78.5 HYPERLIPIDEMIA LDL GOAL <100: ICD-10-CM

## 2018-12-11 RX ORDER — ROSUVASTATIN CALCIUM 5 MG/1
TABLET, COATED ORAL
Qty: 30 TABLET | Refills: 5 | Status: SHIPPED | OUTPATIENT
Start: 2018-12-11 | End: 2019-06-03

## 2018-12-11 NOTE — TELEPHONE ENCOUNTER
"Requested Prescriptions   Pending Prescriptions Disp Refills     rosuvastatin (CRESTOR) 5 MG tablet [Pharmacy Med Name: Rosuvastatin Calcium Oral Tablet 5 MG]    Last Written Prescription Date:  11/8/2018  Last Fill Quantity: 30,  # refills: 0   Last office visit: 7/3/2018 with prescribing provider:  Shadia Munroe     Future Office Visit:     30 tablet 0     Sig: TAKE 1 TABLET (5 MG) BY MOUTH DAILY.    Statins Protocol Passed - 12/11/2018  9:28 AM       Passed - LDL on file in past 12 months    Recent Labs   Lab Test 11/16/18  0848   *            Passed - No abnormal creatine kinase in past 12 months    No lab results found.            Passed - Recent (12 mo) or future (30 days) visit within the authorizing provider's specialty    Patient had office visit in the last 12 months or has a visit in the next 30 days with authorizing provider or within the authorizing provider's specialty.  See \"Patient Info\" tab in inbasket, or \"Choose Columns\" in Meds & Orders section of the refill encounter.             Passed - Patient is age 18 or older       Passed - No active pregnancy on record       Passed - No positive pregnancy test in past 12 months          "

## 2018-12-11 NOTE — TELEPHONE ENCOUNTER
Prescription approved per [facility] Refill Protocol.    Mehnaz Hylton RN

## 2019-01-28 ENCOUNTER — OFFICE VISIT (OUTPATIENT)
Dept: SLEEP MEDICINE | Facility: CLINIC | Age: 69
End: 2019-01-28
Payer: COMMERCIAL

## 2019-01-28 VITALS
DIASTOLIC BLOOD PRESSURE: 76 MMHG | OXYGEN SATURATION: 96 % | WEIGHT: 175 LBS | BODY MASS INDEX: 32.2 KG/M2 | SYSTOLIC BLOOD PRESSURE: 109 MMHG | HEIGHT: 62 IN | HEART RATE: 64 BPM

## 2019-01-28 DIAGNOSIS — G47.33 OSA (OBSTRUCTIVE SLEEP APNEA): Primary | ICD-10-CM

## 2019-01-28 DIAGNOSIS — E66.811 OBESITY (BMI 30.0-34.9): ICD-10-CM

## 2019-01-28 PROCEDURE — 99213 OFFICE O/P EST LOW 20 MIN: CPT | Performed by: INTERNAL MEDICINE

## 2019-01-28 ASSESSMENT — MIFFLIN-ST. JEOR: SCORE: 1287.17

## 2019-01-28 NOTE — PATIENT INSTRUCTIONS
MY TREATMENT INFORMATION FOR SLEEP APNEA-  Ely Humphreys    MY CONTACT NUMBERS ARE:  [phone]  DOCTOR : Doc [facility]  SLEEP CENTER :  [place]    Your sleep apnea is nearly controlled with CPAP, will increase pressure to 10 cmH2O  Decrease humidity setting due to condensation and put the hose under the blanket.   Mask and supplies are ordered  Continue use of CPAP:  - Recommend using CPAP every night for at least 7-8 hours each night  - Daytime naps are not advised, but use CPAP if taking naps.    - Do not remove mask headgear when you go to bathroom at night, but just unplug the hose.    Objective measure goal  Compliance  Goal >70% (preferrably 100%)  Leak   Goal < 10% (less than 24 L/min)  AHI  Goal < 5 events per hour   Usage  Goal 7-8 hours.      Patient was advised not to drive if drowsy or sleepy.      Follow up in 3 months.    Your blood pressure was checked while you were in clinic today.  Please read the guidelines below about what these numbers mean and what you should do about them.  Your systolic blood pressure is the top number.  This is the pressure when the heart is pumping.  Your diastolic blood pressure is the bottom number.  This is the pressure in between beats.  If your systolic blood pressure is less than 120 and your diastolic blood pressure is less than 80, then your blood pressure is normal. There is nothing more that you need to do about it  If your systolic blood pressure is 120-139 or your diastolic blood pressure is 80-89, your blood pressure may be higher than it should be.  You should have your blood pressure re-checked within a year by a primary care provider.  If your systolic blood pressure is 140 or greater or your diastolic blood pressure is 90 or greater, you may have high blood pressure.  High blood pressure is treatable, but if left untreated over time it can put you at risk for heart attack, stroke, or kidney failure.  You should have your blood pressure  re-checked by a primary care provider within the next four weeks.  Your BMI is There is no height or weight on file to calculate BMI.  Weight management is a personal decision.  If you are interested in exploring weight loss strategies, the following discussion covers the approaches that may be successful. Body mass index (BMI) is one way to tell whether you are at a healthy weight, overweight, or obese. It measures your weight in relation to your height.  A BMI of 18.5 to 24.9 is in the healthy range. A person with a BMI of 25 to 29.9 is considered overweight, and someone with a BMI of 30 or greater is considered obese. More than two-thirds of American adults are considered overweight or obese.  Being overweight or obese increases the risk for further weight gain. Excess weight may lead to heart disease and diabetes.  Creating and following plans for healthy eating and physical activity may help you improve your health.  Weight control is part of healthy lifestyle and includes exercise, emotional health, and healthy eating habits. Careful eating habits lifelong are the mainstay of weight control. Though there are significant health benefits from weight loss, long-term weight loss with diet alone may be very difficult to achieve- studies show long-term success with dietary management in less than 10% of people. Attaining a healthy weight may be especially difficult to achieve in those with severe obesity. In some cases, medications, devices and surgical management might be considered.  What can you do?  If you are overweight or obese and are interested in methods for weight loss, you should discuss this with your provider.     Consider reducing daily calorie intake by 500 calories.     Keep a food journal.     Avoiding skipping meals, consider cutting portions instead.    Diet combined with exercise helps maintain muscle while optimizing fat loss. Strength training is particularly important for building and  maintaining muscle mass. Exercise helps reduce stress, increase energy, and improves fitness. Increasing exercise without diet control, however, may not burn enough calories to loose weight.       Start walking three days a week 10-20 minutes at a time    Work towards walking thirty minutes five days a week     Eventually, increase the speed of your walking for 1-2 minutes at time    In addition, we recommend that you review healthy lifestyles and methods for weight loss available through the National Institutes of Health patient information sites:  http://win.niddk.nih.gov/publications/index.htm    And look into health and wellness programs that may be available through your health insurance provider, employer, local community center, or [employer].

## 2019-01-28 NOTE — PROGRESS NOTES
[facility]  January 28, 2019      Name: Ely Humphreys MRN# [id number]   Age: 67 year old YOB: 1950   Date of visit: January 28, 2019  Primary care provider: Shadia Munroe         Assessment and Plan:     1. Obstructive Sleep Apnea:  - Full compliance of PAP usehigh residual AHI 7.5 events per hour predominantly obstructive events, with increased CPAP pressure to 10 cmH2O.   - Decrease humidity setting due to condensation and put hose under the blanket.  - Clinical response: good  - Recommend using CPAP every night for at least 7-8 hours each night  - Daytime naps are not advised, but use CPAP if taking naps  - Patient was advised not to drive if drowsy or sleepy.  - Follow up in sleep clinic in 3 months.    2. Overweight: Encouraged patient to lose weight. Counseled regarding weight loss through diet modification and increased physical activity. Patient was given nstuctions of weight loss and advised to follow up her PCP for further weight loss interventions. Weight loss instructions given.    Orders Placed This Encounter   Procedures     Comprehensive DME       Summary Counseling:  New sleep schedule recommendation: given    Check out [email or web address].aasmnet.org/    All questions were answered.  The patient indicates understanding of the above issues and agrees with the plan set forth.           Chief Complaint:    Routine Follow up            History of Present Illness:     Ely Humphreys is a 67 year old female with history of MIKEL, atrial fibrillation, systolic CHF with EF 40%, hx of Graves s/p I ablation with subsequent hypothyroidism, depression, breast cancer and essential tremor who comes to [facility] for follow up. Patient was diagnosed moderate sleep apnea and was prescribed CPAP 8 cmH2O which was setup on 6/30/17. She is using her PAP therapy every night and 100% compliance. She has benefits and no  complains.  Today patient, is using her CPAP at 97% over 4-hour usage she has high residual AHI of 7.5 events an hour, with a download pointing to obstructive events.  She had a good benefits using her CPAP, sleeps lot better and no snoring.    PREVIOUS SLEEP STUDIES:  PS/15/17  Sleep latency 3.6 minutes with sleep aid.    REM achieved.   REM latency 204 minutes.    Sleep efficiency 82.7%. Total sleep time 260.5 minutes.  Sleep architecture:  Stage 1, 11.9% (5%), stage 2, 62.4% (45-55%), stage 3, 2.4% (15-20%), stage REM, 1.5% (20-25%).    AHI was 24.6/hour.   RDI 30.6/hour.    REM AHI 75/hour (due to limited REM sleep).    Supine AHI 27.2/hour.    Lowest O2 saturation: 83.8%  S/He spent 1.9 minutes below 89% SpO2.   Periodic Limb Movement Index 5.3/hour.        CPAP titration:    Sleep latency 17 minutes.    REM achieved. REM latency 59 minutes.    Sleep efficiency 75%. Total sleep time 194 minutes.   Sleep architecture:  Stage 1, 8.9% (5%), stage 2, 58.8% (45-55%), stage 3, 9.3% (15-20%), stage REM, 23% (20-25%).    Periodic Limb Movement Index 0/hour.   CPAP was titrated to 6 cmH2O with elimination of apneas, hypopneas and desaturations.   Supine REM was not noted at this pressure.     CPAP Compliance:  Dates: 2017- 2017       97 % >4hour use   Days used:   Average daily use (days used): 8.59 hrs  Leak 95 percentile: 8.3 L/min  Residual AHI: 7.5/hr (OA 6.3, CA 0, HI 1.20  Pressure:  8 cmH2O     McDonough Sleepiness Scale score today:   McDonough Sleepiness Scale score last visit:   Pre-PAP McDonough Sleepiness Scale score:     PAP machine: ResMed    Interface:  Mask: nasal mask  Chin strap: No  Leak: No  Humidity: Yes    Difficulties with therapy:    [-] Difficulty tolerating the pressure  [-] Epistaxis:   [-] Nasal congestion   [-] Dry mouth:  [-] Mouth breathing:   [-] Pain/skin breakdown:     Improvements noted with CPAP:  [+] waking up more refreshed  [+] sleeping better with less  arousals  [+] nocturia improved   [+] improved energy level during the day    SLEEP-WAKE SCHEDULE: unchanged    Other sleep problems: None     Drowsy driving / near incidents: No    Medications that affect sleep: No            Medications:     Current Outpatient Medications   Medication Sig     apixaban ANTICOAGULANT (ELIQUIS) 5 MG tablet Take 1 tablet (5 mg) by mouth 2 times daily     calcium 500 MG CHEW Take 2 chew tab by mouth daily     Cholecalciferol (VITAMIN D) 1000 UNIT capsule take 2 Caps by mouth daily.     flecainide (TAMBOCOR) 100 MG tablet Take 1 tablet (100 mg) by mouth 2 times daily     levothyroxine (SYNTHROID/LEVOTHROID) 137 MCG tablet Take 1 tablet (137 mcg) by mouth daily.     liothyronine (CYTOMEL) 5 MCG tablet Take 1 tablet (5 mcg) by mouth daily.     metoprolol succinate (TOPROL-XL) 25 MG 24 hr tablet Take 0.5 tablets (12.5 mg) by mouth daily Take at night     Probiotic Product (PROBIOTIC DAILY) CAPS Take 1 capsule by mouth daily     rosuvastatin (CRESTOR) 5 MG tablet TAKE 1 TABLET (5 MG) BY MOUTH DAILY.     VITAMINS/MINERALS OR TABS 1 TABLET DAILY     order for DME Equipment ordered: RESMED CPAP Mask type: Nasal Settings: 8 cmH2O     SHINGRIX injection      No current facility-administered medications for this visit.         Allergies   Allergen Reactions     No Known Drug Allergies             Past Medical History:     Past Medical History:   Diagnosis Date     Benign essential tremor     Chronic     Hyperlipidemia LDL goal <160 2/10/2010     Invasive ductal carcinoma of breast (H) 6/09    left breast ca     Major depressive disorder, recurrent episode, in full remission (H) 10/19/2011    Stable for decades     osteopenia 2002     Palpitations      Persistent atrial fibrillation (H) 05/10/2017     Unspecified hypothyroidism              Past Surgical History:      Past Surgical History:   Procedure Laterality Date     ANESTHESIA CARDIOVERSION N/A 11/28/2017    Procedure: ANESTHESIA  "CARDIOVERSION;  Cardioversion;  Surgeon: GENERIC ANESTHESIA PROVIDER;  Location: [facility] OR     C NONSPECIFIC PROCEDURE      Tubal Ligation    Abstracted 02     C THYROID ABLATION       COLONOSCOPY  10/03     COLONOSCOPY  2014     COLONOSCOPY  2014    Procedure: COLONOSCOPY;  Surgeon: Garrett Villaseñor MD;  Location: [facility] GI     ENHANCE LASER REFRACTIVE BILATERAL EXISTING PT IN PARAMETERS Bilateral      HC ECHO HEART XTHORACIC, STRESS/REST  09    normal     HC EXCISION BREAST LESION, OPEN >=1      re-excision 09       Social History     Tobacco Use     Smoking status: Never Smoker     Smokeless tobacco: Never Used   Substance Use Topics     Alcohol use: Yes     Comment: 1 or 2 glasses of wine most evenings       Family History   Problem Relation Age of Onset     Cancer Mother         84 yo Breast & Melanoma     Cerebrovascular Disease Mother 92        TIA     C.A.D. Mother      Cerebrovascular Disease Father          87 yo Alzheimers, CHF     C.A.D. Father         possible MI in age 60's     Myocardial Infarction Father      Cancer Maternal Aunt          40's Breast     Cerebrovascular Disease Paternal Grandmother          late 40's - early 50's     Blood Disease Maternal Aunt          51 yo Lupus     Cancer - colorectal No family hx of             Physical Examination:   /76   Pulse 64   Ht 1.575 m (5' 2.01\")   Wt 79.4 kg (175 lb)   SpO2 96%   BMI 32.00 kg/m              Data: All pertinent previous laboratory data reviewed     No results found for: PH, PHARTERIAL, PO2, [id number], SAT, PCO2, HCO3, BASEEXCESS, KWADWO, BEB  Lab Results   Component Value Date    TSH 1.97 2018    TSH 3.41 2018     Lab Results   Component Value Date    GLC 96 2018    GLC 98 05/10/2017     Lab Results   Component Value Date    HGB 13.0 2018    HGB 12.8 2018     Lab Results   Component Value Date    BUN 13 2018    BUN 19 05/10/2017    " CR 0.90 05/21/2018    CR 0.80 05/10/2017     No results found for: HELIO      She will follow up with me in about 3 month(s).         Copy to: Shadia Munroe MD 1/28/2019     [facility]  [address] [place] [address]       Fifteen minutes spent with patient, all of which were spent face-to-face counseling, consulting, coordinating plan of care and going over PAP download/sleep study and chart review.

## 2019-01-28 NOTE — NURSING NOTE
"Chief Complaint   Patient presents with     RECHECK     f/u pap       Initial /76   Pulse 64   Ht 1.575 m (5' 2.01\")   Wt 79.4 kg (175 lb)   SpO2 96%   BMI 32.00 kg/m   Estimated body mass index is 32 kg/m  as calculated from the following:    Height as of this encounter: 1.575 m (5' 2.01\").    Weight as of this encounter: 79.4 kg (175 lb).    Medication Reconciliation: complete        ESS 4    DME:  Yes airsense 10    [facility]  "

## 2019-04-04 ENCOUNTER — DOCUMENTATION ONLY (OUTPATIENT)
Dept: CARDIOLOGY | Facility: CLINIC | Age: 69
End: 2019-04-04

## 2019-04-04 NOTE — PROGRESS NOTES
Received a refill request for flecainide 100mg bid. Pt has transferred Cardiology care to [facility], regions cardiology @ [phone], general cardiologist ROBERT Ruiz, douglas Hodgkin-asked Cub to contact that office for refill-mmunns lpn

## 2019-04-12 NOTE — LETTER
11/27/2017      Shadia Simmons MD  [address] [place] [address]      RE: Ely Humphreys       Dear Colleague,    I had the pleasure of seeing Ely Humphreys in the [facility].    REASON FOR VISIT:  Evaluation of persistent atrial fibrillation.      HISTORY OF PRESENT ILLNESS:  Ms. Humphreys is a delightful 67-year-old lady with a history of Graves disease (14 years ago status post radiotherapy) and breast cancer (left-sided lumpectomy 7 years ago), who is here for evaluation of persistent atrial fibrillation.      The patient was found to have persistent fibrillation on preop evaluation for elective left knee meniscal tear repair surgery.  She was evaluated by Dr. Don and was found to have cardiomyopathy with EF around 40%.  A stress echo done in 2013 was within normal limits.  She was started on metoprolol and Eliquis, and underwent SCOTTIE cardioversion which was unsuccessful.  Later, she saw me in September.  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  I recommended amiodarone load preceded by cardioversion.  She underwent another attempt of cardioversion done on 10/24 which was successful and is here today for routine followup.      At the moment, she feels okay.  She has a difficult time to evaluate whether or not cardioversion helped with any symptoms, as she seems to be mainly asymptomatic.  An echocardiogram was repeated on 11/21 and EF was down to 40%-45%.  However, she was found to be back in AFib during the echo.  She was also found to have moderate MR based on the echo.  EKG done here today confirms atrial fibrillation with a heart rate around 104 beats per minute.      ASSESSMENT AND PLAN:     1.  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  We discussed that although cardioversion was successful, she is back in atrial fibrillation.  Therefore, we cannot confirm whether or not she has a  Group Topic: BH Process Group     Date: April 12  Start Time: 10:00 AM  End Time: 12:00 PM    Focus: Homework review/Check-in  Number in attendance: 5 + 1 resident    Pt will report their ability to apply the insights and skills learned in treatment while at home and individuation towards personal treatment plan and preparation towards recovery lifestyle and prevention of relapse behaviors. Discussion will include “What worked?”, “What did not work?” “How can you apply today's programming to adjust and improve recovery insights and skills to persevere towards your recovery goals?” Pt will identify their goal for treatment today.    Attendance: Present  Response Communication: Appropriate topic  MoodAffect: Appropriate to group  Behavior/Socialization: Appropriate to group  Thought Process: Appropriate  Patient Response: Appropriate feedback, Attentive, Good eye contact, Interactive and Interested in topic     Pt was out of group from [phone] to meet with the doctor. Pt reporting he struggled with withdrawal and felt he was having a Chron's flare up. Pt reports he was going to go into the emergency room as he was with his mother and she was also concerned. Pt reports using a small amount of Suboxone to help with this and felt better. Pt received support from group. Pt also engaged in feedback for peers in group.    tachycardia-mediated cardiomyopathy.      We discussed options including continued rate control versus another attempt at cardioversion.  She would like to give it one more attempt of cardioversion while taking amiodarone.  We will make arrangements to have it done.      She will follow up with us in a month to reevaluate whether or not she remains in sinus rhythm        2.  Embolic prevention.  CHADS-VASc score of 3.  Continue anticoagulation with Eliquis indefinitely.     Outpatient Encounter Prescriptions as of 11/27/2017   Medication Sig Dispense Refill     CALCIUM CARBONATE PO Take 1,250 mg by mouth       amiodarone (PACERONE/CODARONE) 200 MG tablet Amiodarone 400 mg PO BID for 4 days, then decrease to 400 mg PO daily for 4 days, and then decrease to 200 mg PO daily. 60 tablet 3     ELIQUIS 5 MG tablet TAKE ONE TABLET BY MOUTH TWICE DAILY  60 tablet 5     liothyronine (CYTOMEL) 5 MCG tablet Take 1 tablet (5 mcg) by mouth daily 90 tablet 3     levothyroxine (SYNTHROID) 137 MCG tablet Take 1 tablet (137 mcg) by mouth daily 90 tablet 3     Probiotic Product (PROBIOTIC DAILY) CAPS Take 1 capsule by mouth daily       Cholecalciferol (VITAMIN D) 1000 UNIT capsule take 2 Caps by mouth daily.       CALCIUM 500 MG OR TABS 2 tabs daily       VITAMINS/MINERALS OR TABS 1 TABLET DAILY       order for DME Equipment ordered: RESMED CPAP Mask type: Nasal Settings: 8 cmH2O       No facility-administered encounter medications on file as of 11/27/2017.        Again, thank you for allowing me to participate in the care of your patient.      Sincerely,    Miguel Shaffer MD     [facility]

## 2019-04-22 ENCOUNTER — OFFICE VISIT (OUTPATIENT)
Dept: SLEEP MEDICINE | Facility: CLINIC | Age: 69
End: 2019-04-22
Payer: COMMERCIAL

## 2019-04-22 VITALS
SYSTOLIC BLOOD PRESSURE: 137 MMHG | HEART RATE: 58 BPM | BODY MASS INDEX: 33.31 KG/M2 | DIASTOLIC BLOOD PRESSURE: 72 MMHG | RESPIRATION RATE: 16 BRPM | HEIGHT: 62 IN | WEIGHT: 181 LBS | OXYGEN SATURATION: 99 %

## 2019-04-22 DIAGNOSIS — E66.811 OBESITY (BMI 30.0-34.9): ICD-10-CM

## 2019-04-22 DIAGNOSIS — G47.33 OSA (OBSTRUCTIVE SLEEP APNEA): Primary | ICD-10-CM

## 2019-04-22 PROCEDURE — 99213 OFFICE O/P EST LOW 20 MIN: CPT | Performed by: INTERNAL MEDICINE

## 2019-04-22 ASSESSMENT — MIFFLIN-ST. JEOR: SCORE: 1314.26

## 2019-04-22 NOTE — PROGRESS NOTES
[facility]  April 22, 2019      Name: Ely Humphreys MRN# [id number]   Age: 67 year old YOB: 1950   Date of visit: April 22, 2019  Primary care provider: Shadia Munroe         Assessment and Plan:     1. Obstructive Sleep Apnea:  - Full compliance of PAP use with low residual AHI 3/hr after CPAP increased to pressure to 10 cmH2O.   -CPAP machine download is reviewed.  - Clinical response: good  - Recommend using CPAP every night for at least 7-8 hours each night  - Daytime naps are not advised, but use CPAP if taking naps  - Patient was advised not to drive if drowsy or sleepy.  - Follow up in sleep clinic in 12 months.    2. Overweight: Encouraged patient to lose weight. Counseled regarding weight loss through diet modification and increased physical activity. Patient was given nstuctions of weight loss and advised to follow up her PCP for further weight loss interventions. Weight loss instructions given.    No orders of the defined types were placed in this encounter.      Summary Counseling:  New sleep schedule recommendation: given    Check out [email or web address]    All questions were answered.  The patient indicates understanding of the above issues and agrees with the plan set forth.           Chief Complaint:    Routine Follow up            History of Present Illness:     Ely Humphreys is a 67 year old female with history of MIKEL, atrial fibrillation, systolic CHF with EF 40%, hx of Graves s/p I ablation with subsequent hypothyroidism, depression, breast cancer and essential tremor who comes to [facility] for follow up. Patient was diagnosed moderate sleep apnea and was prescribed CPAP 8 cmH2O which was setup on 6/30/17. She is using her PAP therapy every night and 100% compliance. She has benefits and no complains.  Last visit on 1/20/19, patient was using her CPAP with 97% compliance over 4 hours but she  has residual AHI of 7.5 events, her CPAP pressure was increased to 10 cmH2O from 8 cmH2O.  Today, patient, is using her CPAP at 100% over 4-hour usage she has low residual AHI of 3 events an hour. She had a good benefits using her CPAP, sleeps lot better and no snoring. She [place] to [place] vacation.    PREVIOUS SLEEP STUDIES:  PS/15/17  Sleep latency 3.6 minutes with sleep aid.    REM achieved.   REM latency 204 minutes.    Sleep efficiency 82.7%. Total sleep time 260.5 minutes.  Sleep architecture:  Stage 1, 11.9% (5%), stage 2, 62.4% (45-55%), stage 3, 2.4% (15-20%), stage REM, 1.5% (20-25%).    AHI was 24.6/hour.   RDI 30.6/hour.    REM AHI 75/hour (due to limited REM sleep).    Supine AHI 27.2/hour.    Lowest O2 saturation: 83.8%  S/He spent 1.9 minutes below 89% SpO2.   Periodic Limb Movement Index 5.3/hour.        CPAP titration:    Sleep latency 17 minutes.    REM achieved. REM latency 59 minutes.    Sleep efficiency 75%. Total sleep time 194 minutes.   Sleep architecture:  Stage 1, 8.9% (5%), stage 2, 58.8% (45-55%), stage 3, 9.3% (15-20%), stage REM, 23% (20-25%).    Periodic Limb Movement Index 0/hour.   CPAP was titrated to 6 cmH2O with elimination of apneas, hypopneas and desaturations.   Supine REM was not noted at this pressure.     CPAP Compliance:  Dates: 3/20/2019- 2019       97 % >4hour use   Days used:   Average daily use (days used): 8.30 hrs  Leak 95 percentile: 12.2 L/min  Residual AHI: 3/hr  Pressure: 12 cmH2O     [place] Sleepiness Scale score today:   [place] Sleepiness Scale score last visit:   Pre-PAP [place] Sleepiness Scale score:     PAP machine: ResMed    Interface:  Mask: nasal mask  Chin strap: No  Leak: No  Humidity: Yes    Difficulties with therapy:    [-] Difficulty tolerating the pressure  [-] Epistaxis:   [-] Nasal congestion   [-] Dry mouth:  [-] Mouth breathing:   [-] Pain/skin breakdown:     Improvements noted with  CPAP:  [+] waking up more refreshed  [+] sleeping better with less arousals  [+] nocturia improved   [+] improved energy level during the day    SLEEP-WAKE SCHEDULE: unchanged    Other sleep problems: None     Drowsy driving / near incidents: No    Medications that affect sleep: No            Medications:     Current Outpatient Medications   Medication Sig     apixaban ANTICOAGULANT (ELIQUIS) 5 MG tablet Take 1 tablet (5 mg) by mouth 2 times daily     calcium 500 MG CHEW Take 2 chew tab by mouth daily     Cholecalciferol (VITAMIN D) 1000 UNIT capsule take 2 Caps by mouth daily.     flecainide (TAMBOCOR) 100 MG tablet Take 1 tablet (100 mg) by mouth 2 times daily     levothyroxine (SYNTHROID/LEVOTHROID) 137 MCG tablet Take 1 tablet (137 mcg) by mouth daily.     liothyronine (CYTOMEL) 5 MCG tablet Take 1 tablet (5 mcg) by mouth daily.     metoprolol succinate (TOPROL-XL) 25 MG 24 hr tablet Take 0.5 tablets (12.5 mg) by mouth daily Take at night     order for DME Equipment ordered: RESMED CPAP Mask type: Nasal Settings: 8 cmH2O     Probiotic Product (PROBIOTIC DAILY) CAPS Take 1 capsule by mouth daily     rosuvastatin (CRESTOR) 5 MG tablet TAKE 1 TABLET (5 MG) BY MOUTH DAILY.     SHINGRIX injection      VITAMINS/MINERALS OR TABS 1 TABLET DAILY     No current facility-administered medications for this visit.         Allergies   Allergen Reactions     No Known Drug Allergies             Past Medical History:     Past Medical History:   Diagnosis Date     Benign essential tremor     Chronic     Hyperlipidemia LDL goal <160 2/10/2010     Invasive ductal carcinoma of breast (H) 6/09    left breast ca     Major depressive disorder, recurrent episode, in full remission (H) 10/19/2011    Stable for decades     osteopenia 2002     Palpitations      Persistent atrial fibrillation (H) 05/10/2017     Unspecified hypothyroidism              Past Surgical History:      Past Surgical History:   Procedure Laterality Date      "ANESTHESIA CARDIOVERSION N/A 2017    Procedure: ANESTHESIA CARDIOVERSION;  Cardioversion;  Surgeon: GENERIC ANESTHESIA PROVIDER;  Location: [facility] OR     C NONSPECIFIC PROCEDURE      Tubal Ligation    Abstracted 02     C THYROID ABLATION       COLONOSCOPY  10/03     COLONOSCOPY  2014     COLONOSCOPY  2014    Procedure: COLONOSCOPY;  Surgeon: Garrett Villaseñor MD;  Location: [facility] GI     ENHANCE LASER REFRACTIVE BILATERAL EXISTING PT IN PARAMETERS Bilateral      HC ECHO HEART XTHORACIC, STRESS/REST  09    normal     HC EXCISION BREAST LESION, OPEN >=1      re-excision 09       Social History     Tobacco Use     Smoking status: Never Smoker     Smokeless tobacco: Never Used   Substance Use Topics     Alcohol use: Yes     Comment: 1 or 2 glasses of wine most evenings       Family History   Problem Relation Age of Onset     Cancer Mother         82 yo Breast & Melanoma     Cerebrovascular Disease Mother 92        TIA     C.A.D. Mother      Cerebrovascular Disease Father          87 yo Alzheimers, CHF     C.A.D. Father         possible MI in age 60's     Myocardial Infarction Father      Cancer Maternal Aunt          40's Breast     Cerebrovascular Disease Paternal Grandmother          late 40's - early 50's     Blood Disease Maternal Aunt          53 yo Lupus     Cancer - colorectal No family hx of             Physical Examination:   /72   Pulse 58   Resp 16   Ht 1.575 m (5' 2\")   Wt 82.1 kg (181 lb)   SpO2 99%   BMI 33.11 kg/m              Data: All pertinent previous laboratory data reviewed     No results found for: PH, PHARTERIAL, PO2, [id number], SAT, PCO2, HCO3, BASEEXCESS, KWADWO, BEB  Lab Results   Component Value Date    TSH 1.97 2018    TSH 3.41 2018     Lab Results   Component Value Date    GLC 96 2018    GLC 98 05/10/2017     Lab Results   Component Value Date    HGB 13.0 2018    HGB 12.8 2018     Lab " Results   Component Value Date    BUN 13 05/21/2018    BUN 19 05/10/2017    CR 0.90 05/21/2018    CR 0.80 05/10/2017     No results found for: HELIO      She will follow up with me in about 3 month(s).         Copy to: Shadia Munroe MD 4/22/2019     [facility]  [id number] [place], [place] [id number]       Fifteen minutes spent with patient, all of which were spent face-to-face counseling, consulting, coordinating plan of care and going over PAP download/sleep study and chart review.

## 2019-04-22 NOTE — NURSING NOTE
"Chief Complaint   Patient presents with     RECHECK     F/u cpap, pressure change 1/28       Initial /72   Pulse 58   Resp 16   Ht 1.575 m (5' 2\")   Wt 82.1 kg (181 lb)   SpO2 99%   BMI 33.11 kg/m   Estimated body mass index is 33.11 kg/m  as calculated from the following:    Height as of this encounter: 1.575 m (5' 2\").    Weight as of this encounter: 82.1 kg (181 lb).    Medication Reconciliation: complete    Karey Edmondson LPN      "

## 2019-04-22 NOTE — PATIENT INSTRUCTIONS
MY TREATMENT INFORMATION FOR SLEEP APNEA-  Ely Humphreys    MY CONTACT NUMBERS ARE:  [phone]  DOCTOR : [facility]  SLEEP CENTER :  [facility]    Your sleep apnea is controlled with CPAP.   Mask and supplies are ordered    Continue use of CPAP:  - Recommend using CPAP every night for at least 7-8 hours each night  - Daytime naps are not advised, but use CPAP if taking naps.    - Do not remove mask headgear when you go to bathroom at night, but just unplug the hose.    You met all objective measure goal  Compliance  Goal >70% (preferrably 100%)  Leak   Goal < 10% (less than 24 L/min)  AHI  Goal < 5 events per hour   Usage  Goal 7-8 hours.      Patient was advised not to drive if drowsy or sleepy.      Follow up in 12 months.    Your BMI is Body mass index is 33.11 kg/m .  Weight management is a personal decision.  If you are interested in exploring weight loss strategies, the following discussion covers the approaches that may be successful. Body mass index (BMI) is one way to tell whether you are at a healthy weight, overweight, or obese. It measures your weight in relation to your height.  A BMI of 18.5 to 24.9 is in the healthy range. A person with a BMI of 25 to 29.9 is considered overweight, and someone with a BMI of 30 or greater is considered obese. More than two-thirds of American adults are considered overweight or obese.  Being overweight or obese increases the risk for further weight gain. Excess weight may lead to heart disease and diabetes.  Creating and following plans for healthy eating and physical activity may help you improve your health.  Weight control is part of healthy lifestyle and includes exercise, emotional health, and healthy eating habits. Careful eating habits lifelong are the mainstay of weight control. Though there are significant health benefits from weight loss, long-term weight loss with diet alone may be very difficult to achieve- studies show long-term success with  dietary management in less than 10% of people. Attaining a healthy weight may be especially difficult to achieve in those with severe obesity. In some cases, medications, devices and surgical management might be considered.  What can you do?  If you are overweight or obese and are interested in methods for weight loss, you should discuss this with your provider.     Consider reducing daily calorie intake by 500 calories.     Keep a food journal.     Avoiding skipping meals, consider cutting portions instead.    Diet combined with exercise helps maintain muscle while optimizing fat loss. Strength training is particularly important for building and maintaining muscle mass. Exercise helps reduce stress, increase energy, and improves fitness. Increasing exercise without diet control, however, may not burn enough calories to loose weight.       Start walking three days a week 10-20 minutes at a time    Work towards walking thirty minutes five days a week     Eventually, increase the speed of your walking for 1-2 minutes at time    In addition, we recommend that you review healthy lifestyles and methods for weight loss available through the National Institutes of Health patient information sites:  http://win.niddk.nih.gov/publications/index.htm    And look into health and wellness programs that may be available through your health insurance provider, employer, local community center, or manuela club.    Weight management plan: Patient was referred to their PCP to discuss a diet and exercise plan.     Your blood pressure was checked while you were in clinic today.  Please read the guidelines below about what these numbers mean and what you should do about them.  Your systolic blood pressure is the top number.  This is the pressure when the heart is pumping.  Your diastolic blood pressure is the bottom number.  This is the pressure in between beats.  If your systolic blood pressure is less than 120 and your diastolic blood  pressure is less than 80, then your blood pressure is normal. There is nothing more that you need to do about it  If your systolic blood pressure is 120-139 or your diastolic blood pressure is 80-89, your blood pressure may be higher than it should be.  You should have your blood pressure re-checked within a year by a primary care provider.  If your systolic blood pressure is 140 or greater or your diastolic blood pressure is 90 or greater, you may have high blood pressure.  High blood pressure is treatable, but if left untreated over time it can put you at risk for heart attack, stroke, or kidney failure.  You should have your blood pressure re-checked by a primary care provider within the next four weeks.

## 2019-05-20 ENCOUNTER — OFFICE VISIT (OUTPATIENT)
Dept: FAMILY MEDICINE | Facility: CLINIC | Age: 69
End: 2019-05-20
Payer: COMMERCIAL

## 2019-05-20 VITALS
SYSTOLIC BLOOD PRESSURE: 114 MMHG | OXYGEN SATURATION: 95 % | TEMPERATURE: 98.2 F | WEIGHT: 179.9 LBS | HEART RATE: 59 BPM | DIASTOLIC BLOOD PRESSURE: 72 MMHG | BODY MASS INDEX: 32.9 KG/M2 | RESPIRATION RATE: 16 BRPM

## 2019-05-20 DIAGNOSIS — I48.19 PERSISTENT ATRIAL FIBRILLATION (H): ICD-10-CM

## 2019-05-20 DIAGNOSIS — Z01.818 PREOP GENERAL PHYSICAL EXAM: Primary | ICD-10-CM

## 2019-05-20 DIAGNOSIS — G47.30 SLEEP APNEA, UNSPECIFIED TYPE: ICD-10-CM

## 2019-05-20 LAB
ERYTHROCYTE [DISTWIDTH] IN BLOOD BY AUTOMATED COUNT: 12.2 % (ref 10–15)
HCT VFR BLD AUTO: 40.8 % (ref 35–47)
HGB BLD-MCNC: 13 G/DL (ref 11.7–15.7)
MCH RBC QN AUTO: 31 PG (ref 26.5–33)
MCHC RBC AUTO-ENTMCNC: 31.9 G/DL (ref 31.5–36.5)
MCV RBC AUTO: 97 FL (ref 78–100)
PLATELET # BLD AUTO: 249 10E9/L (ref 150–450)
RBC # BLD AUTO: 4.19 10E12/L (ref 3.8–5.2)
WBC # BLD AUTO: 6.1 10E9/L (ref 4–11)

## 2019-05-20 PROCEDURE — 93000 ELECTROCARDIOGRAM COMPLETE: CPT | Performed by: NURSE PRACTITIONER

## 2019-05-20 PROCEDURE — 80048 BASIC METABOLIC PNL TOTAL CA: CPT | Performed by: NURSE PRACTITIONER

## 2019-05-20 PROCEDURE — 99214 OFFICE O/P EST MOD 30 MIN: CPT | Performed by: NURSE PRACTITIONER

## 2019-05-20 PROCEDURE — 36415 COLL VENOUS BLD VENIPUNCTURE: CPT | Performed by: NURSE PRACTITIONER

## 2019-05-20 PROCEDURE — 85027 COMPLETE CBC AUTOMATED: CPT | Performed by: NURSE PRACTITIONER

## 2019-05-20 ASSESSMENT — ANXIETY QUESTIONNAIRES

## 2019-05-20 ASSESSMENT — PATIENT HEALTH QUESTIONNAIRE - PHQ9
SUM OF ALL RESPONSES TO PHQ QUESTIONS 1-9: 2
5. POOR APPETITE OR OVEREATING: NOT AT ALL

## 2019-05-20 NOTE — LETTER
My Depression Action Plan  Name: Ely Humphreys   Date of Birth 1950  Date: 5/20/2019    My doctor: Shadia Munroe   My clinic: [facility]  [address] [address] [phone]          GREEN    ZONE   Good Control    What it looks like:     Things are going generally well. You have normal up s and down s. You may even feel depressed from time to time, but bad moods usually last less than a day.   What you need to do:  1. Continue to care for yourself (see self care plan)  2. Check your depression survival kit and update it as needed  3. Follow your physician s recommendations including any medication.  4. Do not stop taking medication unless you consult with your physician first.           YELLOW         ZONE Getting Worse    What it looks like:     Depression is starting to interfere with your life.     It may be hard to get out of bed; you may be starting to isolate yourself from others.    Symptoms of depression are starting to last most all day and this has happened for several days.     You may have suicidal thoughts but they are not constant.   What you need to do:     1. Call your care team, your response to treatment will improve if you keep your care team informed of your progress. Yellow periods are signs an adjustment may need to be made.     2. Continue your self-care, even if you have to fake it!    3. Talk to someone in your support network    4. Open up your depression survival kit           RED    ZONE Medical Alert - Get Help    What it looks like:     Depression is seriously interfering with your life.     You may experience these or other symptoms: You can t get out of bed most days, can t work or engage in other necessary activities, you have trouble taking care of basic hygiene, or basic responsibilities, thoughts of suicide or death that will not go away, self-injurious behavior.     What you need to do:  1. Call your care team and  request a same-day appointment. If they are not available (weekends or after hours) call your local crisis line, emergency room or 911.            Depression Self Care Plan / Survival Kit    Self-Care for Depression  Here s the deal. Your body and mind are really not as separate as most people think.  What you do and think affects how you feel and how you feel influences what you do and think. This means if you do things that people who feel good do, it will help you feel better.  Sometimes this is all it takes.  There is also a place for medication and therapy depending on how severe your depression is, so be sure to consult with your medical provider and/ or Behavioral Health Consultant if your symptoms are worsening or not improving.     In order to better manage my stress, I will:    Exercise  Get some form of exercise, every day. This will help reduce pain and release endorphins, the  feel good  chemicals in your brain. This is almost as good as taking antidepressants!  This is not the same as joining a gym and then never going! (they count on that by the way ) It can be as simple as just going for a walk or doing some gardening, anything that will get you moving.      Hygiene   Maintain good hygiene (Get out of bed in the morning, Make your bed, Brush your teeth, Take a shower, and Get dressed like you were going to work, even if you are unemployed).  If your clothes don't fit try to get ones that do.    Diet  I will strive to eat foods that are good for me, drink plenty of water, and avoid excessive sugar, caffeine, alcohol, and other mood-altering substances.  Some foods that are helpful in depression are: complex carbohydrates, B vitamins, flaxseed, fish or fish oil, fresh fruits and vegetables.    Psychotherapy  I agree to participate in Individual Therapy (if recommended).    Medication  If prescribed medications, I agree to take them.  Missing doses can result in serious side effects.  I understand that  drinking alcohol, or other illicit drug use, may cause potential side effects.  I will not stop my medication abruptly without first discussing it with my provider.    Staying Connected With Others  I will stay in touch with my friends, family members, and my primary care provider/team.    Use your imagination  Be creative.  We all have a creative side; it doesn t matter if it s oil painting, sand castles, or mud pies! This will also kick up the endorphins.    Witness Beauty  (AKA stop and smell the roses) Take a look outside, even in mid-winter. Notice colors, textures. Watch the squirrels and birds.     Service to others  Be of service to others.  There is always someone else in need.  By helping others we can  get out of ourselves  and remember the really important things.  This also provides opportunities for practicing all the other parts of the program.    Humor  Laugh and be silly!  Adjust your TV habits for less news and crime-drama and more comedy.    Control your stress  Try breathing deep, massage therapy, biofeedback, and meditation. Find time to relax each day.     My support system    Clinic Contact:  Phone number:    Contact 1:  Phone number:    Contact 2:  Phone number:    Nondenominational/:  Phone number:    Therapist:  Phone number:    Local crisis center:    Phone number:    Other community support:  Phone number:

## 2019-05-20 NOTE — PROGRESS NOTES
[facility]  [address] [facility] [address] [phone]  Dept: [phone]    PRE-OP EVALUATION:  Today's date: 2019    Ely Humphreys (: 1950) presents for pre-operative evaluation assessment as requested by Dr. Hodgkin.  She requires evaluation and anesthesia risk assessment prior to undergoing surgery/procedure for treatment of cardiac ablation .    Fax number for surgical facility: [facility] [phone]  Primary Physician: Shadia Munroe  Type of Anesthesia Anticipated: to be determined    Patient has a Health Care Directive or Living Will:  YES     Preop Questions 2019   Who is doing your surgery? Dr. Douglas Hodgkin   What are you having done? cardiac ablation   Date of Surgery/Procedure: 2019   Facility or Hospital where procedure/surgery will be performed: [facility]   1.  Do you have a history of Heart attack, stroke, stent, coronary bypass surgery, or other heart surgery? No   2.  Do you ever have any pain or discomfort in your chest? No   3.  Do you have a history of  Heart Failure? No   4.   Are you troubled by shortness of breath when:  walking on a level surface, or up a slight hill, or at night? No   5.  Do you currently have a cold, bronchitis or other respiratory infection? No   6.  Do you have a cough, shortness of breath, or wheezing? No   7.  Do you sometimes get pains in the calves of your legs when you walk? No   8. Do you or anyone in your family have previous history of blood clots? YES - father with hx of blood clots, unsure of details   9.  Do you or does anyone in your family have a serious bleeding problem such as prolonged bleeding following surgeries or cuts? No   10. Have you ever had problems with anemia or been told to take iron pills? No   11. Have you had any abnormal blood loss such as black, tarry or bloody stools, or abnormal vaginal bleeding? No   12. Have you ever had a blood transfusion? No   13. Have you or any of  your relatives ever had problems with anesthesia? No   14. Do you have sleep apnea, excessive snoring or daytime drowsiness? YES - sleep apnea, compliant with cpap   15. Do you have any prosthetic heart valves? No   16. Do you have prosthetic joints? No   17. Is there any chance that you may be pregnant? No         HPI:     HPI related to upcoming procedure:     A. Fib dx incidentally at a preop in 2017.  ECV was attempted x4, unsuccessful.  Pt was treated with amiodarone.  Eventually converted with ECV x2.  Has been in NSR since that time.  Did take amiodarone x6 months but she had loss of vision in her R eye thought to be due to amiodarone.  Changed to flecainide.  Remained in normal rhythm, however consistently has side effects including fatigue, sob, lightheadedness.  Plan for ablation.      See problem list for active medical problems.  Problems all longstanding and stable, except as noted/documented.  See ROS for pertinent symptoms related to these conditions.                                                                                                                                                          .    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  Document previously scanned on 12/7/16.  Validation form  completed and sent to be scanned.  Code Status reflects choices in most recent ACP document.  Confirmed/documented designated decision maker(s).  Added by Ericka Baker   Advance Care Planning Liaison  Advance Care Planning 11/15/2016: ACP Review of Chart / Resources Provided:  Reviewed chart for advance care plan.  Ely Humphreys has no plan or code status on file however states presence of ACP document. Copy requested. Code status updated and sent to provider for review pending receipt of document/advance care plan review.  Confirmed/documented legally designated decision makers.  Added by Bailey Ndiaye  Advance Care Planning 10/19/2011:  Patient states has Advance Directive and will bring in a copy to clinic.        Health Care Home 08/22/2011     Priority: Medium     X  EMERGENCY CARE PLAN  Presenting Problem Signs and Symptoms Treatment Plan    Questions or conerns during clinic hours    I will call the clinic directly     Questions or conerns outside clinic hours    I will call the 24 hour nurse line at [phone]    Patient needs to schedule an appointment    I will call the 24 hour scheduling team at [phone] or clinic directly    Same day treatment     I will call the clinic first, nurse line if after hours, urgent care and express care if needed                            DX V65.8 REPLACED WITH [id number] [facility] (04/08/2013)       Abnormal Pap smear 11/16/2010     Priority: Medium     HYPERLIPIDEMIA LDL GOAL <160 02/10/2010     Priority: Medium     Invasive ductal carcinoma of breast (H)      Priority: Medium     [place] grade 2 fo 3. T1b Clinical N, N0, M0, rdGrdrrdarddrderd:rd rd3rd Receptor status: ER+, AK+ HER 2 IVAN - by FISH  Completed radiation and chemotherapy.  On anastrazole for at least 5 years. Needs yearly dexa scans. Followed by Dr. Marie.       Postmenopausal atrophic vaginitis 02/21/2007     Priority: Medium     Disorder of bone and cartilage 07/27/2004     Priority: Medium     Problem  list name updated by automated process. Provider to review       Hypothyroidism 07/23/2004     Priority: Medium     Problem list name updated by automated process. Provider to review        Past Medical History:   Diagnosis Date     Benign essential tremor     Chronic     Hyperlipidemia LDL goal <160 2/10/2010     Invasive ductal carcinoma of breast (H) 6/09    left breast ca     Major depressive disorder, recurrent episode, in full remission (H) 10/19/2011    Stable for decades     osteopenia 2002     Palpitations      Persistent atrial fibrillation (H) 05/10/2017     Unspecified hypothyroidism      Past Surgical History:   Procedure Laterality Date     ANESTHESIA CARDIOVERSION N/A 11/28/2017    Procedure: ANESTHESIA CARDIOVERSION;  Cardioversion;  Surgeon: GENERIC ANESTHESIA PROVIDER;  Location: [facility] OR     C NONSPECIFIC PROCEDURE      Tubal Ligation    Abstracted 07/12/02     C THYROID ABLATION       COLONOSCOPY  10/03     COLONOSCOPY  5/9/2014     COLONOSCOPY  6/9/2014    Procedure: COLONOSCOPY;  Surgeon: Garrett Villaseñor MD;  Location: [facility] GI     ENHANCE LASER REFRACTIVE BILATERAL EXISTING PT IN PARAMETERS Bilateral 2008     HC ECHO HEART XTHORACIC, STRESS/REST  7/22/09    normal     HC EXCISION BREAST LESION, OPEN >=1  7/09    re-excision 8/17/09     Current Outpatient Medications   Medication Sig Dispense Refill     apixaban ANTICOAGULANT (ELIQUIS) 5 MG tablet Take 1 tablet (5 mg) by mouth 2 times daily 180 tablet 3     calcium 500 MG CHEW Take 2 chew tab by mouth daily       Cholecalciferol (VITAMIN D) 1000 UNIT capsule take 2 Caps by mouth daily.       flecainide (TAMBOCOR) 100 MG tablet Take 1 tablet (100 mg) by mouth 2 times daily 180 tablet 3     levothyroxine (SYNTHROID/LEVOTHROID) 137 MCG tablet Take 1 tablet (137 mcg) by mouth daily. 90 tablet 1     liothyronine (CYTOMEL) 5 MCG tablet Take 1 tablet (5 mcg) by mouth daily. 90 tablet 1     metoprolol succinate (TOPROL-XL) 25 MG 24 hr tablet Take 0.5  tablets (12.5 mg) by mouth daily Take at night 45 tablet 1     order for DME Equipment ordered: RESMED CPAP Mask type: Nasal Settings: 8 cmH2O       Probiotic Product (PROBIOTIC DAILY) CAPS Take 1 capsule by mouth daily       rosuvastatin (CRESTOR) 5 MG tablet TAKE 1 TABLET (5 MG) BY MOUTH DAILY. 30 tablet 5     VITAMINS/MINERALS OR TABS 1 TABLET DAILY       OTC products: no recent use of OTC ASA, NSAIDS or Steroids    Allergies   Allergen Reactions     No Known Drug Allergies       Latex Allergy: NO    Social History     Tobacco Use     Smoking status: Never Smoker     Smokeless tobacco: Never Used   Substance Use Topics     Alcohol use: Yes     Comment: 1 or 2 glasses of wine most evenings     History   Drug Use No       REVIEW OF SYSTEMS:   CONSTITUTIONAL: NEGATIVE for fever, chills, change in weight  ENT/MOUTH: NEGATIVE for ear, mouth and throat problems  RESP: NEGATIVE for significant cough or SOB  CV: hx of a. Fib.  GI: NEGATIVE for nausea, abdominal pain, heartburn, or change in bowel habits  : NEGATIVE for frequency, dysuria, or hematuria  NEURO: NEGATIVE for weakness, dizziness or paresthesias  ENDOCRINE: NEGATIVE for temperature intolerance, skin/hair changes  HEME: NEGATIVE for bleeding problems  PSYCHIATRIC: NEGATIVE for changes in mood or affect    EXAM:   /72 (BP Location: Right arm, Patient Position: Chair, Cuff Size: Adult Regular)   Pulse 59   Temp 98.2  F (36.8  C) (Tympanic)   Resp 16   Wt 81.6 kg (179 lb 14.4 oz)   LMP  (LMP Unknown)   SpO2 95%   BMI 32.90 kg/m      GENERAL APPEARANCE: healthy, alert and no distress     EYES: Eyes grossly normal to inspection     HENT: ear canals and TM's normal and nose and mouth without ulcers or lesions     NECK: no adenopathy, no asymmetry, masses, or scars and thyroid normal to palpation     RESP: lungs clear to auscultation - no rales, rhonchi or wheezes     CV: regular rates and rhythm, normal S1 S2, no S3 or S4 and no murmur, click or rub      ABDOMEN:  soft, nontender, no HSM or masses and bowel sounds normal     NEURO: Normal strength and tone, sensory exam grossly normal, mentation intact and speech normal     PSYCH: mentation appears normal. and affect normal/bright     LYMPHATICS: No cervical adenopathy    DIAGNOSTICS:   EKG: normal axis, normal intervals, no acute ST/T changes c/w ischemia, no LVH by voltage criteria, unchanged from previous tracings  Hemoglobin (indicated for history of anemia or procedure with significant blood loss such as tonsillectomy, major intraperitoneal surgery, vascular surgery, major spine surgery, total joint replacement)  Serum Potassium  Serum Creatinine    Recent Labs   Lab Test 07/03/18  1031 05/21/18  0840 05/08/18  1628 11/28/17  1005  05/10/17  1705  11/09/15  1047  06/04/12  0741   HGB 13.0 12.8 13.9  --   --  12.5   < >  --   --   --      --  269  --   --  234   < >  --   --   --    INR  --   --   --   --   --  1.01  --   --   --   --    NA  --  140  --   --   --  141   < > 140   < > 144   POTASSIUM  --  4.0  --  4.1   < > 4.2   < > 4.2   < > 4.0   CR  --  0.90  --   --   --  0.80   < > 0.70   < > 0.80   A1C  --   --   --   --   --   --   --  5.4  --  5.6    < > = values in this interval not displayed.      Lab Results   Component Value Date    WBC 6.1 05/20/2019     Lab Results   Component Value Date    RBC 4.19 05/20/2019     Lab Results   Component Value Date    HGB 13.0 05/20/2019     Lab Results   Component Value Date    HCT 40.8 05/20/2019     No components found for: MCT  Lab Results   Component Value Date    MCV 97 05/20/2019     Lab Results   Component Value Date    MCH 31.0 05/20/2019     Lab Results   Component Value Date    MCHC 31.9 05/20/2019     Lab Results   Component Value Date    RDW 12.2 05/20/2019     Lab Results   Component Value Date     05/20/2019     Last Comprehensive Metabolic Panel:  Sodium   Date Value Ref Range Status   05/20/2019 141 133 - 144 mmol/L Final      Potassium   Date Value Ref Range Status   05/20/2019 4.0 3.4 - 5.3 mmol/L Final     Chloride   Date Value Ref Range Status   05/20/2019 106 94 - 109 mmol/L Final     Carbon Dioxide   Date Value Ref Range Status   05/20/2019 27 20 - 32 mmol/L Final     Anion Gap   Date Value Ref Range Status   05/20/2019 8 3 - 14 mmol/L Final     Glucose   Date Value Ref Range Status   05/20/2019 90 70 - 99 mg/dL Final     Urea Nitrogen   Date Value Ref Range Status   05/20/2019 17 7 - 30 mg/dL Final     Creatinine   Date Value Ref Range Status   05/20/2019 0.88 0.52 - 1.04 mg/dL Final     GFR Estimate   Date Value Ref Range Status   05/20/2019 68 >60 mL/min/[1.73_m2] Final     Comment:     Non  GFR Calc  Starting 12/18/2018, serum creatinine based estimated GFR (eGFR) will be   calculated using the Chronic Kidney Disease Epidemiology Collaboration   (CKD-EPI) equation.       Calcium   Date Value Ref Range Status   05/20/2019 10.0 8.5 - 10.1 mg/dL Final       IMPRESSION:   Reason for surgery/procedure: a. Fib, not tolerating medical management.  Diagnosis/reason for consult: hx of a. Fib, sleep apnea.    The proposed surgical procedure is considered INTERMEDIATE risk.    REVISED CARDIAC RISK INDEX  The patient has the following serious cardiovascular risks for perioperative complications such as (MI, PE, VFib and 3  AV Block):  No serious cardiac risks  INTERPRETATION: 0 risks: Class I (very low risk - 0.4% complication rate)    The patient has the following additional risks for perioperative complications:  No identified additional risks      ICD-10-CM    1. Preop general physical exam Z01.818 Basic metabolic panel     CBC with platelets     EKG 12-lead complete w/read - Clinics   2. Persistent atrial fibrillation (H) I48.1 Basic metabolic panel     CBC with platelets     EKG 12-lead complete w/read - Clinics   3. Sleep apnea, unspecified type G47.30        RECOMMENDATIONS:     --Patient is to take all scheduled  medications on the day of surgery.  Has discussed with cardiology nurse.    APPROVAL GIVEN to proceed with proposed procedure, without further diagnostic evaluation       Signed Electronically by: SARAH Yanez Ra, CNP    Copy of this evaluation report is provided to requesting physician.    [place] Preop Guidelines    Revised Cardiac Risk Index

## 2019-05-21 LAB
ANION GAP SERPL CALCULATED.3IONS-SCNC: 8 MMOL/L (ref 3–14)
BUN SERPL-MCNC: 17 MG/DL (ref 7–30)
CALCIUM SERPL-MCNC: 10 MG/DL (ref 8.5–10.1)
CHLORIDE SERPL-SCNC: 106 MMOL/L (ref 94–109)
CO2 SERPL-SCNC: 27 MMOL/L (ref 20–32)
CREAT SERPL-MCNC: 0.88 MG/DL (ref 0.52–1.04)
GFR SERPL CREATININE-BSD FRML MDRD: 68 ML/MIN/{1.73_M2}
GLUCOSE SERPL-MCNC: 90 MG/DL (ref 70–99)
POTASSIUM SERPL-SCNC: 4 MMOL/L (ref 3.4–5.3)
SODIUM SERPL-SCNC: 141 MMOL/L (ref 133–144)

## 2019-05-21 ASSESSMENT — ANXIETY QUESTIONNAIRES: GAD7 TOTAL SCORE: 0

## 2019-06-03 ENCOUNTER — MYC REFILL (OUTPATIENT)
Dept: PEDIATRICS | Facility: CLINIC | Age: 69
End: 2019-06-03

## 2019-06-03 DIAGNOSIS — E03.9 ACQUIRED HYPOTHYROIDISM: ICD-10-CM

## 2019-06-03 DIAGNOSIS — E78.5 HYPERLIPIDEMIA LDL GOAL <100: ICD-10-CM

## 2019-06-03 NOTE — TELEPHONE ENCOUNTER
"Requested Prescriptions   Pending Prescriptions Disp Refills     liothyronine (CYTOMEL) 5 MCG tablet    Last Written Prescription Date:  12/10/2018  Last Fill Quantity: 90,  # refills: 1   Last office visit: 7/3/2018 with prescribing provider:  Shadia Munroe     Future Office Visit:   Next 5 appointments (look out 90 days)    Aug 05, 2019 10:50 AM CDT  PHYSICAL with Shadia Munroe MD  [facility] ([facility] [address] [place] [address]  [address]  [place] [id number] [phone]          90 tablet 1       Thyroid Protocol Passed - 6/3/2019 11:17 AM        Passed - Patient is 12 years or older        Passed - Recent (12 mo) or future (30 days) visit within the authorizing provider's specialty     Patient had office visit in the last 12 months or has a visit in the next 30 days with authorizing provider or within the authorizing provider's specialty.  See \"Patient Info\" tab in inbasket, or \"Choose Columns\" in Meds & Orders section of the refill encounter.              Passed - Medication is active on med list        Passed - Normal TSH on file in past 12 months     Recent Labs   Lab Test 07/03/18  1031   TSH 1.97              Passed - No active pregnancy on record     If patient is pregnant or has had a positive pregnancy test, please check TSH.          Passed - No positive pregnancy test in past 12 months     If patient is pregnant or has had a positive pregnancy test, please check TSH.          levothyroxine (SYNTHROID/LEVOTHROID) 137 MCG tablet  Last Written Prescription Date:  12/10/2018  Last Fill Quantity: 90,  # refills: 1   Last office visit: 7/3/2018 with prescribing provider:  Shadia Munroe     Future Office Visit:   Next 5 appointments (look out 90 days)    Aug 05, 2019 10:50 AM CDT  PHYSICAL with Shadia Munroe MD  [facility] ([facility]) [address]  [address]  [place] [address] [phone]          90 tablet " "1       Thyroid Protocol Passed - 6/3/2019 11:17 AM        Passed - Patient is 12 years or older        Passed - Recent (12 mo) or future (30 days) visit within the authorizing provider's specialty     Patient had office visit in the last 12 months or has a visit in the next 30 days with authorizing provider or within the authorizing provider's specialty.  See \"Patient Info\" tab in inbasket, or \"Choose Columns\" in Meds & Orders section of the refill encounter.              Passed - Medication is active on med list        Passed - Normal TSH on file in past 12 months     Recent Labs   Lab Test 07/03/18  1031   TSH 1.97              Passed - No active pregnancy on record     If patient is pregnant or has had a positive pregnancy test, please check TSH.          Passed - No positive pregnancy test in past 12 months     If patient is pregnant or has had a positive pregnancy test, please check TSH.          rosuvastatin (CRESTOR) 5 MG tablet    Last Written Prescription Date:  12/11/2018  Last Fill Quantity: 30,  # refills: 5   Last office visit: 7/3/2018 with prescribing provider:  Shadia Munroe     Future Office Visit:   Next 5 appointments (look out 90 days)    Aug 05, 2019 10:50 AM CDT  PHYSICAL with Shadia Munroe MD  [facility] ([facility]) [address] [place] [address] [phone]          30 tablet 5     Sig: Take 1 tablet (5 mg) by mouth daily       Statins Protocol Passed - 6/3/2019 11:17 AM        Passed - LDL on file in past 12 months     Recent Labs   Lab Test 11/16/18  0848   *             Passed - No abnormal creatine kinase in past 12 months     No lab results found.             Passed - Recent (12 mo) or future (30 days) visit within the authorizing provider's specialty     Patient had office visit in the last 12 months or has a visit in the next 30 days with authorizing provider or within the authorizing provider's specialty.  " "See \"Patient Info\" tab in inbasket, or \"Choose Columns\" in Meds & Orders section of the refill encounter.              Passed - Medication is active on med list        Passed - Patient is age 18 or older        Passed - No active pregnancy on record        Passed - No positive pregnancy test in past 12 months          "

## 2019-06-04 RX ORDER — LIOTHYRONINE SODIUM 5 UG/1
5 TABLET ORAL DAILY
Qty: 90 TABLET | Refills: 1 | Status: SHIPPED | OUTPATIENT
Start: 2019-06-04 | End: 2019-11-17

## 2019-06-04 RX ORDER — LEVOTHYROXINE SODIUM 137 UG/1
137 TABLET ORAL DAILY
Qty: 90 TABLET | Refills: 0 | Status: SHIPPED | OUTPATIENT
Start: 2019-06-04 | End: 2019-09-27

## 2019-06-04 RX ORDER — ROSUVASTATIN CALCIUM 5 MG/1
5 TABLET, COATED ORAL DAILY
Qty: 30 TABLET | Refills: 2 | Status: SHIPPED | OUTPATIENT
Start: 2019-06-04 | End: 2019-07-31 | Stop reason: SINTOL

## 2019-06-04 NOTE — TELEPHONE ENCOUNTER
Medication is being filled for 1 time refill only due to:  Patient needs to be seen because it has been more than one year since last visit.   Pt has upcoming appt.  Karey Cifuentes RN

## 2019-06-07 ENCOUNTER — ALLIED HEALTH/NURSE VISIT (OUTPATIENT)
Dept: NURSING | Facility: CLINIC | Age: 69
End: 2019-06-07
Payer: COMMERCIAL

## 2019-06-07 VITALS — DIASTOLIC BLOOD PRESSURE: 68 MMHG | SYSTOLIC BLOOD PRESSURE: 92 MMHG

## 2019-06-07 DIAGNOSIS — R00.2 PALPITATIONS: Primary | ICD-10-CM

## 2019-06-07 PROCEDURE — 93000 ELECTROCARDIOGRAM COMPLETE: CPT

## 2019-06-07 NOTE — NURSING NOTE
Performed EKG on patient and saved results on file. Faxed results to outside source.   Regine Frazier MA 9:59 AM 6/7/2019

## 2019-06-14 ENCOUNTER — ANCILLARY PROCEDURE (OUTPATIENT)
Dept: MAMMOGRAPHY | Facility: CLINIC | Age: 69
End: 2019-06-14
Payer: COMMERCIAL

## 2019-06-14 DIAGNOSIS — Z12.31 VISIT FOR SCREENING MAMMOGRAM: ICD-10-CM

## 2019-06-14 PROCEDURE — 77067 SCR MAMMO BI INCL CAD: CPT | Mod: TC

## 2019-06-17 ENCOUNTER — TRANSFERRED RECORDS (OUTPATIENT)
Dept: HEALTH INFORMATION MANAGEMENT | Facility: CLINIC | Age: 69
End: 2019-06-17

## 2019-06-19 DIAGNOSIS — I48.20 CHRONIC ATRIAL FIBRILLATION (H): ICD-10-CM

## 2019-07-28 ASSESSMENT — ENCOUNTER SYMPTOMS
NERVOUS/ANXIOUS: 0
BREAST MASS: 0
WEAKNESS: 1
DIZZINESS: 1
DIARRHEA: 0
MYALGIAS: 0
PALPITATIONS: 0
JOINT SWELLING: 0
FEVER: 0
FREQUENCY: 0
NAUSEA: 0
ARTHRALGIAS: 0
SORE THROAT: 0
ABDOMINAL PAIN: 0
COUGH: 0
HEMATOCHEZIA: 0
HEADACHES: 1
PARESTHESIAS: 1
HEARTBURN: 1
CONSTIPATION: 1
EYE PAIN: 0
DYSURIA: 0
HEMATURIA: 0
SHORTNESS OF BREATH: 0
CHILLS: 0

## 2019-07-28 ASSESSMENT — ACTIVITIES OF DAILY LIVING (ADL): CURRENT_FUNCTION: NO ASSISTANCE NEEDED

## 2019-07-31 ENCOUNTER — OFFICE VISIT (OUTPATIENT)
Dept: PEDIATRICS | Facility: CLINIC | Age: 69
End: 2019-07-31
Payer: COMMERCIAL

## 2019-07-31 VITALS
BODY MASS INDEX: 31.29 KG/M2 | DIASTOLIC BLOOD PRESSURE: 68 MMHG | SYSTOLIC BLOOD PRESSURE: 90 MMHG | OXYGEN SATURATION: 97 % | TEMPERATURE: 98 F | HEIGHT: 63 IN | WEIGHT: 176.6 LBS | HEART RATE: 68 BPM

## 2019-07-31 DIAGNOSIS — E78.5 HYPERLIPIDEMIA LDL GOAL <160: ICD-10-CM

## 2019-07-31 DIAGNOSIS — Z00.00 MEDICARE ANNUAL WELLNESS VISIT, SUBSEQUENT: Primary | ICD-10-CM

## 2019-07-31 DIAGNOSIS — Z78.0 ASYMPTOMATIC POSTMENOPAUSAL STATUS: ICD-10-CM

## 2019-07-31 DIAGNOSIS — E03.9 HYPOTHYROIDISM, UNSPECIFIED TYPE: ICD-10-CM

## 2019-07-31 DIAGNOSIS — M85.89 OSTEOPENIA OF MULTIPLE SITES: ICD-10-CM

## 2019-07-31 DIAGNOSIS — R10.13 EPIGASTRIC PAIN: ICD-10-CM

## 2019-07-31 DIAGNOSIS — Z86.79 S/P ABLATION OF ATRIAL FIBRILLATION: ICD-10-CM

## 2019-07-31 DIAGNOSIS — Z98.890 S/P ABLATION OF ATRIAL FIBRILLATION: ICD-10-CM

## 2019-07-31 PROCEDURE — 84439 ASSAY OF FREE THYROXINE: CPT | Performed by: INTERNAL MEDICINE

## 2019-07-31 PROCEDURE — 90471 IMMUNIZATION ADMIN: CPT | Performed by: INTERNAL MEDICINE

## 2019-07-31 PROCEDURE — 90715 TDAP VACCINE 7 YRS/> IM: CPT | Performed by: INTERNAL MEDICINE

## 2019-07-31 PROCEDURE — 90472 IMMUNIZATION ADMIN EACH ADD: CPT | Performed by: INTERNAL MEDICINE

## 2019-07-31 PROCEDURE — 80053 COMPREHEN METABOLIC PANEL: CPT | Performed by: INTERNAL MEDICINE

## 2019-07-31 PROCEDURE — 90632 HEPA VACCINE ADULT IM: CPT | Performed by: INTERNAL MEDICINE

## 2019-07-31 PROCEDURE — 36415 COLL VENOUS BLD VENIPUNCTURE: CPT | Performed by: INTERNAL MEDICINE

## 2019-07-31 PROCEDURE — G0439 PPPS, SUBSEQ VISIT: HCPCS | Performed by: INTERNAL MEDICINE

## 2019-07-31 PROCEDURE — 80061 LIPID PANEL: CPT | Performed by: INTERNAL MEDICINE

## 2019-07-31 PROCEDURE — 99213 OFFICE O/P EST LOW 20 MIN: CPT | Mod: 25 | Performed by: INTERNAL MEDICINE

## 2019-07-31 PROCEDURE — 84443 ASSAY THYROID STIM HORMONE: CPT | Performed by: INTERNAL MEDICINE

## 2019-07-31 RX ORDER — METOPROLOL SUCCINATE 25 MG/1
25 TABLET, EXTENDED RELEASE ORAL DAILY
Qty: 45 TABLET | Refills: 1 | COMMUNITY
Start: 2019-07-31 | End: 2020-01-15

## 2019-07-31 ASSESSMENT — MIFFLIN-ST. JEOR: SCORE: 1297.24

## 2019-07-31 ASSESSMENT — ENCOUNTER SYMPTOMS
COUGH: 0
PALPITATIONS: 0
JOINT SWELLING: 0
EYE PAIN: 0
SORE THROAT: 0
FEVER: 0
ARTHRALGIAS: 0
MYALGIAS: 0
HEMATURIA: 0
CHILLS: 0
NERVOUS/ANXIOUS: 0
HEMATOCHEZIA: 0
PARESTHESIAS: 1
WEAKNESS: 1
SHORTNESS OF BREATH: 0
DIZZINESS: 1
CONSTIPATION: 1
BREAST MASS: 0
FREQUENCY: 0
DIARRHEA: 0
HEADACHES: 1
NAUSEA: 0
DYSURIA: 0
ABDOMINAL PAIN: 0
HEARTBURN: 1

## 2019-07-31 ASSESSMENT — ACTIVITIES OF DAILY LIVING (ADL): CURRENT_FUNCTION: NO ASSISTANCE NEEDED

## 2019-07-31 NOTE — PROGRESS NOTES
"SUBJECTIVE:   Ely Humphreys is a 69 year old female who presents for Preventive Visit.    Are you in the first 12 months of your Medicare coverage?  No    Healthy Habits:     In general, how would you rate your overall health?  Good    Frequency of exercise:  2-3 days/week    Duration of exercise:  30-45 minutes    Do you usually eat at least 4 servings of fruit and vegetables a day, include whole grains    & fiber and avoid regularly eating high fat or \"junk\" foods?  No    Taking medications regularly:  Yes    Medication side effects:  Lightheadedness    Ability to successfully perform activities of daily living:  No assistance needed    Home Safety:  Throw rugs in the hallway and lack of grab bars in the bathroom    Hearing Impairment:  No hearing concerns    In the past 6 months, have you been bothered by leaking of urine?  No    In general, how would you rate your overall mental or emotional health?  Excellent      PHQ-2 Total Score: 0    Additional concerns today:  No    Do you feel safe in your environment? Yes    Do you have a Health Care Directive? Yes: Advance Directive has been received and scanned.      Fall risk  Fallen 2 or more times in the past year?: No  Any fall with injury in the past year?: No    Cognitive Screening   1) Repeat 3 items (Leader, Season, Table)    2) Clock draw: NORMAL  3) 3 item recall: Recalls 3 objects  Results: 3 items recalled: COGNITIVE IMPAIRMENT LESS LIKELY    Mini-CogTM Copyright S Leonor. Licensed by the author for use in [facility]; reprinted with permission (eros@.[facility]). All rights reserved.      Do you have sleep apnea, excessive snoring or daytime drowsiness?: yes, wears C pap    Reviewed and updated as needed this visit by clinical staff  Tobacco  Allergies  Med Hx  Surg Hx  Fam Hx  Soc Hx        Reviewed and updated as needed this visit by Provider        Social History     Tobacco Use     Smoking status: Never Smoker     Smokeless tobacco: Never " Used   Substance Use Topics     Alcohol use: Yes     Frequency: 4 or more times a week     Drinks per session: 1 or 2     Binge frequency: Never     Comment: 1 or 2 glasses of wine most evenings         Alcohol Use 7/28/2019   Prescreen: >3 drinks/day or >7 drinks/week? No   Prescreen: >3 drinks/day or >7 drinks/week? -       Current providers sharing in care for this patient include:   Patient Care Team:  Shadia Munroe MD as PCP - General  Jose AlejandroRandolph MD as MD (Ophthalmology)  Shadia Pickard Od, Jacqui Winter Ra, APRN CNP as Assigned PCP    The following health maintenance items are reviewed in Epic and correct as of today:  Health Maintenance   Topic Date Due     ANNUAL REVIEW OF HM ORDERS  1950     DEXA  01/13/2019     MEDICARE ANNUAL WELLNESS VISIT  03/13/2019     DTAP/TDAP/TD IMMUNIZATION (3 - Td) 06/01/2019     TSH W/FREE T4 REFLEX  07/03/2019     INFLUENZA VACCINE (1) 09/01/2019     PHQ-9  11/20/2019     FALL RISK ASSESSMENT  05/20/2020     MAMMO SCREENING  06/14/2020     ADVANCE CARE PLANNING  02/19/2022     LIPID  11/16/2023     COLONOSCOPY  06/09/2024     HEPATITIS C SCREENING  Completed     DEPRESSION ACTION PLAN  Completed     ZOSTER IMMUNIZATION  Completed     IPV IMMUNIZATION  Aged Out     MENINGITIS IMMUNIZATION  Aged Out     Lab work is in process  Labs reviewed in EPIC  BP Readings from Last 3 Encounters:   07/31/19 90/68   06/07/19 92/68   05/20/19 114/72    Wt Readings from Last 3 Encounters:   07/31/19 80.1 kg (176 lb 9.6 oz)   05/20/19 81.6 kg (179 lb 14.4 oz)   04/22/19 82.1 kg (181 lb)              Review of Systems   Constitutional: Negative for chills and fever.   HENT: Negative for congestion, ear pain, hearing loss and sore throat.    Eyes: Positive for visual disturbance. Negative for pain.   Respiratory: Negative for cough and shortness of breath.    Cardiovascular: Positive for peripheral edema. Negative for chest pain and palpitations.   Gastrointestinal:  "Positive for constipation and heartburn. Negative for abdominal pain, diarrhea, hematochezia and nausea.   Breasts:  Negative for tenderness, breast mass and discharge.   Genitourinary: Negative for dysuria, frequency, genital sores, hematuria, pelvic pain, urgency, vaginal bleeding and vaginal discharge.   Musculoskeletal: Negative for arthralgias, joint swelling and myalgias.   Skin: Negative for rash.   Neurological: Positive for dizziness, weakness, headaches and paresthesias.   Psychiatric/Behavioral: Negative for mood changes. The patient is not nervous/anxious.      In the last 6-12 months she has had episodes of \"heartburn\". Gets a burning pain in mid-epigastrium. Brought on typically by sitting or resting, may be related to eating, but not sure. Fried food seems to stay in her stomach a long time, but doesn't bring on the pain. More often in the evening. For a while was getting 5 days a week. Now in the last 2 weeks only had 1 episode. thinks she has tried zantac at home. This did help.  Symptoms don't last long, an hour or two, depending on if she takes zantac.    Other symptoms are chronic and stable, though has noted mildly decreasing vision in L eye. Overdue for eye visit.    OBJECTIVE:   BP 90/68   Pulse 68   Temp 98  F (36.7  C) (Tympanic)   Ht 1.588 m (5' 2.5\")   Wt 80.1 kg (176 lb 9.6 oz)   LMP  (LMP Unknown)   SpO2 97%   BMI 31.79 kg/m   Estimated body mass index is 31.79 kg/m  as calculated from the following:    Height as of this encounter: 1.588 m (5' 2.5\").    Weight as of this encounter: 80.1 kg (176 lb 9.6 oz).  Physical Exam  GENERAL: healthy, alert and no distress  EYES: Eyes grossly normal to inspection, PERRL and conjunctivae and sclerae normal  HENT: ear canals and TM's normal, nose and mouth without ulcers or lesions  NECK: no adenopathy, no asymmetry, masses, or scars and thyroid normal to palpation  RESP: lungs clear to auscultation - no rales, rhonchi or wheezes  BREAST: " normal without masses, tenderness or nipple discharge and no palpable axillary masses or adenopathy  CV: regular rate and rhythm, normal S1 S2, no S3 or S4, no murmur, click or rub, no peripheral edema and peripheral pulses strong  ABDOMEN: soft, nontender, no hepatosplenomegaly, no masses and bowel sounds normal  MS: no gross musculoskeletal defects noted, no edema  SKIN: no suspicious lesions or rashes  NEURO: Normal strength and tone, mentation intact and speech normal  PSYCH: mentation appears normal, affect normal/bright    Diagnostic Test Results:  Labs reviewed in Epic    ASSESSMENT / PLAN:   1. Medicare annual wellness visit, subsequent      2. Epigastric pain  Difficult for her to characterize, but does sound postprandial. No episodes of exertional symptoms. Also consider gallbladder origin. Discussed options with her. For now plan therapeutic trial on PPI. See patient instructions section.  - omeprazole (PRILOSEC) 20 MG DR capsule; Take 1 capsule (20 mg) by mouth daily  Dispense: 30 capsule; Refill: 3    3. Hyperlipidemia LDL goal <160  Currently off crestor.   - Lipid panel reflex to direct LDL Fasting  - Comprehensive metabolic panel    4. Hypothyroidism, unspecified type  Due for lab  - TSH with free T4 reflex    5. S/P ablation of atrial fibrillation  Currently has regular rate and rhythm. She is looking forward to being off medication. Discussed the importance of her talking with her cardiologist at her next visit prior to her October trip to [place] ([facility]) which is [place]700 feet above [place] level. Also going to [place] so I did recommend a travel visit.    6. Osteopenia of multiple sites      7. Asymptomatic postmenopausal status    - DX Hip/Pelvis/Spine; Future    End of Life Planning:  Patient currently has an advanced directive: Yes.  Practitioner is supportive of decision.    COUNSELING:  Reviewed preventive health counseling, as reflected in patient instructions    Estimated body mass index is  "31.79 kg/m  as calculated from the following:    Height as of this encounter: 1.588 m (5' 2.5\").    Weight as of this encounter: 80.1 kg (176 lb 9.6 oz).    Weight management plan: Discussed healthy diet and exercise guidelines     reports that she has never smoked. She has never used smokeless tobacco.      Appropriate preventive services were discussed with this patient, including applicable screening as appropriate for cardiovascular disease, diabetes, osteopenia/osteoporosis, and glaucoma.  As appropriate for age/gender, discussed screening for colorectal cancer, prostate cancer, breast cancer, and cervical cancer. Checklist reviewing preventive services available has been given to the patient.    Reviewed patients plan of care and provided an AVS. The Intermediate Care Plan ( asthma action plan, low back pain action plan, and migraine action plan) for Ely meets the Care Plan requirement. This Care Plan has been established and reviewed with the Patient.    Counseling Resources:  ATP IV Guidelines  Pooled Cohorts Equation Calculator  Breast Cancer Risk Calculator  FRAX Risk Assessment  ICSI Preventive Guidelines  Dietary Guidelines for Americans, 2010  USDA's MyPlate  ASA Prophylaxis  Lung CA Screening    Shadia Munroe MD  [facility]    "

## 2019-07-31 NOTE — PATIENT INSTRUCTIONS
Call for your eye appointment.    Start omeprazole 20 mg 30-60 minutes before a meal.  After 2-4 weeks, if your symptoms haven't returned, keep taking this for 3 months. Then try stopping. If your symptoms return, restart omeprazole and let me know.  If your symptoms don't go away, call and I will order an ultrasound of the gallbladder.    Schedule bone density test.  Patient Education   Personalized Prevention Plan  You are due for the preventive services outlined below.  Your care team is available to assist you in scheduling these services.  If you have already completed any of these items, please share that information with your care team to update in your medical record.  Health Maintenance Due   Topic Date Due     ANNUAL REVIEW OF HM ORDERS  1950     Osteoporosis Screening  01/13/2019     Annual Wellness Visit  03/13/2019     Diptheria Tetanus Pertussis (DTAP/TDAP/TD) Vaccine (3 - Td) 06/01/2019     Thyroid Function Lab  07/03/2019       Understanding [employer] My[employer]mn[employer]  The USDA (U.S. Department of Agriculture) has guidelines to help you make healthy food choices. These are called MyPlate. MyPlate shows the food groups that make up healthy meals using the image of a place setting. Before you eat, think about the healthiest choices for what to put onto your plate or into your cup or bowl. To learn more about building a healthy plate, visit www.choosemyplate.gov.    The food groups    Fruits. Any fruit or 100% fruit juice counts as part of the Fruit Group. Fruits may be fresh, canned, frozen, or dried, and may be whole, cut-up, or pureed. Make half your plate fruits and vegetables.    Vegetables. Any vegetable or 100% vegetable juice counts as a member of the Vegetable Group. Vegetables may be fresh, frozen, canned, or dried. They can be served raw or cooked and may be whole, cut-up, or mashed. Make half your plate fruits and vegetables.    Grains. All foods made from grains are part of the Grains Group. These  include wheat, rice, oats, cornmeal, and barley such as bread, pasta, oatmeal, cereal, tortillas, and grits. Grains should be no more than a quarter of your plate. At least half of your grains should be whole grains.    Protein. This group includes meat, poultry, seafood, beans and peas, eggs, processed soy products (like tofu), nuts (including nut butters), and seeds. Make protein choices no more than a quarter of your plate. Meat and poultry choices should be lean or low fat.    Dairy. All fluid milk products and foods made from milk that contain calcium, like yogurt and cheese, are part of the Dairy Group. (Foods that have little calcium, such as cream, butter, and cream cheese, are not part of the group.) Most dairy choices should be low-fat or fat-free.    Oils. These are fats that are liquid at room temperature. They include canola, corn, olive, soybean, and sunflower oil. Foods that are mainly oil include mayonnaise, certain salad dressings, and soft margarines. You should have only 5 to 7 teaspoons of oils a day. You probably already get this much from the food you eat.  Date Last Reviewed: 8/1/2017 2000-2018 [employer]. [address] [place] [address], [place] [address]. All rights reserved. This information is not intended as a substitute for professional medical care. Always follow your healthcare professional's instructions.

## 2019-08-01 LAB
ALBUMIN SERPL-MCNC: 3.7 G/DL (ref 3.4–5)
ALP SERPL-CCNC: 70 U/L (ref 40–150)
ALT SERPL W P-5'-P-CCNC: 29 U/L (ref 0–50)
ANION GAP SERPL CALCULATED.3IONS-SCNC: 8 MMOL/L (ref 3–14)
AST SERPL W P-5'-P-CCNC: 17 U/L (ref 0–45)
BILIRUB SERPL-MCNC: 1 MG/DL (ref 0.2–1.3)
BUN SERPL-MCNC: 14 MG/DL (ref 7–30)
CALCIUM SERPL-MCNC: 9 MG/DL (ref 8.5–10.1)
CHLORIDE SERPL-SCNC: 108 MMOL/L (ref 94–109)
CHOLEST SERPL-MCNC: 284 MG/DL
CO2 SERPL-SCNC: 24 MMOL/L (ref 20–32)
CREAT SERPL-MCNC: 0.76 MG/DL (ref 0.52–1.04)
GFR SERPL CREATININE-BSD FRML MDRD: 81 ML/MIN/{1.73_M2}
GLUCOSE SERPL-MCNC: 89 MG/DL (ref 70–99)
HDLC SERPL-MCNC: 69 MG/DL
LDLC SERPL CALC-MCNC: 197 MG/DL
NONHDLC SERPL-MCNC: 215 MG/DL
POTASSIUM SERPL-SCNC: 4.1 MMOL/L (ref 3.4–5.3)
PROT SERPL-MCNC: 7.3 G/DL (ref 6.8–8.8)
SODIUM SERPL-SCNC: 140 MMOL/L (ref 133–144)
T4 FREE SERPL-MCNC: 1.25 NG/DL (ref 0.76–1.46)
TRIGL SERPL-MCNC: 91 MG/DL
TSH SERPL DL<=0.005 MIU/L-ACNC: 0.22 MU/L (ref 0.4–4)

## 2019-08-13 ENCOUNTER — ANCILLARY PROCEDURE (OUTPATIENT)
Dept: BONE DENSITY | Facility: CLINIC | Age: 69
End: 2019-08-13
Attending: INTERNAL MEDICINE
Payer: COMMERCIAL

## 2019-08-13 DIAGNOSIS — Z78.0 ASYMPTOMATIC POSTMENOPAUSAL STATUS: ICD-10-CM

## 2019-08-13 PROCEDURE — 77080 DXA BONE DENSITY AXIAL: CPT | Performed by: INTERNAL MEDICINE

## 2019-09-03 ENCOUNTER — OFFICE VISIT (OUTPATIENT)
Dept: FAMILY MEDICINE | Facility: CLINIC | Age: 69
End: 2019-09-03
Payer: COMMERCIAL

## 2019-09-03 VITALS — DIASTOLIC BLOOD PRESSURE: 76 MMHG | TEMPERATURE: 98.2 F | SYSTOLIC BLOOD PRESSURE: 113 MMHG

## 2019-09-03 DIAGNOSIS — Z71.84 COUNSELING ABOUT TRAVEL: Primary | ICD-10-CM

## 2019-09-03 PROCEDURE — 99402 PREV MED CNSL INDIV APPRX 30: CPT | Mod: 25 | Performed by: FAMILY MEDICINE

## 2019-09-03 PROCEDURE — 90471 IMMUNIZATION ADMIN: CPT | Performed by: FAMILY MEDICINE

## 2019-09-03 PROCEDURE — 90717 YELLOW FEVER VACCINE SUBQ: CPT | Performed by: FAMILY MEDICINE

## 2019-09-03 RX ORDER — ACETAZOLAMIDE 125 MG/1
125 TABLET ORAL 2 TIMES DAILY
Qty: 20 TABLET | Refills: 0 | Status: SHIPPED | OUTPATIENT
Start: 2019-09-03 | End: 2020-01-15

## 2019-09-03 RX ORDER — ATOVAQUONE AND PROGUANIL HYDROCHLORIDE 250; 100 MG/1; MG/1
TABLET, FILM COATED ORAL
Qty: 15 TABLET | Refills: 0 | Status: SHIPPED | OUTPATIENT
Start: 2019-09-03 | End: 2020-01-15

## 2019-09-03 RX ORDER — AZITHROMYCIN 500 MG/1
TABLET, FILM COATED ORAL
Qty: 3 TABLET | Refills: 0 | Status: SHIPPED | OUTPATIENT
Start: 2019-09-03 | End: 2020-01-15

## 2019-09-03 NOTE — PROGRESS NOTES
Itinerary:  [place] and [place]    Departure Date: 10/14/19    Return Date: 11/1/19    Length of Trip: 2 weeks    Purpose of Trip: pleasure    Urban/Rural: both    Accommodations: hotel    IMMUNIZATION HISTORY  Have you received any immunizations within the past 4 weeks?  No  Have you ever fainted from having your blood drawn or from an injection?  No  Have you ever had a fever reaction to vaccination?  No  Have you ever had any bad reaction or side effect from any vaccination?  No  Have you ever had hepatitis A or B vaccine?  Yes  Do you live (or work closely) with anyone who has AIDS, an AIDS-like condition, any other immune disorder or who is on chemotherapy for cancer or a   family history of immunodeficiency?  No  Have you received any injection of immune globulin or any blood products during the past 12 months?  No    Patient roomed by George Stephen ma      Special medical concerns: none    /76 (BP Location: Left arm, Cuff Size: Adult Regular)   Temp 98.2  F (36.8  C) (Oral)   LMP  (LMP Unknown)   EXAM: deferred    Stamaril Informed Consent    The patient was provided with a copy of the IRB-approved consent form and all questions were answered before the patient agreed to participate by signing the informed consent document.   A copy of the form was provided to the patient.    Date: 9/3/19  Consent Version Date: 5/10/17  Consent Obtained by:  LUCIO  HIPAA:  Yes  HIPAA Authorization Signed Date: 9/3/19      Inclusion/Exclusion Criteria:    (Similar to Yellow Fever-VAX)      The patient met all of the following inclusion criteria in order to be eligible for the Stamaril vaccination under this EAP (Expanded Access Investigational New Drug Program)           At increased risk for YF, including researchers, laboratory workers, vaccine production staff, and those who are traveling within 30 days to a YF-endemic region or to a country requiring proof of YF vaccination under IHRs (International Health  Regulations)?       Yes     Patient is greater than or equal to 9 months of age on the day of vaccination?     Yes     Patient is greater than or equal to 18 years of age and signed and dated the Consent Forms?     Yes     Patient is < 18 years of age and parent(s)/guardian(s) signed and dated the Consent Forms?      Patient is 7 years to < 18 years of age and signed and dated the Assent form?        No Assent is required.  Patient is <7 years of age.     No      No      N/A     The patient did not meet any of the following criteria that would have excluded the patient from receiving the Stamaril vaccination under this EAP              Patient is less than 9 months of age.       No     The patient is breast-feeding and cannot stop nursing for at least 14 days after vaccination.    Note: Yellow Fever vaccine virus may be transmissible via breast milk by nursing mothers who are vaccinated during the final 2 weeks of pregnancy or post-partum.   Following transmission, infants may develop encephalitis.  The minimum time of discontinuation of breastfeeding for 14 days after vaccination is based on the expected clearance of live-attenuated vaccine virus.       No     The patient is immunosuppressed, whether congenital or idiopathic, including for example, leukemia, lymphoma, other malignancies, and patients who are receiving immunosuppressant medications (e.g. Systemic corticosteroids [greater than the standard dose of topical or inhaled steroids], alkylating drugs, antimetabolites, of other cytotoxic or immunomodulatory drugs) or radiation therapy or organ transplantation.       No     The patient has known hypersensitivity to the active substance or to any of the excipients of Stamaril vaccine or to eggs or chicken proteins.     No     The patient is symptomatic for human immunodeficiency virus (HIV) infection     No     The patient is asymptomatic for HIV infection but accompanied by evidence of severe immune  suppression    Note:  Evidence of severe immune suppression includes CD4+ T-cell counts < 200 cubic millimeters (or < 15% total lymphocytes in children aged < 6 years), or as determined by the health care provider.       No     The patient has a history of thymus dysfunction (including myasthenia gravis, thymoma, thymectomy)     No     Moderate or severe febrile illness or acute illness    Note: Participation in the EAP can be reassessed when moderate or severe febrile illness or acute illness has resolved.       No           Immunizations discussed include: Typhoid and Yellow Fever  Malaraia prophylaxis recommended: Malarone  Symptomatic treatment for traveler's diarrhea: azithromycin    ASSESSMENT/PLAN:  1. Counseling about travel     - typhoid (VIVOTIF) CR capsule; Take 1 capsule by mouth every other day for 4 doses One capsule on alternate days (day 1, 3, 5, and 7) for a total of 4 doses; all doses should be complete at least 1 week prior to potential exposure.  Dispense: 4 capsule; Refill: 0  - atovaquone-proguanil (MALARONE) 250-100 MG tablet; Take one tablet daily, start 2 days prior to travel to malaria area and continue daily while there and for 7 days after leaving malaria area  Dispense: 15 tablet; Refill: 0  - azithromycin (ZITHROMAX) 500 MG tablet; Take one tablet daily for up to 3 days as needed for traveler's diarrhea  Dispense: 3 tablet; Refill: 0  - acetaZOLAMIDE (DIAMOX) 125 MG tablet; Take 1 tablet (125 mg) by mouth 2 times daily Start one day prior to ascent, continue for 2-3 days at higher elevation - to prevent altitude illness  Dispense: 20 tablet; Refill: 0  I have reviewed general recommendations for safe travel   including: food/water precautions, insect avoidance, roadway safety. Educational materials and [employer] report provided.    Total visit time 60 minutes (two family members) with over 50% of time spent counseling patient.

## 2019-09-03 NOTE — NURSING NOTE
"Chief Complaint   Patient presents with     [facility]     initial /76 (BP Location: Left arm, Cuff Size: Adult Regular)   Temp 98.2  F (36.8  C) (Oral)   LMP  (LMP Unknown)  Estimated body mass index is 31.79 kg/m  as calculated from the following:    Height as of 7/31/19: 1.588 m (5' 2.5\").    Weight as of 7/31/19: 80.1 kg (176 lb 9.6 oz).  BP completed using cuff size: regular.  L  arm      Health Maintenance that is potentially due pending provider review:  NONE    n/a    George Stephen ma  "

## 2019-09-27 DIAGNOSIS — E03.9 ACQUIRED HYPOTHYROIDISM: ICD-10-CM

## 2019-09-27 RX ORDER — LEVOTHYROXINE SODIUM 137 UG/1
TABLET ORAL
Qty: 30 TABLET | Refills: 0 | Status: SHIPPED | OUTPATIENT
Start: 2019-09-27 | End: 2019-10-09

## 2019-09-27 NOTE — TELEPHONE ENCOUNTER
TSH   Date Value Ref Range Status   07/31/2019 0.22 (L) 0.40 - 4.00 mU/L Final     T4 Free   Date Value Ref Range Status   07/31/2019 1.25 0.76 - 1.46 ng/dL Final     Ely,     Your TSH was low, indicating you may need less levothyroxine (dose too high), but your measured free T4 hormone level was fine.  underdosing you is also not ideal, so I think we should just recheck TSH in 8 weeks.     I don't think this would be causing your symptoms because your T4 measured hormone level looks normal.     I'll enter orders for the future lab and you can schedule an appointment at your convenience.     Let me know if you have other questions.     Shadia Munroe M.D.     Prescription approved per [facility] Refill Protocol #30  Due for follow up lab    Jennifer Cid RN

## 2019-10-03 ENCOUNTER — HEALTH MAINTENANCE LETTER (OUTPATIENT)
Age: 69
End: 2019-10-03

## 2019-10-07 ENCOUNTER — OFFICE VISIT (OUTPATIENT)
Dept: PEDIATRICS | Facility: CLINIC | Age: 69
End: 2019-10-07
Payer: COMMERCIAL

## 2019-10-07 VITALS
SYSTOLIC BLOOD PRESSURE: 98 MMHG | HEIGHT: 63 IN | HEART RATE: 65 BPM | TEMPERATURE: 97.5 F | WEIGHT: 175.4 LBS | DIASTOLIC BLOOD PRESSURE: 62 MMHG | OXYGEN SATURATION: 99 % | BODY MASS INDEX: 31.08 KG/M2

## 2019-10-07 DIAGNOSIS — R42 ORTHOSTATIC DIZZINESS: ICD-10-CM

## 2019-10-07 DIAGNOSIS — R07.9 CHEST PAIN, UNSPECIFIED TYPE: Primary | ICD-10-CM

## 2019-10-07 DIAGNOSIS — I48.0 PAROXYSMAL ATRIAL FIBRILLATION (H): ICD-10-CM

## 2019-10-07 DIAGNOSIS — R53.83 OTHER FATIGUE: ICD-10-CM

## 2019-10-07 LAB
ALBUMIN SERPL-MCNC: 3.6 G/DL (ref 3.4–5)
ALP SERPL-CCNC: 62 U/L (ref 40–150)
ALT SERPL W P-5'-P-CCNC: 34 U/L (ref 0–50)
ANION GAP SERPL CALCULATED.3IONS-SCNC: 11 MMOL/L (ref 3–14)
AST SERPL W P-5'-P-CCNC: 29 U/L (ref 0–45)
BASOPHILS # BLD AUTO: 0 10E9/L (ref 0–0.2)
BASOPHILS NFR BLD AUTO: 0.2 %
BILIRUB SERPL-MCNC: 1.1 MG/DL (ref 0.2–1.3)
BUN SERPL-MCNC: 13 MG/DL (ref 7–30)
CALCIUM SERPL-MCNC: 9.2 MG/DL (ref 8.5–10.1)
CHLORIDE SERPL-SCNC: 102 MMOL/L (ref 94–109)
CO2 SERPL-SCNC: 30 MMOL/L (ref 20–32)
CREAT SERPL-MCNC: 0.9 MG/DL (ref 0.52–1.04)
DIFFERENTIAL METHOD BLD: NORMAL
EOSINOPHIL # BLD AUTO: 0.1 10E9/L (ref 0–0.7)
EOSINOPHIL NFR BLD AUTO: 1.7 %
ERYTHROCYTE [DISTWIDTH] IN BLOOD BY AUTOMATED COUNT: 12.5 % (ref 10–15)
GFR SERPL CREATININE-BSD FRML MDRD: 65 ML/MIN/{1.73_M2}
GLUCOSE SERPL-MCNC: 102 MG/DL (ref 70–99)
HCT VFR BLD AUTO: 41.4 % (ref 35–47)
HGB BLD-MCNC: 13.1 G/DL (ref 11.7–15.7)
LYMPHOCYTES # BLD AUTO: 1.6 10E9/L (ref 0.8–5.3)
LYMPHOCYTES NFR BLD AUTO: 24.2 %
MCH RBC QN AUTO: 30.5 PG (ref 26.5–33)
MCHC RBC AUTO-ENTMCNC: 31.6 G/DL (ref 31.5–36.5)
MCV RBC AUTO: 97 FL (ref 78–100)
MONOCYTES # BLD AUTO: 0.6 10E9/L (ref 0–1.3)
MONOCYTES NFR BLD AUTO: 8.6 %
NEUTROPHILS # BLD AUTO: 4.2 10E9/L (ref 1.6–8.3)
NEUTROPHILS NFR BLD AUTO: 65.3 %
PLATELET # BLD AUTO: 255 10E9/L (ref 150–450)
POTASSIUM SERPL-SCNC: 4.1 MMOL/L (ref 3.4–5.3)
PROT SERPL-MCNC: 7.5 G/DL (ref 6.8–8.8)
RBC # BLD AUTO: 4.29 10E12/L (ref 3.8–5.2)
SODIUM SERPL-SCNC: 143 MMOL/L (ref 133–144)
T3FREE SERPL-MCNC: 2.8 PG/ML (ref 2.3–4.2)
WBC # BLD AUTO: 6.5 10E9/L (ref 4–11)

## 2019-10-07 PROCEDURE — 84439 ASSAY OF FREE THYROXINE: CPT | Performed by: INTERNAL MEDICINE

## 2019-10-07 PROCEDURE — 99215 OFFICE O/P EST HI 40 MIN: CPT | Performed by: INTERNAL MEDICINE

## 2019-10-07 PROCEDURE — 84443 ASSAY THYROID STIM HORMONE: CPT | Performed by: INTERNAL MEDICINE

## 2019-10-07 PROCEDURE — 80053 COMPREHEN METABOLIC PANEL: CPT | Performed by: INTERNAL MEDICINE

## 2019-10-07 PROCEDURE — 36415 COLL VENOUS BLD VENIPUNCTURE: CPT | Performed by: INTERNAL MEDICINE

## 2019-10-07 PROCEDURE — 85025 COMPLETE CBC W/AUTO DIFF WBC: CPT | Performed by: INTERNAL MEDICINE

## 2019-10-07 PROCEDURE — 93000 ELECTROCARDIOGRAM COMPLETE: CPT | Performed by: INTERNAL MEDICINE

## 2019-10-07 PROCEDURE — 84481 FREE ASSAY (FT-3): CPT | Performed by: INTERNAL MEDICINE

## 2019-10-07 ASSESSMENT — MIFFLIN-ST. JEOR: SCORE: 1291.8

## 2019-10-07 NOTE — PROGRESS NOTES
Subjective     Ely Humphreys is a 69 year old female who presents to clinic today for the following health issues:    HPI   Dizziness  Onset: 2 years    Description:   Do you feel faint:  YES  Does it feel like the surroundings (bed, room) are moving: no   Unsteady/off balance: YES  Have you passed out or fallen: no     Intensity: moderate    Progression of Symptoms:  worsening on flecainide and waxing and waning    Accompanying Signs & Symptoms:  Heart palpitations: no   Nausea, vomiting: no   Weakness in arms or legs: YES- better since off statin but still struggles  Fatigue: YES  Vision or speech changes: no   Ringing in ears (Tinnitus): no   Hearing Loss: no     History:   Head trauma/concussion hx: no   Previous similar symptoms: YES  Recent bleeding history: no     Precipitating factors:   Worse with activity or head movement: no   Any new medications (BP?): no   Alcohol/drug abuse/withdrawal: no     Alleviating factors:   Does staying in a fixed position give relief:  YES    Therapies Tried and outcome: none    Has been having symptoms for a long time. Fatigue and dizziness she read can be a side effect of flecainide. Doesn't feel like the room is spinning, just feels like everything goes blank. Often happens after she has been sitting. Getting up out of a car or out of a chair. Was better when she was on the lower dose of flecainide. Worse with increasing dose again.    Also reports a few weeks ago she was out on a walk, very hot humid day, 2/3 of the way along the walk, she got a blank feeling, like her heart fell out of her chest. Describes chest pain that was like burning as if she had been exercising too hard. This lasted part of that day, unclear exactly how long.  Then felt like she had run a marathon, pulse 100-108 for 3 days consistently, like was hard to catch her breath.  Blood pressures up 150-160/120 for 3 days, which is very unusual for her, then back down to her usual pressures with systolic  in the 90's.    Hypothyroidism Follow-up      Since last visit, patient describes the following symptoms: Weight stable, no hair loss, no skin changes, no constipation, no loose stools      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     Drusen of optic disc, left     Sleep apnea, unspecified type     Atrial fibrillation (H)     Past Surgical History:   Procedure Laterality Date     ANESTHESIA CARDIOVERSION N/A 2017    Procedure: ANESTHESIA CARDIOVERSION;  Cardioversion;  Surgeon: GENERIC ANESTHESIA PROVIDER;  Location: [facility]     C NONSPECIFIC PROCEDURE      Tubal Ligation    Abstracted 02     C THYROID ABLATION       COLONOSCOPY  10/03     COLONOSCOPY  2014     COLONOSCOPY  2014    Procedure: COLONOSCOPY;  Surgeon: Garrett Villaseñor MD;  Location: [facility]     ENHANCE LASER REFRACTIVE BILATERAL EXISTING PT IN PARAMETERS Bilateral      HC ECHO HEART XTHORACIC, STRESS/REST  09    normal     HC EXCISION BREAST LESION, OPEN >=1      re-excision 09       Social History     Tobacco Use     Smoking status: Never Smoker     Smokeless tobacco: Never Used   Substance Use Topics     Alcohol use: Yes     Frequency: 4 or more times a week     Drinks per session: 1 or 2     Binge frequency: Never     Comment: 1 or 2 glasses of wine most evenings     Family History   Problem Relation Age of Onset     Cancer Mother         82 yo Breast & Melanoma     Cerebrovascular Disease Mother 92        TIA     C.ATANISHA Mother      Cerebrovascular Disease Father          87 yo Alzheimers, CHF     C.A.NELSON. Father         possible MI in age 60's      Myocardial Infarction Father      Cancer Maternal Aunt          40's Breast     Cerebrovascular Disease Paternal Grandmother          late 40's - early 50's     Blood Disease Maternal Aunt          51 yo Lupus     Cancer - colorectal No family hx of          Current Outpatient Medications   Medication Sig Dispense Refill     apixaban ANTICOAGULANT (ELIQUIS) 5 MG tablet Take 1 tablet (5 mg) by mouth 2 times daily 180 tablet 0     calcium 500 MG CHEW Take 2 chew tab by mouth daily       Cholecalciferol (VITAMIN D) 1000 UNIT capsule take 2 Caps by mouth daily.       flecainide (TAMBOCOR) 100 MG tablet Take 1 tablet (100 mg) by mouth 2 times daily 180 tablet 3     levothyroxine (SYNTHROID/LEVOTHROID) 137 MCG tablet Take 1 tablet by mouth one time every day. 30 tablet 0     liothyronine (CYTOMEL) 5 MCG tablet Take 1 tablet (5 mcg) by mouth daily 90 tablet 1     metoprolol succinate ER (TOPROL-XL) 25 MG 24 hr tablet Take 1 tablet (25 mg) by mouth daily Take at night 45 tablet 1     order for DME Equipment ordered: RESMED CPAP Mask type: Nasal Settings: 8 cmH2O       Probiotic Product (PROBIOTIC DAILY) CAPS Take 1 capsule by mouth daily       VITAMINS/MINERALS OR TABS 1 TABLET DAILY       acetaZOLAMIDE (DIAMOX) 125 MG tablet Take 1 tablet (125 mg) by mouth 2 times daily Start one day prior to ascent, continue for 2-3 days at higher elevation - to prevent altitude illness 20 tablet 0     atovaquone-proguanil (MALARONE) 250-100 MG tablet Take one tablet daily, start 2 days prior to travel to malaria area and continue daily while there and for 7 days after leaving malaria area 15 tablet 0     azithromycin (ZITHROMAX) 500 MG tablet Take one tablet daily for up to 3 days as needed for traveler's diarrhea 3 tablet 0     Reviewed and updated as needed this visit by Provider         Review of Systems   ROS COMP: Constitutional, HEENT, cardiovascular, pulmonary, GI, , musculoskeletal, neuro, skin,  "endocrine and psych systems are negative, except as otherwise noted.      Objective    BP 98/62 (Cuff Size: Adult Regular)   Pulse 65   Temp 97.5  F (36.4  C) (Tympanic)   Ht 1.588 m (5' 2.5\")   Wt 79.6 kg (175 lb 6.4 oz)   LMP  (LMP Unknown)   SpO2 99%   BMI 31.57 kg/m    Body mass index is 31.57 kg/m .  Physical Exam   GENERAL: healthy, alert and no distress  NECK: no adenopathy, no asymmetry, masses, or scars and thyroid normal to palpation  RESP: lungs clear to auscultation - no rales, rhonchi or wheezes  CV: regular rate and rhythm, normal S1 S2, no S3 or S4, no murmur, click or rub, no peripheral edema and peripheral pulses strong  ABDOMEN: soft, nontender, no hepatosplenomegaly, no masses and bowel sounds normal  MS: no gross musculoskeletal defects noted, no edema    Diagnostic Test Results:  Labs reviewed in Epic  Results for orders placed or performed in visit on 10/07/19 (from the past 24 hour(s))   Comprehensive metabolic panel (BMP + Alb, Alk Phos, ALT, AST, Total. Bili, TP)   Result Value Ref Range    Sodium 143 133 - 144 mmol/L    Potassium 4.1 3.4 - 5.3 mmol/L    Chloride 102 94 - 109 mmol/L    Carbon Dioxide 30 20 - 32 mmol/L    Anion Gap 11 3 - 14 mmol/L    Glucose 102 (H) 70 - 99 mg/dL    Urea Nitrogen 13 7 - 30 mg/dL    Creatinine 0.90 0.52 - 1.04 mg/dL    GFR Estimate 65 >60 mL/min/[1.73_m2]    GFR Estimate If Black 75 >60 mL/min/[1.73_m2]    Calcium 9.2 8.5 - 10.1 mg/dL    Bilirubin Total 1.1 0.2 - 1.3 mg/dL    Albumin 3.6 3.4 - 5.0 g/dL    Protein Total 7.5 6.8 - 8.8 g/dL    Alkaline Phosphatase 62 40 - 150 U/L    ALT 34 0 - 50 U/L    AST 29 0 - 45 U/L   CBC with platelets and differential   Result Value Ref Range    WBC 6.5 4.0 - 11.0 10e9/L    RBC Count 4.29 3.8 - 5.2 10e12/L    Hemoglobin 13.1 11.7 - 15.7 g/dL    Hematocrit 41.4 35.0 - 47.0 %    MCV 97 78 - 100 fl    MCH 30.5 26.5 - 33.0 pg    MCHC 31.6 31.5 - 36.5 g/dL    RDW 12.5 10.0 - 15.0 %    Platelet Count 255 150 - 450 10e9/L " "   % Neutrophils 65.3 %    % Lymphocytes 24.2 %    % Monocytes 8.6 %    % Eosinophils 1.7 %    % Basophils 0.2 %    Absolute Neutrophil 4.2 1.6 - 8.3 10e9/L    Absolute Lymphocytes 1.6 0.8 - 5.3 10e9/L    Absolute Monocytes 0.6 0.0 - 1.3 10e9/L    Absolute Eosinophils 0.1 0.0 - 0.7 10e9/L    Absolute Basophils 0.0 0.0 - 0.2 10e9/L    Diff Method Automated Method      EKG - NSR, first degree AV block. TWI in precordial leads. Does not appear significantly changed from previous.        Assessment & Plan     1. Chest pain, unspecified type  Episode 2 weeks ago on a walk of feeling a strange sensation in her chest, like her heart dropped, then describes a \"burning lungs\" type of chest pain, constant, followed by profound fatigue, like she had run a marathon, as well as unusually elevated blood pressures and pulse for 3 days. Was able to finish her walk and get home, but didn't feel well. Did not notice her typical symptoms of AFib.  Now feels like she is back at her baseline. EKG doesn't show significant change, but I am concerned about coronary event or other dysrhythmia as possible causes. I have placed a call to her cardiologist. Of note, she is going to [place] on vacation in 6 days.  I am thinking we should at least do another echocardiogram and/or possible stress test prior to leaving for [facility], which would be an additional stress on her heart and whether there is enough going on to postpone her trip. She is very interested in still going, and does have trip insurance.    2. Orthostatic dizziness  Likely medication related. Discussed that adding diamox while she is at [facility] could make this worse. Encouraged her to wear support socks during the whole trip, and make sure to stay very well-hydrated.    3. Other fatigue  Likely medication related, this is chronic. Her prior fatigue from 2 weeks ago has resolved.  - EKG 12-lead complete w/read - Clinics  - TSH  - T4 FREE  - T3, Free  - Comprehensive metabolic " panel (BMP + Alb, Alk Phos, ALT, AST, Total. Bili, TP)    4. Paroxysmal atrial fibrillation (H)  Sinus rhythm today.  - CBC with platelets and differential       See Patient Instructions    No follow-ups on file.    I spent 45 minutes with this patient face-to-face, of which 50% or greater was spent in counseling and coordination of care with regards to chest pain, fatigue, PAF, orthostatic dizziness. Please see plan above.    Shadia Munroe MD  [facility]    Addendum: Left message with cardiology at 1230 today. I did speak with [facility] cardiology on-call provider at 17:00. Order placed for stress echo and she will get a call to schedule. I did leave a message for Ely regarding this and recommended she keep her follow up appointment with cardiology NP in 2 days.     Shadia Munroe M.D.

## 2019-10-07 NOTE — PATIENT INSTRUCTIONS
You should get a call from us or your cardiologist.     Keep your medications the same.    Make really sure to stay really hydrated on your trip, especially while on diamox (a diuretic)

## 2019-10-08 ENCOUNTER — MYC MEDICAL ADVICE (OUTPATIENT)
Dept: PEDIATRICS | Facility: CLINIC | Age: 69
End: 2019-10-08

## 2019-10-08 LAB
T4 FREE SERPL-MCNC: 1.03 NG/DL (ref 0.76–1.46)
TSH SERPL DL<=0.005 MIU/L-ACNC: 1.3 MU/L (ref 0.4–4)

## 2019-10-08 NOTE — TELEPHONE ENCOUNTER
I spoke with Ely. Did not get a call today to schedule stress test. Has appt tomorrow morning with cardiology NP.  Shadia Munroe M.D.

## 2019-10-08 NOTE — TELEPHONE ENCOUNTER
See office visit note from 10/7/19.    Patient is wondering whether she needs to call to schedule a stress test.    It appears that Dr. Munroe recommends an echocardiogram and possibly a stress test prior to the patient leaving for [place]. There are currently no orders for these tests, but the patient is seeing Cardiology tomorrow.     Is the plan to defer the decision of whether to order the tests to the discretion of the Cardiologist?     Dr. Munroe please advise.

## 2019-10-09 ENCOUNTER — MYC MEDICAL ADVICE (OUTPATIENT)
Dept: PEDIATRICS | Facility: CLINIC | Age: 69
End: 2019-10-09

## 2019-10-09 DIAGNOSIS — E03.9 ACQUIRED HYPOTHYROIDISM: ICD-10-CM

## 2019-10-09 RX ORDER — LEVOTHYROXINE SODIUM 137 UG/1
TABLET ORAL
Qty: 90 TABLET | Refills: 3 | Status: SHIPPED | OUTPATIENT
Start: 2019-10-09 | End: 2020-08-03

## 2019-10-09 NOTE — TELEPHONE ENCOUNTER
Forwarded to Dr. Munroe.    Please review patient's Mychart message and advise.    Misty Garcia, RN, BS, PHN

## 2019-10-11 ENCOUNTER — MYC MEDICAL ADVICE (OUTPATIENT)
Dept: PEDIATRICS | Facility: CLINIC | Age: 69
End: 2019-10-11

## 2019-11-15 DIAGNOSIS — E03.9 ACQUIRED HYPOTHYROIDISM: ICD-10-CM

## 2019-11-15 NOTE — TELEPHONE ENCOUNTER
"Requested Prescriptions   Pending Prescriptions Disp Refills     liothyronine (CYTOMEL) 5 MCG tablet  Last Written Prescription Date:  06/04/2019  Last Fill Quantity: 90 tablet,  # refills: 1   Last Office Visit: 10/7/2019 Shadia Munroe MD   Future Office Visit:    Next 5 appointments (look out 90 days)    Gaudencio 15, 2020 10:35 AM CST  Office Visit with Shadia Munroe MD, Ea Rn [address], EA EXAM ROOM 34  [facility] ([facility]) [address] [place] [address]  [address]  [facility] [address]  [phone]          90 tablet 1     Sig: Take 1 tablet (5 mcg) by mouth daily       Thyroid Protocol Passed - 11/15/2019  8:36 AM        Passed - Patient is 12 years or older        Passed - Recent (12 mo) or future (30 days) visit within the authorizing provider's specialty     Patient has had an office visit with the authorizing provider or a provider within the authorizing providers department within the previous 12 mos or has a future within next 30 days. See \"Patient Info\" tab in inbasket, or \"Choose Columns\" in Meds & Orders section of the refill encounter.              Passed - Medication is active on med list        Passed - Normal TSH on file in past 12 months     Recent Labs   Lab Test 10/07/19  1140   TSH 1.30              Passed - No active pregnancy on record     If patient is pregnant or has had a positive pregnancy test, please check TSH.          Passed - No positive pregnancy test in past 12 months     If patient is pregnant or has had a positive pregnancy test, please check TSH.            "

## 2019-11-17 RX ORDER — LIOTHYRONINE SODIUM 5 UG/1
5 TABLET ORAL DAILY
Qty: 90 TABLET | Refills: 1 | Status: SHIPPED | OUTPATIENT
Start: 2019-11-17 | End: 2020-05-14

## 2020-01-15 ENCOUNTER — OFFICE VISIT (OUTPATIENT)
Dept: PEDIATRICS | Facility: CLINIC | Age: 70
End: 2020-01-15

## 2020-01-15 VITALS
HEART RATE: 72 BPM | HEIGHT: 63 IN | OXYGEN SATURATION: 98 % | WEIGHT: 173 LBS | SYSTOLIC BLOOD PRESSURE: 102 MMHG | TEMPERATURE: 97 F | BODY MASS INDEX: 30.65 KG/M2 | DIASTOLIC BLOOD PRESSURE: 62 MMHG

## 2020-01-15 DIAGNOSIS — I48.0 PAROXYSMAL ATRIAL FIBRILLATION (H): ICD-10-CM

## 2020-01-15 DIAGNOSIS — E78.5 HYPERLIPIDEMIA LDL GOAL <160: Primary | ICD-10-CM

## 2020-01-15 DIAGNOSIS — E03.9 HYPOTHYROIDISM, UNSPECIFIED TYPE: ICD-10-CM

## 2020-01-15 PROCEDURE — 99213 OFFICE O/P EST LOW 20 MIN: CPT | Performed by: INTERNAL MEDICINE

## 2020-01-15 PROCEDURE — 84443 ASSAY THYROID STIM HORMONE: CPT | Performed by: INTERNAL MEDICINE

## 2020-01-15 PROCEDURE — 36415 COLL VENOUS BLD VENIPUNCTURE: CPT | Performed by: INTERNAL MEDICINE

## 2020-01-15 PROCEDURE — 84439 ASSAY OF FREE THYROXINE: CPT | Performed by: INTERNAL MEDICINE

## 2020-01-15 PROCEDURE — 80061 LIPID PANEL: CPT | Performed by: INTERNAL MEDICINE

## 2020-01-15 ASSESSMENT — MIFFLIN-ST. JEOR: SCORE: 1292.02

## 2020-01-15 NOTE — PROGRESS NOTES
Subjective     Ely Humphreys is a 69 year old female who presents to clinic today for the following health issues:    History of Present Illness        Hyperlipidemia:  She presents for follow up of hyperlipidemia.  She is not taking medication to lower cholesterol. She is not having myalgia or other side effects to statin medications.    She eats 4 or more servings of fruits and vegetables daily.She consumes 0 sweetened beverage(s) daily.She exercises with enough effort to increase her heart rate 30 to 60 minutes per day.  She exercises with enough effort to increase her heart rate 4 days per week.   She is taking medications regularly.     Fasting for labs, stopped taking some meds as instructed by her cardiologist. Now that she is off the toprol and flecainide, she is feeling much better, more energy. Happy with this change. No symptoms of afib with reduction in medications.    Would like to discuss statin. Had taken crestor in the past and didn't tolerate due to muscle aches. I reviewed last note from cardiology with her. Calculated risk of cardiovascular event relatively low.     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     Drusen of optic disc, left     Sleep apnea, unspecified type     Atrial fibrillation (H)     Past Surgical History:   Procedure Laterality Date     ANESTHESIA CARDIOVERSION N/A 11/28/2017    Procedure: ANESTHESIA CARDIOVERSION;  Cardioversion;  Surgeon: GENERIC ANESTHESIA PROVIDER;  Location: [facility] OR     C NONSPECIFIC PROCEDURE      Tubal Ligation    Abstracted 07/12/02     C THYROID ABLATION       COLONOSCOPY  10/03     COLONOSCOPY   "2014     COLONOSCOPY  2014    Procedure: COLONOSCOPY;  Surgeon: Garrett Villaseñor MD;  Location: [facility]     ENHANCE LASER REFRACTIVE BILATERAL EXISTING PT IN PARAMETERS Bilateral      HC ECHO HEART XTHORACIC, STRESS/REST  09    normal     HC EXCISION BREAST LESION, OPEN >=1      re-excision 09       Social History     Tobacco Use     Smoking status: Never Smoker     Smokeless tobacco: Never Used   Substance Use Topics     Alcohol use: Yes     Frequency: 4 or more times a week     Drinks per session: 1 or 2     Binge frequency: Never     Comment: 1 or 2 glasses of wine most evenings     Family History   Problem Relation Age of Onset     Cancer Mother         84 yo Breast & Melanoma     Cerebrovascular Disease Mother 92        TIA     C.A.D. Mother      Cerebrovascular Disease Father          85 yo Alzheimers, CHF     C.A.D. Father         possible MI in age 60's     Myocardial Infarction Father      Cancer Maternal Aunt          40's Breast     Cerebrovascular Disease Paternal Grandmother          late 40's - early 50's     Blood Disease Maternal Aunt          53 yo Lupus     Cancer - colorectal No family hx of            Reviewed and updated as needed this visit by Provider         Review of Systems   ROS COMP: Constitutional, HEENT, cardiovascular, pulmonary, gi and gu systems are negative, except as otherwise noted.      Objective    /62 (BP Location: Right arm, Patient Position: Sitting)   Pulse 72   Temp 97  F (36.1  C) (Tympanic)   Ht 1.605 m (5' 3.2\")   Wt 78.5 kg (173 lb)   LMP  (LMP Unknown)   SpO2 98%   BMI 30.45 kg/m    Body mass index is 30.45 kg/m .  Physical Exam   GENERAL: healthy, alert and no distress  NECK: no adenopathy, no asymmetry, masses, or scars and thyroid normal to palpation  RESP: lungs clear to auscultation - no rales, rhonchi or wheezes  CV: regular rate and rhythm, normal S1 S2, no S3 or S4, no murmur, click or rub, " no peripheral edema and peripheral pulses strong    Diagnostic Test Results:  Labs reviewed in Epic        Assessment & Plan     1. Hyperlipidemia LDL goal <160  Plan repeat lipids now and again next fall. Will hold off on statin as long as we can due to her intolerance. Could try pravastatin if needed. She is happy with this plan and will continue with her healthy diet and exercise.  - Lipid panel reflex to direct LDL Fasting; Future  - Lipid panel reflex to direct LDL Fasting    2. Hypothyroidism, unspecified type  Follow lab.  - T4 FREE; Future  - TSH; Future  - TSH  - T4 FREE    3. Paroxysmal atrial fibrillation (H)  No current symptoms after ablation. Has tolerated stopping beta blocker and antiarrhythmic.         See Patient Instructions    No follow-ups on file.    Shadia Munroe MD  [facility]

## 2020-01-16 LAB
CHOLEST SERPL-MCNC: 279 MG/DL
HDLC SERPL-MCNC: 85 MG/DL
LDLC SERPL CALC-MCNC: 179 MG/DL
NONHDLC SERPL-MCNC: 194 MG/DL
T4 FREE SERPL-MCNC: 1.06 NG/DL (ref 0.76–1.46)
TRIGL SERPL-MCNC: 73 MG/DL
TSH SERPL DL<=0.005 MIU/L-ACNC: 1.88 MU/L (ref 0.4–4)

## 2020-02-25 ENCOUNTER — TELEPHONE (OUTPATIENT)
Dept: SLEEP MEDICINE | Facility: CLINIC | Age: 70
End: 2020-02-25

## 2020-02-25 DIAGNOSIS — G47.33 OBSTRUCTIVE SLEEP APNEA (ADULT) (PEDIATRIC): Primary | ICD-10-CM

## 2020-06-11 ENCOUNTER — VIRTUAL VISIT (OUTPATIENT)
Dept: SLEEP MEDICINE | Facility: CLINIC | Age: 70
End: 2020-06-11
Payer: COMMERCIAL

## 2020-06-11 VITALS — WEIGHT: 175 LBS | BODY MASS INDEX: 31.01 KG/M2 | HEIGHT: 63 IN

## 2020-06-11 DIAGNOSIS — G47.33 OSA (OBSTRUCTIVE SLEEP APNEA): Primary | ICD-10-CM

## 2020-06-11 PROCEDURE — 99213 OFFICE O/P EST LOW 20 MIN: CPT | Mod: GT | Performed by: PHYSICIAN ASSISTANT

## 2020-06-11 ASSESSMENT — MIFFLIN-ST. JEOR: SCORE: 1284.98

## 2020-06-11 NOTE — PROGRESS NOTES
"Ely Humphreys is a 70 year old female who is being evaluated via a billable video visit.      The patient has been notified of following:     \"This video visit will be conducted via a call between you and your physician/provider. We have found that certain health care needs can be provided without the need for an in-person physical exam.  This service lets us provide the care you need with a video conversation.  If a prescription is necessary we can send it directly to your pharmacy.  If lab work is needed we can place an order for that and you can then stop by our lab to have the test done at a later time.    Video visits are billed at different rates depending on your insurance coverage.  Please reach out to your insurance provider with any questions.    If during the course of the call the physician/provider feels a video visit is not appropriate, you will not be charged for this service.\"    Patient has given verbal consent for Video visit? Yes    Will anyone else be joining your video visit? No        "

## 2020-06-11 NOTE — PATIENT INSTRUCTIONS
Your BMI is Body mass index is 31.5 kg/m .  Weight management is a personal decision.  If you are interested in exploring weight loss strategies, the following discussion covers the approaches that may be successful. Body mass index (BMI) is one way to tell whether you are at a healthy weight, overweight, or obese. It measures your weight in relation to your height.  A BMI of 18.5 to 24.9 is in the healthy range. A person with a BMI of 25 to 29.9 is considered overweight, and someone with a BMI of 30 or greater is considered obese. More than two-thirds of American adults are considered overweight or obese.  Being overweight or obese increases the risk for further weight gain. Excess weight may lead to heart disease and diabetes.  Creating and following plans for healthy eating and physical activity may help you improve your health.  Weight control is part of healthy lifestyle and includes exercise, emotional health, and healthy eating habits. Careful eating habits lifelong are the mainstay of weight control. Though there are significant health benefits from weight loss, long-term weight loss with diet alone may be very difficult to achieve- studies show long-term success with dietary management in less than 10% of people. Attaining a healthy weight may be especially difficult to achieve in those with severe obesity. In some cases, medications, devices and surgical management might be considered.  What can you do?  If you are overweight or obese and are interested in methods for weight loss, you should discuss this with your provider.     Consider reducing daily calorie intake by 500 calories.     Keep a food journal.     Avoiding skipping meals, consider cutting portions instead.    Diet combined with exercise helps maintain muscle while optimizing fat loss. Strength training is particularly important for building and maintaining muscle mass. Exercise helps reduce stress, increase energy, and improves fitness.  Increasing exercise without diet control, however, may not burn enough calories to loose weight.       Start walking three days a week 10-20 minutes at a time    Work towards walking thirty minutes five days a week     Eventually, increase the speed of your walking for 1-2 minutes at time    In addition, we recommend that you review healthy lifestyles and methods for weight loss available through the National Institutes of Health patient information sites:  http://win.niddk.nih.gov/publications/index.htm    And look into health and wellness programs that may be available through your health insurance provider, employer, local community center, or manuela club.    Weight management plan: Patient was referred to their PCP to discuss a diet and exercise plan.

## 2020-06-11 NOTE — PROGRESS NOTES
CPAP Follow-Up Visit:    Date on this visit: 2020    Ely Humphreys has a follow-up visit today to review her CPAP use for MIKEL. She was initially seen at the [facility] for nightly apnea, snoring and excessive daytime sleepiness and tiredness. Her medical history is significant for atrial fibrillation, systolic CHF with EF 40%, hx of Graves s/p I ablation with subsequent hypothyroidism, depression, breast cancer and essential tremor.     PREVIOUS SLEEP STUDIES:  PS/15/17  AHI was 24.6/hour. RDI 30.6/hour.    REM AHI 75/hour (due to limited REM sleep).  Supine AHI 27.2/hour.    Lowest O2 saturation: 83.8%. S/He spent 1.9 minutes below 89% SpO2.   Periodic Limb Movement Index 5.3/hour.     CPAP titration:    Periodic Limb Movement Index 0/hour.   CPAP was titrated to 6 cmH2O with elimination of apneas, hypopneas and desaturations.   Supine REM was not noted at this pressure.       She sleeps pretty well. She has an occasional early awakening. She sleeps supine. Sleeping laterally causes mask leak. She is used to supine sleep now.    PAP machine: ResMed. Pressure settings: 10 cm    The compliance data shows that the patient used the CPAP for 30/30 nights, 100% of nights for >4 hours.  The 95th% pressure is 10 cm.  The 95th% leak is 14 lpm.  The average nightly usage is 477 min.  The average AHI is 1.4/hr.       Interface:  Mask: Dreamwear nasal mask. Got a new mask in the last few months.   Chin strap: No  Leak: Not usually  Using Humidifier: Yes, does not run out of water  Condensation in hose or mask: Has had that in the past and has adjusted the settings to fix the issue     Difficulties with therapy:    [-] Snoring with CPAP:  [-] Difficulty tolerating the pressure:  [-] Epistaxis/dry nose:   [-] Nasal congestion:  [-] Dry mouth: rare  [-] Mouth breathing:   [-] Pain/skin breakdown:      Improvements noted with CPAP: Sleeps a lot better than she used to. Her energy is mildly better.  She was on some medications that could have been making her tired around the time she started CPAP.  [+] waking up more refreshed  [+] sleeping better with less arousals  [+] improved energy level during the day    Weight change since sleep study: has been stable    Bedtime is about 10 PM. She falls asleep quickly. She sometimes wakes briefly in the night. She is up for the day around 7-7:30 AM. She does not nap.     Past medical/surgical history, family history, social history, medications and allergies were reviewed.      Problem List:  Patient Active Problem List    Diagnosis Date Noted     Atrial fibrillation (H) 10/07/2019     Priority: Medium     Sleep apnea, unspecified type 05/20/2019     Priority: Medium     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 07/17/2018     Priority: Medium     Drusen of optic disc, left 07/17/2018     Priority: Medium     S/P ablation of atrial fibrillation 06/30/2017     Priority: Medium     Ablation done March 2019 at [facility], Dr. Hodgkin       Hypothyroidism, unspecified type 08/29/2016     Priority: Medium     Meralgia paresthetica 07/30/2012     Priority: Medium     ACP (advance care planning) 10/19/2011     Priority: Medium     Advance Care Planning 2/19/2017: Receipt of ACP document:  Received: Health Care Directive which was witnessed or notarized on 11/17/14.  Document previously scanned on 12/7/16.  Validation form completed and sent to be scanned.  Code Status reflects choices in most recent ACP document.  Confirmed/documented designated decision maker(s).  Added by Ericka Baker   Advance Care Planning Liaison  Advance Care Planning 11/15/2016: ACP Review of Chart / Resources Provided:  Reviewed chart for advance care plan.  Ely Humphreys has no plan or code status on file however states presence of ACP document. Copy requested. Code status updated and sent to provider for review pending receipt of document/advance care plan review.  Confirmed/documented  legally designated decision makers.  Added by Bailey Ndiaye  Advance Care Planning 10/19/2011:  Patient states has Advance Directive and will bring in a copy to clinic.        Health Care Home 08/22/2011     Priority: Medium     X  EMERGENCY CARE PLAN  Presenting Problem Signs and Symptoms Treatment Plan    Questions or conerns during clinic hours    I will call the clinic directly     Questions or conerns outside clinic hours    I will call the 24 hour nurse line at [phone]    Patient needs to schedule an appointment    I will call the 24 hour scheduling team at [phone] or clinic directly    Same day treatment     I will call the clinic first, nurse line if after hours, urgent care and express care if needed                            DX V65.8 REPLACED WITH [id number] [facility] CARE HOME (04/08/2013)       HYPERLIPIDEMIA LDL GOAL <160 02/10/2010     Priority: Medium     history of Invasive ductal carcinoma of breast (H)      Priority: Medium     Edgardo grade 2 fo 3. T1b Clinical N, N0, M0, stGstrstastdstest:st st1st Receptor status: ER+, WI+ HER 2 IVAN - by FISH  Completed radiation and chemotherapy.  On anastrazole for at least 5 years. Needs yearly dexa scans. Followed by Dr. Marie.       Postmenopausal atrophic vaginitis 02/21/2007     Priority: Medium     Osteopenia of multiple sites 07/27/2004     Priority: Medium        Impression/Plan:    (G47.33) MIKEL (obstructive sleep apnea)  (primary encounter diagnosis)  Comment: doing very well with CPAP. No concerns. She infrequently has middle of the night awakenings. Her sleep schedule is slightly excessive (10 PM to 7-7:30 AM).  Plan: Comprehensive DME        Continue CPAP 10 cm. A prescription was written for new supplies. We reviewed recommendations for cleaning and replacing supplies. She was advised to reduce time in bed if she starts having more regular middle of the night awakenings.         She will follow up with me in about 1 year(s).     Fifteen minutes spent with  patient, all of which were spent face-to-face counseling, consulting, coordinating plan of care.      Bennett Goltz, PA-C    CC: No ref. provider found      Video-Visit Details    Type of service:  Video Visit    Video Start Time: 1:28 PM  Video End Time: 1:43 PM    Originating Location (pt. Location): Home    Distant Location (provider location):  [facility]     Platform used for Video Visit: AmWell Bennett Ezra Goltz, PA-C

## 2020-07-14 ENCOUNTER — ANCILLARY PROCEDURE (OUTPATIENT)
Dept: MAMMOGRAPHY | Facility: CLINIC | Age: 70
End: 2020-07-14
Attending: INTERNAL MEDICINE
Payer: COMMERCIAL

## 2020-07-14 DIAGNOSIS — Z12.31 VISIT FOR SCREENING MAMMOGRAM: ICD-10-CM

## 2020-07-14 PROCEDURE — 77067 SCR MAMMO BI INCL CAD: CPT | Mod: TC

## 2020-08-03 ENCOUNTER — OFFICE VISIT (OUTPATIENT)
Dept: PEDIATRICS | Facility: CLINIC | Age: 70
End: 2020-08-03
Payer: COMMERCIAL

## 2020-08-03 VITALS
HEART RATE: 70 BPM | HEIGHT: 63 IN | DIASTOLIC BLOOD PRESSURE: 64 MMHG | BODY MASS INDEX: 30.78 KG/M2 | SYSTOLIC BLOOD PRESSURE: 98 MMHG | RESPIRATION RATE: 16 BRPM | TEMPERATURE: 97.3 F | WEIGHT: 173.7 LBS

## 2020-08-03 DIAGNOSIS — E03.9 ACQUIRED HYPOTHYROIDISM: ICD-10-CM

## 2020-08-03 DIAGNOSIS — I48.0 PAROXYSMAL ATRIAL FIBRILLATION (H): ICD-10-CM

## 2020-08-03 DIAGNOSIS — E78.5 HYPERLIPIDEMIA LDL GOAL <160: ICD-10-CM

## 2020-08-03 DIAGNOSIS — Z00.00 MEDICARE ANNUAL WELLNESS VISIT, SUBSEQUENT: Primary | ICD-10-CM

## 2020-08-03 DIAGNOSIS — G62.9 NEUROPATHY: ICD-10-CM

## 2020-08-03 DIAGNOSIS — Z86.79 HISTORY OF CARDIOMYOPATHY: ICD-10-CM

## 2020-08-03 DIAGNOSIS — G47.33 OSA (OBSTRUCTIVE SLEEP APNEA): ICD-10-CM

## 2020-08-03 PROBLEM — I48.91 ATRIAL FIBRILLATION (H): Status: ACTIVE | Noted: 2019-10-07

## 2020-08-03 PROCEDURE — 99213 OFFICE O/P EST LOW 20 MIN: CPT | Mod: 25 | Performed by: INTERNAL MEDICINE

## 2020-08-03 PROCEDURE — G0438 PPPS, INITIAL VISIT: HCPCS | Performed by: INTERNAL MEDICINE

## 2020-08-03 RX ORDER — LIOTHYRONINE SODIUM 5 UG/1
5 TABLET ORAL DAILY
Qty: 90 TABLET | Refills: 1 | Status: SHIPPED | OUTPATIENT
Start: 2020-08-03 | End: 2021-02-01

## 2020-08-03 RX ORDER — LEVOTHYROXINE SODIUM 137 UG/1
TABLET ORAL
Qty: 90 TABLET | Refills: 1 | Status: SHIPPED | OUTPATIENT
Start: 2020-08-03 | End: 2021-02-01 | Stop reason: DRUGHIGH

## 2020-08-03 SDOH — SOCIAL STABILITY: SOCIAL NETWORK
DO YOU BELONG TO ANY CLUBS OR ORGANIZATIONS SUCH AS CHURCH GROUPS UNIONS, FRATERNAL OR ATHLETIC GROUPS, OR SCHOOL GROUPS?: NO

## 2020-08-03 SDOH — HEALTH STABILITY: MENTAL HEALTH: HOW OFTEN DO YOU HAVE A DRINK CONTAINING ALCOHOL?: 2-3 TIMES A WEEK

## 2020-08-03 SDOH — SOCIAL STABILITY: SOCIAL NETWORK: HOW OFTEN DO YOU GET TOGETHER WITH FRIENDS OR RELATIVES?: ONCE A WEEK

## 2020-08-03 SDOH — ECONOMIC STABILITY: INCOME INSECURITY: IN THE LAST 12 MONTHS, WAS THERE A TIME WHEN YOU WERE NOT ABLE TO PAY THE MORTGAGE OR RENT ON TIME?: NO

## 2020-08-03 SDOH — HEALTH STABILITY: PHYSICAL HEALTH: ON AVERAGE, HOW MANY MINUTES DO YOU ENGAGE IN EXERCISE AT THIS LEVEL?: 30 MIN

## 2020-08-03 ASSESSMENT — ENCOUNTER SYMPTOMS
HEMATURIA: 0
NAUSEA: 0
ARTHRALGIAS: 0
WEAKNESS: 0
HEARTBURN: 0
SORE THROAT: 0
HEADACHES: 0
MYALGIAS: 0
NERVOUS/ANXIOUS: 0
DIZZINESS: 0
CONSTIPATION: 0
CHILLS: 0
COUGH: 0
BREAST MASS: 0
ABDOMINAL PAIN: 0
JOINT SWELLING: 0
DYSURIA: 0
PALPITATIONS: 0
EYE PAIN: 0
FREQUENCY: 0
FEVER: 0
HEMATOCHEZIA: 0
PARESTHESIAS: 1
SHORTNESS OF BREATH: 0
DIARRHEA: 0

## 2020-08-03 ASSESSMENT — ACTIVITIES OF DAILY LIVING (ADL): CURRENT_FUNCTION: NO ASSISTANCE NEEDED

## 2020-08-03 ASSESSMENT — MIFFLIN-ST. JEOR: SCORE: 1279.09

## 2020-08-03 NOTE — PROGRESS NOTES
"SUBJECTIVE:   Ely Humphreys is a 70 year old female who presents for Preventive Visit.    Are you in the first 12 months of your Medicare coverage?  No    Healthy Habits:     In general, how would you rate your overall health?  Good    Frequency of exercise:  2-3 days/week    Duration of exercise:  30-45 minutes    Do you usually eat at least 4 servings of fruit and vegetables a day, include whole grains    & fiber and avoid regularly eating high fat or \"junk\" foods?  Yes    Taking medications regularly:  Yes    Medication side effects:  None    Ability to successfully perform activities of daily living:  No assistance needed    Home Safety:  Lack of grab bars in the bathroom    Hearing Impairment:  No hearing concerns    In the past 6 months, have you been bothered by leaking of urine?  No    In general, how would you rate your overall mental or emotional health?  Good      PHQ-2 Total Score: 0    Additional concerns today:  No    Do you feel safe in your environment? Yes    Have you ever done Advance Care Planning? (For example, a Health Directive, POLST, or a discussion with a medical provider or your loved ones about your wishes): Yes, advance care planning is on file.    The 10-year ASCVD risk score (Ransomvilletrini CUADRA Jr., et al., 2013) is: 4.7%    Values used to calculate the score:      Age: 70 years      Sex: Female      Is Non- : No      Diabetic: No      Tobacco smoker: No      Systolic Blood Pressure: 98 mmHg      Is BP treated: No      HDL Cholesterol: 85 mg/dL      Total Cholesterol: 279 mg/dL    Fall risk  Fallen 2 or more times in the past year?: No  Any fall with injury in the past year?: No    Cognitive Screening   1) Repeat 3 items (Leader, Season, Table)    2) Clock draw: NORMAL  3) 3 item recall: Recalls 3 objects  Results: 3 items recalled: COGNITIVE IMPAIRMENT LESS LIKELY    Mini-CogTM Copyright MARYLIN Galvez. Licensed by the author for use in [facility]; reprinted with " permission (eros@[facility]). All rights reserved.      Do you have sleep apnea, excessive snoring or daytime drowsiness?: yes    Reviewed and updated as needed this visit by clinical staff  Tobacco  Allergies         Reviewed and updated as needed this visit by Provider        Social History     Tobacco Use     Smoking status: Never Smoker     Smokeless tobacco: Never Used   Substance Use Topics     Alcohol use: Yes     Frequency: 2-3 times a week     Drinks per session: 1 or 2     Binge frequency: Never     Comment: 1 or 2 glasses of wine most evenings         Alcohol Use 8/3/2020   Prescreen: >3 drinks/day or >7 drinks/week? No   Prescreen: >3 drinks/day or >7 drinks/week? -           Hyperlipidemia Follow-Up      Are you regularly taking any medication or supplement to lower your cholesterol?   No    Are you having muscle aches or other side effects that you think could be caused by your cholesterol lowering medication?  No    Hypothyroidism Follow-up      Since last visit, patient describes the following symptoms: Weight stable, no hair loss, no skin changes, no constipation, no loose stools      Current providers sharing in care for this patient include:   Patient Care Team:  Shadia Munroe MD as PCP - General  Jose Alejandro, Randolph Burden MD as MD (Ophthalmology)  Shadia Pickard Od, OD  Shadia Munroe MD as Assigned PCP    The following health maintenance items are reviewed in Epic and correct as of today:  Health Maintenance   Topic Date Due     ANNUAL REVIEW OF HM ORDERS  1950     FALL RISK ASSESSMENT  07/31/2020     MEDICARE ANNUAL WELLNESS VISIT  07/31/2020     INFLUENZA VACCINE (1) 09/01/2020     TSH W/FREE T4 REFLEX  01/15/2021     MAMMO SCREENING  07/14/2021     ADVANCE CARE PLANNING  02/19/2022     DEXA  08/13/2022     COLORECTAL CANCER SCREENING  06/09/2024     LIPID  01/15/2025     DTAP/TDAP/TD IMMUNIZATION (4 - Td) 07/31/2029     HEPATITIS C SCREENING  Completed     PHQ-2  Completed      "PNEUMOCOCCAL IMMUNIZATION 65+ LOW/MEDIUM RISK  Completed     ZOSTER IMMUNIZATION  Completed     IPV IMMUNIZATION  Aged Out     MENINGITIS IMMUNIZATION  Aged Out     HEPATITIS B IMMUNIZATION  Aged Out     Labs reviewed in EPIC    Review of Systems   Constitutional: Negative for chills and fever.   HENT: Negative for congestion, ear pain, hearing loss and sore throat.    Eyes: Negative for pain and visual disturbance.   Respiratory: Negative for cough and shortness of breath.    Cardiovascular: Negative for chest pain, palpitations and peripheral edema.   Gastrointestinal: Negative for abdominal pain, constipation, diarrhea, heartburn, hematochezia and nausea.   Breasts:  Negative for tenderness, breast mass and discharge.   Genitourinary: Negative for dysuria, frequency, genital sores, hematuria, pelvic pain, urgency, vaginal bleeding and vaginal discharge.   Musculoskeletal: Negative for arthralgias, joint swelling and myalgias.   Skin: Negative for rash.   Neurological: Positive for paresthesias. Negative for dizziness, weakness and headaches.   Psychiatric/Behavioral: Negative for mood changes. The patient is not nervous/anxious.      Neuropathy in feet since chemotherapy. A little in her hands. Has a little bit of numbness along R LFCN distribution.      OBJECTIVE:   BP 98/64 (BP Location: Right arm, Patient Position: Sitting, Cuff Size: Adult Regular)   Pulse 70   Temp 97.3  F (36.3  C) (Tympanic)   Resp 16   Ht 1.588 m (5' 2.5\")   Wt 78.8 kg (173 lb 11.2 oz)   LMP  (LMP Unknown)   BMI 31.26 kg/m   Estimated body mass index is 31.26 kg/m  as calculated from the following:    Height as of this encounter: 1.588 m (5' 2.5\").    Weight as of this encounter: 78.8 kg (173 lb 11.2 oz).  Physical Exam  GENERAL: healthy, alert and no distress  EYES: Eyes grossly normal to inspection, PERRL and conjunctivae and sclerae normal  HENT: ear canals and TM's normal, nose and mouth without ulcers or lesions  NECK: no " adenopathy, no asymmetry, masses, or scars and thyroid normal to palpation  RESP: lungs clear to auscultation - no rales, rhonchi or wheezes  BREAST: L breast with scar at site of lumpectomy inferiorly, otherwise normal without masses, tenderness or nipple discharge and no palpable axillary masses or adenopathy  CV: regular rate and rhythm, normal S1 S2, no S3 or S4, no murmur, click or rub, no peripheral edema and peripheral pulses strong  ABDOMEN: soft, nontender, no hepatosplenomegaly, no masses and bowel sounds normal  MS: no gross musculoskeletal defects noted, no edema  SKIN: no suspicious lesions or rashes  NEURO: Normal strength and tone, mentation intact and speech normal  PSYCH: mentation appears normal, affect normal/bright    Diagnostic Test Results:  Labs reviewed in Epic    ASSESSMENT / PLAN:   1. Medicare annual wellness visit, subsequent      2. Acquired hypothyroidism  Stable on medication. Due for recheck lab by Jan 2021.  - levothyroxine (SYNTHROID/LEVOTHROID) 137 MCG tablet; Take 1 tablet by mouth one time every day.  Dispense: 90 tablet; Refill: 1  - liothyronine (CYTOMEL) 5 MCG tablet; Take 1 tablet (5 mcg) by mouth daily  Dispense: 90 tablet; Refill: 1  - **TSH with free T4 reflex FUTURE anytime; Future    3. Hyperlipidemia LDL goal <160  Does not want statin. 10 year risk is approx 5.5%. discussed importance of plant based diet and exercise in holding off need for statin.  - Lipid panel reflex to direct LDL Fasting; Future  - **Comprehensive metabolic panel FUTURE anytime; Future    4. Neuropathy  Felt due to chemo. Using OTC topical. Not interested in pills, but would consider gabapentin topically. Will also check B12 since has had mild increase in her symptoms.   - **Vitamin B12 FUTURE 2mo; Future      6. MIKEL (obstructive sleep apnea)  On CPAP    7. Paroxysmal atrial fibrillation (H)  No symptoms since stopping flecainide.     8. History of cardiomyopathy  Resolved, last echo 2019 normal  "EF, with mild residual PRIETO.      COUNSELING:  Reviewed preventive health counseling, as reflected in patient instructions    Estimated body mass index is 31.26 kg/m  as calculated from the following:    Height as of this encounter: 1.588 m (5' 2.5\").    Weight as of this encounter: 78.8 kg (173 lb 11.2 oz).    Weight management plan: Discussed healthy diet and exercise guidelines     reports that she has never smoked. She has never used smokeless tobacco.      Appropriate preventive services were discussed with this patient, including applicable screening as appropriate for cardiovascular disease, diabetes, osteopenia/osteoporosis, and glaucoma.  As appropriate for age/gender, discussed screening for colorectal cancer, prostate cancer, breast cancer, and cervical cancer. Checklist reviewing preventive services available has been given to the patient.    Reviewed patients plan of care and provided an AVS. The Intermediate Care Plan ( asthma action plan, low back pain action plan, and migraine action plan) for Ely meets the Care Plan requirement. This Care Plan has been established and reviewed with the Patient.    Counseling Resources:  ATP IV Guidelines  Pooled Cohorts Equation Calculator  Breast Cancer Risk Calculator  FRAX Risk Assessment  ICSI Preventive Guidelines  Dietary Guidelines for Americans, 2010  [employer]'s MyPlate  ASA Prophylaxis  Lung CA Screening    Shadia Munroe MD  [facility]    Identified Health Risks:  "

## 2020-11-04 DIAGNOSIS — E03.9 ACQUIRED HYPOTHYROIDISM: ICD-10-CM

## 2020-11-04 DIAGNOSIS — E78.5 HYPERLIPIDEMIA LDL GOAL <160: ICD-10-CM

## 2020-11-04 DIAGNOSIS — G62.9 NEUROPATHY: ICD-10-CM

## 2020-11-04 LAB
ALBUMIN SERPL-MCNC: 3.6 G/DL (ref 3.4–5)
ALP SERPL-CCNC: 70 U/L (ref 40–150)
ALT SERPL W P-5'-P-CCNC: 26 U/L (ref 0–50)
ANION GAP SERPL CALCULATED.3IONS-SCNC: 4 MMOL/L (ref 3–14)
AST SERPL W P-5'-P-CCNC: 17 U/L (ref 0–45)
BILIRUB SERPL-MCNC: 0.8 MG/DL (ref 0.2–1.3)
BUN SERPL-MCNC: 14 MG/DL (ref 7–30)
CALCIUM SERPL-MCNC: 9.6 MG/DL (ref 8.5–10.1)
CHLORIDE SERPL-SCNC: 107 MMOL/L (ref 94–109)
CHOLEST SERPL-MCNC: 291 MG/DL
CO2 SERPL-SCNC: 29 MMOL/L (ref 20–32)
CREAT SERPL-MCNC: 0.73 MG/DL (ref 0.52–1.04)
GFR SERPL CREATININE-BSD FRML MDRD: 84 ML/MIN/{1.73_M2}
GLUCOSE SERPL-MCNC: 97 MG/DL (ref 70–99)
HDLC SERPL-MCNC: 84 MG/DL
LDLC SERPL CALC-MCNC: 196 MG/DL
NONHDLC SERPL-MCNC: 207 MG/DL
POTASSIUM SERPL-SCNC: 4.3 MMOL/L (ref 3.4–5.3)
PROT SERPL-MCNC: 7.7 G/DL (ref 6.8–8.8)
SODIUM SERPL-SCNC: 140 MMOL/L (ref 133–144)
T4 FREE SERPL-MCNC: 1.49 NG/DL (ref 0.76–1.46)
TRIGL SERPL-MCNC: 55 MG/DL
TSH SERPL DL<=0.005 MIU/L-ACNC: 0.31 MU/L (ref 0.4–4)
VIT B12 SERPL-MCNC: 542 PG/ML (ref 193–986)

## 2020-11-04 PROCEDURE — 84439 ASSAY OF FREE THYROXINE: CPT | Performed by: INTERNAL MEDICINE

## 2020-11-04 PROCEDURE — 82607 VITAMIN B-12: CPT | Performed by: INTERNAL MEDICINE

## 2020-11-04 PROCEDURE — 80061 LIPID PANEL: CPT | Performed by: INTERNAL MEDICINE

## 2020-11-04 PROCEDURE — 84443 ASSAY THYROID STIM HORMONE: CPT | Performed by: INTERNAL MEDICINE

## 2020-11-04 PROCEDURE — 80053 COMPREHEN METABOLIC PANEL: CPT | Performed by: INTERNAL MEDICINE

## 2020-11-04 PROCEDURE — 36415 COLL VENOUS BLD VENIPUNCTURE: CPT | Performed by: INTERNAL MEDICINE

## 2020-11-29 ENCOUNTER — TELEPHONE (OUTPATIENT)
Dept: PEDIATRICS | Facility: CLINIC | Age: 70
End: 2020-11-29

## 2020-11-29 DIAGNOSIS — E03.9 ACQUIRED HYPOTHYROIDISM: Primary | ICD-10-CM

## 2020-11-29 NOTE — TELEPHONE ENCOUNTER
Please call her. Thyroid labs show her dose may be just slightly too high. Same thing happened last year but on recheck was OK.     Options are to either decrease her dose to 125 mcg, a very small decrease, or recheck in 8 weeks. Either is OK. Her levels were almost in the normal range, but not quite.    Shadia Munroe M.D.

## 2020-11-30 RX ORDER — LEVOTHYROXINE SODIUM 125 UG/1
125 TABLET ORAL DAILY
Status: CANCELLED | OUTPATIENT
Start: 2020-11-30

## 2020-11-30 NOTE — TELEPHONE ENCOUNTER
Placed call to patient and I went over Dr. Munroe's recommendation below.    Patient would like to try the levothyroxine 125 mcg.    Will route to Dr. Munroe- Please review pended prescription for 125 mcg and sign. The recommendation was to either decrease dose or to recheck labs. Would you still like to recheck labs after decreasing the dose? If so, when? Please advise and I will update patient.    Katarina CARRERA RN

## 2020-12-04 ENCOUNTER — MYC MEDICAL ADVICE (OUTPATIENT)
Dept: PEDIATRICS | Facility: CLINIC | Age: 70
End: 2020-12-04

## 2020-12-04 DIAGNOSIS — E03.9 HYPOTHYROIDISM, UNSPECIFIED TYPE: Primary | ICD-10-CM

## 2020-12-04 RX ORDER — LEVOTHYROXINE SODIUM 125 UG/1
125 TABLET ORAL DAILY
Qty: 90 TABLET | Refills: 1 | Status: SHIPPED | OUTPATIENT
Start: 2020-12-04 | End: 2021-05-21

## 2020-12-04 NOTE — TELEPHONE ENCOUNTER
Patient is asking for lower dose of levothyroxine as discussed in 11/29/20.    Order pended for Levothyroxine at 125 mg daily.    Do we need to recheck labs after this change in dose?   If so, When?    Routing to Dr. Munroe to sign order and advise.    Anthony Key RN on 12/4/2020 at 4:18 PM

## 2021-02-16 DIAGNOSIS — E03.9 ACQUIRED HYPOTHYROIDISM: ICD-10-CM

## 2021-02-16 LAB — TSH SERPL DL<=0.005 MIU/L-ACNC: 0.51 MU/L (ref 0.4–4)

## 2021-02-16 PROCEDURE — 36415 COLL VENOUS BLD VENIPUNCTURE: CPT | Performed by: INTERNAL MEDICINE

## 2021-02-16 PROCEDURE — 84443 ASSAY THYROID STIM HORMONE: CPT | Performed by: INTERNAL MEDICINE

## 2021-05-21 DIAGNOSIS — E03.9 HYPOTHYROIDISM, UNSPECIFIED TYPE: ICD-10-CM

## 2021-05-21 RX ORDER — LEVOTHYROXINE SODIUM 125 UG/1
125 TABLET ORAL DAILY
Qty: 90 TABLET | Refills: 0 | Status: SHIPPED | OUTPATIENT
Start: 2021-05-21 | End: 2021-08-23

## 2021-05-21 NOTE — TELEPHONE ENCOUNTER
Prescription approved per [facility] Refill Protocol.  Jamal PALOMARES RN

## 2021-08-07 DIAGNOSIS — E03.9 ACQUIRED HYPOTHYROIDISM: ICD-10-CM

## 2021-08-09 ENCOUNTER — ANCILLARY PROCEDURE (OUTPATIENT)
Dept: MAMMOGRAPHY | Facility: CLINIC | Age: 71
End: 2021-08-09
Attending: INTERNAL MEDICINE
Payer: COMMERCIAL

## 2021-08-09 DIAGNOSIS — Z12.31 VISIT FOR SCREENING MAMMOGRAM: ICD-10-CM

## 2021-08-09 PROCEDURE — 77067 SCR MAMMO BI INCL CAD: CPT | Mod: TC | Performed by: RADIOLOGY

## 2021-08-10 RX ORDER — LIOTHYRONINE SODIUM 5 UG/1
TABLET ORAL
Qty: 90 TABLET | Refills: 1 | Status: SHIPPED | OUTPATIENT
Start: 2021-08-10 | End: 2021-09-08

## 2021-08-10 NOTE — TELEPHONE ENCOUNTER
Prescription approved per [facility] Refill Protocol.    Brittni Tatum RN on 8/10/2021 at 12:19 PM

## 2021-08-21 DIAGNOSIS — E03.9 HYPOTHYROIDISM, UNSPECIFIED TYPE: ICD-10-CM

## 2021-08-23 RX ORDER — LEVOTHYROXINE SODIUM 125 UG/1
125 TABLET ORAL DAILY
Qty: 90 TABLET | Refills: 0 | Status: SHIPPED | OUTPATIENT
Start: 2021-08-23 | End: 2021-09-08

## 2021-08-23 NOTE — TELEPHONE ENCOUNTER
Medication is being filled for 1 time refill only due to:  Patient needs to be seen because it has been more than one year since last visit.     Routing to MA/TC pool. The Pt is due for a visit with PCP. Please call and help them schedule.    RN will issue a 90 day supply. No further refills until the Pt is seen.       Misty Pickens, RN   [facility] -- Triage Nurse

## 2021-09-02 ASSESSMENT — ENCOUNTER SYMPTOMS
CHILLS: 0
SHORTNESS OF BREATH: 0
CONSTIPATION: 0
JOINT SWELLING: 0
PALPITATIONS: 0
MYALGIAS: 0
WEAKNESS: 0
PARESTHESIAS: 0
COUGH: 0
FEVER: 0
ABDOMINAL PAIN: 0
DYSURIA: 0
HEMATOCHEZIA: 0
SORE THROAT: 0
EYE PAIN: 1
DIARRHEA: 0
HEADACHES: 1
ARTHRALGIAS: 0
FREQUENCY: 0
NERVOUS/ANXIOUS: 0
DIZZINESS: 0
HEARTBURN: 0
BREAST MASS: 0
NAUSEA: 0
HEMATURIA: 0

## 2021-09-02 ASSESSMENT — ACTIVITIES OF DAILY LIVING (ADL): CURRENT_FUNCTION: NO ASSISTANCE NEEDED

## 2021-09-05 ENCOUNTER — HEALTH MAINTENANCE LETTER (OUTPATIENT)
Age: 71
End: 2021-09-05

## 2021-09-08 ENCOUNTER — OFFICE VISIT (OUTPATIENT)
Dept: PEDIATRICS | Facility: CLINIC | Age: 71
End: 2021-09-08
Payer: COMMERCIAL

## 2021-09-08 VITALS
WEIGHT: 178 LBS | HEIGHT: 62 IN | DIASTOLIC BLOOD PRESSURE: 72 MMHG | HEART RATE: 74 BPM | BODY MASS INDEX: 32.76 KG/M2 | TEMPERATURE: 98.1 F | OXYGEN SATURATION: 97 % | SYSTOLIC BLOOD PRESSURE: 118 MMHG

## 2021-09-08 DIAGNOSIS — E78.00 PURE HYPERCHOLESTEROLEMIA: ICD-10-CM

## 2021-09-08 DIAGNOSIS — R93.89 ABNORMAL CAROTID ULTRASOUND: ICD-10-CM

## 2021-09-08 DIAGNOSIS — G47.33 OSA (OBSTRUCTIVE SLEEP APNEA): ICD-10-CM

## 2021-09-08 DIAGNOSIS — H35.369 DRUSEN: ICD-10-CM

## 2021-09-08 DIAGNOSIS — I77.810 ACQUIRED DILATION OF ASCENDING AORTA AND AORTIC ROOT (H): ICD-10-CM

## 2021-09-08 DIAGNOSIS — I65.21 STENOSIS OF RIGHT CAROTID ARTERY: ICD-10-CM

## 2021-09-08 DIAGNOSIS — Z00.00 ENCOUNTER FOR MEDICARE ANNUAL WELLNESS EXAM: Primary | ICD-10-CM

## 2021-09-08 DIAGNOSIS — Z85.3 PERSONAL HISTORY OF MALIGNANT NEOPLASM OF BREAST: ICD-10-CM

## 2021-09-08 DIAGNOSIS — I43 DILATED CARDIOMYOPATHY SECONDARY TO TACHYCARDIA (H): ICD-10-CM

## 2021-09-08 DIAGNOSIS — Z86.79 HISTORY OF ATRIAL FIBRILLATION: ICD-10-CM

## 2021-09-08 DIAGNOSIS — R00.0 DILATED CARDIOMYOPATHY SECONDARY TO TACHYCARDIA (H): ICD-10-CM

## 2021-09-08 DIAGNOSIS — Z23 NEED FOR INFLUENZA VACCINATION: ICD-10-CM

## 2021-09-08 DIAGNOSIS — E03.9 HYPOTHYROIDISM, UNSPECIFIED TYPE: ICD-10-CM

## 2021-09-08 DIAGNOSIS — Z12.31 ENCOUNTER FOR SCREENING MAMMOGRAM FOR BREAST CANCER: ICD-10-CM

## 2021-09-08 DIAGNOSIS — M85.89 OSTEOPENIA OF MULTIPLE SITES: ICD-10-CM

## 2021-09-08 PROBLEM — I48.91 ATRIAL FIBRILLATION (H): Status: RESOLVED | Noted: 2019-10-07 | Resolved: 2021-09-08

## 2021-09-08 PROCEDURE — 84443 ASSAY THYROID STIM HORMONE: CPT | Performed by: INTERNAL MEDICINE

## 2021-09-08 PROCEDURE — G0008 ADMIN INFLUENZA VIRUS VAC: HCPCS | Performed by: INTERNAL MEDICINE

## 2021-09-08 PROCEDURE — 99397 PER PM REEVAL EST PAT 65+ YR: CPT | Mod: 25 | Performed by: INTERNAL MEDICINE

## 2021-09-08 PROCEDURE — 36415 COLL VENOUS BLD VENIPUNCTURE: CPT | Performed by: INTERNAL MEDICINE

## 2021-09-08 PROCEDURE — 90662 IIV NO PRSV INCREASED AG IM: CPT | Performed by: INTERNAL MEDICINE

## 2021-09-08 PROCEDURE — 99214 OFFICE O/P EST MOD 30 MIN: CPT | Mod: 25 | Performed by: INTERNAL MEDICINE

## 2021-09-08 PROCEDURE — 84439 ASSAY OF FREE THYROXINE: CPT | Performed by: INTERNAL MEDICINE

## 2021-09-08 PROCEDURE — 80053 COMPREHEN METABOLIC PANEL: CPT | Performed by: INTERNAL MEDICINE

## 2021-09-08 PROCEDURE — 80061 LIPID PANEL: CPT | Performed by: INTERNAL MEDICINE

## 2021-09-08 RX ORDER — LEVOTHYROXINE SODIUM 125 UG/1
125 TABLET ORAL DAILY
Qty: 90 TABLET | Refills: 1 | Status: SHIPPED | OUTPATIENT
Start: 2021-09-08 | End: 2022-05-17

## 2021-09-08 RX ORDER — ASPIRIN 81 MG/1
81 TABLET ORAL DAILY
COMMUNITY
Start: 2021-09-08 | End: 2024-03-15

## 2021-09-08 RX ORDER — LIOTHYRONINE SODIUM 5 UG/1
5 TABLET ORAL DAILY
Qty: 90 TABLET | Refills: 1 | Status: SHIPPED | OUTPATIENT
Start: 2021-09-08 | End: 2022-01-10

## 2021-09-08 ASSESSMENT — ENCOUNTER SYMPTOMS
HEARTBURN: 0
PARESTHESIAS: 0
FREQUENCY: 0
MYALGIAS: 0
ABDOMINAL PAIN: 0
WEAKNESS: 0
NERVOUS/ANXIOUS: 0
HEMATURIA: 0
SHORTNESS OF BREATH: 0
NAUSEA: 0
COUGH: 0
EYE PAIN: 1
HEMATOCHEZIA: 0
ARTHRALGIAS: 0
DIARRHEA: 0
PALPITATIONS: 0
BREAST MASS: 0
DIZZINESS: 0
HEADACHES: 1
CONSTIPATION: 0
SORE THROAT: 0
DYSURIA: 0
CHILLS: 0
JOINT SWELLING: 0
FEVER: 0

## 2021-09-08 ASSESSMENT — MIFFLIN-ST. JEOR: SCORE: 1285.65

## 2021-09-08 ASSESSMENT — ACTIVITIES OF DAILY LIVING (ADL): CURRENT_FUNCTION: NO ASSISTANCE NEEDED

## 2021-09-08 NOTE — PROGRESS NOTES
"SUBJECTIVE:   Ely Humphreys is a 71 year old female who presents for Preventive Visit.      Patient has been advised of split billing requirements and indicates understanding: Yes   Are you in the first 12 months of your Medicare coverage?  No    Healthy Habits:     In general, how would you rate your overall health?  Good    Frequency of exercise:  4-5 days/week    Duration of exercise:  30-45 minutes    Do you usually eat at least 4 servings of fruit and vegetables a day, include whole grains    & fiber and avoid regularly eating high fat or \"junk\" foods?  Yes    Medication side effects:  None    Ability to successfully perform activities of daily living:  No assistance needed    Home Safety:  No safety concerns identified    Hearing Impairment:  No hearing concerns    In the past 6 months, have you been bothered by leaking of urine?  No    In general, how would you rate your overall mental or emotional health?  Excellent      PHQ-2 Total Score: 0    Additional concerns today:  No    Do you feel safe in your environment? Yes    Have you ever done Advance Care Planning? (For example, a Health Directive, POLST, or a discussion with a medical provider or your loved ones about your wishes): Yes, advance care planning is on file.    Fall risk  Fallen 2 or more times in the past year?: No  Any fall with injury in the past year?: No  click delete button to remove this line now  Cognitive Screening   1) Repeat 3 items (Leader, Season, Table)    2) Clock draw: NORMAL  3) 3 item recall: Recalls 3 objects  Results: 3 items recalled: COGNITIVE IMPAIRMENT LESS LIKELY    Mini-CogTM Copyright MARYLIN Galvez. Licensed by the author for use in [facility]; reprinted with permission (eros@.[facility]). All rights reserved.      Do you have sleep apnea, excessive snoring or daytime drowsiness?: no    Reviewed and updated as needed this visit by clinical staff  Tobacco  Allergies    Med Hx            Reviewed and updated as " needed this visit by Provider                Social History     Tobacco Use     Smoking status: Never Smoker     Smokeless tobacco: Never Used   Substance Use Topics     Alcohol use: Yes     Comment: 1 or 2 glasses of wine most evenings     If you drink alcohol do you typically have >3 drinks per day or >7 drinks per week? No    Alcohol Use 9/8/2021   Prescreen: >3 drinks/day or >7 drinks/week? -   Prescreen: >3 drinks/day or >7 drinks/week? No     Outside screening for carotid stenosis abnormal. Would like to address.     Hyperlipidemia Follow-Up      Are you regularly taking any medication or supplement to lower your cholesterol?   Yes- pravachol    Are you having muscle aches or other side effects that you think could be caused by your cholesterol lowering medication?  No    Hypothyroidism Follow-up      Since last visit, patient describes the following symptoms: Weight stable, no hair loss, no skin changes, no constipation, no loose stools      Current providers sharing in care for this patient include:     Patient Care Team:  Shadia Munroe MD as PCP - Randolph Pascual MD as MD (Ophthalmology)  Shadia Pickard Od, Shadia Reyes MD as Assigned PCP  Franco Garcia MD as MD (Dermatology)  Goltz, Bennett Ezra, PA-C as Assigned Neuroscience Provider  Yoly Ross [facility] as Pharmacist (Pharmacist)    The following health maintenance items are reviewed in Epic and correct as of today:  There are no preventive care reminders to display for this patient.  Labs reviewed in EPIC    FHS-7:   Breast CA Risk Assessment (FHS-7) 8/9/2021 9/2/2021   Did any of your first-degree relatives have breast or ovarian cancer? Yes Yes   Did any of your relatives have bilateral breast cancer? No Unknown   Did any man in your family have breast cancer? No No   Did any woman in your family have breast and ovarian cancer? No Yes   Did any woman in your family have breast cancer before age 50 y? Yes Yes  "  Do you have 2 or more relatives with breast and/or ovarian cancer? No Yes   Do you have 2 or more relatives with breast and/or bowel cancer? No Yes       Mammogram Screening - Alternate mammogram schedule due to breast cancer history  Pertinent mammograms are reviewed under the imaging tab.    Review of Systems   Constitutional: Negative for chills and fever.   HENT: Negative for congestion, ear pain, hearing loss and sore throat.    Eyes: Positive for pain. Negative for visual disturbance.   Respiratory: Negative for cough and shortness of breath.    Cardiovascular: Negative for chest pain, palpitations and peripheral edema.   Gastrointestinal: Negative for abdominal pain, constipation, diarrhea, heartburn, hematochezia and nausea.   Breasts:  Positive for tenderness. Negative for breast mass and discharge.   Genitourinary: Negative for dysuria, frequency, genital sores, hematuria, pelvic pain, urgency, vaginal bleeding and vaginal discharge.   Musculoskeletal: Negative for arthralgias, joint swelling and myalgias.   Skin: Negative for rash.   Neurological: Positive for headaches. Negative for dizziness, weakness and paresthesias.   Psychiatric/Behavioral: Negative for mood changes. The patient is not nervous/anxious.      Seeing ophthalmology. Other symptoms chronic and stable.     OBJECTIVE:   /72 (BP Location: Right arm, Cuff Size: Adult Regular)   Pulse 74   Temp 98.1  F (36.7  C) (Tympanic)   Ht 1.575 m (5' 2\")   Wt 80.7 kg (178 lb)   LMP  (LMP Unknown)   SpO2 97%   BMI 32.56 kg/m   Estimated body mass index is 32.56 kg/m  as calculated from the following:    Height as of this encounter: 1.575 m (5' 2\").    Weight as of this encounter: 80.7 kg (178 lb).  Physical Exam  GENERAL: healthy, alert and no distress  EYES: Eyes grossly normal to inspection, PERRL and conjunctivae and sclerae normal  NECK: no adenopathy, no asymmetry, masses, or scars and thyroid normal to palpation  RESP: lungs clear to " auscultation - no rales, rhonchi or wheezes  BREAST: normal without masses, tenderness or nipple discharge and no palpable axillary masses or adenopathy  CV: regular rate and rhythm, normal S1 S2, no S3 or S4, no murmur, click or rub, no peripheral edema and peripheral pulses strong  ABDOMEN: soft, nontender, no hepatosplenomegaly, no masses and bowel sounds normal  MS: no gross musculoskeletal defects noted, no edema  SKIN: no suspicious lesions or rashes  NEURO: Normal strength and tone, mentation intact and speech normal  PSYCH: mentation appears normal, affect normal/bright    Diagnostic Test Results:  Labs reviewed in Epic    ASSESSMENT / PLAN:     1. Encounter for Medicare annual wellness exam    - INFLUENZA, QUAD, HIGH DOSE, PF, 65YR + (FLUZONE HD)    2. Stenosis of right carotid artery  Needs confirmatory carotid US  - US Carotid Bilateral; Future    3. Abnormal carotid ultrasound  As above  - Comprehensive metabolic panel (BMP + Alb, Alk Phos, ALT, AST, Total. Bili, TP); Future  - Comprehensive metabolic panel (BMP + Alb, Alk Phos, ALT, AST, Total. Bili, TP)    4. Hypothyroidism, unspecified type  Due for lab check  - TSH  - T4 FREE  - levothyroxine (SYNTHROID/LEVOTHROID) 125 MCG tablet; Take 1 tablet (125 mcg) by mouth daily  Dispense: 90 tablet; Refill: 1    5. History of atrial fibrillation, s/p ablation  Has been following with cardiology, Dr. Hodgkin. Has elected to stop anticoagulation, felt she has remained in NSR. Need to watch for return of palpitations.    6. Osteopenia of multiple sites  Following dexa    7. Personal history of malignant neoplasm of breast  Discharged from oncology 10 years after diagnosis. At last visit,     8. Need for influenza vaccination    - FLU VACCINE, INCREASED ANTIGEN, PRESV FREE  - INFLUENZA, QUAD, HIGH DOSE, PF, 65YR + (FLUZONE HD)    9. Drnicolás    - Adult Eye Referral; Future    10. Pure hypercholesterolemia  Has had difficulty tolerating statin, will have her see MTM  "pharmacy or lipid clinic.  - Lipid panel reflex to direct LDL Fasting; Future  - Lipid panel reflex to direct LDL Fasting    11. MIKEL (obstructive sleep apnea)  Seeing sleep medicine    12. Encounter for screening mammogram for breast cancer    - MA Screen Bilateral w/David; Future    13. Dilated cardiomyopathy due to afib RVR, EF normal after ablation (H)  Reviewed most recent Echo results. Due for follow with her cardiologist at [facility].     14. Acquired dilation of ascending aorta and aortic root (H)  Borderline/mild. Noted on last ECHO here in 2018, but has been following with cardiology.         Patient has been advised of split billing requirements and indicates understanding: Yes  COUNSELING:  Reviewed preventive health counseling, as reflected in patient instructions    Estimated body mass index is 32.56 kg/m  as calculated from the following:    Height as of this encounter: 1.575 m (5' 2\").    Weight as of this encounter: 80.7 kg (178 lb).    Weight management plan: Discussed healthy diet and exercise guidelines    She reports that she has never smoked. She has never used smokeless tobacco.    Appropriate preventive services were discussed with this patient, including applicable screening as appropriate for cardiovascular disease, diabetes, osteopenia/osteoporosis, and glaucoma.  As appropriate for age/gender, discussed screening for colorectal cancer, prostate cancer, breast cancer, and cervical cancer. Checklist reviewing preventive services available has been given to the patient.    Reviewed patients plan of care and provided an AVS. The Basic Care Plan (routine screening as documented in Health Maintenance) for Ely meets the Care Plan requirement. This Care Plan has been established and reviewed with the Patient.    Counseling Resources:  ATP IV Guidelines  Pooled Cohorts Equation Calculator  Breast Cancer Risk Calculator  Breast Cancer: Medication to Reduce Risk  FRAX Risk Assessment  ICSI " Preventive Guidelines  Dietary Guidelines for Americans, 2010  USDA's MyPlate  ASA Prophylaxis  Lung CA Screening    Shadia Munroe MD  [facility]    Identified Health Risks:

## 2021-09-08 NOTE — PATIENT INSTRUCTIONS
Let's plan on having your follow up after your cholesterol. We could have you see either [facility] pharmacy or a doctor in the cardiology lipid clinic.    Dr. Brittni Freitas, [facility].    [facility].  [facility] [address], [address]  [place] [address]   Information: [phone]  [phone]  Hours: Monday-Friday 8:00 a.m. - 5:00 p.m.  Evenings and : By Appointment    Patient Education   Personalized Prevention Plan  You are due for the preventive services outlined below.  Your care team is available to assist you in scheduling these services.  If you have already completed any of these items, please share that information with your care team to update in your medical record.  Health Maintenance Due   Topic Date Due     FALL RISK ASSESSMENT  2021     Flu Vaccine (1) 2021

## 2021-09-09 LAB
ALBUMIN SERPL-MCNC: 3.6 G/DL (ref 3.4–5)
ALP SERPL-CCNC: 78 U/L (ref 40–150)
ALT SERPL W P-5'-P-CCNC: 38 U/L (ref 0–50)
ANION GAP SERPL CALCULATED.3IONS-SCNC: 5 MMOL/L (ref 3–14)
AST SERPL W P-5'-P-CCNC: 25 U/L (ref 0–45)
BILIRUB SERPL-MCNC: 1.2 MG/DL (ref 0.2–1.3)
BUN SERPL-MCNC: 14 MG/DL (ref 7–30)
CALCIUM SERPL-MCNC: 8.5 MG/DL (ref 8.5–10.1)
CHLORIDE BLD-SCNC: 108 MMOL/L (ref 94–109)
CHOLEST SERPL-MCNC: 304 MG/DL
CO2 SERPL-SCNC: 25 MMOL/L (ref 20–32)
CREAT SERPL-MCNC: 0.84 MG/DL (ref 0.52–1.04)
GFR SERPL CREATININE-BSD FRML MDRD: 71 ML/MIN/1.73M2
GLUCOSE BLD-MCNC: 93 MG/DL (ref 70–99)
HDLC SERPL-MCNC: 64 MG/DL
LDLC SERPL CALC-MCNC: 215 MG/DL
NONHDLC SERPL-MCNC: 240 MG/DL
POTASSIUM BLD-SCNC: 4.4 MMOL/L (ref 3.4–5.3)
PROT SERPL-MCNC: 7.6 G/DL (ref 6.8–8.8)
SODIUM SERPL-SCNC: 138 MMOL/L (ref 133–144)
T4 FREE SERPL-MCNC: 1.1 NG/DL (ref 0.76–1.46)
TRIGL SERPL-MCNC: 126 MG/DL
TSH SERPL DL<=0.005 MIU/L-ACNC: 1.74 MU/L (ref 0.4–4)

## 2021-09-20 ENCOUNTER — HOSPITAL ENCOUNTER (OUTPATIENT)
Dept: ULTRASOUND IMAGING | Facility: CLINIC | Age: 71
Discharge: HOME | End: 2021-09-20
Attending: INTERNAL MEDICINE | Admitting: INTERNAL MEDICINE
Payer: COMMERCIAL

## 2021-09-20 DIAGNOSIS — I65.21 STENOSIS OF RIGHT CAROTID ARTERY: ICD-10-CM

## 2021-09-20 PROCEDURE — 93880 EXTRACRANIAL BILAT STUDY: CPT

## 2021-09-24 ENCOUNTER — VIRTUAL VISIT (OUTPATIENT)
Dept: PHARMACY | Facility: CLINIC | Age: 71
End: 2021-09-24
Attending: INTERNAL MEDICINE
Payer: COMMERCIAL

## 2021-09-24 DIAGNOSIS — G47.33 OSA (OBSTRUCTIVE SLEEP APNEA): ICD-10-CM

## 2021-09-24 DIAGNOSIS — E78.00 PURE HYPERCHOLESTEROLEMIA: Primary | ICD-10-CM

## 2021-09-24 DIAGNOSIS — E03.9 HYPOTHYROIDISM, UNSPECIFIED TYPE: ICD-10-CM

## 2021-09-24 DIAGNOSIS — Z71.85 VACCINE COUNSELING: ICD-10-CM

## 2021-09-24 DIAGNOSIS — M85.89 OSTEOPENIA OF MULTIPLE SITES: ICD-10-CM

## 2021-09-24 PROCEDURE — 99605 MTMS BY PHARM NP 15 MIN: CPT | Performed by: PHARMACIST

## 2021-09-24 PROCEDURE — 99607 MTMS BY PHARM ADDL 15 MIN: CPT | Performed by: PHARMACIST

## 2021-09-24 RX ORDER — MULTIVIT WITH MINERALS/LUTEIN
1 TABLET ORAL DAILY
COMMUNITY
End: 2024-01-06

## 2021-09-24 RX ORDER — PRAVASTATIN SODIUM 10 MG
10 TABLET ORAL DAILY
Qty: 90 TABLET | Refills: 0 | Status: SHIPPED | OUTPATIENT
Start: 2021-09-24 | End: 2021-12-21 | Stop reason: DRUGHIGH

## 2021-09-24 NOTE — LETTER
"        Date: 2021    Ely Humphreys  [address] [place] [address]    Dear Ms. Humphreys,    Thank you for talking with me on 21 about your health and medications. Medicare s MTM (Medication Therapy Management) program helps you understand your medications and use them safely.      This letter includes an action plan (Medication Action Plan) and medication list (Personal Medication List). The action plan has steps you should take to help you get the best results from your medications. The medication list will help you keep track of your medications and how to use them the right way.       Have your action plan and medication list with you when you talk with your doctors, pharmacists, and other healthcare providers in your care team.     Ask your doctors, pharmacists, and other healthcare providers to update the action plan and medication list at every visit.     Take your medication list with you if you go to the hospital or emergency room.     Give a copy of the action plan and medication list to your family or caregivers.     If you want to talk about this letter or any of the papers with it, please call   [phone].We look forward to working with you, your doctors, and other healthcare providers to help you stay healthy through the [facility] MTM program.    Sincerely,  Yoly Ross [facility]    Enclosed: Medication Action Plan and Personal Medication List    MEDICATION ACTION PLAN FOR Ely Humphreys,  1950     This action plan will help you get the best results from your medications if you:   1. Read \"What we talked about.\"   2. Take the steps listed in the \"What I need to do\" boxes.   3. Fill in \"What I did and when I did it.\"   4. Fill in \"My follow-up plan\" and \"Questions I want to ask.\"     Have this action plan with you when you talk with your doctors, pharmacists, and other healthcare providers in your care team. Share this with your family or caregivers too.  DATE PREPARED: " "2021  What we talked about: your bad cholesterol is elevated, I do suggest we start a cholesterol lowering medication to target lowering the \"LDL\". Please start pravastatin 10mg at bedtime.                                                  What I need to do: Start pravastatin 10mg at bedtime and monitor for any side effects. Inform primary care team if side effects occur.   What I did and when I did it:                                              What we talked about: adequate calcium and vitamin D are important to bone health. You are on more than 2000 units of vitamin D from all of the supplements for this reason, I will suggest we check a level to ensure the dosing is appropriate. Continue with the supplements unchanged at this time.                                                What I need to do: have the vitamin D level recheck in 3 months with the cholesterol lab too.   What I did and when I did it:                                                 My follow-up plan:                 Questions I want to ask:              If you have any questions about your action plan, call Yoly Ross [facility], Phone: [phone] , Monday-Friday 8-4:30pm.           PERSONAL MEDICATION LIST FOR Ely Humphreys DOB 1950     This medication list was made for you after we talked. We also used information from your doctor's chart.      Use blank rows to add new medications. Then fill in the dates you started using them.    Cross out medications when you no longer use them. Then write the date and why you stopped using them.    Ask your doctors, pharmacists, and other healthcare providers to update this list at every visit. Keep this list up-to-date with:       Prescription medications    Over the counter drugs     Herbals    Vitamins    Minerals      If you go to the hospital or emergency room, take this list with you. Share this with your family or caregivers too.     DATE PREPARED: 2021  Allergies or side effects: " Amiodarone     Medication:  ASPIRIN EC 81 MG PO TBEC      How I use it:  Take 1 tablet (81 mg) by mouth daily      Why I use it: Clot prevention    Prescriber:  Shadia Munroe MD      Date I started using it:       Date I stopped using it:         Why I stopped using it:            Medication:  CALCIUM/VITAMIN D3/ADULT GUMMY PO      How I use it:  Take 2 chewable gummies by mouth a day (500 mg of calcium 1000 units of vitamin D3) (Lazar brand)      Why I use it:  bone health    Prescriber:  Shadia Munroe MD      Date I started using it:       Date I stopped using it:         Why I stopped using it:            Medication:  CENTRUM SILVER PO TABS      How I use it:  Take 1 tablet by mouth daily (Centrum Silver 50+ brand)      Why I use it:  general health    Prescriber:  Shadia Munroe MD      Date I started using it:       Date I stopped using it:         Why I stopped using it:            Medication:  LEVOTHYROXINE SODIUM 125 MCG PO TABS      How I use it:  Take 1 tablet (125 mcg) by mouth daily      Why I use it: Hypothyroidism    Prescriber:  Shadia Munroe MD      Date I started using it:       Date I stopped using it:         Why I stopped using it:            Medication:  LIOTHYRONINE SODIUM 5 MCG PO TABS      How I use it:  Take 1 tablet (5 mcg) by mouth daily      Why I use it:  hypothyroidism    Prescriber:  Shadia Munroe MD      Date I started using it:       Date I stopped using it:         Why I stopped using it:            Medication:  PRAVASTATIN SODIUM 10 MG PO TABS      How I use it:  Take 1 tablet (10 mg) by mouth daily      Why I use it: Cholesterol     Prescriber:  Shadia Munroe MD      Date I started using it:       Date I stopped using it:         Why I stopped using it:            Medication:  VITAMIN D 1000 UNIT PO CAPS      How I use it:  Take 2 Capsules (2000 units) by mouth daily.      Why I use it:  bone health    Prescriber:  Shadia Munroe MD      Date I  started using it:       Date I stopped using it:         Why I stopped using it:            Medication:         How I use it:         Why I use it:      Prescriber:         Date I started using it:       Date I stopped using it:         Why I stopped using it:            Medication:         How I use it:         Why I use it:      Prescriber:         Date I started using it:       Date I stopped using it:         Why I stopped using it:            Medication:         How I use it:         Why I use it:      Prescriber:         Date I started using it:       Date I stopped using it:         Why I stopped using it:              Other Information:     If you have any questions about your medication list, call Yoly Ross [facility], Phone: [phone] , Monday-Friday 8-4:30pm.    According to the Paperwork Reduction Act of 1995, no persons are required to respond to a collection of information unless it displays a valid OMB control number. The valid OMB number for this information collection is 6833-9948. The time required to complete this information collection is estimated to average 40 minutes per response, including the time to review instructions, searching existing data resources, gather the data needed, and complete and review the information collection. If you have any comments concerning the accuracy of the time estimate(s) or suggestions for improving this form, please write to: CMS, Attn: [employer] Reports Clearance Officer, [address] [place] [address], [place], [place] [address].

## 2021-09-24 NOTE — PATIENT INSTRUCTIONS
Recommendations from today's MTM visit:                                                      Start pravastatin 10mg at bedtime     Having you on vitamin D can often help prevent muscle related side effects to statins, which you are on!    Recheck cholesterol and your vitamin D level to ensure proper dosing    If you have issues with pravastatin, we can certainly consider the second-line called Zetia instead.    Follow-up: Return in about 1 month (around 10/24/2021) for Medication Therapy Telephone Visit.    It was great to speak with you today.  I value your experience and would be very thankful for your time with providing feedback on our clinic survey. You may receive a survey via email or text message in the next few days.     To schedule another MTM appointment, please call the clinic directly or you may call the MTM scheduling line at [phone] or toll-free at [phone].     My Clinical Pharmacist's contact information:                                                      Please feel free to contact me with any questions or concerns you have.      Yoly Ross PharmD  Medication Therapy Management Pharmacist  Pager#: [phone]

## 2021-09-24 NOTE — PROGRESS NOTES
Medication Therapy Management (MTM) Encounter    ASSESSMENT:                            Medication Adherence/Access: No issues identified    Hyperlipidemia/hx of carotid stenosis: patient does benefit from statin therapy in particular with her LDL elevated. Although she did not tolerate rosuvastatin would challenge with pravastatin. Will consider just a low dose to ensure tolerability.    Osteopenia: suggest we check a vitamin D level given no check in past year and on high dose (> 2000 units a day) but no acute symptoms for toxicity observed. Suggest she continue and plan for recheck. Likely meeting calcium goal.     Hypothyroidism: tsh wnl.    Vaccines: UTD    MIKEL: stable.      PLAN:                            1. Discussed statin versus alternated class and reason for preference to re-challenge alternate statin first. Patient is agreeable to trial pravastatin low dose of 10 mg once daily. Instructed to take at bedtime. If she does have side effects with pravastatin, we discussed ok to consider Zetia 10 mg daily instead.    2. Vitamin d level check as she is a cumulative dose ~ 4000 units a day    3. Lab check in 3 months FLP and vit D.     Follow-up: Return in about 1 month (around 10/24/2021) for Medication Therapy Telephone Visit.      SUBJECTIVE/OBJECTIVE:                          Ely Humphreys is a 71 year old female called for an initial visit. She was referred to me from Dr. Munroe cholesterol discussion.      Reason for visit: follow-up on cholesterol.    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 Hx of ablation for afib, cardiomyopathy.  Tobacco: She reports that she has never smoked. She has never used smokeless tobacco.  Alcohol: a few times a week only    Medication Adherence/Access: The patient fills medications at [place]: NO, fills medications at [facility].    Hx of right carotid stenosis: taking  "aspirin EC 81 mg daily (also s/p ablation afib). No bruising or bleeding.    US of carotid bilateral on 9/20/21 -->   IMPRESSION:    1. Less than 50% diameter stenosis of the right ICA relative to the  distal ICA diameter.   2. Less than 50% diameter stenosis of the left ICA relative to the  distal ICA diameter.  BP Readings from Last 3 Encounters:   09/08/21 118/72   08/03/20 98/64   01/15/20 102/62       Hyperlipidemia: Current therapy includes no current medications.  Patient reports previously on Crestor 5 mg from 2019 and developed severe leg weakness. Otherwise denies taking anything else for cholesterol.  The 10-year ASCVD risk score ([place] CUADRA Jr., et al., 2013) is: 8.1%    Values used to calculate the score:      Age: 71 years      Sex: Female      Is Non- : No      Diabetic: No      Tobacco smoker: No      Systolic Blood Pressure: 118 mmHg      Is BP treated: No      HDL Cholesterol: 64 mg/dL      Total Cholesterol: 304 mg/dL  Recent Labs   Lab Test 09/08/21  1209 11/04/20  1037 11/15/16  1030 11/09/15  1047 10/27/14  0835 10/27/14  0835   CHOL 304* 291*   < > 256*   < > 261*   HDL 64 84   < > 77   < > 80   * 196*   < > 158*   < > 165*   TRIG 126 55   < > 103   < > 82   CHOLHDLRATIO  --   --   --  3.3  --  3.3    < > = values in this interval not displayed.       Hypothyroidism: Patient is taking levothyroxine 125 mcg daily and Cytomel 5 mcg daily. Takes in the MORNING with aspirin too but 1 hours from food. Patient is having the following symptoms: none.  TSH   Date Value Ref Range Status   09/08/2021 1.74 0.40 - 4.00 mU/L Final   02/16/2021 0.51 0.40 - 4.00 mU/L Final     Osteopenia: Current therapy includes: calcium 500 mg/Vitamin D 1000 units, Vitamin D supplements: ~ 2000 IU  Day and Centrum Silver 50+ multivitamin daily (1000 units of vitamin D and calcium 220 mg) . Pt is not experiencing side effects.  DEXA History: 8/2019 --> \"FRAX, are 17 % for major osteoporotic " "fracture and 2.0 % for hip fracture\"  Patient is getting the following sources of dietary calcium: green leafy vegetables  Last vitamin D level:  No results found for: VITDT    MIKEL: CPAP every night. No concern.     Vaccines:  Most Recent Immunizations   Administered Date(s) Administered     COVID-19,PF,Pfizer 03/25/2021     DT (PEDS <7y) 12/03/1992     FLU 6-35 months 09/20/2010     Flu 65+ Years 09/29/2019     HepA-Adult 07/31/2019     Influenza (High Dose) 3 valent vaccine 09/24/2019     Influenza (IIV3) PF 10/26/2015     Influenza Vaccine IM > 6 months Valent IIV4 (Alfuria,Fluzone) 11/02/2016     Influenza, Quad, High Dose, Pf, 65yr+ (Fluzone HD) 09/08/2021     Pneumo Conj 13-V (2010&after) 11/01/2017     Pneumococcal 23 valent 10/03/2018     TD (ADULT, 7+) 01/05/2006     TDAP Vaccine (Adacel) 07/31/2019     Tdap (Adult) Unspecified Formulation 07/31/2019     Typhoid Oral 09/03/2019     Yellow Fever, Live (Stamaril) 09/03/2019     Zoster vaccine recombinant adjuvanted (SHINGRIX) 07/20/2018     Zoster vaccine, live 05/07/2012   Pended Date(s) Pended     Influenza, Quad, High Dose, Pf, 65yr+ (Fluzone HD) 09/08/2021         Today's Vitals: LMP  (LMP Unknown)   ---------------    I spent 35 minutes with this patient today (an extra 15 minutes was spent creating the Medication Action Plan). All changes were made via collaborative practice agreement with Shadia Munroe MD. A copy of the visit note was provided to the patient's primary care provider.    The patient was given a summary of these recommendations.     Telly GauthierD  Medication Therapy Management Pharmacist  Pager#: [phone]    Telemedicine Visit Details  Type of service:  Telephone visit  Start Time: 8:12 AM  End Time: 8:47 AM  Originating Location (patient location): [place]  Distant Location (provider location):  [facility]     Medication Therapy Recommendations  Osteopenia of multiple sites    Current Medication: " Cholecalciferol (VITAMIN D) 1000 UNIT capsule   Rationale: Dose too high - Dosage too high - Safety   Recommendation: Order Lab   Status: Accepted per CPA         Pure hypercholesterolemia    Current Medication: pravastatin (PRAVACHOL) 10 MG tablet   Rationale: Untreated condition - Needs additional medication therapy - Indication   Recommendation: Start Medication   Status: Accepted per CPA

## 2021-10-02 PROBLEM — I77.810 ACQUIRED DILATION OF ASCENDING AORTA AND AORTIC ROOT (H): Status: ACTIVE | Noted: 2021-10-02

## 2021-10-21 ENCOUNTER — VIRTUAL VISIT (OUTPATIENT)
Dept: PHARMACY | Facility: CLINIC | Age: 71
End: 2021-10-21
Payer: COMMERCIAL

## 2021-10-21 DIAGNOSIS — I77.810 ACQUIRED DILATION OF ASCENDING AORTA AND AORTIC ROOT (H): ICD-10-CM

## 2021-10-21 DIAGNOSIS — E78.00 PURE HYPERCHOLESTEROLEMIA: Primary | ICD-10-CM

## 2021-10-21 PROCEDURE — 99606 MTMS BY PHARM EST 15 MIN: CPT | Performed by: PHARMACIST

## 2021-10-21 NOTE — PROGRESS NOTES
Medication Therapy Management (MTM) Encounter    ASSESSMENT:                            Medication Adherence/Access: See below for considerations    Hyperlipidemia/hx of carotid stenosis: suggest patient continue aspirin and statin therapy at this time. Discussed efficacy should be taken at bedtime but if she missing doses frequently will have her take in morning and monitor fasting lipid panel as planned.    PLAN:                            Ok to take pravastatin morning due to adherence, discussed more efficacious taken at bedtime but perfer she remain adherent. Labs planned for December.    Follow-up: as needed       SUBJECTIVE/OBJECTIVE:                          Ely Humphreys is a 71 year old female called for a follow-up visit. She was referred to me from Dr. Munroe.  Today's visit is a follow-up MTM visit from 9/24/21.     Reason for visit:  Follow-up on lipids-statin. She reports trouble taking pravastatin in the evening.     Allergies/ADRs: Reviewed in chart  Past Medical History: Reviewed in chart  Tobacco: She reports that she has never smoked. She has never used smokeless tobacco.  Alcohol: a few times a week only    Medication Adherence/Access: The patient fills medications at [place]: NO, fills medications at [facility].    Hx of right carotid stenosis: taking aspirin EC 81 mg daily (also s/p ablation afib). No bruising or bleeding. However she wonders if she should continue.   US of carotid bilateral on 9/20/21.    Hyperlipidemia: Current therapy includes pravastatin 10mg at bedtime, however admits to missing doses as she takes all of her medications in the morning usually. Patient has failed rosuvastatin and atorvastatin in the past due to intolerance.  The 10-year ASCVD risk score ([place] KHALIF Jr., et al., 2013) is: 8.1%    Values used to calculate the score:      Age: 71 years      Sex: Female      Is Non- : No      Diabetic: No      Tobacco smoker: No      Systolic Blood  Pressure: 118 mmHg      Is BP treated: No      HDL Cholesterol: 64 mg/dL      Total Cholesterol: 304 mg/dL  Recent Labs   Lab Test 09/08/21  1209 11/04/20  1037 11/15/16  1030 11/09/15  1047 10/27/14  0835 10/27/14  0835   CHOL 304* 291*   < > 256*   < > 261*   HDL 64 84   < > 77   < > 80   * 196*   < > 158*   < > 165*   TRIG 126 55   < > 103   < > 82   CHOLHDLRATIO  --   --   --  3.3  --  3.3    < > = values in this interval not displayed.       Today's Vitals: LMP  (LMP Unknown)   ----------------    I spent 18 minutes with this patient today. All changes were made via collaborative practice agreement with Shadia Munroe MD. A copy of the visit note was provided to the patient's primary care provider.    The patient was sent via [employer] a summary of these recommendations.     Yoly Ross, PharmD  Medication Therapy Management Pharmacist  Pager#: [phone]    Telemedicine Visit Details  Type of service:  Telephone visit  Start Time: 9:00 AM  End Time: 9:18 AM  Originating Location (patient location): Home  Distant Location (provider location):  [facility]     Medication Therapy Recommendations  No medication therapy recommendations to display

## 2021-10-26 NOTE — PATIENT INSTRUCTIONS
Recommendations from today's MTM visit:                                                      May continue pravastatin in the morning although it works best taken at bedtime. We will monitor your progress as planned.     Follow-up: Return if symptoms worsen or fail to improve, for Medication Therapy Management Visit.    It was great to speak with you today.  I value your experience and would be very thankful for your time with providing feedback on our clinic survey. You may receive a survey via email or text message in the next few days.     To schedule another MTM appointment, please call the clinic directly or you may call the MTM scheduling line at [phone] or toll-free at [phone].     My Clinical Pharmacist's contact information:                                                      Please feel free to contact me with any questions or concerns you have.      Yoly Ross, PharmD  Medication Therapy Management Pharmacist  Pager#: [phone]

## 2021-10-27 ENCOUNTER — IMMUNIZATION (OUTPATIENT)
Dept: NURSING | Facility: CLINIC | Age: 71
End: 2021-10-27
Payer: COMMERCIAL

## 2021-10-27 PROCEDURE — 0004A PR COVID VAC PFIZER DIL RECON 30 MCG/0.3 ML IM: CPT

## 2021-10-27 PROCEDURE — 91300 PR COVID VAC PFIZER DIL RECON 30 MCG/0.3 ML IM: CPT

## 2021-11-10 VITALS — WEIGHT: 175 LBS | BODY MASS INDEX: 32.2 KG/M2 | HEIGHT: 62 IN

## 2021-11-10 ASSESSMENT — MIFFLIN-ST. JEOR: SCORE: 1272.04

## 2021-11-10 NOTE — PROGRESS NOTES
Ely Humphreys is a 71 year old who is being evaluated via a billable video visit.      How would you like to obtain your AVS? MyChart  If the video visit is dropped, the invitation should be resent by: Send to e-mail at: [email or web address]  Will anyone else be joining your video visit? No    Are you currently in the state of [place] Yes  Does patient have any form of state insurance?Yes   Do you have wifi? Yes  Do you have a smart phone/device?Yes  Can you download an mekhi on your phone comfortably with out assistance including You Tube? Yes    If patient encounters technical issues they should call [phone] :[id number]}    Jyotsna Brewster CMA    Video-Visit Details    Video Start Time: 11:30 AM    Type of service:  Video Visit    Video End Time:11:53 AM    Originating Location (pt. Location): Home    Distant Location (provider location):  [email or web address]@     Platform used for Video Visit: [facility]    CPAP Follow-Up Visit:    Date on this visit: 2021    Ely Humphreys has a follow-up visit today to review her CPAP use for MIKEL. She was initially seen at the [facility] for nightly apnea, snoring and excessive daytime sleepiness and tiredness. Her medical history is significant for atrial fibrillation, systolic CHF with EF 40%, hx of Graves s/p I ablation with subsequent hypothyroidism, depression, breast cancer and essential tremor.      PREVIOUS SLEEP STUDIES:  PS/15/17  AHI was 24.6/hour. RDI 30.6/hour.    REM AHI 75/hour (due to limited REM sleep).  Supine AHI 27.2/hour.    Lowest O2 saturation: 83.8%. S/He spent 1.9 minutes below 89% SpO2.    CPAP titration:    CPAP was titrated to 6 cmH2O with elimination of apneas, hypopneas and desaturations. Supine REM was not noted at this pressure.     She sometimes has trouble with water condensing in the hose. She has tried adjusting the hose temp and humidity. She has her humidity at 3 and hose temp at 73 degrees. Her room temp is 68 degrees.      PAP machine: TapCanvas (6/30/2017). Pressure settings: 10 cm    The compliance data shows that the patient used the CPAP for 30/30 nights, 100% of nights for >4 hours.  The 95th% pressure is 10 cm.  The 95th% leak is 11.2 lpm.  The average nightly usage is 484 min.  The average AHI is 1.3/hr.       Interface:  Mask: Dreamwear nasal mask. May replace supplies about yearly.   Chin strap: No  Leak: No, with the exception of when she rolls. She has gotten used to sleeping supine, but used to like to sleep laterally.   Using Humidifier: Yes  Condensation in hose or mask: Yes     Difficulties with therapy:    [-] Snoring with CPAP:  [-] Difficulty tolerating the pressure:  [-] Epistaxis/dry nose:   [-] Nasal congestion:  [-] Dry mouth:  [-] Mouth breathing: infrequent  [-] Pain/skin breakdown:      Improvements noted with CPAP: she does sleep better, but does get a little tired in the day, may be that she is just not wanting to do things she has to do. She denies inadvertent dozing for the most part.  [+] waking up more refreshed  [+] sleeping better with less arousals  [+] nocturia improved   [+] improved energy level during the day    Weight change since sleep study: 175 lbs estimated. No significant change lately.    Bedtime: 10 PM.  Wake time: 6 AM or later. Falls asleep in 15 minutes. Wakes 1-3 times per night for 5 minutes. Sometimes she has trouble getting back to sleep and then will take melatonin (maybe a couple times per week). Reason for waking: restroom  Naps: no.       Past medical/surgical history, family history, social history, medications and allergies were reviewed.      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 11/21/2017 EF 40-45%. 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 Hodgkin       Hypothyroidism, unspecified type 08/29/2016     Priority: Medium     Meralgia paresthetica 07/30/2012     Priority: Medium     ACP (advance care planning) 10/19/2011     Priority: Medium     Advance Care Planning 2/19/2017: Receipt of ACP document:  Received: Health Care Directive which was witnessed or notarized on 11/17/14.  Document previously scanned on 12/7/16.  Validation form completed and sent to be scanned.  Code Status reflects choices in most recent ACP document.  Confirmed/documented designated decision maker(s).  Added by Ericka Baker   Advance Care Planning Liaison  Advance Care Planning 11/15/2016: ACP Review of Chart / Resources Provided:  Reviewed chart for advance care plan.  Ely Humphreys has no plan or code status on file however states presence of ACP document. Copy requested. Code status updated and sent to provider for review pending receipt of document/advance care plan review.  Confirmed/documented legally designated decision makers.  Added by Bailey Ndiaye  Advance Care Planning 10/19/2011:  Patient states has Advance Directive and will bring in a copy to clinic.        Health Care Home 08/22/2011     Priority: Medium     X  EMERGENCY CARE PLAN  Presenting Problem Signs and Symptoms Treatment Plan    Questions or conerns during clinic hours    I will call the clinic directly     Questions or conerns outside clinic hours    I will call the 24 hour nurse line at [phone]    Patient needs to schedule an  appointment    I will call the 24 hour scheduling team at [phone] or clinic directly    Same day treatment     I will call the clinic first, nurse line if after hours, urgent care and express care if needed                            DX V65.8 REPLACED WITH [id number] HEALTH CARE HOME (04/08/2013)       Pure hypercholesterolemia 02/10/2010     Priority: Medium     Postmenopausal atrophic vaginitis 02/21/2007     Priority: Medium     Osteopenia of multiple sites 07/27/2004     Priority: Medium        Impression/Plan:    (G47.33) MIKEL (obstructive sleep apnea)  (primary encounter diagnosis)  Comment: Ely is doing well with CPAP. She has issus with condensation in the hose sometimes. She also sometimes has a little trouble getting back to sleep after awakenings in the early morning. She has a little fatigue in the day, but not significant sleepiness.   Plan: Comprehensive DME        Continue CPAP 10 cm. A prescription was written for new supplies. We reviewed recommendations for cleaning and replacing supplies. We reviewed her humidity settings and she was advised to turn the hose temp up. We talked about the importance of consistent sleep scheduling. She was encouraged to pay attention to how much alcohol may be contributing to sleep disruption at the end of the night. We talked about adding a very brief nap in the early afternoon if desired, but she says she has never been a chito.        She will follow up with me in about 1 year(s).     26 minutes were spent on the date of the encounter doing chart review, history and exam, documentation and further activities as noted above.     Bennett Goltz, PA-C    CC: No ref. provider found

## 2021-11-10 NOTE — PATIENT INSTRUCTIONS
Your BMI is Body mass index is 32.01 kg/m .  Weight management is a personal decision.  If you are interested in exploring weight loss strategies, the following discussion covers the approaches that may be successful. Body mass index (BMI) is one way to tell whether you are at a healthy weight, overweight, or obese. It measures your weight in relation to your height.  A BMI of 18.5 to 24.9 is in the healthy range. A person with a BMI of 25 to 29.9 is considered overweight, and someone with a BMI of 30 or greater is considered obese. More than two-thirds of American adults are considered overweight or obese.  Being overweight or obese increases the risk for further weight gain. Excess weight may lead to heart disease and diabetes.  Creating and following plans for healthy eating and physical activity may help you improve your health.  Weight control is part of healthy lifestyle and includes exercise, emotional health, and healthy eating habits. Careful eating habits lifelong are the mainstay of weight control. Though there are significant health benefits from weight loss, long-term weight loss with diet alone may be very difficult to achieve- studies show long-term success with dietary management in less than 10% of people. Attaining a healthy weight may be especially difficult to achieve in those with severe obesity. In some cases, medications, devices and surgical management might be considered.  What can you do?  If you are overweight or obese and are interested in methods for weight loss, you should discuss this with your provider.     Consider reducing daily calorie intake by 500 calories.     Keep a food journal.     Avoiding skipping meals, consider cutting portions instead.    Diet combined with exercise helps maintain muscle while optimizing fat loss. Strength training is particularly important for building and maintaining muscle mass. Exercise helps reduce stress, increase energy, and improves fitness.  Increasing exercise without diet control, however, may not burn enough calories to loose weight.       Start walking three days a week 10-20 minutes at a time    Work towards walking thirty minutes five days a week     Eventually, increase the speed of your walking for 1-2 minutes at time    In addition, we recommend that you review healthy lifestyles and methods for weight loss available through the National Institutes of Health patient information sites:  http://win.niddk.nih.gov/publications/index.htm    And look into health and wellness programs that may be available through your health insurance provider, employer, local community center, or [employer]uela club.

## 2021-11-11 ENCOUNTER — VIRTUAL VISIT (OUTPATIENT)
Dept: SLEEP MEDICINE | Facility: CLINIC | Age: 71
End: 2021-11-11
Payer: COMMERCIAL

## 2021-11-11 DIAGNOSIS — G47.33 OSA (OBSTRUCTIVE SLEEP APNEA): Primary | ICD-10-CM

## 2021-11-11 PROCEDURE — 99213 OFFICE O/P EST LOW 20 MIN: CPT | Mod: 95 | Performed by: PHYSICIAN ASSISTANT

## 2021-12-06 ENCOUNTER — E-VISIT (OUTPATIENT)
Dept: PEDIATRICS | Facility: CLINIC | Age: 71
End: 2021-12-06

## 2021-12-06 ENCOUNTER — OFFICE VISIT (OUTPATIENT)
Dept: FAMILY MEDICINE | Facility: CLINIC | Age: 71
End: 2021-12-06
Payer: COMMERCIAL

## 2021-12-06 VITALS
WEIGHT: 179 LBS | OXYGEN SATURATION: 96 % | TEMPERATURE: 97.9 F | DIASTOLIC BLOOD PRESSURE: 70 MMHG | SYSTOLIC BLOOD PRESSURE: 102 MMHG | RESPIRATION RATE: 12 BRPM | HEART RATE: 77 BPM | BODY MASS INDEX: 32.74 KG/M2

## 2021-12-06 DIAGNOSIS — H92.13 OTORRHEA, BILATERAL: Primary | ICD-10-CM

## 2021-12-06 DIAGNOSIS — Z23 NEED FOR HEPATITIS A IMMUNIZATION: ICD-10-CM

## 2021-12-06 DIAGNOSIS — Z71.84 ENCOUNTER FOR COUNSELING FOR TRAVEL: Primary | ICD-10-CM

## 2021-12-06 PROCEDURE — 99401 PREV MED CNSL INDIV APPRX 15: CPT | Mod: 25 | Performed by: PHYSICIAN ASSISTANT

## 2021-12-06 PROCEDURE — 90632 HEPA VACCINE ADULT IM: CPT | Performed by: PHYSICIAN ASSISTANT

## 2021-12-06 PROCEDURE — 90471 IMMUNIZATION ADMIN: CPT | Performed by: PHYSICIAN ASSISTANT

## 2021-12-06 PROCEDURE — 99422 OL DIG E/M SVC 11-20 MIN: CPT | Performed by: INTERNAL MEDICINE

## 2021-12-06 RX ORDER — AZITHROMYCIN 500 MG/1
TABLET, FILM COATED ORAL
Qty: 6 TABLET | Refills: 0 | Status: SHIPPED | OUTPATIENT
Start: 2021-12-06 | End: 2022-05-16

## 2021-12-06 NOTE — PROGRESS NOTES
SUBJECTIVE: Ely Humphreys , a 71 year old  female, presents for counseling and information regarding upcoming travel to [place] and [place]. Special medical concerns include: none. She anticipates the following unusual exposures: none.    Itinerary:  [place] and [place]    Departure Date: 1/20/22 Return date: 2/15/2022    Reason for travel (i.e. Business, pleasure): pleasure    Visiting an urban or rural area?: both    Accommodations (i.e. hotel, hostel, friends, family, etc): Hotels    Women - First day of your last period: na    IMMUNIZATION HISTORY  Have you received any vaccinations in the past 4 weeks?  No  Have you ever fainted from having your blood drawn or from an injection?  No  Have you ever had a fever reaction to vaccination?  No  Have you ever had any bad reaction or side effect from any vaccination?  No  Have you ever had hepatitis A or B vaccine?  Yes  Do you live (or work closely) with anyone who has AIDS, an AIDS-like condition, any other immune disorder or who is on chemotherapy for cancer?  No  Have you received any injection of immune globulin or any blood products during the past 12 months?  No    GENERAL MEDICAL HISTORY  Do you have a medical condition that warrants maintenance medication or physician follow-up?  Yes  Do you have a medical condition that is stable now, but that may recur while traveling?  No  Has your spleen been removed?  No  Have you had an acute illness or a fever in the past 48 hours?  Yes, cold no fevers  Are you pregnant, or might you become pregnant on this trip?  Any chance of pregnancy?  No  Are you breastfeeding?  No  Do you have HIV, AIDS, an AIDS-like condition, any other immune disorder, leukemia or cancer?  No  Do you have a severe combined immunodeficiency disease?  No  Have you had your thymus gland removed or history of problems with your thymus, such as myasthenia gravis, DiGeorge syndrome, or thymoma?  No    Do you have severe thrombocytopenia (low  platelet count) or a coagulation disorder?  No  Have you ever had a convulsion, seizure, epilepsy, neurologic condition or brain infection?  No  Do you have any stomach conditions?  No  Do you have a G6PD deficiency?  No  Do you have severe renal or kidney impairment?  No  Do you have a history of psychiatric problems?  No  Do you have a problem with strange dreams and/or nightmares?  No  Do you have insomnia?  No  Do you have problems with vaginitis?  No  Do you have psoriasis?  No  Are you prone to motion sickness?  No  Have you ever had headaches, nausea, vomiting, or breathing problems from altitude exposure?  Yes- in [place]      Past Medical History:   Diagnosis Date     Abnormal Pap smear 11/16/2010     Atrial fibrillation s/p ablation 10/7/2019    CHADsVASc is 2 for gender and age. Cardiology stopped eliquis after discussion on 2/26/20.      Benign essential tremor     Chronic     Hyperlipidemia LDL goal <160 2/10/2010     Invasive ductal carcinoma of breast (H) 6/09    left breast ca     Major depressive disorder, recurrent episode, in full remission (H) 10/19/2011    Stable for decades     osteopenia 2002     Palpitations      Persistent atrial fibrillation (H) 05/10/2017     Unspecified hypothyroidism       Immunization History   Administered Date(s) Administered     COVID-19,PF,Pfizer (12+ Yrs) 02/25/2021, 03/25/2021, 10/27/2021     DT (PEDS <7y) 04/22/1985, 12/03/1992     FLU 6-35 months 09/20/2010     Flu 65+ Years 09/29/2019     HepA-Adult 07/31/2019     Influenza (High Dose) 3 valent vaccine 11/01/2017, 09/24/2019     Influenza (IIV3) PF 09/20/2010, 10/01/2011, 10/08/2012, 10/14/2014, 10/26/2015     Influenza Vaccine IM > 6 months Valent IIV4 (Alfuria,Fluzone) 10/04/2016, 11/02/2016     Influenza, Quad, High Dose, Pf, 65yr+ (Fluzone HD) 09/08/2020, 09/08/2021     Pneumo Conj 13-V (2010&after) 11/01/2017     Pneumococcal 23 valent 10/03/2018     TD (ADULT, 7+) 01/05/2006     TDAP Vaccine (Adacel)  06/01/2009, 07/31/2019     Tdap (Adult) Unspecified Formulation 06/01/2009, 07/31/2019     Typhoid Oral 09/03/2019     Yellow Fever, Live (Stamaril) 09/03/2019     Zoster vaccine recombinant adjuvanted (SHINGRIX) 04/13/2018, 07/20/2018     Zoster vaccine, live 05/07/2012       Current Outpatient Medications   Medication Sig Dispense Refill     aspirin 81 MG EC tablet Take 1 tablet (81 mg) by mouth daily       Calcium-Phosphorus-Vitamin D (CALCIUM/VITAMIN D3/ADULT GUMMY PO) 2 gummies a day (500 mg of calcium 1000 units of vitamin D3) (Lazar brand)       Cholecalciferol (VITAMIN D) 1000 UNIT capsule take 2 Caps by mouth daily.       levothyroxine (SYNTHROID/LEVOTHROID) 125 MCG tablet Take 1 tablet (125 mcg) by mouth daily 90 tablet 1     liothyronine (CYTOMEL) 5 MCG tablet Take 1 tablet (5 mcg) by mouth daily 90 tablet 1     multivitamin (CENTRUM SILVER) tablet Take 1 tablet by mouth daily Centrum Silver 50+       pravastatin (PRAVACHOL) 10 MG tablet Take 1 tablet (10 mg) by mouth daily 90 tablet 0     Allergies   Allergen Reactions     Amiodarone Other (See Comments)     Loss of vision in R eye.        EXAM: deferred    Immunizations discussed include: Hepatitis A  Malaraia prophylaxis recommended: not needed  Symptomatic treatment for traveler's diarrhea: bismuth subsalicylate, loperamide/diphenoxylate and azithromycin    ASSESSMENT/PLAN:    (Z71.84) Encounter for counseling for travel  (primary encounter diagnosis)    Comment: Hep A vaccines today. Patient will return or follow-up with PCP as needed. Prophylaxis given for Traveler's diarrhea and is not needed for  Malaria. All questions were answered.     Plan: azithromycin (ZITHROMAX) 500 MG tablet            (Z23) Need for hepatitis A immunization  Comment:   Plan: HEPATITIS A VACCINE (ADULT)              I have reviewed general recommendations for safe travel   including: food/water precautions, insect avoidance, safe sex   practices given high prevalence of  HIV and other STDs,   roadway safety. Educational materials and links to the [facility]   Traveler's health website have been provided.    Total time 15 minutes, greater than 50 percent in counseling   and coordination of care.

## 2021-12-07 RX ORDER — OFLOXACIN 3 MG/ML
10 SOLUTION AURICULAR (OTIC) 2 TIMES DAILY
Qty: 10 ML | Refills: 0 | Status: SHIPPED | OUTPATIENT
Start: 2021-12-07 | End: 2021-12-21

## 2021-12-20 ENCOUNTER — LAB (OUTPATIENT)
Dept: LAB | Facility: CLINIC | Age: 71
End: 2021-12-20
Payer: COMMERCIAL

## 2021-12-20 ENCOUNTER — MYC MEDICAL ADVICE (OUTPATIENT)
Dept: PEDIATRICS | Facility: CLINIC | Age: 71
End: 2021-12-20

## 2021-12-20 DIAGNOSIS — M85.89 OSTEOPENIA OF MULTIPLE SITES: ICD-10-CM

## 2021-12-20 DIAGNOSIS — E78.00 PURE HYPERCHOLESTEROLEMIA: ICD-10-CM

## 2021-12-20 LAB — DEPRECATED CALCIDIOL+CALCIFEROL SERPL-MC: 85 UG/L (ref 20–75)

## 2021-12-20 PROCEDURE — 36415 COLL VENOUS BLD VENIPUNCTURE: CPT

## 2021-12-20 PROCEDURE — 80061 LIPID PANEL: CPT

## 2021-12-20 PROCEDURE — 82306 VITAMIN D 25 HYDROXY: CPT

## 2021-12-21 ENCOUNTER — TELEPHONE (OUTPATIENT)
Dept: PEDIATRICS | Facility: CLINIC | Age: 71
End: 2021-12-21
Payer: COMMERCIAL

## 2021-12-21 DIAGNOSIS — E78.00 PURE HYPERCHOLESTEROLEMIA: ICD-10-CM

## 2021-12-21 DIAGNOSIS — E78.00 PURE HYPERCHOLESTEROLEMIA: Primary | ICD-10-CM

## 2021-12-21 DIAGNOSIS — E67.3 HYPERVITAMINOSIS D: ICD-10-CM

## 2021-12-21 LAB
CHOLEST SERPL-MCNC: 285 MG/DL
FASTING STATUS PATIENT QL REPORTED: YES
HDLC SERPL-MCNC: 74 MG/DL
LDLC SERPL CALC-MCNC: 192 MG/DL
NONHDLC SERPL-MCNC: 211 MG/DL
TRIGL SERPL-MCNC: 93 MG/DL

## 2021-12-21 RX ORDER — PRAVASTATIN SODIUM 20 MG
20 TABLET ORAL DAILY
Qty: 30 TABLET | Refills: 3 | Status: SHIPPED | OUTPATIENT
Start: 2021-12-21 | End: 2022-05-16

## 2021-12-21 NOTE — TELEPHONE ENCOUNTER
Called patient - she is tolerating current statin and willing to increase her dose. New prescription sent to pharmacy.    She will also stop the additional vitamin D and continue with calcium/vitamin D and MV daily.

## 2021-12-21 NOTE — TELEPHONE ENCOUNTER
Patient calling stating she received a message to call clinic back.     From 12/20/2021 labs:    Annmarie Taylor [facility]   12/21/2021  1:41 PM CST Back to Top        Called patient to review labs- left message.  She has appointment tomorrow with Adenike Avery.  Would recommend increasing statin dose if patient tolerating current statin therapy and reducing vitamin D to 2000 units daily.         Patient informed of the above message.     Routing to provider to please see message above and advise. Patient wants to know if provider agrees with the above recommendations.     Nuris MAURICIO RN   [facility]

## 2021-12-21 NOTE — TELEPHONE ENCOUNTER
To triage and pharmD.   Please call simran. started very low with pravastatin since she has been statin intolerant in past. Will likely need to continue to increase dose every 3 months after recheck lipids.  Will send new rx for pravastatin 20 mg daily if she is doing OK with this. Confirm she would like sent to [facility].  [facility] would not likely manage this at visit tomorrow.  Shadia Munroe M.D.

## 2021-12-22 ENCOUNTER — OFFICE VISIT (OUTPATIENT)
Dept: PEDIATRICS | Facility: CLINIC | Age: 71
End: 2021-12-22
Payer: COMMERCIAL

## 2021-12-22 VITALS
WEIGHT: 180 LBS | HEART RATE: 80 BPM | SYSTOLIC BLOOD PRESSURE: 106 MMHG | DIASTOLIC BLOOD PRESSURE: 70 MMHG | TEMPERATURE: 98.6 F | OXYGEN SATURATION: 98 % | BODY MASS INDEX: 32.92 KG/M2 | RESPIRATION RATE: 16 BRPM

## 2021-12-22 DIAGNOSIS — H65.192 ACUTE EFFUSION OF LEFT EAR: Primary | ICD-10-CM

## 2021-12-22 PROCEDURE — 99213 OFFICE O/P EST LOW 20 MIN: CPT | Performed by: PHYSICIAN ASSISTANT

## 2021-12-22 RX ORDER — PRAVASTATIN SODIUM 10 MG
10 TABLET ORAL DAILY
Qty: 90 TABLET | Refills: 0 | OUTPATIENT
Start: 2021-12-22

## 2021-12-22 NOTE — PROGRESS NOTES
Assessment & Plan     Acute effusion of left ear  Begin fluids, flonase. Call in 2-3 weeks if symptoms persist.     [facility] LUKE Graves is a 71 year old who presents for the following health issues:  HPI     Concern - Bilat Ear pain   Onset: 1 month & was seen for this 12/06  Description: pt states she feels popping and discomfort  Intensity: mild, moderate  Progression of Symptoms: improved slightly due to treatment about 1 week ago  Accompanying Signs & Symptoms: none  Previous history of similar problem: yes  Precipitating factors:        Worsened by: none  Alleviating factors:        Improved by: none  Therapies tried and outcome: antibiotics and ear drops    Review of Systems   Constitutional, HEENT, cardiovascular, pulmonary, gi and gu systems are negative, except as otherwise noted.      Objective    /70   Pulse 80   Temp 98.6  F (37  C) (Tympanic)   Resp 16   Wt 81.6 kg (180 lb)   LMP  (LMP Unknown)   SpO2 98%   BMI 32.92 kg/m    Body mass index is 32.92 kg/m .  Physical Exam   GENERAL: healthy, alert and no distress  EYES: Eyes grossly normal to inspection, PERRL and conjunctivae and sclerae normal  HENT: ear canals and TM's with fluid bilaterally L>R, nose and mouth without ulcers or lesions

## 2021-12-23 RX ORDER — PRAVASTATIN SODIUM 40 MG
40 TABLET ORAL DAILY
Qty: 90 TABLET | Refills: 3 | Status: SHIPPED | OUTPATIENT
Start: 2021-12-23 | End: 2022-11-09

## 2022-01-10 ENCOUNTER — MYC MEDICAL ADVICE (OUTPATIENT)
Dept: PEDIATRICS | Facility: CLINIC | Age: 72
End: 2022-01-10
Payer: COMMERCIAL

## 2022-01-10 DIAGNOSIS — E03.9 HYPOTHYROIDISM, UNSPECIFIED TYPE: Primary | ICD-10-CM

## 2022-01-10 RX ORDER — LIOTHYRONINE SODIUM 5 UG/1
5 TABLET ORAL DAILY
Qty: 90 TABLET | Refills: 1 | Status: SHIPPED | OUTPATIENT
Start: 2022-01-10 | End: 2022-07-07

## 2022-03-07 ENCOUNTER — MYC MEDICAL ADVICE (OUTPATIENT)
Dept: SLEEP MEDICINE | Facility: CLINIC | Age: 72
End: 2022-03-07
Payer: COMMERCIAL

## 2022-03-07 DIAGNOSIS — G47.33 OSA (OBSTRUCTIVE SLEEP APNEA): Primary | ICD-10-CM

## 2022-03-15 ENCOUNTER — LAB (OUTPATIENT)
Dept: LAB | Facility: CLINIC | Age: 72
End: 2022-03-15
Payer: COMMERCIAL

## 2022-03-15 DIAGNOSIS — E67.3 HYPERVITAMINOSIS D: ICD-10-CM

## 2022-03-15 DIAGNOSIS — E78.00 PURE HYPERCHOLESTEROLEMIA: ICD-10-CM

## 2022-03-15 PROCEDURE — 82306 VITAMIN D 25 HYDROXY: CPT

## 2022-03-15 PROCEDURE — 84460 ALANINE AMINO (ALT) (SGPT): CPT

## 2022-03-15 PROCEDURE — 80061 LIPID PANEL: CPT

## 2022-03-15 PROCEDURE — 36415 COLL VENOUS BLD VENIPUNCTURE: CPT

## 2022-03-16 LAB
ALT SERPL W P-5'-P-CCNC: 41 U/L (ref 0–50)
CHOLEST SERPL-MCNC: 245 MG/DL
DEPRECATED CALCIDIOL+CALCIFEROL SERPL-MC: 65 UG/L (ref 20–75)
FASTING STATUS PATIENT QL REPORTED: YES
HDLC SERPL-MCNC: 68 MG/DL
LDLC SERPL CALC-MCNC: 153 MG/DL
NONHDLC SERPL-MCNC: 177 MG/DL
TRIGL SERPL-MCNC: 119 MG/DL

## 2022-05-14 DIAGNOSIS — E03.9 HYPOTHYROIDISM, UNSPECIFIED TYPE: ICD-10-CM

## 2022-05-16 ENCOUNTER — OFFICE VISIT (OUTPATIENT)
Dept: PEDIATRICS | Facility: CLINIC | Age: 72
End: 2022-05-16
Payer: COMMERCIAL

## 2022-05-16 VITALS
DIASTOLIC BLOOD PRESSURE: 96 MMHG | RESPIRATION RATE: 18 BRPM | WEIGHT: 183.1 LBS | OXYGEN SATURATION: 95 % | BODY MASS INDEX: 33.49 KG/M2 | HEART RATE: 72 BPM | SYSTOLIC BLOOD PRESSURE: 140 MMHG | TEMPERATURE: 98.5 F

## 2022-05-16 DIAGNOSIS — G57.11 MERALGIA PARESTHETICA OF RIGHT SIDE: Primary | ICD-10-CM

## 2022-05-16 PROCEDURE — 99214 OFFICE O/P EST MOD 30 MIN: CPT | Performed by: STUDENT IN AN ORGANIZED HEALTH CARE EDUCATION/TRAINING PROGRAM

## 2022-05-16 RX ORDER — GABAPENTIN 100 MG/1
100 CAPSULE ORAL AT BEDTIME
Qty: 90 CAPSULE | Refills: 1 | Status: SHIPPED | OUTPATIENT
Start: 2022-05-16 | End: 2022-06-27

## 2022-05-16 ASSESSMENT — PAIN SCALES - GENERAL: PAINLEVEL: MODERATE PAIN (4)

## 2022-05-16 NOTE — PATIENT INSTRUCTIONS
So nice to meet you!    Let's try gabapentin for nerve pain at night: 1 capsule nightly.     Can increase up to 300mg (so 3 capsules if needed) at once at night if not helping at all at night    If 100mg is helpful at night but still having pain during the day can try taking a capsule in the morning      Try checking blood pressure at home

## 2022-05-16 NOTE — PROGRESS NOTES
"Assessment & Plan     Meralgia paresthetica of right side  Clinical picture most consistent with neuropathic pain caused by meralgia paresthetica. Differential diagnosis also includes lumbar (L3/L4) radiculopathy, lumbar plexopathy, and femoral neuropathy. Recommend trial of gabapentin: start at 100mg at bedtime - can go to BID or increase dose up to 300mg at bedtime if needed. If not improving, consider lumbar spine MRI vs switch to pregabalin.  - gabapentin (NEURONTIN) 100 MG capsule; Take 1 capsule (100 mg) by mouth At Bedtime      Return in about 4 months (around 9/16/2022) for Annual Wellness Exam.   -blood pressure recheck: patient will start checking at home. No previous high readings.     Taniya Woodard MD  [facility] LUKE Graves is a 72 year old who presents for the following health issues     History of Present Illness       Reason for visit:  Pain in my upper right thigh  Symptom onset:  More than a month  Symptoms include:  Feels like nerve damage, spasms, crawling sensation  Symptom intensity:  Moderate  Symptom progression:  Worsening  Had these symptoms before:  Yes  Has tried/received treatment for these symptoms:  Yes  Previous treatment was successful:  No  What makes it worse:  Exertion  What makes it better:  I've used either Biofreeze or Penetrex    She eats 4 or more servings of fruits and vegetables daily.She consumes 0 sweetened beverage(s) daily.She exercises with enough effort to increase her heart rate 30 to 60 minutes per day.  She exercises with enough effort to increase her heart rate 4 days per week.   She is taking medications regularly.     Patient states she feels a deep ache on the left hip and right leg is feeling pain as well as back pain.   Had lumbar spine MRI in 2012 - showed \"Chronic-appearing bilateral pars interarticularis defects at L5\"  Denies significant weakness in legs        Objective    BP (!) 140/96 (BP Location: Right arm, Patient " Position: Sitting, Cuff Size: Adult Large)   Pulse 72   Temp 98.5  F (36.9  C) (Temporal)   Resp 18   Wt 83.1 kg (183 lb 1.6 oz)   LMP  (LMP Unknown)   SpO2 95%   BMI 33.49 kg/m    Body mass index is 33.49 kg/m .  Physical Exam   GENERAL: healthy, alert and no distress  MS: RLE exam shows no evidence of joint effusion and ROM of all joints is normal and LLE exam shows no evidence of joint effusion and ROM of all joints is normal  SKIN: no suspicious lesions or rashes  NEURO: Normal strength and tone, mentation intact and speech normal  Comprehensive back pain exam:  No tenderness, Range of motion not limited by pain, Lower extremity strength functional and equal on both sides and Straight leg raise negative bilaterally        BP Readings from Last 6 Encounters:   05/16/22 (!) 140/96   12/22/21 106/70   12/06/21 102/70   09/08/21 118/72   08/03/20 98/64   01/15/20 102/62

## 2022-05-17 RX ORDER — LEVOTHYROXINE SODIUM 125 UG/1
TABLET ORAL
Qty: 90 TABLET | Refills: 0 | Status: SHIPPED | OUTPATIENT
Start: 2022-05-17 | End: 2022-08-16

## 2022-06-27 ENCOUNTER — MYC MEDICAL ADVICE (OUTPATIENT)
Dept: PEDIATRICS | Facility: CLINIC | Age: 72
End: 2022-06-27

## 2022-06-27 DIAGNOSIS — G57.11 MERALGIA PARESTHETICA OF RIGHT SIDE: ICD-10-CM

## 2022-06-27 RX ORDER — GABAPENTIN 100 MG/1
100 CAPSULE ORAL 3 TIMES DAILY PRN
Qty: 90 CAPSULE | Refills: 3 | Status: SHIPPED | OUTPATIENT
Start: 2022-06-27 | End: 2022-11-09

## 2022-07-05 DIAGNOSIS — E03.9 HYPOTHYROIDISM, UNSPECIFIED TYPE: ICD-10-CM

## 2022-07-07 RX ORDER — LIOTHYRONINE SODIUM 5 UG/1
TABLET ORAL
Qty: 90 TABLET | Refills: 0 | Status: SHIPPED | OUTPATIENT
Start: 2022-07-07 | End: 2022-11-07

## 2022-07-07 NOTE — TELEPHONE ENCOUNTER
Prescription approved per [facility] Refill Protocol for cytomel.    Appointments in Next Year    Sep 12, 2022  9:40 AM  (Arrive by 9:20 AM)  Annual Wellness Visit with Shadia Munroe MD  [facility] ([facility] ) [phone]

## 2022-08-10 ENCOUNTER — ANCILLARY PROCEDURE (OUTPATIENT)
Dept: MAMMOGRAPHY | Facility: CLINIC | Age: 72
End: 2022-08-10
Attending: INTERNAL MEDICINE
Payer: COMMERCIAL

## 2022-08-10 DIAGNOSIS — Z12.31 ENCOUNTER FOR SCREENING MAMMOGRAM FOR BREAST CANCER: ICD-10-CM

## 2022-08-10 PROCEDURE — 77067 SCR MAMMO BI INCL CAD: CPT | Mod: TC | Performed by: RADIOLOGY

## 2022-08-10 PROCEDURE — 77063 BREAST TOMOSYNTHESIS BI: CPT | Mod: TC | Performed by: RADIOLOGY

## 2022-08-11 DIAGNOSIS — E03.9 HYPOTHYROIDISM, UNSPECIFIED TYPE: ICD-10-CM

## 2022-08-16 RX ORDER — LEVOTHYROXINE SODIUM 125 UG/1
TABLET ORAL
Qty: 90 TABLET | Refills: 0 | Status: SHIPPED | OUTPATIENT
Start: 2022-08-16 | End: 2022-11-07

## 2022-08-23 ENCOUNTER — TRANSFERRED RECORDS (OUTPATIENT)
Dept: HEALTH INFORMATION MANAGEMENT | Facility: CLINIC | Age: 72
End: 2022-08-23

## 2022-09-08 ENCOUNTER — TRANSFERRED RECORDS (OUTPATIENT)
Dept: HEALTH INFORMATION MANAGEMENT | Facility: CLINIC | Age: 72
End: 2022-09-08

## 2022-09-27 ENCOUNTER — TRANSFERRED RECORDS (OUTPATIENT)
Dept: HEALTH INFORMATION MANAGEMENT | Facility: CLINIC | Age: 72
End: 2022-09-27

## 2022-10-23 ENCOUNTER — HEALTH MAINTENANCE LETTER (OUTPATIENT)
Age: 72
End: 2022-10-23

## 2022-11-05 DIAGNOSIS — E03.9 HYPOTHYROIDISM, UNSPECIFIED TYPE: ICD-10-CM

## 2022-11-07 RX ORDER — LIOTHYRONINE SODIUM 5 UG/1
TABLET ORAL
Qty: 90 TABLET | Refills: 0 | Status: SHIPPED | OUTPATIENT
Start: 2022-11-07 | End: 2022-11-09

## 2022-11-07 RX ORDER — LEVOTHYROXINE SODIUM 125 UG/1
TABLET ORAL
Qty: 90 TABLET | Refills: 0 | Status: SHIPPED | OUTPATIENT
Start: 2022-11-07 | End: 2022-11-09

## 2022-11-07 NOTE — TELEPHONE ENCOUNTER
Routing refill request to provider for review/approval because:  Labs not current:  TSH    Shira Tracy RN

## 2022-11-08 SDOH — HEALTH STABILITY: PHYSICAL HEALTH: ON AVERAGE, HOW MANY MINUTES DO YOU ENGAGE IN EXERCISE AT THIS LEVEL?: 30 MIN

## 2022-11-08 SDOH — ECONOMIC STABILITY: TRANSPORTATION INSECURITY
IN THE PAST 12 MONTHS, HAS THE LACK OF TRANSPORTATION KEPT YOU FROM MEDICAL APPOINTMENTS OR FROM GETTING MEDICATIONS?: NO

## 2022-11-08 SDOH — ECONOMIC STABILITY: INCOME INSECURITY: IN THE LAST 12 MONTHS, WAS THERE A TIME WHEN YOU WERE NOT ABLE TO PAY THE MORTGAGE OR RENT ON TIME?: NO

## 2022-11-08 SDOH — ECONOMIC STABILITY: FOOD INSECURITY: WITHIN THE PAST 12 MONTHS, YOU WORRIED THAT YOUR FOOD WOULD RUN OUT BEFORE YOU GOT MONEY TO BUY MORE.: NEVER TRUE

## 2022-11-08 SDOH — HEALTH STABILITY: PHYSICAL HEALTH: ON AVERAGE, HOW MANY DAYS PER WEEK DO YOU ENGAGE IN MODERATE TO STRENUOUS EXERCISE (LIKE A BRISK WALK)?: 3 DAYS

## 2022-11-08 SDOH — ECONOMIC STABILITY: INCOME INSECURITY: HOW HARD IS IT FOR YOU TO PAY FOR THE VERY BASICS LIKE FOOD, HOUSING, MEDICAL CARE, AND HEATING?: NOT HARD AT ALL

## 2022-11-08 SDOH — ECONOMIC STABILITY: FOOD INSECURITY: WITHIN THE PAST 12 MONTHS, THE FOOD YOU BOUGHT JUST DIDN'T LAST AND YOU DIDN'T HAVE MONEY TO GET MORE.: NEVER TRUE

## 2022-11-08 SDOH — ECONOMIC STABILITY: TRANSPORTATION INSECURITY
IN THE PAST 12 MONTHS, HAS LACK OF TRANSPORTATION KEPT YOU FROM MEETINGS, WORK, OR FROM GETTING THINGS NEEDED FOR DAILY LIVING?: NO

## 2022-11-08 ASSESSMENT — LIFESTYLE VARIABLES
SKIP TO QUESTIONS 9-10: 1
HOW OFTEN DO YOU HAVE A DRINK CONTAINING ALCOHOL: 2-3 TIMES A WEEK
HOW OFTEN DO YOU HAVE SIX OR MORE DRINKS ON ONE OCCASION: NEVER
HOW MANY STANDARD DRINKS CONTAINING ALCOHOL DO YOU HAVE ON A TYPICAL DAY: 1 OR 2
AUDIT-C TOTAL SCORE: 3

## 2022-11-08 ASSESSMENT — ENCOUNTER SYMPTOMS
ARTHRALGIAS: 1
WEAKNESS: 1
PALPITATIONS: 0
HEMATURIA: 0
FEVER: 0
PARESTHESIAS: 1
HEADACHES: 0
SORE THROAT: 0
JOINT SWELLING: 0
CONSTIPATION: 0
DYSURIA: 0
EYE PAIN: 0
HEMATOCHEZIA: 0
DIZZINESS: 0
BREAST MASS: 0
ABDOMINAL PAIN: 0
HEARTBURN: 0
NERVOUS/ANXIOUS: 0
COUGH: 0
SHORTNESS OF BREATH: 0
NAUSEA: 0
CHILLS: 0
MYALGIAS: 0
DIARRHEA: 0
FREQUENCY: 0

## 2022-11-08 ASSESSMENT — SOCIAL DETERMINANTS OF HEALTH (SDOH)
HOW OFTEN DO YOU ATTEND CHURCH OR RELIGIOUS SERVICES?: NEVER
HOW OFTEN DO YOU GET TOGETHER WITH FRIENDS OR RELATIVES?: TWICE A WEEK
IN A TYPICAL WEEK, HOW MANY TIMES DO YOU TALK ON THE PHONE WITH FAMILY, FRIENDS, OR NEIGHBORS?: TWICE A WEEK
DO YOU BELONG TO ANY CLUBS OR ORGANIZATIONS SUCH AS CHURCH GROUPS UNIONS, FRATERNAL OR ATHLETIC GROUPS, OR SCHOOL GROUPS?: YES

## 2022-11-08 ASSESSMENT — ACTIVITIES OF DAILY LIVING (ADL): CURRENT_FUNCTION: NO ASSISTANCE NEEDED

## 2022-11-09 ENCOUNTER — OFFICE VISIT (OUTPATIENT)
Dept: PEDIATRICS | Facility: CLINIC | Age: 72
End: 2022-11-09
Payer: COMMERCIAL

## 2022-11-09 VITALS
OXYGEN SATURATION: 98 % | TEMPERATURE: 98.8 F | WEIGHT: 179.6 LBS | HEART RATE: 76 BPM | BODY MASS INDEX: 33.05 KG/M2 | RESPIRATION RATE: 18 BRPM | SYSTOLIC BLOOD PRESSURE: 138 MMHG | HEIGHT: 62 IN | DIASTOLIC BLOOD PRESSURE: 76 MMHG

## 2022-11-09 DIAGNOSIS — E66.09 CLASS 1 OBESITY DUE TO EXCESS CALORIES WITHOUT SERIOUS COMORBIDITY WITH BODY MASS INDEX (BMI) OF 32.0 TO 32.9 IN ADULT: ICD-10-CM

## 2022-11-09 DIAGNOSIS — Z86.79 S/P ABLATION OF ATRIAL FIBRILLATION: ICD-10-CM

## 2022-11-09 DIAGNOSIS — E03.9 HYPOTHYROIDISM, UNSPECIFIED TYPE: ICD-10-CM

## 2022-11-09 DIAGNOSIS — R00.0 DILATED CARDIOMYOPATHY SECONDARY TO TACHYCARDIA (H): ICD-10-CM

## 2022-11-09 DIAGNOSIS — M85.89 OSTEOPENIA OF MULTIPLE SITES: ICD-10-CM

## 2022-11-09 DIAGNOSIS — E66.811 CLASS 1 OBESITY DUE TO EXCESS CALORIES WITHOUT SERIOUS COMORBIDITY WITH BODY MASS INDEX (BMI) OF 32.0 TO 32.9 IN ADULT: ICD-10-CM

## 2022-11-09 DIAGNOSIS — Z98.890 S/P ABLATION OF ATRIAL FIBRILLATION: ICD-10-CM

## 2022-11-09 DIAGNOSIS — Z00.00 ENCOUNTER FOR MEDICARE ANNUAL WELLNESS EXAM: Primary | ICD-10-CM

## 2022-11-09 DIAGNOSIS — I43 DILATED CARDIOMYOPATHY SECONDARY TO TACHYCARDIA (H): ICD-10-CM

## 2022-11-09 DIAGNOSIS — G57.11 MERALGIA PARESTHETICA OF RIGHT SIDE: ICD-10-CM

## 2022-11-09 DIAGNOSIS — E78.00 PURE HYPERCHOLESTEROLEMIA: ICD-10-CM

## 2022-11-09 DIAGNOSIS — I77.810 ACQUIRED DILATION OF ASCENDING AORTA AND AORTIC ROOT (H): ICD-10-CM

## 2022-11-09 LAB
T3FREE SERPL-MCNC: 2.9 PG/ML (ref 2–4.4)
T4 FREE SERPL-MCNC: 1.11 NG/DL (ref 0.76–1.46)
TSH SERPL DL<=0.005 MIU/L-ACNC: 0.73 MU/L (ref 0.4–4)

## 2022-11-09 PROCEDURE — G0439 PPPS, SUBSEQ VISIT: HCPCS | Performed by: INTERNAL MEDICINE

## 2022-11-09 PROCEDURE — 84481 FREE ASSAY (FT-3): CPT | Performed by: INTERNAL MEDICINE

## 2022-11-09 PROCEDURE — 36415 COLL VENOUS BLD VENIPUNCTURE: CPT | Performed by: INTERNAL MEDICINE

## 2022-11-09 PROCEDURE — 84443 ASSAY THYROID STIM HORMONE: CPT | Performed by: INTERNAL MEDICINE

## 2022-11-09 PROCEDURE — 99214 OFFICE O/P EST MOD 30 MIN: CPT | Mod: 25 | Performed by: INTERNAL MEDICINE

## 2022-11-09 PROCEDURE — 84439 ASSAY OF FREE THYROXINE: CPT | Performed by: INTERNAL MEDICINE

## 2022-11-09 RX ORDER — GABAPENTIN 100 MG/1
100 CAPSULE ORAL AT BEDTIME
Qty: 90 CAPSULE | Refills: 4 | Status: SHIPPED | OUTPATIENT
Start: 2022-11-09 | End: 2023-12-04

## 2022-11-09 RX ORDER — LIOTHYRONINE SODIUM 5 UG/1
5 TABLET ORAL DAILY
Qty: 90 TABLET | Refills: 4 | Status: SHIPPED | OUTPATIENT
Start: 2022-11-09 | End: 2024-01-22

## 2022-11-09 RX ORDER — LEVOTHYROXINE SODIUM 125 UG/1
125 TABLET ORAL DAILY
Qty: 90 TABLET | Refills: 4 | Status: SHIPPED | OUTPATIENT
Start: 2022-11-09 | End: 2023-11-13

## 2022-11-09 RX ORDER — PRAVASTATIN SODIUM 40 MG
40 TABLET ORAL DAILY
Qty: 90 TABLET | Refills: 3 | Status: SHIPPED | OUTPATIENT
Start: 2022-11-09 | End: 2023-12-19

## 2022-11-09 RX ORDER — TRAMADOL HYDROCHLORIDE 50 MG/1
50 TABLET ORAL EVERY 8 HOURS PRN
COMMUNITY
Start: 2022-10-27 | End: 2024-01-06

## 2022-11-09 ASSESSMENT — ENCOUNTER SYMPTOMS
ABDOMINAL PAIN: 0
DIARRHEA: 0
DYSURIA: 0
NERVOUS/ANXIOUS: 0
FEVER: 0
HEARTBURN: 0
SHORTNESS OF BREATH: 0
CHILLS: 0
JOINT SWELLING: 0
HEADACHES: 0
HEMATOCHEZIA: 0
BREAST MASS: 0
CONSTIPATION: 0
SORE THROAT: 0
DIZZINESS: 0
COUGH: 0
EYE PAIN: 0
WEAKNESS: 1
PARESTHESIAS: 1
MYALGIAS: 0
NAUSEA: 0
ARTHRALGIAS: 1
FREQUENCY: 0
HEMATURIA: 0
PALPITATIONS: 0

## 2022-11-09 ASSESSMENT — ACTIVITIES OF DAILY LIVING (ADL): CURRENT_FUNCTION: NO ASSISTANCE NEEDED

## 2022-11-09 ASSESSMENT — PAIN SCALES - GENERAL: PAINLEVEL: MILD PAIN (3)

## 2022-11-09 NOTE — PROGRESS NOTES
"SUBJECTIVE:   Ely is a 72 year old who presents for Preventive Visit.      Patient has been advised of split billing requirements and indicates understanding: Yes  Are you in the first 12 months of your Medicare coverage?  No    Healthy Habits:     In general, how would you rate your overall health?  Good    Frequency of exercise:  2-3 days/week    Duration of exercise:  30-45 minutes    Do you usually eat at least 4 servings of fruit and vegetables a day, include whole grains    & fiber and avoid regularly eating high fat or \"junk\" foods?  Yes    Taking medications regularly:  Yes    Medication side effects:  None    Ability to successfully perform activities of daily living:  No assistance needed    Home Safety:  Lack of grab bars in the bathroom    Hearing Impairment:  No hearing concerns    In the past 6 months, have you been bothered by leaking of urine?  No    In general, how would you rate your overall mental or emotional health?  Excellent      PHQ-2 Total Score: 0    Additional concerns today:  No    Do you feel safe in your environment? Yes    Have you ever done Advance Care Planning? (For example, a Health Directive, POLST, or a discussion with a medical provider or your loved ones about your wishes): Yes, advance care planning is on file.       Fall risk  Fallen 2 or more times in the past year?: No  Any fall with injury in the past year?: No    Cognitive Screening   1) Repeat 3 items (Leader, Season, Table)    2) Clock draw: NORMAL  3) 3 item recall: Recalls 3 objects  Results: 3 items recalled: COGNITIVE IMPAIRMENT LESS LIKELY    Mini-CogTM Copyright MARYLIN Galvez. Licensed by the author for use in [facility]; reprinted with permission (eros@.[facility]). All rights reserved.      Do you have sleep apnea, excessive snoring or daytime drowsiness?: yes    Reviewed and updated as needed this visit by clinical staff   Tobacco  Allergies    Med Hx  Surg Hx  Fam Hx  Soc Hx        Reviewed and " updated as needed this visit by Provider                 Social History     Tobacco Use     Smoking status: Never     Smokeless tobacco: Never     Tobacco comments:     none   Substance Use Topics     Alcohol use: Yes     Comment: 1 or 2 glasses of wine most evenings         Alcohol Use 11/8/2022   Prescreen: >3 drinks/day or >7 drinks/week? No   Prescreen: >3 drinks/day or >7 drinks/week? -           Hyperlipidemia Follow-Up      Are you regularly taking any medication or supplement to lower your cholesterol?   Yes- pravastatin    Are you having muscle aches or other side effects that you think could be caused by your cholesterol lowering medication?  No    Hypothyroidism Follow-up      Since last visit, patient describes the following symptoms: dry skin and fatigue      Current providers sharing in care for this patient include:   Patient Care Team:  Shadia Munroe MD as PCP - General  Randolph Blount MD as MD (Ophthalmology)  Shadia Pickard Od, OD  Shadia Munroe MD as Assigned PCP  Franco Garcia MD as MD (Dermatology)  Goltz, Bennett Ezra, PA-C as Assigned Neuroscience Provider  Yoly Ross RPH as Pharmacist (Pharmacist)  Hodgkin, Douglas, MD as MD (Cardiovascular Disease)  Yoly Ross RPH as Assigned MTM Pharmacist    The following health maintenance items are reviewed in Epic and correct as of today:  Health Maintenance   Topic Date Due     DEXA  08/13/2022     MEDICARE ANNUAL WELLNESS VISIT  09/08/2022     TSH W/FREE T4 REFLEX  09/08/2022     ANNUAL REVIEW OF HM ORDERS  09/08/2022     MAMMO SCREENING  08/10/2023     FALL RISK ASSESSMENT  11/09/2023     COLORECTAL CANCER SCREENING  06/09/2024     ADVANCE CARE PLANNING  10/02/2026     LIPID  03/15/2027     DTAP/TDAP/TD IMMUNIZATION (4 - Td or Tdap) 07/31/2029     HEPATITIS C SCREENING  Completed     PHQ-2 (once per calendar year)  Completed     INFLUENZA VACCINE  Completed     Pneumococcal Vaccine: 65+ Years  Completed      "ZOSTER IMMUNIZATION  Completed     COVID-19 Vaccine  Completed     IPV IMMUNIZATION  Aged Out     MENINGITIS IMMUNIZATION  Aged Out     Labs reviewed in EPIC        Review of Systems   Constitutional: Negative for chills and fever.   HENT: Negative for congestion, ear pain, hearing loss and sore throat.    Eyes: Positive for visual disturbance. Negative for pain.   Respiratory: Negative for cough and shortness of breath.    Cardiovascular: Negative for chest pain, palpitations and peripheral edema.   Gastrointestinal: Negative for abdominal pain, constipation, diarrhea, heartburn, hematochezia and nausea.   Breasts:  Negative for tenderness, breast mass and discharge.   Genitourinary: Negative for dysuria, frequency, genital sores, hematuria, pelvic pain, urgency, vaginal bleeding and vaginal discharge.   Musculoskeletal: Positive for arthralgias. Negative for joint swelling and myalgias.   Skin: Negative for rash.   Neurological: Positive for weakness and paresthesias. Negative for dizziness and headaches.   Psychiatric/Behavioral: Negative for mood changes. The patient is not nervous/anxious.          OBJECTIVE:   /76 (BP Location: Right arm, Patient Position: Sitting, Cuff Size: Adult Regular)   Pulse 76   Temp 98.8  F (37.1  C) (Tympanic)   Resp 18   Ht 1.582 m (5' 2.3\")   Wt 81.5 kg (179 lb 9.6 oz)   LMP  (LMP Unknown)   SpO2 98%   BMI 32.53 kg/m   Estimated body mass index is 32.53 kg/m  as calculated from the following:    Height as of this encounter: 1.582 m (5' 2.3\").    Weight as of this encounter: 81.5 kg (179 lb 9.6 oz).  Physical Exam  GENERAL: healthy, alert and no distress  EYES: Eyes grossly normal to inspection, PERRL and conjunctivae and sclerae normal  NECK: no adenopathy, no asymmetry, masses, or scars and thyroid normal to palpation  RESP: lungs clear to auscultation - no rales, rhonchi or wheezes  CV: regular rate and rhythm, normal S1 S2, no S3 or S4, no murmur, click or rub, no " peripheral edema and peripheral pulses strong  ABDOMEN: soft, nontender, no hepatosplenomegaly, no masses and bowel sounds normal  MS: no gross musculoskeletal defects noted, no edema  SKIN: no suspicious lesions or rashes  NEURO: Normal strength and tone, mentation intact and speech normal  PSYCH: mentation appears normal, affect normal/bright    Diagnostic Test Results:  Labs reviewed in Epic    ASSESSMENT / PLAN:     1. Encounter for Medicare annual wellness exam      2. Osteopenia of multiple sites  Following dexa    3. Hypothyroidism, unspecified type  Stable.  - TSH WITH FREE T4 REFLEX; Future  - levothyroxine (SYNTHROID/LEVOTHROID) 125 MCG tablet; Take 1 tablet (125 mcg) by mouth daily  Dispense: 90 tablet; Refill: 4  - liothyronine (CYTOMEL) 5 MCG tablet; Take 1 tablet (5 mcg) by mouth daily  Dispense: 90 tablet; Refill: 4  - TSH; Future  - T4, free; Future  - T3, Free; Future  - TSH  - T4, free  - T3, Free    4. 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 Reviewed most recent visits with them. Of note, she did have an episode of aflutter after ablation. I'm wondering if we should be doing a repeat holter monitor to see if she remains in sinus rhythm. See patient instructions section.     5. Acquired dilation of ascending aorta and aortic root (H)  Followed by echo, but is due for recheck.    6. Pure hypercholesterolemia  Discussed seeing lipid specialist. She would like to do this at [facility]  - pravastatin (PRAVACHOL) 40 MG tablet; Take 1 tablet (40 mg) by mouth daily  Dispense: 90 tablet; Refill: 3    7. Meralgia paresthetica of right side  stable  - gabapentin (NEURONTIN) 100 MG capsule; Take 1 capsule (100 mg) by mouth At Bedtime  Dispense: 90 capsule; Refill: 4    8. Class 1 obesity due to excess calories without serious comorbidity with body mass index (BMI) of 32.0 to 32.9 in adult  Is interested in referral. Discussed what is generally  "involved.  - Comprehensive Weight Management; Future    COUNSELING:  Reviewed preventive health counseling, as reflected in patient instructions    Estimated body mass index is 32.53 kg/m  as calculated from the following:    Height as of this encounter: 1.582 m (5' 2.3\").    Weight as of this encounter: 81.5 kg (179 lb 9.6 oz).    Weight management plan: Patient referred to endocrine and/or weight management specialty    She reports that she has never smoked. She has never used smokeless tobacco.      Appropriate preventive services were discussed with this patient, including applicable screening as appropriate for cardiovascular disease, diabetes, osteopenia/osteoporosis, and glaucoma.  As appropriate for age/gender, discussed screening for colorectal cancer, prostate cancer, breast cancer, and cervical cancer. Checklist reviewing preventive services available has been given to the patient.    Reviewed patients plan of care and provided an AVS. The Intermediate Care Plan ( asthma action plan, low back pain action plan, and migraine action plan) for Ely meets the Care Plan requirement. This Care Plan has been established and reviewed with the Patient.    Counseling Resources:  ATP IV Guidelines  Pooled Cohorts Equation Calculator  Breast Cancer Risk Calculator  Breast Cancer: Medication to Reduce Risk  FRAX Risk Assessment  ICSI Preventive Guidelines  Dietary Guidelines for Americans, 2010  [place]'s MyPlate  ASA Prophylaxis  Lung CA Screening    Shadia Munroe MD  [facility]    Identified Health Risks:  "

## 2022-11-09 NOTE — PATIENT INSTRUCTIONS
I'd like you to see a cholesterol specialist. Let me know if you have difficulty doing this through [facility].  Follow up with your cardiologiy MD about stress test and aflutter episode looks like was after ablation. Should we be monitoring periodic holter heart monitors?    Patient Education   Personalized Prevention Plan  You are due for the preventive services outlined below.  Your care team is available to assist you in scheduling these services.  If you have already completed any of these items, please share that information with your care team to update in your medical record.  Health Maintenance Due   Topic Date Due    Osteoporosis Screening  08/13/2022    Annual Wellness Visit  09/08/2022    Thyroid Function Lab  09/08/2022    ANNUAL REVIEW OF HM ORDERS  09/08/2022

## 2022-11-14 ENCOUNTER — VIRTUAL VISIT (OUTPATIENT)
Dept: FAMILY MEDICINE | Facility: CLINIC | Age: 72
End: 2022-11-14
Payer: COMMERCIAL

## 2022-11-14 DIAGNOSIS — U07.1 INFECTION DUE TO 2019 NOVEL CORONAVIRUS: Primary | ICD-10-CM

## 2022-11-14 PROCEDURE — 99213 OFFICE O/P EST LOW 20 MIN: CPT | Mod: 95 | Performed by: FAMILY MEDICINE

## 2022-11-14 NOTE — PROGRESS NOTES
Ely is a 72 year old who is being evaluated via a billable video visit.      How would you like to obtain your AVS? MyChart  If the video visit is dropped, the invitation should be resent by: Send to e-mail at: [email or web address]  Will anyone else be joining your video visit? No          Assessment & Plan     Infection due to 2019 novel coronavirus  -- Risk criteria for age. No medication interactions with Paxlovid. Wishes to proceed with Paxlovid.  Discussed isolation, quarantine. Discussed conservative cares. All questions answered.   - nirmatrelvir and ritonavir (PAXLOVID) therapy pack  Dispense: 30 each; Refill: 0    The risks, benefits and treatment options of prescribed medications or other treatments have been discussed with the patient. The patient verbalized their understanding and should call or follow up if no improvement or if they develop further problems.    Follow up if not improving or worsening.     Rl Bailey, [facility]    Subjective   Ely is a 72 year old, presenting for the following health issues:  Covid Concern      HPI       COVID-19 Symptom Review  How many days ago did these symptoms start? Started Saturday    Are any of the following symptoms significant for you?    New or worsening difficulty breathing? No    Worsening cough? Yes, it's a dry cough.     Fever or chills? Yes, I felt feverish or had chills.    Headache: YES- yesterday    Sore throat: No    Chest pain: No    Diarrhea: No    Body aches? YES    What treatments has patient tried? NyQuil and Dayquil   Does patient live in a nursing home, group home, or shelter? No  Does patient have a way to get food/medications during quarantined? Yes, I have a friend or family member who can help me.    Positive Home test this morning.       She reports thinking she just had a cold.   Had a headache yesterday, feels achy   No severe shortness of breath.                 Review of Systems   Constitutional,  HEENT, cardiovascular, pulmonary, gi and gu systems are negative, except as otherwise noted.      Objective    Vitals - Patient Reported  Pain Score: No Pain (0)      Vitals:  No vitals were obtained today due to virtual visit.    Physical Exam   GENERAL: Healthy, alert and no distress  EYES: Eyes grossly normal to inspection.  No discharge or erythema, or obvious scleral/conjunctival abnormalities.  RESP: No audible wheeze, cough, or visible cyanosis.  No visible retractions or increased work of breathing.    SKIN: Visible skin clear. No significant rash, abnormal pigmentation or lesions.  NEURO: Cranial nerves grossly intact.  Mentation and speech appropriate for age.  PSYCH: Mentation appears normal, affect normal/bright, judgement and insight intact, normal speech and appearance well-groomed.          Video-Visit Details    Video Start Time: 2:25    Type of service:  Video Visit    Video End Time:2:34 PM    Originating Location (pt. Location): Home        Distant Location (provider location):  On-site    Platform used for Video Visit: [employer]

## 2022-11-14 NOTE — PROGRESS NOTES
Virtual Visit Check-In    During this virtual visit the patient is located in [place], patient verifies this as the location during the entirety of this visit.     Ely is a 72 year old who is being evaluated via a billable video visit.      How would you like to obtain your AVS? MyChart  If the video visit is dropped, the invitation should be resent by: Text to cell phone: [phone]  Will anyone else be joining your video visit? No        Video-Visit Details    Originating Location (pt. Location): Home        Distant Location (provider location):  Off-site    Platform used for Video Visit: [employer]

## 2022-11-15 ENCOUNTER — VIRTUAL VISIT (OUTPATIENT)
Dept: ENDOCRINOLOGY | Facility: CLINIC | Age: 72
End: 2022-11-15
Payer: COMMERCIAL

## 2022-11-15 ENCOUNTER — TELEPHONE (OUTPATIENT)
Dept: ENDOCRINOLOGY | Facility: CLINIC | Age: 72
End: 2022-11-15

## 2022-11-15 VITALS — WEIGHT: 179 LBS | BODY MASS INDEX: 32.94 KG/M2 | HEIGHT: 62 IN

## 2022-11-15 DIAGNOSIS — E66.811 CLASS 1 OBESITY DUE TO EXCESS CALORIES WITHOUT SERIOUS COMORBIDITY WITH BODY MASS INDEX (BMI) OF 32.0 TO 32.9 IN ADULT: ICD-10-CM

## 2022-11-15 DIAGNOSIS — E66.09 CLASS 1 OBESITY DUE TO EXCESS CALORIES WITHOUT SERIOUS COMORBIDITY WITH BODY MASS INDEX (BMI) OF 32.0 TO 32.9 IN ADULT: ICD-10-CM

## 2022-11-15 PROCEDURE — 99203 OFFICE O/P NEW LOW 30 MIN: CPT | Mod: 95 | Performed by: INTERNAL MEDICINE

## 2022-11-15 NOTE — PATIENT INSTRUCTIONS
-Start Ozempic 0.25 mg weekly for 2 weeks then increase to 0.5 mg weekly for 1 month and then increase to 1.0 mg weekly thereafter    See PharmD in 4-6 weeks  See Dr.Tasma LERNER in 3 month(s)    If you have any questions, please do not hesitate to call Weight management clinic at [phone] or [phone].  If you need to fax, please fax to [phone].    Sincerely,    Matti Hanson MD  Endocrinology

## 2022-11-15 NOTE — PROGRESS NOTES
"      New Medical Weight Management Consult    PATIENT:  Ely Humphreys  MRN:         [id number]  :         1950  LIDIA:         11/15/2022    Dear PCP,    I had the pleasure of seeing your patient, Ely Humphreys. Full intake/assessment was done to determine barriers to weight loss success and develop a treatment plan. Ely Humphreys is a 72 year old female interested in treatment of medical problems associated with excess weight. She has a height of 5' 2\", a weight of 179 lbs 0 oz, and the calculated Body mass index is 32.74 kg/m .    She has the following co-morbidities: Atrial fibrillation, hyperlipidemia, MIKEL, hyperlipidemia, hx of breast cancer  Family hx of breast cancer and personal history of breast cancer    She stated that she has been gaining weight particularly after she went to menopause. Most of weight gain is in the central area. She tried to lose weight but has difficulty doing so.   She feels that she knows what to do in term of diet. She was a health .  She could not exercise much due to back pain    Lifestyle -- healthy but not perfect  More toward protein or vegetable, do not eat a lot of carbohydrate  Do mostly whole grain  Popcorn once a week  Like chip but try not to eat very often  Snack on nut  Do not eat out a lot  Less processed food or fast food    Exercise: was doing exercise but regularly lately due to back pain, hiking or treadmill 30 minutes per day       11/15/2022   I have the following health issues associated with obesity: Heart Disease, High Cholesterol, Sleep Apnea, Cancer   I have the following symptoms associated with obesity: Lower Extremity Swelling, Back Pain, Fatigue, Hip Pain       Patient Goals 11/15/2022   I am interested in having a healthier weight to diminish current health problems: Yes   I am interested in having a healthier weight in order to prevent future health problems: Yes   I am interested in having a healthier weight in order to have a future " "surgery: No       Referring Provider 11/15/2022   Please name the provider who referred you to Medical Weight Management.  If you do not know, please answer: \"I Don't Know\". Dr. Shadia Munroe       Weight History 11/15/2022   How concerned are you about your weight? Somewhat Concerned   Would you describe your weight gain as gradual? Yes   I became overweight: As an Adult   The following factors have contributed to my weight gain:  Started on Medication that Caused Weight Gain, A Health Crisis, Eating Wrong Types of Food, Eating Too Much, Lack of Exercise   I have tried the following methods to lose weight: Watching Portions or Calories, Exercise, Weight Watchers, Atkins-type Diet (Low Carb/High Protein)   My lowest weight since age 20 was: 125   My highest weight since age 20 was: 181   The most weight I have ever lost was: (lbs) 25   I have the following family history of obesity/being overweight:  My father is overweight, Many of my relatives are overweight   Has anyone in your family had weight loss surgery? No   How has your weight changed over the last year?  Gained   How many pounds? 5       Diet Recall Review with Patient 11/15/2022   Do you typically eat breakfast? Yes   If you do eat breakfast, what types of food do you eat? protein, eggs,oatmeal, avocado or peanut butter toast, fruil   st   Do you typically eat lunch? Yes   If you do eat lunch, what types of food do you typically eat?  salad, soup, 1/2 sandwitch, leftovers, fruit   Do you typically eat supper? Yes   If you do eat supper, what types of food do you typically eat? protein and veg   Do you typically eat snacks? No   If you do snack, what types of food do you typically eat? popcorn   Do you like vegetables?  Yes   Do you drink water? Yes   How many glasses of juice do you drink in a typical day? 0   How many of glasses of milk do you drink in a typical day? 0   How many 8oz glasses of sugar containing drinks such as Nicola-Aid/sweet tea do you " drink in a day? 0   How many cans/bottles of sugar pop/soda/tea/sports drinks do you drink in a day? 0   How many cans/bottles of diet pop/soda/tea or sports drink do you drink in a day? 0   How often do you have a drink of alcohol? 2-3 TImes a Week   If you do drink, how many drinks might you have in a day? 1 or 2       Eating Habits 11/15/2022   Generally, my meals include foods like these: bread, pasta, rice, potatoes, corn, crackers, sweet dessert, pop, or juice. A Few Times a Week   Generally, my meals include foods like these: fried meats, brats, burgers, french fries, pizza, cheese, chips, or ice cream. Once a Week   Eat fast food (like [employer], [employer], [employer]). Less Than Weekly   Eat at a buffet or sit-down restaurant. Less Than Weekly   Eat most of my meals in front of the TV or computer. A Few Times a Week   Often skip meals, eat at random times, have no regular eating times. Less Than Weekly   Rarely sit down for a meal but snack or graze throughout.  Never   Eat extra snacks between meals. Less Than Weekly   Eat most of my food at the end of the day. Less Than Weekly   Eat in the middle of the night or wake up at night to eat. Never   Eat extra snacks to prevent or correct low blood sugar. Never   Eat to prevent acid reflux or stomach pain. Never   Worry about not having enough food to eat. Never   Have you been to the food shelf at least a few times this year? No   I eat when I am depressed. Less Than Weekly   I eat when I am stressed. Less Than Weekly   I eat when I am bored. Less Than Weekly   I eat when I am anxious. Less Than Weekly   I eat when I am happy or as a reward. Less Than Weekly   I feel hungry all the time even if I just have eaten. Never   Feeling full is important to me. Less Than Weekly   I finish all the food on my plate even if I am already full. A Few Times a Week   I can't resist eating delicious food or walk past the good food/smell. Less Than Weekly   I eat/snack  without noticing that I am eating. Less Than Weekly   I eat when I am preparing the meal. A Few Times a Week   I eat more than usual when I see others eating. Less Than Weekly   I have trouble not eating sweets, ice cream, cookies, or chips if they are around the house. Less Than Weekly   I think about food all day. Less Than Weekly   What foods, if any, do you crave? Chips/Crackers   Please list any other foods you crave? potato chips, more salty fatty things       Amount of Food 11/15/2022   I make myself vomit what I have eaten or use laxatives to get rid of food. Never   I eat a large amount of food, like a loaf of bread, a box of cookies, a pint/quart of ice cream, all at once. Never   I eat a large amount of food even when I am not hungry. Monthly   I eat rapidly. Almost Everyday   I eat alone because I feel embarrassed and do not want others to see how much I have eaten. Never   I eat until I am uncomfortably full. Monthly   I feel bad, disgusted, or guilty after I overeat. Monthly   I make myself vomit what I have eaten or use laxatives to get rid of food. Never       Activity/Exercise History 11/15/2022   How much of a typical 12 hour day do you spend sitting? Half the Day   How much of a typical 12 hour day do you spend lying down? Less Than Half the Day   How much of a typical day do you spend walking/standing? Half the Day   How many hours (not including work) do you spend on the TV/Video Games/Computer/Tablet/Phone? 2-3 Hours   How many times a week are you active for the purpose of exercise? 2-3 Times a Week   What keeps you from being more active? Pain   How many total minutes do you spend doing some activity for the purpose of exercising when you exercise? More Than 30 Minutes     PAST MEDICAL HISTORY:  Past Medical History:   Diagnosis Date     Abnormal Pap smear 11/16/2010     Atrial fibrillation s/p ablation 10/07/2019    [facility] is 2 for gender and age. Cardiology stopped eliquis after  discussion on 2/26/20.      Benign essential tremor     Chronic     Hyperlipidemia LDL goal <160 02/10/2010     Invasive ductal carcinoma of breast (H) 06/2009    left breast ca     Major depressive disorder, recurrent episode, in full remission (H) 10/19/2011    Stable for decades     osteopenia 2002     Palpitations      Persistent atrial fibrillation (H) 05/10/2017     Unspecified hypothyroidism        Work/Social History Reviewed With Patient 11/15/2022   My employment status is: Retired   My job is: [employer]    What is your marital status? /In a Relationship   If in a relationship, is your significant other overweight? Yes   Do you have children? Yes   If you have children, are they overweight? Yes   Who do you live with?     Are they supportive of your health goals? Yes   Who does the food shopping?   and I       Mental Health History Reviewed With Patient 11/15/2022   Have you ever been physically or sexually abused? No   How often in the past 2 weeks have you felt little interest or pleasure in doing things? Not at all   Over the past 2 weeks how often have you felt down, depressed, or hopeless? Not at all       Sleep History Reviewed With Patient 11/15/2022   How many hours do you sleep at night? 8   Do you think that you snore loudly or has anybody ever heard you snore loudly (louder than talking or so loud it can be heard behind a shut door)? Yes   Has anyone seen or heard you stop breathing during your sleep? Yes   Do you often feel tired, fatigued, or sleepy during the day? Yes   Do you have a TV/Computer in your bedroom? Yes       MEDICATIONS:   Current Outpatient Medications   Medication Sig Dispense Refill     aspirin 81 MG EC tablet Take 1 tablet (81 mg) by mouth daily       Calcium-Phosphorus-Vitamin D (CALCIUM/VITAMIN D3/ADULT GUMMY PO) 2 gummies a day (500 mg of calcium 1000 units of vitamin D3) (Lazar brand)       gabapentin (NEURONTIN) 100 MG capsule Take 1  "capsule (100 mg) by mouth At Bedtime 90 capsule 4     levothyroxine (SYNTHROID/LEVOTHROID) 125 MCG tablet Take 1 tablet (125 mcg) by mouth daily 90 tablet 4     liothyronine (CYTOMEL) 5 MCG tablet Take 1 tablet (5 mcg) by mouth daily 90 tablet 4     multivitamin (CENTRUM SILVER) tablet Take 1 tablet by mouth daily Centrum Silver 50+       nirmatrelvir and ritonavir (PAXLOVID) therapy pack Take 3 tablets by mouth 2 times daily for 5 days (Take 2 Nirmatrelvir tablets and 1 Ritonavir tablet twice daily for 5 days) 30 each 0     pravastatin (PRAVACHOL) 40 MG tablet Take 1 tablet (40 mg) by mouth daily 90 tablet 3     traMADol (ULTRAM) 50 MG tablet Take 50 mg by mouth every 8 hours as needed         ALLERGIES:   Allergies   Allergen Reactions     Amiodarone Other (See Comments)     Loss of vision in R eye.       PHYSICAL EXAM:  Ht 1.575 m (5' 2\")   Wt 81.2 kg (179 lb)   LMP  (LMP Unknown)   BMI 32.74 kg/m      Wt Readings from Last 4 Encounters:   11/15/22 81.2 kg (179 lb)   11/09/22 81.5 kg (179 lb 9.6 oz)   05/16/22 83.1 kg (183 lb 1.6 oz)   12/22/21 81.6 kg (180 lb)     A & O x 3  HEENT: NCAT  Respirations unlabored  Location of obesity: Central Obesity    Lab reviewed  Lab Results   Component Value Date    A1C 5.4 11/09/2015    A1C 5.6 06/04/2012     ENDO DIABETES Latest Ref Rng & Units 3/15/2022   ALT 0 - 50 U/L 41   AST 0 - 45 U/L    CHOL <200 mg/dL 245 (H)   LDL <=100 mg/dL 153 (H)   HDL >=50 mg/dL 68   NHDL <130 mg/dL 177 (H)   VLDL 0 - 30 mg/dL    TRIGLYCERIDES <150 mg/dL 119     ENDO DIABETES Latest Ref Rng & Units 9/8/2021   CREATININE 0.52 - 1.04 mg/dL 0.84       ASSESSMENT/PLAN:  Ely is a patient with mature onset obesity with significant element of familial/genetic influence and with current health consequences. She does not need aggressive weight loss plan.  Ely Humphreys eats to obtain specific degree of fullness.    Her ability to lose weight is impacted by current work life, physical impairment and " lack of confidence.    PLAN:    Decrease portion sizes  No meals in front of TV screen  Purge house of food triggers  No meal skipping  Calorie/low fat diet  Meal planning  Increase activity as tolerated    Craving/Reward   Ancillary testing:  N/A.  Food Plan:  High protein/low carbohydrate.   Activity Plan:  Exercise after meals.  Supplementary:  N/A.   Medication:  The patient will begin medication in pursuit of improved medical status as influenced by body weight. She will start  Ozempic 0.25 mg weekly for 2 weeks then increase to 0.5 mg weekly for 1 month and then increase to 1.0 mg weekly thereafter.  There is a mutual understanding of the goals and risks of this therapy. The patient is in agreement. She is educated on dosage regimen and possible side effects.      Orders Placed This Encounter   Procedures     Med Therapy Management Referral       FOLLOW-UP:   See PharmD in 4-6 weeks  See Dr.Tasma LERNER in 3 month(s)    Joined the call at 11/15/2022, 9:18:55 am.  Left the call at 11/15/2022, 9:39:02 am.  You were on the call for 20 minutes 6 seconds .    External notes/medical records independently reviewed, labs and imaging independently reviewed, medical management and tests to be discussed/communicated to patient.    Time: I spent 40 minutes spent on the date of the encounter preparing to see patient (including chart review and preparation), obtaining and or reviewing additional medical history, performing a physical exam and evaluation, documenting clinical information in the electronic health record, independently interpreting results, communicating results to the patient and coordinating care.    Sincerely,    Matti Hanson MD

## 2022-11-15 NOTE — LETTER
"11/15/2022       RE: Ely Humphreys  [address] [place] [address]     Dear Colleague,    Thank you for referring your patient, Ely Humphreys, to the [facility]. Please see a copy of my visit note below.    New Medical Weight Management Consult    PATIENT:  Ely Humphreys  MRN:         [id number]  :         1950  LIDIA:         11/15/2022    Dear PCP,    I had the pleasure of seeing your patient, Ely Humphreys. Full intake/assessment was done to determine barriers to weight loss success and develop a treatment plan. Ely Humphreys is a 72 year old female interested in treatment of medical problems associated with excess weight. She has a height of 5' 2\", a weight of 179 lbs 0 oz, and the calculated Body mass index is 32.74 kg/m .    She has the following co-morbidities: Atrial fibrillation, hyperlipidemia, MIKEL, hyperlipidemia, hx of breast cancer  Family hx of breast cancer and personal history of breast cancer    She stated that she has been gaining weight particularly after she went to menopause. Most of weight gain is in the central area. She tried to lose weight but has difficulty doing so.   She feels that she knows what to do in term of diet. She was a health .  She could not exercise much due to back pain    Lifestyle -- healthy but not perfect  More toward protein or vegetable, do not eat a lot of carbohydrate  Do mostly whole grain  Popcorn once a week  Like chip but try not to eat very often  Snack on nut  Do not eat out a lot  Less processed food or fast food    Exercise: was doing exercise but regularly lately due to back pain, hiking or treadmill 30 minutes per day       11/15/2022   I have the following health issues associated with obesity: Heart Disease, High Cholesterol, Sleep Apnea, Cancer   I have the following symptoms associated with obesity: Lower Extremity Swelling, Back " "Pain, Fatigue, Hip Pain       Patient Goals 11/15/2022   I am interested in having a healthier weight to diminish current health problems: Yes   I am interested in having a healthier weight in order to prevent future health problems: Yes   I am interested in having a healthier weight in order to have a future surgery: No       Referring Provider 11/15/2022   Please name the provider who referred you to Medical Weight Management.  If you do not know, please answer: \"I Don't Know\". Dr. Shadia Munroe       Weight History 11/15/2022   How concerned are you about your weight? Somewhat Concerned   Would you describe your weight gain as gradual? Yes   I became overweight: As an Adult   The following factors have contributed to my weight gain:  Started on Medication that Caused Weight Gain, A Health Crisis, Eating Wrong Types of Food, Eating Too Much, Lack of Exercise   I have tried the following methods to lose weight: Watching Portions or Calories, Exercise, Weight Watchers, Atkins-type Diet (Low Carb/High Protein)   My lowest weight since age 20 was: 125   My highest weight since age 20 was: 181   The most weight I have ever lost was: (lbs) 25   I have the following family history of obesity/being overweight:  My father is overweight, Many of my relatives are overweight   Has anyone in your family had weight loss surgery? No   How has your weight changed over the last year?  Gained   How many pounds? 5       Diet Recall Review with Patient 11/15/2022   Do you typically eat breakfast? Yes   If you do eat breakfast, what types of food do you eat? protein, eggs,oatmeal, avocado or peanut butter toast, fruil   st   Do you typically eat lunch? Yes   If you do eat lunch, what types of food do you typically eat?  salad, soup, 1/2 sandwitch, leftovers, fruit   Do you typically eat supper? Yes   If you do eat supper, what types of food do you typically eat? protein and veg   Do you typically eat snacks? No   If you do snack, " what types of food do you typically eat? popcorn   Do you like vegetables?  Yes   Do you drink water? Yes   How many glasses of juice do you drink in a typical day? 0   How many of glasses of milk do you drink in a typical day? 0   How many 8oz glasses of sugar containing drinks such as Nicola-Aid/sweet tea do you drink in a day? 0   How many cans/bottles of sugar pop/soda/tea/sports drinks do you drink in a day? 0   How many cans/bottles of diet pop/soda/tea or sports drink do you drink in a day? 0   How often do you have a drink of alcohol? 2-3 TImes a Week   If you do drink, how many drinks might you have in a day? 1 or 2       Eating Habits 11/15/2022   Generally, my meals include foods like these: bread, pasta, rice, potatoes, corn, crackers, sweet dessert, pop, or juice. A Few Times a Week   Generally, my meals include foods like these: fried meats, brats, burgers, french fries, pizza, cheese, chips, or ice cream. Once a Week   Eat fast food (like McDonalds, Burger Aaron, Taco Bell). Less Than Weekly   Eat at a buffet or sit-down restaurant. Less Than Weekly   Eat most of my meals in front of the TV or computer. A Few Times a Week   Often skip meals, eat at random times, have no regular eating times. Less Than Weekly   Rarely sit down for a meal but snack or graze throughout.  Never   Eat extra snacks between meals. Less Than Weekly   Eat most of my food at the end of the day. Less Than Weekly   Eat in the middle of the night or wake up at night to eat. Never   Eat extra snacks to prevent or correct low blood sugar. Never   Eat to prevent acid reflux or stomach pain. Never   Worry about not having enough food to eat. Never   Have you been to the food shelf at least a few times this year? No   I eat when I am depressed. Less Than Weekly   I eat when I am stressed. Less Than Weekly   I eat when I am bored. Less Than Weekly   I eat when I am anxious. Less Than Weekly   I eat when I am happy or as a reward. Less Than  Weekly   I feel hungry all the time even if I just have eaten. Never   Feeling full is important to me. Less Than Weekly   I finish all the food on my plate even if I am already full. A Few Times a Week   I can't resist eating delicious food or walk past the good food/smell. Less Than Weekly   I eat/snack without noticing that I am eating. Less Than Weekly   I eat when I am preparing the meal. A Few Times a Week   I eat more than usual when I see others eating. Less Than Weekly   I have trouble not eating sweets, ice cream, cookies, or chips if they are around the house. Less Than Weekly   I think about food all day. Less Than Weekly   What foods, if any, do you crave? Chips/Crackers   Please list any other foods you crave? potato chips, more salty fatty things       Amount of Food 11/15/2022   I make myself vomit what I have eaten or use laxatives to get rid of food. Never   I eat a large amount of food, like a loaf of bread, a box of cookies, a pint/quart of ice cream, all at once. Never   I eat a large amount of food even when I am not hungry. Monthly   I eat rapidly. Almost Everyday   I eat alone because I feel embarrassed and do not want others to see how much I have eaten. Never   I eat until I am uncomfortably full. Monthly   I feel bad, disgusted, or guilty after I overeat. Monthly   I make myself vomit what I have eaten or use laxatives to get rid of food. Never       Activity/Exercise History 11/15/2022   How much of a typical 12 hour day do you spend sitting? Half the Day   How much of a typical 12 hour day do you spend lying down? Less Than Half the Day   How much of a typical day do you spend walking/standing? Half the Day   How many hours (not including work) do you spend on the TV/Video Games/Computer/Tablet/Phone? 2-3 Hours   How many times a week are you active for the purpose of exercise? 2-3 Times a Week   What keeps you from being more active? Pain   How many total minutes do you spend doing some  activity for the purpose of exercising when you exercise? More Than 30 Minutes     PAST MEDICAL HISTORY:  Past Medical History:   Diagnosis Date     Abnormal Pap smear 11/16/2010     Atrial fibrillation s/p ablation 10/07/2019    CHADsVASc is 2 for gender and age. Cardiology stopped eliquis after discussion on 2/26/20.      Benign essential tremor     Chronic     Hyperlipidemia LDL goal <160 02/10/2010     Invasive ductal carcinoma of breast (H) 06/2009    left breast ca     Major depressive disorder, recurrent episode, in full remission (H) 10/19/2011    Stable for decades     osteopenia 2002     Palpitations      Persistent atrial fibrillation (H) 05/10/2017     Unspecified hypothyroidism        Work/Social History Reviewed With Patient 11/15/2022   My employment status is: Retired   My job is: [employer]    What is your marital status? /In a Relationship   If in a relationship, is your significant other overweight? Yes   Do you have children? Yes   If you have children, are they overweight? Yes   Who do you live with?     Are they supportive of your health goals? Yes   Who does the food shopping?   and I       Mental Health History Reviewed With Patient 11/15/2022   Have you ever been physically or sexually abused? No   How often in the past 2 weeks have you felt little interest or pleasure in doing things? Not at all   Over the past 2 weeks how often have you felt down, depressed, or hopeless? Not at all       Sleep History Reviewed With Patient 11/15/2022   How many hours do you sleep at night? 8   Do you think that you snore loudly or has anybody ever heard you snore loudly (louder than talking or so loud it can be heard behind a shut door)? Yes   Has anyone seen or heard you stop breathing during your sleep? Yes   Do you often feel tired, fatigued, or sleepy during the day? Yes   Do you have a TV/Computer in your bedroom? Yes       MEDICATIONS:   Current Outpatient Medications  "  Medication Sig Dispense Refill     aspirin 81 MG EC tablet Take 1 tablet (81 mg) by mouth daily       Calcium-Phosphorus-Vitamin D (CALCIUM/VITAMIN D3/ADULT GUMMY PO) 2 gummies a day (500 mg of calcium 1000 units of vitamin D3) (Lzaar brand)       gabapentin (NEURONTIN) 100 MG capsule Take 1 capsule (100 mg) by mouth At Bedtime 90 capsule 4     levothyroxine (SYNTHROID/LEVOTHROID) 125 MCG tablet Take 1 tablet (125 mcg) by mouth daily 90 tablet 4     liothyronine (CYTOMEL) 5 MCG tablet Take 1 tablet (5 mcg) by mouth daily 90 tablet 4     multivitamin (CENTRUM SILVER) tablet Take 1 tablet by mouth daily Centrum Silver 50+       nirmatrelvir and ritonavir (PAXLOVID) therapy pack Take 3 tablets by mouth 2 times daily for 5 days (Take 2 Nirmatrelvir tablets and 1 Ritonavir tablet twice daily for 5 days) 30 each 0     pravastatin (PRAVACHOL) 40 MG tablet Take 1 tablet (40 mg) by mouth daily 90 tablet 3     traMADol (ULTRAM) 50 MG tablet Take 50 mg by mouth every 8 hours as needed         ALLERGIES:   Allergies   Allergen Reactions     Amiodarone Other (See Comments)     Loss of vision in R eye.       PHYSICAL EXAM:  Ht 1.575 m (5' 2\")   Wt 81.2 kg (179 lb)   LMP  (LMP Unknown)   BMI 32.74 kg/m      Wt Readings from Last 4 Encounters:   11/15/22 81.2 kg (179 lb)   11/09/22 81.5 kg (179 lb 9.6 oz)   05/16/22 83.1 kg (183 lb 1.6 oz)   12/22/21 81.6 kg (180 lb)     A & O x 3  HEENT: NCAT  Respirations unlabored  Location of obesity: Central Obesity    Lab reviewed  Lab Results   Component Value Date    A1C 5.4 11/09/2015    A1C 5.6 06/04/2012     ENDO DIABETES Latest Ref Rng & Units 3/15/2022   ALT 0 - 50 U/L 41   AST 0 - 45 U/L    CHOL <200 mg/dL 245 (H)   LDL <=100 mg/dL 153 (H)   HDL >=50 mg/dL 68   NHDL <130 mg/dL 177 (H)   VLDL 0 - 30 mg/dL    TRIGLYCERIDES <150 mg/dL 119     ENDO DIABETES Latest Ref Rng & Units 9/8/2021   CREATININE 0.52 - 1.04 mg/dL 0.84       ASSESSMENT/PLAN:  Ely is a patient with mature " onset obesity with significant element of familial/genetic influence and with current health consequences. She does not need aggressive weight loss plan.  Ely Humphreys eats to obtain specific degree of fullness.    Her ability to lose weight is impacted by current work life, physical impairment and lack of confidence.    PLAN:    Decrease portion sizes  No meals in front of TV screen  Purge house of food triggers  No meal skipping  Calorie/low fat diet  Meal planning  Increase activity as tolerated    Craving/Reward   Ancillary testing:  N/A.  Food Plan:  High protein/low carbohydrate.   Activity Plan:  Exercise after meals.  Supplementary:  N/A.   Medication:  The patient will begin medication in pursuit of improved medical status as influenced by body weight. She will start  Ozempic 0.25 mg weekly for 2 weeks then increase to 0.5 mg weekly for 1 month and then increase to 1.0 mg weekly thereafter.  There is a mutual understanding of the goals and risks of this therapy. The patient is in agreement. She is educated on dosage regimen and possible side effects.      Orders Placed This Encounter   Procedures     Med Therapy Management Referral       FOLLOW-UP:   See PharmD in 4-6 weeks  See Dr.Tasma LERNER in 3 month(s)    Joined the call at 11/15/2022, 9:18:55 am.  Left the call at 11/15/2022, 9:39:02 am.  You were on the call for 20 minutes 6 seconds .    External notes/medical records independently reviewed, labs and imaging independently reviewed, medical management and tests to be discussed/communicated to patient.    Time: I spent 40 minutes spent on the date of the encounter preparing to see patient (including chart review and preparation), obtaining and or reviewing additional medical history, performing a physical exam and evaluation, documenting clinical information in the electronic health record, independently interpreting results, communicating results to the patient and coordinating  care.    Sincerely,    Matti Hanson MD

## 2022-11-15 NOTE — TELEPHONE ENCOUNTER
Per test claim Ozempic 4MG/3ML needs prior authorization.  Insurance as follows: [employer] Part D  BIN: [id number]  BALDEVN: MD  ID: [id number]  Group: [id number]  Please submit for PA approval. Once approved or denied, please include liaison on communication.

## 2022-11-15 NOTE — NURSING NOTE
"Chief Complaint   Patient presents with     Consult     New consultation for weight management.       Vitals:    11/15/22 0842   Weight: 179 lb   Height: 5' 2\"       Body mass index is 32.74 kg/m .      Ranjan Estrada, EMT  [facility]                      "

## 2022-11-16 ENCOUNTER — TELEPHONE (OUTPATIENT)
Dept: ENDOCRINOLOGY | Facility: CLINIC | Age: 72
End: 2022-11-16

## 2022-11-16 DIAGNOSIS — E66.09 CLASS 1 OBESITY DUE TO EXCESS CALORIES WITHOUT SERIOUS COMORBIDITY WITH BODY MASS INDEX (BMI) OF 32.0 TO 32.9 IN ADULT: Primary | ICD-10-CM

## 2022-11-16 DIAGNOSIS — E66.811 CLASS 1 OBESITY DUE TO EXCESS CALORIES WITHOUT SERIOUS COMORBIDITY WITH BODY MASS INDEX (BMI) OF 32.0 TO 32.9 IN ADULT: Primary | ICD-10-CM

## 2022-11-16 NOTE — TELEPHONE ENCOUNTER
Central Prior Authorization Team   Phone: [phone]    PA Initiation    Medication: Ozempic 4MG/3ML  Insurance Company: [employer] - Phone [phone] Fax [phone]  Pharmacy Filling the Rx: [address]  Filling Pharmacy Phone: [phone]  Filling Pharmacy Fax:    Start Date: 11/16/2022

## 2022-11-16 NOTE — TELEPHONE ENCOUNTER
Attempted to call patient to schedule a follow up appt with Dr. Chino in 3 months (around 2/15/23) and an appt with a PharmD in 4-6 weeks. Patient was unavailable, will call back to schedule.

## 2022-11-16 NOTE — TELEPHONE ENCOUNTER
PRIOR AUTHORIZATION DENIED    Medication: Ozempic 4MG/3ML    Denial Date: 11/16/2022    Denial Rational: Since Ozempic 0.25 was denied the higher strength was also denied        Appeal Information:

## 2022-11-17 ENCOUNTER — TELEPHONE (OUTPATIENT)
Dept: ENDOCRINOLOGY | Facility: CLINIC | Age: 72
End: 2022-11-17

## 2022-11-17 NOTE — TELEPHONE ENCOUNTER
"Prior Authorization Retail Medication Request    Medication/Dose: Saxenda  ICD code (if different than what is on RX):  Class 1 obesity due to excess calories without serious comorbidity with body mass index (BMI) of 32.0 to 32.9 in adult [E66.09, Z68.32]  - Primary     Previously Tried and Failed:  Watching Portions or Calories, Exercise, Weight Watchers, Atkins-type Diet (Low Carb/High Protein)  Rationale:  I had the pleasure of seeing your patient, Ely Humphreys. Full intake/assessment was done to determine barriers to weight loss success and develop a treatment plan. Ely Humphreys is a 72 year old female interested in treatment of medical problems associated with excess weight. She has a height of 5' 2\", a weight of 179 lbs 0 oz, and the calculated Body mass index is 32.74 kg/m .     She has the following co-morbidities: Atrial fibrillation, hyperlipidemia, MIKEL, hyperlipidemia, hx of breast cancer  Family hx of breast cancer and personal history of breast cancer    She stated that she has been gaining weight particularly after she went to menopause. Most of weight gain is in the central area. She tried to lose weight but has difficulty doing so.   She feels that she knows what to do in term of diet. She was a health .  She could not exercise much due to back pain    Insurance Name:    Insurance ID:        Pharmacy Information (if different than what is on RX)  Name:  [facility] [address] [place] [address]  Phone:  [phone]  "

## 2022-11-18 NOTE — TELEPHONE ENCOUNTER
Central Prior Authorization Team   Phone: [phone]      PA Initiation    Medication: Saxenda 18MG/3ML-PA initiated    Insurance Company: [employer] - Phone [phone] Fax [phone]  Pharmacy Filling the Rx: [address]  Filling Pharmacy Phone: [phone]  Filling Pharmacy Fax:    Start Date: 11/17/2022        
Per test claim Saxenda 18mg/3ml needs prior authorization.  Insurance as follows:  BIN: [id number]   KATYA: MD  ID: [id number]  Group: [id number]  Please submit for PA approval. Once approved or denied, please include liaison on communication.    
Prescription has been released to patient's preferred pharmacy.  
Prior Authorization Not Needed per Insurance-This is excluded by Medicare law and is not eligible for review.    Medication: Saxenda 18MG/3ML-PA denied    Insurance Company: [employer] - Phone [phone] Fax [phone]  Expected CoPay:      Pharmacy Filling the Rx: [facility] [address] [place] [address]  Pharmacy Notified: No  Patient Notified: No      
(2) cough or sneeze

## 2022-11-21 ENCOUNTER — TRANSFERRED RECORDS (OUTPATIENT)
Dept: HEALTH INFORMATION MANAGEMENT | Facility: CLINIC | Age: 72
End: 2022-11-21

## 2022-11-28 ENCOUNTER — ANCILLARY PROCEDURE (OUTPATIENT)
Dept: BONE DENSITY | Facility: CLINIC | Age: 72
End: 2022-11-28
Attending: INTERNAL MEDICINE
Payer: COMMERCIAL

## 2022-11-28 DIAGNOSIS — M85.89 OSTEOPENIA OF MULTIPLE SITES: ICD-10-CM

## 2022-11-28 PROCEDURE — 77085 DXA BONE DENSITY AXL VRT FX: CPT | Performed by: INTERNAL MEDICINE

## 2023-01-05 ENCOUNTER — TRANSFERRED RECORDS (OUTPATIENT)
Dept: HEALTH INFORMATION MANAGEMENT | Facility: CLINIC | Age: 73
End: 2023-01-05

## 2023-02-07 ENCOUNTER — TRANSFERRED RECORDS (OUTPATIENT)
Dept: HEALTH INFORMATION MANAGEMENT | Facility: CLINIC | Age: 73
End: 2023-02-07

## 2023-02-15 ENCOUNTER — TELEPHONE (OUTPATIENT)
Dept: ENDOCRINOLOGY | Facility: CLINIC | Age: 73
End: 2023-02-15
Payer: COMMERCIAL

## 2023-02-15 NOTE — TELEPHONE ENCOUNTER
Patient Returning Call    Reason for call:  Call back    Patient has additional questions:  Yes    What are your questions/concerns:  Pt has upcoming appt and would like a call back regarding if she really needs to have it    Could we [facility] you [facility] or would you prefer to receive a phone call?:   Patient would prefer a phone call   Okay to leave a detailed message?: Yes at Cell number on file:    Telephone Information:   Mobile [phone]

## 2023-02-21 ENCOUNTER — VIRTUAL VISIT (OUTPATIENT)
Dept: ENDOCRINOLOGY | Facility: CLINIC | Age: 73
End: 2023-02-21
Payer: COMMERCIAL

## 2023-02-21 VITALS — BODY MASS INDEX: 32.2 KG/M2 | HEIGHT: 62 IN | WEIGHT: 175 LBS

## 2023-02-21 DIAGNOSIS — E66.811 CLASS 1 OBESITY DUE TO EXCESS CALORIES WITHOUT SERIOUS COMORBIDITY WITH BODY MASS INDEX (BMI) OF 32.0 TO 32.9 IN ADULT: Primary | ICD-10-CM

## 2023-02-21 DIAGNOSIS — E66.09 CLASS 1 OBESITY DUE TO EXCESS CALORIES WITHOUT SERIOUS COMORBIDITY WITH BODY MASS INDEX (BMI) OF 32.0 TO 32.9 IN ADULT: Primary | ICD-10-CM

## 2023-02-21 PROCEDURE — 99214 OFFICE O/P EST MOD 30 MIN: CPT | Mod: VID | Performed by: INTERNAL MEDICINE

## 2023-02-21 RX ORDER — SEMAGLUTIDE 1 MG/.5ML
1 INJECTION, SOLUTION SUBCUTANEOUS WEEKLY
Qty: 6 ML | Refills: 3 | Status: SHIPPED | OUTPATIENT
Start: 2023-02-21 | End: 2023-04-04

## 2023-02-21 RX ORDER — SEMAGLUTIDE 0.25 MG/.5ML
0.25 INJECTION, SOLUTION SUBCUTANEOUS WEEKLY
Qty: 2 ML | Refills: 0 | Status: SHIPPED | OUTPATIENT
Start: 2023-02-21 | End: 2023-04-04

## 2023-02-21 RX ORDER — SEMAGLUTIDE 0.5 MG/.5ML
0.5 INJECTION, SOLUTION SUBCUTANEOUS WEEKLY
Qty: 2 ML | Refills: 0 | Status: SHIPPED | OUTPATIENT
Start: 2023-02-21 | End: 2023-04-04

## 2023-02-21 NOTE — PATIENT INSTRUCTIONS
- trial of Wegovy. If not approved, will try topiramate    If you have any questions, please do not hesitate to call Weight management clinic at [phone] or [phone].  If you need to fax, please fax to [phone].    Sincerely,    Matti Hanson MD  Endocrinology

## 2023-02-21 NOTE — PROGRESS NOTES
"  Return Medical Weight Management Note     Ely Humphreys  MRN:  [id number]  :  1950  LIDIA:  2023    Dear Shadia Munroe MD,    I had the pleasure of seeing your patient Ely Humphreys. She is a 72 year old female who I am continuing to see for treatment of obesity related to: Atrial fibrillation, hyperlipidemia, MIKEL, hyperlipidemia, hx of breast cancer    Family hx of breast cancer and personal history of breast cancer       11/15/2022   I have the following health issues associated with obesity: Heart Disease, High Cholesterol, Sleep Apnea, Cancer   I have the following symptoms associated with obesity: Lower Extremity Swelling, Back Pain, Fatigue, Hip Pain     She has been gaining weight particularly after she went to menopause. Most of weight gain is in the central area. She tried to lose weight but has difficulty doing so. She could not exercise much due to back pain    INTERVAL HISTORY:  Last seen 2022    Still struggle with back and hip pain and she may need to have hip surgery. So she has not been doing a lot exercise. She has done some physical therapy.    She eats mostly healthy. She does not snack a lot and eat more protein and veggie. She does not get craving. Weight is down 4 lbs in 3 months.    Saxenda is not covered by insurance.    CURRENT WEIGHT:   175 lbs 0 oz    Initial Weight (lbs): 179 lbs  Last Visits Weight: 81.2 kg (179 lb)  Cumulative weight loss (lbs): 4  Weight Loss Percentage: 2.23%    Changes and Difficulties 2023   I have made the following changes to my activity/exercise since my last visit: I am being seen for back and hip issues.  Unable to walk distances       VITALS:  Ht 1.575 m (5' 2\")   Wt 79.4 kg (175 lb)   LMP  (LMP Unknown)   BMI 32.01 kg/m      MEDICATIONS:   Current Outpatient Medications   Medication Sig Dispense Refill     aspirin 81 MG EC tablet Take 1 tablet (81 mg) by mouth daily       Calcium-Phosphorus-Vitamin D (CALCIUM/VITAMIN D3/ADULT " GUMMY PO) 2 gummies a day (500 mg of calcium 1000 units of vitamin D3) (Lazar brand)       gabapentin (NEURONTIN) 100 MG capsule Take 1 capsule (100 mg) by mouth At Bedtime 90 capsule 4     insulin pen needle (31G X 5 MM) 31G X 5 MM miscellaneous Use 1 pen needles daily or as directed. 100 each 3     levothyroxine (SYNTHROID/LEVOTHROID) 125 MCG tablet Take 1 tablet (125 mcg) by mouth daily 90 tablet 4     liothyronine (CYTOMEL) 5 MCG tablet Take 1 tablet (5 mcg) by mouth daily 90 tablet 4     liraglutide - Weight Management (SAXENDA) 18 MG/3ML pen Week 1- Inject 0.6 mg daily; Week 2- Inject 1.2 mg daily; Week 3- Inject 1.8 mg daily; Week 4- Inject 2.4 mg daily; Week 5 and thereafter- Inject 3.0 mg daily thereafter 15 mL 3     multivitamin (CENTRUM SILVER) tablet Take 1 tablet by mouth daily Centrum Silver 50+       pravastatin (PRAVACHOL) 40 MG tablet Take 1 tablet (40 mg) by mouth daily 90 tablet 3     traMADol (ULTRAM) 50 MG tablet Take 50 mg by mouth every 8 hours as needed         Weight Loss Medication History Reviewed With Patient 2/18/2023   Which weight loss medications are you currently taking on a regular basis?  None   If you are not taking a weight loss medication that was prescribed to you, please indicate why: The cost is too much for me       ASSESSMENT:   Ely Humphreys is a 72 year old female who I am continuing to see for treatment of obesity related to: Atrial fibrillation, hyperlipidemia, MIKEL, hyperlipidemia, hx of breast cancer    Work on diet modification.   Saxenda is not covered by insurance.  Exercise is limited due to ongoing back and hip pain    PLAN:   - trial of Wegovy. If not approved, may consider topiramate (discussed side effect)  - continue lifestyle modification  - exercise as tolerated    FOLLOW-UP:    4 months follow up    Joined the call at 2/21/2023, 10:01:39 am.  Left the call at 2/21/2023, 10:19:29 am.  You were on the call for 17 minutes 50 seconds .    External  notes/medical records independently reviewed, labs and imaging independently reviewed, medical management and tests to be discussed/communicated to patient.    Time: I spent 36 minutes spent on the date of the encounter preparing to see patient (including chart review and preparation), obtaining and or reviewing additional medical history, performing a physical exam and evaluation, documenting clinical information in the electronic health record, independently interpreting results, communicating results to the patient and coordinating care.    Sincerely,    Matti Hanson MD

## 2023-02-21 NOTE — PROGRESS NOTES
Virtual Visit Check-In    During this virtual visit the patient is located in [place], patient verifies this as the location during the entirety of this visit.     Ely is a 72 year old who is being evaluated via a billable video visit.      How would you like to obtain your AVS? MyChart  If the video visit is dropped, the invitation should be resent by: Text to cell phone: [phone]  Will anyone else be joining your video visit? No        Video-Visit Details    Originating Location (pt. Location): Home    Distant Location (provider location):  Off-site  Platform used for Video Visit: Valencia Estrada NREMT

## 2023-02-21 NOTE — NURSING NOTE
Chief Complaint   Patient presents with     Follow Up     Return weight management.         Vitals:    02/21/23 0938   Weight: 175 lb       Body mass index is 32.01 kg/m .      Ranjan Estrada, EMT  Surgery Clinic

## 2023-02-21 NOTE — LETTER
2023       RE: Ely Humphreys  [address] [place] [address]     Dear Colleague,    Thank you for referring your patient, Ely Humphreys, to the [facility] [place] at [facility]. Please see a copy of my visit note below.      Return Medical Weight Management Note     Ely Humphreys  MRN:  [id number]  :  1950  LIDIA:  2023    Dear Shadia Munroe MD,    I had the pleasure of seeing your patient Ely Humphreys. She is a 72 year old female who I am continuing to see for treatment of obesity related to: Atrial fibrillation, hyperlipidemia, MIKEL, hyperlipidemia, hx of breast cancer    Family hx of breast cancer and personal history of breast cancer       11/15/2022   I have the following health issues associated with obesity: Heart Disease, High Cholesterol, Sleep Apnea, Cancer   I have the following symptoms associated with obesity: Lower Extremity Swelling, Back Pain, Fatigue, Hip Pain     She has been gaining weight particularly after she went to menopause. Most of weight gain is in the central area. She tried to lose weight but has difficulty doing so. She could not exercise much due to back pain    INTERVAL HISTORY:  Last seen 2022    Still struggle with back and hip pain and she may need to have hip surgery. So she has not been doing a lot exercise. She has done some physical therapy.    She eats mostly healthy. She does not snack a lot and eat more protein and veggie. She does not get craving. Weight is down 4 lbs in 3 months.    Saxenda is not covered by insurance.    CURRENT WEIGHT:   175 lbs 0 oz    Initial Weight (lbs): 179 lbs  Last Visits Weight: 81.2 kg (179 lb)  Cumulative weight loss (lbs): 4  Weight Loss Percentage: 2.23%    Changes and Difficulties 2023   I have made the following changes to my activity/exercise since my last visit: I am being seen for back and hip issues.  Unable to walk  "distances       VITALS:  Ht 1.575 m (5' 2\")   Wt 79.4 kg (175 lb)   LMP  (LMP Unknown)   BMI 32.01 kg/m      MEDICATIONS:   Current Outpatient Medications   Medication Sig Dispense Refill     aspirin 81 MG EC tablet Take 1 tablet (81 mg) by mouth daily       Calcium-Phosphorus-Vitamin D (CALCIUM/VITAMIN D3/ADULT GUMMY PO) 2 gummies a day (500 mg of calcium 1000 units of vitamin D3) (Lazar brand)       gabapentin (NEURONTIN) 100 MG capsule Take 1 capsule (100 mg) by mouth At Bedtime 90 capsule 4     insulin pen needle (31G X 5 MM) 31G X 5 MM miscellaneous Use 1 pen needles daily or as directed. 100 each 3     levothyroxine (SYNTHROID/LEVOTHROID) 125 MCG tablet Take 1 tablet (125 mcg) by mouth daily 90 tablet 4     liothyronine (CYTOMEL) 5 MCG tablet Take 1 tablet (5 mcg) by mouth daily 90 tablet 4     liraglutide - Weight Management (SAXENDA) 18 MG/3ML pen Week 1- Inject 0.6 mg daily; Week 2- Inject 1.2 mg daily; Week 3- Inject 1.8 mg daily; Week 4- Inject 2.4 mg daily; Week 5 and thereafter- Inject 3.0 mg daily thereafter 15 mL 3     multivitamin (CENTRUM SILVER) tablet Take 1 tablet by mouth daily Centrum Silver 50+       pravastatin (PRAVACHOL) 40 MG tablet Take 1 tablet (40 mg) by mouth daily 90 tablet 3     traMADol (ULTRAM) 50 MG tablet Take 50 mg by mouth every 8 hours as needed         Weight Loss Medication History Reviewed With Patient 2/18/2023   Which weight loss medications are you currently taking on a regular basis?  None   If you are not taking a weight loss medication that was prescribed to you, please indicate why: The cost is too much for me       ASSESSMENT:   Ely Humphreys is a 72 year old female who I am continuing to see for treatment of obesity related to: Atrial fibrillation, hyperlipidemia, MIKEL, hyperlipidemia, hx of breast cancer    Work on diet modification.   Saxenda is not covered by insurance.  Exercise is limited due to ongoing back and hip pain    PLAN:   - trial of Wegovy. If " not approved, may consider topiramate (discussed side effect)  - continue lifestyle modification  - exercise as tolerated    FOLLOW-UP:    4 months follow up    Joined the call at 2/21/2023, 10:01:39 am.  Left the call at 2/21/2023, 10:19:29 am.  You were on the call for 17 minutes 50 seconds .    External notes/medical records independently reviewed, labs and imaging independently reviewed, medical management and tests to be discussed/communicated to patient.    Time: I spent 36 minutes spent on the date of the encounter preparing to see patient (including chart review and preparation), obtaining and or reviewing additional medical history, performing a physical exam and evaluation, documenting clinical information in the electronic health record, independently interpreting results, communicating results to the patient and coordinating care.    Sincerely,    Matti Hanson MD

## 2023-02-27 ENCOUNTER — TELEPHONE (OUTPATIENT)
Dept: ENDOCRINOLOGY | Facility: CLINIC | Age: 73
End: 2023-02-27
Payer: COMMERCIAL

## 2023-02-27 NOTE — TELEPHONE ENCOUNTER
Discussing with Dr. Matti Hanson - will contact patient for next steps when needed.     Lauren Bloch, PharmD, BCACP   Medication Therapy Management Pharmacist   [facility] [place]

## 2023-02-27 NOTE — TELEPHONE ENCOUNTER
Pt isn't sure if insurance has authorized weight management medication. Pt hasn't heard anything and is wondering if someone from [facility] can look into the process and let pt know. Pt doesn't think an appt is needed with Ria yet until medication is figured out.

## 2023-04-04 ENCOUNTER — VIRTUAL VISIT (OUTPATIENT)
Dept: ENDOCRINOLOGY | Facility: CLINIC | Age: 73
End: 2023-04-04
Payer: COMMERCIAL

## 2023-04-04 VITALS — BODY MASS INDEX: 32.2 KG/M2 | HEIGHT: 62 IN | WEIGHT: 175 LBS

## 2023-04-04 DIAGNOSIS — E66.09 CLASS 1 OBESITY DUE TO EXCESS CALORIES WITHOUT SERIOUS COMORBIDITY WITH BODY MASS INDEX (BMI) OF 32.0 TO 32.9 IN ADULT: Primary | ICD-10-CM

## 2023-04-04 DIAGNOSIS — E66.811 CLASS 1 OBESITY DUE TO EXCESS CALORIES WITHOUT SERIOUS COMORBIDITY WITH BODY MASS INDEX (BMI) OF 32.0 TO 32.9 IN ADULT: Primary | ICD-10-CM

## 2023-04-04 PROCEDURE — 99214 OFFICE O/P EST MOD 30 MIN: CPT | Mod: VID | Performed by: INTERNAL MEDICINE

## 2023-04-04 NOTE — PROGRESS NOTES
Virtual Visit Check-In    During this virtual visit the patient is located in [place], patient verifies this as the location during the entirety of this visit.     Ely is a 72 year old who is being evaluated via a billable video visit.      How would you like to obtain your AVS? MyChart  If the video visit is dropped, the invitation should be resent by: Text to cell phone: [phone]  Will anyone else be joining your video visit? No        Video-Visit Details    Originating Location (pt. Location): Home    Distant Location (provider location):  Off-site  Platform used for Video Visit: Valencia Mckinley, EMT

## 2023-04-04 NOTE — LETTER
2023       RE: Ely Humphreys  [address] [place] [address]     Dear Colleague,    Thank you for referring your patient, Ely Humphreys, to the [facility]NA[facility]. Please see a copy of my visit note below.        Return Medical Weight Management Note     Ely Humphreys  MRN:  [id number]  :  1950  LIDIA:  2023    Dear Shadia Munroe MD,    I had the pleasure of seeing your patient Ely Humphreys. She is a 72 year old female who I am continuing to see for treatment of obesity related to: Atrial fibrillation, hyperlipidemia, MIKEL, hyperlipidemia, hx of breast cancer    Family hx of breast cancer and personal history of breast cancer        11/15/2022     9:08 AM   --   I have the following health issues associated with obesity Heart Disease    High Cholesterol    Sleep Apnea    Cancer   I have the following symptoms associated with obesity Lower Extremity Swelling    Back Pain    Fatigue    Hip Pain     She has been gaining weight particularly after she went to menopause. Most of weight gain is in the central area. She tried to lose weight but has difficulty doing so. She could not exercise much due to back pain    INTERVAL HISTORY:  Last seen 2023    Saxenda and Wegovy are not covered by insurance.    Still struggle with back and hip pain and she may need to have hip surgery. So she has not been doing a lot exercise. She has done some physical therapy.    She eats mostly healthy. She has been snacking more than usual last week. Weight is about the same over the past 2 months.    CURRENT WEIGHT:   175 lbs 0 oz    Initial Weight (lbs): 179 lbs  Last Visits Weight: 79.4 kg (175 lb)  Cumulative weight loss (lbs): 4  Weight Loss Percentage: 2.23%        2023     2:07 PM   Changes and Difficulties   I have made the following changes to my diet since my last visit: It is still about the same.   "More protein orientated but I do eat fruit and whole grains   With regards to my diet, I am still struggling with: frustration and then I might snack   I have made the following changes to my activity/exercise since my last visit: about the same   With regards to my activity/exercise, I am still struggling with: I have been told I need surgery on  my back.  A pinched nerve causes my hip to ache and it is hard to walk or exercise.       VITALS:  Ht 1.575 m (5' 2\")   Wt 79.4 kg (175 lb)   LMP  (LMP Unknown)   BMI 32.01 kg/m      MEDICATIONS:   Current Outpatient Medications   Medication Sig Dispense Refill    aspirin 81 MG EC tablet Take 1 tablet (81 mg) by mouth daily      Calcium-Phosphorus-Vitamin D (CALCIUM/VITAMIN D3/ADULT GUMMY PO) 2 gummies a day (500 mg of calcium 1000 units of vitamin D3) (Lazar brand)      gabapentin (NEURONTIN) 100 MG capsule Take 1 capsule (100 mg) by mouth At Bedtime 90 capsule 4    levothyroxine (SYNTHROID/LEVOTHROID) 125 MCG tablet Take 1 tablet (125 mcg) by mouth daily 90 tablet 4    liothyronine (CYTOMEL) 5 MCG tablet Take 1 tablet (5 mcg) by mouth daily 90 tablet 4    multivitamin (CENTRUM SILVER) tablet Take 1 tablet by mouth daily Centrum Silver 50+      pravastatin (PRAVACHOL) 40 MG tablet Take 1 tablet (40 mg) by mouth daily 90 tablet 3    Semaglutide-Weight Management (WEGOVY) 0.25 MG/0.5ML pen Inject 0.25 mg Subcutaneous once a week 2 mL 0    Semaglutide-Weight Management (WEGOVY) 0.5 MG/0.5ML pen Inject 0.5 mg Subcutaneous once a week To be used after finishing 0.25 mg pen 2 mL 0    Semaglutide-Weight Management (WEGOVY) 1 MG/0.5ML pen Inject 1 mg Subcutaneous once a week To be used after finishing 0.5 mg pen 6 mL 3    traMADol (ULTRAM) 50 MG tablet Take 50 mg by mouth every 8 hours as needed             4/1/2023     2:07 PM   Weight Loss Medication History Reviewed With Patient   Which weight loss medications are you currently taking on a regular basis? None   If you " are not taking a weight loss medication that was prescribed to you, please indicate why: My insurance does not cover the cost       ASSESSMENT:   Ely Humphreys is a 72 year old female who I am continuing to see for treatment of obesity related to: Atrial fibrillation, hyperlipidemia, MIKEL, hyperlipidemia, hx of breast cancer    Work on diet modification.   Saxenda and Wegovy are not covered by insurance.  Exercise is limited due to ongoing back and hip pain    PLAN:   - trial of Wegovy. If not approved, may consider topiramate (discussed side effect)  - continue lifestyle modification  - exercise as tolerated    FOLLOW-UP:    4 months follow up    Joined the call at 4/4/2023, 3:59:17 pm.  Left the call at 4/4/2023, 4:17:54 pm.  You were on the call for 18 minutes 37 seconds .    External notes/medical records independently reviewed, labs and imaging independently reviewed, medical management and tests to be discussed/communicated to patient.    Time: I spent 30 minutes spent on the date of the encounter preparing to see patient (including chart review and preparation), obtaining and or reviewing additional medical history, performing a physical exam and evaluation, documenting clinical information in the electronic health record, independently interpreting results, communicating results to the patient and coordinating care.    Sincerely,    Matti Hanson MD

## 2023-04-04 NOTE — PATIENT INSTRUCTIONS
PLAN:   - trial of Wegovy. If not approved, may consider topiramate (discussed side effect)  - continue lifestyle modification  - exercise as tolerated    FOLLOW-UP:    4 months follow up    If you have any questions, please do not hesitate to call Weight management clinic at [phone] or [phone].  If you need to fax, please fax to [phone].    Sincerely,    Matti Hanson MD  Endocrinology

## 2023-04-04 NOTE — NURSING NOTE
Chief Complaint   Patient presents with     Follow Up     Return weight management.         Vitals:    04/04/23 1550   Weight: 175 lb       Body mass index is 32.01 kg/m .      Ranjan Estrada, EMT  Surgery Clinic

## 2023-04-04 NOTE — PROGRESS NOTES
Return Medical Weight Management Note     Ely Humphreys  MRN:  [id number]  :  1950  LIDIA:  2023    Dear Shadia Munroe MD,    I had the pleasure of seeing your patient Ely Humphreys. She is a 72 year old female who I am continuing to see for treatment of obesity related to: Atrial fibrillation, hyperlipidemia, MIKEL, hyperlipidemia, hx of breast cancer    Family hx of breast cancer and personal history of breast cancer        11/15/2022     9:08 AM   --   I have the following health issues associated with obesity Heart Disease    High Cholesterol    Sleep Apnea    Cancer   I have the following symptoms associated with obesity Lower Extremity Swelling    Back Pain    Fatigue    Hip Pain     She has been gaining weight particularly after she went to menopause. Most of weight gain is in the central area. She tried to lose weight but has difficulty doing so. She could not exercise much due to back pain    INTERVAL HISTORY:  Last seen 2023    Saxenda and Wegovy are not covered by insurance.    Still struggle with back and hip pain and she may need to have hip surgery. So she has not been doing a lot exercise. She has done some physical therapy.    She eats mostly healthy. She has been snacking more than usual last week. Weight is about the same over the past 2 months.    CURRENT WEIGHT:   175 lbs 0 oz    Initial Weight (lbs): 179 lbs  Last Visits Weight: 79.4 kg (175 lb)  Cumulative weight loss (lbs): 4  Weight Loss Percentage: 2.23%        2023     2:07 PM   Changes and Difficulties   I have made the following changes to my diet since my last visit: It is still about the same.  More protein orientated but I do eat fruit and whole grains   With regards to my diet, I am still struggling with: frustration and then I might snack   I have made the following changes to my activity/exercise since my last visit: about the same   With regards to my activity/exercise, I am still struggling with: I have  "been told I need surgery on  my back.  A pinched nerve causes my hip to ache and it is hard to walk or exercise.       VITALS:  Ht 1.575 m (5' 2\")   Wt 79.4 kg (175 lb)   LMP  (LMP Unknown)   BMI 32.01 kg/m      MEDICATIONS:   Current Outpatient Medications   Medication Sig Dispense Refill     aspirin 81 MG EC tablet Take 1 tablet (81 mg) by mouth daily       Calcium-Phosphorus-Vitamin D (CALCIUM/VITAMIN D3/ADULT GUMMY PO) 2 gummies a day (500 mg of calcium 1000 units of vitamin D3) (Lazar brand)       gabapentin (NEURONTIN) 100 MG capsule Take 1 capsule (100 mg) by mouth At Bedtime 90 capsule 4     levothyroxine (SYNTHROID/LEVOTHROID) 125 MCG tablet Take 1 tablet (125 mcg) by mouth daily 90 tablet 4     liothyronine (CYTOMEL) 5 MCG tablet Take 1 tablet (5 mcg) by mouth daily 90 tablet 4     multivitamin (CENTRUM SILVER) tablet Take 1 tablet by mouth daily Centrum Silver 50+       pravastatin (PRAVACHOL) 40 MG tablet Take 1 tablet (40 mg) by mouth daily 90 tablet 3     Semaglutide-Weight Management (WEGOVY) 0.25 MG/0.5ML pen Inject 0.25 mg Subcutaneous once a week 2 mL 0     Semaglutide-Weight Management (WEGOVY) 0.5 MG/0.5ML pen Inject 0.5 mg Subcutaneous once a week To be used after finishing 0.25 mg pen 2 mL 0     Semaglutide-Weight Management (WEGOVY) 1 MG/0.5ML pen Inject 1 mg Subcutaneous once a week To be used after finishing 0.5 mg pen 6 mL 3     traMADol (ULTRAM) 50 MG tablet Take 50 mg by mouth every 8 hours as needed             4/1/2023     2:07 PM   Weight Loss Medication History Reviewed With Patient   Which weight loss medications are you currently taking on a regular basis? None   If you are not taking a weight loss medication that was prescribed to you, please indicate why: My insurance does not cover the cost       ASSESSMENT:   Ely Humphreys is a 72 year old female who I am continuing to see for treatment of obesity related to: Atrial fibrillation, hyperlipidemia, MIKEL, hyperlipidemia, hx of " breast cancer    Work on diet modification.   Saxenda and Wegovy are not covered by insurance.  Exercise is limited due to ongoing back and hip pain    PLAN:   - trial of Wegovy. If not approved, may consider topiramate (discussed side effect)  - continue lifestyle modification  - exercise as tolerated    FOLLOW-UP:    4 months follow up    Joined the call at 4/4/2023, 3:59:17 pm.  Left the call at 4/4/2023, 4:17:54 pm.  You were on the call for 18 minutes 37 seconds .    External notes/medical records independently reviewed, labs and imaging independently reviewed, medical management and tests to be discussed/communicated to patient.    Time: I spent 30 minutes spent on the date of the encounter preparing to see patient (including chart review and preparation), obtaining and or reviewing additional medical history, performing a physical exam and evaluation, documenting clinical information in the electronic health record, independently interpreting results, communicating results to the patient and coordinating care.    Sincerely,    Matti Hanson MD

## 2023-04-07 ENCOUNTER — TELEPHONE (OUTPATIENT)
Dept: ENDOCRINOLOGY | Facility: CLINIC | Age: 73
End: 2023-04-07

## 2023-04-07 DIAGNOSIS — E66.09 CLASS 1 OBESITY DUE TO EXCESS CALORIES WITHOUT SERIOUS COMORBIDITY WITH BODY MASS INDEX (BMI) OF 32.0 TO 32.9 IN ADULT: Primary | ICD-10-CM

## 2023-04-07 DIAGNOSIS — E66.811 CLASS 1 OBESITY DUE TO EXCESS CALORIES WITHOUT SERIOUS COMORBIDITY WITH BODY MASS INDEX (BMI) OF 32.0 TO 32.9 IN ADULT: Primary | ICD-10-CM

## 2023-04-07 RX ORDER — TOPIRAMATE 25 MG/1
TABLET, FILM COATED ORAL
Qty: 90 TABLET | Refills: 1 | Status: SHIPPED | OUTPATIENT
Start: 2023-04-07 | End: 2023-05-04

## 2023-04-07 NOTE — TELEPHONE ENCOUNTER
PRIOR AUTHORIZATION DENIED    Medication: topiramate (TOPAMAX) 25 MG tablet - EPA DENIED    Denial Date: 4/7/2023    Denial Rational: ALL MEDICATIONS USED FOR WEIGHT-LOSS ARE EXCLUDED BY PLAN      Appeal Information:

## 2023-05-01 NOTE — ANESTHESIA POSTPROCEDURE EVALUATION
Patient: Ely Humphreys    * No procedures listed *    Diagnosis:* No pre-op diagnosis entered *  Diagnosis Additional Information: No value filed.    Anesthesia Type:  General    Note:  Anesthesia Post Evaluation    Patient location during evaluation: PACU  Patient participation: Able to fully participate in evaluation  Level of consciousness: awake and alert  Pain management: satisfactory to patient  Airway patency: patent  Cardiovascular status: hemodynamically stable  Respiratory status: acceptable and unassisted  Hydration status: balanced  PONV: none     Anesthetic complications: None          Last vitals:  Vitals:    09/19/17 1215 09/19/17 1220 09/19/17 1225   BP: 143/85 (!) 133/102 129/86   Resp: 16 15 11   Temp:      SpO2: 96% 97% 98%         Electronically Signed By: Kei Anne MD  September 19, 2017  1:47 PM   Cimetidine Counseling:  I discussed with the patient the risks of Cimetidine including but not limited to gynecomastia, headache, diarrhea, nausea, drowsiness, arrhythmias, pancreatitis, skin rashes, psychosis, bone marrow suppression and kidney toxicity.

## 2023-05-04 ENCOUNTER — OFFICE VISIT (OUTPATIENT)
Dept: PEDIATRICS | Facility: CLINIC | Age: 73
End: 2023-05-04
Payer: COMMERCIAL

## 2023-05-04 VITALS
BODY MASS INDEX: 33.31 KG/M2 | TEMPERATURE: 98.6 F | HEART RATE: 72 BPM | DIASTOLIC BLOOD PRESSURE: 64 MMHG | WEIGHT: 182.1 LBS | SYSTOLIC BLOOD PRESSURE: 108 MMHG | OXYGEN SATURATION: 99 % | RESPIRATION RATE: 12 BRPM

## 2023-05-04 DIAGNOSIS — G47.33 OSA (OBSTRUCTIVE SLEEP APNEA): ICD-10-CM

## 2023-05-04 DIAGNOSIS — H26.9 CATARACT OF BOTH EYES, UNSPECIFIED CATARACT TYPE: ICD-10-CM

## 2023-05-04 DIAGNOSIS — Z01.818 PREOP GENERAL PHYSICAL EXAM: Primary | ICD-10-CM

## 2023-05-04 PROCEDURE — 99214 OFFICE O/P EST MOD 30 MIN: CPT | Performed by: INTERNAL MEDICINE

## 2023-05-04 RX ORDER — VIT C/B6/B5/MAGNESIUM/HERB 173 50-5-6-5MG
500 CAPSULE ORAL DAILY
COMMUNITY

## 2023-05-04 ASSESSMENT — PAIN SCALES - GENERAL: PAINLEVEL: NO PAIN (0)

## 2023-05-04 NOTE — PROGRESS NOTES
[facility]  [address]  [address]  [place] [address]  Phone: [phone]  Fax: [phone]  Primary Provider: Shadia Munroe  Pre-op Performing Provider: SCOTTIE DON      PREOPERATIVE EVALUATION:  Today's date: 5/4/2023    Ely Humphreys is a 72 year old female who presents for a preoperative evaluation.      Surgical Information:  Surgery/Procedure: Cataracts   Surgery Location: [facility] [place]   Surgeon: Dr. Soto   Surgery Date: 05/11/2023 and 05/25/2023  Time of Surgery: NA   Where patient plans to recover: At home with family  Fax number for surgical facility: [phone]    Assessment & Plan     The proposed surgical procedure is considered LOW risk.    (Z01.818) Preop general physical exam  (primary encounter diagnosis)    (H26.9) Cataract of both eyes, unspecified cataract type    (G47.33) MIKEL (obstructive sleep apnea)  History of sleep apnea, on CPAP.  Recommend postoperative CR monitor.  At risk for hypoxia and arrhythmias.        - No identified additional risk factors other than previously addressed      RECOMMENDATION:  APPROVAL GIVEN to proceed with proposed procedure, without further diagnostic evaluation.      Subjective     HPI related to upcoming procedure: Ely Humphreys is a 72 year old who presents for preoperative evaluation as requested by Dr Soto prior to bilateral cataract surgeries.         4/30/2023     2:10 PM   Preop Questions   1. Have you ever had a heart attack or stroke? No   2. Have you ever had surgery on your heart or blood vessels, such as a stent placement, a coronary artery bypass, or surgery on an artery in your head, neck, heart, or legs? YES - ablation for afib   3. Do you have chest pain with activity? No   4. Do you have a history of  heart failure? No   5. Do you currently have a cold, bronchitis or symptoms of other infection? No   6. Do you have a cough, shortness of breath, or wheezing? No    7. Do you or anyone in your family have previous history of blood clots? YES - father.  Pt has no hx of blood clots   8. Do you or does anyone in your family have a serious bleeding problem such as prolonged bleeding following surgeries or cuts? No   9. Have you ever had problems with anemia or been told to take iron pills? No   10. Have you had any abnormal blood loss such as black, tarry or bloody stools, or abnormal vaginal bleeding? No   11. Have you ever had a blood transfusion? No   12. Are you willing to have a blood transfusion if it is medically needed before, during, or after your surgery? Yes   13. Have you or any of your relatives ever had problems with anesthesia? No   14. Do you have sleep apnea, excessive snoring or daytime drowsiness? YES - on CPAP   14a. Do you have a CPAP machine? Yes   15. Do you have any artifical heart valves or other implanted medical devices like a pacemaker, defibrillator, or continuous glucose monitor? No   16. Do you have artificial joints? No   17. Are you allergic to latex? No       Health Care Directive:  Patient has a Health Care Directive on file      Preoperative Review of :   reviewed - no record of controlled substances prescribed.        Review of Systems  Constitutional, neuro, ENT, endocrine, pulmonary, cardiac, gastrointestinal, genitourinary, musculoskeletal, integument and psychiatric systems are negative, except as otherwise noted.    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 11/21/2017 EF 40-45%. 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 Hodgkin       Hypothyroidism, unspecified type 08/29/2016     Priority: Medium     Meralgia paresthetica 07/30/2012     Priority: Medium     ACP (advance care planning) 10/19/2011     Priority: Medium     Advance Care Planning 2/19/2017: Receipt of ACP document:  Received: Health Care Directive which was witnessed or notarized on 11/17/14.  Document previously scanned on 12/7/16.  Validation form completed and sent to be scanned.  Code Status reflects choices in most recent ACP document.  Confirmed/documented designated decision maker(s).  Added by Ericka Baker   Advance Care Planning Liaison  Advance Care Planning 11/15/2016: ACP Review of Chart / Resources Provided:  Reviewed chart for advance care plan.  Ely Humphreys has no plan or code status on file however states presence of ACP document. Copy requested. Code status updated and sent to provider for review pending receipt of document/advance care plan review.  Confirmed/documented legally designated decision makers.  Added by Bailey Ndiaye  Advance Care Planning 10/19/2011:  Patient states has Advance Directive and will bring in a copy to clinic.        Health Care Home 08/22/2011     Priority: Medium     X  EMERGENCY CARE PLAN  Presenting Problem Signs and Symptoms Treatment Plan    Questions or conerns during clinic hours    I will call the clinic directly     Questions or conerns outside clinic hours    I will call the 24 hour nurse line at [phone]    Patient needs to schedule an appointment    I will call the 24 hour scheduling team at [phone] or clinic directly    Same day treatment     I will call the clinic first, nurse line if after hours, urgent care and express care if needed                             DX V65.8 REPLACED WITH [id number] [facility] CARE HOME (04/08/2013)       Pure hypercholesterolemia 02/10/2010     Priority: Medium     Postmenopausal atrophic vaginitis 02/21/2007     Priority: Medium     Osteopenia of multiple sites 07/27/2004     Priority: Medium      Past Medical History:   Diagnosis Date     Abnormal Pap smear 11/16/2010     Atrial fibrillation s/p ablation 10/07/2019    CHADsVASc is 2 for gender and age. Cardiology stopped eliquis after discussion on 2/26/20.      Benign essential tremor     Chronic     Hyperlipidemia LDL goal <160 02/10/2010     Invasive ductal carcinoma of breast (H) 06/2009    left breast ca     Major depressive disorder, recurrent episode, in full remission (H) 10/19/2011    Stable for decades     osteopenia 2002     Palpitations      Persistent atrial fibrillation (H) 05/10/2017     Unspecified hypothyroidism      Past Surgical History:   Procedure Laterality Date     ANESTHESIA CARDIOVERSION N/A 11/28/2017    Procedure: ANESTHESIA CARDIOVERSION;  Cardioversion;  Surgeon: GENERIC ANESTHESIA PROVIDER;  Location: [facility] OR     BACK SURGERY  10-    an ablation on lower spine     C THYROID ABLATION       CARDIAC SURGERY  2017    ablasion     COLONOSCOPY  10/2003     COLONOSCOPY  05/09/2014     COLONOSCOPY  06/09/2014    Procedure: COLONOSCOPY;  Surgeon: Garrett Villaseñor MD;  Location:  GI     ENHANCE LASER REFRACTIVE BILATERAL EXISTING PT IN PARAMETERS Bilateral 2008     HC EXCISION BREAST LESION, OPEN >=1  07/2009    re-excision 8/17/09     ORTHOPEDIC SURGERY  2016    torn meniscus     ZZC NONSPECIFIC PROCEDURE      Tubal Ligation    Abstracted 07/12/02     ZZ ECHO HEART XTHORACIC, STRESS/REST  07/22/2009    normal     Current Outpatient Medications   Medication Sig Dispense Refill     aspirin 81 MG EC tablet Take 1 tablet (81 mg) by mouth daily       Calcium-Phosphorus-Vitamin D (CALCIUM/VITAMIN D3/ADULT GUMMY PO) 2 gummies a day (500 mg of  calcium 1000 units of vitamin D3) (Lazar brand)       gabapentin (NEURONTIN) 100 MG capsule Take 1 capsule (100 mg) by mouth At Bedtime 90 capsule 4     levothyroxine (SYNTHROID/LEVOTHROID) 125 MCG tablet Take 1 tablet (125 mcg) by mouth daily 90 tablet 4     liothyronine (CYTOMEL) 5 MCG tablet Take 1 tablet (5 mcg) by mouth daily 90 tablet 4     multivitamin (CENTRUM SILVER) tablet Take 1 tablet by mouth daily Centrum Silver 50+       pravastatin (PRAVACHOL) 40 MG tablet Take 1 tablet (40 mg) by mouth daily 90 tablet 3     topiramate (TOPAMAX) 25 MG tablet 25 mg at bedtime for 1 week, 50 mg at bedtime thereafter 90 tablet 1     traMADol (ULTRAM) 50 MG tablet Take 50 mg by mouth every 8 hours as needed         Allergies   Allergen Reactions     Amiodarone Other (See Comments)     Loss of vision in R eye.        Social History     Tobacco Use     Smoking status: Never     Smokeless tobacco: Never     Tobacco comments:     none   Vaping Use     Vaping status: Never Used   Substance Use Topics     Alcohol use: Yes     Comment: 1 or 2 glasses of wine most evenings       History   Drug Use No         Objective     LMP  (LMP Unknown)     Physical Exam    GENERAL APPEARANCE: healthy, alert and no distress     EYES: EOMI, PERRL     HENT: ear canals and TM's normal and nose and mouth without ulcers or lesions     NECK: no adenopathy, no asymmetry, masses, or scars and thyroid normal to palpation     RESP: lungs clear to auscultation - no rales, rhonchi or wheezes     CV: regular rates and rhythm, normal S1 S2, no S3 or S4 and no murmur, click or rub     ABDOMEN:  soft, nontender, no HSM or masses and bowel sounds normal     MS: extremities normal- no gross deformities noted, no evidence of inflammation in joints, FROM in all extremities.     NEURO: Normal strength and tone, sensory exam grossly normal, mentation intact and speech normal     PSYCH: mentation appears normal. and affect normal/bright     LYMPHATICS: No  cervical adenopathy    Recent Labs   Lab Test 09/08/21  1209      POTASSIUM 4.4   CR 0.84        Diagnostics:  No labs were ordered during this visit.   No EKG required for low risk surgery (cataract, skin procedure, breast biopsy, etc).    Revised Cardiac Risk Index (RCRI):  The patient has the following serious cardiovascular risks for perioperative complications:   - No serious cardiac risks = 0 points     RCRI Interpretation: 0 points: Class I (very low risk - 0.4% complication rate)           Signed Electronically by: Alvino Brush MD  Copy of this evaluation report is provided to requesting physician.

## 2023-05-04 NOTE — PATIENT INSTRUCTIONS
For informational purposes only. Not to replace the advice of your health care provider. Copyright   2003,  [employer] [facility]. All rights reserved. Clinically reviewed by Harleen Morton MD. Linkyt [id number] - REV .  Preparing for Your Surgery  Getting started  A nurse will call you to review your health history and instructions. They will give you an arrival time based on your scheduled surgery time. Please be ready to share:  Your doctor's clinic name and phone number  Your medical, surgical, and anesthesia history  A list of allergies and sensitivities  A list of medicines, including herbal treatments and over-the-counter drugs  Whether the patient has a legal guardian (ask how to send us the papers in advance)  Please tell us if you're pregnant--or if there's any chance you might be pregnant. Some surgeries may injure a fetus (unborn baby), so they require a pregnancy test. Surgeries that are safe for a fetus don't always need a test, and you can choose whether to have one.   If you have a child who's having surgery, please ask for a copy of Preparing for Your Child's Surgery.    Preparing for surgery  Within 10 to 30 days of surgery: Have a pre-op exam (sometimes called an H&P, or History and Physical). This can be done at a clinic or pre-operative center.  If you're having a , you may not need this exam. Talk to your care team.  At your pre-op exam, talk to your care team about all medicines you take. If you need to stop any medicines before surgery, ask when to start taking them again.  We do this for your safety. Many medicines can make you bleed too much during surgery. Some change how well surgery (anesthesia) drugs work.  Call your insurance company to let them know you're having surgery. (If you don't have insurance, call [phone].)  Call your clinic if there's any change in your health. This includes signs of a cold or flu (sore throat, runny nose, cough, rash, fever). It  also includes a scrape or scratch near the surgery site.  If you have questions on the day of surgery, call your hospital or surgery center.  Eating and drinking guidelines  For your safety: Unless your surgeon tells you otherwise, follow the guidelines below.  Eat and drink as usual until 8 hours before you arrive for surgery. After that, no food or milk.  Drink clear liquids until 2 hours before you arrive. These are liquids you can see through, like water, Gatorade, and Propel Water. They also include plain black coffee and tea (no cream or milk), candy, and breath mints. You can spit out gum when you arrive.  If you drink alcohol: Stop drinking it the night before surgery.  If your care team tells you to take medicine on the morning of surgery, it's okay to take it with a sip of water.  Preventing infection  Shower or bathe the night before and morning of your surgery. Follow the instructions your clinic gave you. (If no instructions, use regular soap.)  Don't shave or clip hair near your surgery site. We'll remove the hair if needed.  Don't smoke or vape the morning of surgery. You may chew nicotine gum up to 2 hours before surgery. A nicotine patch is okay.  Note: Some surgeries require you to completely quit smoking and nicotine. Check with your surgeon.  Your care team will make every effort to keep you safe from infection. We will:  Clean our hands often with soap and water (or an alcohol-based hand rub).  Clean the skin at your surgery site with a special soap that kills germs.  Give you a special gown to keep you warm. (Cold raises the risk of infection.)  Wear special hair covers, masks, gowns and gloves during surgery.  Give antibiotic medicine, if prescribed. Not all surgeries need antibiotics.  What to bring on the day of surgery  Photo ID and insurance card  Copy of your health care directive, if you have one  Glasses and hearing aids (bring cases)  You can't wear contacts during surgery  Inhaler and  eye drops, if you use them (tell us about these when you arrive)  CPAP machine or breathing device, if you use them  A few personal items, if spending the night  If you have . . .  A pacemaker, ICD (cardiac defibrillator) or other implant: Bring the ID card.  An implanted stimulator: Bring the remote control.  A legal guardian: Bring a copy of the certified (court-stamped) guardianship papers.  Please remove any jewelry, including body piercings. Leave jewelry and other valuables at home.  If you're going home the day of surgery  You must have a responsible adult drive you home. They should stay with you overnight as well.  If you don't have someone to stay with you, and you aren't safe to go home alone, we may keep you overnight. Insurance often won't pay for this.  After surgery  If it's hard to control your pain or you need more pain medicine, please call your surgeon's office.  Questions?   If you have any questions for your care team, list them here: _________________________________________________________________________________________________________________________________________________________________________ ____________________________________ ____________________________________ ____________________________________

## 2023-05-22 ENCOUNTER — VIRTUAL VISIT (OUTPATIENT)
Dept: PHARMACY | Facility: CLINIC | Age: 73
End: 2023-05-22
Payer: COMMERCIAL

## 2023-05-22 DIAGNOSIS — E78.00 PURE HYPERCHOLESTEROLEMIA: ICD-10-CM

## 2023-05-22 DIAGNOSIS — E66.811 CLASS 1 OBESITY DUE TO EXCESS CALORIES IN ADULT, UNSPECIFIED BMI, UNSPECIFIED WHETHER SERIOUS COMORBIDITY PRESENT: Primary | ICD-10-CM

## 2023-05-22 DIAGNOSIS — E66.09 CLASS 1 OBESITY DUE TO EXCESS CALORIES IN ADULT, UNSPECIFIED BMI, UNSPECIFIED WHETHER SERIOUS COMORBIDITY PRESENT: Primary | ICD-10-CM

## 2023-05-22 DIAGNOSIS — E03.9 HYPOTHYROIDISM, UNSPECIFIED TYPE: ICD-10-CM

## 2023-05-22 DIAGNOSIS — M85.89 OSTEOPENIA OF MULTIPLE SITES: ICD-10-CM

## 2023-05-22 DIAGNOSIS — I77.810 ACQUIRED DILATION OF ASCENDING AORTA AND AORTIC ROOT (H): ICD-10-CM

## 2023-05-22 DIAGNOSIS — M79.2 NERVE PAIN: ICD-10-CM

## 2023-05-22 PROCEDURE — 99605 MTMS BY PHARM NP 15 MIN: CPT | Performed by: PHARMACIST

## 2023-05-22 PROCEDURE — 99607 MTMS BY PHARM ADDL 15 MIN: CPT | Performed by: PHARMACIST

## 2023-05-22 RX ORDER — TOPIRAMATE 50 MG/1
TABLET, FILM COATED ORAL
Qty: 30 TABLET | Refills: 2 | Status: SHIPPED | OUTPATIENT
Start: 2023-05-22 | End: 2023-07-17

## 2023-05-22 NOTE — PROGRESS NOTES
Medication Therapy Management (MTM) Encounter    ASSESSMENT:                            Medication Adherence/Access: No issues identified    Weight Management: Needs improvement.  Patient would benefit from considering alternative weight loss medication options.  Due to current status of nutrition and eating behaviors, could consider topiramate.  Due to patient having Medicare part D and not covering weight loss medications if insurance does not pay for topiramate it is lower in cost.     Nerve Pain: Stable.     Hyperlipidemia/Right Carotid Stenosis: Last LDL not at goal < 70. Would benefit from Lipid repeat as due.     Hypothyroidism: Stable. TSH within normal limits.     Osteopenia: Stable.       PLAN:                            1. Start topiramate 50 mg tablet: 1/2 tablet by mouth daily in the evening for 2 weeks, then increase to 1 tablet by mouth daily in the evening thereafter.    2.  Get lipid panel completed as due.    Follow-up: Return in about 6 weeks (around 7/3/2023) for Medication Therapy Management Pharmacist Visit, (scheduled today).    SUBJECTIVE/OBJECTIVE:                          Ely Humphreys is a 73 year old female called for an initial visit. She was referred to me from Dr. Matti Hanson.      Reason for visit: Would like to discuss weight management medication options.    Allergies/ADRs: Reviewed in chart  Past Medical History: Reviewed in chart  Tobacco: She reports that she has never smoked. She has never used smokeless tobacco.  Alcohol: not currently using    Medication Adherence/Access: no issues reported    Weight Management:  Topiramate 25 mg once daily - not taking     Followed by Dr. Matti Hanson, seen 4/4/2023 for Return Medical Weight Management. Patient was trying to get glp1 agonist started, but as patient has Medicare and no diagnosis of prediabetes or type 2 diabetes, was not able to start.  Patient is frustrated by her lack of progress with weight loss.  She  "reports that issues started when she went through menopause and then had greater issues of weight gain when she was put on estrogen blocking medications after breast cancer.  Is willing to consider other medications but was unsure about cost associated.  She reports that she generally eats healthfully but she does have wanting her cravings for particular foods at times.  She reports wanting to exercise more but difficulties with this due to current status of pain.  She is potentially going to be undergoing back surgery in the future. No history of glaucoma or kidney stones.     Wt Readings from Last 4 Encounters:   05/04/23 182 lb 1.6 oz (82.6 kg)   04/04/23 175 lb (79.4 kg)   02/21/23 175 lb (79.4 kg)   11/15/22 179 lb (81.2 kg)     Estimated body mass index is 33.31 kg/m  as calculated from the following:    Height as of 4/4/23: 5' 2\" (1.575 m).    Weight as of 5/4/23: 182 lb 1.6 oz (82.6 kg).    Pain:   Gabapentin 100 mg bedtime  Tramadol 50 mg daily as needed  Turmeric daily     Because she does note that rarely takes tramadol for pain.  She tries not to use it can make her tired.  No medication side effects.  Is effective when she has to rarely use.  Since chemotherapy from breast cancer, she had issues of twitching/spasming so started gabapentin. Gabapentin effective. Does denote drowsiness when wakes up the next day, but manageable. Does use a CPAP and uses nightly.     Hyperlipidemia/Right Carotid Stenosis:   Pravastatin 40mg daily  Aspirin 81 mg daily    Patient reports no significant myalgias or other side effects. No issues bleeding or bruising. Failed atorvastatin and rosuvastatin due to intolerance.     Recent Labs   Lab Test 03/15/22  1043 12/20/21  0917 11/15/16  1030 11/09/15  1047   CHOL 245* 285*   < > 256*   HDL 68 74   < > 77   * 192*   < > 158*   TRIG 119 93   < > 103   CHOLHDLRATIO  --   --   --  3.3    < > = values in this interval not displayed.     Hypothyroidism:   Levothyroxine 125 " mcg daily in AM   Cytomel 5 mcg daily in AM     Takes in the morning. Patient is having the following symptoms: none.  TSH   Date Value Ref Range Status   11/09/2022 0.73 0.40 - 4.00 mU/L Final   02/16/2021 0.51 0.40 - 4.00 mU/L Final     T4 Free   Date Value Ref Range Status   11/04/2020 1.49 (H) 0.76 - 1.46 ng/dL Final     Free T4   Date Value Ref Range Status   11/09/2022 1.11 0.76 - 1.46 ng/dL Final     Osteopenia:   Calcium 500 mg/Vitamin D 1000 units daily   Vitamin D 1000 international unit(s) twice weekly   Centrum Silver 50+ multivitamin daily (1000 units of vitamin D and calcium 220 mg)     Patient is experiencing side effects. Patient is getting the following sources of dietary calcium: green leafy vegetables  DEXA 11/28/2022 FINDINGS:               Lumbar Spine (L1-L4)      T-score:  -1.2, marked degenerative changes present                Left Femoral Neck            T-score:  -1.6               Right Femoral Neck          T-score:  -1.4    Vitamin D Deficiency Screening Results:  Lab Results   Component Value Date    VITDT 65 03/15/2022    VITDT 85 (H) 12/20/2021     ----------------  I spent 20 minutes with this patient today. All changes were made via collaborative practice agreement with Dr. Matti Hanson. A copy of the visit note was provided to the patient's provider(s).    A summary of these recommendations was sent via clinic portal.    Lauren Bloch, PharmD, BCACP   Medication Therapy Management Pharmacist   [facility]      Telemedicine Visit Details  Type of service:  Telephone visit  Start Time: 9:34 AM  End Time: 10:04 AM     Medication Therapy Recommendations  Class 1 obesity due to excess calories in adult, unspecified BMI, unspecified whether serious comorbidity present    Rationale: Untreated condition - Needs additional medication therapy - Indication   Recommendation: Start Medication - topiramate 25 MG tablet   Status: Accepted per Provider          Pure hypercholesterolemia    Current Medication: pravastatin (PRAVACHOL) 40 MG tablet   Rationale: Medication requires monitoring - Needs additional monitoring   Recommendation: Order Lab   Status: Patient Agreed - Adherence/Education

## 2023-05-22 NOTE — Clinical Note
MWM - started low dose topiramte after our discussion, will check in on her in 6 weeks to see how she is doing.   Lauren Bloch, PharmD, BCACP  Medication Therapy Management Pharmacist  [facility] [place]

## 2023-05-22 NOTE — LETTER
May 24, 2023  Ely Humphreys  [address] [place] [address]    Dear Ms. Humphreys, [facility]     Thank you for talking with me on May 22, 2023 about your health and medications. As a follow-up to our conversation, I have included two documents:      Your Recommended To-Do List has steps you should take to get the best results from your medications.  Your Medication List will help you keep track of your medications and how to take them.    If you want to talk about these documents, please call Lauren T. Bloch, RPH at phone: [phone], Monday-Friday 8-4:30pm.    I look forward to working with you and your doctors to make sure your medications work well for you.    Sincerely,  Lauren T. Bloch, RPH  [facility] Pharmacist, [facility]

## 2023-05-22 NOTE — LETTER
"Recommended To-Do List      Prepared on: May 22, 2023       You can get the best results from your medications by completing the items on this \"To-Do List.\"      Bring your To-Do List when you go to your doctor. And, share it with your family or caregivers.    My To-Do List:  What we talked about: What I should do:   A new medication that may be of benefit to you    Start taking topiramate (TOPAMAX) 50 mg tablet: one-half tablet (25 mg) once daily in evening for 2 weeks, then increase to 1 tablet (50 mg) once daily in evening thereafter.           What we talked about: What I should do:   Needing additional monitoring    Get the following lab test(s): get your cholesterol lab completed            What we talked about: What I should do:                     "

## 2023-05-22 NOTE — LETTER
_  Medication List        Prepared on: May 22, 2023     Bring your Medication List when you go to the doctor, hospital, or   emergency room. And, share it with your family or caregivers.     Note any changes to how you take your medications.  Cross out medications when you no longer use them.    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 Shadia Munroe MD   Calcium-Phosphorus-Vitamin D (CALCIUM/VITAMIN D3/ADULT GUMMY PO) Take 2 chew tab by mouth daily 2 gummies a day (500 mg of calcium 1000 units of vitamin D3) (Lazar brand) Osteopenia Patient Reported   gabapentin (NEURONTIN) 100 MG capsule Take 1 capsule (100 mg) by mouth At Bedtime Meralgia paresthetica of right side Shadia Munroe MD   levothyroxine (SYNTHROID/LEVOTHROID) 125 MCG tablet Take 1 tablet (125 mcg) by mouth daily Hypothyroidism, unspecified type Shadia Munroe MD   liothyronine (CYTOMEL) 5 MCG tablet Take 1 tablet (5 mcg) by mouth daily Hypothyroidism, unspecified type Shadia Munroe MD   multivitamin (CENTRUM SILVER) tablet Take 1 tablet by mouth daily Centrum Silver 50+  General Health  Patient Reported   pravastatin (PRAVACHOL) 40 MG tablet Take 1 tablet (40 mg) by mouth daily Pure Hypercholesterolemia Shadia Munroe MD   topiramate (TOPAMAX) 50 MG tablet Take one-half tablet by mouth daily with supper for 2 weeks, then can increase one tablet once daily with supper. Class 1 obesity due to excess calories in adult, unspecified BMI, unspecified whether serious comorbidity present Matti Hanson MD   traMADol (ULTRAM) 50 MG tablet Take 50 mg by mouth every 8 hours as needed  Pain Shadia Munroe MD   Turmeric (QC TUMERIC COMPLEX) 500 MG CAPS Take 500 mg by mouth daily Inflammation Patient Reported         Add new medications, over-the-counter drugs, herbals, vitamins, or  minerals in the blank rows  below.    Medication How I take it Why I use it Prescriber                                      Allergies:      amiodarone        Side effects I have had:               Other Information:              My notes and questions:

## 2023-05-23 NOTE — PATIENT INSTRUCTIONS
"Recommendations from today's MTM visit:                                                    MTM (medication therapy management) is a service provided by a clinical pharmacist designed to help you get the most of out of your medicines.   Today we reviewed what your medicines are for, how to know if they are working, that your medicines are safe and how to make your medicine regimen as easy as possible.      1. Start topiramate 50 mg tablet: 1/2 tablet by mouth daily in the evening for 2 weeks, then increase to 1 tablet by mouth daily in the evening thereafter.    2.  Get lipid panel completed as due.    Follow-up: Return in about 6 weeks (around 7/3/2023) for Medication Therapy Management Pharmacist Visit, (scheduled today).    It was great speaking with you today.  I value your experience and would be very thankful for your time in providing feedback in our clinic survey. In the next few days, you may receive an email or text message from [employer] with a link to a survey related to your  clinical pharmacist.\"     To schedule another appointment with your MTM pharmacist, please call [facility] Weight Management Scheduling at [phone].     My Clinical Pharmacist's contact information:                                                      Please feel free to contact me with any questions or concerns you have.      Lauren Bloch, PharmD  Medication Therapy Management Pharmacist   [facility] [place]             "

## 2023-06-14 ENCOUNTER — TRANSFERRED RECORDS (OUTPATIENT)
Dept: HEALTH INFORMATION MANAGEMENT | Facility: CLINIC | Age: 73
End: 2023-06-14
Payer: COMMERCIAL

## 2023-07-11 ENCOUNTER — PATIENT OUTREACH (OUTPATIENT)
Dept: CARE COORDINATION | Facility: CLINIC | Age: 73
End: 2023-07-11
Payer: COMMERCIAL

## 2023-07-17 ENCOUNTER — VIRTUAL VISIT (OUTPATIENT)
Dept: PHARMACY | Facility: CLINIC | Age: 73
End: 2023-07-17
Payer: COMMERCIAL

## 2023-07-17 VITALS — BODY MASS INDEX: 31.01 KG/M2 | HEIGHT: 63 IN | WEIGHT: 175 LBS

## 2023-07-17 DIAGNOSIS — E66.811 CLASS 1 OBESITY DUE TO EXCESS CALORIES IN ADULT, UNSPECIFIED BMI, UNSPECIFIED WHETHER SERIOUS COMORBIDITY PRESENT: Primary | ICD-10-CM

## 2023-07-17 DIAGNOSIS — M79.2 NERVE PAIN: ICD-10-CM

## 2023-07-17 DIAGNOSIS — E66.09 CLASS 1 OBESITY DUE TO EXCESS CALORIES IN ADULT, UNSPECIFIED BMI, UNSPECIFIED WHETHER SERIOUS COMORBIDITY PRESENT: Primary | ICD-10-CM

## 2023-07-17 PROCEDURE — 99606 MTMS BY PHARM EST 15 MIN: CPT | Performed by: PHARMACIST

## 2023-07-17 ASSESSMENT — PAIN SCALES - GENERAL: PAINLEVEL: MILD PAIN (2)

## 2023-07-17 NOTE — PATIENT INSTRUCTIONS
"Recommendations from today's MTM visit:                                                       1. Continue current regimen.     Follow-up: Return if needed in the future, for Medication Therapy Management Pharmacist Visit.    It was great speaking with you today.  I value your experience and would be very thankful for your time in providing feedback in our clinic survey. In the next few days, you may receive an email or text message from [facility] with a link to a survey related to your  clinical pharmacist.\"     To schedule another appointment with your [facility] pharmacist, please call [facility] Weight Management Scheduling at [phone].     My Clinical Pharmacist's contact information:                                                      Please feel free to contact me with any questions or concerns you have.      Lauren Bloch, PharmD  Medication Therapy Management Pharmacist   [facility] [place]             "

## 2023-07-17 NOTE — PROGRESS NOTES
"Medication Therapy Management (MTM) Encounter    ASSESSMENT:                            Medication Adherence/Access: No issues identified    Weight Management:  Side effects resolved. To hold off on starting alternative weight loss medication(s) due to limited options and patient preferring to hold off due to upcoming back surgery.     Pain:   Unimproved, to follow up with Ortho.     PLAN:                            1. Continue current regimen.     Follow-up: Return if needed in the future, for Medication Therapy Management Pharmacist Visit.    SUBJECTIVE/OBJECTIVE:                          Ely Humphreys is a 73 year old female called for a follow-up visit from 5/22/2023 .       Reason for visit: Topiramate side effects - stopped.    Allergies/ADRs: Reviewed in chart  Past Medical History: Reviewed in chart  Tobacco: She reports that she has never smoked. She has never used smokeless tobacco.  Alcohol: not currently using    Medication Adherence/Access: no issues reported    Weight Management:       Followed by Dr. Matti Hanson, seen 4/4/2023 for Return Medical Weight Management. She did not tolerate the topiramate, started at the 25 mg daily dose and from the start felt \"off\". She felt like \"everything slowed down\". She has since stopped the topiramate and back to baseline. She does not wish to start alternative weight loss medication(s) right now. She has previously been on WW and thinking of restarting this.      Wt Readings from Last 4 Encounters:   07/17/23 175 lb (79.4 kg)   05/04/23 182 lb 1.6 oz (82.6 kg)   04/04/23 175 lb (79.4 kg)   02/21/23 175 lb (79.4 kg)     Estimated body mass index is 31.5 kg/m  as calculated from the following:    Height as of this encounter: 5' 2.5\" (1.588 m).    Weight as of this encounter: 175 lb (79.4 kg).    Pain:   Gabapentin 100 mg bedtime  Tramadol 50 mg daily as needed  Turmeric daily     She is going to be having back surgery, reports 5-6 months recovery. Reports " "she actually more recently found out she has Spina Bifida. No medication side effects.   Since chemotherapy from breast cancer, she had issues of twitching/spasming so started gabapentin. Gabapentin effective for pain. Does denote drowsiness when wakes up the next day, but manageable. Does use a CPAP and uses nightly.      Today's Vitals: Ht 5' 2.5\" (1.588 m)   Wt 175 lb (79.4 kg)   LMP  (LMP Unknown)   BMI 31.50 kg/m    ----------------    I spent 15 minutes with this patient today. All changes were made via collaborative practice agreement with Dr. Matti Hanson. A copy of the visit note was provided to the patient's provider(s).    A summary of these recommendations was sent via [employer].    Lauren Bloch, PharmD, BCACP   Medication Therapy Management Pharmacist   [facility]    Telemedicine Visit Details  Type of service:  Telephone visit  Start Time: 10:30 AM  End Time: 10:45 AM     Medication Therapy Recommendations  No medication therapy recommendations to display   "

## 2023-07-17 NOTE — NURSING NOTE
Is the patient currently in the state of [place]? YES    Visit mode:TELEPHONE    If the visit is dropped, the patient can be reconnected by: TELEPHONE VISIT: Phone number: [phone]    Will anyone else be joining the visit? NO      How would you like to obtain your AVS? MyChart    Are changes needed to the allergy or medication list? NO     Pt states allergies/medications reviewed in mychart/declines further review by VF.    Reason for visit: RECHISAK Brewer on 7/17/2023 at 10:17 AM

## 2023-08-08 ENCOUNTER — PATIENT OUTREACH (OUTPATIENT)
Dept: CARE COORDINATION | Facility: CLINIC | Age: 73
End: 2023-08-08
Payer: COMMERCIAL

## 2023-08-30 ENCOUNTER — ANCILLARY PROCEDURE (OUTPATIENT)
Dept: MAMMOGRAPHY | Facility: CLINIC | Age: 73
End: 2023-08-30
Attending: INTERNAL MEDICINE
Payer: COMMERCIAL

## 2023-08-30 DIAGNOSIS — Z12.31 VISIT FOR SCREENING MAMMOGRAM: ICD-10-CM

## 2023-08-30 PROCEDURE — 77063 BREAST TOMOSYNTHESIS BI: CPT | Mod: TC | Performed by: RADIOLOGY

## 2023-08-30 PROCEDURE — 77067 SCR MAMMO BI INCL CAD: CPT | Mod: TC | Performed by: RADIOLOGY

## 2023-09-01 ASSESSMENT — SLEEP AND FATIGUE QUESTIONNAIRES
HOW LIKELY ARE YOU TO NOD OFF OR FALL ASLEEP WHILE SITTING AND READING: SLIGHT CHANCE OF DOZING
HOW LIKELY ARE YOU TO NOD OFF OR FALL ASLEEP WHEN YOU ARE A PASSENGER IN A CAR FOR AN HOUR WITHOUT A BREAK: WOULD NEVER DOZE
HOW LIKELY ARE YOU TO NOD OFF OR FALL ASLEEP WHILE SITTING QUIETLY AFTER LUNCH WITHOUT ALCOHOL: WOULD NEVER DOZE
HOW LIKELY ARE YOU TO NOD OFF OR FALL ASLEEP WHILE SITTING AND TALKING TO SOMEONE: WOULD NEVER DOZE
HOW LIKELY ARE YOU TO NOD OFF OR FALL ASLEEP WHILE LYING DOWN TO REST IN THE AFTERNOON WHEN CIRCUMSTANCES PERMIT: WOULD NEVER DOZE
HOW LIKELY ARE YOU TO NOD OFF OR FALL ASLEEP WHILE WATCHING TV: SLIGHT CHANCE OF DOZING
HOW LIKELY ARE YOU TO NOD OFF OR FALL ASLEEP IN A CAR, WHILE STOPPED FOR A FEW MINUTES IN TRAFFIC: WOULD NEVER DOZE
HOW LIKELY ARE YOU TO NOD OFF OR FALL ASLEEP WHILE SITTING INACTIVE IN A PUBLIC PLACE: WOULD NEVER DOZE

## 2023-09-08 ENCOUNTER — VIRTUAL VISIT (OUTPATIENT)
Dept: SLEEP MEDICINE | Facility: CLINIC | Age: 73
End: 2023-09-08
Payer: COMMERCIAL

## 2023-09-08 VITALS — WEIGHT: 175 LBS | BODY MASS INDEX: 31.01 KG/M2 | HEIGHT: 63 IN

## 2023-09-08 DIAGNOSIS — G47.33 OSA (OBSTRUCTIVE SLEEP APNEA): Primary | ICD-10-CM

## 2023-09-08 PROCEDURE — 99214 OFFICE O/P EST MOD 30 MIN: CPT | Mod: VID | Performed by: PHYSICIAN ASSISTANT

## 2023-09-08 ASSESSMENT — PAIN SCALES - GENERAL: PAINLEVEL: NO PAIN (0)

## 2023-09-08 NOTE — NURSING NOTE
Is the patient currently in the state of [place]? YES    Visit mode:VIDEO    If the visit is dropped, the patient can be reconnected by: VIDEO VISIT: Send to e-mail at: [email or web address]    Will anyone else be joining the visit? NO  (If patient encounters technical issues they should call [phone]    How would you like to obtain your AVS? MyChart    Are changes needed to the allergy or medication list? Pt stated no changes to allergies and Pt stated no med changes    Reason for visit: RECHECK    Carla BASHIR    Has patient had flu shot for current/most recent flu season? If so, when? Yes: 9/2022

## 2023-09-08 NOTE — PATIENT INSTRUCTIONS
Consider Micropore tape over your lips.   Otherwise, you could look for Somnifix mouth stickers or something like the product below

## 2023-09-08 NOTE — PROGRESS NOTES
Virtual Visit Details    Type of service:  Video Visit   Video Start Time: 8:34 AM  Video End Time:9:04 AM    Originating Location (pt. Location): Home    Distant Location (provider location):  Off-site  Platform used for Video Visit: [employer]- camera was not registering via [employer]

## 2023-09-08 NOTE — PROGRESS NOTES
CPAP Follow-Up Visit:    Date on this visit: 2023    Ely Humphreys has a follow-up visit today to review her CPAP use for MIKEL. She was initially seen at the [facility] for nightly apnea, snoring and excessive daytime sleepiness and tiredness. Her medical history is significant for atrial fibrillation, systolic CHF with EF 40%, hx of Graves s/p I ablation with subsequent hypothyroidism, depression, breast cancer and essential tremor.      PREVIOUS SLEEP STUDIES:  PS/15/17. Wt: 160#  AHI was 24.6/hour. RDI 30.6/hour.    REM AHI 75/hour (due to limited REM sleep).  Supine AHI 27.2/hour.    Lowest O2 saturation: 83.8%. S/He spent 1.9 minutes below 89% SpO2.    CPAP titration:    CPAP was titrated to 6 cmH2O with elimination of apneas, hypopneas and desaturations. Supine REM was not noted at this pressure.      Ely has no significant concerns. She does sometimes still feel tired. That has been the case for a long time. It is most noticeable in the afternoon. She finds it hard to motivate herself to do anything. She does not nap though. She denies inadvertent dozing. She does find it hard to concentrate if she tries to read. If she has plans out of the house, she is generally ok, although may still feel a little tired. She does not take any medications in the middle of the day. In the evening, her energy improves. She only takes tramadol when she has to walk a lot. Pain is contributing to her being more sedentary.   She has no troubles with the CPAP. She definitely notices a benefit from CPAP.      PAP machine: Chayamuni (2017). Pressure settings: 10-13 cm       The compliance data shows that the patient used the CPAP for 23/30 nights, 77% of nights for >4 hours.  The 95th% pressure is 11.7 cm.  The 95th% leak is 26 lpm.  The average nightly usage is 8:16.  The average AHI is 0.7/hr.       Interface:  Mask: Dreamwear nasal mask  Chin strap: No  Leak: No  Using Humidifier: Yes, does not  run out  Condensation in hose or mask: No     Difficulties with therapy:    [-] Snoring with CPAP:  [-] Difficulty tolerating the pressure:  [-] Epistaxis/dry nose:   [-] Nasal congestion:  [+] Dry mouth:  [-] Mouth breathing:   [+] Pain/skin breakdown: it rubs under her nose causing irritation. She cleans it about weekly and is not replacing it often.     Improvements noted with CPAP:   [+] waking up more refreshed  [+] sleeping better with less arousals  [+] nocturia improved   [+] improved energy level during the day    Weight change since sleep study: 175 lbs    Bedtime: 10 PM.  Wake time: 7 AM. Falls asleep in 15 minutes. Wakes 1 times per night for 5-10 minutes. Infrequently, she may have trouble getting back to sleep. Reason for waking: restroom  Naps: 0       Past medical/surgical history, family history, social history, medications and allergies were reviewed.      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 11/21/2017 EF 40-45%. 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 Hodgkin      Hypothyroidism, unspecified type 08/29/2016     Priority: Medium    Meralgia paresthetica 07/30/2012     Priority: Medium    ACP (advance care planning) 10/19/2011     Priority: Medium     Advance Care Planning 2/19/2017: Receipt of ACP document:   Received: Health Care Directive which was witnessed or notarized on 11/17/14.  Document previously scanned on 12/7/16.  Validation form completed and sent to be scanned.  Code Status reflects choices in most recent ACP document.  Confirmed/documented designated decision maker(s).  Added by Ericka Baker   Advance Care Planning Liaison  Advance Care Planning 11/15/2016: ACP Review of Chart / Resources Provided:  Reviewed chart for advance care plan.  Ely Humphreys has no plan or code status on file however states presence of ACP document. Copy requested. Code status updated and sent to provider for review pending receipt of document/advance care plan review.  Confirmed/documented legally designated decision makers.  Added by Bailey Ndiaye  Advance Care Planning 10/19/2011:  Patient states has Advance Directive and will bring in a copy to clinic.       [facility] 08/22/2011     Priority: Medium     X  EMERGENCY CARE PLAN  Presenting Problem Signs and Symptoms Treatment Plan    Questions or conerns during clinic hours    I will call the clinic directly     Questions or conerns outside clinic hours    I will call the 24 hour nurse line at [phone]    Patient needs to schedule an appointment    I will call the 24 hour scheduling team at [phone] or clinic directly    Same day treatment     I will call the clinic first, nurse line if after hours, urgent care and express care if needed                                  DX V65.8 REPLACED WITH [id number] [facility] (04/08/2013)      Pure hypercholesterolemia 02/10/2010     Priority: Medium    Postmenopausal atrophic vaginitis 02/21/2007     Priority: Medium    Osteopenia of multiple sites 07/27/2004     Priority: Medium        Impression/Plan:    (G47.33) MIKEL (obstructive sleep apnea)  (primary encounter diagnosis)  Comment: Ely is using CPAP regularly and benefits from use. She mentions that she feels tired in the afternoon, although denies napping or  inadvertent dozing. Her apnea appears well-controlled. She has mild evidence of mouth leak, occurring in a pattern consistent with REM sleep. There is no obvious cause to her afternoon drowsiness. It could be in part the normal circadian afternoon dip.  Plan: Comprehensive DME        Continue auto CPAP 10-13 cm. A prescription was written for new supplies. We reviewed recommendations for cleaning and replacing supplies. We talked about trying mouth tape/stickers to see if her dry mouth and energy improve at all. If she does not notice any significant difference, she can stop using them.   I recommended that she try taking a 10-15 minute nap with CPAP to see if it helps her energy. If she has difficulty falling asleep, or is still fatigued, I would recommend visiting her primary for further evaluation of the fatigue.       She will follow up with me in about 1 year(s).     35 minutes were spent on the date of the encounter doing chart review, history and exam, documentation and further activities as noted above.     Bennett Goltz, PA-C    CC: Shadia Munroe MD

## 2023-11-11 DIAGNOSIS — E03.9 HYPOTHYROIDISM, UNSPECIFIED TYPE: ICD-10-CM

## 2023-11-13 RX ORDER — LEVOTHYROXINE SODIUM 125 UG/1
125 TABLET ORAL DAILY
Qty: 90 TABLET | Refills: 0 | Status: SHIPPED | OUTPATIENT
Start: 2023-11-13 | End: 2024-02-13

## 2023-12-04 ENCOUNTER — TELEPHONE (OUTPATIENT)
Dept: PEDIATRICS | Facility: CLINIC | Age: 73
End: 2023-12-04

## 2023-12-04 ENCOUNTER — NURSE TRIAGE (OUTPATIENT)
Dept: PEDIATRICS | Facility: CLINIC | Age: 73
End: 2023-12-04

## 2023-12-04 ENCOUNTER — TELEPHONE (OUTPATIENT)
Dept: NURSING | Facility: CLINIC | Age: 73
End: 2023-12-04
Payer: COMMERCIAL

## 2023-12-04 ENCOUNTER — OFFICE VISIT (OUTPATIENT)
Dept: PEDIATRICS | Facility: CLINIC | Age: 73
End: 2023-12-04
Payer: COMMERCIAL

## 2023-12-04 VITALS
DIASTOLIC BLOOD PRESSURE: 82 MMHG | OXYGEN SATURATION: 98 % | BODY MASS INDEX: 32.84 KG/M2 | TEMPERATURE: 98.5 F | WEIGHT: 185.4 LBS | SYSTOLIC BLOOD PRESSURE: 120 MMHG | RESPIRATION RATE: 18 BRPM | HEART RATE: 73 BPM

## 2023-12-04 DIAGNOSIS — G89.29 CHRONIC RIGHT-SIDED LOW BACK PAIN WITH RIGHT-SIDED SCIATICA: ICD-10-CM

## 2023-12-04 DIAGNOSIS — R00.2 PALPITATIONS: Primary | ICD-10-CM

## 2023-12-04 DIAGNOSIS — M54.41 CHRONIC RIGHT-SIDED LOW BACK PAIN WITH RIGHT-SIDED SCIATICA: ICD-10-CM

## 2023-12-04 DIAGNOSIS — Z98.890 S/P ABLATION OF ATRIAL FIBRILLATION: ICD-10-CM

## 2023-12-04 DIAGNOSIS — R07.89 ATYPICAL CHEST PAIN: ICD-10-CM

## 2023-12-04 DIAGNOSIS — I43 DILATED CARDIOMYOPATHY SECONDARY TO TACHYCARDIA (H): ICD-10-CM

## 2023-12-04 DIAGNOSIS — E03.9 HYPOTHYROIDISM, UNSPECIFIED TYPE: ICD-10-CM

## 2023-12-04 DIAGNOSIS — R00.0 DILATED CARDIOMYOPATHY SECONDARY TO TACHYCARDIA (H): ICD-10-CM

## 2023-12-04 DIAGNOSIS — Z86.79 S/P ABLATION OF ATRIAL FIBRILLATION: ICD-10-CM

## 2023-12-04 LAB
ALBUMIN SERPL BCG-MCNC: 4.4 G/DL (ref 3.5–5.2)
ALP SERPL-CCNC: 72 U/L (ref 40–150)
ALT SERPL W P-5'-P-CCNC: 25 U/L (ref 0–50)
ANION GAP SERPL CALCULATED.3IONS-SCNC: 11 MMOL/L (ref 7–15)
AST SERPL W P-5'-P-CCNC: 31 U/L (ref 0–45)
BILIRUB SERPL-MCNC: 0.5 MG/DL
BUN SERPL-MCNC: 20.9 MG/DL (ref 8–23)
CALCIUM SERPL-MCNC: 9.8 MG/DL (ref 8.8–10.2)
CHLORIDE SERPL-SCNC: 105 MMOL/L (ref 98–107)
CREAT SERPL-MCNC: 0.82 MG/DL (ref 0.51–0.95)
DEPRECATED HCO3 PLAS-SCNC: 26 MMOL/L (ref 22–29)
EGFRCR SERPLBLD CKD-EPI 2021: 76 ML/MIN/1.73M2
ERYTHROCYTE [DISTWIDTH] IN BLOOD BY AUTOMATED COUNT: 12.5 % (ref 10–15)
GLUCOSE SERPL-MCNC: 96 MG/DL (ref 70–99)
HCT VFR BLD AUTO: 40 % (ref 35–47)
HGB BLD-MCNC: 12.5 G/DL (ref 11.7–15.7)
MCH RBC QN AUTO: 30 PG (ref 26.5–33)
MCHC RBC AUTO-ENTMCNC: 31.3 G/DL (ref 31.5–36.5)
MCV RBC AUTO: 96 FL (ref 78–100)
PLATELET # BLD AUTO: 316 10E3/UL (ref 150–450)
POTASSIUM SERPL-SCNC: 4.2 MMOL/L (ref 3.4–5.3)
PROT SERPL-MCNC: 7.3 G/DL (ref 6.4–8.3)
RBC # BLD AUTO: 4.17 10E6/UL (ref 3.8–5.2)
SODIUM SERPL-SCNC: 142 MMOL/L (ref 135–145)
TSH SERPL DL<=0.005 MIU/L-ACNC: 2.75 UIU/ML (ref 0.3–4.2)
WBC # BLD AUTO: 7 10E3/UL (ref 4–11)

## 2023-12-04 PROCEDURE — 93000 ELECTROCARDIOGRAM COMPLETE: CPT | Performed by: INTERNAL MEDICINE

## 2023-12-04 PROCEDURE — 85027 COMPLETE CBC AUTOMATED: CPT | Performed by: INTERNAL MEDICINE

## 2023-12-04 PROCEDURE — 99214 OFFICE O/P EST MOD 30 MIN: CPT | Mod: 25 | Performed by: INTERNAL MEDICINE

## 2023-12-04 PROCEDURE — 80053 COMPREHEN METABOLIC PANEL: CPT | Performed by: INTERNAL MEDICINE

## 2023-12-04 PROCEDURE — 36415 COLL VENOUS BLD VENIPUNCTURE: CPT | Performed by: INTERNAL MEDICINE

## 2023-12-04 PROCEDURE — 84443 ASSAY THYROID STIM HORMONE: CPT | Performed by: INTERNAL MEDICINE

## 2023-12-04 RX ORDER — GABAPENTIN 100 MG/1
CAPSULE ORAL
Start: 2023-12-04 | End: 2023-12-09

## 2023-12-04 RX ORDER — TRAMADOL HYDROCHLORIDE 50 MG/1
50 TABLET ORAL EVERY 6 HOURS PRN
Qty: 30 TABLET | Refills: 1 | Status: SHIPPED | OUTPATIENT
Start: 2023-12-04 | End: 2023-12-07

## 2023-12-04 RX ORDER — TIZANIDINE 2 MG/1
2-4 TABLET ORAL 3 TIMES DAILY PRN
Qty: 60 TABLET | Refills: 1 | Status: SHIPPED | OUTPATIENT
Start: 2023-12-04 | End: 2024-01-15

## 2023-12-04 ASSESSMENT — PAIN SCALES - GENERAL: PAINLEVEL: MILD PAIN (3)

## 2023-12-04 NOTE — TELEPHONE ENCOUNTER
"Nurse Triage SBAR    Is this a 2nd Level Triage? YES, LICENSED PRACTITIONER REVIEW IS REQUIRED    Situation: Patient's monitor at home telling her \"possible a-fib\"     Background: Had 3 day hx of chest pain about 2 weeks ago on R ride that radiated to jaw, lightheaded, felt heart was racing and irregular. Home monitor said possible A-fib. Did not call 911. Has been checking monitor every day since. Had ablation 4.5 years ago.    Assessment: Patient currently has NO symptoms, other than monitor saying possible a-fib. HR 83. Denies current SOB, sweatiness, heart racing, lightheaded, chest pain.    Protocol Recommended Disposition:   See in Office Today    Recommendation: Per protocol, Should see in office today. No available appointments left.   UC/ER? Please advise. Reviewed care advice under care tab with pt. Instructed pt to call back if new or worsening symptoms or if chest pain and lightheadedness comes back call 911. Patient was given an opportunity to ask questions, verbalized understanding of plan, and is agreeable.      Routed to provider    Does the patient meet one of the following criteria for ADS visit consideration? 16+ years old, with an [facility] PCP     Siena MCCULLOUGH RN on 12/4/2023 at 1:30 PM         Reason for Disposition   Age > 60 years  (Exception: Brief heartbeat symptoms that went away and now feels well.)    Additional Information   Negative: Passed out (i.e., lost consciousness, collapsed and was not responding)   Negative: Shock suspected (e.g., cold/pale/clammy skin, too weak to stand, low BP, rapid pulse)   Negative: Difficult to awaken or acting confused (e.g., disoriented, slurred speech)   Negative: Visible sweat on face or sweat dripping down face   Negative: Unable to walk, or can only walk with assistance (e.g., requires support)   Negative: Received SHOCK from implantable cardiac defibrillator and has persisting symptoms (i.e., palpitations, lightheadedness)   Negative: Dizziness, " "lightheadedness, or weakness and heart beating very rapidly (e.g., > 140 / minute)   Negative: Dizziness, lightheadedness, or weakness and heart beating very slowly (e.g., < 50 / minute)   Negative: Sounds like a life-threatening emergency to the triager   Negative: Chest pain   Negative: Difficulty breathing   Negative: Dizziness, lightheadedness, or weakness   Negative: Heart beating very rapidly (e.g., > 140 / minute) and present now  (Exception: During exercise.)   Negative: Heart beating very slowly (e.g., < 50 / minute)  (Exception: Athlete and heart rate normal for caller.)    Answer Assessment - Initial Assessment Questions  1. DESCRIPTION: \"Please describe your heart rate or heartbeat that you are having\" (e.g., fast/slow, regular/irregular, skipped or extra beats, \"palpitations\")      Heart racing, chest pains 2 weeks ago, felt irregular. Home monitor said \"possible afib\"  2. ONSET: \"When did it start?\" (Minutes, hours or days)       1.5-2 weeks ago  3. DURATION: \"How long does it last\" (e.g., seconds, minutes, hours)      Hasn't felt it since last week - lasted 3-4 days.  4. PATTERN \"Does it come and go, or has it been constant since it started?\"  \"Does it get worse with exertion?\"   \"Are you feeling it now?\"      No longer racing, have not felt it since then  5. TAP: \"Using your hand, can you tap out what you are feeling on a chair or table in front of you, so that I can hear?\" (Note: not all patients can do this)          6. HEART RATE: \"Can you tell me your heart rate?\" \"How many beats in 15 seconds?\"  (Note: not all patients can do this)        83  7. RECURRENT SYMPTOM: \"Have you ever had this before?\" If Yes, ask: \"When was the last time?\" and \"What happened that time?\"       Had ablation surgery4.5 years ago  8. CAUSE: \"What do you think is causing the palpitations?\"      stress  9. CARDIAC HISTORY: \"Do you have any history of heart disease?\" (e.g., heart attack, angina, bypass surgery, angioplasty, " "arrhythmia)       Previous ablation ,  10. OTHER SYMPTOMS: \"Do you have any other symptoms?\" (e.g., dizziness, chest pain, sweating, difficulty breathing)        No SOB, difficulty breathing, dizziness, sweating  Had chest pain last week with lightheaded   11. PREGNANCY: \"Is there any chance you are pregnant?\" \"When was your last menstrual period?\"    Protocols used: Heart Rate and Heartbeat Upfmfbloe-I-OG    "

## 2023-12-04 NOTE — TELEPHONE ENCOUNTER
Reason for Call:  Other appointment    Detailed comments: patient called and was triaged to be seen in the next 24 hours for poss A Fib.    Patient would like a clinic appt about EA IM/PEDS  clinic today.    Please contact patient.  Thank you.    Phone Number Patient can be reached at: Home number on file [phone] (home)    Best Time: any    Can we leave a detailed message on this number? YES    Call taken on 12/4/2023 at 8:50 AM by Carmenza Lawrence

## 2023-12-04 NOTE — TELEPHONE ENCOUNTER
RN called and spoke with patient. She will come in right now to clinic. Double booked for end of day per Dr Munroe.      Provider Recommendation Follow Up:   Reached patient/caregiver. Informed of provider's recommendations. Patient verbalized understanding and agrees with the plan.           Siena MCCULLOUGH RN on 12/4/2023 at 2:27 PM

## 2023-12-04 NOTE — TELEPHONE ENCOUNTER
Pharmacy calling to get more info for the tramadol script that was sent in, need to know if for acute or chronic pain. Per chart review- chronic back pain, and has surgery planned 12/18/2023.      ESTRELLA Conley on 12/4/2023 at 5:18 PM

## 2023-12-04 NOTE — TELEPHONE ENCOUNTER
She could come in now to see me. I'll add her on after my last patient. As long as not having chest pain/SOB now.   Shadia Munroe M.D.

## 2023-12-04 NOTE — PATIENT INSTRUCTIONS
Our triage RN, Joan, can be reached at [phone]. You can leave a message and she will get back to you.    EKG does not show atrial fibrillation today.    I've ordered a heart monitor and echocardiogram.     If you'd like to stay with [facility]  Radha Roberts, APRN, CNP   [address] [place] [address]   [phone]     Increase gabapentin to 200-300 mg at bedtime.  Take additional 1 capsule twice during the day for pinched nerve pain. Could make you sleepy/groggy.    Minimize aleve/ibuprofen. If you do need to take, take with a stomach acid blocker such as prilosec.  Tylenol is fine. Take 1000 mg 3 times daily  Could try muscle relaxer, tizanidine  I'll refill your tramadol.

## 2023-12-04 NOTE — Clinical Note
Preop. OTC monitor suggested afib, also had palpitations and R sided atypical chest pain. EKG here normal. See if we can get Ely in to see Radha Roberts, APRN, CNP  Does she want echo and heart monitor there?  [address] [place] [address]  [phone]

## 2023-12-04 NOTE — PROGRESS NOTES
"  Assessment & Plan     Palpitations  EKG shows sinus rhythm. Palpitations brief, noted last week. Home OTC heart monitor last week showed \"possible afib\"  She is scheduled for her preop in 2 weeks and surgery a few days after that.   Plan 2 day holter monitor and echo. We were able to get her appointment with [facility] cardiology, where she has been seen most recently in 1 week.   - TSH WITH FREE T4 REFLEX; Future  - EKG 12-lead complete w/read - Clinics  - Comprehensive metabolic panel (BMP + Alb, Alk Phos, ALT, AST, Total. Bili, TP); Future  - CBC with platelets; Future  - TSH WITH FREE T4 REFLEX  - Comprehensive metabolic panel (BMP + Alb, Alk Phos, ALT, AST, Total. Bili, TP)  - CBC with platelets  - Echocardiogram Complete; Future  - Adult Holter Monitor 48 hour; Future    S/P ablation of atrial fibrillation  Possible she did have brief episode. Will see how holter looks. Currently not on Eliquis.  She is wondering about restarting. I would defer this to cardiology once her results are back.    Atypical chest pain  R sided, during very stressful week. Not exertional. Will monitor for any recurrence.    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 Will see how repeat echo looks.    Chronic right-sided low back pain with right-sided sciatica  Has had significant pain over the last year. Will work on symptom control while waiting for surgery.   - tiZANidine (ZANAFLEX) 2 MG tablet; Take 1-2 tablets (2-4 mg) by mouth 3 times daily as needed for muscle spasms  - traMADol (ULTRAM) 50 MG tablet; Take 1 tablet (50 mg) by mouth every 6 hours as needed for severe pain      See Patient Instructions    Shadia Munroe MD  [facility] LUKE Graves is a 73 year old, presenting for the following health " issues:  Chest Pain (Possible A.Fib/ )      12/4/2023     2:57 PM   Additional Questions   Roomed by YANIRA Murillo   Accompanied by CORTEZ         12/4/2023     2:57 PM   Patient Reported Additional Medications   Patient reports taking the following new medications CORTEZ       History of Present Illness       Reason for visit:  Possble AFib  Symptom onset:  1-2 weeks ago    She eats 2-3 servings of fruits and vegetables daily.She consumes 0 sweetened beverage(s) daily.She exercises with enough effort to increase her heart rate 10 to 19 minutes per day.  She exercises with enough effort to increase her heart rate 4 days per week.   She is taking medications regularly.     Lots of back pain, stemming from injury she sustained in HS. Last few years has been worse. Worked with chiropractor for a few years. Then went to [facility]. Did injections, ablation. Met with surgeon 5 months ago.     Summer 2022. In quarantine. Was going every day walking. Got worse the more days she walked. That's when she started with [facility] Insurance wouldn't cover the surgery that was recommended. Finally 2 weeks ago had an appointment with surgeon and wasn't sure what they would recommend. Got very anxious and worked up. Surgery scheduled for Dec 18 with [facility].     Can't walk, all she does is sit around the house. Takes OTC pain medication to go to grocery store.     Describes a day about 2 weeks ago, had R sided sharp pains radiating to R jaw. Tried deep breathing. Listened to a calming recording. Drank water and it went away, then came back again. First episode lasted 30 minutes. Second episode 15 minutes.    Was noticing she was getting more winded climbing the stairs. Would feel a flutter occasionally in her chest. That was only last week. Ordered a heart monitor. Unclear if she is short of breath. Her only exercise is PT.     Tells me she was recommended to take eliquis, but insurance wasn't covering so she stopped it.     Tried hard not  to take anything.   Has been taking tylenol occasionally.    Sometimes 2 aleve per day, sometimes additional ibuprofen.       Review of Systems   Constitutional, HEENT, cardiovascular, pulmonary, gi and gu systems are negative, except as otherwise noted.      Objective    /82   Pulse 73   Temp 98.5  F (36.9  C) (Oral)   Resp 18   Wt 84.1 kg (185 lb 6.4 oz)   LMP  (LMP Unknown)   SpO2 98%   BMI 32.84 kg/m    Body mass index is 32.84 kg/m .  Physical Exam   GENERAL: healthy, alert and no distress  NECK: no adenopathy, no asymmetry, masses, or scars and thyroid normal to palpation  RESP: lungs clear to auscultation - no rales, rhonchi or wheezes  CV: regular rate and rhythm, normal S1 S2, no S3 or S4, no murmur, click or rub, no peripheral edema and peripheral pulses strong  ABDOMEN: soft, nontender, no hepatosplenomegaly, no masses and bowel sounds normal  MS: no gross musculoskeletal defects noted, no edema    Results for orders placed or performed in visit on 12/04/23 (from the past 24 hour(s))   CBC with platelets   Result Value Ref Range    WBC Count 7.0 4.0 - 11.0 10e3/uL    RBC Count 4.17 3.80 - 5.20 10e6/uL    Hemoglobin 12.5 11.7 - 15.7 g/dL    Hematocrit 40.0 35.0 - 47.0 %    MCV 96 78 - 100 fL    MCH 30.0 26.5 - 33.0 pg    MCHC 31.3 (L) 31.5 - 36.5 g/dL    RDW 12.5 10.0 - 15.0 %    Platelet Count 316 150 - 450 10e3/uL

## 2023-12-04 NOTE — TELEPHONE ENCOUNTER
Pre op scheduled for 12/18/2023, chest pains, possible a-fib per machine at home monitor about 2 weeks ago. Patient denies any chest pain for 2 1/2 weeks. Patient warm transferred to scheduling to set up an appointment. No triage.     VICKY ZENDEJAS RN

## 2023-12-04 NOTE — TELEPHONE ENCOUNTER
"Shadia Munroe MD P Ea Sb3/5 Johny MCCULLOUGH (Rn)  Preop. OTC monitor suggested afib, also had palpitations and R sided atypical chest pain. EKG here normal. See if we can get [place] in to see Radha Roberts APRN, CNP  Does she want echo and heart monitor there?    [address] [place] [address]  [phone]     Per chart review, office visit today: \"EKG does not show atrial fibrillation today.     I've ordered a heart monitor and echocardiogram.      If you'd like to stay with [facility]  Radha Roberts APRN, CNP   [address] [place] [address]   [phone] \"    The number above is for the ED, I was transferred.    I called  Cardiology to schedule appointment for patient.    She is an EP cardiologist.  We can get the Echo and heart monitor done with [facility] and they will be able to see the results and follow up.    Patient will need a consultation appointment as she has not been seen since 2020    We got an appointment scheduled [facility] EP cardiologist:  Appointment information:  Tuesday, 12/12/23 at 12.45 arrival time with Dr. Reyes      Location:  [facility]  [address] [place] [address]   Second floor heart center      Informed patient and PCP of this appointment.    Juani Nevarez RN    "

## 2023-12-05 ENCOUNTER — HOSPITAL ENCOUNTER (OUTPATIENT)
Dept: CARDIOLOGY | Facility: CLINIC | Age: 73
Discharge: HOME | End: 2023-12-05
Attending: INTERNAL MEDICINE
Payer: COMMERCIAL

## 2023-12-05 DIAGNOSIS — R00.2 PALPITATIONS: ICD-10-CM

## 2023-12-05 LAB — LVEF ECHO: NORMAL

## 2023-12-05 PROCEDURE — 93225 XTRNL ECG REC<48 HRS REC: CPT

## 2023-12-05 PROCEDURE — 93306 TTE W/DOPPLER COMPLETE: CPT | Mod: 26 | Performed by: INTERNAL MEDICINE

## 2023-12-05 PROCEDURE — 93227 XTRNL ECG REC<48 HR R&I: CPT | Performed by: INTERNAL MEDICINE

## 2023-12-05 PROCEDURE — 93306 TTE W/DOPPLER COMPLETE: CPT

## 2023-12-05 RX ORDER — GABAPENTIN 100 MG/1
CAPSULE ORAL
Qty: 90 CAPSULE | Refills: 0 | OUTPATIENT
Start: 2023-12-05

## 2023-12-09 ENCOUNTER — MYC REFILL (OUTPATIENT)
Dept: PEDIATRICS | Facility: CLINIC | Age: 73
End: 2023-12-09
Payer: COMMERCIAL

## 2023-12-09 DIAGNOSIS — G89.29 CHRONIC RIGHT-SIDED LOW BACK PAIN WITH RIGHT-SIDED SCIATICA: Primary | ICD-10-CM

## 2023-12-09 DIAGNOSIS — M54.41 CHRONIC RIGHT-SIDED LOW BACK PAIN WITH RIGHT-SIDED SCIATICA: Primary | ICD-10-CM

## 2023-12-11 RX ORDER — GABAPENTIN 100 MG/1
CAPSULE ORAL
Qty: 240 CAPSULE | Refills: 1 | Status: SHIPPED | OUTPATIENT
Start: 2023-12-11 | End: 2024-03-25

## 2023-12-16 SDOH — HEALTH STABILITY: PHYSICAL HEALTH: ON AVERAGE, HOW MANY DAYS PER WEEK DO YOU ENGAGE IN MODERATE TO STRENUOUS EXERCISE (LIKE A BRISK WALK)?: 0 DAYS

## 2023-12-16 SDOH — HEALTH STABILITY: PHYSICAL HEALTH: ON AVERAGE, HOW MANY MINUTES DO YOU ENGAGE IN EXERCISE AT THIS LEVEL?: 0 MIN

## 2023-12-16 ASSESSMENT — SOCIAL DETERMINANTS OF HEALTH (SDOH)
IN A TYPICAL WEEK, HOW MANY TIMES DO YOU TALK ON THE PHONE WITH FAMILY, FRIENDS, OR NEIGHBORS?: ONCE A WEEK
DO YOU BELONG TO ANY CLUBS OR ORGANIZATIONS SUCH AS CHURCH GROUPS UNIONS, FRATERNAL OR ATHLETIC GROUPS, OR SCHOOL GROUPS?: NO
HOW OFTEN DO YOU ATTEND CHURCH OR RELIGIOUS SERVICES?: NEVER
HOW OFTEN DO YOU GET TOGETHER WITH FRIENDS OR RELATIVES?: TWICE A WEEK
HOW OFTEN DO YOU ATTENT MEETINGS OF THE CLUB OR ORGANIZATION YOU BELONG TO?: NEVER

## 2023-12-16 ASSESSMENT — LIFESTYLE VARIABLES
AUDIT-C TOTAL SCORE: 5
HOW OFTEN DO YOU HAVE SIX OR MORE DRINKS ON ONE OCCASION: LESS THAN MONTHLY
SKIP TO QUESTIONS 9-10: 0
HOW MANY STANDARD DRINKS CONTAINING ALCOHOL DO YOU HAVE ON A TYPICAL DAY: 1 OR 2
HOW OFTEN DO YOU HAVE A DRINK CONTAINING ALCOHOL: 4 OR MORE TIMES A WEEK

## 2023-12-16 NOTE — COMMUNITY RESOURCES LIST (ENGLISH)
12/16/2023    [employer]  N/A  For questions about this resource list or additional care needs, please contact your primary care clinic or care manager.  Phone: [phone]   Email: N/A   Address: [address] [place] [id number]   Hours: N/A        Exercise and Recreation       Gym or workout facility  1  [employer] [facility] [place] Distance: 4.11 miles      In-Person   [address] [place] [address]  Language: English  Hours: Mon - Sun Open 24 Hours  Fees: Insurance, Self Pay, Sliding Fee   Phone: [phone]) [phone] Email: [email or web address] Website: [email or web address]     2  [facility] [employer] Distance: 4.9 miles      In-Person   [address] [place] [address]  Language: English  Hours: Mon - Fri 6:00 AM - 12:30 PM , Mon - Fri 3:00 PM - 8:00 PM , Sat 8:00 AM - 12:00 PM  Fees: Self Pay   Phone: [phone]) [phone] Website: [email or web address]          Important Numbers & Websites       Emergency Services   911  [facility] Services   311  Poison Control   (468) 719-4598  Suicide Prevention Lifeline   (755) 847-3827 (TALK)  Child Abuse Hotline   (709) 810-3267 (4-A-Child)  Sexual Assault Hotline   (411) 718-9171 (HOPE)  National Runaway Safeline   (715) 555-2367 (RUNAWAY)  All-Options Talkline   (433) 572-8929  Substance Abuse Referral   (951) 758-6953 (HELP)

## 2023-12-17 DIAGNOSIS — E78.00 PURE HYPERCHOLESTEROLEMIA: ICD-10-CM

## 2023-12-18 ENCOUNTER — OFFICE VISIT (OUTPATIENT)
Dept: FAMILY MEDICINE | Facility: CLINIC | Age: 73
End: 2023-12-18
Payer: COMMERCIAL

## 2023-12-18 VITALS
SYSTOLIC BLOOD PRESSURE: 91 MMHG | TEMPERATURE: 97.3 F | HEART RATE: 59 BPM | RESPIRATION RATE: 14 BRPM | WEIGHT: 185.6 LBS | OXYGEN SATURATION: 95 % | HEIGHT: 63 IN | BODY MASS INDEX: 32.89 KG/M2 | DIASTOLIC BLOOD PRESSURE: 61 MMHG

## 2023-12-18 DIAGNOSIS — M54.16 LUMBAR RADICULOPATHY: ICD-10-CM

## 2023-12-18 DIAGNOSIS — Z01.818 PREOP GENERAL PHYSICAL EXAM: Primary | ICD-10-CM

## 2023-12-18 DIAGNOSIS — R00.2 PALPITATIONS: ICD-10-CM

## 2023-12-18 PROBLEM — Z79.811 LONG TERM CURRENT USE OF AROMATASE INHIBITOR: Status: ACTIVE | Noted: 2023-12-18

## 2023-12-18 PROBLEM — E55.9 VITAMIN D DEFICIENCY: Status: ACTIVE | Noted: 2023-12-18

## 2023-12-18 PROBLEM — Z17.0 ESTROGEN RECEPTOR POSITIVE STATUS (ER+): Status: ACTIVE | Noted: 2023-12-18

## 2023-12-18 PROBLEM — L72.0 EPIDERMOID CYST OF SKIN: Status: ACTIVE | Noted: 2023-12-18

## 2023-12-18 PROCEDURE — 99214 OFFICE O/P EST MOD 30 MIN: CPT | Performed by: PHYSICIAN ASSISTANT

## 2023-12-18 RX ORDER — ERGOCALCIFEROL 1.25 MG/1
CAPSULE, LIQUID FILLED ORAL
COMMUNITY
Start: 2023-12-06 | End: 2023-12-18

## 2023-12-18 RX ORDER — TOPIRAMATE 50 MG/1
TABLET, FILM COATED ORAL
COMMUNITY
End: 2023-12-18

## 2023-12-18 RX ORDER — HYDROCORTISONE 25 MG/G
2.5 OINTMENT TOPICAL
COMMUNITY
Start: 2023-10-10

## 2023-12-18 ASSESSMENT — PAIN SCALES - GENERAL: PAINLEVEL: NO PAIN (0)

## 2023-12-18 NOTE — PROGRESS NOTES
[address] [address]  Phone: [phone]  Primary Provider: Shadia Munroe  Pre-op Performing Provider: NATE HARRISON    PREOPERATIVE EVALUATION:  Today's date: 12/18/2023    Ely is a 73 year old, presenting for the following:  Pre-Op Exam        12/18/2023     7:08 AM   Additional Questions   Roomed by Elidia CONTRERAS CMA     Surgical Information:  Surgery/Procedure: Spinal Fusion   Surgery Location: [facility]  Surgeon: Dr. Lele Long  Surgery Date: 12/22/23    Time of Surgery: TBD  Where patient plans to recover: At home with family  Fax number for surgical facility: Note does not need to be faxed, will be available electronically in Epic.    Assessment & Plan     The proposed surgical procedure is considered INTERMEDIATE risk.    Preop general physical exam  Stable exam.     Lumbar radiculopathy      Palpitations  Was seen for acute palpitations on 12/4/23 by pcp. S/p a fib ablation in 2017. EKG, holter monitor and echocardiogram showed no concerns. No obvious a fib present. Was seen on 12/12/23 through cardiology at [facility]. Notes not available to review through care everywhere at time of today's visit, but patient states there was no concerns and cardiac clearance for her surgery was given. She has no symptoms since. Given her significant workup, no symptoms and self reported cardiac clearance, cleared from my perspective for this procedure.             - No identified additional risk factors other than previously addressed    Antiplatelet or Anticoagulation Medication Instructions:   - aspirin: Discontinue aspirin 7-10 days prior to procedure to reduce bleeding risk. It should be resumed postoperatively.     Additional Medication Instructions:  Patient is to take all scheduled medications on the day of surgery EXCEPT for modifications listed below:   - Herbal medications and vitamins: HOLD 14 days prior to  surgery.    RECOMMENDATION:  APPROVAL GIVEN to proceed with proposed procedure, without further diagnostic evaluation.      Subjective     HPI related to upcoming procedure: history of lumbar radiculopathy. The above procedure was deemed the next best step in management.         12/16/2023     2:01 PM   Preop Questions   1. Have you ever had a heart attack or stroke? No   2. Have you ever had surgery on your heart or blood vessels, such as a stent placement, a coronary artery bypass, or surgery on an artery in your head, neck, heart, or legs? YES - ablation   3. Do you have chest pain with activity? No   4. Do you have a history of  heart failure? No   5. Do you currently have a cold, bronchitis or symptoms of other infection? No   6. Do you have a cough, shortness of breath, or wheezing? No   7. Do you or anyone in your family have previous history of blood clots? YES - Father   8. Do you or does anyone in your family have a serious bleeding problem such as prolonged bleeding following surgeries or cuts? No   9. Have you ever had problems with anemia or been told to take iron pills? No   10. Have you had any abnormal blood loss such as black, tarry or bloody stools, or abnormal vaginal bleeding? No   11. Have you ever had a blood transfusion? No   12. Are you willing to have a blood transfusion if it is medically needed before, during, or after your surgery? Yes   13. Have you or any of your relatives ever had problems with anesthesia? No   14. Do you have sleep apnea, excessive snoring or daytime drowsiness? YES - Sleep apnea, uses CPAP   14a. Do you have a CPAP machine? Yes   15. Do you have any artifical heart valves or other implanted medical devices like a pacemaker, defibrillator, or continuous glucose monitor? No   16. Do you have artificial joints? No   17. Are you allergic to latex? No       Health Care Directive:  Patient has a Health Care Directive on file      Preoperative Review of :   reviewed -  controlled substances reflected in medication list.      Status of Chronic Conditions:  See problem list for active medical problems.  Problems all longstanding and stable, except as noted/documented.  See ROS for pertinent symptoms related to these conditions.    HYPERLIPIDEMIA - Patient has a long history of significant Hyperlipidemia requiring medication for treatment with recent fair control. Patient reports no problems or side effects with the medication.     HYPOTHYROIDISM - Patient has a longstanding history of chronic Hypothyroidism. Patient has been doing well, noting no tremor, insomnia, hair loss or changes in skin texture. Continues to take medications as directed, without adverse reactions or side effects. Last TSH   Lab Results   Component Value Date    TSH 2.75 12/04/2023   .      Was seen for acute palpitations on 12/4/23 by pcp. S/p a fib ablation in 2017. EKG, holter monitor and echocardiogram showed no concerns. No obvious a fib present. Was seen on 12/12/23 through cardiology at [facility]. Notes not available to review through care everywhere at time of today's visit, but patient states there was no concerns and cardiac clearance for her surgery was given. She has no symptoms since. Given her significant workup, no symptoms and self reported cardiac clearance, cleared from my perspective for this procedure.     Review of Systems  CONSTITUTIONAL: NEGATIVE for fever, chills, change in weight  INTEGUMENTARY/SKIN: NEGATIVE for worrisome rashes, moles or lesions  EYES: NEGATIVE for vision changes or irritation  ENT/MOUTH: NEGATIVE for ear, mouth and throat problems  RESP: NEGATIVE for significant cough or SOB  CV: NEGATIVE for chest pain, palpitations or peripheral edema  GI: NEGATIVE for nausea, abdominal pain, heartburn, or change in bowel habits  : NEGATIVE for frequency, dysuria, or hematuria  MUSCULOSKELETAL: NEGATIVE for significant arthralgias or myalgia  NEURO: NEGATIVE for weakness,  dizziness or paresthesias  ENDOCRINE: NEGATIVE for temperature intolerance, skin/hair changes  HEME: NEGATIVE for bleeding problems  PSYCHIATRIC: NEGATIVE for changes in mood or affect    Patient Active Problem List    Diagnosis Date Noted    Vitamin D deficiency 12/18/2023     Priority: Medium    Long term current use of aromatase inhibitor 12/18/2023     Priority: Medium    Estrogen receptor positive status (ER+) 12/18/2023     Priority: Medium    Epidermoid cyst of skin 12/18/2023     Priority: Medium    Acquired dilation of ascending aorta and aortic root (H24) 10/02/2021     Priority: Medium    Personal history of malignant neoplasm of breast 09/08/2021     Priority: Medium    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 11/21/2017 EF 40-45%. 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 Hodgkin      Hypothyroidism, unspecified type 08/29/2016     Priority: Medium    Meralgia paresthetica 07/30/2012     Priority: Medium    ACP (advance care planning) 10/19/2011     Priority: Medium     Advance Care Planning 2/19/2017: Receipt of ACP document:  Received: Health Care Directive which was witnessed or notarized on 11/17/14.  Document previously scanned on 12/7/16.  Validation form completed and sent to be scanned.  Code Status reflects choices in most recent ACP document.  Confirmed/documented designated decision maker(s).  Added by Ericka Baker   Advance Care Planning Liaison  Advance Care Planning 11/15/2016: ACP Review of Chart / Resources Provided:  Reviewed chart for advance  care plan.  Ely Humphreys has no plan or code status on file however states presence of ACP document. Copy requested. Code status updated and sent to provider for review pending receipt of document/advance care plan review.  Confirmed/documented legally designated decision makers.  Added by Bailey Ndiaye  Advance Care Planning 10/19/2011:  Patient states has Advance Directive and will bring in a copy to clinic.       Health Care Home 08/22/2011     Priority: Medium     X  EMERGENCY CARE PLAN  Presenting Problem Signs and Symptoms Treatment Plan    Questions or conerns during clinic hours    I will call the clinic directly     Questions or conerns outside clinic hours    I will call the 24 hour nurse line at [phone]    Patient needs to schedule an appointment    I will call the 24 hour scheduling team at [phone] or clinic directly    Same day treatment     I will call the clinic first, nurse line if after hours, urgent care and express care if needed                                  DX V65.8 REPLACED WITH [id number] [facility] HOME (04/08/2013)      Pure hypercholesterolemia 02/10/2010     Priority: Medium    Postmenopausal atrophic vaginitis 02/21/2007     Priority: Medium    Osteopenia of multiple sites 07/27/2004     Priority: Medium      Past Medical History:   Diagnosis Date    Abnormal Pap smear 11/16/2010    Atrial fibrillation s/p ablation 10/07/2019    CHADsVASc is 2 for gender and age. Cardiology stopped eliquis after discussion on 2/26/20.     Benign essential tremor     Chronic    Hyperlipidemia LDL goal <160 02/10/2010    Invasive ductal carcinoma of breast (H) 06/2009    left breast ca    Major depressive disorder, recurrent episode, in full remission (H24) 10/19/2011    Stable for decades    osteopenia 2002    Palpitations     Persistent atrial fibrillation (H) 05/10/2017    Unspecified hypothyroidism      Past Surgical History:   Procedure Laterality Date    ANESTHESIA CARDIOVERSION N/A  11/28/2017    Procedure: ANESTHESIA CARDIOVERSION;  Cardioversion;  Surgeon: GENERIC ANESTHESIA PROVIDER;  Location: [facility] OR    BACK SURGERY  10-    an ablation on lower spine    C THYROID ABLATION      CARDIAC SURGERY  2017    ablasion    COLONOSCOPY  10/2003    COLONOSCOPY  05/09/2014    COLONOSCOPY  06/09/2014    Procedure: COLONOSCOPY;  Surgeon: Garrett Villaseñor MD;  Location: [facility] GI    ENHANCE LASER REFRACTIVE BILATERAL EXISTING PT IN PARAMETERS Bilateral 2008    EYE SURGERY  2023    cataract    HC EXCISION BREAST LESION, OPEN >=1  07/2009    re-excision 8/17/09    ORTHOPEDIC SURGERY  2016    torn meniscus    ZZC NONSPECIFIC PROCEDURE      Tubal Ligation    Abstracted 07/12/02    ZZ ECHO HEART XTHORACIC, STRESS/REST  07/22/2009    normal     Current Outpatient Medications   Medication Sig Dispense Refill    Calcium-Phosphorus-Vitamin D (CALCIUM/VITAMIN D3/ADULT GUMMY PO) Take 2 chew tab by mouth daily 2 gummies a day (500 mg of calcium 1000 units of vitamin D3) (Lazar brand)      gabapentin (NEURONTIN) 100 MG capsule Take 1-3 capsules (100-300 mg) by mouth at bedtime. May also take 1-2 capsules (100-200 mg) 2 times daily as needed for neuropathic pain. 240 capsule 1    hydrocortisone 2.5 % ointment Apply 2.5 g topically      levothyroxine (SYNTHROID/LEVOTHROID) 125 MCG tablet Take 1 tablet (125 mcg) by mouth daily 90 tablet 0    liothyronine (CYTOMEL) 5 MCG tablet Take 1 tablet (5 mcg) by mouth daily 90 tablet 4    multivitamin (CENTRUM SILVER) tablet Take 1 tablet by mouth daily Centrum Silver 50+      pravastatin (PRAVACHOL) 40 MG tablet Take 1 tablet (40 mg) by mouth daily 90 tablet 3    tiZANidine (ZANAFLEX) 2 MG tablet Take 1-2 tablets (2-4 mg) by mouth 3 times daily as needed for muscle spasms 60 tablet 1    traMADol (ULTRAM) 50 MG tablet Take 50 mg by mouth every 8 hours as needed      Turmeric (QC TUMERIC COMPLEX) 500 MG CAPS Take 500 mg by mouth daily      aspirin 81 MG EC tablet Take 81 mg  by mouth daily (Patient not taking: Reported on 2023)      topiramate (TOPAMAX) 50 MG tablet TAKE ONE-HALF TABLET (25 MG) BY MOUTH ONCE DAILY WITH SUPPER FOR 2 WEEKS, THEN MAY INCREASE TO 1 TABLET (50 MG) BY MOUTH ONCE DAILY WITH SUPPER.* (Patient not taking: Reported on 2023)      vitamin D2 (ERGOCALCIFEROL) 22571 units (1250 mcg) capsule Take 1 capsule by mouth 1 time every week.* (Patient not taking: Reported on 2023)         Allergies   Allergen Reactions    Amiodarone Other (See Comments)     Loss of vision in R eye.        Social History     Tobacco Use    Smoking status: Never    Smokeless tobacco: Never    Tobacco comments:     none   Substance Use Topics    Alcohol use: Yes     Comment: social     Family History   Problem Relation Age of Onset    Cancer Mother         84 yo Breast & Melanoma    Cerebrovascular Disease Mother 92    C.A.D. Mother     Hypertension Mother     Breast Cancer Mother     Anxiety Disorder Mother     Osteoporosis Mother     Cerebrovascular Disease Father     C.A.D. Father         possible MI in age 60's    Myocardial Infarction Father     Hypertension Father     Hyperlipidemia Father     Coronary Artery Disease Father         congestive heart failure    Prostate Cancer Father     Depression Father     Obesity Father     Cerebrovascular Disease Paternal Grandmother     Breast Cancer Paternal Grandmother     Coronary Artery Disease Brother         quadruple bypass    Hypertension Brother     Prostate Cancer Brother     Hyperlipidemia Brother     Breast Cancer Daughter 41    Cancer Maternal Aunt          40's Breast    Blood Disease Maternal Aunt          53 yo Lupus    Breast Cancer Other         daughter    Cancer - colorectal No family hx of      History   Drug Use No         Objective     BP 91/61 (BP Location: Right arm, Patient Position: Sitting, Cuff Size: Adult Large)   Pulse 59   Temp 97.3  F (36.3  C) (Temporal)   Resp 14   Ht 1.6 m (5'  "3\")   Wt 84.2 kg (185 lb 9.6 oz)   LMP  (LMP Unknown)   SpO2 95%   BMI 32.88 kg/m      Physical Exam    GENERAL APPEARANCE: healthy, alert and no distress     EYES: EOMI, PERRL     HENT: ear canals and TM's normal and nose and mouth without ulcers or lesions     NECK: no adenopathy, no asymmetry, masses, or scars and thyroid normal to palpation     RESP: lungs clear to auscultation - no rales, rhonchi or wheezes     CV: regular rates and rhythm, normal S1 S2, no S3 or S4 and no murmur, click or rub     MS: extremities normal- no gross deformities noted, no evidence of inflammation in joints, FROM in all extremities.     SKIN: no suspicious lesions or rashes     NEURO: Normal strength and tone, sensory exam grossly normal, mentation intact and speech normal     PSYCH: mentation appears normal. and affect normal/bright     LYMPHATICS: No cervical adenopathy    Recent Labs   Lab Test 12/04/23  1548   HGB 12.5         POTASSIUM 4.2   CR 0.82        Diagnostics:  Recent Results (from the past 720 hour(s))   TSH WITH FREE T4 REFLEX    Collection Time: 12/04/23  3:48 PM   Result Value Ref Range    TSH 2.75 0.30 - 4.20 uIU/mL   Comprehensive metabolic panel (BMP + Alb, Alk Phos, ALT, AST, Total. Bili, TP)    Collection Time: 12/04/23  3:48 PM   Result Value Ref Range    Sodium 142 135 - 145 mmol/L    Potassium 4.2 3.4 - 5.3 mmol/L    Carbon Dioxide (CO2) 26 22 - 29 mmol/L    Anion Gap 11 7 - 15 mmol/L    Urea Nitrogen 20.9 8.0 - 23.0 mg/dL    Creatinine 0.82 0.51 - 0.95 mg/dL    GFR Estimate 76 >60 mL/min/1.73m2    Calcium 9.8 8.8 - 10.2 mg/dL    Chloride 105 98 - 107 mmol/L    Glucose 96 70 - 99 mg/dL    Alkaline Phosphatase 72 40 - 150 U/L    AST 31 0 - 45 U/L    ALT 25 0 - 50 U/L    Protein Total 7.3 6.4 - 8.3 g/dL    Albumin 4.4 3.5 - 5.2 g/dL    Bilirubin Total 0.5 <=1.2 mg/dL   CBC with platelets    Collection Time: 12/04/23  3:48 PM   Result Value Ref Range    WBC Count 7.0 4.0 - 11.0 10e3/uL    RBC " Count 4.17 3.80 - 5.20 10e6/uL    Hemoglobin 12.5 11.7 - 15.7 g/dL    Hematocrit 40.0 35.0 - 47.0 %    MCV 96 78 - 100 fL    MCH 30.0 26.5 - 33.0 pg    MCHC 31.3 (L) 31.5 - 36.5 g/dL    RDW 12.5 10.0 - 15.0 %    Platelet Count 316 150 - 450 10e3/uL   Echocardiogram Complete    Collection Time: 12/05/23 10:35 AM   Result Value Ref Range    LVEF  55-60%       EKG 12/4/23: sinus rhythm. Non specific t wave inversions in v1-v4. Unchanged from previous EKG on 10/7/2019.   Echo 12/5/23: Interpretation Summary     The left atrium is mildly dilated.  Left to right atrial shunt, small  A patent foramen ovale is present.  Mild (35-45mmHg) pulmonary hypertension is present.  The visual ejection fraction is 55-60%.    48 hour holter monitor 12/5/23:   1.  Ely Humphreys was monitored for 48 hours.  The quality of the tracing was fair.  The predominant rhythm is sinus with  sinus arrhythmia and  intermittent 1st degree AV block.  During this time the average HR was 72 bpm with a minimum HR of 53  bpm at 8:49 AM and a maximum HR of 128 bpm at  9:32 PM.  The WA interval measured .19-.24 sec., the QRS measured .08 sec.,  and the QT varied with R to R interval .29-.47 sec.  2.  Sinus/sinus tachycardia with intermittent sinus pauses.  3.  285 Ventricular ectopic beats with 1 couplet.  4.  03513 Supraventricular/aberrant ectopic beats with 562 paired beats, and 105 runs.  The longest run was 27 beats at 8:22  PM  with a rate of 108  bpm.  5.  ST/T wave abnormality.  6.  No symptoms were identified.    Revised Cardiac Risk Index (RCRI):  The patient has the following serious cardiovascular risks for perioperative complications:   - No serious cardiac risks = 0 points     RCRI Interpretation: 0 points: Class I (very low risk - 0.4% complication rate)         Signed Electronically by: Jordan Atkinson PA-C  Copy of this evaluation report is provided to requesting physician.

## 2023-12-18 NOTE — PATIENT INSTRUCTIONS
Preparing for Your Surgery  Getting started  A nurse will call you to review your health history and instructions. They will give you an arrival time based on your scheduled surgery time. Please be ready to share:  Your doctor's clinic name and phone number  Your medical, surgical, and anesthesia history  A list of allergies and sensitivities  A list of medicines, including herbal treatments and over-the-counter drugs  Whether the patient has a legal guardian (ask how to send us the papers in advance)  Please tell us if you're pregnant--or if there's any chance you might be pregnant. Some surgeries may injure a fetus (unborn baby), so they require a pregnancy test. Surgeries that are safe for a fetus don't always need a test, and you can choose whether to have one.   If you have a child who's having surgery, please ask for a copy of Preparing for Your Child's Surgery.    Preparing for surgery  Within 10 to 30 days of surgery: Have a pre-op exam (sometimes called an H&P, or History and Physical). This can be done at a clinic or pre-operative center.  If you're having a , you may not need this exam. Talk to your care team.  At your pre-op exam, talk to your care team about all medicines you take. If you need to stop any medicines before surgery, ask when to start taking them again.  We do this for your safety. Many medicines can make you bleed too much during surgery. Some change how well surgery (anesthesia) drugs work.  Call your insurance company to let them know you're having surgery. (If you don't have insurance, call [phone].)  Call your clinic if there's any change in your health. This includes signs of a cold or flu (sore throat, runny nose, cough, rash, fever). It also includes a scrape or scratch near the surgery site.  If you have questions on the day of surgery, call your hospital or surgery center.  Eating and drinking guidelines  For your safety: Unless your surgeon tells you otherwise,  follow the guidelines below.  Eat and drink as usual until 8 hours before you arrive for surgery. After that, no food or milk.  Drink clear liquids until 2 hours before you arrive. These are liquids you can see through, like water, Gatorade, and Propel Water. They also include plain black coffee and tea (no cream or milk), candy, and breath mints. You can spit out gum when you arrive.  If you drink alcohol: Stop drinking it the night before surgery.  If your care team tells you to take medicine on the morning of surgery, it's okay to take it with a sip of water.  Preventing infection  Shower or bathe the night before and morning of your surgery. Follow the instructions your clinic gave you. (If no instructions, use regular soap.)  Don't shave or clip hair near your surgery site. We'll remove the hair if needed.  Don't smoke or vape the morning of surgery. You may chew nicotine gum up to 2 hours before surgery. A nicotine patch is okay.  Note: Some surgeries require you to completely quit smoking and nicotine. Check with your surgeon.  Your care team will make every effort to keep you safe from infection. We will:  Clean our hands often with soap and water (or an alcohol-based hand rub).  Clean the skin at your surgery site with a special soap that kills germs.  Give you a special gown to keep you warm. (Cold raises the risk of infection.)  Wear special hair covers, masks, gowns and gloves during surgery.  Give antibiotic medicine, if prescribed. Not all surgeries need antibiotics.  What to bring on the day of surgery  Photo ID and insurance card  Copy of your health care directive, if you have one  Glasses and hearing aids (bring cases)  You can't wear contacts during surgery  Inhaler and eye drops, if you use them (tell us about these when you arrive)  CPAP machine or breathing device, if you use them  A few personal items, if spending the night  If you have . . .  A pacemaker, ICD (cardiac defibrillator) or other  implant: Bring the ID card.  An implanted stimulator: Bring the remote control.  A legal guardian: Bring a copy of the certified (court-stamped) guardianship papers.  Please remove any jewelry, including body piercings. Leave jewelry and other valuables at home.  If you're going home the day of surgery  You must have a responsible adult drive you home. They should stay with you overnight as well.  If you don't have someone to stay with you, and you aren't safe to go home alone, we may keep you overnight. Insurance often won't pay for this.  After surgery  If it's hard to control your pain or you need more pain medicine, please call your surgeon's office.  Questions?   If you have any questions for your care team, list them here: _________________________________________________________________________________________________________________________________________________________________________ ____________________________________ ____________________________________ ____________________________________  For informational purposes only. Not to replace the advice of your health care provider. Copyright   2003, 2019 [employer]. All rights reserved. Clinically reviewed by Harleen Morton MD. [employer] 946256 - REV 12/22.    How to Take Your Medication Before Surgery  - HOLD (do not take) Aspirin for 7 days  - hold multivitamin until after surgery.

## 2023-12-19 ENCOUNTER — MYC MEDICAL ADVICE (OUTPATIENT)
Dept: PEDIATRICS | Facility: CLINIC | Age: 73
End: 2023-12-19
Payer: COMMERCIAL

## 2023-12-19 RX ORDER — PRAVASTATIN SODIUM 40 MG
40 TABLET ORAL DAILY
Qty: 90 TABLET | Refills: 0 | Status: SHIPPED | OUTPATIENT
Start: 2023-12-19 | End: 2024-03-25

## 2023-12-20 NOTE — TELEPHONE ENCOUNTER
Fasting lipids order in  Patient can schedule a lab only appointment prior to seeing PCP in January as her PCP appointment is in the afternoon.  Patient informed.  Juani Nevarez RN

## 2023-12-28 ENCOUNTER — TELEPHONE (OUTPATIENT)
Dept: PEDIATRICS | Facility: CLINIC | Age: 73
End: 2023-12-28
Payer: COMMERCIAL

## 2023-12-28 NOTE — TELEPHONE ENCOUNTER
[facility], requesting delay in start of care until 12/31/23 for physical therapy. Reason for delay in care: staffing. Also needs confirmation that Dr. Munroe will follow for home care orders. Please review and advise.     Call back #[phone]    Jamal PALOMARES RN 12/28/2023 at 4:44 PM

## 2023-12-29 NOTE — TELEPHONE ENCOUNTER
Any [facility] home care calling to see if provider has given verbal orders for delay of care and if Dr. Munroe will be following patient?    Ainsley Pepper RN

## 2023-12-31 ENCOUNTER — MEDICAL CORRESPONDENCE (OUTPATIENT)
Dept: HEALTH INFORMATION MANAGEMENT | Facility: CLINIC | Age: 73
End: 2023-12-31
Payer: COMMERCIAL

## 2024-01-03 ENCOUNTER — TELEPHONE (OUTPATIENT)
Dept: PEDIATRICS | Facility: CLINIC | Age: 74
End: 2024-01-03
Payer: COMMERCIAL

## 2024-01-03 NOTE — TELEPHONE ENCOUNTER
Home Care is calling regarding an established patient with [facility].       Requesting orders from: Shadia Munroe  Provider is following patient: Yes  Is this a 60-day recertification request?  No    Orders Requested    Physical Therapy  Request for initial certification (first set of orders)   Frequency:  2x/wk for 2 wks  then 1x/wk for 2 wks    Add OT evaluation.    Medication review: need clarification on Tylenol 500mg.  Patient has reported taking 1000mg as needed.  Needs an ok to take Tylenol 1000mg three times daily as needed, asit is not on their medication list.  If ok, will enter onto medication list in the chart.    Confirmed ok to leave a detailed message with call back.  Contact information confirmed and updated as needed.    Juani Nevarez RN

## 2024-01-06 ENCOUNTER — APPOINTMENT (OUTPATIENT)
Dept: CT IMAGING | Facility: CLINIC | Age: 74
End: 2024-01-06
Attending: EMERGENCY MEDICINE
Payer: COMMERCIAL

## 2024-01-06 ENCOUNTER — HOSPITAL ENCOUNTER (EMERGENCY)
Facility: CLINIC | Age: 74
Discharge: NURSING, REHAB OR LONG-TERM CARE | End: 2024-01-06
Attending: EMERGENCY MEDICINE | Admitting: RADIOLOGY
Payer: COMMERCIAL

## 2024-01-06 ENCOUNTER — APPOINTMENT (OUTPATIENT)
Dept: ULTRASOUND IMAGING | Facility: CLINIC | Age: 74
DRG: 175 | End: 2024-01-06
Payer: COMMERCIAL

## 2024-01-06 ENCOUNTER — HOSPITAL ENCOUNTER (INPATIENT)
Facility: CLINIC | Age: 74
LOS: 2 days | Discharge: HOME | DRG: 175 | End: 2024-01-08
Attending: INTERNAL MEDICINE | Admitting: INTERNAL MEDICINE
Payer: COMMERCIAL

## 2024-01-06 ENCOUNTER — APPOINTMENT (OUTPATIENT)
Dept: GENERAL RADIOLOGY | Facility: CLINIC | Age: 74
DRG: 175 | End: 2024-01-06
Payer: COMMERCIAL

## 2024-01-06 ENCOUNTER — APPOINTMENT (OUTPATIENT)
Dept: MRI IMAGING | Facility: CLINIC | Age: 74
DRG: 175 | End: 2024-01-06
Payer: COMMERCIAL

## 2024-01-06 ENCOUNTER — APPOINTMENT (OUTPATIENT)
Dept: INTERVENTIONAL RADIOLOGY/VASCULAR | Facility: CLINIC | Age: 74
End: 2024-01-06
Attending: RADIOLOGY
Payer: COMMERCIAL

## 2024-01-06 VITALS
RESPIRATION RATE: 33 BRPM | TEMPERATURE: 98.4 F | SYSTOLIC BLOOD PRESSURE: 145 MMHG | DIASTOLIC BLOOD PRESSURE: 88 MMHG | BODY MASS INDEX: 32.26 KG/M2 | WEIGHT: 182.1 LBS | HEART RATE: 76 BPM | OXYGEN SATURATION: 97 %

## 2024-01-06 DIAGNOSIS — M54.41 CHRONIC RIGHT-SIDED LOW BACK PAIN WITH RIGHT-SIDED SCIATICA: ICD-10-CM

## 2024-01-06 DIAGNOSIS — I26.09 ACUTE PULMONARY EMBOLISM WITH ACUTE COR PULMONALE, UNSPECIFIED PULMONARY EMBOLISM TYPE (H): Primary | ICD-10-CM

## 2024-01-06 DIAGNOSIS — R11.0 NAUSEA: ICD-10-CM

## 2024-01-06 DIAGNOSIS — I26.99 PULMONARY EMBOLISM, UNSPECIFIED CHRONICITY, UNSPECIFIED PULMONARY EMBOLISM TYPE, UNSPECIFIED WHETHER ACUTE COR PULMONALE PRESENT (H): ICD-10-CM

## 2024-01-06 DIAGNOSIS — R29.90 STROKE-LIKE SYMPTOMS: ICD-10-CM

## 2024-01-06 DIAGNOSIS — G89.29 CHRONIC RIGHT-SIDED LOW BACK PAIN WITH RIGHT-SIDED SCIATICA: ICD-10-CM

## 2024-01-06 LAB
ACT BLD: 216 SECONDS (ref 74–150)
ANION GAP SERPL CALCULATED.3IONS-SCNC: 12 MMOL/L (ref 7–15)
APTT PPP: 39 SECONDS (ref 22–38)
ATRIAL RATE - MUSE: 71 BPM
BASOPHILS # BLD AUTO: 0 10E3/UL (ref 0–0.2)
BASOPHILS NFR BLD AUTO: 0 %
BUN SERPL-MCNC: 14.6 MG/DL (ref 8–23)
CALCIUM SERPL-MCNC: 9.7 MG/DL (ref 8.8–10.2)
CHLORIDE SERPL-SCNC: 104 MMOL/L (ref 98–107)
CHOLEST SERPL-MCNC: 183 MG/DL
CREAT SERPL-MCNC: 0.88 MG/DL (ref 0.51–0.95)
DEPRECATED HCO3 PLAS-SCNC: 25 MMOL/L (ref 22–29)
DIASTOLIC BLOOD PRESSURE - MUSE: NORMAL MMHG
EGFRCR SERPLBLD CKD-EPI 2021: 70 ML/MIN/1.73M2
EOSINOPHIL # BLD AUTO: 0.1 10E3/UL (ref 0–0.7)
EOSINOPHIL NFR BLD AUTO: 1 %
ERYTHROCYTE [DISTWIDTH] IN BLOOD BY AUTOMATED COUNT: 12.7 % (ref 10–15)
ERYTHROCYTE [DISTWIDTH] IN BLOOD BY AUTOMATED COUNT: 12.7 % (ref 10–15)
FLUAV RNA SPEC QL NAA+PROBE: NEGATIVE
FLUBV RNA RESP QL NAA+PROBE: NEGATIVE
GLUCOSE BLDC GLUCOMTR-MCNC: 97 MG/DL (ref 70–99)
GLUCOSE SERPL-MCNC: 108 MG/DL (ref 70–99)
HBA1C MFR BLD: 5.6 %
HCT VFR BLD AUTO: 32.2 % (ref 35–47)
HCT VFR BLD AUTO: 33.3 % (ref 35–47)
HDLC SERPL-MCNC: 50 MG/DL
HGB BLD-MCNC: 10.2 G/DL (ref 11.7–15.7)
HGB BLD-MCNC: 10.2 G/DL (ref 11.7–15.7)
IMM GRANULOCYTES # BLD: 0.1 10E3/UL
IMM GRANULOCYTES NFR BLD: 1 %
INR PPP: 1.05 (ref 0.85–1.15)
INTERPRETATION ECG - MUSE: NORMAL
LDLC SERPL CALC-MCNC: 107 MG/DL
LYMPHOCYTES # BLD AUTO: 1.1 10E3/UL (ref 0.8–5.3)
LYMPHOCYTES NFR BLD AUTO: 15 %
MCH RBC QN AUTO: 29.5 PG (ref 26.5–33)
MCH RBC QN AUTO: 29.8 PG (ref 26.5–33)
MCHC RBC AUTO-ENTMCNC: 30.6 G/DL (ref 31.5–36.5)
MCHC RBC AUTO-ENTMCNC: 31.7 G/DL (ref 31.5–36.5)
MCV RBC AUTO: 93 FL (ref 78–100)
MCV RBC AUTO: 97 FL (ref 78–100)
MONOCYTES # BLD AUTO: 0.6 10E3/UL (ref 0–1.3)
MONOCYTES NFR BLD AUTO: 7 %
NEUTROPHILS # BLD AUTO: 5.8 10E3/UL (ref 1.6–8.3)
NEUTROPHILS NFR BLD AUTO: 76 %
NONHDLC SERPL-MCNC: 133 MG/DL
NRBC # BLD AUTO: 0 10E3/UL
NRBC BLD AUTO-RTO: 0 /100
P AXIS - MUSE: 10 DEGREES
PLATELET # BLD AUTO: 348 10E3/UL (ref 150–450)
PLATELET # BLD AUTO: 409 10E3/UL (ref 150–450)
POTASSIUM SERPL-SCNC: 3.5 MMOL/L (ref 3.4–5.3)
PR INTERVAL - MUSE: 198 MS
QRS DURATION - MUSE: 82 MS
QT - MUSE: 456 MS
QTC - MUSE: 495 MS
R AXIS - MUSE: 5 DEGREES
RADIOLOGIST FLAGS: ABNORMAL
RBC # BLD AUTO: 3.42 10E6/UL (ref 3.8–5.2)
RBC # BLD AUTO: 3.46 10E6/UL (ref 3.8–5.2)
RSV RNA SPEC NAA+PROBE: NEGATIVE
SARS-COV-2 RNA RESP QL NAA+PROBE: NEGATIVE
SODIUM SERPL-SCNC: 141 MMOL/L (ref 135–145)
SYSTOLIC BLOOD PRESSURE - MUSE: NORMAL MMHG
T AXIS - MUSE: -22 DEGREES
TRIGL SERPL-MCNC: 131 MG/DL
TROPONIN T SERPL HS-MCNC: 32 NG/L
TROPONIN T SERPL HS-MCNC: 58 NG/L
TROPONIN T SERPL HS-MCNC: 71 NG/L
TSH SERPL DL<=0.005 MIU/L-ACNC: 3.98 UIU/ML (ref 0.3–4.2)
UFH PPP CHRO-ACNC: 0.81 IU/ML
VENTRICULAR RATE- MUSE: 71 BPM
WBC # BLD AUTO: 10.5 10E3/UL (ref 4–11)
WBC # BLD AUTO: 7.7 10E3/UL (ref 4–11)

## 2024-01-06 PROCEDURE — 84443 ASSAY THYROID STIM HORMONE: CPT

## 2024-01-06 PROCEDURE — 272N000566 HC SHEATH CR3

## 2024-01-06 PROCEDURE — 84484 ASSAY OF TROPONIN QUANT: CPT

## 2024-01-06 PROCEDURE — 99153 MOD SED SAME PHYS/QHP EA: CPT

## 2024-01-06 PROCEDURE — 255N000002 HC RX 255 OP 636: Performed by: RADIOLOGY

## 2024-01-06 PROCEDURE — 85027 COMPLETE CBC AUTOMATED: CPT

## 2024-01-06 PROCEDURE — 250N000013 HC RX MED GY IP 250 OP 250 PS 637

## 2024-01-06 PROCEDURE — 0042T CT HEAD PERFUSION W CONTRAST: CPT

## 2024-01-06 PROCEDURE — 200N000002 HC R&B ICU UMMC

## 2024-01-06 PROCEDURE — 93005 ELECTROCARDIOGRAM TRACING: CPT

## 2024-01-06 PROCEDURE — A9585 GADOBUTROL INJECTION: HCPCS | Performed by: INTERNAL MEDICINE

## 2024-01-06 PROCEDURE — 83036 HEMOGLOBIN GLYCOSYLATED A1C: CPT

## 2024-01-06 PROCEDURE — C1769 GUIDE WIRE: HCPCS

## 2024-01-06 PROCEDURE — 85025 COMPLETE CBC W/AUTO DIFF WBC: CPT | Performed by: EMERGENCY MEDICINE

## 2024-01-06 PROCEDURE — 99152 MOD SED SAME PHYS/QHP 5/>YRS: CPT

## 2024-01-06 PROCEDURE — 85610 PROTHROMBIN TIME: CPT | Performed by: EMERGENCY MEDICINE

## 2024-01-06 PROCEDURE — 255N000002 HC RX 255 OP 636: Performed by: INTERNAL MEDICINE

## 2024-01-06 PROCEDURE — 96366 THER/PROPH/DIAG IV INF ADDON: CPT

## 2024-01-06 PROCEDURE — 250N000011 HC RX IP 250 OP 636: Performed by: EMERGENCY MEDICINE

## 2024-01-06 PROCEDURE — 99291 CRITICAL CARE FIRST HOUR: CPT | Mod: GC | Performed by: PSYCHIATRY & NEUROLOGY

## 2024-01-06 PROCEDURE — 250N000011 HC RX IP 250 OP 636: Performed by: RADIOLOGY

## 2024-01-06 PROCEDURE — 70551 MRI BRAIN STEM W/O DYE: CPT

## 2024-01-06 PROCEDURE — 80061 LIPID PANEL: CPT

## 2024-01-06 PROCEDURE — 85520 HEPARIN ASSAY: CPT

## 2024-01-06 PROCEDURE — 272N000116 HC CATH CR1

## 2024-01-06 PROCEDURE — 272N000194 HC ACCESSORY CR3

## 2024-01-06 PROCEDURE — 250N000009 HC RX 250: Performed by: EMERGENCY MEDICINE

## 2024-01-06 PROCEDURE — 84484 ASSAY OF TROPONIN QUANT: CPT | Performed by: EMERGENCY MEDICINE

## 2024-01-06 PROCEDURE — C1760 CLOSURE DEV, VASC: HCPCS

## 2024-01-06 PROCEDURE — 250N000013 HC RX MED GY IP 250 OP 250 PS 637: Performed by: STUDENT IN AN ORGANIZED HEALTH CARE EDUCATION/TRAINING PROGRAM

## 2024-01-06 PROCEDURE — 36415 COLL VENOUS BLD VENIPUNCTURE: CPT

## 2024-01-06 PROCEDURE — 93970 EXTREMITY STUDY: CPT | Mod: 26 | Performed by: RADIOLOGY

## 2024-01-06 PROCEDURE — 71045 X-RAY EXAM CHEST 1 VIEW: CPT | Mod: 26 | Performed by: RADIOLOGY

## 2024-01-06 PROCEDURE — 99292 CRITICAL CARE ADDL 30 MIN: CPT | Mod: FS

## 2024-01-06 PROCEDURE — 80048 BASIC METABOLIC PNL TOTAL CA: CPT | Performed by: EMERGENCY MEDICINE

## 2024-01-06 PROCEDURE — 250N000009 HC RX 250: Performed by: RADIOLOGY

## 2024-01-06 PROCEDURE — 85730 THROMBOPLASTIN TIME PARTIAL: CPT | Performed by: EMERGENCY MEDICINE

## 2024-01-06 PROCEDURE — 71045 X-RAY EXAM CHEST 1 VIEW: CPT

## 2024-01-06 PROCEDURE — 71275 CT ANGIOGRAPHY CHEST: CPT

## 2024-01-06 PROCEDURE — 85347 COAGULATION TIME ACTIVATED: CPT

## 2024-01-06 PROCEDURE — 93010 ELECTROCARDIOGRAM REPORT: CPT | Performed by: INTERNAL MEDICINE

## 2024-01-06 PROCEDURE — 93970 EXTREMITY STUDY: CPT

## 2024-01-06 PROCEDURE — 99207 PR NO BILLABLE SERVICE THIS VISIT: CPT

## 2024-01-06 PROCEDURE — 87637 SARSCOV2&INF A&B&RSV AMP PRB: CPT | Performed by: EMERGENCY MEDICINE

## 2024-01-06 PROCEDURE — 99291 CRITICAL CARE FIRST HOUR: CPT | Mod: FS

## 2024-01-06 PROCEDURE — 96365 THER/PROPH/DIAG IV INF INIT: CPT | Mod: 59

## 2024-01-06 PROCEDURE — 36415 COLL VENOUS BLD VENIPUNCTURE: CPT | Performed by: EMERGENCY MEDICINE

## 2024-01-06 PROCEDURE — 70496 CT ANGIOGRAPHY HEAD: CPT

## 2024-01-06 PROCEDURE — 272N000196 HC ACCESSORY CR5

## 2024-01-06 PROCEDURE — 37185 PRIM ART M-THRMBC SBSQ VSL: CPT

## 2024-01-06 PROCEDURE — 96376 TX/PRO/DX INJ SAME DRUG ADON: CPT | Mod: 59

## 2024-01-06 PROCEDURE — 99291 CRITICAL CARE FIRST HOUR: CPT | Mod: 25

## 2024-01-06 PROCEDURE — 70551 MRI BRAIN STEM W/O DYE: CPT | Mod: 26 | Performed by: RADIOLOGY

## 2024-01-06 PROCEDURE — 70450 CT HEAD/BRAIN W/O DYE: CPT

## 2024-01-06 PROCEDURE — 99207 PR NO BILLABLE SERVICE THIS VISIT: CPT | Mod: GC | Performed by: PSYCHIATRY & NEUROLOGY

## 2024-01-06 PROCEDURE — 250N000011 HC RX IP 250 OP 636

## 2024-01-06 RX ORDER — FENTANYL CITRATE 50 UG/ML
25-50 INJECTION, SOLUTION INTRAMUSCULAR; INTRAVENOUS EVERY 5 MIN PRN
Status: DISCONTINUED | OUTPATIENT
Start: 2024-01-06 | End: 2024-01-06 | Stop reason: HOSPADM

## 2024-01-06 RX ORDER — HYDROXYZINE PAMOATE 50 MG/1
50 CAPSULE ORAL 2 TIMES DAILY PRN
COMMUNITY
End: 2024-03-15

## 2024-01-06 RX ORDER — AMOXICILLIN 250 MG
2 CAPSULE ORAL 2 TIMES DAILY PRN
Status: DISCONTINUED | OUTPATIENT
Start: 2024-01-06 | End: 2024-01-06 | Stop reason: HOSPADM

## 2024-01-06 RX ORDER — ERGOCALCIFEROL 1.25 MG/1
50000 CAPSULE, LIQUID FILLED ORAL WEEKLY
COMMUNITY
End: 2024-06-17

## 2024-01-06 RX ORDER — IOPAMIDOL 612 MG/ML
100 INJECTION, SOLUTION INTRAVASCULAR ONCE
Status: COMPLETED | OUTPATIENT
Start: 2024-01-06 | End: 2024-01-06

## 2024-01-06 RX ORDER — GABAPENTIN 300 MG/1
300 CAPSULE ORAL AT BEDTIME
Status: DISCONTINUED | OUTPATIENT
Start: 2024-01-06 | End: 2024-01-08 | Stop reason: HOSPADM

## 2024-01-06 RX ORDER — NALOXONE HYDROCHLORIDE 0.4 MG/ML
0.4 INJECTION, SOLUTION INTRAMUSCULAR; INTRAVENOUS; SUBCUTANEOUS
Status: DISCONTINUED | OUTPATIENT
Start: 2024-01-06 | End: 2024-01-08 | Stop reason: HOSPADM

## 2024-01-06 RX ORDER — ASPIRIN 81 MG/1
324 TABLET, CHEWABLE ORAL DAILY
Status: DISCONTINUED | OUTPATIENT
Start: 2024-01-06 | End: 2024-01-06 | Stop reason: HOSPADM

## 2024-01-06 RX ORDER — BISACODYL 10 MG
10 SUPPOSITORY, RECTAL RECTAL DAILY PRN
Status: DISCONTINUED | OUTPATIENT
Start: 2024-01-06 | End: 2024-01-06 | Stop reason: HOSPADM

## 2024-01-06 RX ORDER — OXYCODONE HYDROCHLORIDE 5 MG/1
5 CAPSULE ORAL 4 TIMES DAILY
COMMUNITY
End: 2024-03-15

## 2024-01-06 RX ORDER — HEPARIN SODIUM 10000 [USP'U]/100ML
0-5000 INJECTION, SOLUTION INTRAVENOUS CONTINUOUS
Status: DISCONTINUED | OUTPATIENT
Start: 2024-01-06 | End: 2024-01-06 | Stop reason: HOSPADM

## 2024-01-06 RX ORDER — NALOXONE HYDROCHLORIDE 0.4 MG/ML
0.2 INJECTION, SOLUTION INTRAMUSCULAR; INTRAVENOUS; SUBCUTANEOUS
Status: DISCONTINUED | OUTPATIENT
Start: 2024-01-06 | End: 2024-01-06 | Stop reason: HOSPADM

## 2024-01-06 RX ORDER — NALOXONE HYDROCHLORIDE 0.4 MG/ML
0.2 INJECTION, SOLUTION INTRAMUSCULAR; INTRAVENOUS; SUBCUTANEOUS
Status: DISCONTINUED | OUTPATIENT
Start: 2024-01-06 | End: 2024-01-08 | Stop reason: HOSPADM

## 2024-01-06 RX ORDER — OXYCODONE HYDROCHLORIDE 5 MG/1
5 CAPSULE ORAL EVERY 6 HOURS PRN
Status: DISCONTINUED | OUTPATIENT
Start: 2024-01-06 | End: 2024-01-06

## 2024-01-06 RX ORDER — DOCUSATE SODIUM 100 MG/1
100 CAPSULE, LIQUID FILLED ORAL 2 TIMES DAILY
COMMUNITY
End: 2024-03-15

## 2024-01-06 RX ORDER — METHOCARBAMOL 500 MG/1
500 TABLET, FILM COATED ORAL EVERY 6 HOURS PRN
COMMUNITY
End: 2024-03-15

## 2024-01-06 RX ORDER — NICOTINE POLACRILEX 4 MG
15-30 LOZENGE BUCCAL
Status: DISCONTINUED | OUTPATIENT
Start: 2024-01-06 | End: 2024-01-06

## 2024-01-06 RX ORDER — NALOXONE HYDROCHLORIDE 0.4 MG/ML
0.4 INJECTION, SOLUTION INTRAMUSCULAR; INTRAVENOUS; SUBCUTANEOUS
Status: DISCONTINUED | OUTPATIENT
Start: 2024-01-06 | End: 2024-01-06 | Stop reason: HOSPADM

## 2024-01-06 RX ORDER — POLYETHYLENE GLYCOL 3350 17 G/17G
17 POWDER, FOR SOLUTION ORAL DAILY PRN
Status: DISCONTINUED | OUTPATIENT
Start: 2024-01-06 | End: 2024-01-06 | Stop reason: HOSPADM

## 2024-01-06 RX ORDER — HEPARIN SODIUM 200 [USP'U]/100ML
1 INJECTION, SOLUTION INTRAVENOUS CONTINUOUS PRN
Status: DISCONTINUED | OUTPATIENT
Start: 2024-01-06 | End: 2024-01-06 | Stop reason: HOSPADM

## 2024-01-06 RX ORDER — NICOTINE POLACRILEX 4 MG
15-30 LOZENGE BUCCAL
Status: DISCONTINUED | OUTPATIENT
Start: 2024-01-06 | End: 2024-01-06 | Stop reason: HOSPADM

## 2024-01-06 RX ORDER — ACETAMINOPHEN 325 MG/1
650 TABLET ORAL EVERY 4 HOURS PRN
Status: DISCONTINUED | OUTPATIENT
Start: 2024-01-06 | End: 2024-01-08 | Stop reason: HOSPADM

## 2024-01-06 RX ORDER — POLYETHYLENE GLYCOL 3350 17 G/17G
17 POWDER, FOR SOLUTION ORAL DAILY PRN
Status: DISCONTINUED | OUTPATIENT
Start: 2024-01-06 | End: 2024-01-06

## 2024-01-06 RX ORDER — FENTANYL CITRATE 50 UG/ML
25 INJECTION, SOLUTION INTRAMUSCULAR; INTRAVENOUS
Status: DISCONTINUED | OUTPATIENT
Start: 2024-01-06 | End: 2024-01-06

## 2024-01-06 RX ORDER — ACETAMINOPHEN 325 MG/1
650 TABLET ORAL EVERY 4 HOURS PRN
Status: DISCONTINUED | OUTPATIENT
Start: 2024-01-06 | End: 2024-01-06 | Stop reason: HOSPADM

## 2024-01-06 RX ORDER — DEXTROSE MONOHYDRATE 25 G/50ML
25-50 INJECTION, SOLUTION INTRAVENOUS
Status: DISCONTINUED | OUTPATIENT
Start: 2024-01-06 | End: 2024-01-08 | Stop reason: HOSPADM

## 2024-01-06 RX ORDER — OXYCODONE HYDROCHLORIDE 5 MG/1
5 TABLET ORAL EVERY 6 HOURS PRN
Status: DISCONTINUED | OUTPATIENT
Start: 2024-01-06 | End: 2024-01-08 | Stop reason: HOSPADM

## 2024-01-06 RX ORDER — GABAPENTIN 100 MG/1
100 CAPSULE ORAL DAILY
Status: DISCONTINUED | OUTPATIENT
Start: 2024-01-07 | End: 2024-01-08 | Stop reason: HOSPADM

## 2024-01-06 RX ORDER — AMOXICILLIN 250 MG
1 CAPSULE ORAL 2 TIMES DAILY PRN
Status: DISCONTINUED | OUTPATIENT
Start: 2024-01-06 | End: 2024-01-08 | Stop reason: HOSPADM

## 2024-01-06 RX ORDER — FLUMAZENIL 0.1 MG/ML
0.2 INJECTION, SOLUTION INTRAVENOUS
Status: DISCONTINUED | OUTPATIENT
Start: 2024-01-06 | End: 2024-01-06 | Stop reason: HOSPADM

## 2024-01-06 RX ORDER — BISACODYL 10 MG
10 SUPPOSITORY, RECTAL RECTAL DAILY PRN
Status: DISCONTINUED | OUTPATIENT
Start: 2024-01-06 | End: 2024-01-08 | Stop reason: HOSPADM

## 2024-01-06 RX ORDER — ACETAMINOPHEN 325 MG/10.15ML
650 LIQUID ORAL EVERY 4 HOURS PRN
Status: DISCONTINUED | OUTPATIENT
Start: 2024-01-06 | End: 2024-01-06 | Stop reason: HOSPADM

## 2024-01-06 RX ORDER — PRAVASTATIN SODIUM 20 MG
40 TABLET ORAL DAILY
Status: DISCONTINUED | OUTPATIENT
Start: 2024-01-06 | End: 2024-01-08 | Stop reason: HOSPADM

## 2024-01-06 RX ORDER — PROPOFOL 10 MG/ML
5-75 INJECTION, EMULSION INTRAVENOUS CONTINUOUS
Status: DISCONTINUED | OUTPATIENT
Start: 2024-01-06 | End: 2024-01-06

## 2024-01-06 RX ORDER — AMOXICILLIN 250 MG
1 CAPSULE ORAL 2 TIMES DAILY PRN
Status: DISCONTINUED | OUTPATIENT
Start: 2024-01-06 | End: 2024-01-06 | Stop reason: HOSPADM

## 2024-01-06 RX ORDER — HEPARIN SODIUM 10000 [USP'U]/100ML
0-5000 INJECTION, SOLUTION INTRAVENOUS CONTINUOUS
Status: DISCONTINUED | OUTPATIENT
Start: 2024-01-06 | End: 2024-01-08

## 2024-01-06 RX ORDER — ACETAMINOPHEN 325 MG/10.15ML
650 LIQUID ORAL EVERY 4 HOURS PRN
Status: DISCONTINUED | OUTPATIENT
Start: 2024-01-06 | End: 2024-01-08 | Stop reason: HOSPADM

## 2024-01-06 RX ORDER — GADOBUTROL 604.72 MG/ML
0.1 INJECTION INTRAVENOUS ONCE
Status: COMPLETED | OUTPATIENT
Start: 2024-01-06 | End: 2024-01-06

## 2024-01-06 RX ORDER — NICOTINE POLACRILEX 4 MG
15-30 LOZENGE BUCCAL
Status: DISCONTINUED | OUTPATIENT
Start: 2024-01-06 | End: 2024-01-08 | Stop reason: HOSPADM

## 2024-01-06 RX ORDER — POLYETHYLENE GLYCOL 3350 17 G/17G
17 POWDER, FOR SOLUTION ORAL DAILY
Status: DISCONTINUED | OUTPATIENT
Start: 2024-01-07 | End: 2024-01-08 | Stop reason: HOSPADM

## 2024-01-06 RX ORDER — POLYETHYLENE GLYCOL 3350 17 G/17G
17 POWDER, FOR SOLUTION ORAL 2 TIMES DAILY
COMMUNITY
End: 2024-03-15

## 2024-01-06 RX ORDER — IOPAMIDOL 755 MG/ML
69 INJECTION, SOLUTION INTRAVASCULAR ONCE
Status: COMPLETED | OUTPATIENT
Start: 2024-01-06 | End: 2024-01-06

## 2024-01-06 RX ORDER — IBUPROFEN 200 MG
1 CAPSULE ORAL DAILY
COMMUNITY

## 2024-01-06 RX ORDER — LIOTHYRONINE SODIUM 5 UG/1
5 TABLET ORAL DAILY
Status: DISCONTINUED | OUTPATIENT
Start: 2024-01-07 | End: 2024-01-08 | Stop reason: HOSPADM

## 2024-01-06 RX ORDER — ACETAMINOPHEN 500 MG
1000 TABLET ORAL PRN
COMMUNITY

## 2024-01-06 RX ORDER — IOPAMIDOL 755 MG/ML
117 INJECTION, SOLUTION INTRAVASCULAR ONCE
Status: COMPLETED | OUTPATIENT
Start: 2024-01-06 | End: 2024-01-06

## 2024-01-06 RX ORDER — DEXTROSE MONOHYDRATE 25 G/50ML
25-50 INJECTION, SOLUTION INTRAVENOUS
Status: DISCONTINUED | OUTPATIENT
Start: 2024-01-06 | End: 2024-01-06 | Stop reason: HOSPADM

## 2024-01-06 RX ORDER — DEXTROSE MONOHYDRATE 25 G/50ML
25-50 INJECTION, SOLUTION INTRAVENOUS
Status: DISCONTINUED | OUTPATIENT
Start: 2024-01-06 | End: 2024-01-06

## 2024-01-06 RX ORDER — AMOXICILLIN 250 MG
2 CAPSULE ORAL 2 TIMES DAILY PRN
Status: DISCONTINUED | OUTPATIENT
Start: 2024-01-06 | End: 2024-01-08 | Stop reason: HOSPADM

## 2024-01-06 RX ADMIN — LIDOCAINE HYDROCHLORIDE 6 ML: 10 INJECTION, SOLUTION INFILTRATION; PERINEURAL at 13:24

## 2024-01-06 RX ADMIN — IOPAMIDOL 117 ML: 755 INJECTION, SOLUTION INTRAVENOUS at 08:37

## 2024-01-06 RX ADMIN — ACETAMINOPHEN 650 MG: 325 SOLUTION ORAL at 16:30

## 2024-01-06 RX ADMIN — ASPIRIN 81 MG CHEWABLE TABLET 324 MG: 81 TABLET CHEWABLE at 09:23

## 2024-01-06 RX ADMIN — FENTANYL CITRATE 50 MCG: 50 INJECTION, SOLUTION INTRAMUSCULAR; INTRAVENOUS at 12:07

## 2024-01-06 RX ADMIN — GABAPENTIN 300 MG: 300 CAPSULE ORAL at 21:09

## 2024-01-06 RX ADMIN — HEPARIN SODIUM 1500 UNITS/HR: 10000 INJECTION, SOLUTION INTRAVENOUS at 09:28

## 2024-01-06 RX ADMIN — SODIUM CHLORIDE 100 ML: 9 INJECTION, SOLUTION INTRAVENOUS at 08:38

## 2024-01-06 RX ADMIN — IOPAMIDOL 69 ML: 755 INJECTION, SOLUTION INTRAVENOUS at 08:51

## 2024-01-06 RX ADMIN — Medication 5 MG: at 21:09

## 2024-01-06 RX ADMIN — HEPARIN SODIUM 6 BAG: 200 INJECTION, SOLUTION INTRAVENOUS at 13:23

## 2024-01-06 RX ADMIN — MIDAZOLAM 0.5 MG: 1 INJECTION INTRAMUSCULAR; INTRAVENOUS at 12:10

## 2024-01-06 RX ADMIN — HEPARIN SODIUM AND DEXTROSE 1500 UNITS/HR: 10000; 5 INJECTION INTRAVENOUS at 14:50

## 2024-01-06 RX ADMIN — OXYCODONE HYDROCHLORIDE 5 MG: 5 TABLET ORAL at 18:03

## 2024-01-06 RX ADMIN — SODIUM CHLORIDE 93 ML: 9 INJECTION, SOLUTION INTRAVENOUS at 08:51

## 2024-01-06 RX ADMIN — IOPAMIDOL 90 ML: 612 INJECTION, SOLUTION INTRAVENOUS at 13:08

## 2024-01-06 RX ADMIN — MIDAZOLAM 1 MG: 1 INJECTION INTRAMUSCULAR; INTRAVENOUS at 11:56

## 2024-01-06 RX ADMIN — PRAVASTATIN SODIUM 40 MG: 20 TABLET ORAL at 21:09

## 2024-01-06 RX ADMIN — ACETAMINOPHEN 650 MG: 325 TABLET, FILM COATED ORAL at 21:09

## 2024-01-06 ASSESSMENT — ACTIVITIES OF DAILY LIVING (ADL)
ADLS_ACUITY_SCORE: 24
ADLS_ACUITY_SCORE: 35
ADLS_ACUITY_SCORE: 35
ADLS_ACUITY_SCORE: 28
ADLS_ACUITY_SCORE: 28
ADLS_ACUITY_SCORE: 29
ADLS_ACUITY_SCORE: 29
ADLS_ACUITY_SCORE: 35

## 2024-01-06 NOTE — CONSULTS
Neurocritical Care Consultation    Reason for critical care admission: Bilateral PE  Admitting Team: MICU  Date of Service:  01/06/2024  Date of Admission:  1/6/2024  Hospital Day: 1    Assessment/Plan  Ely Humphreys is a 73 year old female with a past medical history of breast cancer, hypothyroidism, MIKEL, HLD who underwent a spinal fusion at the level of L4-S1 2 weeks ago on 12/22/2023 transferred to [facility] after being found to have bilateral PE S/P pulmonary angio and thrombectomy on heparin high intensity now in the neuro ICU.  CT head CTA done at [facility] did not show any acute intracranial pathology or any large vessel occlusions, in addition the patient had a normal perfusion study.  Neurocritical care team was consulted due to concerns for ongoing left arm weakness and numbness.  Patient's NIH stroke score was 2 for left arm drift without hitting bed and left arm numbness.    Neuro  # Left upper extremity weakness and numbness  -Neurochecks every 2 hours   -SBP goal <220 mmHg due to concerns of possible stroke being acute  -HOB > 30   -PT/OT/SLP  -MRI brain with and without contrast to rule out stroke or to make sure the stroke burden is not big as the patient is on a high intensity heparin drip.  - For now we are okay with the primary team continuing the heparin drip as the patient needs it from a PE standpoint and even if there is a stroke, the stroke burden is likely to be small  -Patient received 1 dose of aspirin in the ED at [facility].  Please do not continue aspirin while on anticoagulation with heparin      Clinically Significant Risk Factors Present on Admission                # Drug Induced Platelet Defect: home medication list includes an antiplatelet medication     # Acute Respiratory Failure: Documented O2 saturation < 91%.  Continue supplemental oxygen as needed     # Obesity: Estimated body mass index is 31.76 kg/m  as calculated from the following:    Height as of this encounter: 1.6  "m (5' 2.99\").    Weight as of this encounter: 81.3 kg (179 lb 3.7 oz).                    The patient was seen and discussed with the NCC attending, Dr. Lars MD.    Ruy Foy MD  Neurology Resident PGY 3  [facility] [id number]    History of Present Illness:  Ely Humphreys is a 73 year old female with a past medical history of breast cancer, hypothyroidism, MIKEL, HLD who underwent a spinal fusion at the level of L4-S1 2 weeks ago on 12/22/2023 presented to [facility] this morning 1/6/2024 for left arm numbness and weakness that has been on and off for the last 3 to 4 days.  Patient woke up this morning with left arm weakness and possible left facial droop.  At the ED the patient was found to be hypoxic with an SpO2 of 73%.  Patient was given an NIH stroke score of 2 for left arm drift and mild sensation loss in the left upper extremity.  As part of workup patient CT head and CTA did not show any acute intracranial pathology or any large vessel occlusion.  Patient's CT perfusion showed mild elevation of Tmax more than 6 seconds = 6 mL in the right temporal lobe without focal related cerebral volume loss or flow deficit to indicate infarct or ischemic penumbra.  But the patient was on to have large volume bilateral pulmonary emboli within the main, segmental and subsegmental pulmonary arteries with CT evidence of right heart strain.  Patient was hence started on a heparin drip and transferred to [facility] for further evaluation and management.  Neurocritical care team was consulted as the patient continues to have some weakness and numbness in the left upper extremity.  On exam the patient was given an NIH stroke score of 2 again for left arm drift without hitting bed and left upper extremity mild sensation loss without extinction.    Allergies   Allergen Reactions    Amiodarone Other (See Comments)     Loss of vision in R eye.       Current Medications:   [START ON 1/7/2024] gabapentin  100 mg " "Oral Daily    gabapentin  300 mg Oral At Bedtime    [START ON 1/7/2024] levothyroxine  125 mcg Oral QAM AC    [START ON 1/7/2024] liothyronine  5 mcg Oral Daily    [START ON 1/7/2024] polyethylene glycol  17 g Oral or Feeding Tube Daily    pravastatin  40 mg Oral Daily       PRN Medications:  acetaminophen **OR** acetaminophen, bisacodyl, glucose **OR** dextrose **OR** glucagon, - MEDICATION INSTRUCTIONS -, senna-docusate **OR** senna-docusate    Infusions:   heparin 1,500 Units/hr (01/06/24 1450)    - MEDICATION INSTRUCTIONS -         Physical Examination:  Vitals: Temp 98.4  F (36.9  C) (Axillary)   Ht 1.6 m (5' 2.99\")   Wt 81.3 kg (179 lb 3.7 oz)   LMP  (LMP Unknown)   BMI 31.76 kg/m    General: Adult female patient, lying in bed, critically-ill  HEENT: Normocephalict  Cardiac: RRR  Pulm: On O2 not using accessory muscles for ventilation  Abdomen: Soft  Extremities: Warm, no edema, distal pulses +2, well perfused  Skin: No rash or lesion  Psych: Calm and cooperative.    Neuro:  Mental status: Awake, alert, attentive, oriented to self, time, place, and circumstance. Language is fluent and coherent with intact comprehension of complex commands, naming and repetition.  Cranial nerves: VFF, PERRL, conjugate gaze, EOMI, facial sensation intact, face symmetric, shoulder shrug strong, tongue midline, no dysarthria.   Motor: Normal bulk and tone. No abnormal movements.  5 out of 5 strength in right upper extremity, unable to assess bilateral lower extremity as patient is supposed to lay flat until 7 PM.  Left upper extremity drift without hitting bed and a strength of 4+/5 on elbow flexion, extension, wrist extension, flexion.   Sensory: Intact to light touch x 4 extremities, with mild subjective numbness in left upper extremity  Coordination: FNF and HS without ataxia or dysmetria. Rapid alternating movements intact.   Gait: JEROME, deferred.    NIHSS  Interval     1a. Level of Consciousness 0-->Alert, keenly " responsive   1b. LOC Questions 0-->Answers both questions correctly   1c. LOC Commands 0-->Performs both tasks correctly   2.   Best Gaze 0-->Normal   3.   Visual 0-->No visual loss   4.   Facial Palsy 0-->Normal symmetrical movements   5a. Motor Arm, Left 1-->Drift, limb holds 90 (or 45) degrees, but drifts down before full 10 seconds, does not hit bed or other support   5b. Motor Arm, Right 0-->No drift, limb holds 90 (or 45) degrees for full 10 secs   6a. Motor Leg, Left 0-->No drift, leg holds 30 degree position for full 5 secs   6b. Motor Leg, right 0-->No drift, leg holds 30 degree position for full 5 secs   7.   Limb Ataxia 0-->Absent   8.   Sensory 1-->Mild-to-moderate sensory loss, patient feels pinprick is less sharp or is dull on the affected side, or there is a loss of superficial pain with pinprick, but patient is aware of being touched   9.   Best Language 0-->No aphasia, normal   10. Dysarthria 0-->Normal   11. Extinction and Inattention  0-->No abnormality   Total 2 (01/06/24 1510)     Labs and Imaging:    Results for orders placed or performed during the hospital encounter of 01/06/24 (from the past 24 hour(s))   IR Pulmonary Angiogram Bilateral    Narrative    IR PULMONARY ANGIOGRAM BILATERAL   1/6/2024 1:07 PM     CLINICAL HISTORY/INDICATION: Patient is a 73-year-old female with a  history of intermediate high risk pulmonary embolism with findings  consistent with right heart strain who presents to interventional  radiology for emergent pulmonary angiogram with mechanical  thrombectomy.    PROCEDURES PERFORMED:   1. Ultrasound-guided right femoral venotomy.  2. Selective catheterization and angiography of the main pulmonary  artery.  3. Selective catheterization and angiography of the right and left  main pulmonary arteries.  4. Selective catheterization and angiography of the right lower lobe  and left lower lobe pulmonary arteries.  5. Mechanical thrombectomy with extirpation of matter from the  right  main, right lower lobe, left main and left lower lobe pulmonary  arteries.  6. Pulmonary artery pressures, pre and post  7. Usage of a vascular closure device x 2  8. Moderate Sedation    MODERATE SEDATION: Versed 1.5 mg IV; Fentanyl 50 mcg IV. During the  time out, immediately prior to the administration of medications, the  patient was reassessed for adequacy to receive conscious sedation.  Under physician supervision, Versed and fentanyl were administered for  moderate sedation. Pulse oximetry, heart rate and blood pressure were  continuously monitored by an independent trained observer. The  physician spent 61 minutes of face-to-face sedation time with the  patient.    ATTENDING PHYSICIAN: Krishna Collins M.D.    ADDITIONAL MEDICATION: None.    CONTRAST: 90 cc Isovue-300    FLUORO: 17.1 minutes  AIR KERMA: 152 mGy    CONSENT: Following a discussion of the risks, benefits, indications  and alternatives to treatment, appropriate informed consent was  obtained.      TIMEOUT: A timeout was performed per universal protocol policy to  ensure correct patient, site, and procedure to be performed.     PROCEDURE AND FINDINGS: Following a discussion of the risks, benefits,  indications, and alternatives to treatment, appropriate informed  consent was obtained from the patient. The patient was brought to the  interventional radiology suite and placed supine on the table.  Bilateral groins were prepped and draped in a sterile fashion.  A  timeout was performed per universal protocol policy to confirm the  correct patient, site and procedure to be performed.    A preliminary ultrasound of the right  groin was performed and  demonstrates a patent right common femoral vein. A permanent  ultrasound image was recorded.  Using a combination of fluoroscopy and  ultrasound, an access site was determined.  The overlying skin was  anesthetized with 1% Lidocaine.  Using ultrasound guidance,  access  into the right common femoral  vein was obtained with visualization of  needle entry into the vessel. A 0.018 wire was advanced through the  needle and exchange was made for a 5 Syriac micropuncture sheath. A  0.035 wire was advanced through the micropuncture sheath. A 6 Syriac  sheath was used to dilate the puncture arteriotomy. Then 2 Perclose  devices were deployed in staggered fashion in the typical Perclose  technique. A 0.035 wire was advanced into the IVC. Over the wire a 24  Syriac [place] dry seal sheath was advanced with the tip in the IVC.     MAIN PULMONARY ARTERY:   Using a combination of wire and angled pigtail catheter access was  gained into the main pulmonary artery. Digital subtraction angiography  was performed, images show significant bilateral pulmonary emboli. The  pigtail catheter was removed over the wire and exchanged for the 24  [place]riac FlowTriever.     LEFT MAIN: Using a combination of wire and angled pigtail catheter  access was gained into the main pulmonary artery. The pigtail catheter  was removed over the wire and exchanged for the 24 [place]ri[place] FlowTriever.  Using a combination of catheter and wire access was gained into the  Left pulmonary artery.  The FlowTriever was then advanced into the  Left main pulmonary artery to the level of the pulmonary emboli and  extirpation of matter from the left main pulmonary artery was  performed with removal small amount of pulmonary embolus. Post  aspiration angiography was performed, images show some improvement in  the amount of thrombus.     LEFT LOWER LOBE: Using a combination of catheter and wire access was  gained into the left lower lobe pulmonary artery. Digital subtraction  angiography was performed, images show persistent thrombus in the left  lower lobe pulmonary artery. Over the wire the  FlowTriever was  advanced into the left lower lobe pulmonary artery the level of the  pulmonary emboli and mechanical thrombectomy with extirpation of  matter from the left lower lobe  pulmonary artery was performed with  with removal of a small amount of amount of pulmonary embolus. A 20  [place] curved Flowtriever was also utilized for more directed  aspiration of the lower lobe arteries. Post aspiration angiography was  performed, images show slight improvement in the amount of thrombus.  There is still persistent thrombus in the left upper and left lower  lobe pulmonary arteries, however, clot burden is overall improved.    RIGHT MAIN  Using a combination of catheter and wire access was gained into the  Right pulmonary artery. The FlowTriever was then advanced into the  Right main pulmonary artery to the level of the pulmonary emboli and  extirpation of matter from the right main pulmonary artery was  performed with removal of a significant amount of embolus. Post  aspiration angiography was performed, images show significant  improvement in the amount of thrombus.     RIGHT LOWER LOBE: Using a combination of catheter and wire, access was  gained into the right lower lobe pulmonary artery. Digital subtraction  angiography was performed, images show persistent thrombus in the  right lower lobe pulmonary artery. Over the wire, the  FlowTriever was  advanced into the right lower lobe pulmonary artery to the level of  the pulmonary emboli and mechanical thrombectomy with extirpation of  matter from the right lower lobe pulmonary artery was performed with  removal of a significant amount of pulmonary embolus. Post aspiration  angiography was performed, images show significant improvement in the  amount of thrombus. There is still persistent thrombus in the right  lower lobe pulmonary artery, however, clot burden is significantly  improved.    The catheter was pulled back into the main pulmonary artery. Final  digital subtraction angiography was performed, images show significant  improvement in the clot burden throughout the right and left pulmonary  arteries including the saddle pulmonary embolus  is no longer  visualized.    The FlowTriever was removed over the wire.    Hemostasis was achieved with successful administration of both  Perclose suture mediated vascular closure devices.    Pulmonary pressures:  Pre treatment: 29 mmHg mean  Post Treatment: 24 mmHg mean    IVC FILTER PLACEMENT:   Over the wire a flush catheter was then advanced into the inferior  vena cava at the iliac confluence. An inferior venacavogram was  performed which demonstrates conventional caval anatomy without  duplication of the IVC or the renal veins. The inferior vena cava is  normal in caliber and no thrombus or web is identified.    The flush catheter was removed over a wire and the sheath was  exchanged for the filter delivery sheath. The sheath was advanced to  the suprarenal inferior vena cava, the filter was advanced to the end  of the sheath, and the sheath and filter were retracted as a unit for  delivery in an infrarenal position. The filter was deployed.    Following this, the vascular sheath was removed and the previously  placed Perclose sutures were tied down in standard fashion, obtaining  excellent hemostasis bilaterally. The incision with closed with  Steri-Strips.     Throughout the procedure, the patient was monitored by a radiology  nurse for cardiac rhythm and oxygen saturation which remained stable.  The patient tolerated the procedure well and left interventional  radiology in stable condition.      Impression    IMPRESSION:   1. Successful bilateral pulmonary angiography and mechanical  thromboembolectomy, with significant improvement in pulmonary artery  pressures. Approximately ~95% of total thrombus successfully  removed.    RADHA GUEVARA MD         SYSTEM ID:  [id number]   EKG 12-lead, complete   Result Value Ref Range    Systolic Blood Pressure  mmHg    Diastolic Blood Pressure  mmHg    Ventricular Rate 81 BPM    Atrial Rate 81 BPM    NV Interval 196 ms    QRS Duration 90 ms     ms    QTc 520 ms     P Axis 8 degrees    R AXIS 22 degrees    T Axis -47 degrees    Interpretation ECG       Sinus rhythm  ST & T wave abnormality, consider anterolateral ischemia  Prolonged QT  Abnormal ECG  When compared with ECG of 06-JAN-2024 09:18,  No significant change was found         All relevant imaging and laboratory values personally reviewed.

## 2024-01-06 NOTE — CONSULTS
[facility]    Stroke Consult Note    Reason for Consult: Stroke Code     Chief Complaint: Stroke Symptoms      HPI  Ely Humphreys is a 73 year old woman with h/o breast cancer, hypothyroidism, MIKEL, HLD, who underwent spinal fusion at the level of L4-S1 2 weeks ago on 12/22, has been experiencing Left arm numbness on and off since a couple of days. This morning woke up with Left arm weakness and ?L facial droop. At the ED found to be hypoxic (Sp02 73%). ED initial exam by ED Attending was concerning for significant Left arm weakness with arm flopping back to bed when checking for drift.  On my exam NIHSS of 2 with mild drift (Grade 4/5 strength) and mild decrement in sensation on the left arm    Imaging Findings  CT Head: ASPECTS 10, no hemorrhage  CTA Head and Neck: No LVO, mild R P1 stenosis, mild athero at cervical ica and arch  CTP: Mild elevation of Tmax (Tmax > 6s = 6 ml) in the right temporal lobe without focal relative cerebral blood volume or flow deficit to indicate infarct or ischemic penumbra.   CTA Chest: Large volume bilateral pulmonary emboli within the main, segmental, and subsegmental pulmonary arteries with CT findings of right heart strain.     Intravenous Thrombolysis  Not given due to:   - minor/isolated/quickly resolving symptoms  - surgery/trauma within the past 14 days  - unclear or unfavorable risk-benefit profile for extended window thrombolysis beyond the conventional 4.5 hour time window    Endovascular Treatment  Not initiated due to absence of proximal vessel occlusion    Impression   Acute left arm weakness and decreased sensation  Large PE with Right heart strain    Although the stroke is acute, based on her exam patient probably has a small burden of the stroke, therefore okay to start heparin gtt for treatment her PE. Given the burden of PE, Stroke team is okay with primary team doing boluses and high intensity heparin, if patient requires the same.  "She was given 1 dose of Aspirin in the ED, but no antiplatelets needed while patient is on heparin gtt.    Recommendations  - Use orderset: \"Ischemic Stroke Routine Admission\" or \"Ischemic Stroke No Thrombolytics/No Thrombectomy ICU Admission\"  - Neurochecks every 4 hours; if any acute changes in neuro exam, stop heparin gtt and get STAT CT.  - Permissive HTN; goal SBP < 220 mmHg  - Heparin gtt management per primary team  - Statin: Continue PTA Pravastatin 40 mg daily. Assess LDL results (LDL goal <70)  - MRI Brain with and without contrast  - Telemetry, EKG  - Bedside Glucose Monitoring  - A1c, Lipid Panel, Troponin x 3  - PT/OT/SLP  - Stroke Education  - Euthermia, Euglycemia    Patient Follow-up     - final recommendation pending work-up    Thank you for this consult. We will continue to follow.      The Stroke Staff is Dr. Simmons.    Pollo Thompson MD  Vascular Neurology Fellow    To page me or covering stroke neurology team member, click here: [facility]  Choose \"On Call\" tab at top, then select \"NEUROLOGY/ALL SITES\" from middle drop-down box, press Enter, then look for \"stroke\" or \"telestroke\" for your site.  ______________________________________________________    Clinically Significant Risk Factors Present on Admission                # Drug Induced Platelet Defect: home medication list includes an antiplatelet medication     # Acute Respiratory Failure: Documented O2 saturation < 91%.  Continue supplemental oxygen as needed     # Obesity: Estimated body mass index is 32.26 kg/m  as calculated from the following:    Height as of 12/18/23: 1.6 m (5' 3\").    Weight as of this encounter: 82.6 kg (182 lb 1.6 oz).                Past Medical History   Past Medical History:   Diagnosis Date    Abnormal Pap smear 11/16/2010    Atrial fibrillation s/p ablation 10/07/2019    CHADsVASc is 2 for gender and age. Cardiology stopped eliquis after discussion on 2/26/20.     Benign essential tremor     Chronic    " Hyperlipidemia LDL goal <160 02/10/2010    Invasive ductal carcinoma of breast (H) 06/2009    left breast ca    Major depressive disorder, recurrent episode, in full remission (H24) 10/19/2011    Stable for decades    osteopenia 2002    Palpitations     Persistent atrial fibrillation (H) 05/10/2017    Unspecified hypothyroidism      Past Surgical History   Past Surgical History:   Procedure Laterality Date    ANESTHESIA CARDIOVERSION N/A 11/28/2017    Procedure: ANESTHESIA CARDIOVERSION;  Cardioversion;  Surgeon: GENERIC ANESTHESIA PROVIDER;  Location: [facility] OR    BACK SURGERY  10-    an ablation on lower spine    C THYROID ABLATION      CARDIAC SURGERY  2017    ablasion    COLONOSCOPY  10/2003    COLONOSCOPY  05/09/2014    COLONOSCOPY  06/09/2014    Procedure: COLONOSCOPY;  Surgeon: Garrett Villaseñor MD;  Location: [facility] GI    ENHANCE LASER REFRACTIVE BILATERAL EXISTING PT IN PARAMETERS Bilateral 2008    EYE SURGERY  2023    cataract    HC EXCISION BREAST LESION, OPEN >=1  07/2009    re-excision 8/17/09    ORTHOPEDIC SURGERY  2016    torn meniscus    ZZC NONSPECIFIC PROCEDURE      Tubal Ligation    Abstracted 07/12/02    ZZHC ECHO HEART XTHORACIC, STRESS/REST  07/22/2009    normal     Medications   Home Meds  Prior to Admission medications    Medication Sig Start Date End Date Taking? Authorizing Provider   aspirin 81 MG EC tablet Take 81 mg by mouth daily 9/8/21   Shadia Munroe MD   Calcium-Phosphorus-Vitamin D (CALCIUM/VITAMIN D3/ADULT GUMMY PO) Take 2 chew tab by mouth daily 2 gummies a day (500 mg of calcium 1000 units of vitamin D3) (Lazar brand)    Reported, Patient   gabapentin (NEURONTIN) 100 MG capsule Take 1-3 capsules (100-300 mg) by mouth at bedtime. May also take 1-2 capsules (100-200 mg) 2 times daily as needed for neuropathic pain. 12/11/23   Shadia Munroe MD   hydrocortisone 2.5 % ointment Apply 2.5 g topically 10/10/23   Reported, Patient   levothyroxine (SYNTHROID/LEVOTHROID) 125  MCG tablet Take 1 tablet (125 mcg) by mouth daily 23   Shadia Munroe MD   liothyronine (CYTOMEL) 5 MCG tablet Take 1 tablet (5 mcg) by mouth daily 22   Shadia Munroe MD   multivitamin (CENTRUM SILVER) tablet Take 1 tablet by mouth daily Centrum Silver 50+    Reported, Patient   pravastatin (PRAVACHOL) 40 MG tablet Take 1 tablet (40 mg) by mouth daily 23   Shadia Munroe MD   tiZANidine (ZANAFLEX) 2 MG tablet Take 1-2 tablets (2-4 mg) by mouth 3 times daily as needed for muscle spasms 23   Shadia Munroe MD   traMADol (ULTRAM) 50 MG tablet Take 50 mg by mouth every 8 hours as needed 10/27/22   Reported, Patient   Turmeric (QC TUMERIC COMPLEX) 500 MG CAPS Take 500 mg by mouth daily    Reported, Patient       Scheduled Meds   aspirin  324 mg Oral Daily       Infusion Meds   heparin 1,500 Units/hr (24)       PRN Meds      Allergies   Allergies   Allergen Reactions    Amiodarone Other (See Comments)     Loss of vision in R eye.     Family History   Family History   Problem Relation Age of Onset    Cancer Mother         84 yo Breast & Melanoma    Cerebrovascular Disease Mother 92    C.A.D. Mother     Hypertension Mother     Breast Cancer Mother     Anxiety Disorder Mother     Osteoporosis Mother     Cerebrovascular Disease Father     C.A.D. Father         possible MI in age 60's    Myocardial Infarction Father     Hypertension Father     Hyperlipidemia Father     Coronary Artery Disease Father         congestive heart failure    Prostate Cancer Father     Depression Father     Obesity Father     Cerebrovascular Disease Paternal Grandmother     Breast Cancer Paternal Grandmother     Coronary Artery Disease Brother         quadruple bypass    Hypertension Brother     Prostate Cancer Brother     Hyperlipidemia Brother     Breast Cancer Daughter 41    Cancer Maternal Aunt          40's Breast    Blood Disease Maternal Aunt          53 yo Lupus    Breast  Cancer Other         daughter    Cancer - colorectal No family hx of      Social History   Social History     Tobacco Use    Smoking status: Never    Smokeless tobacco: Never    Tobacco comments:     none   Vaping Use    Vaping Use: Never used   Substance Use Topics    Alcohol use: Yes     Comment: social    Drug use: No       Review of Systems   The 10 point Review of Systems is negative other than noted in the HPI or here.        PHYSICAL EXAMINATION  Temp:  [98.4  F (36.9  C)] 98.4  F (36.9  C)  Pulse:  [66-83] 66  Resp:  [16-25] 17  BP: (105-123)/(63-85) 112/73  SpO2:  [73 %-95 %] 94 %     See NIHSS    Dysphagia Screen  Per Nursing    Stroke Scales    NIHSS  1a. Level of Consciousness 0-->Alert, keenly responsive   1b. LOC Questions 0-->Answers both questions correctly   1c. LOC Commands 0-->Performs both tasks correctly   2.   Best Gaze 0-->Normal   3.   Visual 0-->No visual loss (has chronic R eye blindness with patient being unable to see lower half of visual field)   4.   Facial Palsy 0-->Normal symmetrical movements   5a. Motor Arm, Left 1-->Drift, limb holds 90 (or 45) degrees, but drifts down before full 10 seconds, does not hit bed or other support   5b. Motor Arm, Right 0-->No drift, limb holds 90 (or 45) degrees for full 10 secs   6a. Motor Leg, Left 0-->No drift, leg holds 30 degree position for full 5 secs   6b. Motor Leg, right 0-->No drift, leg holds 30 degree position for full 5 secs   7.   Limb Ataxia 0-->Absent   8.   Sensory 1-->Mild-to-moderate sensory loss, patient feels pinprick is less sharp or is dull on the affected side, or there is a loss of superficial pain with pinprick, but patient is aware of being touched   9.   Best Language 0-->No aphasia, normal   10. Dysarthria 0-->Normal   11. Extinction and Inattention  0-->No abnormality   Total 2 (01/06/24 0845)       Modified Jasper Score (Pre-morbid)  unclear    Imaging  I personally reviewed all imaging; relevant findings per HPI.  "    Lab Results Data   CBC  Recent Labs   Lab 01/06/24  0827   WBC 7.7   RBC 3.46*   HGB 10.2*   HCT 32.2*        Basic Metabolic Panel    Recent Labs   Lab 01/06/24  0827      POTASSIUM 3.5   CHLORIDE 104   CO2 25   BUN 14.6   CR 0.88   *   MARTITA 9.7     Liver Panel  No results for input(s): \"PROTTOTAL\", \"ALBUMIN\", \"BILITOTAL\", \"ALKPHOS\", \"AST\", \"ALT\", \"BILIDIRECT\" in the last 168 hours.  INR    Recent Labs   Lab Test 01/06/24  0827 05/10/17  1705   INR 1.05 1.01      Lipid Profile    Recent Labs   Lab Test 03/15/22  1043 12/20/21  0917 09/08/21  1209 11/15/16  1030 11/09/15  1047   CHOL 245* 285* 304*   < > 256*   HDL 68 74 64   < > 77   * 192* 215*   < > 158*   TRIG 119 93 126   < > 103   CHOLHDLRATIO  --   --   --   --  3.3    < > = values in this interval not displayed.     A1C    Recent Labs   Lab Test 11/09/15  1047   A1C 5.4     Troponin    Recent Labs   Lab 01/06/24  0827   CTROPT 32*          Stroke Code Data Data   Stroke Code Data  (for stroke code without tele)  Stroke code activated 01/06/24  0825   First stroke provider response 01/06/24  0826   Last known normal 01/06/24   (few days ago but new acute change this AM;)   Time of discovery (or onset of symptoms) 01/06/24  0630   Head CT read by Stroke Neuro Provider 01/06/24  0843   Was stroke code de-escalated? Yes  01/06/24  0930       "

## 2024-01-06 NOTE — PHARMACY-ADMISSION MEDICATION HISTORY
Admission medication history completed at [facility]. Please see Pharmacy Intern Admission Medication History note from 1/6/2024.

## 2024-01-06 NOTE — PROCEDURES
Interventional Radiology Post-Procedure Note   ?   Brief Procedure Note:   Patient name: Ely Humphreys  Pt MRN:[id number]   Date of procedure: 1/6/2024     Procedure(s): Bilateral pulmonary angiogram with mechanical thrombectomy  Sedation method: Moderate sedation was employed. The patient was monitored by an interventional radiology nurse at all times throughout the procedure under my direct guidance.  Pre Procedure Diagnosis: Severe bilateral pulmonary embolism  Post Procedure Diagnosis: Same  Indications: Severe bilateral pulmonary artery embolic burden with evidence of right heart strain necessitating urgent intervention.   ?   Attending: Krishna Collins M.D.  Specimen(s) removed: None   Additional studies ordered: None  Drains: None   Estimated Blood Loss: Minimal  Complications: None  Vascular closure method: Perclose sutures x 2   Findings/Notes/Comments: Pulmonary angiography confirmed severe bilateral pulmonary embolism. Successful mechanical thrombectomy with removal of ~95% of embolic burden.        ?   Please see dictation in PACS or under the Imaging tab in [facility] for detailed procedure note.     Krishna Collins M.D.   Vascular and Interventional Radiology   Pager: [phone]   After Hours / Scheduling: [phone]     1/6/2024  1:10 PM

## 2024-01-06 NOTE — PHARMACY-ADMISSION MEDICATION HISTORY
Pharmacy Intern Admission Medication History    Admission medication history is complete. The information provided in this note is only as accurate as the sources available at the time of the update.    Information Source(s): Patient, Family member, and CareEverywhere/SureScripts via in-person    Pertinent Information: Updated med rec pet pt    Changes made to PTA medication list:  Added: Updated medication pet pt  Deleted:   Tramadol   Tumeric   Calcium-Phosphorous- vitamin D  Multivitamin  Changed:   Gabapentin and tizanidine dosage regimen    Medication Affordability:  Not including over the counter (OTC) medications, was there a time in the past 3 months when you did not take your medications as prescribed because of cost?: Unable to Assess    Allergies reviewed with patient and updates made in EHR: yes  Medication History Completed By: Angella Perez 1/6/2024 10:20 AM    PTA Med List   Medication Sig Last Dose    acetaminophen (TYLENOL) 500 MG tablet Take 1,000 mg by mouth 3 times daily 1/6/2024 at am    aspirin 81 MG EC tablet Take 81 mg by mouth daily 1/6/2024 at am    calcium carbonate 500 mg, elemental, (OSCAL 500) 1250 (500 Ca) MG TABS tablet Take 1 tablet by mouth daily 1/6/2024 at am    docusate sodium (COLACE) 100 MG capsule Take 100 mg by mouth 2 times daily 1/6/2024 at am    gabapentin (NEURONTIN) 100 MG capsule Take 1-3 capsules (100-300 mg) by mouth at bedtime. May also take 1-2 capsules (100-200 mg) 2 times daily as needed for neuropathic pain. (Patient taking differently: Take 100 mg in morning and 300 mg at night) 1/6/2024 at am    hydrocortisone 2.5 % ointment Apply 2.5 g topically Unknown at prn    hydrOXYzine (VISTARIL) 50 MG capsule Take 50 mg by mouth 2 times daily as needed for itching 1/6/2024 at am    levothyroxine (SYNTHROID/LEVOTHROID) 125 MCG tablet Take 1 tablet (125 mcg) by mouth daily 1/6/2024 at am    liothyronine (CYTOMEL) 5 MCG tablet Take 1 tablet (5 mcg) by mouth daily  1/6/2024 at am    methocarbamol (ROBAXIN) 500 MG tablet Take 500 mg by mouth every 6 hours as needed for muscle spasms Past Week at prn    oxyCODONE (OXY-IR) 5 MG capsule Take 5 mg by mouth 4 times daily 1/6/2024 at am    polyethylene glycol (MIRALAX) 17 g packet Take 17 g by mouth 2 times daily 1/6/2024 at am    pravastatin (PRAVACHOL) 40 MG tablet Take 1 tablet (40 mg) by mouth daily 1/5/2024 at pm    tiZANidine (ZANAFLEX) 2 MG tablet Take 1-2 tablets (2-4 mg) by mouth 3 times daily as needed for muscle spasms (Patient taking differently: Take 6-12 mg by mouth 3 times daily as needed for muscle spasms) 1/6/2024 at am    vitamin D2 (ERGOCALCIFEROL) 20315 units (1250 mcg) capsule Take 50,000 Units by mouth once a week 12/27/2023

## 2024-01-06 NOTE — IR NOTE
Interventional Radiology Intra-procedural Nursing Note    Patient Name: Ely Humphreys  Medical Record Number: [id number]  Today's Date: January 6, 2024    Procedure: Bi-Lateral Pulmonary Angiogram with Pulmonary Embolectomy under Moderate Sedation  Start time: 1206  End time: 1307  Report provided to: Abida DE LA ROSA at [facility]   Patient depart time and location: 1328 to [facility]    Note: Patient entered Interventional Radiology Suite number 1 via cart. Patient awake, alert and orientated. Assisted onto procedural table in supine position. Prepped and draped.  Dr. Collins in room. Time out and procedure started. Vital signs stable. Telemetry reading NSR.    Procedure well tolerated by patient without complications. Procedure end with debrief by Dr. Collins.      Administered medication totals:  Lidocaine 1% 6 mL Intradermal  Versed 1.5 mg IVP  Fentanyl 50 mcg IVP    Last dose of sedation administered at 1210.

## 2024-01-06 NOTE — ED PROVIDER NOTES
History     Chief Complaint:  Stroke Symptoms       HPI   Ely Humphreys is a 73 year old female who presents to the ED with left arm weakness.  She got up about 630 this morning to the bathroom and noticed her left arm was not working well her left leg was fine.  She denies any speech problems swallowing double vision or other focal numbness or weakness.  She also tells me that she has had some intermittent low numbness to the left arm over the past few days and she has been is feeling short of breath intermittently for the past 2 weeks after having spine surgery at [facility] she did see her spine surgeon yesterday but the breathing difficulty was not evaluated.  She has had no previous strokes.  Not currently on a blood thinner      Independent Historian:    None brief review of [facility]    Review of External Notes:  Review of surgery notes    Medications:    No current outpatient medications on file.      Past Medical History:    Past Medical History:   Diagnosis Date    Abnormal Pap smear 11/16/2010    Atrial fibrillation s/p ablation 10/07/2019    Benign essential tremor     Hyperlipidemia LDL goal <160 02/10/2010    Invasive ductal carcinoma of breast (H) 06/2009    Major depressive disorder, recurrent episode, in full remission (H24) 10/19/2011    osteopenia 2002    Palpitations     Persistent atrial fibrillation (H) 05/10/2017    Unspecified hypothyroidism        Past Surgical History:    Past Surgical History:   Procedure Laterality Date    ANESTHESIA CARDIOVERSION N/A 11/28/2017    Procedure: ANESTHESIA CARDIOVERSION;  Cardioversion;  Surgeon: GENERIC ANESTHESIA PROVIDER;  Location:  OR    BACK SURGERY  10-    an ablation on lower spine    C THYROID ABLATION      CARDIAC SURGERY  2017    ablasion    COLONOSCOPY  10/2003    COLONOSCOPY  05/09/2014    COLONOSCOPY  06/09/2014    Procedure: COLONOSCOPY;  Surgeon: Garrett Villaseñor MD;  Location:  GI    ENHANCE LASER REFRACTIVE  BILATERAL EXISTING PT IN PARAMETERS Bilateral 2008    EYE SURGERY  2023    cataract    HC EXCISION BREAST LESION, OPEN >=1  07/2009    re-excision 8/17/09    IR PULMONARY ANGIOGRAM BILATERAL  1/6/2024    ORTHOPEDIC SURGERY  2016    torn meniscus    Z NONSPECIFIC PROCEDURE      Tubal Ligation    Abstracted 07/12/02    [facility] ECHO HEART XTHORACIC, STRESS/REST  07/22/2009    normal          Physical Exam   Patient Vitals for the past 24 hrs:   BP Temp Temp src Pulse Resp SpO2 Weight   01/06/24 1320 -- -- -- 76 (!) 33 -- --   01/06/24 1315 (!) 145/88 -- -- 75 25 97 % --   01/06/24 1310 (!) 138/96 -- -- 70 (!) 34 99 % --   01/06/24 1305 139/80 -- -- 73 23 93 % --   01/06/24 1300 (!) 145/83 -- -- 77 20 94 % --   01/06/24 1255 (!) 145/90 -- -- 75 20 90 % --   01/06/24 1250 (!) 145/87 -- -- 79 23 92 % --   01/06/24 1245 (!) 143/83 -- -- 75 22 91 % --   01/06/24 1240 (!) 140/74 -- -- 72 19 92 % --   01/06/24 1235 134/81 -- -- 75 19 (!) 88 % --   01/06/24 1230 (!) 145/85 -- -- 74 21 91 % --   01/06/24 1225 131/77 -- -- 76 20 (!) 85 % --   01/06/24 1220 125/75 -- -- 75 18 (!) 88 % --   01/06/24 1215 118/86 -- -- 70 14 (!) 86 % --   01/06/24 1210 118/82 -- -- 68 17 95 % --   01/06/24 1205 126/81 -- -- 70 19 95 % --   01/06/24 1200 121/84 -- -- 71 11 92 % --   01/06/24 1155 139/88 -- -- 70 21 95 % --   01/06/24 1130 115/76 -- -- 70 23 96 % --   01/06/24 1107 117/78 -- -- 72 21 96 % --   01/06/24 1052 120/73 -- -- 72 19 96 % --   01/06/24 1030 114/73 -- -- 70 23 99 % --   01/06/24 1022 120/77 -- -- 65 21 100 % --   01/06/24 1000 106/72 -- -- 67 24 95 % --   01/06/24 0943 112/68 -- -- 64 24 94 % --   01/06/24 0942 -- -- -- 66 17 94 % --   01/06/24 0927 112/73 -- -- 70 20 94 % --   01/06/24 0915 108/63 -- -- 76 25 (!) 88 % --   01/06/24 0912 108/63 -- -- 73 23 93 % --   01/06/24 0908 115/72 -- -- 73 26 (!) 88 % --   01/06/24 0900 117/72 -- -- 74 18 95 % --   01/06/24 0845 123/73 -- -- 76 16 95 % --   01/06/24 0831 105/85 -- -- 78  16 95 % --   01/06/24 0830 -- -- -- 78 -- (!) 89 % --   01/06/24 0825 118/79 98.4  F (36.9  C) Temporal 80 16 (!) 73 % --   01/06/24 0824 -- -- -- -- -- -- 82.6 kg (182 lb 1.6 oz)   01/06/24 0823 118/79 -- -- 83 -- -- --        Physical Exam  Constitutional: Middle-aged white female sitting no respiratory distress  HENT: No signs of trauma.   Eyes: EOM are normal. Pupils are equal, round, and reactive to light.   Neck: Normal range of motion. No JVD present. No cervical adenopathy.  Cardiovascular: Regular rhythm.  Exam reveals no gallop and no friction rub.    No murmur heard.  Pulmonary/Chest: Bilateral breath sounds normal. No wheezes, rhonchi or rales.  Abdominal: Soft. No tenderness. No rebound or guarding.  Lower abdominal midline laceration healing  Musculoskeletal: No edema. No tenderness.  Healing lumbar paraspinal incisions  Lymphadenopathy: No lymphadenopathy.   Neurological: Alert and oriented to person, place, and time.  No facial asymmetry speech fluent.  Weakness of left arm.  When I hold it up it drops hard to the bed. Patient is able to weakly squeeze my hand.  Other extremities with good strength and sensation.  Healing  Skin: Skin is warm and dry. No rash noted. No erythema.      Emergency Department Course   ECG  Normal sinus rhythm with inferior and anterior T wave inversion new from EKG done 4/12/2018  Rate 71 bpm. MN interval 198 ms. QRS duration 82 ms. QT/QTc 456/495 ms. P-R-T axes 10 5-22.    Imaging:  CT Chest Pulmonary Embolism w Contrast   Final Result   Abnormal   IMPRESSION:   Large volume bilateral pulmonary emboli within the main, segmental, and subsegmental pulmonary arteries with CT findings of right heart strain.         [Critical Result: New diagnosis of pulmonary embolism]      Finding was identified on 1/6/2024 9:11 AM CST.       Dr. Tipton was contacted by me on 1/6/2024 9:15 AM CST and verbalized understanding of the critical result.      CT Head Perfusion w Contrast - For  Tier 2 Stroke   Final Result   IMPRESSION:    1.  Mild elevation of Tmax in the right temporal lobe without focal relative cerebral blood volume or flow deficit to indicate infarct or ischemic penumbra. No correlate on the CTA.         CTA Head Neck with Contrast   Final Result   CONCLUSION:   HEAD CTA:   1.  Negative. No vessel stenosis, occlusion or aneurysm.      NECK CTA:   1.  Minor carotid artery atherosclerosis.    2.  No dissection or hemodynamically significant narrowing in the neck by NASCET criteria.      Findings were discussed with Dr. CE HURTADO via telephone at 902 hours on 1/6/2024.      CT Head w/o Contrast   Final Result   IMPRESSION:   1.  No acute intracranial injury, hemorrhage, mass, or CT evidence of recent ischemia.      MR Brain w/o & w Contrast    (Results Pending)     Report per radiology    Laboratory:  Labs Ordered and Resulted from Time of ED Arrival to Time of ED Departure   BASIC METABOLIC PANEL - Abnormal       Result Value    Sodium 141      Potassium 3.5      Chloride 104      Carbon Dioxide (CO2) 25      Anion Gap 12      Urea Nitrogen 14.6      Creatinine 0.88      GFR Estimate 70      Calcium 9.7      Glucose 108 (*)    PARTIAL THROMBOPLASTIN TIME - Abnormal    aPTT 39 (*)    TROPONIN T, HIGH SENSITIVITY - Abnormal    Troponin T, High Sensitivity 32 (*)    CBC WITH PLATELETS AND DIFFERENTIAL - Abnormal    WBC Count 7.7      RBC Count 3.46 (*)     Hemoglobin 10.2 (*)     Hematocrit 32.2 (*)     MCV 93      MCH 29.5      MCHC 31.7      RDW 12.7      Platelet Count 409      % Neutrophils 76      % Lymphocytes 15      % Monocytes 7      % Eosinophils 1      % Basophils 0      % Immature Granulocytes 1      NRBCs per 100 WBC 0      Absolute Neutrophils 5.8      Absolute Lymphocytes 1.1      Absolute Monocytes 0.6      Absolute Eosinophils 0.1      Absolute Basophils 0.0      Absolute Immature Granulocytes 0.1      Absolute NRBCs 0.0     INR - Normal    INR 1.05      INFLUENZA A/B, RSV, & SARS-COV2 PCR - Normal    Influenza A PCR Negative      Influenza B PCR Negative      RSV PCR Negative      SARS CoV2 PCR Negative     GLUCOSE MONITOR NURSING POCT   CBC WITH PLATELETS        Procedures   None    Emergency Department Course & Assessments:             Interventions:  Medications   iopamidol (ISOVUE-370) solution 117 mL (117 mLs Intravenous $Given 1/6/24 0837)   Saline (100 mLs As instructed $Given 1/6/24 0838)   iopamidol (ISOVUE-370) solution 69 mL (69 mLs Intravenous $Given 1/6/24 0851)   sodium chloride 0.9 % bag 500mL for CT scan flush use (93 mLs Intravenous $Given 1/6/24 0851)   heparin ANTICOAGULANT Loading dose for HIGH INTENSITY TREATMENT * Give BEFORE starting heparin infusion (6,600 Units Intravenous $Given 1/6/24 0927)   lidocaine 1 % 1-30 mL (6 mLs Intradermal $Given by Other 1/6/24 1324)   iopamidol (ISOVUE-300) IV solution 61% 100 mL (90 mLs Arterial $Given 1/6/24 1308)        Assessments:  Seen and examined    Independent Interpretation (X-rays, CTs, rhythm strip):  None    Consultations/Discussion of Management or Tests:  Stroke neuro, interventional radiology, intensivist at [facility]       Social Determinants of Health affecting care:  None     Disposition:  Transfer to interventional radiology with eventual transfer to ICU at [facility]    Impression & Plan        Medical Decision Making:  Presents to the emergency department with new onset of strokelike symptoms involving the left arm primarily.  However on arrival she is found to be significantly hypoxic with sats in the 70s which corrected the 90s only with facemask on discussion with the patient she has developed shortness of breath since her surgery 2 weeks ago.  Code stroke was called and patient went for CT CTA of the brain as well as CT of the chest.  No acute findings of the brain were found and by the time she returned she was now using her left arm fairly well.  However  her CT chest was positive for submassive PE.  Patient was started on heparin.  Also started on aspirin per neuro I contacted interventional radiology and they agreed to do a pulmonary embolectomy.  Since no ICU beds were available here arrangements were initially made to transfer patient to ICU with [facility] post embolectomy.    Critical Care time:  was 35 minutes for this patient excluding procedures.    Diagnosis:    ICD-10-CM    1. Pulmonary embolism, unspecified chronicity, unspecified pulmonary embolism type, unspecified whether acute cor pulmonale present (H)  I26.99       2. Stroke-like symptoms  R29.90            Discharge Medications:  Discharge Medication List as of 1/6/2024  2:02 PM             Mir Tipton MD  1/6/2024                Mir Tipton MD  01/06/24 [id number]

## 2024-01-06 NOTE — H&P
MEDICAL ICU H&P  01/06/2024    Date of Hospital Admission: 1/6/2024  Date of ICU Admission: 1/6/2023  Reason for Critical Care Admission: acute hypoxic respiratory failure, stroke  Date of Service (when I saw the patient): 01/06/2024    ASSESSMENT: Ely Humphreys is a 73 year old female with PMH breast cancer 2009, hypothyroidism, MIKEL on home CPAP, hyperlipidemia, recently underwent spinal fusion at the level of L4 - S1 12/22/2023, who has been experiencing increasing shortness of breath over the past two weeks, and intermittent left arm numbness over the past few days. This morning woke up with persistent left arm weakness and presented to the ED and was found to be hypoxic SpO2 70% on room air. CT PE with large bilateral pulmonary emboli and findings of right heart strain, s/p bilateral mechanical embolectomy with IR at [facility]. Admitted on 1/6/2024 to [facility] Medical ICU for acute hypoxic respiratory failure and stroke workup.     CHANGES and MAJOR THINGS TODAY:   - Admit to ICU  - Neurocrit consult  - MRI brain w/wo contrast  - Continue high intensity heparin gtt  - CXR  - US BLE  - Echo with bubble  - TSH, troponin, EKG, A1c, lipid panel    PLAN:    Neuro:  # Acute on chronic pain   # S/p L4-S1 spinal fusion 12/2023  - Tylenol 650 mg PRN   - PTA Gabapentin 100 mg in AM, 300 mg in PM  - PTA Robaxin 500 mg q 6 hours PRN  - Hold PTA Oxycodone  - Hold PTA Tizanidine  - RASS goal 0 to -1    #Left upper extremity weakness and numbness  Reports intermittent left arm numbness and weakness over the past few days. The morning of 1/6/24 woke up with left arm weakness which persisted, as well as possible L facial droop. Presented to ED with concern for symptoms. Head CT and CTA did not show any acute intracranial pathology or large vessel occlusion. Initial NIHSS 2.   - Neurocrit consult, appreciate recs  - MRI brain w/wo contrast to rule out stroke, evaluate stroke burden in setting of heparin gtt   - Neuro  checks every 2 hours  - SLP evaluation  - SBP < 220 mmHg  - Check lipid panel, A1c  - Received ASA while in ED at [facility], per neurocrit, will not continue ASA while on heparin gtt    Pulmonary:  # Acute hypoxic respiratory failure 2/2 PE  # Severe bilateral PE, acute  # MIKEL on home CPAP   Reports intermittent shortness of breath over past two weeks following her spine surgery. Upon presentation to ED SpO2 found to be 70s on room air. CT PE showing large volume pulmonary emboli within the main, segmental, and subsegmental pulmonary arteries with evidence of right heart strain. S/p bilateral pulmonary angio with mechanical thrombectomy by IR with successful removal of ~95% of embolic burden. On 10L oxymask upon arrival to ICU with shortness of breath but reportedly improved from before procedure.   - CXR upon arrival to ICU  - SpO2 goal > 92%, wean supplemental O2 as able  - CPAP at night   - Obtain US BLE    Cardiovascular:  # RV strain  # Troponinemia  # Prolonged Qtc  # Atrial fibrillation s/p ablation 2019  # HLD  CT PE with large volume bilateral pulmonary emboli with RV/LV ratio greater than 1 suggesting right heart dysfunction now s/p embolectomy. Bedside echo with evidence of mild RV dysfunction, TAPSE 25. IVC collapsible. Prior echo 12/2023 LVEF 55-60%, RV normal, patent foramen ovale present with small L to R atrial shunt. Hx of atrial fibrillation, not on anticoagulation outpatient.   - Obtain formal echo with bubble study  - Trend troponin to peak  - Obtain EKG     GI/Nutrition:  # No active concerns  - NPO pending SLP eval per stroke protocol    - Bowel regimen: Senna-docusate, Miralax  - Hepatic function panel WNL    Renal/Fluids/Electrolytes:  # No active issues  - Strict I&O monitoring  - Daily weights  - BMP, Mg, Phos daily  - ICU electrolyte replacement protocol     Endocrine:  # Hypothyroidism  - Continue PTA Levothyroxine 125 mcg daily  - Liothyronine 5 mcg daily  - Check TSH with reflex T4      ID:  # No active issues  - No leukocytosis, afebrile  - No recent sick contacts  - Flu/COVID/RSV negative on admission     Hematology:    # Normocytic anemia  - Continue high-intensity heparin infusion  - Obtain BLE US  - Daily CBC     Musculoskeletal:  # S/p L4-S1 spinal fusion  - PT / OT consult     Skin:  # No active issues  - Appreciate diligent nursing cares    General Cares/Prophylaxis:    DVT Prophylaxis: Heparin infusion, high-intensity  GI Prophylaxis: Not indicated  Restraints: None  Family Communication:  updated at bedside  Code Status: FULL    Lines/tubes/drains:  - PIVs    Disposition:  - Medical ICU     Patient seen and findings/plan discussed with medical ICU staff, Dr. Hurst.    Kate Terrell, APRN CNP    45 minutes of critical care time spent with patient        Attending note:  Patient seen, examined and discussed with the DOROTHEA. All data reviewed including vital signs, medications, laboratory studies, and imaging.  I agree with the assessment and plan as outlined in the above note.  The patient remains critically ill with acute respiratory failure, massive PE, arm numbness, and atrial fibrillation.  I personally adjusted the oxygen to treat the acute respiratory failure, followed hemodynamics in the setting of a massive PE, and followed hear rhythm and rate.  Presented to ED at [facility] with significant desaturation and found to have bilateral PE's, underwent embolectomy at [facility] without complication. Decreasing oxygen requirement. Stroke work-up there was negative, MRI head done here with pending results, Neurology following.  Continue heparin, was on Eliquis in the past for afib- will need to resume or warfarin.   Monitor in ICU overnight.      Total Critical Care time excluding time for teaching and procedures was 60 minutes    Naty Hurst MD  [phone]    Clinically Significant Risk Factors Present on Admission                # Drug Induced Platelet Defect: home medication list includes an  "antiplatelet medication     # Acute Respiratory Failure: Documented O2 saturation < 91%.  Continue supplemental oxygen as needed     # Obesity: Estimated body mass index is 31.76 kg/m  as calculated from the following:    Height as of this encounter: 1.6 m (5' 2.99\").    Weight as of this encounter: 81.3 kg (179 lb 3.7 oz).           # Anemia: based on hgb <11         -----------------------------------------------------------------------    HISTORY PRESENTING ILLNESS: Ely Humphreys is a 73 year old female with PMH breast cancer, hypothyroidism, MIKEL, hyperlipidemia, recently underwent spinal fusion at the level of L4 - S1 12/22/2023, who has been experiencing increasing shortness of breath over the past two weeks, and intermittent left arm numbness over the past few days. This morning woke up with persistent left arm weakness, numbness, and reported L facial droop which prompted her to go in to ED for further evaluation.   Upon arrival to ED was found to be hypoxic SpO2 70% on room air. Code stroke called. CT/CTA brain without intracranial findings or large vessel occlusion. CT PE showing large volume pulmonary emboli within the main, segmental, and subsegmental pulmonary arteries with evidence of right heart strain. Underwent bilateral mechanical embolectomy with IR at [facility]. Transferred to [facility] Medical ICU post-procedure for further management.       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 Cardiology stopped eliquis after discussion on 2/26/20.     Benign essential tremor     Chronic    Hyperlipidemia LDL goal <160 02/10/2010    Invasive ductal carcinoma of breast (H) 06/2009    left breast ca    Major depressive disorder, recurrent episode, in full remission (H24) 10/19/2011    Stable for decades    osteopenia 2002    Palpitations     Persistent atrial fibrillation (H) 05/10/2017    Unspecified hypothyroidism      SURGICAL HISTORY:  Past Surgical History:   Procedure Laterality Date    ANESTHESIA CARDIOVERSION N/A 11/28/2017    Procedure: ANESTHESIA CARDIOVERSION;  Cardioversion;  Surgeon: GENERIC ANESTHESIA PROVIDER;  Location: [facility] OR    BACK SURGERY  10-    an ablation on lower spine    C THYROID ABLATION      CARDIAC SURGERY  2017    ablasion    COLONOSCOPY  10/2003    COLONOSCOPY  05/09/2014    COLONOSCOPY  06/09/2014    Procedure: COLONOSCOPY;  Surgeon: Garrett Villaseñor MD;  Location: [facility] GI    ENHANCE LASER REFRACTIVE BILATERAL EXISTING PT IN PARAMETERS Bilateral 2008    EYE SURGERY  2023    cataract    HC EXCISION BREAST LESION, OPEN >=1  07/2009    re-excision 8/17/09    IR PULMONARY ANGIOGRAM BILATERAL  1/6/2024    ORTHOPEDIC SURGERY  2016    torn meniscus    ZZC NONSPECIFIC PROCEDURE      Tubal Ligation    Abstracted 07/12/02    ZZHC ECHO HEART XTHORACIC, STRESS/REST  07/22/2009    normal     SOCIAL HISTORY:  Social History     Socioeconomic History    Marital status:      Spouse name: Judah    Number of children: 2    Years of education: 18    Highest education level: Master's degree (e.g., MA, MS, Tiffany, MEd, MSW, XIOMARA)   Occupational History    Occupation:      Employer: [employer]     Comment: health    Tobacco Use    Smoking status: Never    Smokeless tobacco: Never    Tobacco comments:     none   Vaping Use    Vaping Use: Never used   Substance and Sexual Activity    Alcohol use: Yes     Comment:  social    Drug use: No    Sexual activity: Not Currently     Birth control/protection: Surgical     Comment: menopause   Other Topics Concern    Parent/sibling w/ CABG, MI or angioplasty before 65F 55M? Yes     Comment: father     Social Determinants of Health     Financial Resource Strain: Low Risk  (12/16/2023)    Financial Resource Strain     Within the past 12 months, have you or your family members you live with been unable to get utilities (heat, electricity) when it was really needed?: No   Food Insecurity: Low Risk  (12/16/2023)    Food Insecurity     Within the past 12 months, did you worry that your food would run out before you got money to buy more?: No     Within the past 12 months, did the food you bought just not last and you didn t have money to get more?: No   Transportation Needs: Low Risk  (12/16/2023)    Transportation Needs     Within the past 12 months, has lack of transportation kept you from medical appointments, getting your medicines, non-medical meetings or appointments, work, or from getting things that you need?: No   Physical Activity: Inactive (12/16/2023)    Exercise Vital Sign     Days of Exercise per Week: 0 days     Minutes of Exercise per Session: 0 min   Stress: No Stress Concern Present (12/16/2023)    [employer] [employer] - Occupational Stress Questionnaire     Feeling of Stress : Only a little   Social Connections: Moderately Isolated (12/16/2023)    Social Connection and Isolation Panel [NHANES]     Frequency of Communication with Friends and Family: Once a week     Frequency of Social Gatherings with Friends and Family: Twice a week     Attends [employer] Services: Never     Active Member of Clubs or Organizations: No     Attends Club or Organization Meetings: Never     Marital Status:    Interpersonal Safety: Low Risk  (12/4/2023)    Interpersonal Safety     Do you feel physically and emotionally safe where you currently live?: Yes     Within the  past 12 months, have you been hit, slapped, kicked or otherwise physically hurt by someone?: No     Within the past 12 months, have you been humiliated or emotionally abused in other ways by your partner or ex-partner?: No   Housing Stability: Low Risk  (2023)    Housing Stability     Do you have housing? : Yes     Are you worried about losing your housing?: No     FAMILY HISTORY:   Family History   Problem Relation Age of Onset    Cancer Mother         84 yo Breast & Melanoma    Cerebrovascular Disease Mother 92    C.A.D. Mother     Hypertension Mother     Breast Cancer Mother     Anxiety Disorder Mother     Osteoporosis Mother     Cerebrovascular Disease Father     C.A.D. Father         possible MI in age 60's    Myocardial Infarction Father     Hypertension Father     Hyperlipidemia Father     Coronary Artery Disease Father         congestive heart failure    Prostate Cancer Father     Depression Father     Obesity Father     Cerebrovascular Disease Paternal Grandmother     Breast Cancer Paternal Grandmother     Coronary Artery Disease Brother         quadruple bypass    Hypertension Brother     Prostate Cancer Brother     Hyperlipidemia Brother     Breast Cancer Daughter 41    Cancer Maternal Aunt          40's Breast    Blood Disease Maternal Aunt          53 yo Lupus    Breast Cancer Other         daughter    Cancer - colorectal No family hx of      ALLERGIES:   Allergies   Allergen Reactions    Amiodarone Other (See Comments)     Loss of vision in R eye.     MEDICATIONS:  [COMPLETED] heparin ANTICOAGULANT Loading dose for HIGH INTENSITY TREATMENT * Give BEFORE starting heparin infusion  [COMPLETED] iopamidol (ISOVUE-300) IV solution 61% 100 mL  [COMPLETED] iopamidol (ISOVUE-370) solution 117 mL  [COMPLETED] iopamidol (ISOVUE-370) solution 69 mL  [COMPLETED] lidocaine 1 % 1-30 mL  [COMPLETED] Saline  [COMPLETED] sodium chloride 0.9 % bag 500mL for CT scan flush use    acetaminophen  (TYLENOL) 500 MG tablet, Take 1,000 mg by mouth 3 times daily  aspirin 81 MG EC tablet, Take 81 mg by mouth daily  calcium carbonate 500 mg, elemental, (OSCAL 500) 1250 (500 Ca) MG TABS tablet, Take 1 tablet by mouth daily  docusate sodium (COLACE) 100 MG capsule, Take 100 mg by mouth 2 times daily  gabapentin (NEURONTIN) 100 MG capsule, Take 1-3 capsules (100-300 mg) by mouth at bedtime. May also take 1-2 capsules (100-200 mg) 2 times daily as needed for neuropathic pain. (Patient taking differently: Take 100 mg in morning and 300 mg at night)  hydrocortisone 2.5 % ointment, Apply 2.5 g topically  hydrOXYzine (VISTARIL) 50 MG capsule, Take 50 mg by mouth 2 times daily as needed for itching  levothyroxine (SYNTHROID/LEVOTHROID) 125 MCG tablet, Take 1 tablet (125 mcg) by mouth daily  liothyronine (CYTOMEL) 5 MCG tablet, Take 1 tablet (5 mcg) by mouth daily  methocarbamol (ROBAXIN) 500 MG tablet, Take 500 mg by mouth every 6 hours as needed for muscle spasms  oxyCODONE (OXY-IR) 5 MG capsule, Take 5 mg by mouth 4 times daily  polyethylene glycol (MIRALAX) 17 g packet, Take 17 g by mouth 2 times daily  pravastatin (PRAVACHOL) 40 MG tablet, Take 1 tablet (40 mg) by mouth daily  tiZANidine (ZANAFLEX) 2 MG tablet, Take 1-2 tablets (2-4 mg) by mouth 3 times daily as needed for muscle spasms (Patient taking differently: Take 6-12 mg by mouth 3 times daily as needed for muscle spasms)  Turmeric (QC TUMERIC COMPLEX) 500 MG CAPS, Take 500 mg by mouth daily (Patient not taking: Reported on 1/6/2024)  vitamin D2 (ERGOCALCIFEROL) 03564 units (1250 mcg) capsule, Take 50,000 Units by mouth once a week        PHYSICAL EXAMINATION:  Temp:  [98.4  F (36.9  C)] 98.4  F (36.9  C)  Pulse:  [64-83] 76  Resp:  [11-34] 33  BP: (105-145)/(63-96) 145/88  SpO2:  [73 %-100 %] 97 %  General: Resting in bed, in no apparent distress  HEENT: Normocephalic, atraumatic. PERRL. Mucous membranes moist, intact.   Neuro: A&Ox3, strength 5/5 to RUE, 4/5  LUE, no facial asymmetry,   Pulm/Resp: Clear breath sounds bilaterally without rhonchi, crackles or wheeze, breathing non-labored.   CV: RRR, S1S2, no murmur, rub, or gallop.   Abdomen: Soft, non-distended, non-tender, active bowel sounds.   Ext: No edema, pulses 2+ radial, pedal bilaterally.  Incisions/Skin: Warm, dry, intact. No rashes or lesions.     LABS: Reviewed.   Arterial Blood Gases   No lab results found in last 7 days.  Complete Blood Count   Recent Labs   Lab 01/06/24  0827   WBC 7.7   HGB 10.2*        Basic Metabolic Panel  Recent Labs   Lab 01/06/24  1400 01/06/24 0827   NA  --  141   POTASSIUM  --  3.5   CHLORIDE  --  104   CO2  --  25   BUN  --  14.6   CR  --  0.88   GLC 97 108*     Liver Function Tests  Recent Labs   Lab 01/06/24 0827   INR 1.05     Coagulation Profile  Recent Labs   Lab 01/06/24 0827   INR 1.05   PTT 39*       IMAGING:  Recent Results (from the past 24 hour(s))   CT Head w/o Contrast    Narrative    EXAM: CT HEAD W/O CONTRAST  LOCATION: [facility]  DATE: 1/6/2024    INDICATION: Left-sided weakness  COMPARISON: Brain MRI 05/07/2018  TECHNIQUE: Routine CT Head without IV contrast. Multiplanar reformats. Dose reduction techniques were used.    FINDINGS:  INTRACRANIAL CONTENTS: No intracranial hemorrhage, extraaxial collection, or mass effect.  No CT evidence of acute infarct. Normal parenchymal attenuation. Mild generalized volume loss. No hydrocephalus.     VISUALIZED ORBITS/SINUSES/MASTOIDS: No intraorbital abnormality. No paranasal sinus mucosal disease. No middle ear or mastoid effusion.    BONES/SOFT TISSUES: No acute abnormality.      Impression    IMPRESSION:  1.  No acute intracranial injury, hemorrhage, mass, or CT evidence of recent ischemia.   CTA Head Neck with Contrast    Narrative    HEAD AND NECK CT ANGIOGRAM WITH IV CONTRAST  1/6/2024 8:44 AM CST    INDICATION: Left-sided weakness  TECHNIQUE: Head and neck CT angiogram with IV  contrast. CT images of the head and neck vessels obtained during the arterial phase of intravenous contrast administration. Axial helical 2D reconstructed images and multiplanar 3D MIP reconstructed images of   the head and neck vessels were performed by the technologist. Dose reduction techniques were used.  CONTRAST: 67 mL Isovue 370  COMPARISON: None.     FINDINGS:  HEAD CTA: The intracranial circulation is patent without evidence for aneurysm, proximal vessel occlusion, high-grade intracranial stenosis or arteriovenous malformation. Patent dural venous sinuses.    NECK CTA: Three vessel arch.  Carotid arteries are patent with minor atherosclerotic change.  No hemodynamically significant stenosis by NASCET criteria in either carotid system.  Patent vertebral arteries. No dissection.      Impression    CONCLUSION:  HEAD CTA:  1.  Negative. No vessel stenosis, occlusion or aneurysm.    NECK CTA:  1.  Minor carotid artery atherosclerosis.   2.  No dissection or hemodynamically significant narrowing in the neck by NASCET criteria.    Findings were discussed with Dr. CE HURTADO via telephone at 902 hours on 1/6/2024.   CT Head Perfusion w Contrast - For Tier 2 Stroke    Narrative    EXAM: CT HEAD PERFUSION WITH CONTRAST  LOCATION: [facility]  DATE/TIME: 01/06/2024, 9:06 AM CST    INDICATION: Left-sided weakness.  TECHNIQUE: CT cerebral perfusion was performed utilizing a contrast bolus. Perfusion data were post processed with generation of standard perfusion maps and estimation of ischemic/infarcted volumes utilizing standard threshold values. Dose reduction   techniques were used.   CONTRAST: 50 mL Isovue 370.  COMPARISON: None.    FINDINGS:   PERFUSION MAPS: Symmetrical cerebral perfusion. No subjective focal deficits in cerebral blood flow or volume to suggest ischemia/oligemia. Small area of mild Tmax elevation within the right temporal lobe.    RAPID ANALYSIS:  CBF<30%: 0  mL  Tmax>6sec: 6 mL  Mismatch volume: 6 mL  Mismatch ratio: Infinite      Impression    IMPRESSION:   1.  Mild elevation of Tmax in the right temporal lobe without focal relative cerebral blood volume or flow deficit to indicate infarct or ischemic penumbra. No correlate on the CTA.     CT Chest Pulmonary Embolism w Contrast   Result Value    Radiologist flags New diagnosis of pulmonary embolism (AA)    Narrative    EXAM: CT CHEST PULMONARY EMBOLISM W CONTRAST  LOCATION: [facility]  DATE: 1/6/2024    INDICATION: hypoxia, dyspnea lef arm weak   2 weeks s p spine surgery  COMPARISON: None.  TECHNIQUE: CT chest pulmonary angiogram during arterial phase injection of IV contrast. Multiplanar reformats and MIP reconstructions were performed. Dose reduction techniques were used.   CONTRAST: 69mL Isovue 370    FINDINGS:  ANGIOGRAM CHEST: Large volume pulmonary embolus within the bilateral may as well as each segmental and subsegmental pulmonary artery, right greater than left. The RV/LV ratio is greater than 1, suggesting right heart dysfunction. Thoracic aorta is   negative for dissection.     LUNGS AND PLEURA: Dependent atelectasis. No areas of consolidation. No pleural effusion.    MEDIASTINUM/AXILLAE: Mild cardiomegaly. No pericardial effusion. No thoracic lymphadenopathy.    CORONARY ARTERY CALCIFICATION: Mild.    UPPER ABDOMEN: Benign left renal cysts (which requires no follow-up).    MUSCULOSKELETAL: No acute osseous abnormality.      Impression    IMPRESSION:  Large volume bilateral pulmonary emboli within the main, segmental, and subsegmental pulmonary arteries with CT findings of right heart strain.      [Critical Result: New diagnosis of pulmonary embolism]    Finding was identified on 1/6/2024 9:11 AM CST.     Dr. Tipton was contacted by me on 1/6/2024 9:15 AM CST and verbalized understanding of the critical result.   IR Pulmonary Angiogram Bilateral    Narrative    IR PULMONARY  ANGIOGRAM BILATERAL   1/6/2024 1:07 PM     CLINICAL HISTORY/INDICATION: Patient is a 73-year-old female with a  history of intermediate high risk pulmonary embolism with findings  consistent with right heart strain who presents to interventional  radiology for emergent pulmonary angiogram with mechanical  thrombectomy.    PROCEDURES PERFORMED:   1. Ultrasound-guided right femoral venotomy.  2. Selective catheterization and angiography of the main pulmonary  artery.  3. Selective catheterization and angiography of the right and left  main pulmonary arteries.  4. Selective catheterization and angiography of the right lower lobe  and left lower lobe pulmonary arteries.  5. Mechanical thrombectomy with extirpation of matter from the right  main, right lower lobe, left main and left lower lobe pulmonary  arteries.  6. Pulmonary artery pressures, pre and post  7. Usage of a vascular closure device x 2  8. Moderate Sedation    MODERATE SEDATION: Versed 1.5 mg IV; Fentanyl 50 mcg IV. During the  time out, immediately prior to the administration of medications, the  patient was reassessed for adequacy to receive conscious sedation.  Under physician supervision, Versed and fentanyl were administered for  moderate sedation. Pulse oximetry, heart rate and blood pressure were  continuously monitored by an independent trained observer. The  physician spent 61 minutes of face-to-face sedation time with the  patient.    ATTENDING PHYSICIAN: Krishna Collins M.D.    ADDITIONAL MEDICATION: None.    CONTRAST: 90 cc Isovue-300    FLUORO: 17.1 minutes  AIR KERMA: 152 mGy    CONSENT: Following a discussion of the risks, benefits, indications  and alternatives to treatment, appropriate informed consent was  obtained.      TIMEOUT: A timeout was performed per universal protocol policy to  ensure correct patient, site, and procedure to be performed.     PROCEDURE AND FINDINGS: Following a discussion of the risks, benefits,  indications, and  alternatives to treatment, appropriate informed  consent was obtained from the patient. The patient was brought to the  interventional radiology suite and placed supine on the table.  Bilateral groins were prepped and draped in a sterile fashion.  A  timeout was performed per universal protocol policy to confirm the  correct patient, site and procedure to be performed.    A preliminary ultrasound of the right  groin was performed and  demonstrates a patent right common femoral vein. A permanent  ultrasound image was recorded.  Using a combination of fluoroscopy and  ultrasound, an access site was determined.  The overlying skin was  anesthetized with 1% Lidocaine.  Using ultrasound guidance,  access  into the right common femoral vein was obtained with visualization of  needle entry into the vessel. A 0.018 wire was advanced through the  needle and exchange was made for a 5 Gambian micropuncture sheath. A  0.035 wire was advanced through the micropuncture sheath. A 6 Gambian  sheath was used to dilate the puncture arteriotomy. Then 2 Perclose  devices were deployed in staggered fashion in the typical Perclose  technique. A 0.035 wire was advanced into the IVC. Over the wire a 24  Gambian Girard dry seal sheath was advanced with the tip in the IVC.     MAIN PULMONARY ARTERY:   Using a combination of wire and angled pigtail catheter access was  gained into the main pulmonary artery. Digital subtraction angiography  was performed, images show significant bilateral pulmonary emboli. The  pigtail catheter was removed over the wire and exchanged for the 24  Gambian FlowTriever.     LEFT MAIN: Using a combination of wire and angled pigtail catheter  access was gained into the main pulmonary artery. The pigtail catheter  was removed over the wire and exchanged for the 24 Gambian FlowTriever.  Using a combination of catheter and wire access was gained into the  Left pulmonary artery.  The FlowTriever was then advanced into  the  Left main pulmonary artery to the level of the pulmonary emboli and  extirpation of matter from the left main pulmonary artery was  performed with removal small amount of pulmonary embolus. Post  aspiration angiography was performed, images show some improvement in  the amount of thrombus.     LEFT LOWER LOBE: Using a combination of catheter and wire access was  gained into the left lower lobe pulmonary artery. Digital subtraction  angiography was performed, images show persistent thrombus in the left  lower lobe pulmonary artery. Over the wire the  FlowTriever was  advanced into the left lower lobe pulmonary artery the level of the  pulmonary emboli and mechanical thrombectomy with extirpation of  matter from the left lower lobe pulmonary artery was performed with  with removal of a small amount of amount of pulmonary embolus. A 20  Bolivian curved Flowtriever was also utilized for more directed  aspiration of the lower lobe arteries. Post aspiration angiography was  performed, images show slight improvement in the amount of thrombus.  There is still persistent thrombus in the left upper and left lower  lobe pulmonary arteries, however, clot burden is overall improved.    RIGHT MAIN  Using a combination of catheter and wire access was gained into the  Right pulmonary artery. The FlowTriever was then advanced into the  Right main pulmonary artery to the level of the pulmonary emboli and  extirpation of matter from the right main pulmonary artery was  performed with removal of a significant amount of embolus. Post  aspiration angiography was performed, images show significant  improvement in the amount of thrombus.     RIGHT LOWER LOBE: Using a combination of catheter and wire, access was  gained into the right lower lobe pulmonary artery. Digital subtraction  angiography was performed, images show persistent thrombus in the  right lower lobe pulmonary artery. Over the wire, the  FlowTriever was  advanced into the  right lower lobe pulmonary artery to the level of  the pulmonary emboli and mechanical thrombectomy with extirpation of  matter from the right lower lobe pulmonary artery was performed with  removal of a significant amount of pulmonary embolus. Post aspiration  angiography was performed, images show significant improvement in the  amount of thrombus. There is still persistent thrombus in the right  lower lobe pulmonary artery, however, clot burden is significantly  improved.    The catheter was pulled back into the main pulmonary artery. Final  digital subtraction angiography was performed, images show significant  improvement in the clot burden throughout the right and left pulmonary  arteries including the saddle pulmonary embolus is no longer  visualized.    The FlowTriever was removed over the wire.    Hemostasis was achieved with successful administration of both  Perclose suture mediated vascular closure devices.    Pulmonary pressures:  Pre treatment: 29 mmHg mean  Post Treatment: 24 mmHg mean    IVC FILTER PLACEMENT:   Over the wire a flush catheter was then advanced into the inferior  vena cava at the iliac confluence. An inferior venacavogram was  performed which demonstrates conventional caval anatomy without  duplication of the IVC or the renal veins. The inferior vena cava is  normal in caliber and no thrombus or web is identified.    The flush catheter was removed over a wire and the sheath was  exchanged for the filter delivery sheath. The sheath was advanced to  the suprarenal inferior vena cava, the filter was advanced to the end  of the sheath, and the sheath and filter were retracted as a unit for  delivery in an infrarenal position. The filter was deployed.    Following this, the vascular sheath was removed and the previously  placed Perclose sutures were tied down in standard fashion, obtaining  excellent hemostasis bilaterally. The incision with closed with  Steri-Strips.     Throughout the  procedure, the patient was monitored by a radiology  nurse for cardiac rhythm and oxygen saturation which remained stable.  The patient tolerated the procedure well and left interventional  radiology in stable condition.      Impression    IMPRESSION:   1. Successful bilateral pulmonary angiography and mechanical  thromboembolectomy, with significant improvement in pulmonary artery  pressures. Approximately ~95% of total thrombus successfully  removed.    RADHA GUEVARA MD         SYSTEM ID:  [id number]

## 2024-01-06 NOTE — ED NOTES
Bed: ST02  Expected date:   Expected time:   Means of arrival:   Comments:  [facility] code [place] 73F l sided weakness and facial droop lkw 0630 132/87, glucose 126 eta 0818

## 2024-01-06 NOTE — ED TRIAGE NOTES
Went to bed at 2130 or 2200, woke up with left side weak , slight droop, few days of off and on numbness left arm, confusion new today, vss, bg 126, 95% room air , 18g IV right AC , SOB, spinal fusion 2 weeks ago

## 2024-01-07 ENCOUNTER — APPOINTMENT (OUTPATIENT)
Dept: SPEECH THERAPY | Facility: CLINIC | Age: 74
DRG: 175 | End: 2024-01-07
Payer: COMMERCIAL

## 2024-01-07 ENCOUNTER — APPOINTMENT (OUTPATIENT)
Dept: PHYSICAL THERAPY | Facility: CLINIC | Age: 74
DRG: 175 | End: 2024-01-07
Payer: COMMERCIAL

## 2024-01-07 ENCOUNTER — APPOINTMENT (OUTPATIENT)
Dept: CARDIOLOGY | Facility: CLINIC | Age: 74
DRG: 175 | End: 2024-01-07
Payer: COMMERCIAL

## 2024-01-07 LAB
ALBUMIN SERPL BCG-MCNC: 3.6 G/DL (ref 3.5–5.2)
ALP SERPL-CCNC: 98 U/L (ref 40–150)
ALT SERPL W P-5'-P-CCNC: 17 U/L (ref 0–50)
ANION GAP SERPL CALCULATED.3IONS-SCNC: 12 MMOL/L (ref 7–15)
AST SERPL W P-5'-P-CCNC: 35 U/L (ref 0–45)
ATRIAL RATE - MUSE: 81 BPM
BILIRUB SERPL-MCNC: 0.5 MG/DL
BUN SERPL-MCNC: 11.2 MG/DL (ref 8–23)
CALCIUM SERPL-MCNC: 9 MG/DL (ref 8.8–10.2)
CHLORIDE SERPL-SCNC: 107 MMOL/L (ref 98–107)
CREAT SERPL-MCNC: 0.78 MG/DL (ref 0.51–0.95)
DEPRECATED HCO3 PLAS-SCNC: 23 MMOL/L (ref 22–29)
DIASTOLIC BLOOD PRESSURE - MUSE: NORMAL MMHG
EGFRCR SERPLBLD CKD-EPI 2021: 81 ML/MIN/1.73M2
ERYTHROCYTE [DISTWIDTH] IN BLOOD BY AUTOMATED COUNT: 12.8 % (ref 10–15)
GLUCOSE SERPL-MCNC: 100 MG/DL (ref 70–99)
HCT VFR BLD AUTO: 29.6 % (ref 35–47)
HGB BLD-MCNC: 9.3 G/DL (ref 11.7–15.7)
HOLD SPECIMEN: NORMAL
INTERPRETATION ECG - MUSE: NORMAL
LVEF ECHO: NORMAL
MAGNESIUM SERPL-MCNC: 2.3 MG/DL (ref 1.7–2.3)
MCH RBC QN AUTO: 29.6 PG (ref 26.5–33)
MCHC RBC AUTO-ENTMCNC: 31.4 G/DL (ref 31.5–36.5)
MCV RBC AUTO: 94 FL (ref 78–100)
P AXIS - MUSE: 8 DEGREES
PHOSPHATE SERPL-MCNC: 3.6 MG/DL (ref 2.5–4.5)
PLATELET # BLD AUTO: 381 10E3/UL (ref 150–450)
POTASSIUM SERPL-SCNC: 3.8 MMOL/L (ref 3.4–5.3)
PR INTERVAL - MUSE: 196 MS
PROT SERPL-MCNC: 6.7 G/DL (ref 6.4–8.3)
QRS DURATION - MUSE: 90 MS
QT - MUSE: 448 MS
QTC - MUSE: 520 MS
R AXIS - MUSE: 22 DEGREES
RBC # BLD AUTO: 3.14 10E6/UL (ref 3.8–5.2)
SODIUM SERPL-SCNC: 142 MMOL/L (ref 135–145)
SYSTOLIC BLOOD PRESSURE - MUSE: NORMAL MMHG
T AXIS - MUSE: -47 DEGREES
TROPONIN T SERPL HS-MCNC: 50 NG/L
UFH PPP CHRO-ACNC: 0.61 IU/ML
UFH PPP CHRO-ACNC: 0.7 IU/ML
UFH PPP CHRO-ACNC: 0.85 IU/ML
VENTRICULAR RATE- MUSE: 81 BPM
WBC # BLD AUTO: 8.1 10E3/UL (ref 4–11)

## 2024-01-07 PROCEDURE — 92610 EVALUATE SWALLOWING FUNCTION: CPT | Mod: GN

## 2024-01-07 PROCEDURE — 250N000013 HC RX MED GY IP 250 OP 250 PS 637

## 2024-01-07 PROCEDURE — 250N000011 HC RX IP 250 OP 636

## 2024-01-07 PROCEDURE — 250N000013 HC RX MED GY IP 250 OP 250 PS 637: Performed by: INTERNAL MEDICINE

## 2024-01-07 PROCEDURE — 80053 COMPREHEN METABOLIC PANEL: CPT

## 2024-01-07 PROCEDURE — 92526 ORAL FUNCTION THERAPY: CPT | Mod: GN

## 2024-01-07 PROCEDURE — 85520 HEPARIN ASSAY: CPT | Performed by: INTERNAL MEDICINE

## 2024-01-07 PROCEDURE — 97530 THERAPEUTIC ACTIVITIES: CPT | Mod: GP

## 2024-01-07 PROCEDURE — 99233 SBSQ HOSP IP/OBS HIGH 50: CPT | Mod: FS

## 2024-01-07 PROCEDURE — 85027 COMPLETE CBC AUTOMATED: CPT

## 2024-01-07 PROCEDURE — 999N000208 ECHOCARDIOGRAM COMPLETE

## 2024-01-07 PROCEDURE — 97116 GAIT TRAINING THERAPY: CPT | Mod: GP

## 2024-01-07 PROCEDURE — 84100 ASSAY OF PHOSPHORUS: CPT

## 2024-01-07 PROCEDURE — 255N000002 HC RX 255 OP 636: Performed by: INTERNAL MEDICINE

## 2024-01-07 PROCEDURE — 93306 TTE W/DOPPLER COMPLETE: CPT | Mod: 26 | Performed by: INTERNAL MEDICINE

## 2024-01-07 PROCEDURE — 93005 ELECTROCARDIOGRAM TRACING: CPT

## 2024-01-07 PROCEDURE — 97161 PT EVAL LOW COMPLEX 20 MIN: CPT | Mod: GP

## 2024-01-07 PROCEDURE — 999N000111 HC STATISTIC OT IP EVAL DEFER: Performed by: OCCUPATIONAL THERAPIST

## 2024-01-07 PROCEDURE — 93010 ELECTROCARDIOGRAM REPORT: CPT | Performed by: INTERNAL MEDICINE

## 2024-01-07 PROCEDURE — 83735 ASSAY OF MAGNESIUM: CPT

## 2024-01-07 PROCEDURE — 36415 COLL VENOUS BLD VENIPUNCTURE: CPT | Performed by: INTERNAL MEDICINE

## 2024-01-07 PROCEDURE — 200N000002 HC R&B ICU UMMC

## 2024-01-07 RX ORDER — ACYCLOVIR 200 MG/1
3 CAPSULE ORAL ONCE
Status: DISCONTINUED | OUTPATIENT
Start: 2024-01-07 | End: 2024-01-08 | Stop reason: HOSPADM

## 2024-01-07 RX ORDER — POTASSIUM CHLORIDE 750 MG/1
20 TABLET, EXTENDED RELEASE ORAL ONCE
Status: COMPLETED | OUTPATIENT
Start: 2024-01-07 | End: 2024-01-07

## 2024-01-07 RX ORDER — ONDANSETRON 4 MG/1
4 TABLET, FILM COATED ORAL 3 TIMES DAILY PRN
Status: DISCONTINUED | OUTPATIENT
Start: 2024-01-07 | End: 2024-01-08 | Stop reason: HOSPADM

## 2024-01-07 RX ADMIN — OXYCODONE HYDROCHLORIDE 5 MG: 5 TABLET ORAL at 00:11

## 2024-01-07 RX ADMIN — HEPARIN SODIUM AND DEXTROSE 1400 UNITS/HR: 10000; 5 INJECTION INTRAVENOUS at 05:50

## 2024-01-07 RX ADMIN — LIOTHYRONINE SODIUM 5 MCG: 5 TABLET ORAL at 08:44

## 2024-01-07 RX ADMIN — HUMAN ALBUMIN MICROSPHERES AND PERFLUTREN 5 ML: 10; .22 INJECTION, SOLUTION INTRAVENOUS at 10:47

## 2024-01-07 RX ADMIN — ONDANSETRON HYDROCHLORIDE 4 MG: 4 TABLET, FILM COATED ORAL at 17:54

## 2024-01-07 RX ADMIN — Medication 5 MG: at 21:03

## 2024-01-07 RX ADMIN — OXYCODONE HYDROCHLORIDE 5 MG: 5 TABLET ORAL at 06:05

## 2024-01-07 RX ADMIN — ACETAMINOPHEN 650 MG: 325 TABLET, FILM COATED ORAL at 06:05

## 2024-01-07 RX ADMIN — GABAPENTIN 300 MG: 300 CAPSULE ORAL at 21:07

## 2024-01-07 RX ADMIN — ACETAMINOPHEN 650 MG: 325 TABLET, FILM COATED ORAL at 11:09

## 2024-01-07 RX ADMIN — OXYCODONE HYDROCHLORIDE 5 MG: 5 TABLET ORAL at 21:03

## 2024-01-07 RX ADMIN — PROCHLORPERAZINE EDISYLATE 10 MG: 5 INJECTION INTRAMUSCULAR; INTRAVENOUS at 21:00

## 2024-01-07 RX ADMIN — POTASSIUM CHLORIDE 20 MEQ: 750 TABLET, EXTENDED RELEASE ORAL at 07:38

## 2024-01-07 RX ADMIN — GABAPENTIN 100 MG: 100 CAPSULE ORAL at 07:38

## 2024-01-07 RX ADMIN — ONDANSETRON HYDROCHLORIDE 4 MG: 4 TABLET, FILM COATED ORAL at 19:48

## 2024-01-07 RX ADMIN — ACETAMINOPHEN 650 MG: 325 TABLET, FILM COATED ORAL at 18:54

## 2024-01-07 RX ADMIN — LEVOTHYROXINE SODIUM 125 MCG: 0.03 TABLET ORAL at 07:38

## 2024-01-07 RX ADMIN — HEPARIN SODIUM AND DEXTROSE 1300 UNITS/HR: 10000; 5 INJECTION INTRAVENOUS at 22:17

## 2024-01-07 RX ADMIN — PRAVASTATIN SODIUM 40 MG: 20 TABLET ORAL at 07:38

## 2024-01-07 ASSESSMENT — ACTIVITIES OF DAILY LIVING (ADL)
ADLS_ACUITY_SCORE: 29
ADLS_ACUITY_SCORE: 32
ADLS_ACUITY_SCORE: 29

## 2024-01-07 NOTE — PROGRESS NOTES
"   01/07/24 0800   Appointment Info   Signing Clinician's Name / Credentials (SLP) Kinga Cannon MA CCC-SLP   General Information   Onset of Illness/Injury or Date of Surgery 01/06/24   Referring Physician Mir Tipton MD   Pertinent History of Current Problem Per MD, \"Ely Humphreys is a 73 year old female with PMH breast cancer 2009, hypothyroidism, MIKEL on home CPAP, hyperlipidemia, recently underwent spinal fusion at the level of L4 - S1 12/22/2023, who has been experiencing increasing shortness of breath over the past two weeks, and intermittent left arm numbness over the past few days. Morning of 1/6 woke up with persistent left arm weakness and question of L facial droop, presented to the ED and was found to be hypoxic SpO2 70% on room air. CT PE with large bilateral pulmonary emboli and findings of right heart strain, s/p bilateral mechanical embolectomy with IR at [facility]. Admitted on 1/6/2024 to [facility] for acute hypoxic respiratory failure and stroke workup. \"  Clinical swallow evaluation completed this AM per MD order.       Present no   Pain Assessment   Patient Currently in Pain No   Type of Evaluation   Type of Evaluation Swallow Evaluation   Oral Motor   Oral Musculature generally intact   Structural Abnormalities none present   Dentition (Oral Motor)   Dentition (Oral Motor) natural dentition   Facial Symmetry (Oral Motor)   Facial Symmetry (Oral Motor) WNL   Lip Function (Oral Motor)   Lip Range of Motion (Oral Motor) WNL   Lip Coordination (Oral Motor) left side   Tongue Function (Oral Motor)   Tongue Coordination/Speed (Oral Motor) WNL   Tongue ROM (Oral Motor) WNL   Jaw Function (Oral Motor)   Jaw Function (Oral Motor) WNL   Facial Sensation   Facial Sensation WNL   Cough/Swallow/Gag Reflex (Oral Motor)   Volitional Throat Clear/Cough (Oral Motor) WNL   Vocal Quality/Secretion Management (Oral Motor)   Vocal Quality (Oral Motor) WNL "   Secretion Management (Oral Motor) WNL   General Swallowing Observations   Past History of Dysphagia None per chart review   Respiratory Support nasal cannula   Current Diet/Method of Nutritional Intake (General Swallowing Observations, NIS) NPO   Swallowing Evaluation Clinical swallow evaluation   Clinical Swallow Evaluation   Feeding Assistance no assistance needed   Clinical Swallow Evaluation Textures Trialed thin liquids;solid foods   Clinical Swallow Eval: Thin Liquid Texture Trial   Mode of Presentation, Thin Liquids self-fed;cup;straw   Volume of Liquid or Food Presented 3 oz   Oral Phase of Swallow WFL   Pharyngeal Phase of Swallow intact   Diagnostic Statement No s/sx of aspiration   Clinical Swallow Evaluation: Solid Food Texture Trial   Mode of Presentation self-fed   Volume Presented 1 azeem cracker   Oral Phase WFL   Pharyngeal Phase intact   Diagnostic Statement No s/sx of aspiration   Esophageal Phase of Swallow   Patient reports or presents with symptoms of esophageal dysphagia No   Swallowing Recommendations   Diet Consistency Recommendations regular diet;thin liquids (level 0)   Supervision Level for Intake patient independent   Recommended Feeding/Eating Techniques (Swallow Eval) patient is independent, no specific recommendations   Medication Administration Recommendations, Swallowing (SLP) Orally   Instrumental Assessment Recommendations instrumental evaluation not recommended at this time   Clinical Impression   Criteria for Skilled Therapeutic Interventions Met (SLP Eval) Evaluation only;No problems identified which require skilled intervention   Risks & Benefits of therapy have been explained evaluation/treatment results reviewed;risks/benefits reviewed;current/potential barriers reviewed;participants voiced agreement with care plan;participants included;patient   Clinical Impression Comments Clinical swallow evaluation completed per MD order.  Pt presents with WNL oropharyngeal swallowing  abilities and oral mech observed to be unremarkable.  RN reported pt tolerated medications orally without difficulty. Pt assessed with thin liquids via straw and azeem crackers.  Oral and pharyngeal phases observed to be unremarkable.  Recommend regular food textures (IDDSI 7) and thin liquids (IDDSI 0).  Recommend medications orally.  Pt tolerating baseline diet without difficulty, no further ST warranted at this time and will s-o.   Interventions   Interventions Quick Adds Swallowing Dysfunction   Swallowing Dysfunction &/or Oral Function for Feeding   Treatment of Swallowing Dysfunction &/or Oral Function for Feeding Minutes (07186) 7   Symptoms Noted During/After Treatment None   Treatment Detail/Skilled Intervention Swallow evaluation completed.  Education provided re: anatomy and physiology of swallow mechanism, s/sx of aspiration and aspiration-related complications following a stroke.  SLP educated pt on safe swallowing strategies and pt verbalized understanding.   SLP Discharge Planning   SLP Plan s-o   SLP Discharge Recommendation home with assist   SLP Rationale for DC Rec Pt's oropharyngeal swallowing abilities are WNL   SLP Brief overview of current status  Recommend regular food textures (IDDSI 7) and thin liquids (IDDSI 0). Recommend medications orally. Pt tolerating baseline diet without difficulty, no further ST warranted at this time and will s-o.

## 2024-01-07 NOTE — PROGRESS NOTES
MEDICAL ICU PROGRESS NOTE  01/07/2024      Date of Service (when I saw the patient): 01/07/2024    ASSESSMENT: Ely Humphreys is a 73 year old female with PMH breast cancer 2009, hypothyroidism, MIKEL on home CPAP, hyperlipidemia, recently underwent spinal fusion at the level of L4 - S1 12/22/2023, who has been experiencing increasing shortness of breath over the past two weeks, and intermittent left arm numbness over the past few days. Morning of 1/6 woke up with persistent left arm weakness and question of L facial droop, presented to the ED and was found to be hypoxic SpO2 70% on room air. CT PE with large bilateral pulmonary emboli and findings of right heart strain, s/p bilateral mechanical embolectomy with IR at [facility]. Admitted on 1/6/2024 to [facility] ICU for acute hypoxic respiratory failure and stroke workup.      CHANGES and MAJOR THINGS TODAY:   - Waiting results of echo bubble study   > Consider cards consult for evaluation of PFO closure seen on prior echo  - Continue Heparin gtt  - Advance to regular diet  - Transfer out of ICU       PLAN:     Neuro:  # Acute on chronic pain   # S/p L4-S1 spinal fusion 12/2023  - Tylenol 650 mg PRN   - PTA Gabapentin 100 mg in AM, 300 mg in PM  - PTA Robaxin 500 mg q 6 hours PRN  - Hold PTA Oxycodone  - Hold PTA Tizanidine  - RASS goal 0 to -1     #Left upper extremity weakness and numbness, improving  Reports intermittent left arm numbness and weakness over the past few days. The morning of 1/6/24 woke up with left arm weakness which persisted, as well as possible L facial droop. Presented to ED with concern for symptoms. Head CT and CTA did not show any acute intracranial pathology or large vessel occlusion. Initial NIHSS 2. A1c, lipid panel, troponin obtained.   - Neurocrit consult, appreciate recs  - MRI brain: punctate focus of diffusion restriction to L occipital lobe, acute vs subacute stroke  - Neuro checks per neurocrit discretion   - SBP < 220  mmHg  - Received ASA while in ED at [facility], per neurocrit, will not continue ASA while on heparin gtt     Pulmonary:  # Acute hypoxic respiratory failure 2/2 PE  # Severe bilateral PE, acute  # MIKEL on home CPAP   Reports intermittent shortness of breath over past two weeks following her spine surgery. Upon presentation to ED SpO2 found to be 70s on room air. CT PE showing large volume pulmonary emboli within the main, segmental, and subsegmental pulmonary arteries with evidence of right heart strain. S/p bilateral pulmonary angio with mechanical thrombectomy by IR with successful removal of ~95% of embolic burden. On 10L oxymask upon arrival to ICU with shortness of breath but reportedly improved from before procedure. BLE US negative for DVTs. Likely provoked PE from recent surgery and reduced mobility post-op.   - Weaned to room air today  - SpO2 goal > 92%  - Utilizing IS  - CPAP at night      Cardiovascular:  # RV strain  # Troponinemia  # Prolonged Qtc  # Atrial fibrillation s/p ablation 2019  # HLD  CT PE with large volume bilateral pulmonary emboli with RV/LV ratio greater than 1 suggesting right heart dysfunction now s/p embolectomy. Bedside echo with evidence of mild RV dysfunction, TAPSE 25. IVC collapsible. Prior echo 12/2023 LVEF 55-60%, RV normal, patent foramen ovale present with small L to R atrial shunt. Hx of atrial fibrillation, not on anticoagulation outpatient. Trop peaked 71.   - Obtain formal echo with bubble study   > Consider cardiology consult pending echo results for evaluation of closure of PFO     GI/Nutrition:  # No active concerns  - Passed SLP evaluation, advanced to regular diet   - Bowel regimen: Senna-docusate, Miralax  - Hepatic function panel WNL     Renal/Fluids/Electrolytes:  # No active issues  - Strict I&O monitoring  - Daily weights  - BMP, Mg, Phos daily  - ICU electrolyte replacement protocol      Endocrine:  # Hypothyroidism  - TSH on admission 3.98  - Continue PTA  "Levothyroxine 125 mcg daily  - Liothyronine 5 mcg daily     ID:  # No active issues  - No leukocytosis, afebrile  - Flu/COVID/RSV negative on admission      Hematology:    # Provoked PE  # Normocytic anemia  - Continue high-intensity heparin infusion  - Will need 3 months of anticoagulation   - BLE US negative for DVT  - Daily CBC      Musculoskeletal:  # S/p L4-S1 spinal fusion  - PT / OT consult      Skin:  # No active issues  - Appreciate diligent nursing cares     General Cares/Prophylaxis:    DVT Prophylaxis: Heparin infusion, high-intensity  GI Prophylaxis: Not indicated  Restraints: None  Family Communication:  updated at bedside  Code Status: FULL    Lines/tubes/drains:  - PIVs    Disposition:  Transfer out of ICU    Patient seen and findings/plan discussed with medical ICU staff, Dr. Wan.    Kate Terrell, SARAH CNP    Clinically Significant Risk Factors Present on Admission                # Drug Induced Platelet Defect: home medication list includes an antiplatelet medication     # Acute Respiratory Failure: Documented O2 saturation < 91%.  Continue supplemental oxygen as needed     # Obesity: Estimated body mass index is 31.76 kg/m  as calculated from the following:    Height as of this encounter: 1.6 m (5' 2.99\").    Weight as of this encounter: 81.3 kg (179 lb 3.7 oz).           # Anemia: based on hgb <11             ====================================  INTERVAL HISTORY:   No acute events overnight, O2 weaned to room air. Hemodynamically stable. Reports significant improvement in shortness of breath, no further coughing. Denies any decreased sensation to her LUE. No pain.     OBJECTIVE:   1. VITAL SIGNS:   Temp:  [98  F (36.7  C)-101  F (38.3  C)] 98  F (36.7  C)  Pulse:  [64-90] 74  Resp:  [11-34] 19  BP: (105-150)/(61-96) 134/82  SpO2:  [73 %-100 %] 95 %  Resp: 19    2. INTAKE/ OUTPUT:   I/O last 3 completed shifts:  In: 190.5 [I.V.:190.5]  Out: 1100 [Urine:1100]    3. PHYSICAL " EXAMINATION:  General: Resting in bed, in no apparent distress  HEENT: Normocephalic, atraumatic. PERRL. Mucous membranes moist, intact.   Neuro: A&Ox3, strength 5/5 to RUE, 4/5 LUE, no facial asymmetry, sensation intact.  Pulm/Resp: Clear breath sounds bilaterally without rhonchi, crackles or wheeze, breathing non-labored.   CV: RRR, S1S2, no murmur, rub, or gallop.   Abdomen: Soft, non-distended, non-tender, active bowel sounds.   Ext: No edema, pulses 2+ radial, pedal bilaterally.  Incisions/Skin: Warm, dry, intact. No rashes or lesions.     4. LABS:   Arterial Blood Gases   No lab results found in last 7 days.  Complete Blood Count   Recent Labs   Lab 01/07/24  0547 01/06/24  1502 01/06/24  0827   WBC 8.1 10.5 7.7   HGB 9.3* 10.2* 10.2*    348 409     Basic Metabolic Panel  Recent Labs   Lab 01/07/24  0547 01/06/24  1400 01/06/24  0827     --  141   POTASSIUM 3.8  --  3.5   CHLORIDE 107  --  104   CO2 23  --  25   BUN 11.2  --  14.6   CR 0.78  --  0.88   * 97 108*     Liver Function Tests  Recent Labs   Lab 01/07/24  0547 01/06/24  0827   AST 35  --    ALT 17  --    ALKPHOS 98  --    BILITOTAL 0.5  --    ALBUMIN 3.6  --    INR  --  1.05     Coagulation Profile  Recent Labs   Lab 01/06/24  0827   INR 1.05   PTT 39*       5. RADIOLOGY:   Recent Results (from the past 24 hour(s))   CT Head w/o Contrast    Narrative    EXAM: CT HEAD W/O CONTRAST  LOCATION: [facility]  DATE: 1/6/2024    INDICATION: Left-sided weakness  COMPARISON: Brain MRI 05/07/2018  TECHNIQUE: Routine CT Head without IV contrast. Multiplanar reformats. Dose reduction techniques were used.    FINDINGS:  INTRACRANIAL CONTENTS: No intracranial hemorrhage, extraaxial collection, or mass effect.  No CT evidence of acute infarct. Normal parenchymal attenuation. Mild generalized volume loss. No hydrocephalus.     VISUALIZED ORBITS/SINUSES/MASTOIDS: No intraorbital abnormality. No paranasal sinus mucosal  disease. No middle ear or mastoid effusion.    BONES/SOFT TISSUES: No acute abnormality.      Impression    IMPRESSION:  1.  No acute intracranial injury, hemorrhage, mass, or CT evidence of recent ischemia.   CTA Head Neck with Contrast    Narrative    HEAD AND NECK CT ANGIOGRAM WITH IV CONTRAST  1/6/2024 8:44 AM CST    INDICATION: Left-sided weakness  TECHNIQUE: Head and neck CT angiogram with IV contrast. CT images of the head and neck vessels obtained during the arterial phase of intravenous contrast administration. Axial helical 2D reconstructed images and multiplanar 3D MIP reconstructed images of   the head and neck vessels were performed by the technologist. Dose reduction techniques were used.  CONTRAST: 67 mL Isovue 370  COMPARISON: None.     FINDINGS:  HEAD CTA: The intracranial circulation is patent without evidence for aneurysm, proximal vessel occlusion, high-grade intracranial stenosis or arteriovenous malformation. Patent dural venous sinuses.    NECK CTA: Three vessel arch.  Carotid arteries are patent with minor atherosclerotic change.  No hemodynamically significant stenosis by NASCET criteria in either carotid system.  Patent vertebral arteries. No dissection.      Impression    CONCLUSION:  HEAD CTA:  1.  Negative. No vessel stenosis, occlusion or aneurysm.    NECK CTA:  1.  Minor carotid artery atherosclerosis.   2.  No dissection or hemodynamically significant narrowing in the neck by NASCET criteria.    Findings were discussed with Dr. CE HURTADO via telephone at 902 hours on 1/6/2024.   CT Head Perfusion w Contrast - For Tier 2 Stroke    Narrative    EXAM: CT HEAD PERFUSION WITH CONTRAST  LOCATION: [facility]  DATE/TIME: 01/06/2024, 9:06 AM CST    INDICATION: Left-sided weakness.  TECHNIQUE: CT cerebral perfusion was performed utilizing a contrast bolus. Perfusion data were post processed with generation of standard perfusion maps and estimation of  ischemic/infarcted volumes utilizing standard threshold values. Dose reduction   techniques were used.   CONTRAST: 50 mL Isovue 370.  COMPARISON: None.    FINDINGS:   PERFUSION MAPS: Symmetrical cerebral perfusion. No subjective focal deficits in cerebral blood flow or volume to suggest ischemia/oligemia. Small area of mild Tmax elevation within the right temporal lobe.    RAPID ANALYSIS:  CBF<30%: 0 mL  Tmax>6sec: 6 mL  Mismatch volume: 6 mL  Mismatch ratio: Infinite      Impression    IMPRESSION:   1.  Mild elevation of Tmax in the right temporal lobe without focal relative cerebral blood volume or flow deficit to indicate infarct or ischemic penumbra. No correlate on the CTA.     CT Chest Pulmonary Embolism w Contrast   Result Value    Radiologist flags New diagnosis of pulmonary embolism (AA)    Narrative    EXAM: CT CHEST PULMONARY EMBOLISM W CONTRAST  LOCATION: [facility]  DATE: 1/6/2024    INDICATION: hypoxia, dyspnea lef arm weak   2 weeks s p spine surgery  COMPARISON: None.  TECHNIQUE: CT chest pulmonary angiogram during arterial phase injection of IV contrast. Multiplanar reformats and MIP reconstructions were performed. Dose reduction techniques were used.   CONTRAST: 69mL Isovue 370    FINDINGS:  ANGIOGRAM CHEST: Large volume pulmonary embolus within the bilateral may as well as each segmental and subsegmental pulmonary artery, right greater than left. The RV/LV ratio is greater than 1, suggesting right heart dysfunction. Thoracic aorta is   negative for dissection.     LUNGS AND PLEURA: Dependent atelectasis. No areas of consolidation. No pleural effusion.    MEDIASTINUM/AXILLAE: Mild cardiomegaly. No pericardial effusion. No thoracic lymphadenopathy.    CORONARY ARTERY CALCIFICATION: Mild.    UPPER ABDOMEN: Benign left renal cysts (which requires no follow-up).    MUSCULOSKELETAL: No acute osseous abnormality.      Impression    IMPRESSION:  Large volume bilateral pulmonary  emboli within the main, segmental, and subsegmental pulmonary arteries with CT findings of right heart strain.      [Critical Result: New diagnosis of pulmonary embolism]    Finding was identified on 1/6/2024 9:11 AM CST.     Dr. Tipton was contacted by me on 1/6/2024 9:15 AM CST and verbalized understanding of the critical result.   IR Pulmonary Angiogram Bilateral    Narrative    IR PULMONARY ANGIOGRAM BILATERAL   1/6/2024 1:07 PM     CLINICAL HISTORY/INDICATION: Patient is a 73-year-old female with a  history of intermediate high risk pulmonary embolism with findings  consistent with right heart strain who presents to interventional  radiology for emergent pulmonary angiogram with mechanical  thrombectomy.    PROCEDURES PERFORMED:   1. Ultrasound-guided right femoral venotomy.  2. Selective catheterization and angiography of the main pulmonary  artery.  3. Selective catheterization and angiography of the right and left  main pulmonary arteries.  4. Selective catheterization and angiography of the right lower lobe  and left lower lobe pulmonary arteries.  5. Mechanical thrombectomy with extirpation of matter from the right  main, right lower lobe, left main and left lower lobe pulmonary  arteries.  6. Pulmonary artery pressures, pre and post  7. Usage of a vascular closure device x 2  8. Moderate Sedation    MODERATE SEDATION: Versed 1.5 mg IV; Fentanyl 50 mcg IV. During the  time out, immediately prior to the administration of medications, the  patient was reassessed for adequacy to receive conscious sedation.  Under physician supervision, Versed and fentanyl were administered for  moderate sedation. Pulse oximetry, heart rate and blood pressure were  continuously monitored by an independent trained observer. The  physician spent 61 minutes of face-to-face sedation time with the  patient.    ATTENDING PHYSICIAN: Krishna Collins M.D.    ADDITIONAL MEDICATION: None.    CONTRAST: 90 cc Isovue-300    FLUORO: 17.1  minutes  AIR KERMA: 152 mGy    CONSENT: Following a discussion of the risks, benefits, indications  and alternatives to treatment, appropriate informed consent was  obtained.      TIMEOUT: A timeout was performed per universal protocol policy to  ensure correct patient, site, and procedure to be performed.     PROCEDURE AND FINDINGS: Following a discussion of the risks, benefits,  indications, and alternatives to treatment, appropriate informed  consent was obtained from the patient. The patient was brought to the  interventional radiology suite and placed supine on the table.  Bilateral groins were prepped and draped in a sterile fashion.  A  timeout was performed per universal protocol policy to confirm the  correct patient, site and procedure to be performed.    A preliminary ultrasound of the right  groin was performed and  demonstrates a patent right common femoral vein. A permanent  ultrasound image was recorded.  Using a combination of fluoroscopy and  ultrasound, an access site was determined.  The overlying skin was  anesthetized with 1% Lidocaine.  Using ultrasound guidance,  access  into the right common femoral vein was obtained with visualization of  needle entry into the vessel. A 0.018 wire was advanced through the  needle and exchange was made for a 5 [place] micropuncture sheath. A  0.035 wire was advanced through the micropuncture sheath. A 6 [place]  sheath was used to dilate the puncture arteriotomy. Then 2 Perclose  devices were deployed in staggered fashion in the typical Perclose  technique. A 0.035 wire was advanced into the IVC. Over the wire a 24  [place] dry seal sheath was advanced with the tip in the IVC.     MAIN PULMONARY ARTERY:   Using a combination of wire and angled pigtail catheter access was  gained into the main pulmonary artery. Digital subtraction angiography  was performed, images show significant bilateral pulmonary emboli. The  pigtail catheter was removed over the wire  and exchanged for the 24  Maltese FlowTriever.     LEFT MAIN: Using a combination of wire and angled pigtail catheter  access was gained into the main pulmonary artery. The pigtail catheter  was removed over the wire and exchanged for the 24 Maltese FlowTriever.  Using a combination of catheter and wire access was gained into the  Left pulmonary artery.  The FlowTriever was then advanced into the  Left main pulmonary artery to the level of the pulmonary emboli and  extirpation of matter from the left main pulmonary artery was  performed with removal small amount of pulmonary embolus. Post  aspiration angiography was performed, images show some improvement in  the amount of thrombus.     LEFT LOWER LOBE: Using a combination of catheter and wire access was  gained into the left lower lobe pulmonary artery. Digital subtraction  angiography was performed, images show persistent thrombus in the left  lower lobe pulmonary artery. Over the wire the  FlowTriever was  advanced into the left lower lobe pulmonary artery the level of the  pulmonary emboli and mechanical thrombectomy with extirpation of  matter from the left lower lobe pulmonary artery was performed with  with removal of a small amount of amount of pulmonary embolus. A 20  Maltese curved Flowtriever was also utilized for more directed  aspiration of the lower lobe arteries. Post aspiration angiography was  performed, images show slight improvement in the amount of thrombus.  There is still persistent thrombus in the left upper and left lower  lobe pulmonary arteries, however, clot burden is overall improved.    RIGHT MAIN  Using a combination of catheter and wire access was gained into the  Right pulmonary artery. The FlowTriever was then advanced into the  Right main pulmonary artery to the level of the pulmonary emboli and  extirpation of matter from the right main pulmonary artery was  performed with removal of a significant amount of embolus. Post  aspiration  angiography was performed, images show significant  improvement in the amount of thrombus.     RIGHT LOWER LOBE: Using a combination of catheter and wire, access was  gained into the right lower lobe pulmonary artery. Digital subtraction  angiography was performed, images show persistent thrombus in the  right lower lobe pulmonary artery. Over the wire, the  FlowTriever was  advanced into the right lower lobe pulmonary artery to the level of  the pulmonary emboli and mechanical thrombectomy with extirpation of  matter from the right lower lobe pulmonary artery was performed with  removal of a significant amount of pulmonary embolus. Post aspiration  angiography was performed, images show significant improvement in the  amount of thrombus. There is still persistent thrombus in the right  lower lobe pulmonary artery, however, clot burden is significantly  improved.    The catheter was pulled back into the main pulmonary artery. Final  digital subtraction angiography was performed, images show significant  improvement in the clot burden throughout the right and left pulmonary  arteries including the saddle pulmonary embolus is no longer  visualized.    The FlowTriever was removed over the wire.    Hemostasis was achieved with successful administration of both  Perclose suture mediated vascular closure devices.    Pulmonary pressures:  Pre treatment: 29 mmHg mean  Post Treatment: 24 mmHg mean    IVC FILTER PLACEMENT:   Over the wire a flush catheter was then advanced into the inferior  vena cava at the iliac confluence. An inferior venacavogram was  performed which demonstrates conventional caval anatomy without  duplication of the IVC or the renal veins. The inferior vena cava is  normal in caliber and no thrombus or web is identified.    The flush catheter was removed over a wire and the sheath was  exchanged for the filter delivery sheath. The sheath was advanced to  the suprarenal inferior vena cava, the filter was  advanced to the end  of the sheath, and the sheath and filter were retracted as a unit for  delivery in an infrarenal position. The filter was deployed.    Following this, the vascular sheath was removed and the previously  placed Perclose sutures were tied down in standard fashion, obtaining  excellent hemostasis bilaterally. The incision with closed with  Steri-Strips.     Throughout the procedure, the patient was monitored by a radiology  nurse for cardiac rhythm and oxygen saturation which remained stable.  The patient tolerated the procedure well and left interventional  radiology in stable condition.      Impression    IMPRESSION:   1. Successful bilateral pulmonary angiography and mechanical  thromboembolectomy, with significant improvement in pulmonary artery  pressures. Approximately ~95% of total thrombus successfully  removed.    RADHA GUEVARA MD         SYSTEM ID:  [id number]   XR Chest Port 1 View    Narrative    Exam: XR CHEST PORT 1 VIEW, 1/6/2024 3:18 PM    Indication: hypoxia, shortness of breath, cough s/p embolectomy for  bilateral PE    Comparison: CT chest 1/6/2024    Findings:   Single portable AP supine view of the chest was obtained. Prominent  cardiomediastinal silhouette. Patchy perihilar opacities. Clear  costophrenic angles. No pneumothorax. No acute osseous abnormality.      Impression    Impression: Mild perihilar opacities, which may represent mild  pulmonary edema versus atelectasis post procedure.    I have personally reviewed the examination and initial interpretation  and I agree with the findings.    HEMANT STARKEY MD         SYSTEM ID:  [id number]   MR Brain w/o Contrast    Narrative    MRI brain without contrast    Provided History:  73 y.o. found to have PE, new left upper extremity  weakness, eval for stroke. MR requested by neuro stat..    Comparison:  MR brain 5/17/2018, CT stroke series 1/6/2024    Technique:   Axial and coronal diffusion-weighted (with ADC map), axial FLAIR,  and  axial susceptibility-weighted images of the brain were obtained  without the administration of intravenous contrast.    Findings:   Diffusion weighted images demonstrate punctate focus of diffusion  restriction in the left occipital lobe with subtle T2 hyperintensity.  Additional scattered periventricular and subcortical subtle T2  hyperintense foci, likely sequelae of chronic small vessel ischemic  disease. Susceptibility weighted images are unremarkable. The  paranasal sinuses and mastoid air cells are clear. Orbits are grossly  unremarkable.      Impression    Impression:  Punctate focus of diffusion restriction in the left occipital lobe,  which may represent acute/subacute infarct.    I have personally reviewed the examination and initial interpretation  and I agree with the findings.    SANDRA ROLAND MD         SYSTEM ID:  [id number]    Lower Extremity Venous Duplex Bilateral    Narrative    EXAMINATION: DOPPLER VENOUS ULTRASOUND OF BILATERAL LOWER EXTREMITIES,  1/6/2024 6:19 PM     COMPARISON: Venous duplex ultrasound 1/18/2006    HISTORY: Known PE, evaluate for DVT    TECHNIQUE:  Gray-scale evaluation with compression, spectral flow and  color Doppler assessment of the deep venous system of both legs from  groin to knee, and then at the ankles.    FINDINGS:  In the lower extremities, the left common femoral and bilateral  femoral, popliteal and posterior tibial veins demonstrate normal  compressibility and blood flow. The right common femoral vein was  obscured by overlying wound dressing.      Impression    IMPRESSION:  No evidence of deep venous thrombosis in either lower extremity.    I have personally reviewed the examination and initial interpretation  and I agree with the findings.    HEMANT STARKYE MD         SYSTEM ID:  [id number]

## 2024-01-07 NOTE — PROGRESS NOTES
"   01/07/24 1020   Appointment Info   Signing Clinician's Name / Credentials (PT) Lalit Bray, PT, DPT   Rehab Comments (PT) PT Only, LSO for OOB mobility   Living Environment   People in Home spouse   Current Living Arrangements house   Home Accessibility stairs within home;stairs to enter home   Living Environment Comments Bedroom and shower upstairs, walk in shower with shower chair   Self-Care   Usual Activity Tolerance good   Current Activity Tolerance moderate   Equipment Currently Used at Home shower chair;walker, rolling   Activity/Exercise/Self-Care Comment Baseline IND with mobility, since spinal surgery on 12/22, has been using walker. Has 4ww upstairs and FWW on main level. Limited by SOB few days prior to admission.   General Information   Onset of Illness/Injury or Date of Surgery 01/06/24   Referring Physician Delisa Blount MD   Patient/Family Therapy Goals Statement (PT) Return home   Pertinent History of Current Problem (include personal factors and/or comorbidities that impact the POC) Per EMR: \"73 year old female with PMH breast cancer 2009, hypothyroidism, MIKEL on home CPAP, hyperlipidemia, recently underwent spinal fusion at the level of L4 - S1 12/22/2023, who has been experiencing increasing shortness of breath over the past two weeks, and intermittent left arm numbness over the past few days. Morning of 1/6 woke up with persistent left arm weakness and question of L facial droop, presented to the ED and was found to be hypoxic SpO2 70% on room air. CT PE with large bilateral pulmonary emboli and findings of right heart strain, s/p bilateral mechanical embolectomy with IR at [facility]. Admitted on 1/6/2024 to [facility] ICU for acute hypoxic respiratory failure and stroke workup. \"   Existing Precautions/Restrictions fall;spinal   Cognition   Cognitive Status Comments Follows all commands appropriately, oriented to self and situation   Integumentary/Edema   Integumentary/Edema no " deficits were identifed   Posture    Posture Protracted shoulders   Range of Motion (ROM)   Range of Motion ROM is WFL   Strength (Manual Muscle Testing)   Strength (Manual Muscle Testing) strength is WFL   Strength Comments Mild L UE weakness noted with  strength. Antigravity shoulder flexion and ABD, elbow flex/ext. Pt reported feeling her L shoulder as a little heavier than right   Bed Mobility   Bed Mobility supine-sit   Supine-Sit [place] (Bed Mobility) supervision   Comment, (Bed Mobility) Adhered to spinal precautions   Transfers   Transfers sit-stand transfer   Sit-Stand Transfer   Sit-Stand [place] (Transfers) contact guard   Comment, (Sit-Stand Transfer) Good initiation and stability   Gait/Stairs (Locomotion)   [place] Level (Gait) supervision   Assistive Device (Gait) walker, front-wheeled   Distance in Feet (Gait) 10   Balance   Balance Comments Unsupported sitting: IND, standing: pt requests B UE support on walker   Clinical Impression   Criteria for Skilled Therapeutic Intervention Yes, treatment indicated   PT Diagnosis (PT) impaired mobility   Influenced by the following impairments activity tolerance, balance   Functional limitations due to impairments gait   Clinical Presentation (PT Evaluation Complexity) stable   Clinical Presentation Rationale clinical judgement   Clinical Decision Making (Complexity) low complexity   Planned Therapy Interventions (PT) balance training;bed mobility training;gait training;neuromuscular re-education;stair training;strengthening;transfer training;progressive activity/exercise   Risk & Benefits of therapy have been explained evaluation/treatment results reviewed;care plan/treatment goals reviewed;risks/benefits reviewed;current/potential barriers reviewed;participants voiced agreement with care plan;patient;participants included   PT Total Evaluation Time   PT Eval, Low Complexity Minutes (40187) 10   Physical Therapy Goals   PT Frequency 5x/week    PT Predicted Duration/Target Date for Goal Attainment 02/23/24   PT Goals Bed Mobility;Transfers;Gait;Stairs   PT: Bed Mobility Supervision/stand-by assist;Goal Met   PT: Transfers Supervision/stand-by assist;Goal Met   PT: Gait Supervision/stand-by assist;150 feet   PT: Stairs Greater than 10 stairs;Supervision/stand-by assist   Interventions   Interventions Quick Adds Gait Training;Therapeutic Activity   Therapeutic Activity   Therapeutic Activities: dynamic activities to improve functional performance Minutes (67925) 15   Treatment Detail/Skilled Intervention Pt supine upon arrival, agreeable to therapy session, RN ok'd mobility. Pt on 2L/min via NC, maintained sats above 90%.  To progress functional independence, pt completd transfers. STS frmo various surfaces, SBA. PT IND donned LSO while sitting on EOB. Discussion held on dishcarge recs and reasoning as well as mobility goals in acute setting including walks and time in chair.   Gait Training   Gait Training Minutes (88290) 10   Treatment Detail/Skilled Intervention To progress gait, pt ambulated with FWW and SBA. Initially, slight dizziness reported that improved with duratino of walk. Pt declined averse symptoms on second walk. Skilled monitoring of symptoms, stability and tolerance. Pt upright in chair at end of session, call light in reach, denied further needs.   Distance in Feet 60, 120   [place] Level (Gait Training) stand-by assist   PT Discharge Planning   PT Plan Trial stairs, progress gait endurance   PT Discharge Recommendation (DC Rec) home with assist;home with home care physical therapy   PT Rationale for DC Rec Pt mobilizing with up to CGA and FWW. Recommend home with assist and home health PT to progress activity tolerance.   PT Brief overview of current status Walk in saeed 3x/day, LSO when OOB, SBA and FWW   Total Session Time   Timed Code Treatment Minutes 25   Total Session Time (sum of timed and untimed services) 35

## 2024-01-07 NOTE — PROGRESS NOTES
MICU Progress Note 1-7-2024    MICU Staff      This patient has been seen and evaluated by me.  I have discussed care with the DOROTHEA and agree with the findings and plan in her note. 73 year old female with PMH breast cancer 2009, hypothyroidism, MIKEL on home CPAP, hyperlipidemia, recently underwent spinal fusion at the level of L4 - S1 12/22/2023, who has been experiencing increasing shortness of breath over the past two weeks, and intermittent left arm numbness over the past few days. This morning woke up with persistent left arm weakness and presented to the ED and was found to be hypoxic SpO2 70% on room air. CT PE with large bilateral pulmonary emboli and findings of right heart strain, s/p bilateral mechanical embolectomy with IR at [facility]. Admitted on 1/6/2024 to [facility] ICU for acute hypoxic respiratory failure and stroke workup.     Issues Today:  Neuro: Clinical acute stroke with left sided deficit. Neurology  consulted. MRI of brain shows abnormality in left occipital lobe which may represent acute/subacute stroke. On high intensity heparin for PE. Continue for now. Likely embolic stroke.  CV: Plan for Echo with bubble. Echo on 12/5/23 showed patent foramen ovale. Recommend Cardiology consultation with discussion for closure of PFO in future.   Pulm: PE. S/p mechanical thrombectomy. On heparin.  GI: regular diet  Endocrine: on levothyroxine    Plan to transfer out of ICU.    Omer Wan MD  Pulmonary/Critical Care  January 7, 2024 7:20 AM    CCT  40 minutes separate from procedures

## 2024-01-08 VITALS
HEART RATE: 77 BPM | RESPIRATION RATE: 16 BRPM | WEIGHT: 177.03 LBS | DIASTOLIC BLOOD PRESSURE: 77 MMHG | TEMPERATURE: 98 F | HEIGHT: 63 IN | OXYGEN SATURATION: 96 % | BODY MASS INDEX: 31.37 KG/M2 | SYSTOLIC BLOOD PRESSURE: 117 MMHG

## 2024-01-08 LAB
ANION GAP SERPL CALCULATED.3IONS-SCNC: 10 MMOL/L (ref 7–15)
BUN SERPL-MCNC: 9.9 MG/DL (ref 8–23)
CALCIUM SERPL-MCNC: 9.6 MG/DL (ref 8.8–10.2)
CHLORIDE SERPL-SCNC: 107 MMOL/L (ref 98–107)
CREAT SERPL-MCNC: 0.73 MG/DL (ref 0.51–0.95)
DEPRECATED HCO3 PLAS-SCNC: 24 MMOL/L (ref 22–29)
EGFRCR SERPLBLD CKD-EPI 2021: 87 ML/MIN/1.73M2
ERYTHROCYTE [DISTWIDTH] IN BLOOD BY AUTOMATED COUNT: 12.7 % (ref 10–15)
GLUCOSE SERPL-MCNC: 107 MG/DL (ref 70–99)
HCT VFR BLD AUTO: 30.9 % (ref 35–47)
HGB BLD-MCNC: 9.5 G/DL (ref 11.7–15.7)
MAGNESIUM SERPL-MCNC: 2.4 MG/DL (ref 1.7–2.3)
MCH RBC QN AUTO: 29.3 PG (ref 26.5–33)
MCHC RBC AUTO-ENTMCNC: 30.7 G/DL (ref 31.5–36.5)
MCV RBC AUTO: 95 FL (ref 78–100)
PHOSPHATE SERPL-MCNC: 4.1 MG/DL (ref 2.5–4.5)
PLATELET # BLD AUTO: 422 10E3/UL (ref 150–450)
POTASSIUM SERPL-SCNC: 3.9 MMOL/L (ref 3.4–5.3)
RBC # BLD AUTO: 3.24 10E6/UL (ref 3.8–5.2)
SODIUM SERPL-SCNC: 141 MMOL/L (ref 135–145)
UFH PPP CHRO-ACNC: 0.6 IU/ML
WBC # BLD AUTO: 8.9 10E3/UL (ref 4–11)

## 2024-01-08 PROCEDURE — 250N000013 HC RX MED GY IP 250 OP 250 PS 637

## 2024-01-08 PROCEDURE — 85027 COMPLETE CBC AUTOMATED: CPT

## 2024-01-08 PROCEDURE — 83735 ASSAY OF MAGNESIUM: CPT

## 2024-01-08 PROCEDURE — 80048 BASIC METABOLIC PNL TOTAL CA: CPT

## 2024-01-08 PROCEDURE — 36415 COLL VENOUS BLD VENIPUNCTURE: CPT

## 2024-01-08 PROCEDURE — 84100 ASSAY OF PHOSPHORUS: CPT

## 2024-01-08 PROCEDURE — 99239 HOSP IP/OBS DSCHRG MGMT >30: CPT | Mod: GC | Performed by: STUDENT IN AN ORGANIZED HEALTH CARE EDUCATION/TRAINING PROGRAM

## 2024-01-08 PROCEDURE — 85520 HEPARIN ASSAY: CPT

## 2024-01-08 RX ORDER — ONDANSETRON 4 MG/1
4 TABLET, FILM COATED ORAL EVERY 8 HOURS PRN
Qty: 21 TABLET | Refills: 0 | Status: SHIPPED | OUTPATIENT
Start: 2024-01-08 | End: 2024-01-15

## 2024-01-08 RX ORDER — ACETAMINOPHEN 325 MG/1
650 TABLET ORAL EVERY 4 HOURS PRN
Qty: 60 TABLET | Refills: 0 | OUTPATIENT
Start: 2024-01-08 | End: 2024-08-08

## 2024-01-08 RX ADMIN — PRAVASTATIN SODIUM 40 MG: 20 TABLET ORAL at 08:25

## 2024-01-08 RX ADMIN — ACETAMINOPHEN 650 MG: 325 TABLET, FILM COATED ORAL at 04:35

## 2024-01-08 RX ADMIN — OXYCODONE HYDROCHLORIDE 5 MG: 5 TABLET ORAL at 04:35

## 2024-01-08 RX ADMIN — LIOTHYRONINE SODIUM 5 MCG: 5 TABLET ORAL at 08:25

## 2024-01-08 RX ADMIN — APIXABAN 10 MG: 5 TABLET, FILM COATED ORAL at 12:06

## 2024-01-08 RX ADMIN — GABAPENTIN 100 MG: 100 CAPSULE ORAL at 08:25

## 2024-01-08 RX ADMIN — LEVOTHYROXINE SODIUM 125 MCG: 0.03 TABLET ORAL at 08:25

## 2024-01-08 ASSESSMENT — ACTIVITIES OF DAILY LIVING (ADL)
ADLS_ACUITY_SCORE: 32
ADLS_ACUITY_SCORE: 29
ADLS_ACUITY_SCORE: 29
ADLS_ACUITY_SCORE: 32
ADLS_ACUITY_SCORE: 29
ADLS_ACUITY_SCORE: 32
ADLS_ACUITY_SCORE: 32

## 2024-01-08 NOTE — CONSULTS
Discharge Pharmacy Test Claim    Eliquis and xarelto are covered with a copay of $35 per month through patient's [employer] Medicare advantage plan.     Test Claim Copay   eliquis 35.00   xarelto 35.00     Nessa Macias  [facility] Pharmacy Liaison  Ph: [phone]  Fax [phone]  Available on Vocera (learn more here)

## 2024-01-08 NOTE — PROGRESS NOTES
Major Shift Events:  Neuro intact able to make her needs known. VSS Denies pain/nausea. Heparin gtts discontinued per order, apixiban dose given. Discharge medications and instructions given to patient and spouse who demonstrated understanding. Pan for follow up with primary next week. Pt discharged to home with spouse.     For vital signs and complete assessments, please see documentation flowsheets.

## 2024-01-08 NOTE — DISCHARGE SUMMARY
"[facility]  Hospitalist Discharge Summary      Date of Admission:  1/6/2024  Date of Discharge:  1/8/2024  1:49 PM  Discharging Provider: Cassy Vale MD  Discharge Service: Medicine Service, LONI TEAM 2    Discharge Diagnoses   Bilateral pulmonary emboli with right heart strain s/p bilateral mechanical embolectomy by IR  Acute hypoxic respiratory failure secondary to PE, resolved  Left arm weakness/numbness, resolved  Spinal fusion L4-S1 12/22/2023    Chronic:  - MIKEL on CPAP  - HLD  - hypothyroidism  - breast cancer 2009    Clinically Significant Risk Factors     # Obesity: Estimated body mass index is 31.37 kg/m  as calculated from the following:    Height as of this encounter: 1.6 m (5' 2.99\").    Weight as of this encounter: 80.3 kg (177 lb 0.5 oz).       Follow-ups Needed After Discharge   Follow-up Appointments     Adult [facility] Follow-up and recommended labs and tests      Follow up with primary care provider, Shadia Munroe, within 7 days   for hospital follow- up.      Appointments on [place] and/or [facility] (with [facility] or [facility]   provider or service). Call [phone] if you haven't heard regarding   these appointments within 7 days of discharge.            Discharge Disposition   Discharged to home  Condition at discharge: Stable    Hospital Course    Ely Humphreys is a 73 year old female with PMH breast cancer 2009, hypothyroidism, MIKEL on home CPAP, hyperlipidemia, recently underwent spinal fusion at the level of L4 - S1 12/22/2023, who has been experiencing increasing shortness of breath over the past two weeks, and intermittent left arm numbness over the past few days. Morning of 1/6 woke up with persistent left arm weakness and question of L facial droop, presented to the ED and was found to be hypoxic SpO2 70% on room air. CT PE with large bilateral pulmonary emboli and findings of right heart strain, s/p bilateral mechanical " embolectomy with IR at [facility]. Admitted on 1/6/2024 to [facility] Medical ICU as there were no ICU beds for monitoring at [facility] post-embolectomy.     Bilateral pulmonary emboli with right heart strain s/p bilateral mechanical embolectomy by IR  Acute hypoxic respiratory failure secondary to PE, resolved  Reported intermittent shortness of breath over past two weeks following her spine surgery. Upon presentation to ED SpO2 found to be 70s on room air. CT PE showing large volume pulmonary emboli within the main, segmental, and subsegmental pulmonary arteries with evidence of right heart strain. S/p bilateral pulmonary angio with mechanical thrombectomy by IR with successful removal of ~95% of embolic burden. On 10L oxymask upon arrival to ICU with shortness of breath but reportedly improved from before procedure. BLE US negative for DVTs. Likely provoked PE from recent surgery and reduced mobility post-op. Weaned to RA and resolution of SOB (used NC overnight but she did not have her home CPAP so this is in setting of MIKEL). Started on heparin gtt which she tolerated well.   - 3 months of apixaban for provoked PE    Left arm weakness/numbness, resolved  Reported intermittent left arm numbness and weakness over the past few days before admission. The morning of 1/6/24 woke up with left arm weakness which persisted, as well as possible L facial droop. Presented to ED with concern for symptoms. Head CT and CTA did not show any acute intracranial pathology or large vessel occlusion. Initial NIHSS 2. A1c, lipid panel, troponin obtained. MRI brain: punctate focus of diffusion restriction to L occipital lobe, acute vs subacute stroke. Echo with EF 55-60%, small PFO but negative bubble study. Neurology saw patient and noted MRI without overt ischemia, no pronator drift on exam, questioned if this represented stroke or complication of above problems. We discussed by phone with neurology who did not feel that patient  had a stroke. Possible that neuro sxs were in the setting of hypoxia/acute illness. Resolved by time of discharge.     Spinal fusion L4-S1 12/22/2023  Spinal fusion done at [facility]. Likely provoking cause of PE. Discussed with our spine ortho team here and no contraindications to starting AC/DOAC for PE.   - f/up with surgeons OP as scheduled  - PTA pain control with Tylenol, gabapentin, robaxin, oxycodone, tizanidine    Chronic:  - MIKEL on CPAP  - HLD  - hypothyroidism  - breast cancer 2009    Consultations This Hospital Stay   PHYSICAL THERAPY ADULT IP CONSULT  OCCUPATIONAL THERAPY ADULT IP CONSULT  PHARMACY IP CONSULT  NEUROLOGY STROKE ADULT IP CONSULT  SPEECH LANGUAGE PATH ADULT IP CONSULT  PHYSICAL THERAPY ADULT IP CONSULT  OCCUPATIONAL THERAPY ADULT IP CONSULT  PHARMACY LIAISON FOR MEDICATION COVERAGE CONSULT    Code Status   Prior    Time Spent on this Encounter   I, Mary Ann Vale, personally saw the patient today and spent greater than 30 minutes discharging this patient.        Physician Attestation   I saw this patient with the resident and agree with the resident/fellow's findings and plan of care as documented in the note.      Please see A&P for additional details of medical decision making.    I have personally reviewed the following data over the past 24 hrs:    8.9  \   9.5 (L)   / 422     141 107 9.9 /  107 (H)   3.9 24 0.73 \         Mary Ann Philip (Cassy) MD [facility]  Internal Medicine/Pediatrics  Hospitalist  Date of Service (when I saw the patient): 01/08/24    [facility] NEURO SURGICAL ICU  [address] [address]  Phone: [phone]  ______________________________________________________________________    Physical Exam   Vital Signs: Temp: 98  F (36.7  C) Temp src: Oral BP: 117/77 Pulse: 77   Resp: 16 SpO2: 96 % O2 Device: None (Room air) Oxygen Delivery: 2 LPM  Weight: 177 lbs .47 oz  Physical Exam    General: awake, alert, in no acute distress  HEENT: NCAT, sclera  anicteric, no nasal discharge, MMM  CV: RRR, no murmurs noted  Resp: CTAB, no increased WOB  Abd: Soft, nontender, nondistended  MSK: No peripheral edema, extremities warm and well perfused  Skin: warm, dry, no jaundice  Neuro: Alert and oriented x4.         Primary Care Physician   Shadia Munroe    Discharge Orders      Reason for your hospital stay    You were hospitalized for a pulmonary embolism (a clot) that was causing your shortness of breath, thought to be provoked by your recent surgery. The clot was removed and your respiratory status improved quickly to the point that you didn't need supplemental oxygen. You also had some left-sided weakness. You were seen by neurology for this and CT scan and MRI of your head did not show any evidence of a stroke. We are sending you home with a blood thinner, apixaban, since you had a clot. Please take this for 3 months. Follow up with your primary care provider in 1-2 weeks.     Return the the ED if you develop shortness of breath, chest pain, new weakness, and bleeding.     Activity    Your activity upon discharge: activity as tolerated     Adult [facility] Follow-up and recommended labs and tests    Follow up with primary care provider, Shadia Munroe, within 7 days for hospital follow- up.      Appointments on [place] and/or [facility] (with [facility] or [facility] provider or service). Call [phone] if you haven't heard regarding these appointments within 7 days of discharge.     Diet    Follow this diet upon discharge: Orders Placed This Encounter      Regular Diet Adult Thin Liquids (level 0)       Significant Results and Procedures   Most Recent 3 CBC's:Recent Labs   Lab Test 01/08/24  0338 01/07/24  0547 01/06/24  1502   WBC 8.9 8.1 10.5   HGB 9.5* 9.3* 10.2*   MCV 95 94 97    381 348     Most Recent 3 BMP's:Recent Labs   Lab Test 01/08/24  0338 01/07/24  0547 01/06/24  1400 01/06/24  0827    142  --  141   POTASSIUM 3.9 3.8  --  3.5    CHLORIDE 107 107  --  104   CO2 24 23  --  25   BUN 9.9 11.2  --  14.6   CR 0.73 0.78  --  0.88   ANIONGAP 10 12  --  12   MARTITA 9.6 9.0  --  9.7   * 100* 97 108*     Most Recent 2 LFT's:Recent Labs   Lab Test 01/07/24  0547 12/04/23  1548   AST 35 31   ALT 17 25   ALKPHOS 98 72   BILITOTAL 0.5 0.5     Most Recent 3 INR's:Recent Labs   Lab Test 01/06/24  0827 05/10/17  1705   INR 1.05 1.01   ,   Results for orders placed or performed during the hospital encounter of 01/06/24   IR Pulmonary Angiogram Bilateral    Narrative    IR PULMONARY ANGIOGRAM BILATERAL   1/6/2024 1:07 PM     CLINICAL HISTORY/INDICATION: Patient is a 73-year-old female with a  history of intermediate high risk pulmonary embolism with findings  consistent with right heart strain who presents to interventional  radiology for emergent pulmonary angiogram with mechanical  thrombectomy.    PROCEDURES PERFORMED:   1. Ultrasound-guided right femoral venotomy.  2. Selective catheterization and angiography of the main pulmonary  artery.  3. Selective catheterization and angiography of the right and left  main pulmonary arteries.  4. Selective catheterization and angiography of the right lower lobe  and left lower lobe pulmonary arteries.  5. Mechanical thrombectomy with extirpation of matter from the right  main, right lower lobe, left main and left lower lobe pulmonary  arteries.  6. Pulmonary artery pressures, pre and post  7. Usage of a vascular closure device x 2  8. Moderate Sedation    MODERATE SEDATION: Versed 1.5 mg IV; Fentanyl 50 mcg IV. During the  time out, immediately prior to the administration of medications, the  patient was reassessed for adequacy to receive conscious sedation.  Under physician supervision, Versed and fentanyl were administered for  moderate sedation. Pulse oximetry, heart rate and blood pressure were  continuously monitored by an independent trained observer. The  physician spent 61 minutes of face-to-face  sedation time with the  patient.    ATTENDING PHYSICIAN: Krishna Collins M.D.    ADDITIONAL MEDICATION: None.    CONTRAST: 90 cc Isovue-300    FLUORO: 17.1 minutes  AIR KERMA: 152 mGy    CONSENT: Following a discussion of the risks, benefits, indications  and alternatives to treatment, appropriate informed consent was  obtained.      TIMEOUT: A timeout was performed per universal protocol policy to  ensure correct patient, site, and procedure to be performed.     PROCEDURE AND FINDINGS: Following a discussion of the risks, benefits,  indications, and alternatives to treatment, appropriate informed  consent was obtained from the patient. The patient was brought to the  interventional radiology suite and placed supine on the table.  Bilateral groins were prepped and draped in a sterile fashion.  A  timeout was performed per universal protocol policy to confirm the  correct patient, site and procedure to be performed.    A preliminary ultrasound of the right  groin was performed and  demonstrates a patent right common femoral vein. A permanent  ultrasound image was recorded.  Using a combination of fluoroscopy and  ultrasound, an access site was determined.  The overlying skin was  anesthetized with 1% Lidocaine.  Using ultrasound guidance,  access  into the right common femoral vein was obtained with visualization of  needle entry into the vessel. A 0.018 wire was advanced through the  needle and exchange was made for a 5 Hungarian micropuncture sheath. A  0.035 wire was advanced through the micropuncture sheath. A 6 Hungarian  sheath was used to dilate the puncture arteriotomy. Then 2 Perclose  devices were deployed in staggered fashion in the typical Perclose  technique. A 0.035 wire was advanced into the IVC. Over the wire a 24  Hungarian [place] dry seal sheath was advanced with the tip in the IVC.     MAIN PULMONARY ARTERY:   Using a combination of wire and angled pigtail catheter access was  gained into the main pulmonary  artery. Digital subtraction angiography  was performed, images show significant bilateral pulmonary emboli. The  pigtail catheter was removed over the wire and exchanged for the 24  Tamazight FlowTriever.     LEFT MAIN: Using a combination of wire and angled pigtail catheter  access was gained into the main pulmonary artery. The pigtail catheter  was removed over the wire and exchanged for the 24 Tamazight FlowTriever.  Using a combination of catheter and wire access was gained into the  Left pulmonary artery.  The FlowTriever was then advanced into the  Left main pulmonary artery to the level of the pulmonary emboli and  extirpation of matter from the left main pulmonary artery was  performed with removal small amount of pulmonary embolus. Post  aspiration angiography was performed, images show some improvement in  the amount of thrombus.     LEFT LOWER LOBE: Using a combination of catheter and wire access was  gained into the left lower lobe pulmonary artery. Digital subtraction  angiography was performed, images show persistent thrombus in the left  lower lobe pulmonary artery. Over the wire the  FlowTriever was  advanced into the left lower lobe pulmonary artery the level of the  pulmonary emboli and mechanical thrombectomy with extirpation of  matter from the left lower lobe pulmonary artery was performed with  with removal of a small amount of amount of pulmonary embolus. A 20  Tamazight curved Flowtriever was also utilized for more directed  aspiration of the lower lobe arteries. Post aspiration angiography was  performed, images show slight improvement in the amount of thrombus.  There is still persistent thrombus in the left upper and left lower  lobe pulmonary arteries, however, clot burden is overall improved.    RIGHT MAIN  Using a combination of catheter and wire access was gained into the  Right pulmonary artery. The FlowTriever was then advanced into the  Right main pulmonary artery to the level of the  pulmonary emboli and  extirpation of matter from the right main pulmonary artery was  performed with removal of a significant amount of embolus. Post  aspiration angiography was performed, images show significant  improvement in the amount of thrombus.     RIGHT LOWER LOBE: Using a combination of catheter and wire, access was  gained into the right lower lobe pulmonary artery. Digital subtraction  angiography was performed, images show persistent thrombus in the  right lower lobe pulmonary artery. Over the wire, the  FlowTriever was  advanced into the right lower lobe pulmonary artery to the level of  the pulmonary emboli and mechanical thrombectomy with extirpation of  matter from the right lower lobe pulmonary artery was performed with  removal of a significant amount of pulmonary embolus. Post aspiration  angiography was performed, images show significant improvement in the  amount of thrombus. There is still persistent thrombus in the right  lower lobe pulmonary artery, however, clot burden is significantly  improved.    The catheter was pulled back into the main pulmonary artery. Final  digital subtraction angiography was performed, images show significant  improvement in the clot burden throughout the right and left pulmonary  arteries including the saddle pulmonary embolus is no longer  visualized.    The FlowTriever was removed over the wire.    Hemostasis was achieved with successful administration of both  Perclose suture mediated vascular closure devices.    Pulmonary pressures:  Pre treatment: 29 mmHg mean  Post Treatment: 24 mmHg mean    IVC FILTER PLACEMENT:   Over the wire a flush catheter was then advanced into the inferior  vena cava at the iliac confluence. An inferior venacavogram was  performed which demonstrates conventional caval anatomy without  duplication of the IVC or the renal veins. The inferior vena cava is  normal in caliber and no thrombus or web is identified.    The flush catheter  was removed over a wire and the sheath was  exchanged for the filter delivery sheath. The sheath was advanced to  the suprarenal inferior vena cava, the filter was advanced to the end  of the sheath, and the sheath and filter were retracted as a unit for  delivery in an infrarenal position. The filter was deployed.    Following this, the vascular sheath was removed and the previously  placed Perclose sutures were tied down in standard fashion, obtaining  excellent hemostasis bilaterally. The incision with closed with  Steri-Strips.     Throughout the procedure, the patient was monitored by a radiology  nurse for cardiac rhythm and oxygen saturation which remained stable.  The patient tolerated the procedure well and left interventional  radiology in stable condition.      Impression    IMPRESSION:   1. Successful bilateral pulmonary angiography and mechanical  thromboembolectomy, with significant improvement in pulmonary artery  pressures. Approximately ~95% of total thrombus successfully  removed.    RADHA GUEVARA MD         SYSTEM ID:  [id number]   US Lower Extremity Venous Duplex Bilateral    Narrative    EXAMINATION: DOPPLER VENOUS ULTRASOUND OF BILATERAL LOWER EXTREMITIES,  1/6/2024 6:19 PM     COMPARISON: Venous duplex ultrasound 1/18/2006    HISTORY: Known PE, evaluate for DVT    TECHNIQUE:  Gray-scale evaluation with compression, spectral flow and  color Doppler assessment of the deep venous system of both legs from  groin to knee, and then at the ankles.    FINDINGS:  In the lower extremities, the left common femoral and bilateral  femoral, popliteal and posterior tibial veins demonstrate normal  compressibility and blood flow. The right common femoral vein was  obscured by overlying wound dressing.      Impression    IMPRESSION:  No evidence of deep venous thrombosis in either lower extremity.    I have personally reviewed the examination and initial interpretation  and I agree with the findings.    HEMANT  MD JAKY         SYSTEM ID:  [id number]   XR Chest Port 1 View    Narrative    Exam: XR CHEST PORT 1 VIEW, 1/6/2024 3:18 PM    Indication: hypoxia, shortness of breath, cough s/p embolectomy for  bilateral PE    Comparison: CT chest 1/6/2024    Findings:   Single portable AP supine view of the chest was obtained. Prominent  cardiomediastinal silhouette. Patchy perihilar opacities. Clear  costophrenic angles. No pneumothorax. No acute osseous abnormality.      Impression    Impression: Mild perihilar opacities, which may represent mild  pulmonary edema versus atelectasis post procedure.    I have personally reviewed the examination and initial interpretation  and I agree with the findings.    HEMANT STARKEY MD         SYSTEM ID:  [id number]   MR Brain w/o Contrast    Narrative    MRI brain without contrast    Provided History:  73 y.o. found to have PE, new left upper extremity  weakness, eval for stroke. MR requested by neuro stat..    Comparison:  MR brain 5/17/2018, CT stroke series 1/6/2024    Technique:   Axial and coronal diffusion-weighted (with ADC map), axial FLAIR, and  axial susceptibility-weighted images of the brain were obtained  without the administration of intravenous contrast.    Findings:   Diffusion weighted images demonstrate punctate focus of diffusion  restriction in the left occipital lobe with subtle T2 hyperintensity.  Additional scattered periventricular and subcortical subtle T2  hyperintense foci, likely sequelae of chronic small vessel ischemic  disease. Susceptibility weighted images are unremarkable. The  paranasal sinuses and mastoid air cells are clear. Orbits are grossly  unremarkable.      Impression    Impression:  Punctate focus of diffusion restriction in the left occipital lobe,  which may represent acute/subacute infarct.    I have personally reviewed the examination and initial interpretation  and I agree with the findings.    SANDRA ROLAND MD         SYSTEM ID:  [id number]    Echocardiogram Complete     Value    LVEF  55-60%    [facility]    [id number]  [id number]  [id number]  [id number]     [facility]  Echocardiography Laboratory  [address] [place] [address]     Name: NYA KOROMA  MRN: [id number]  : 1950  Study Date: 2024 09:51 AM  Age: 73 yrs  Gender: Female  Patient Location: [facility]  Reason For Study: Pulmonary Embolism  Ordering Physician: JUDD KRISHNAMURTHY  Referring Physician: CE HURTADO  Performed By: Brenda Mccloud     BSA: 1.8 m2  Height: 62 in  Weight: 179 lb  BP: 133/73 mmHg  ______________________________________________________________________________  Procedure  Complete Portable Echo Adult. Contrast Optison.  ______________________________________________________________________________  Interpretation Summary  Global and regional left ventricular function is normal with an EF of 55-60%.  Moderate right ventricular dilation is present.  Global right ventricular function is mildly reduced.  A small patent foramen ovale is present.  There is a predominantly left-to-right shunt by color Doppler. Bubble study is  negative (no R-to L shunt with bubble study both at rest and with Valsalva).  No significant valvular abnormalities present.  Pulmonary artery systolic pressure cannot be assessed.     This study was compared with the study from 2023 .RV size and function  worsened.  ______________________________________________________________________________  Left Ventricle  Left ventricular size is normal. Global and regional left ventricular function  is normal with an EF of 55-60%. Left ventricular wall thickness is normal.  Left ventricular diastolic function is normal.     Right Ventricle  Moderate right ventricular dilation is present. Global right ventricular  function is mildly reduced.     Atria  The left atrium appears normal. Moderate right atrial enlargement is present.  A small  patent foramen ovale is present. There is a predominantly left-to-  right shunt.     Mitral Valve  The mitral valve is normal.     Aortic Valve  Aortic valve is normal in structure and function.     Tricuspid Valve  The tricuspid valve is normal. Pulmonary artery systolic pressure cannot be  assessed. The peak velocity of the tricuspid regurgitant jet is not  obtainable. Trace tricuspid insufficiency is present.     Pulmonic Valve  The pulmonic valve is normal.     Vessels  The aorta root is normal. The inferior vena cava was normal in size with  preserved respiratory variability.     Pericardium  No pericardial effusion is present.     Miscellaneous  No significant valvular abnormalities present.     Compared to Previous Study  This study was compared with the study from 12/5/2023 . RV size and function  worsened.  ______________________________________________________________________________  MMode/2D Measurements & Calculations  IVSd: 1.2 cm  LVIDd: 4.2 cm  LVIDs: 2.9 cm  LVPWd: 1.3 cm  FS: 30.2 %  LV mass(C)d: 191.7 grams  LV mass(C)dI: 105.1 grams/m2  Ao root diam: 4.0 cm  LVOT diam: 2.0 cm  LVOT area: 3.1 cm2  Ao root diam index Ht(cm/m): 2.5  Ao root diam index BSA (cm/m2): 2.2  RWT: 0.61     ______________________________________________________________________________  Report approved by: Francisco VILLA 01/07/2024 01:24 PM               Discharge Medications   Discharge Medication List as of 1/8/2024 12:56 PM      START taking these medications    Details   apixaban ANTICOAGULANT (ELIQUIS) 5 MG tablet Take 2 tablets (10 mg) by mouth 2 times daily for 6 days, THEN 1 tablet (5 mg) 2 times daily for 86 days., Disp-196 tablet, R-0, E-Prescribe      ondansetron (ZOFRAN) 4 MG tablet Take 1 tablet (4 mg) by mouth every 8 hours as needed for nausea or vomiting, Disp-21 tablet, R-0, E-Prescribe         CONTINUE these medications which have NOT CHANGED    Details   acetaminophen (TYLENOL) 500 MG tablet Take 1,000  mg by mouth 3 times daily, Historical      aspirin 81 MG EC tablet Take 81 mg by mouth daily, Historical      calcium carbonate 500 mg, elemental, (OSCAL 500) 1250 (500 Ca) MG TABS tablet Take 1 tablet by mouth daily, Historical      docusate sodium (COLACE) 100 MG capsule Take 100 mg by mouth 2 times daily, Historical      gabapentin (NEURONTIN) 100 MG capsule Take 1-3 capsules (100-300 mg) by mouth at bedtime. May also take 1-2 capsules (100-200 mg) 2 times daily as needed for neuropathic pain., Disp-240 capsule, R-1, E-Prescribe      hydrocortisone 2.5 % ointment Apply 2.5 g topicallyHistorical      hydrOXYzine (VISTARIL) 50 MG capsule Take 50 mg by mouth 2 times daily as needed for itching, Historical      levothyroxine (SYNTHROID/LEVOTHROID) 125 MCG tablet Take 1 tablet (125 mcg) by mouth daily, Disp-90 tablet, R-0, E-Prescribe      liothyronine (CYTOMEL) 5 MCG tablet Take 1 tablet (5 mcg) by mouth daily, Disp-90 tablet, R-4, E-Prescribe      methocarbamol (ROBAXIN) 500 MG tablet Take 500 mg by mouth every 6 hours as needed for muscle spasms, Historical      oxyCODONE (OXY-IR) 5 MG capsule Take 5 mg by mouth 4 times daily, Historical      polyethylene glycol (MIRALAX) 17 g packet Take 17 g by mouth 2 times daily, Historical      pravastatin (PRAVACHOL) 40 MG tablet Take 1 tablet (40 mg) by mouth daily, Disp-90 tablet, R-0, E-Prescribe      tiZANidine (ZANAFLEX) 2 MG tablet Take 1-2 tablets (2-4 mg) by mouth 3 times daily as needed for muscle spasms, Disp-60 tablet, R-1, E-Prescribe      Turmeric (QC TUMERIC COMPLEX) 500 MG CAPS Take 500 mg by mouth daily, Historical      vitamin D2 (ERGOCALCIFEROL) 75821 units (1250 mcg) capsule Take 50,000 Units by mouth once a week, Historical           Allergies   Allergies   Allergen Reactions     Amiodarone Other (See Comments)     Loss of vision in R eye.

## 2024-01-08 NOTE — PROGRESS NOTES
Major Shift Events: Neuro intact, able to make her needs known. PRN tylenol given X1, Zofran given for nausea. NSR, afebrile. RA, tolerating well. Advanced to regular diet after speech evaluation. Minimal appetite. Voiding, BM X2. Family at bedside.   Plan: Continue POV and transfer to floor when bed is available.   For vital signs and complete assessments, please see documentation flowsheets.

## 2024-01-09 ENCOUNTER — TELEPHONE (OUTPATIENT)
Dept: PEDIATRICS | Facility: CLINIC | Age: 74
End: 2024-01-09
Payer: COMMERCIAL

## 2024-01-09 LAB
ATRIAL RATE - MUSE: 85 BPM
DIASTOLIC BLOOD PRESSURE - MUSE: NORMAL MMHG
INTERPRETATION ECG - MUSE: NORMAL
P AXIS - MUSE: 43 DEGREES
PR INTERVAL - MUSE: 182 MS
QRS DURATION - MUSE: 88 MS
QT - MUSE: 402 MS
QTC - MUSE: 478 MS
R AXIS - MUSE: -9 DEGREES
SYSTOLIC BLOOD PRESSURE - MUSE: NORMAL MMHG
T AXIS - MUSE: -24 DEGREES
VENTRICULAR RATE- MUSE: 85 BPM

## 2024-01-09 ASSESSMENT — ENCOUNTER SYMPTOMS
HEMATURIA: 0
CHILLS: 0
NERVOUS/ANXIOUS: 0
HEADACHES: 0
EYE PAIN: 0
HEMATOCHEZIA: 0
DIZZINESS: 0
SORE THROAT: 0
DYSURIA: 0
MYALGIAS: 0
PALPITATIONS: 0
HEARTBURN: 0
CONSTIPATION: 0
SHORTNESS OF BREATH: 0
FREQUENCY: 0
BREAST MASS: 0
ABDOMINAL PAIN: 0
COUGH: 0
JOINT SWELLING: 0
WEAKNESS: 0
PARESTHESIAS: 0
ARTHRALGIAS: 0
FEVER: 0
DIARRHEA: 0
NAUSEA: 0

## 2024-01-09 ASSESSMENT — ACTIVITIES OF DAILY LIVING (ADL): CURRENT_FUNCTION: NO ASSISTANCE NEEDED

## 2024-01-09 NOTE — TELEPHONE ENCOUNTER
Home Care is calling regarding an established patient with [facility].       Requesting orders from: Shadia Munroe  Provider is following patient: Yes  Is this a 60-day recertification request?  No    Orders Requested    Physical Therapy  Request for resumption in care.     Frequency will come after can assess patient after resumption    Occupational Therapy  Request for resumption in care.     Frequency will come after can assess patient after resumption      Information was gathered and will be sent to provider for review.  RN will contact Home Care with information after provider review.  Confirmed ok to leave a detailed message with call back.  Contact information confirmed and updated as needed.      Siena Arellano RN

## 2024-01-10 ENCOUNTER — MEDICAL CORRESPONDENCE (OUTPATIENT)
Dept: HEALTH INFORMATION MANAGEMENT | Facility: CLINIC | Age: 74
End: 2024-01-10

## 2024-01-10 ENCOUNTER — PATIENT OUTREACH (OUTPATIENT)
Dept: CARE COORDINATION | Facility: CLINIC | Age: 74
End: 2024-01-10
Payer: COMMERCIAL

## 2024-01-10 DIAGNOSIS — Z53.9 DIAGNOSIS NOT YET DEFINED: Primary | ICD-10-CM

## 2024-01-10 PROCEDURE — G0180 MD CERTIFICATION HHA PATIENT: HCPCS | Performed by: INTERNAL MEDICINE

## 2024-01-10 NOTE — TELEPHONE ENCOUNTER
Please see the other encounter- have given a verbal ok for orders for resumption of care today-patient was hospitalized again and the first step is resumption of care.  They will call after that to request orders.  Juani Nevarez RN

## 2024-01-10 NOTE — PROGRESS NOTES
Connected Care Resource Center:   [facility] Care Resource Center Contact  [facility]/Voicemail     Clinical Data: Post-Discharge Outreach     Outreach attempted x 2.  Left message on patient's voicemail, providing [facility]'s central phone number of [phone] ([phone]) for questions/concerns and/or to schedule an appt with an [facility] provider, if they do not have a PCP.      Plan:  [facility] will do no further outreaches at this time.       DIEGO Caban  Connected Care Resource Center, [facility]    *Connected Care Resource Team does NOT follow patient ongoing. Referrals are identified based on internal discharge reports and the outreach is to ensure patient has an understanding of their discharge instructions.

## 2024-01-12 ENCOUNTER — LAB (OUTPATIENT)
Dept: LAB | Facility: CLINIC | Age: 74
End: 2024-01-12
Payer: COMMERCIAL

## 2024-01-12 ENCOUNTER — TELEPHONE (OUTPATIENT)
Dept: PEDIATRICS | Facility: CLINIC | Age: 74
End: 2024-01-12

## 2024-01-12 DIAGNOSIS — E78.00 PURE HYPERCHOLESTEROLEMIA: ICD-10-CM

## 2024-01-12 LAB
CHOLEST SERPL-MCNC: 182 MG/DL
FASTING STATUS PATIENT QL REPORTED: YES
HDLC SERPL-MCNC: 52 MG/DL
LDLC SERPL CALC-MCNC: 103 MG/DL
NONHDLC SERPL-MCNC: 130 MG/DL
TRIGL SERPL-MCNC: 136 MG/DL

## 2024-01-12 PROCEDURE — 36415 COLL VENOUS BLD VENIPUNCTURE: CPT

## 2024-01-12 PROCEDURE — 80061 LIPID PANEL: CPT

## 2024-01-12 NOTE — TELEPHONE ENCOUNTER
Order/Referral Request    Who is requesting: Suzan PT    Orders being requested: PT for 1x a week for 4 weeks for strengthening and  training     Reason service is needed/diagnosis: Had back surgery    When are orders needed by: ASAP    Has this been discussed with Provider: no    Does patient have a preference on a Group/Provider/Facility? [facility]    Does patient have an appointment scheduled?: No    Where to send orders:  Verbal, [phone]    Could we send this information to you in [facility] or would you prefer to receive a phone call?:   Patient would prefer a phone call   Okay to leave a detailed message?: Yes at Other phone number: [phone]

## 2024-01-14 ENCOUNTER — HEALTH MAINTENANCE LETTER (OUTPATIENT)
Age: 74
End: 2024-01-14

## 2024-01-14 DIAGNOSIS — G89.29 CHRONIC RIGHT-SIDED LOW BACK PAIN WITH RIGHT-SIDED SCIATICA: ICD-10-CM

## 2024-01-14 DIAGNOSIS — M54.41 CHRONIC RIGHT-SIDED LOW BACK PAIN WITH RIGHT-SIDED SCIATICA: ICD-10-CM

## 2024-01-15 DIAGNOSIS — I26.99 PULMONARY EMBOLISM (H): Primary | ICD-10-CM

## 2024-01-15 NOTE — TELEPHONE ENCOUNTER
Home Care is calling regarding an established patient with [facility].       Requesting orders from: Shadia Munroe  Provider is following patient: Yes  Is this a 60-day recertification request?  No    Orders Requested    Physical Therapy  Request for initial certification (first set of orders)   Frequency:  1x/wk for 4 wks    Provider needs to review.  Confirmed ok to leave a detailed message with call back.  Contact information confirmed and updated as needed.  Suzan, physical therapy: [phone]   Juani Nevarez RN

## 2024-01-15 NOTE — TELEPHONE ENCOUNTER
Called the patient at [phone]   - Patient states that she is taking 2 tablets (4 mg) 3 times daily   -  Pended tizanidine 4 mg tablets (Take 1 tablet by mouth 3 times daily as needed for muscle spasms) per the written order from Dr. Shaneka Munroe, please review and order as appropriate.     Alyssa ROBERTS RN   Patient Advocate Liaison (PAL)  [facility]   [phone]

## 2024-01-15 NOTE — TELEPHONE ENCOUNTER
Max dose is 4-8 mg 3 times a day. Max dose/day is 24 mg.   Please call her to confirm dosing. Then OK to prep rx for 30 day supply, but may want to increase tablet size to 4 mg.   Shadia Munroe M.D.

## 2024-01-15 NOTE — TELEPHONE ENCOUNTER
[facility] at [phone]   - Provided verbal orders on behalf of Dr. Munroe for Physical Therapy for 1 time a week for 4 weeks for strengthening and training   - Suzan accepted the verbal orders     Alyssa ROBERTS RN   [facility]

## 2024-01-16 ENCOUNTER — OFFICE VISIT (OUTPATIENT)
Dept: PEDIATRICS | Facility: CLINIC | Age: 74
End: 2024-01-16
Payer: COMMERCIAL

## 2024-01-16 VITALS
DIASTOLIC BLOOD PRESSURE: 63 MMHG | SYSTOLIC BLOOD PRESSURE: 98 MMHG | OXYGEN SATURATION: 94 % | HEIGHT: 63 IN | TEMPERATURE: 98 F | RESPIRATION RATE: 20 BRPM | WEIGHT: 179.8 LBS | HEART RATE: 66 BPM | BODY MASS INDEX: 31.86 KG/M2

## 2024-01-16 DIAGNOSIS — R20.0 HAND NUMBNESS: ICD-10-CM

## 2024-01-16 DIAGNOSIS — I26.99 PULMONARY EMBOLISM, UNSPECIFIED CHRONICITY, UNSPECIFIED PULMONARY EMBOLISM TYPE, UNSPECIFIED WHETHER ACUTE COR PULMONALE PRESENT (H): Primary | ICD-10-CM

## 2024-01-16 DIAGNOSIS — R68.2 DRY MOUTH: ICD-10-CM

## 2024-01-16 DIAGNOSIS — I95.9 HYPOTENSION, UNSPECIFIED HYPOTENSION TYPE: ICD-10-CM

## 2024-01-16 DIAGNOSIS — Z98.1 S/P SPINAL FUSION: ICD-10-CM

## 2024-01-16 DIAGNOSIS — Z98.890 HISTORY OF EMBOLECTOMY: ICD-10-CM

## 2024-01-16 PROBLEM — I42.8 DILATED CARDIOMYOPATHY SECONDARY TO TACHYCARDIA (H): Status: RESOLVED | Noted: 2020-08-03 | Resolved: 2024-01-16

## 2024-01-16 PROBLEM — R00.0 DILATED CARDIOMYOPATHY SECONDARY TO TACHYCARDIA (H): Status: RESOLVED | Noted: 2020-08-03 | Resolved: 2024-01-16

## 2024-01-16 PROBLEM — I43 DILATED CARDIOMYOPATHY SECONDARY TO TACHYCARDIA (H): Status: RESOLVED | Noted: 2020-08-03 | Resolved: 2024-01-16

## 2024-01-16 LAB
ERYTHROCYTE [DISTWIDTH] IN BLOOD BY AUTOMATED COUNT: 12.6 % (ref 10–15)
HCT VFR BLD AUTO: 30 % (ref 35–47)
HGB BLD-MCNC: 9.2 G/DL (ref 11.7–15.7)
MCH RBC QN AUTO: 29.3 PG (ref 26.5–33)
MCHC RBC AUTO-ENTMCNC: 30.7 G/DL (ref 31.5–36.5)
MCV RBC AUTO: 96 FL (ref 78–100)
PLATELET # BLD AUTO: 346 10E3/UL (ref 150–450)
RBC # BLD AUTO: 3.14 10E6/UL (ref 3.8–5.2)
WBC # BLD AUTO: 5.8 10E3/UL (ref 4–11)

## 2024-01-16 PROCEDURE — 83735 ASSAY OF MAGNESIUM: CPT | Performed by: INTERNAL MEDICINE

## 2024-01-16 PROCEDURE — 85027 COMPLETE CBC AUTOMATED: CPT | Performed by: INTERNAL MEDICINE

## 2024-01-16 PROCEDURE — 36415 COLL VENOUS BLD VENIPUNCTURE: CPT | Performed by: INTERNAL MEDICINE

## 2024-01-16 PROCEDURE — 80048 BASIC METABOLIC PNL TOTAL CA: CPT | Performed by: INTERNAL MEDICINE

## 2024-01-16 PROCEDURE — 99495 TRANSJ CARE MGMT MOD F2F 14D: CPT | Performed by: INTERNAL MEDICINE

## 2024-01-16 ASSESSMENT — ENCOUNTER SYMPTOMS
CHILLS: 0
ARTHRALGIAS: 0
PALPITATIONS: 0
DYSURIA: 0
FREQUENCY: 0
JOINT SWELLING: 0
SHORTNESS OF BREATH: 0
HEARTBURN: 0
HEADACHES: 0
HEMATURIA: 0
PARESTHESIAS: 0
SORE THROAT: 0
NAUSEA: 0
MYALGIAS: 0
DIARRHEA: 0
FEVER: 0
ABDOMINAL PAIN: 0
NERVOUS/ANXIOUS: 0
HEMATOCHEZIA: 0
WEAKNESS: 0
DIZZINESS: 0
CONSTIPATION: 0
EYE PAIN: 0
COUGH: 0
BREAST MASS: 0

## 2024-01-16 ASSESSMENT — PAIN SCALES - GENERAL: PAINLEVEL: NO PAIN (0)

## 2024-01-16 ASSESSMENT — ACTIVITIES OF DAILY LIVING (ADL): CURRENT_FUNCTION: NO ASSISTANCE NEEDED

## 2024-01-16 NOTE — PROGRESS NOTES
"SUBJECTIVE:   Ely is a 73 year old, presenting for the following:  Hospital follow up.    Plan focus on hospital follow up only today.      1/16/2024     2:44 PM   Additional Questions   Roomed by Sejal Cochran   Accompanied by N/A     Has some questions about her medications after discharge     Are you in the first 12 months of your Medicare coverage?  No    Healthy Habits:     In general, how would you rate your overall health?  Fair    Frequency of exercise:  None    Do you usually eat at least 4 servings of fruit and vegetables a day, include whole grains    & fiber and avoid regularly eating high fat or \"junk\" foods?  Yes    Taking medications regularly:  Yes    Medication side effects:  None    Ability to successfully perform activities of daily living:  No assistance needed    Home Safety:  No safety concerns identified    Hearing Impairment:  No hearing concerns    In the past 6 months, have you been bothered by leaking of urine?  No    In general, how would you rate your overall mental or emotional health?  Excellent    Additional concerns today:  Yes      Today's PHQ-2 Score:       1/15/2024     3:08 PM   PHQ-2 ( 1999 [employer])   Q1: Little interest or pleasure in doing things 0   Q2: Feeling down, depressed or hopeless 0   PHQ-2 Score 0   Q1: Little interest or pleasure in doing things Not at all   Q2: Feeling down, depressed or hopeless Not at all   PHQ-2 Score 0       Have you ever done Advance Care Planning? (For example, a Health Directive, POLST, or a discussion with a medical provider or your loved ones about your wishes): Yes, advance care planning is on file.    Fall risk  Fallen 2 or more times in the past year?: No  Any fall with injury in the past year?: No    Cognitive Screening   1) Repeat 3 items (Leader, Season, Table)    2) Clock draw: NORMAL  3) 3 item recall: Recalls 3 objects  Results: 3 items recalled: COGNITIVE IMPAIRMENT LESS LIKELY    Mini-CogTM Copyright S Leonor. Licensed by the " author for use in [facility]; reprinted with permission (tirsogracie@[email or web address]). All rights reserved.      Do you have sleep apnea, excessive snoring or daytime drowsiness? : yes    Reviewed and updated as needed this visit by clinical staff   Tobacco  Allergies  Meds              Reviewed and updated as needed this visit by Provider                 Social History     Tobacco Use    Smoking status: Never    Smokeless tobacco: Never    Tobacco comments:     none   Substance Use Topics    Alcohol use: Yes     Comment: social           1/9/2024    12:44 PM   Alcohol Use   Prescreen: >3 drinks/day or >7 drinks/week? No     Do you have a current opioid prescription? Yes   How severe is your pain on a scale from 1-10? 7/10     Hospital Follow-up Visit:    Hospital/Nursing Home/IP Rehab Facility: [facility]  Date of Admission: 1/6/24  Date of Discharge: 1/8/24  Reason(s) for Admission: Pulmonary Embolism     Was your hospitalization related to COVID-19? No   Problems taking medications regularly:  None  Medication changes since discharge: None  Problems adhering to non-medication therapy:  None    Summary of hospitalization:  [facility] hospital discharge summary reviewed  Diagnostic Tests/Treatments reviewed.  Follow up needed: specialist follow up  Other Healthcare Providers Involved in Patient s Care:         Specialist appointment - cardiology, orthopedics  Update since discharge: improved.     Cut back to 3 oxycodone per day. Was achy and sore all the time. Now is back on 4 per day.     After discharge, was OK for a few days, then a few days before she went back to er, she started to have shortness of breath, started to develop L arm numbness. Still does have some numbness in 2 fingers on L hand. Woke up the day she went in and knew something was wrong. Called 911. Went to [facility]. Had embolectomy.     Plan of care communicated with patient              Current providers sharing in care for this patient include:   Patient Care Team:  Shadia Munroe MD as PCP - General  Randolph Blount MD as MD (Ophthalmology)  Shadia Pickard Od, OD  Shadia Munroe MD as Assigned PCP  Franco Garcia MD as MD (Dermatology)  Yoly Ross, [facility] as Pharmacist (Pharmacist)  Hodgkin, Douglas, MD as MD (Cardiovascular Disease)  Matti Hanson MD as Assigned Endocrinology Provider  Bloch, Lauren Turner, [facility] as Assigned MTM Pharmacist  Goltz, Bennett Ezra, PA-C as Assigned Neuroscience Provider  Juani Nevarez, ESTRELLA as Personal Advocate & Liaison (PAL)    The following health maintenance items are reviewed in Epic and correct as of today:  Health Maintenance   Topic Date Due    HF ACTION PLAN  Never done    MEDICARE ANNUAL WELLNESS VISIT  11/09/2023    COLORECTAL CANCER SCREENING  06/09/2024    BMP  07/08/2024    MAMMO SCREENING  08/30/2024    ANNUAL REVIEW OF HM ORDERS  12/04/2024    ALT  01/07/2025    CBC  01/08/2025    LIPID  01/12/2025    FALL RISK ASSESSMENT  01/16/2025    DEXA  11/28/2025    ADVANCE CARE PLANNING  01/16/2029    DTAP/TDAP/TD IMMUNIZATION (5 - Td or Tdap) 07/31/2029    TSH W/FREE T4 REFLEX  Completed    HEPATITIS C SCREENING  Completed    PHQ-2 (once per calendar year)  Completed    INFLUENZA VACCINE  Completed    Pneumococcal Vaccine: 65+ Years  Completed    ZOSTER IMMUNIZATION  Completed    RSV VACCINE (Pregnancy & 60+)  Completed    COVID-19 Vaccine  Completed    IPV IMMUNIZATION  Aged Out    HPV IMMUNIZATION  Aged Out    MENINGITIS IMMUNIZATION  Aged Out    RSV MONOCLONAL ANTIBODY  Aged Out     Labs reviewed in EPIC      Review of Systems   Constitutional:  Negative for chills and fever.   HENT:  Negative for congestion, ear pain, hearing loss and sore throat.    Eyes:  Negative for pain and visual disturbance.   Respiratory:  Negative for cough and shortness of breath.    Cardiovascular:  Negative for chest pain, palpitations  "and peripheral edema.   Gastrointestinal:  Negative for abdominal pain, constipation, diarrhea, heartburn, hematochezia and nausea.   Breasts:  Negative for tenderness, breast mass and discharge.   Genitourinary:  Negative for dysuria, frequency, genital sores, hematuria, pelvic pain, urgency, vaginal bleeding and vaginal discharge.   Musculoskeletal:  Negative for arthralgias, joint swelling and myalgias.   Skin:  Negative for rash.   Neurological:  Negative for dizziness, weakness, headaches and paresthesias.   Psychiatric/Behavioral:  Negative for mood changes. The patient is not nervous/anxious.        OBJECTIVE:   BP 98/63 (BP Location: Right arm, Patient Position: Sitting, Cuff Size: Adult Large)   Pulse 66   Temp 98  F (36.7  C) (Tympanic)   Resp 20   Ht 1.59 m (5' 2.6\")   Wt 81.6 kg (179 lb 12.8 oz)   LMP  (LMP Unknown)   SpO2 94%   BMI 32.26 kg/m   Estimated body mass index is 32.26 kg/m  as calculated from the following:    Height as of this encounter: 1.59 m (5' 2.6\").    Weight as of this encounter: 81.6 kg (179 lb 12.8 oz).  Physical Exam  GENERAL: alert and no distress  NECK: no adenopathy, no asymmetry, masses, or scars  RESP: lungs clear to auscultation - no rales, rhonchi or wheezes  CV: regular rate and rhythm, normal S1 S2, no S3 or S4, no murmur, click or rub, no peripheral edema  ABDOMEN: soft, nontender, no hepatosplenomegaly, no masses and bowel sounds normal  MS: no gross musculoskeletal defects noted, no edema    Diagnostic Test Results:  Labs reviewed in Epic    ASSESSMENT / PLAN:   1. Pulmonary embolism, unspecified chronicity, unspecified pulmonary embolism type, unspecified whether acute cor pulmonale present (H)  Presented to [facility] with acute hypoxic respiratory failure and R heart strain due to bilateral PE. Embolectomy was performed, but due to bed shortage, was transferred to ICU at [facility]  Currently anticoagulated. Given extensive postop bilateral PE and history of breast " cancer, will ask heme/onc to see her.  - Adult Oncology/Hematology  Referral; Future    2. History of embolectomy  Noted significant improvement in her symptoms after this procedure.     3. Hand numbness  Initially was thought to have had a CVA. Symptoms felt to be resolved in hospital, but she does report today ongoing small area of numbness remaining in L hand.  - Adult Neurology  Referral; Future    4. Dry mouth  Dry mouth and low blood pressure. Encouraged her to drink more fluids as she admits she hasn't been taking in much liquid.     5. Hypotension, unspecified hypotension type  Considered orthostatics, but no signs of bleeding, and given recent low back surgery, felt likelihood she would be orthostatic along with discomfort from lying supine, this was deferred. If she has any remaining symptoms after better hydration, or has difficulty with hydration at home, she will let me know  - CBC with platelets; Future  - Basic metabolic panel  (Ca, Cl, CO2, Creat, Gluc, K, Na, BUN); Future  - Magnesium; Future  - CBC with platelets  - Basic metabolic panel  (Ca, Cl, CO2, Creat, Gluc, K, Na, BUN)  - Magnesium    6. S/P spinal fusion  Follow up with ortho as planned. Pain control reasonable.    MED REC REQUIRED  Post Medication Reconciliation Status: discharge medications reconciled, continue medications without change        Shadia Munroe MD  [facility]

## 2024-01-16 NOTE — PATIENT INSTRUCTIONS
Restart docusate stool softener twice a day while on narcotic pain medications.  If you are doing well with miralax in addition, continue with that 1/2-1 capful. You can stop this if your stools get too soft.     You'll get a call to schedule with neurology. If you prefer, you could call [facility] [phone] and set up an appointment with them.     Our triage RN, Joan, can be reached at [phone]. You can leave a message and she will get back to you.

## 2024-01-17 ENCOUNTER — MEDICAL CORRESPONDENCE (OUTPATIENT)
Dept: HEALTH INFORMATION MANAGEMENT | Facility: CLINIC | Age: 74
End: 2024-01-17

## 2024-01-18 LAB
ANION GAP SERPL CALCULATED.3IONS-SCNC: 13 MMOL/L (ref 7–15)
BUN SERPL-MCNC: 20.1 MG/DL (ref 8–23)
CALCIUM SERPL-MCNC: 9.4 MG/DL (ref 8.8–10.2)
CHLORIDE SERPL-SCNC: 105 MMOL/L (ref 98–107)
CREAT SERPL-MCNC: 0.95 MG/DL (ref 0.51–0.95)
DEPRECATED HCO3 PLAS-SCNC: 24 MMOL/L (ref 22–29)
EGFRCR SERPLBLD CKD-EPI 2021: 64 ML/MIN/1.73M2
GLUCOSE SERPL-MCNC: 98 MG/DL (ref 70–99)
MAGNESIUM SERPL-MCNC: 2.3 MG/DL (ref 1.7–2.3)
POTASSIUM SERPL-SCNC: 4.4 MMOL/L (ref 3.4–5.3)
SODIUM SERPL-SCNC: 142 MMOL/L (ref 135–145)

## 2024-01-21 DIAGNOSIS — E03.9 HYPOTHYROIDISM, UNSPECIFIED TYPE: ICD-10-CM

## 2024-01-22 RX ORDER — LIOTHYRONINE SODIUM 5 UG/1
TABLET ORAL
Qty: 90 TABLET | Refills: 3 | Status: SHIPPED | OUTPATIENT
Start: 2024-01-22

## 2024-01-24 ENCOUNTER — MEDICAL CORRESPONDENCE (OUTPATIENT)
Dept: HEALTH INFORMATION MANAGEMENT | Facility: CLINIC | Age: 74
End: 2024-01-24

## 2024-01-25 ENCOUNTER — MYC MEDICAL ADVICE (OUTPATIENT)
Dept: PEDIATRICS | Facility: CLINIC | Age: 74
End: 2024-01-25
Payer: COMMERCIAL

## 2024-01-26 NOTE — TELEPHONE ENCOUNTER
"See patient's MyChart message   - Patient states that she is in the process of eliminating her oxycodone, but reports that the spasms in her upper right thing are more painful   - Patient states that she is currently taking 1 capsule of gabapentin (NEURONTIN) 100 MG capsule in the morning and 3 capsules of gabapentin (NEURONTIN) 100 MG at night   - Patient wondering if she can add another capsule in the morning     Upon chart review:   - A prescription for gabapentin (NEURONTIN) 100 MG capsule states, \"Take 1-3 capsules (100-300 mg) by mouth at bedtime. May also take 1-2 capsules (100-200 mg) 2 times daily as needed for neuropathic pain. - Oral\"      gabapentin (NEURONTIN) 100 MG capsule 240 capsule 1 12/11/2023 -- No   Sig - Route: Take 1-3 capsules (100-300 mg) by mouth at bedtime. May also take 1-2 capsules (100-200 mg) 2 times daily as needed for neuropathic pain. - Oral     Sent a [employer] message to the patient   - Informed the patient of the medication sig stating that in addition to her nightly dose of gabapentin, she can also take 1-2 capsules (100-200 mg) 2 times daily as needed for neuropathic pain     Alyssa ROBERTS RN   [facility]   "

## 2024-01-28 ENCOUNTER — APPOINTMENT (OUTPATIENT)
Dept: GENERAL RADIOLOGY | Facility: CLINIC | Age: 74
End: 2024-01-28
Attending: EMERGENCY MEDICINE
Payer: COMMERCIAL

## 2024-01-28 ENCOUNTER — HOSPITAL ENCOUNTER (EMERGENCY)
Facility: CLINIC | Age: 74
Discharge: HOME | End: 2024-01-29
Attending: EMERGENCY MEDICINE | Admitting: EMERGENCY MEDICINE
Payer: COMMERCIAL

## 2024-01-28 DIAGNOSIS — I48.0 PAROXYSMAL ATRIAL FIBRILLATION (H): ICD-10-CM

## 2024-01-28 LAB
ANION GAP SERPL CALCULATED.3IONS-SCNC: 14 MMOL/L (ref 7–15)
ATRIAL RATE - MUSE: NORMAL BPM
BASOPHILS # BLD AUTO: 0 10E3/UL (ref 0–0.2)
BASOPHILS NFR BLD AUTO: 0 %
BUN SERPL-MCNC: 19.4 MG/DL (ref 8–23)
CALCIUM SERPL-MCNC: 10.5 MG/DL (ref 8.8–10.2)
CHLORIDE SERPL-SCNC: 103 MMOL/L (ref 98–107)
CREAT SERPL-MCNC: 0.77 MG/DL (ref 0.51–0.95)
DEPRECATED HCO3 PLAS-SCNC: 23 MMOL/L (ref 22–29)
DIASTOLIC BLOOD PRESSURE - MUSE: NORMAL MMHG
EGFRCR SERPLBLD CKD-EPI 2021: 82 ML/MIN/1.73M2
EOSINOPHIL # BLD AUTO: 0.1 10E3/UL (ref 0–0.7)
EOSINOPHIL NFR BLD AUTO: 1 %
ERYTHROCYTE [DISTWIDTH] IN BLOOD BY AUTOMATED COUNT: 12.7 % (ref 10–15)
GLUCOSE SERPL-MCNC: 138 MG/DL (ref 70–99)
HCT VFR BLD AUTO: 34.8 % (ref 35–47)
HGB BLD-MCNC: 11.1 G/DL (ref 11.7–15.7)
IMM GRANULOCYTES # BLD: 0 10E3/UL
IMM GRANULOCYTES NFR BLD: 0 %
INTERPRETATION ECG - MUSE: NORMAL
LYMPHOCYTES # BLD AUTO: 1.7 10E3/UL (ref 0.8–5.3)
LYMPHOCYTES NFR BLD AUTO: 22 %
MCH RBC QN AUTO: 29.3 PG (ref 26.5–33)
MCHC RBC AUTO-ENTMCNC: 31.9 G/DL (ref 31.5–36.5)
MCV RBC AUTO: 92 FL (ref 78–100)
MONOCYTES # BLD AUTO: 0.6 10E3/UL (ref 0–1.3)
MONOCYTES NFR BLD AUTO: 8 %
NEUTROPHILS # BLD AUTO: 5.3 10E3/UL (ref 1.6–8.3)
NEUTROPHILS NFR BLD AUTO: 69 %
NRBC # BLD AUTO: 0 10E3/UL
NRBC BLD AUTO-RTO: 0 /100
P AXIS - MUSE: NORMAL DEGREES
PLATELET # BLD AUTO: 340 10E3/UL (ref 150–450)
POTASSIUM SERPL-SCNC: 3.5 MMOL/L (ref 3.4–5.3)
PR INTERVAL - MUSE: NORMAL MS
QRS DURATION - MUSE: 82 MS
QT - MUSE: 256 MS
QTC - MUSE: 366 MS
R AXIS - MUSE: -11 DEGREES
RBC # BLD AUTO: 3.79 10E6/UL (ref 3.8–5.2)
SODIUM SERPL-SCNC: 140 MMOL/L (ref 135–145)
SYSTOLIC BLOOD PRESSURE - MUSE: NORMAL MMHG
T AXIS - MUSE: -89 DEGREES
VENTRICULAR RATE- MUSE: 123 BPM
WBC # BLD AUTO: 7.8 10E3/UL (ref 4–11)

## 2024-01-28 PROCEDURE — 93005 ELECTROCARDIOGRAM TRACING: CPT

## 2024-01-28 PROCEDURE — 71046 X-RAY EXAM CHEST 2 VIEWS: CPT

## 2024-01-28 PROCEDURE — 85025 COMPLETE CBC W/AUTO DIFF WBC: CPT | Performed by: EMERGENCY MEDICINE

## 2024-01-28 PROCEDURE — 99285 EMERGENCY DEPT VISIT HI MDM: CPT | Mod: 25

## 2024-01-28 PROCEDURE — 80048 BASIC METABOLIC PNL TOTAL CA: CPT | Performed by: EMERGENCY MEDICINE

## 2024-01-28 PROCEDURE — 36415 COLL VENOUS BLD VENIPUNCTURE: CPT | Performed by: EMERGENCY MEDICINE

## 2024-01-28 PROCEDURE — 96360 HYDRATION IV INFUSION INIT: CPT

## 2024-01-28 PROCEDURE — 258N000003 HC RX IP 258 OP 636: Performed by: EMERGENCY MEDICINE

## 2024-01-28 RX ADMIN — SODIUM CHLORIDE, POTASSIUM CHLORIDE, SODIUM LACTATE AND CALCIUM CHLORIDE 1000 ML: 600; 310; 30; 20 INJECTION, SOLUTION INTRAVENOUS at 23:40

## 2024-01-29 VITALS
DIASTOLIC BLOOD PRESSURE: 71 MMHG | WEIGHT: 180 LBS | OXYGEN SATURATION: 96 % | TEMPERATURE: 98.2 F | HEART RATE: 62 BPM | HEIGHT: 63 IN | BODY MASS INDEX: 31.89 KG/M2 | SYSTOLIC BLOOD PRESSURE: 115 MMHG | RESPIRATION RATE: 17 BRPM

## 2024-01-29 LAB
ATRIAL RATE - MUSE: 63 BPM
DIASTOLIC BLOOD PRESSURE - MUSE: NORMAL MMHG
HOLD SPECIMEN: NORMAL
HOLD SPECIMEN: NORMAL
INTERPRETATION ECG - MUSE: NORMAL
P AXIS - MUSE: 38 DEGREES
PR INTERVAL - MUSE: 220 MS
QRS DURATION - MUSE: 82 MS
QT - MUSE: 420 MS
QTC - MUSE: 429 MS
R AXIS - MUSE: -2 DEGREES
SYSTOLIC BLOOD PRESSURE - MUSE: NORMAL MMHG
T AXIS - MUSE: -5 DEGREES
VENTRICULAR RATE- MUSE: 63 BPM

## 2024-01-29 ASSESSMENT — ACTIVITIES OF DAILY LIVING (ADL): ADLS_ACUITY_SCORE: 35

## 2024-01-29 NOTE — ED TRIAGE NOTES
Patient presents with shortness of breath and intermittent periods of tachycardia.  Symptoms started yesterday, but seemed to worsen today. She hx of PE 3 weeks ago. Hx. Of afib.     Triage Assessment (Adult)       Row Name 01/28/24 9084          Triage Assessment    Airway WDL WDL        Respiratory WDL    Respiratory WDL X;all      Rhythm/Pattern, Respiratory shortness of breath         Skin Circulation/Temperature WDL    Skin Circulation/Temperature WDL WDL        Cardiac WDL    Cardiac WDL X;rhythm      Pulse Rate & Regularity tachycardic         Peripheral/Neurovascular WDL    Peripheral Neurovascular WDL WDL        Cognitive/Neuro/Behavioral WDL    Cognitive/Neuro/Behavioral WDL WDL

## 2024-01-29 NOTE — ED PROVIDER NOTES
"  History     Chief Complaint:  Tachycardia       HPI   Ely Humphreys is a 73 year old female with a history of PE on Eliquis, atrial fibrillation s/p ablation, coronary artery disease, PFO, breast cancer s/p lesion excision, hyperlipidemia, and hypothyroidism who presents with palpitations and shortness of breath beginning yesterday afternoon. Tonight when laying in bed, shortness of breath seemed to worse, like she was \"being suffocated.\" She also reports feeling hot and having a pressure in her chest. Reports palpitations have improved since arriving in the emergency department. She still feels warm. She reports she took her blood pressure and heart rate at home, and both were elevated. Her palpitations feel similar to past episodes of atrial fibrillation. She had not had any palpitations since her ablation 4-5 years ago until a few weeks prior to back surgery in December. She did have some palpitations int he first week of December, but her EKG did not show atrial fibrillation at that time.     The patient notes she was hospitalized 1/6/24-1/8/24. She initially presented with shortness of breath and left arm weakness, and imaging showed bilateral PE. Stroke workup was negative. She was started on Eliquis. Reports she is taking all medications as prescribed.    Independent Historian:   The patient's  is at bedside and corroborates the above history.    Review of External Notes:   1/8/24 discharge summary for bilateral PE.    Medications:    Eliquis  Aspirin 81 mg   Gabapentin   Vistaril  Levothyroxine   Liothyronine   Robaxin  Pravastatin  Zanaflex    Past Medical History:    Atrial fibrillation   Benign essential tremor  Hyperlipidemia   Breast cancer   Depression   Osteopenia   Hypothyroidism   Obstructive sleep apnea   Dilation of ascending aorta and aortic root  Vitamin D deficiency   Pulmonary embolism  PFO  Coronary artery disease     Past Surgical History:    Cardiac ablation  Cataract surgery " "  Breast lumpectomy  Pulmonary angiogram  Meniscus surgery   Tubal ligation     Physical Exam   Patient Vitals for the past 24 hrs:   BP Temp Temp src Pulse Resp SpO2 Height Weight   01/29/24 0030 121/75 -- -- 62 17 96 % -- --   01/29/24 0014 -- -- -- 64 24 94 % -- --   01/29/24 0000 100/60 -- -- 61 18 95 % -- --   01/28/24 2344 104/64 -- -- 61 17 94 % -- --   01/28/24 2341 110/58 -- -- 62 16 96 % -- --   01/28/24 2257 (!) 173/109 98.2  F (36.8  C) Temporal (!) 131 20 97 % 1.588 m (5' 2.5\") 81.6 kg (180 lb)        Physical Exam  General: Appears well-developed and well-nourished.   Head: No signs of trauma.   CV: Normal rate and regular rhythm.    Resp: Effort normal and breath sounds normal. No respiratory distress.   GI: Soft. There is no tenderness.  No rebound or guarding.  Normal bowel sounds  MSK: Normal range of motion. no edema. No Calf tenderness.  Neuro: The patient is alert and oriented to person, place, and time.  PERRLA, EOMI, strength in upper/lower extremities normal and symmetrical. Sensation normal. Speech normal.  Skin: Skin is warm and dry. No rash noted.   Psych: normal mood and affect. behavior is normal.       Emergency Department Course   ECG:  ECG results from 01/28/24   EKG 12 lead     Value    Systolic Blood Pressure     Diastolic Blood Pressure     Ventricular Rate 123    Atrial Rate     ID Interval     QRS Duration 82        QTc 366    P Axis     R AXIS -11    T Axis -89    Interpretation ECG      Atrial fibrillation with rapid ventricular response  Minimal voltage criteria for LVH, may be normal variant ( R in aVL )  Cannot rule out Anterior infarct , age undetermined  ST & T wave abnormality, consider inferior ischemia  Abnormal ECG  Read by me 1357     EKG 12-lead, tracing only     Value    Systolic Blood Pressure     Diastolic Blood Pressure     Ventricular Rate 63    Atrial Rate 63    ID Interval 220    QRS Duration 82        QTc 429    P Axis 38    R AXIS -2    T Axis -5 "    Interpretation ECG      Sinus rhythm with 1st degree A-V block  Cannot rule out Anterior infarct (cited on or before 28-JAN-2024)  Abnormal ECG  When compared with ECG of 28-JAN-2024 22:50,  Sinus rhythm has replaced Atrial fibrillation  T wave inversion no longer evident in Lateral leads  Read by me       Imaging:  XR Chest 2 Views   Final Result   IMPRESSION: No acute abnormality.         Report per radiology    Laboratory:  Labs Ordered and Resulted from Time of ED Arrival to Time of ED Departure   BASIC METABOLIC PANEL - Abnormal       Result Value    Sodium 140      Potassium 3.5      Chloride 103      Carbon Dioxide (CO2) 23      Anion Gap 14      Urea Nitrogen 19.4      Creatinine 0.77      GFR Estimate 82      Calcium 10.5 (*)     Glucose 138 (*)    CBC WITH PLATELETS AND DIFFERENTIAL - Abnormal    WBC Count 7.8      RBC Count 3.79 (*)     Hemoglobin 11.1 (*)     Hematocrit 34.8 (*)     MCV 92      MCH 29.3      MCHC 31.9      RDW 12.7      Platelet Count 340      % Neutrophils 69      % Lymphocytes 22      % Monocytes 8      % Eosinophils 1      % Basophils 0      % Immature Granulocytes 0      NRBCs per 100 WBC 0      Absolute Neutrophils 5.3      Absolute Lymphocytes 1.7      Absolute Monocytes 0.6      Absolute Eosinophils 0.1      Absolute Basophils 0.0      Absolute Immature Granulocytes 0.0      Absolute NRBCs 0.0        Emergency Department Course & Assessments:       Interventions:  Medications   lactated ringers BOLUS 1,000 mL (0 mLs Intravenous Stopped 1/29/24 0044)        Independent Interpretation (X-rays, CTs, rhythm strip):  On my independent interpretation of CXR there is no pneumothorax      Assessments/Consultations/Discussion of Management or Tests:  ED Course as of 01/29/24 0100   Sun Jan 28, 2024   6126 I obtained the history and examined the patient as noted above.        Social Determinants of Health affecting care:   None    Disposition:  The patient was discharged to home.      Impression & Plan    Medical Decision Making:  Ely Humphreys presents due to racing heart rate feeling, shortness of breath, not feeling well.  She states that the symptoms started largely tonight.  She has a history of atrial fibrillation, and states that it feels similar to that.  She checked her vital signs and noted her heart rate to be elevated.  On arrival to the ER, she did have tachycardia and EKG did confirm A-fib with RVR.  By the time of my evaluation, the A-fib did resolve and she was back into a sinus rhythm with an appropriate rate, and her symptoms had resolved.  I did obtain blood work, chest x-ray, follow-up EKG, which were all reassuring.  Patient was recently treated for a pulmonary embolism, and has been taking her Eliquis consistently.  Clinically I do not suspect a worsening of her pulmonary embolism given the resolution of her symptoms with the resolution of the A-fib.  In this setting, I felt that she was appropriate for discharge home I did recommend that she follow-up with her cardiologist and that she return to the ER for any further concerns.    Diagnosis:    ICD-10-CM    1. Paroxysmal atrial fibrillation (H)  I48.0            Discharge Medications:  New Prescriptions    No medications on file        Scribe Disclosure:  I, Alexandra Patel, am serving as a scribe at 11:26 PM on 1/28/2024 to document services personally performed by Jaison Leyva MD based on my observations and the provider's statements to me.     1/28/2024   Jaison Leyva MD Bergenstal, John A, MD  01/29/24 0244

## 2024-01-29 NOTE — DISCHARGE INSTRUCTIONS
Your evaluation tonight showed that you had atrial fibrillation, which has resolved.  Please follow-up with your cardiologist to discuss further evaluation and management, and return to the ER for any further concerns.

## 2024-01-31 ENCOUNTER — MEDICAL CORRESPONDENCE (OUTPATIENT)
Dept: HEALTH INFORMATION MANAGEMENT | Facility: CLINIC | Age: 74
End: 2024-01-31

## 2024-02-01 ENCOUNTER — MYC MEDICAL ADVICE (OUTPATIENT)
Dept: PEDIATRICS | Facility: CLINIC | Age: 74
End: 2024-02-01
Payer: COMMERCIAL

## 2024-02-02 NOTE — TELEPHONE ENCOUNTER
"See patient's [employer] message   - Patient was prescribed apixaban ANTICOAGULANT (ELIQUIS) 5 MG tablet on 1/8/2024   - Patient wondering what does of the apixaban ANTICOAGULANT (ELIQUIS) 5 MG tablet she is to take     Sent a [employer] message to the patient   - Informed the patient that a presription for Eliquis was sent to the [facility] [address] on 1/8/2024   - Informed the patient of the prescription sig, \"Take 2 tablets (10 mg) by mouth 2 times daily for 6 days, THEN 1 tablet (5 mg) 2 times daily for 86 days. - Oral\"   - Informed the patient that this prescription should get her to 4/9/2024   - Recommended that the patient contact the [facility] [address] as she should have refills on file if she was only given a 30 day supply     apixaban ANTICOAGULANT (ELIQUIS) 5 MG tablet 196 tablet 0 1/8/2024 4/9/2024 No   Sig - Route: Take 2 tablets (10 mg) by mouth 2 times daily for 6 days, THEN 1 tablet (5 mg) 2 times daily for 86 days. - Oral     Alyssa ROBERTS RN   [facility]   "

## 2024-02-12 ENCOUNTER — TELEPHONE (OUTPATIENT)
Dept: PEDIATRICS | Facility: CLINIC | Age: 74
End: 2024-02-12
Payer: COMMERCIAL

## 2024-02-12 NOTE — TELEPHONE ENCOUNTER
Patient Quality Outreach Health Maintenance - PAL RN    Summary:    PAL RN contacted pt regarding overdue health maintenance    Patient is due/failing the following:   Physical Annual Wellness Visit    Health Maintenance Due   Topic Date Due    HF ACTION PLAN  Never done    MEDICARE ANNUAL WELLNESS VISIT  11/09/2023       Type of outreach:    Chart review performed, no outreach needed.  Per chart review, patient had a Medical physical on 1/16/2024, no other appointment required or due.    - Advised patient if any questions or concerns comes up to call the PAL RN.   - Patient given opportunity to ask questions and at this time there is nothing further needed.     Questions for provider review:    None                                                                                     Chart routed to none.    Juani Nevarez, RN

## 2024-02-13 DIAGNOSIS — E03.9 HYPOTHYROIDISM, UNSPECIFIED TYPE: ICD-10-CM

## 2024-02-13 RX ORDER — LEVOTHYROXINE SODIUM 125 UG/1
125 TABLET ORAL DAILY
Qty: 90 TABLET | Refills: 3 | Status: SHIPPED | OUTPATIENT
Start: 2024-02-13

## 2024-02-14 NOTE — PROGRESS NOTES
[facility]  [address] [place] [id number]  Main: [phone], Fax: [phone]    Patient seen at: [facility] at [address] [place] [address]    Outpatient Visit Note:    Patient: Ely Humphreys  MRN: [id number]  : 1950  LIDIA: 2024  Location of this writer at the time of this clinic visit was conducted: [facility].  Location of the patient at the time of this clinic visit was conducted: [facility].     Reason for visit:  Bilateral pulmonary embolism S/P embolectomy on 2024.     HPI:  Ely is a 73 year old female with a history of breast cancer in , hypothyroidism, MIKEL, HLD, who also has a history of atrial fibrillation S/P ablation in  with reported CHADsVACs score of 2 (gender and age) who discontinued apixaban in  after discussion with cardiology, small PFO (per echocardiogram on 2024) who recently is found to have large volume bilateral pulmonary emboli on 2024, about 14 days after her complex anterior approach lumbar spinal fusion surgery, referred by Dr. Shadia Munroe of [facility] for consultation.     From what I can tell, Ely was found to have atrial fibrillation back in  and underwent ablation procedure. At the time, it was determined that her CHADsVASC score was 2 based on her gender and age. Considering that she had financial difficulty at the time to afford apixaban, the decision was made to have the patient stop apixaban and stay on indefinite antiplatelet therapy with aspirin.     Dated back on 2023, she underwent a complex anterior approach, retroperitoneal exposure of L4-5 and L5-S1 disk space lumbar spinal fusion surgery at [facility]. She was hospitalized post operatively and according to documentation at the hospital, she  was placed on enoxaparin for DVT prophylaxis while staying in the hospital. HOWEVER, the patient herself and her  reporting that they both did not recall receiving any enoxaparin injections during her hospitalization after her surgery. She eventually discharged to home on 1/4/2024. From what I can tell, she was not discharge home on any anticoagulation therapy.     Then on 1/6/2024, she presented to the emergency department with acute onset of left arm weakness for 2 hours. Concern for possible stroke, she was seen at the emergency department at [facility]. There a CT head was done which showed no acute intracranial injury, hemorrhage, mass or ischemia. At the time, she was also noted to be hypoxic with her O2 sat in the 70's%. CTA chest was subsequently done showing large volume bilateral pulmonary emboli within the main, segmental, and subsegmental pulmonary arteries with CT findings of right heart strain. Interventional Radiology was consulted and the patient underwent urgent mechanical thrombectomy via right femoral venotomy on 1/6/2024. Post procedurally, because of bed availability issue, she was transferred to [facility] for further management and observation. Bilateral venous ultrasound on 1/6/2024 showed no evidence of DVT. An MR Brain was done also on 1/6/2024 showing punctate focus of diffusion restriction in the left occipital lobe, which may represent acute/subacute infarct. She was then placed on unfractionated heparin and discharged on 1/8/2024 with apixaban. Her left arm weakness had resolved at the time of her discharge.     On 1/16/2024, she had a post hospital follow up with Dr. Shadia Munroe, her primary care provider and she is referred to our clinic for consultation.     Currently, Ely is on apixaban at 5 mg PO BID dosing. Ely denies any bleeding issues. She is currently scheduled to see Radha Vuong PA-C at [facility] in April 2024 for follow up. Ely reports  that she continues to have some right thigh pain and muscle spasm at times for which she does use Tylenol and hesitate to use any NSAIDs for the pain due to the fact that she is on apixaban currently. She also has stopped taking her aspirin since she has started on apixaban.     ROS:  Denies any bleeding complications. Specifically, no frequent epistaxis. No issues with oral mucosal bleeding. Denies any hematuria or blood in stools. Denies any shortness of breath. No chest pain. No cough. No fever.    Medications:  Current Outpatient Medications   Medication    acetaminophen (TYLENOL) 500 MG tablet    apixaban ANTICOAGULANT (ELIQUIS) 5 MG tablet    aspirin 81 MG EC tablet    calcium carbonate 500 mg, elemental, (OSCAL 500) 1250 (500 Ca) MG TABS tablet    docusate sodium (COLACE) 100 MG capsule    gabapentin (NEURONTIN) 100 MG capsule    hydrocortisone 2.5 % ointment    hydrOXYzine (VISTARIL) 50 MG capsule    levothyroxine (SYNTHROID/LEVOTHROID) 125 MCG tablet    liothyronine (CYTOMEL) 5 MCG tablet    methocarbamol (ROBAXIN) 500 MG tablet    oxyCODONE (OXY-IR) 5 MG capsule    polyethylene glycol (MIRALAX) 17 g packet    pravastatin (PRAVACHOL) 40 MG tablet    tiZANidine (ZANAFLEX) 4 MG tablet    Turmeric (QC TUMERIC COMPLEX) 500 MG CAPS    vitamin D2 (ERGOCALCIFEROL) 79680 units (1250 mcg) capsule     No current facility-administered medications for this visit.     Allergies:  Allergies   Allergen Reactions    Amiodarone Other (See Comments)     Loss of vision in R eye.     PmHx:  Past Medical History:   Diagnosis Date    Abnormal Pap smear 11/16/2010    Atrial fibrillation s/p ablation 10/07/2019    CHADsVASc is 2 for gender and age. Cardiology stopped eliquis after discussion on 2/26/20.     Benign essential tremor     Chronic    Hyperlipidemia LDL goal <160 02/10/2010    Invasive ductal carcinoma of breast (H) 06/2009    left breast ca    Major depressive disorder, recurrent episode, in full remission (H24)  10/19/2011    Stable for decades    osteopenia 2002    Palpitations     Persistent atrial fibrillation (H) 05/10/2017    Unspecified hypothyroidism        Social History:   Deferred.    Family History:  Father had a history of Cardiac issues and stroke but not sure if he had venous thromboembolic events.   Mother with no history of venous thromboembolism.   She has one brother without history of venous thromboembolism.   She has one 45 year old son and one 43 year old daughter with no history of venous thromboembolism.     Objective:  Vitals: BP (!) 146/88 (BP Location: Right arm, Patient Position: Sitting, Cuff Size: Adult Regular)   Pulse 84   Temp 97.1  F (36.2  C) (Tympanic)   Wt 78.8 kg (173 lb 11.2 oz)   LMP  (LMP Unknown)   SpO2 94%   BMI 31.26 kg/m    Exam:   Complete exam is not performed today.       Labs:  Component      Latest Ref Rng 1/7/2024  5:47 AM 1/28/2024  11:01 PM   WBC      4.0 - 11.0 10e3/uL  7.8    RBC Count      3.80 - 5.20 10e6/uL  3.79 (L)    Hemoglobin      11.7 - 15.7 g/dL  11.1 (L)    Hematocrit      35.0 - 47.0 %  34.8 (L)    MCV      78 - 100 fL  92    MCH      26.5 - 33.0 pg  29.3    MCHC      31.5 - 36.5 g/dL  31.9    RDW      10.0 - 15.0 %  12.7    Platelet Count      150 - 450 10e3/uL  340    % Neutrophils      %  69    % Lymphocytes      %  22    % Monocytes      %  8    % Eosinophils      %  1    % Basophils      %  0    % Immature Granulocytes      %  0    NRBCs per 100 WBC      <1 /100  0    Absolute Neutrophils      1.6 - 8.3 10e3/uL  5.3    Absolute Lymphocytes      0.8 - 5.3 10e3/uL  1.7    Absolute Monocytes      0.0 - 1.3 10e3/uL  0.6    Absolute Eosinophils      0.0 - 0.7 10e3/uL  0.1    Absolute Basophils      0.0 - 0.2 10e3/uL  0.0    Absolute Immature Granulocytes      <=0.4 10e3/uL  0.0    Absolute NRBCs      10e3/uL  0.0    Sodium      135 - 145 mmol/L 142  140    Potassium      3.4 - 5.3 mmol/L 3.8  3.5    Carbon Dioxide (CO2)      22 - 29 mmol/L 23  23     Anion Gap      7 - 15 mmol/L 12  14    Urea Nitrogen      8.0 - 23.0 mg/dL 11.2  19.4    Creatinine      0.51 - 0.95 mg/dL 0.78  0.77    GFR Estimate      >60 mL/min/1.73m2 81  82    Calcium      8.8 - 10.2 mg/dL 9.0  10.5 (H)    Chloride      98 - 107 mmol/L 107  103    Glucose      70 - 99 mg/dL 100 (H)  138 (H)    Alkaline Phosphatase      40 - 150 U/L 98     AST      0 - 45 U/L 35     ALT      0 - 50 U/L 17     Protein Total      6.4 - 8.3 g/dL 6.7     Albumin      3.5 - 5.2 g/dL 3.6     Bilirubin Total      <=1.2 mg/dL 0.5        Imaging:  Reviewed and are as described above.     Assessment:  In summary, Ely is a 73 year old female with a history of breast cancer in 2009, hypothyroidism, MIKEL, HLD, who also has a history of atrial fibrillation S/P ablation in 2017 with reported CHADsVACs score of 2 (gender and age) who discontinued apixaban in 2020 after discussion with cardiology, small PFO (per echocardiogram on 1/7/2024) who recently is found to have large volume bilateral pulmonary emboli on 1/6/2024, about 14 days after her complex anterior approach lumbar spinal fusion surgery, referred by Dr. Shadia Munroe of [facility] for consultation.    Her pulmonary embolic event found back on 1/6/2024 was a provoked event after her complex spinal surgery on 12/22/2023 and post operative hospitalization until 1/4/2024. There is contradicting report of if she was placed on pharmacological DVT/PE prophylaxis during her post operative period or not. Documentation during her hospitalization mentioned enoxaparin for DVT prophylaxis during her post op period. However, the patient and her  adamantly stating that Ely never receive any subcutaneous injections during her post operative period. In any event, this pulmonary embolic event was a provoked venous thromboembolic event.     To complicate matters, she has a history of atrial fibrillation, although she did undergo ablation procedure back in 2017.  With her recent diagnosis of pulmonary embolism, now her CHADsVASC score is 4 (gender, age and thromboembolism). Also further complicate matter is the fact that she is found to have a small PFO on 2 different echocardiogram done recently in Jan 2024.     Diagnosis:  Provoked large burden bilateral pulmonary embolism found on 1/6/2024 S/P mechanical thrombectomy.   History of atrial fibrillation S/P ablation in 2017 and was on aspirin up until her diagnosis of pulmonary embolism. Now on apixaban. CHADsVASc score now with her recent pulmonary embolic event is 4.   Small PFO.   Right thigh pain (Chronic).     Plan:  I have a long discussion with Ely and her  in regard to my recommendation today.     I took quite a bit of time to educate Ely and her  about DVT/PE, provoked vs unprovoked venous thromboembolic event, atrial fibrillation and its pathophysiology of stroke risk, PFO and its pathophysiology of stroke risk, as well as general approach in regard to anticoagulation therapy management and duration of venous thromboembolism. I have also answered all their questions to their satisfaction today.     I explain to Ely and her  that from a hematological standpoint, her pulmonary embolic event was a provoked event and thus in accordance to current American Society of Hematology (DONTE) guidelines, 3-6 months of anticoagulation therapy should be suffice. Because of the large burden of her pulmonary embolic event, in this particular case, I am recommending 6 months of anticoagulation therapy for her provoked pulmonary embolism. HOWEVER, as mentioned above, she does have a history of atrial fibrillation and now has a CHADsVASC score of 4 AND also she is found to have a small PFO. Thus her stroke risk is high and based on cardiology indications, she might need to stay on indefinite anticoagulation therapy.     So from a hematological standpoint, I am recommending 6 months of anticoagulation therapy  with apixaban at 5 mg PO BID dosing (through 7/6/2024). I will defer the decision on the need of ongoing anticoagulation therapy to her cardiologist from a cardiological indication / stroke prevention standpoint.     As mentioned, she has chronic right thigh pain. She used to use NSAID prior to her being started on apixaban and it did provide her with effective symptomatic relief as compared to Tylenol. I do recommend against the routine use of NSAID for this patient who is on apixaban as the combination of such can increase her bleeding risk. However, I suggest the consideration of Celebrex as this non-steroidal anti-inflammatory does not have platelet prohibition property. I recommend that Ely is to discuss this with her primary care provider.     I have provided the patient and her  with our clinic's contact information and they are instructed to call if they should have any further questions or concerns. At this time, I have no further plans to see Ely back on a routine basis.   Thank you for letting us to participate in this patient's care.       Usama Samuels PA-C, MPAS  Physician Assistant  [facility].     65 minutes spent by me on the date of the encounter doing chart review, history and exam, documentation and further activities per the note.    Time IN: 11:04  Time OUT: 11:50

## 2024-02-20 ENCOUNTER — OFFICE VISIT (OUTPATIENT)
Dept: HEMATOLOGY | Facility: CLINIC | Age: 74
End: 2024-02-20
Attending: INTERNAL MEDICINE
Payer: COMMERCIAL

## 2024-02-20 VITALS
OXYGEN SATURATION: 94 % | WEIGHT: 173.7 LBS | SYSTOLIC BLOOD PRESSURE: 146 MMHG | TEMPERATURE: 97.1 F | HEART RATE: 84 BPM | DIASTOLIC BLOOD PRESSURE: 88 MMHG | BODY MASS INDEX: 31.26 KG/M2

## 2024-02-20 DIAGNOSIS — I26.09 ACUTE PULMONARY EMBOLISM WITH ACUTE COR PULMONALE, UNSPECIFIED PULMONARY EMBOLISM TYPE (H): ICD-10-CM

## 2024-02-20 DIAGNOSIS — Z86.79 HISTORY OF ATRIAL FIBRILLATION: ICD-10-CM

## 2024-02-20 DIAGNOSIS — Z86.79 S/P ABLATION OF ATRIAL FIBRILLATION: Primary | ICD-10-CM

## 2024-02-20 DIAGNOSIS — I26.99 PULMONARY EMBOLISM, UNSPECIFIED CHRONICITY, UNSPECIFIED PULMONARY EMBOLISM TYPE, UNSPECIFIED WHETHER ACUTE COR PULMONALE PRESENT (H): ICD-10-CM

## 2024-02-20 DIAGNOSIS — Q21.12 PFO (PATENT FORAMEN OVALE): ICD-10-CM

## 2024-02-20 DIAGNOSIS — Z98.890 S/P ABLATION OF ATRIAL FIBRILLATION: Primary | ICD-10-CM

## 2024-02-20 PROCEDURE — G0463 HOSPITAL OUTPT CLINIC VISIT: HCPCS | Performed by: PHYSICIAN ASSISTANT

## 2024-02-20 PROCEDURE — 99205 OFFICE O/P NEW HI 60 MIN: CPT | Performed by: PHYSICIAN ASSISTANT

## 2024-02-20 NOTE — LETTER
[facility]  [address] [place] [id number]  Main: [phone], Fax: [phone]    Patient seen at: [facility] at [address] [place] [address]    Outpatient Visit Note:    Patient: Ely Humphreys  MRN: [id number]  : 1950  LIDIA: 2024  Location of this writer at the time of this clinic visit was conducted: [facility].  Location of the patient at the time of this clinic visit was conducted: [facility].     Reason for visit:  Bilateral pulmonary embolism S/P embolectomy on 2024.     HPI:  Ely is a 73 year old female with a history of breast cancer in , hypothyroidism, MIKEL, HLD, who also has a history of atrial fibrillation S/P ablation in  with reported CHADsVACs score of 2 (gender and age) who discontinued apixaban in  after discussion with cardiology, small PFO (per echocardiogram on 2024) who recently is found to have large volume bilateral pulmonary emboli on 2024, about 14 days after her complex anterior approach lumbar spinal fusion surgery, referred by Dr. Shadia Munroe of [facility] for consultation.     From what I can tell, Ely was found to have atrial fibrillation back in  and underwent ablation procedure. At the time, it was determined that her CHADsVASC score was 2 based on her gender and age. Considering that she had financial difficulty at the time to afford apixaban, the decision was made to have the patient stop apixaban and stay on indefinite antiplatelet therapy with aspirin.     Dated back on 2023, she underwent a complex anterior approach, retroperitoneal exposure of L4-5 and L5-S1 disk space lumbar spinal fusion surgery at [facility]. She was hospitalized post operatively and according to documentation at the  hospital, she was placed on enoxaparin for DVT prophylaxis while staying in the hospital. HOWEVER, the patient herself and her  reporting that they both did not recall receiving any enoxaparin injections during her hospitalization after her surgery. She eventually discharged to home on 1/4/2024. From what I can tell, she was not discharge home on any anticoagulation therapy.     Then on 1/6/2024, she presented to the emergency department with acute onset of left arm weakness for 2 hours. Concern for possible stroke, she was seen at the emergency department at [facility]. There a CT head was done which showed no acute intracranial injury, hemorrhage, mass or ischemia. At the time, she was also noted to be hypoxic with her O2 sat in the 70's%. CTA chest was subsequently done showing large volume bilateral pulmonary emboli within the main, segmental, and subsegmental pulmonary arteries with CT findings of right heart strain. Interventional Radiology was consulted and the patient underwent urgent mechanical thrombectomy via right femoral venotomy on 1/6/2024. Post procedurally, because of bed availability issue, she was transferred to [facility] for further management and observation. Bilateral venous ultrasound on 1/6/2024 showed no evidence of DVT. An MR Brain was done also on 1/6/2024 showing punctate focus of diffusion restriction in the left occipital lobe, which may represent acute/subacute infarct. She was then placed on unfractionated heparin and discharged on 1/8/2024 with apixaban. Her left arm weakness had resolved at the time of her discharge.     On 1/16/2024, she had a post hospital follow up with Dr. Shadia Munroe, her primary care provider and she is referred to our clinic for consultation.     Currently, Ely is on apixaban at 5 mg PO BID dosing. Ely denies any bleeding issues. She is currently scheduled to see Radha Vuong PA-C at [facility] in April 2024 for follow up.  Ely reports that she continues to have some right thigh pain and muscle spasm at times for which she does use Tylenol and hesitate to use any NSAIDs for the pain due to the fact that she is on apixaban currently. She also has stopped taking her aspirin since she has started on apixaban.     ROS:  Denies any bleeding complications. Specifically, no frequent epistaxis. No issues with oral mucosal bleeding. Denies any hematuria or blood in stools. Denies any shortness of breath. No chest pain. No cough. No fever.    Medications:  Current Outpatient Medications   Medication     acetaminophen (TYLENOL) 500 MG tablet     apixaban ANTICOAGULANT (ELIQUIS) 5 MG tablet     aspirin 81 MG EC tablet     calcium carbonate 500 mg, elemental, (OSCAL 500) 1250 (500 Ca) MG TABS tablet     docusate sodium (COLACE) 100 MG capsule     gabapentin (NEURONTIN) 100 MG capsule     hydrocortisone 2.5 % ointment     hydrOXYzine (VISTARIL) 50 MG capsule     levothyroxine (SYNTHROID/LEVOTHROID) 125 MCG tablet     liothyronine (CYTOMEL) 5 MCG tablet     methocarbamol (ROBAXIN) 500 MG tablet     oxyCODONE (OXY-IR) 5 MG capsule     polyethylene glycol (MIRALAX) 17 g packet     pravastatin (PRAVACHOL) 40 MG tablet     tiZANidine (ZANAFLEX) 4 MG tablet     Turmeric (QC TUMERIC COMPLEX) 500 MG CAPS     vitamin D2 (ERGOCALCIFEROL) 36694 units (1250 mcg) capsule     No current facility-administered medications for this visit.     Allergies:  Allergies   Allergen Reactions     Amiodarone Other (See Comments)     Loss of vision in R eye.     PmHx:  Past Medical History:   Diagnosis Date     Abnormal Pap smear 11/16/2010     Atrial fibrillation s/p ablation 10/07/2019    CHADsVASc is 2 for gender and age. Cardiology stopped eliquis after discussion on 2/26/20.      Benign essential tremor     Chronic     Hyperlipidemia LDL goal <160 02/10/2010     Invasive ductal carcinoma of breast (H) 06/2009    left breast ca     Major depressive disorder, recurrent  episode, in full remission (H24) 10/19/2011    Stable for decades     osteopenia 2002     Palpitations      Persistent atrial fibrillation (H) 05/10/2017     Unspecified hypothyroidism        Social History:   Deferred.    Family History:  Father had a history of Cardiac issues and stroke but not sure if he had venous thromboembolic events.   Mother with no history of venous thromboembolism.   She has one brother without history of venous thromboembolism.   She has one 45 year old son and one 43 year old daughter with no history of venous thromboembolism.     Objective:  Vitals: BP (!) 146/88 (BP Location: Right arm, Patient Position: Sitting, Cuff Size: Adult Regular)   Pulse 84   Temp 97.1  F (36.2  C) (Tympanic)   Wt 78.8 kg (173 lb 11.2 oz)   LMP  (LMP Unknown)   SpO2 94%   BMI 31.26 kg/m    Exam:   Complete exam is not performed today.       Labs:  Component      Latest Ref Rng 1/7/2024  5:47 AM 1/28/2024  11:01 PM   WBC      4.0 - 11.0 10e3/uL  7.8    RBC Count      3.80 - 5.20 10e6/uL  3.79 (L)    Hemoglobin      11.7 - 15.7 g/dL  11.1 (L)    Hematocrit      35.0 - 47.0 %  34.8 (L)    MCV      78 - 100 fL  92    MCH      26.5 - 33.0 pg  29.3    MCHC      31.5 - 36.5 g/dL  31.9    RDW      10.0 - 15.0 %  12.7    Platelet Count      150 - 450 10e3/uL  340    % Neutrophils      %  69    % Lymphocytes      %  22    % Monocytes      %  8    % Eosinophils      %  1    % Basophils      %  0    % Immature Granulocytes      %  0    NRBCs per 100 WBC      <1 /100  0    Absolute Neutrophils      1.6 - 8.3 10e3/uL  5.3    Absolute Lymphocytes      0.8 - 5.3 10e3/uL  1.7    Absolute Monocytes      0.0 - 1.3 10e3/uL  0.6    Absolute Eosinophils      0.0 - 0.7 10e3/uL  0.1    Absolute Basophils      0.0 - 0.2 10e3/uL  0.0    Absolute Immature Granulocytes      <=0.4 10e3/uL  0.0    Absolute NRBCs      10e3/uL  0.0    Sodium      135 - 145 mmol/L 142  140    Potassium      3.4 - 5.3 mmol/L 3.8  3.5    Carbon Dioxide  (CO2)      22 - 29 mmol/L 23  23    Anion Gap      7 - 15 mmol/L 12  14    Urea Nitrogen      8.0 - 23.0 mg/dL 11.2  19.4    Creatinine      0.51 - 0.95 mg/dL 0.78  0.77    GFR Estimate      >60 mL/min/1.73m2 81  82    Calcium      8.8 - 10.2 mg/dL 9.0  10.5 (H)    Chloride      98 - 107 mmol/L 107  103    Glucose      70 - 99 mg/dL 100 (H)  138 (H)    Alkaline Phosphatase      40 - 150 U/L 98     AST      0 - 45 U/L 35     ALT      0 - 50 U/L 17     Protein Total      6.4 - 8.3 g/dL 6.7     Albumin      3.5 - 5.2 g/dL 3.6     Bilirubin Total      <=1.2 mg/dL 0.5        Imaging:  Reviewed and are as described above.     Assessment:  In summary, Ely is a 73 year old female with a history of breast cancer in 2009, hypothyroidism, MIKEL, HLD, who also has a history of atrial fibrillation S/P ablation in 2017 with reported CHADsVACs score of 2 (gender and age) who discontinued apixaban in 2020 after discussion with cardiology, small PFO (per echocardiogram on 1/7/2024) who recently is found to have large volume bilateral pulmonary emboli on 1/6/2024, about 14 days after her complex anterior approach lumbar spinal fusion surgery, referred by Dr. Shadia Munroe of [facility] for consultation.    Her pulmonary embolic event found back on 1/6/2024 was a provoked event after her complex spinal surgery on 12/22/2023 and post operative hospitalization until 1/4/2024. There is contradicting report of if she was placed on pharmacological DVT/PE prophylaxis during her post operative period or not. Documentation during her hospitalization mentioned enoxaparin for DVT prophylaxis during her post op period. However, the patient and her  adamantly stating that Ely never receive any subcutaneous injections during her post operative period. In any event, this pulmonary embolic event was a provoked venous thromboembolic event.     To complicate matters, she has a history of atrial fibrillation, although she did  undergo ablation procedure back in 2017. With her recent diagnosis of pulmonary embolism, now her CHADsVASC score is 4 (gender, age and thromboembolism). Also further complicate matter is the fact that she is found to have a small PFO on 2 different echocardiogram done recently in Jan 2024.     Diagnosis:  Provoked large burden bilateral pulmonary embolism found on 1/6/2024 S/P mechanical thrombectomy.   History of atrial fibrillation S/P ablation in 2017 and was on aspirin up until her diagnosis of pulmonary embolism. Now on apixaban. CHADsVASc score now with her recent pulmonary embolic event is 4.   Small PFO.   Right thigh pain (Chronic).     Plan:  I have a long discussion with Ely and her  in regard to my recommendation today.     I took quite a bit of time to educate Ely and her  about DVT/PE, provoked vs unprovoked venous thromboembolic event, atrial fibrillation and its pathophysiology of stroke risk, PFO and its pathophysiology of stroke risk, as well as general approach in regard to anticoagulation therapy management and duration of venous thromboembolism. I have also answered all their questions to their satisfaction today.     I explain to Ely and her  that from a hematological standpoint, her pulmonary embolic event was a provoked event and thus in accordance to current American Society of Hematology (DONTE) guidelines, 3-6 months of anticoagulation therapy should be suffice. Because of the large burden of her pulmonary embolic event, in this particular case, I am recommending 6 months of anticoagulation therapy for her provoked pulmonary embolism. HOWEVER, as mentioned above, she does have a history of atrial fibrillation and now has a CHADsVASC score of 4 AND also she is found to have a small PFO. Thus her stroke risk is high and based on cardiology indications, she might need to stay on indefinite anticoagulation therapy.     So from a hematological standpoint, I am  recommending 6 months of anticoagulation therapy with apixaban at 5 mg PO BID dosing (through 7/6/2024). I will defer the decision on the need of ongoing anticoagulation therapy to her cardiologist from a cardiological indication / stroke prevention standpoint.     As mentioned, she has chronic right thigh pain. She used to use NSAID prior to her being started on apixaban and it did provide her with effective symptomatic relief as compared to Tylenol. I do recommend against the routine use of NSAID for this patient who is on apixaban as the combination of such can increase her bleeding risk. However, I suggest the consideration of Celebrex as this non-steroidal anti-inflammatory does not have platelet prohibition property. I recommend that Ely is to discuss this with her primary care provider.     I have provided the patient and her  with our clinic's contact information and they are instructed to call if they should have any further questions or concerns. At this time, I have no further plans to see Ely back on a routine basis.   Thank you for letting us to participate in this patient's care.       Usama Samuels PA-C, MPAS  Physician Assistant  [facility].     65 minutes spent by me on the date of the encounter doing chart review, history and exam, documentation and further activities per the note.    Time IN: 11:04  Time OUT: 11:50

## 2024-03-11 ENCOUNTER — TELEPHONE (OUTPATIENT)
Dept: NEUROLOGY | Facility: CLINIC | Age: 74
End: 2024-03-11
Payer: COMMERCIAL

## 2024-03-11 NOTE — TELEPHONE ENCOUNTER
A message was left for patient regarding May 22 appointment with Dr. Martinez that needs to be rescheduled due to provider not being in the clinic.    Patient was offered to reschedule appointment for March 12 at 8a.    Patient advised to call the clinic to reschedule appointment at [phone]

## 2024-03-14 ENCOUNTER — TELEPHONE (OUTPATIENT)
Dept: NEUROLOGY | Facility: CLINIC | Age: 74
End: 2024-03-14
Payer: COMMERCIAL

## 2024-03-14 NOTE — TELEPHONE ENCOUNTER
Attempted to reach patient to remind them about appointment scheduled with Adam Martinez MD on 3/15/24 in our [place] location.  A voicemail was left with a call back number if the patient has questions or would like to reschedule.

## 2024-03-14 NOTE — PROGRESS NOTES
ESTABLISHED PATIENT NEUROLOGY NOTE    DATE OF VISIT: 3/15/2024  CLINIC LOCATION: [facility]  MRN: [id number]  PATIENT NAME: Ely Humphreys  YOB: 1950    REASON FOR VISIT:   Chief Complaint   Patient presents with    Consult     Numbness/Tingling- primarily in left pinkie but also in left pointer finger      SUBJECTIVE:                                                      HISTORY OF PRESENT ILLNESS: Patient is new to me, but was previously seen by stroke neurology team.  History and prior evaluation are briefly summarized below.  Please refer to previous neurology notes for more detailed information.    Per chart review, the patient has history of breast cancer, hypothyroidism, obstructive sleep apnea, hyperlipidemia and spinal fusion at L4-S1 (December 2023).  In January 2024 she presented to [facility] with complaints of left arm weakness and left facial droop in context of intermittent left arm numbness for the preceding 3 to 4 days.  Was hypoxic in the ED and found to have extensive bilateral pulmonary emboli (started on heparin drip) and underwent embolectomy.    Initial head CT from 1/6/2024 was negative for acute intracranial abnormality.  CTP was unrevealing, so as head neck CTA.  Brain MRI without contrast demonstrated punctate focus of diffusion restriction in the left occipital lobe, potentially representing an acute/subacute infarct.  All images were personally reviewed and independently interpreted.    During follow-up with primary care provider in January 2024 complained of small area of numbness in the left hand and was referred to see a neurologist.    Today, the patient reports continued paresthesias/numbness and tingling in the left pinky finger.  She denies any additional focal neurological symptoms.  On apixaban and pravastatin.    Laboratory evaluation from January 2024 includes elevated calcium of 10.5, glucose of 128, and low hemoglobin of  11.1.  EXAM:                                                    Physical Exam:   Vitals: LMP  (LMP Unknown)     General: pt is in NAD, cooperative.  Skin: normal turgor, moist mucous membranes, no lesions/rashes noticed.  HEENT: ATNC, white sclera, normal conjunctiva.  Respiratory: Symmetric lung excursion, no accessory respiratory muscle use.  Abdomen: Non distended.  Neurological: awake, cooperative, follows commands, no aphasia or dysarthria noted, cranial nerves II-XII: no ptosis, extraocular motility is full, face is symmetric, tongue is midline, equally moves all extremities, strength is 5/5 bilaterally in upper and lower extremities, no pronator drift, deep tendon reflexes are equal bilaterally, sensation to the light touch, vibration, and pinprick is intact bilaterally, finger to nose and heel-knee-shin tests are intact bilaterally, casual gait is normal.  ASSESSMENT AND PLAN:                                                    Assessment: 73 year old female patient presents for a follow-up of left hand numbness.  She was evaluated in the hospital, but no central causes were found.  We reviewed previous testing, including brain MRI.  I discussed with the patient that other differential of her remaining symptoms includes focal entrapment neuropathies, mainly ulnar neuropathy, versus cervical radiculopathy.  We decided to pursue EMG for further diagnosis clarification.    Diagnoses:    ICD-10-CM    1. Hand numbness  R20.0 Adult Neurology  Referral        Plan:  There are no Patient Instructions on file for this visit.    Total Time: *** minutes spent on the date of the encounter doing chart review, history and exam, documentation and further activities per the note.    Adam Martinez MD  [facility]  (Chart documentation was completed in part with Dragon voice-recognition software. Even though reviewed, some grammatical, spelling, and word errors may remain.)

## 2024-03-15 ENCOUNTER — OFFICE VISIT (OUTPATIENT)
Dept: NEUROLOGY | Facility: CLINIC | Age: 74
End: 2024-03-15
Attending: INTERNAL MEDICINE
Payer: COMMERCIAL

## 2024-03-15 VITALS — DIASTOLIC BLOOD PRESSURE: 79 MMHG | HEART RATE: 70 BPM | OXYGEN SATURATION: 99 % | SYSTOLIC BLOOD PRESSURE: 128 MMHG

## 2024-03-15 DIAGNOSIS — R20.0 HAND NUMBNESS: Primary | ICD-10-CM

## 2024-03-15 PROCEDURE — 99215 OFFICE O/P EST HI 40 MIN: CPT | Performed by: PSYCHIATRY & NEUROLOGY

## 2024-03-15 RX ORDER — MULTIVITAMIN
1 TABLET ORAL DAILY
COMMUNITY

## 2024-03-15 NOTE — PROGRESS NOTES
"Ely Humphreys is a 73 year old female who presents for:  Chief Complaint   Patient presents with    Consult     Numbness/Tingling- primarily in left pinkie but also in left pointer finger         Initial Vitals:  /79 (BP Location: Left arm, Patient Position: Sitting, Cuff Size: Adult Regular)   Pulse 70   LMP  (LMP Unknown)   SpO2 99%  Estimated body mass index is 31.26 kg/m  as calculated from the following:    Height as of 1/28/24: 1.588 m (5' 2.5\").    Weight as of 2/20/24: 78.8 kg (173 lb 11.2 oz).. There is no height or weight on file to calculate BSA. BP completed using cuff size: darryl Chacon   "

## 2024-03-15 NOTE — PATIENT INSTRUCTIONS
AFTER VISIT SUMMARY (AVS):    At today's visit we thoroughly discussed current symptoms, evaluation results, diagnosis, available treatment options, and the plan.    We decided to monitor your symptoms while you make some ergonomic adjustments, including avoiding prolonged bending at the elbow or putting pressure on the inner side of the left elbow during the day and also avoiding prolonged bending at night.  If symptoms worsen in the future, please come back to consider EMG and additional necessary testing.    Regarding stroke prevention, as long as you stay on Eliquis for life, this should be adequate, but if Eliquis is discontinued, you should be on 81 mg of aspirin lifelong.  Please also continue your pravastatin.    Next follow-up appointment is on as needed basis.    Please do not hesitate to call me with any questions or concerns.    Thanks.

## 2024-03-15 NOTE — LETTER
"    3/15/2024         RE: Ely Humphreys  [address] [place] [address]        Dear Colleague,    Thank you for referring your patient, Ely Humphreys, to the [facility]. Please see a copy of my visit note below.    Ely Humphreys is a 73 year old female who presents for:  Chief Complaint   Patient presents with     Consult     Numbness/Tingling- primarily in left pinkie but also in left pointer finger         Initial Vitals:  /79 (BP Location: Left arm, Patient Position: Sitting, Cuff Size: Adult Regular)   Pulse 70   LMP  (LMP Unknown)   SpO2 99%  Estimated body mass index is 31.26 kg/m  as calculated from the following:    Height as of 1/28/24: 1.588 m (5' 2.5\").    Weight as of 2/20/24: 78.8 kg (173 lb 11.2 oz).. There is no height or weight on file to calculate BSA. BP completed using cuff size: regular    Nessa Chacon     ESTABLISHED PATIENT NEUROLOGY NOTE    DATE OF VISIT: 3/15/2024  CLINIC LOCATION: [facility]  MRN: [id number]  PATIENT NAME: Ely Humphreys  YOB: 1950    REASON FOR VISIT:   Chief Complaint   Patient presents with     Consult     Numbness/Tingling- primarily in left pinkie but also in left ring finger      SUBJECTIVE:                                                      HISTORY OF PRESENT ILLNESS: Patient is new to me, but was previously seen by stroke neurology team.  History and prior evaluation are briefly summarized below.  Please refer to previous neurology notes for more detailed information.  Accompanied by her .    Per chart review, the patient has history of breast cancer, hypothyroidism, obstructive sleep apnea, hyperlipidemia and spinal fusion at L4-S1 (December 2023).  In January 2024 she presented to [facility] with complaints of left arm weakness and left facial droop in context of intermittent left arm numbness for the preceding 3 to 4 days.  Was hypoxic in the ED " and found to have extensive bilateral pulmonary emboli (started on heparin drip) and underwent embolectomy.    Initial head CT from 1/6/2024 was negative for acute intracranial abnormality.  CTP was unrevealing, so as head neck CTA.  Brain MRI without contrast demonstrated punctate focus of diffusion restriction in the left occipital lobe (without ADC map correlate), potentially representing an acute/subacute infarct versus shine through phenomenon.  All images were personally reviewed and independently interpreted.    During follow-up with primary care provider in January 2024 complained of small area of numbness in the left hand and was referred to see a neurologist.    Today, the patient reports continued paresthesias/numbness and tingling in the left pinky and ring finger.  She denies any additional focal neurological symptoms.  On apixaban and pravastatin.    Laboratory evaluation from January 2024 includes elevated calcium of 10.5, glucose of 128, and low hemoglobin of 11.1.  Hemoglobin A1C was 5.6 in January 2023.  LDL at that time was 103.  Echocardiogram was negative for intracardiac thrombus, but demonstrated small foramen ovale.  EXAM:                                                    Physical Exam:   Vitals: /79 (BP Location: Left arm, Patient Position: Sitting, Cuff Size: Adult Regular)   Pulse 70   LMP  (LMP Unknown)   SpO2 99%     General: pt is in NAD, cooperative.  Skin: normal turgor, moist mucous membranes, no lesions/rashes noticed.  HEENT: ATNC, white sclera, normal conjunctiva.  Respiratory: Symmetric lung excursion, no accessory respiratory muscle use.  Abdomen: Non distended.  Neurological: awake, cooperative, follows commands, no aphasia or dysarthria noted, cranial nerves II-XII: no ptosis, extraocular motility is full, face is symmetric, tongue is midline, equally moves all extremities, strength is 5/5 bilaterally in upper and lower extremities, no pronator drift, has bilateral  positional and action hand tremor, deep tendon reflexes are equal bilaterally (except Achilles reflexes that are absent bilaterally), sensation to the light touch, vibration, and pinprick is reduced in both feet and intact elsewhere, finger to nose and heel-knee-shin tests are intact bilaterally, casual gait is normal.  ASSESSMENT AND PLAN:                                                    Assessment: 73 year old female patient presents for a follow-up of left hand/fingers numbness.  She was evaluated in the hospital, but no central causes of her symptoms were found.  We reviewed previous testing, including brain MRI.      I discussed with the patient and her  that other differential of her remaining symptoms includes focal entrapment neuropathies, mainly ulnar neuropathy, versus cervical radiculopathy.  Discussed the utility of obtaining EMG for further diagnosis clarification, but decided to hold off given subjective improvement of her symptoms.  This test could be considered if symptoms worsen in the future.    We also had discussion regarding punctate focus of diffusion in the left occipital lobe, which could represent subacute infarct versus shine through phenomena from old scar in the area, which is faintly seen on her FLAIR images.  My recommendations regarding stroke prevention are detailed below.    Diagnoses:    ICD-10-CM    1. Hand numbness  R20.0 Adult Neurology  Referral        Plan: At today's visit we thoroughly discussed current symptoms, evaluation results, diagnosis, available treatment options, and the plan.    We decided to monitor her symptoms while she makes some ergonomic adjustments, including avoiding prolonged bending at the elbow or putting pressure on the inner side of the left elbow during the day and also avoiding prolonged bending at night.  If symptoms worsen in the future, she should come back to consider EMG and additional necessary testing.    Regarding stroke  prevention, as long as she stays on Eliquis for life, this should be adequate, but if Eliquis is discontinued, she should be on 81 mg of aspirin lifelong.  Should also continue pravastatin.    Next follow-up appointment is on as needed basis.    Total Time: 51 minutes spent on the date of the encounter doing chart review, history and exam, documentation and further activities per the note.    Adam Martinez MD  [facility] Neurology  (Chart documentation was completed in part with Dragon voice-recognition software. Even though reviewed, some grammatical, spelling, and word errors may remain.)       Again, thank you for allowing me to participate in the care of your patient.        Sincerely,        Adam Martinez MD

## 2024-03-15 NOTE — PROGRESS NOTES
ESTABLISHED PATIENT NEUROLOGY NOTE    DATE OF VISIT: 3/15/2024  CLINIC LOCATION: [facility]  MRN: [id number]  PATIENT NAME: Ely Humphreys  YOB: 1950    REASON FOR VISIT:   Chief Complaint   Patient presents with    Consult     Numbness/Tingling- primarily in left pinkie but also in left ring finger      SUBJECTIVE:                                                      HISTORY OF PRESENT ILLNESS: Patient is new to me, but was previously seen by stroke neurology team.  History and prior evaluation are briefly summarized below.  Please refer to previous neurology notes for more detailed information.  Accompanied by her .    Per chart review, the patient has history of breast cancer, hypothyroidism, obstructive sleep apnea, hyperlipidemia and spinal fusion at L4-S1 (December 2023).  In January 2024 she presented to [facility] with complaints of left arm weakness and left facial droop in context of intermittent left arm numbness for the preceding 3 to 4 days.  Was hypoxic in the ED and found to have extensive bilateral pulmonary emboli (started on heparin drip) and underwent embolectomy.    Initial head CT from 1/6/2024 was negative for acute intracranial abnormality.  CTP was unrevealing, so as head neck CTA.  Brain MRI without contrast demonstrated punctate focus of diffusion restriction in the left occipital lobe (without ADC map correlate), potentially representing an acute/subacute infarct versus shine through phenomenon.  All images were personally reviewed and independently interpreted.    During follow-up with primary care provider in January 2024 complained of small area of numbness in the left hand and was referred to see a neurologist.    Today, the patient reports continued paresthesias/numbness and tingling in the left pinky and ring finger.  She denies any additional focal neurological symptoms.  On apixaban and pravastatin.    Laboratory evaluation  from January 2024 includes elevated calcium of 10.5, glucose of 128, and low hemoglobin of 11.1.  Hemoglobin A1C was 5.6 in January 2023.  LDL at that time was 103.  Echocardiogram was negative for intracardiac thrombus, but demonstrated small foramen ovale.  EXAM:                                                    Physical Exam:   Vitals: /79 (BP Location: Left arm, Patient Position: Sitting, Cuff Size: Adult Regular)   Pulse 70   LMP  (LMP Unknown)   SpO2 99%     General: pt is in NAD, cooperative.  Skin: normal turgor, moist mucous membranes, no lesions/rashes noticed.  HEENT: ATNC, white sclera, normal conjunctiva.  Respiratory: Symmetric lung excursion, no accessory respiratory muscle use.  Abdomen: Non distended.  Neurological: awake, cooperative, follows commands, no aphasia or dysarthria noted, cranial nerves II-XII: no ptosis, extraocular motility is full, face is symmetric, tongue is midline, equally moves all extremities, strength is 5/5 bilaterally in upper and lower extremities, no pronator drift, has bilateral positional and action hand tremor, deep tendon reflexes are equal bilaterally (except Achilles reflexes that are absent bilaterally), sensation to the light touch, vibration, and pinprick is reduced in both feet and intact elsewhere, finger to nose and heel-knee-shin tests are intact bilaterally, casual gait is normal.  ASSESSMENT AND PLAN:                                                    Assessment: 73 year old female patient presents for a follow-up of left hand/fingers numbness.  She was evaluated in the hospital, but no central causes of her symptoms were found.  We reviewed previous testing, including brain MRI.      I discussed with the patient and her  that other differential of her remaining symptoms includes focal entrapment neuropathies, mainly ulnar neuropathy, versus cervical radiculopathy.  Discussed the utility of obtaining EMG for further diagnosis clarification,  but decided to hold off given subjective improvement of her symptoms.  This test could be considered if symptoms worsen in the future.    We also had discussion regarding punctate focus of diffusion in the left occipital lobe, which could represent subacute infarct versus shine through phenomena from old scar in the area, which is faintly seen on her FLAIR images.  My recommendations regarding stroke prevention are detailed below.    Diagnoses:    ICD-10-CM    1. Hand numbness  R20.0 Adult Neurology  Referral        Plan: At today's visit we thoroughly discussed current symptoms, evaluation results, diagnosis, available treatment options, and the plan.    We decided to monitor her symptoms while she makes some ergonomic adjustments, including avoiding prolonged bending at the elbow or putting pressure on the inner side of the left elbow during the day and also avoiding prolonged bending at night.  If symptoms worsen in the future, she should come back to consider EMG and additional necessary testing.    Regarding stroke prevention, as long as she stays on Eliquis for life, this should be adequate, but if Eliquis is discontinued, she should be on 81 mg of aspirin lifelong.  Should also continue pravastatin.    Next follow-up appointment is on as needed basis.    Total Time: 51 minutes spent on the date of the encounter doing chart review, history and exam, documentation and further activities per the note.    Adam Martinez MD  [facility] Neurology  (Chart documentation was completed in part with Dragon voice-recognition software. Even though reviewed, some grammatical, spelling, and word errors may remain.)

## 2024-03-22 NOTE — PLAN OF CARE
Admitted/transferred from: [facility]  Reason for admission/transfer: Higher level of care  Patient status upon admission/transfer: Alert, oriented, on 9L oxymask  Interventions: settled into room, skin assessment, team notified  Plan: MRI, chest xray, labs, LE ultrasound  2 RN skin assessment: completed by Ana  Sexual Orientation and Gender Identity (SOGI) smartfom completed: Not Done  Result of skin assessment and interventions/actions: previous spinal surgery, right groin site  Height, weight, drug calc weight: Done  Patient belongings (see Flowsheet - Adult Profile for details): With patient or   MDRO education (if applicable): N/A    
Goal Outcome Evaluation:    Major Shift Events: Neuro intact, baseline essential tremors. Up with stand by assist to the bathroom. Afebrile. Blood pressure within goal without intervention. 2L worn overnight. Intermittent nausea- resolved with PRNs. Voids spontaneously with adequate output. Pain managed with PRN medications. Heparin Xa checks therapeutic x2- recheck tomorrow AM.     Plan: Transfer to floor once bed is available. Continue plan of care.     For vital signs and complete assessments, please see documentation flowsheets.     
ICU End of Shift Summary. See flowsheets for vital signs and detailed assessment.    Changes this shift: Arrive to unit around 1500, settled into room, on 10 L oxymask. Alert, oriented, following all commands. L sided weakness reported in report, minimal weakness and decreased strength noticed upon exam.   MRI done. LE Ultrasound done, awaiting results. Patinet complained of increased cough and course lung sounds upon examp, CXR done. PRN tylenol and oxy given for pain.      Plan: q2hr neuros    
Major Shift Events: Q2hr neuros. Intact. Back pain managed with prn oxycodone + tylenol. Up SBA/GB. SR 70-80s. SBP within params. Afebrile. 2-3L O2 overnight. Voiding spont. NPO pending SLP. Small BM x2. PIV x2. Hep gtt at 1400  Plan: Wean O2, SLP, transfer out of ICU   For vital signs and complete assessments, please see documentation flowsheets.            
Occupational Therapy: Orders received. Chart reviewed and discussed with care team.? Occupational Therapy not indicated due as pt with no acute OT needs per PT, has minimal LUE deficits, and has assist for ADLs if needed at home.? Defer discharge recommendations to PT.? Will complete orders.    
no

## 2024-03-23 DIAGNOSIS — E78.00 PURE HYPERCHOLESTEROLEMIA: ICD-10-CM

## 2024-03-23 DIAGNOSIS — G89.29 CHRONIC RIGHT-SIDED LOW BACK PAIN WITH RIGHT-SIDED SCIATICA: ICD-10-CM

## 2024-03-23 DIAGNOSIS — M54.41 CHRONIC RIGHT-SIDED LOW BACK PAIN WITH RIGHT-SIDED SCIATICA: ICD-10-CM

## 2024-03-25 RX ORDER — GABAPENTIN 100 MG/1
CAPSULE ORAL
Qty: 240 CAPSULE | Refills: 0 | Status: SHIPPED | OUTPATIENT
Start: 2024-03-25 | End: 2024-05-15

## 2024-03-25 RX ORDER — PRAVASTATIN SODIUM 40 MG
40 TABLET ORAL DAILY
Qty: 90 TABLET | Refills: 0 | Status: SHIPPED | OUTPATIENT
Start: 2024-03-25 | End: 2024-06-17

## 2024-04-17 ENCOUNTER — OFFICE VISIT (OUTPATIENT)
Dept: PEDIATRICS | Facility: CLINIC | Age: 74
End: 2024-04-17
Payer: COMMERCIAL

## 2024-04-17 VITALS
OXYGEN SATURATION: 97 % | DIASTOLIC BLOOD PRESSURE: 76 MMHG | BODY MASS INDEX: 33.01 KG/M2 | SYSTOLIC BLOOD PRESSURE: 120 MMHG | RESPIRATION RATE: 16 BRPM | TEMPERATURE: 97.6 F | WEIGHT: 179.4 LBS | HEART RATE: 67 BPM | HEIGHT: 62 IN

## 2024-04-17 DIAGNOSIS — I48.0 PAF (PAROXYSMAL ATRIAL FIBRILLATION) (H): ICD-10-CM

## 2024-04-17 DIAGNOSIS — Z86.711 HISTORY OF PULMONARY EMBOLISM: ICD-10-CM

## 2024-04-17 DIAGNOSIS — E66.811 CLASS 1 OBESITY DUE TO EXCESS CALORIES WITH SERIOUS COMORBIDITY AND BODY MASS INDEX (BMI) OF 32.0 TO 32.9 IN ADULT: Primary | ICD-10-CM

## 2024-04-17 DIAGNOSIS — I77.810 ACQUIRED DILATION OF ASCENDING AORTA AND AORTIC ROOT (H): ICD-10-CM

## 2024-04-17 DIAGNOSIS — I43 DILATED CARDIOMYOPATHY SECONDARY TO TACHYCARDIA (H): ICD-10-CM

## 2024-04-17 DIAGNOSIS — G47.33 OSA (OBSTRUCTIVE SLEEP APNEA): ICD-10-CM

## 2024-04-17 DIAGNOSIS — R00.0 DILATED CARDIOMYOPATHY SECONDARY TO TACHYCARDIA (H): ICD-10-CM

## 2024-04-17 DIAGNOSIS — R60.0 LEG EDEMA, RIGHT: ICD-10-CM

## 2024-04-17 DIAGNOSIS — E66.09 CLASS 1 OBESITY DUE TO EXCESS CALORIES WITH SERIOUS COMORBIDITY AND BODY MASS INDEX (BMI) OF 32.0 TO 32.9 IN ADULT: Primary | ICD-10-CM

## 2024-04-17 LAB
ERYTHROCYTE [DISTWIDTH] IN BLOOD BY AUTOMATED COUNT: 14.4 % (ref 10–15)
HCT VFR BLD AUTO: 37.9 % (ref 35–47)
HGB BLD-MCNC: 12 G/DL (ref 11.7–15.7)
MCH RBC QN AUTO: 29.4 PG (ref 26.5–33)
MCHC RBC AUTO-ENTMCNC: 31.7 G/DL (ref 31.5–36.5)
MCV RBC AUTO: 93 FL (ref 78–100)
PLATELET # BLD AUTO: 281 10E3/UL (ref 150–450)
RBC # BLD AUTO: 4.08 10E6/UL (ref 3.8–5.2)
WBC # BLD AUTO: 5.4 10E3/UL (ref 4–11)

## 2024-04-17 PROCEDURE — 99214 OFFICE O/P EST MOD 30 MIN: CPT | Performed by: INTERNAL MEDICINE

## 2024-04-17 PROCEDURE — 80048 BASIC METABOLIC PNL TOTAL CA: CPT | Performed by: INTERNAL MEDICINE

## 2024-04-17 PROCEDURE — 84443 ASSAY THYROID STIM HORMONE: CPT | Performed by: INTERNAL MEDICINE

## 2024-04-17 PROCEDURE — 85027 COMPLETE CBC AUTOMATED: CPT | Performed by: INTERNAL MEDICINE

## 2024-04-17 PROCEDURE — 36415 COLL VENOUS BLD VENIPUNCTURE: CPT | Performed by: INTERNAL MEDICINE

## 2024-04-17 RX ORDER — METOPROLOL SUCCINATE 25 MG/1
0.5 TABLET, EXTENDED RELEASE ORAL DAILY
COMMUNITY
Start: 2024-04-03 | End: 2024-06-17

## 2024-04-17 RX ORDER — FLECAINIDE ACETATE 50 MG/1
TABLET ORAL
COMMUNITY
Start: 2024-04-03 | End: 2024-06-17

## 2024-04-17 ASSESSMENT — PAIN SCALES - GENERAL: PAINLEVEL: MILD PAIN (2)

## 2024-04-17 NOTE — PROGRESS NOTES
"  Assessment & Plan     Class 1 obesity due to excess calories with serious comorbidity and body mass index (BMI) of 32.0 to 32.9 in adult  Has seen weight management in past. Would like to start on semaglutide. She does think her insurance might cover. Discussed how to use and importance of good nutrition.   - Semaglutide-Weight Management (WEGOVY) 0.25 MG/0.5ML pen; Inject 0.25 mg Subcutaneous once a week    History of pulmonary embolism  On anticoagulation    PAF (paroxysmal atrial fibrillation) (H)  Stable.  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     Acquired dilation of ascending aorta and aortic root (H24)  Followed by cardiology    MIKEL (obstructive sleep apnea)    - Semaglutide-Weight Management (WEGOVY) 0.25 MG/0.5ML pen; Inject 0.25 mg Subcutaneous once a week    Leg edema, right  Long term, intermittent R LE swelling. No pitting edema today but is larger than L. Slightly worse after spine surgery December 2023. Unlikely to be cardiac or renal given unilateral nature. Not significantly progressing. Will check labs but printing rx for Jobst stockings she can try.  - TSH with free T4 reflex; Future  - CBC with platelets; Future  - Basic metabolic panel  (Ca, Cl, CO2, Creat, Gluc, K, Na, BUN); Future  - Compression Sleeve/Stocking Order for DME - ONLY FOR DME  - TSH with free T4 reflex  - CBC with platelets  - Basic metabolic panel  (Ca, Cl, CO2, Creat, Gluc, K, Na, BUN)      38 minutes spent by me on the date of the encounter doing chart review, history and exam, documentation and further activities per the note      BMI  Estimated body mass index is 32.39 kg/m  as calculated from the following:    Height as of this encounter: 1.585 m (5' 2.4\").    Weight as of this encounter: 81.4 kg (179 lb 6.4 oz).   Weight management plan: " "Discussed healthy diet and exercise guidelines medication, see above      See Patient Instructions    Subjective   Ely is a 73 year old, presenting for the following health issues:  RECHECK        4/17/2024     2:44 PM   Additional Questions   Roomed by ISAK Ley   Accompanied by n/a         4/17/2024     2:44 PM   Patient Reported Additional Medications   Patient reports taking the following new medications n/a     History of Present Illness       Vascular Disease:  She presents for follow up of vascular disease.     She never takes nitroglycerin. She is not taking daily aspirin.    Reason for visit:  Follow up care for my surgery    She eats 4 or more servings of fruits and vegetables daily.She consumes 0 sweetened beverage(s) daily.She exercises with enough effort to increase her heart rate 20 to 29 minutes per day.  She exercises with enough effort to increase her heart rate 4 days per week.   She is taking medications regularly.  CONCERNS: Would like to discuss wegovy and if insurance will cover it, transfer eliquis under you as the prescriber if possible.      R leg difficult to get into her boots this winter. Long history of R leg being more swollen than L. PE 3 months ago, LE US negative for DVT. On anticoagulation. Not sure if much better at night.       Review of Systems  Constitutional, HEENT, cardiovascular, pulmonary, gi and gu systems are negative, except as otherwise noted.      Objective    /76 (BP Location: Right arm, Patient Position: Sitting, Cuff Size: Adult Large)   Pulse 67   Temp 97.6  F (36.4  C) (Oral)   Resp 16   Ht 1.585 m (5' 2.4\")   Wt 81.4 kg (179 lb 6.4 oz)   LMP  (LMP Unknown)   SpO2 97%   BMI 32.39 kg/m    Body mass index is 32.39 kg/m .  Physical Exam   GENERAL: alert and no distress  NECK: no adenopathy, no asymmetry, masses, or scars  RESP: lungs clear to auscultation - no rales, rhonchi or wheezes  CV: irregularly irregular rhythm, normal S1 S2, no S3 or S4, " no murmur, click or rub, peripheral pulses strong, and no peripheral edema  ABDOMEN: soft, nontender, no hepatosplenomegaly, no masses and bowel sounds normal  MS: no gross musculoskeletal defects noted, no edema          Signed Electronically by: Shadia Munroe MD

## 2024-04-17 NOTE — PATIENT INSTRUCTIONS
Wegovy 0.25 mg once weekly for 4 weeks. By week 3, if covered and you are tolerating, send me a message and I'll send a rx for the next step up dose, 0.5 mg.    We can meet after 2 months or so to assess how you're doing  and consider dose adjustment.     Set up appointment with Dr. Neville for next available.

## 2024-04-19 LAB
ANION GAP SERPL CALCULATED.3IONS-SCNC: 11 MMOL/L (ref 7–15)
BUN SERPL-MCNC: 19.4 MG/DL (ref 8–23)
CALCIUM SERPL-MCNC: 9.9 MG/DL (ref 8.8–10.2)
CHLORIDE SERPL-SCNC: 104 MMOL/L (ref 98–107)
CREAT SERPL-MCNC: 0.91 MG/DL (ref 0.51–0.95)
DEPRECATED HCO3 PLAS-SCNC: 26 MMOL/L (ref 22–29)
EGFRCR SERPLBLD CKD-EPI 2021: 67 ML/MIN/1.73M2
GLUCOSE SERPL-MCNC: 85 MG/DL (ref 70–99)
POTASSIUM SERPL-SCNC: 4.5 MMOL/L (ref 3.4–5.3)
SODIUM SERPL-SCNC: 141 MMOL/L (ref 135–145)
TSH SERPL DL<=0.005 MIU/L-ACNC: 4.15 UIU/ML (ref 0.3–4.2)

## 2024-04-26 NOTE — RESULT ENCOUNTER NOTE
Arya Graves ,    The results from your recent lab work are within normal limits.    -All of your labs are normal.      Thank you for choosing [facility] for your health care needs,      Zeinab Agustin PA-C

## 2024-05-07 ENCOUNTER — PATIENT OUTREACH (OUTPATIENT)
Dept: CARE COORDINATION | Facility: CLINIC | Age: 74
End: 2024-05-07
Payer: COMMERCIAL

## 2024-05-15 DIAGNOSIS — M54.41 CHRONIC RIGHT-SIDED LOW BACK PAIN WITH RIGHT-SIDED SCIATICA: ICD-10-CM

## 2024-05-15 DIAGNOSIS — G89.29 CHRONIC RIGHT-SIDED LOW BACK PAIN WITH RIGHT-SIDED SCIATICA: ICD-10-CM

## 2024-05-15 RX ORDER — GABAPENTIN 100 MG/1
CAPSULE ORAL
Qty: 240 CAPSULE | Refills: 1 | Status: SHIPPED | OUTPATIENT
Start: 2024-05-15 | End: 2024-06-17

## 2024-06-17 ENCOUNTER — OFFICE VISIT (OUTPATIENT)
Dept: PEDIATRICS | Facility: CLINIC | Age: 74
End: 2024-06-17
Payer: COMMERCIAL

## 2024-06-17 VITALS
RESPIRATION RATE: 16 BRPM | SYSTOLIC BLOOD PRESSURE: 99 MMHG | HEART RATE: 69 BPM | BODY MASS INDEX: 32.97 KG/M2 | HEIGHT: 62 IN | TEMPERATURE: 97 F | DIASTOLIC BLOOD PRESSURE: 70 MMHG | OXYGEN SATURATION: 96 % | WEIGHT: 179.2 LBS

## 2024-06-17 DIAGNOSIS — M54.41 CHRONIC RIGHT-SIDED LOW BACK PAIN WITH RIGHT-SIDED SCIATICA: ICD-10-CM

## 2024-06-17 DIAGNOSIS — I48.20 CHRONIC ATRIAL FIBRILLATION (H): ICD-10-CM

## 2024-06-17 DIAGNOSIS — G89.29 CHRONIC RIGHT-SIDED LOW BACK PAIN WITH RIGHT-SIDED SCIATICA: ICD-10-CM

## 2024-06-17 DIAGNOSIS — E66.09 CLASS 1 OBESITY DUE TO EXCESS CALORIES WITH SERIOUS COMORBIDITY AND BODY MASS INDEX (BMI) OF 32.0 TO 32.9 IN ADULT: Primary | ICD-10-CM

## 2024-06-17 DIAGNOSIS — E66.811 CLASS 1 OBESITY DUE TO EXCESS CALORIES WITH SERIOUS COMORBIDITY AND BODY MASS INDEX (BMI) OF 32.0 TO 32.9 IN ADULT: Primary | ICD-10-CM

## 2024-06-17 DIAGNOSIS — I51.7 ACQUIRED DILATION OF RIGHT VENTRICLE OF HEART: ICD-10-CM

## 2024-06-17 DIAGNOSIS — E78.00 PURE HYPERCHOLESTEROLEMIA: ICD-10-CM

## 2024-06-17 DIAGNOSIS — Z12.11 SCREEN FOR COLON CANCER: ICD-10-CM

## 2024-06-17 PROBLEM — R00.0 DILATED CARDIOMYOPATHY SECONDARY TO TACHYCARDIA (H): Status: ACTIVE | Noted: 2020-08-03

## 2024-06-17 PROBLEM — I43 DILATED CARDIOMYOPATHY SECONDARY TO TACHYCARDIA (H): Status: ACTIVE | Noted: 2020-08-03

## 2024-06-17 PROBLEM — I48.11 LONGSTANDING PERSISTENT ATRIAL FIBRILLATION (H): Status: ACTIVE | Noted: 2024-06-17

## 2024-06-17 PROBLEM — I42.8 DILATED CARDIOMYOPATHY SECONDARY TO TACHYCARDIA (H): Status: ACTIVE | Noted: 2020-08-03

## 2024-06-17 PROCEDURE — 99214 OFFICE O/P EST MOD 30 MIN: CPT | Performed by: INTERNAL MEDICINE

## 2024-06-17 RX ORDER — DILTIAZEM HYDROCHLORIDE 180 MG/1
180 CAPSULE, EXTENDED RELEASE ORAL DAILY
COMMUNITY

## 2024-06-17 RX ORDER — GABAPENTIN 100 MG/1
100-300 CAPSULE ORAL AT BEDTIME
Status: SHIPPED
Start: 2024-06-17 | End: 2024-07-15

## 2024-06-17 RX ORDER — BUPROPION HYDROCHLORIDE 150 MG/1
150 TABLET ORAL EVERY MORNING
Qty: 30 TABLET | Refills: 3 | Status: SHIPPED | OUTPATIENT
Start: 2024-06-17 | End: 2024-09-17

## 2024-06-17 RX ORDER — PRAVASTATIN SODIUM 40 MG
40 TABLET ORAL DAILY
Qty: 90 TABLET | Refills: 4 | Status: SHIPPED | OUTPATIENT
Start: 2024-06-17

## 2024-06-17 NOTE — PROGRESS NOTES
{PROVIDER CHARTING PREFERENCE:000774}    Mary Graves is a 74 year old, presenting for the following health issues:  Atrial Fib and Lipids  {(!) Visit Details have not yet been documented.  Please enter Visit Details and then use this list to pull in documentation. (Optional):523214}  History of Present Illness       Hyperlipidemia:  She presents for follow up of hyperlipidemia.   She is taking medication to lower cholesterol. She is not having myalgia or other side effects to statin medications.    She eats 2-3 servings of fruits and vegetables daily.She consumes 0 sweetened beverage(s) daily.She exercises with enough effort to increase her heart rate 10 to 19 minutes per day.  She exercises with enough effort to increase her heart rate 3 or less days per week.   She is taking medications regularly.       {MA/LPN/RN Pre-Provider Visit Orders- hCG/UA/Strep (Optional):950272}  {SUPERLIST (Optional):208473}  {additonal problems for provider to add (Optional):242719}    {ROS Picklists (Optional):670367}      Objective    LMP  (LMP Unknown)   There is no height or weight on file to calculate BMI.  Physical Exam   {Exam List (Optional):529552}    {Diagnostic Test Results (Optional):763259}        Signed Electronically by: Shadia Munroe MD  {Email feedback regarding this note to [email or web address]   :376809}

## 2024-06-17 NOTE — PATIENT INSTRUCTIONS
Medication options for weight loss:    Oral:  Naltrexone (helps with cravings)  Wellbutrin (antidepressant)  Topiramate (reduces appetite)    Start with wellbutrin 150 mg in the morning. If you do well with this, we can increase to 300 mg after a month, if needed.  Then add topiramate or naltrexone if you find cravings are more of an issue.     You can check on pricing for weight loss injections  Semaglutide or terzepatide (a little stronger, may need lower doses)  [email or web address]  [address], [place], [place] [id number] [phone]    Call to set up your colonoscopy  Please call [facility] at [phone] to set up an appointment.   [address], [place], [place]    Stop your morning gabapentin and continue 300 mg at bedtime. If you continue to do well, you can decrease gabapentin by 100 mg to off.

## 2024-06-17 NOTE — PROGRESS NOTES
Assessment & Plan     Class 1 obesity due to excess calories with serious comorbidity and body mass index (BMI) of 32.0 to 32.9 in adult  Discussed options. Semaglutide not covered by ins. Discussed option for self pay compounded. Will try oral medications. She has always been very fit, but with menopause, breast cancer and estrogen blockade, gained significant weight she has not been able to lose.   - buPROPion (WELLBUTRIN XL) 150 MG 24 hr tablet; Take 1 tablet (150 mg) by mouth every morning    Chronic atrial fibrillation (H)  Followed by cardiology. Currently on dilt 180, recently had heart monitor which I don't see results for yet, but she reports was continuously in afib. She will see cards again in 2 months.     Acquired dilation of right ventricle of heart  Reviewed echo. Followed by cardiology    Pure hypercholesterolemia  refilled  - pravastatin (PRAVACHOL) 40 MG tablet; Take 1 tablet (40 mg) by mouth daily    Chronic right-sided low back pain with right-sided sciatica  Doing better, plan slowly decrease medication and see if we can stop  - gabapentin (NEURONTIN) 100 MG capsule; Take 1-3 capsules (100-300 mg) by mouth at bedtime    Screen for colon cancer    - Adult GI  Referral - Procedure Only; Future    37 minutes spent by me on the date of the encounter doing chart review, history and exam, documentation and further activities per the note        See Patient Instructions    Mary Graves is a 74 year old, presenting for the following health issues:  Atrial Fib and Lipids        6/17/2024    11:08 AM   Additional Questions   Roomed by Sejal Cochran   Accompanied by N/A         6/17/2024    11:08 AM   Patient Reported Additional Medications   Patient reports taking the following new medications DITIAZEM     History of Present Illness       Hyperlipidemia:  She presents for follow up of hyperlipidemia.   She is taking medication to lower cholesterol. She is not having myalgia or other side  "effects to statin medications.    She eats 2-3 servings of fruits and vegetables daily.She consumes 0 sweetened beverage(s) daily.She exercises with enough effort to increase her heart rate 10 to 19 minutes per day.  She exercises with enough effort to increase her heart rate 3 or less days per week.   She is taking medications regularly.         Hyperlipidemia Follow-Up    Are you regularly taking any medication or supplement to lower your cholesterol?   Yes- pravastatin  Are you having muscle aches or other side effects that you think could be caused by your cholesterol lowering medication?  No    Atrial Fibrillation Follow-up    Symptoms: no recent chest pain, significant palpitations, dizziness/lightheadedness, dyspnea, or increased peripheral edema.  Stroke prevention: DOAC (Eliquis, Xarelto, Pradaxa)        1/29/2024     1:00 AM 2/20/2024    10:52 AM 3/15/2024     1:43 PM 4/17/2024     2:48 PM 6/17/2024    11:10 AM   Date   Pulse 62 84 70 67 69         Review of Systems  Constitutional, HEENT, cardiovascular, pulmonary, gi and gu systems are negative, except as otherwise noted.      Objective    BP 99/70 (BP Location: Right arm, Patient Position: Sitting, Cuff Size: Adult Regular)   Pulse 69   Temp 97  F (36.1  C) (Temporal)   Resp 16   Ht 1.585 m (5' 2.4\")   Wt 81.3 kg (179 lb 3.2 oz)   LMP  (LMP Unknown)   SpO2 96%   BMI 32.36 kg/m    Body mass index is 32.36 kg/m .  Physical Exam   GENERAL: alert and no distress  NECK: no adenopathy, no asymmetry, masses, or scars  RESP: lungs clear to auscultation - no rales, rhonchi or wheezes  CV: regular rate and rhythm, normal S1 S2, no S3 or S4, no murmur, click or rub, no peripheral edema  MS: no gross musculoskeletal defects noted, no edema    Office Visit on 04/17/2024   Component Date Value Ref Range Status    TSH 04/17/2024 4.15  0.30 - 4.20 uIU/mL Final    WBC Count 04/17/2024 5.4  4.0 - 11.0 10e3/uL Final    RBC Count 04/17/2024 4.08  3.80 - 5.20 " 10e6/uL Final    Hemoglobin 04/17/2024 12.0  11.7 - 15.7 g/dL Final    Hematocrit 04/17/2024 37.9  35.0 - 47.0 % Final    MCV 04/17/2024 93  78 - 100 fL Final    MCH 04/17/2024 29.4  26.5 - 33.0 pg Final    MCHC 04/17/2024 31.7  31.5 - 36.5 g/dL Final    RDW 04/17/2024 14.4  10.0 - 15.0 % Final    Platelet Count 04/17/2024 281  150 - 450 10e3/uL Final    Sodium 04/17/2024 141  135 - 145 mmol/L Final    Reference intervals for this test were updated on 09/26/2023 to more accurately reflect our healthy population. There may be differences in the flagging of prior results with similar values performed with this method. Interpretation of those prior results can be made in the context of the updated reference intervals.     Potassium 04/17/2024 4.5  3.4 - 5.3 mmol/L Final    Chloride 04/17/2024 104  98 - 107 mmol/L Final    Carbon Dioxide (CO2) 04/17/2024 26  22 - 29 mmol/L Final    Anion Gap 04/17/2024 11  7 - 15 mmol/L Final    Urea Nitrogen 04/17/2024 19.4  8.0 - 23.0 mg/dL Final    Creatinine 04/17/2024 0.91  0.51 - 0.95 mg/dL Final    GFR Estimate 04/17/2024 67  >60 mL/min/1.73m2 Final    Calcium 04/17/2024 9.9  8.8 - 10.2 mg/dL Final    Glucose 04/17/2024 85  70 - 99 mg/dL Final           Signed Electronically by: Shadia Munroe MD

## 2024-06-20 ENCOUNTER — ALLIED HEALTH/NURSE VISIT (OUTPATIENT)
Dept: RESEARCH | Facility: CLINIC | Age: 74
End: 2024-06-20
Payer: COMMERCIAL

## 2024-06-20 VITALS
OXYGEN SATURATION: 97 % | HEIGHT: 65 IN | HEART RATE: 90 BPM | TEMPERATURE: 97.4 F | DIASTOLIC BLOOD PRESSURE: 74 MMHG | WEIGHT: 175 LBS | SYSTOLIC BLOOD PRESSURE: 102 MMHG | BODY MASS INDEX: 29.16 KG/M2

## 2024-06-20 DIAGNOSIS — Z00.6 EXAMINATION OF PARTICIPANT OR CONTROL IN CLINICAL RESEARCH: Primary | ICD-10-CM

## 2024-06-20 LAB
ATRIAL RATE - MUSE: 101 BPM
ATRIAL RATE - MUSE: 63 BPM
DIASTOLIC BLOOD PRESSURE - MUSE: NORMAL MMHG
DIASTOLIC BLOOD PRESSURE - MUSE: NORMAL MMHG
INTERPRETATION ECG - MUSE: NORMAL
INTERPRETATION ECG - MUSE: NORMAL
P AXIS - MUSE: NORMAL DEGREES
P AXIS - MUSE: NORMAL DEGREES
PR INTERVAL - MUSE: NORMAL MS
PR INTERVAL - MUSE: NORMAL MS
QRS DURATION - MUSE: 78 MS
QRS DURATION - MUSE: 82 MS
QT - MUSE: 368 MS
QT - MUSE: 368 MS
QTC - MUSE: 434 MS
QTC - MUSE: 440 MS
R AXIS - MUSE: 1 DEGREES
R AXIS - MUSE: 8 DEGREES
SYSTOLIC BLOOD PRESSURE - MUSE: NORMAL MMHG
SYSTOLIC BLOOD PRESSURE - MUSE: NORMAL MMHG
T AXIS - MUSE: -21 DEGREES
T AXIS - MUSE: -32 DEGREES
VENTRICULAR RATE- MUSE: 84 BPM
VENTRICULAR RATE- MUSE: 86 BPM

## 2024-06-20 PROCEDURE — 93010 ELECTROCARDIOGRAM REPORT: CPT | Performed by: INTERNAL MEDICINE

## 2024-06-20 PROCEDURE — 99207 PR NO CHARGE NURSE ONLY: CPT

## 2024-06-20 PROCEDURE — 93000 ELECTROCARDIOGRAM COMPLETE: CPT | Mod: RTG | Performed by: INTERNAL MEDICINE

## 2024-06-20 PROCEDURE — 93005 ELECTROCARDIOGRAM TRACING: CPT

## 2024-06-20 NOTE — PROGRESS NOTES
"    Study Physical Exam      Medical History Reviewed? Yes    Physical Examination  For abnormal findings, please evaluate if the finding is Clinically Significant (by 'CS') or Not Clinically Significant (by 'NCS')  General Appearance   Normal  Head and Neck   Normal  Eyes     Abnormal; NCS wears glasses  Ears, Nose Mouth and Throat  Normal  Cardiovascular   Abnormal; NCS due to history of atrial fibrillation ; irregular rhythm  Respiratory    Normal  Skin and Hair of Wrists  Normal      Physical disability that presents safe or adequate testing: Not present  Dermatological conditions that preclude wearing of sensor or satisfactory data acquisition: Not present  Tremor: Present; NCS Describe: mild right hand tremor   Other: Normal    Vitals:    06/20/24 0901   BP: 102/74   BP Location: Left arm   Patient Position: Left side   Cuff Size: Adult Regular   Pulse: 90   Temp: 97.4  F (36.3  C)   TempSrc: Oral   SpO2: 97%   Weight: 79.4 kg (175 lb)   Height: 1.638 m (5' 4.5\")                Electrocardiogram Interpretation:   12 Lead Interpretation: Atrial Fibrillation       20-JUN-2024    Flor Dove PA-C      "

## 2024-06-20 NOTE — PROGRESS NOTES
Whoop Study Consent    Study Description: The objective of this study is to evaluate the safety and performance of the WHOOP ECG feature in adults (22 years or older) with normal sinus rhythm or diagnosed with AF.      CONSENT     Ely Humphreys a 74 year old female, was on-site today to discuss participation in the Whoop Study.       The consent form was reviewed with the patient.     The review of the study included:  Study Purpose    Participant Responsibilities    Study Data and Devices    Benefits and Risks of Participation    Compensation and Costs of Participation    Voluntary Participation    Confidentiality    Injury and Legal Rights      Protocol Version: 3.0 (Version Date 30-Apr-2024)    Screening Number: SCR - [id number]    The subject was queried in regards to her willingness to continue and her questions were answered to her satisfaction.   The patient has given her agreement to volunteer and participate in the above noted study.   The eConsent and HIPAA form version 2.0 (Version Date 12-Apr-2024) was signed  on  20-JUN-2024 with the Clinical Research Unit of [facility].     A copy of the consent will be placed in subject's medical record. A copy of the consent form was given to the subject today.    Study data is directly entered into Epic and [employer] per protocol.     No study procedures were done prior to Ely Humphreys providing informed consent.     Has this subject had a previous screen failure in this study? Yes    If yes, previous Subject Number: NA     Study Phase: Phase 2    20-JUN-2024    Sara Behmanesh  Clinical Research Coordinator

## 2024-06-20 NOTE — PROGRESS NOTES
Study Description: The objective of this study is to evaluate the safety and performance of the WHOOP ECG feature in adults (22 years or older) with normal sinus rhythm or diagnosed with AF.    Post Exercise Assessment: Ely Humphreys  denies symptoms post exercise a 12 lead ECG was completed and reviewed.     Electrocardiogram has returned to baseline and Ely Humphreys was released from the clinical research unit and completed the study.     20 -JUN- 2024    Flor Dove PA-C

## 2024-06-20 NOTE — PROGRESS NOTES
[facility] Inclusion/Exclusion Criteria:    Study Name: [facility]   : Candace Alcaraz MD      Study Description: The [employer] ECG feature is a software-only mobile medical application intended for use with the Whoop strap to create, record, store, transfer and display a single-channel electrocardiogram (ECG) qualitatively similar to a lead I ECG.     Protocol Version: 3.0 (30-Apr-2024)  Consent Version: 2.0 ( 12-Apr-2024 )    Criteria #  Inclusion Criteria (ALL MUST BE YES)  YES/NO/N/A   1  Aged 22 years or older  Yes   2 Ability to provide informed consent Yes   3 Willing to participate and to follow the procedures per the Principle Investigator's instructions Yes   4  Resides in the [place]    Yes   5   Wrist circumference: 130 mm to 245 mm at band wear position Yes   6    Previous medical history of persistent, permanent, or chronic AF and being in AF at the time of enrollment (AF Cohort Only)    Yes   7     No known history of AF and being in normal sinus rhythm at the time of enrollment (SR Cohort Only) NA             Criteria # Exclusion Criteria (ALL MUST BE NO) YES/NO/N/A   1  Subjects with an implantable pacemaker device or implantable cardioverter-defibrillator device No   2 Medical history of a life-threatening cardiac arrhythmia eg., Ventricular tachycardia or fibrillation No   3  Any known allergies to medical adhesives, isopropyl alcohol, or ECG patch    No   4  Any known allergy or or sensitivity to thermoplastics, metals with PVD coatings or Elastane used in the wrist fitness device  No   5  Clinically significant body tremors that compromise study measurements    No   6  Pregnant at the time of enrollment   No   7  Any physical disability that prevents safe and adequate testing    No   8  Physical or medical impairments that preclude exercise testing, including, but not limited to, back pain and leg claudication No   9  Mental impairment as determined by the Investigator or  designee    No   10  Subjects with any medical history, physical examination finding, vital sign or other finding or assessment that could compromise subject safety, study integrity or accurate that could compromise subject safety, study integrity or accurate assessment of study objectives. This includes, but is not limited to, known untreated medical conditions that may be considered clinically significant, such as significant anemia, electrolyte imbalance, untreated or uncontrolled thyroid disease, and open wound(s) in the areas of test band positioning   No   11    Vital sign measurements, medical history of physical examination finding that makes the subject inappropriate for participation according to the investigator * No   12    Scarring, tattoos, large, pigmented moles or other skin pathology in the area of sensor location  No   13    Severe skin conditions on either wrist, that would preclude wearing the sensor. Severe symptomatic skin injury, disorder, allergy or disease such as eczema, rosacea, impetigo, dermatomyositis, or contact dermatitis on wrists or other areas where sensors or electrodes will be placed  No   14    Clinically significant hand tremors as judged by the Investigator No   15  Participated in Phase 1 of the study (Only for Phase 2 cohort)  No   * If subject is a screen fail due to PE findings, choose whichever exclusion criteria matches that finding in addition to E10.     Patient does fulfill study inclusion criteria and no exclusion criteria are found.     Candace Alcaraz MD    20-JUN-2024    Sara Behmanesh  Clinical Research Coordinator

## 2024-06-20 NOTE — PROGRESS NOTES
[facility] Study Visit Note    Study Description: The [facility] ECG feature is a software-only mobile medical application intended for use with the [facility] strap to create, record, store, transfer and display a single-channel electrocardiogram (ECG) qualitatively similar to a lead I ECG.    SCREENING       Demographic Info  Ely Humphreys   1950          74 year old  female    Woman of Child Bearing Potential?  No   If no, Why? N/A    Race: White/  Ethnicity: Not  or      Time Seated/Time of Assessment: 09:00:50 HH:MM:SS    Heart Abnormalities:  History of Heart Rhythm Abnormalities? Yes Specify Below  AF persistent (>7 days duration) Onset Year: 2017     Past Medical History:   Diagnosis Date    Atrial fibrillation s/p ablation 06/30/2017 - ongoing    CHADsVASc is 2 for gender and age. Cardiology stopped eliquis after discussion on 2/26/20.     Hypercholesterolemia 02/10/2010- ongoing    Obesity  06/17/24 - ongoing    Chronic right-sided low back pain with right-sided sciatica  05/16/22 - ongoing    Hypothyroidism 08/29/2016 - ongoing       Current Outpatient Medications:     apixaban ANTICOAGULANT (ELIQUIS) 5 MG tablet, Take 1 tablet (5 mg) by mouth 2 times daily, Disp: 180 tablet, Rfl: 1    buPROPion (WELLBUTRIN XL) 150 MG 24 hr tablet, Take 1 tablet (150 mg) by mouth every morning, Disp: 30 tablet, Rfl: 3    diltiazem ER (TIAZAC) 180 MG 24 hr ER beaded capsule, Take 180 mg by mouth daily, Disp: , Rfl:     gabapentin (NEURONTIN) 100 MG capsule, Take 1-3 capsules (100-300 mg) by mouth at bedtime, Disp: , Rfl:     levothyroxine (SYNTHROID/LEVOTHROID) 125 MCG tablet, Take 1 tablet (125 mcg) by mouth daily, Disp: 90 tablet, Rfl: 3    liothyronine (CYTOMEL) 5 MCG tablet, Take 1 tablet (5 mcg) by mouth once daily, Disp: 90 tablet, Rfl: 3    pravastatin (PRAVACHOL) 40 MG tablet, Take 1 tablet (40 mg) by mouth daily, Disp: 90 tablet, Rfl: 4      Medication Name (Generic) Indication Start Date Ongoing?  "Dose Unit Frequency Route   Apixaban  Atrial fibrillation  05/10/17 Yes 5  mg BID Oral   bupropion Obesity 06/17/24  Yes 150 mg QD Oral   diltiazem Atrial fibrillation  06/17/24 Yes 180 mg QD Oral   Gabapentin Chronic right-sided low back pain with right-sided sciatica 05/16/22 Yes 100 mg QD Oral   levothyroxine Hyperthyroidism 12/04/20  Yes 125 mcg QD Oral   Liothyronine Hyperthyroidism 09/07/10 Yes 5 mcg QD Oral   Pravastatin Hypercholesterolemia 12/20/21 Yes 40 mg QD Oral       Lifestyle Habits  How often do you...  Exercise: Occasionally (a few times a month)  Consume Caffeine:Frequently (a few times a week)   Use Tobacco/Nicotine: Never  Consume Alcohol: Occasionally (a few times a month)  Use Recreational Drugs: Never    Dominant Hand: Right  Preferred Band Hand: Right  Reason for Choosing Band Hand: Subject preference    Skin Fold Thickness: 3.0 mm  Band Wrist Circumference: 170 mm  Wrist Hairiness: Fine, low density    Does the subject have tattoos, moles, or scars on band wrist? No  Noble Skin Tone: Noble: 2    Vitals Assessment Time 09:01:00  /74 (BP Location: Left arm, Patient Position: Left side, Cuff Size: Adult Regular)   Pulse 90   Temp 97.4  F (36.3  C) (Oral)   Ht 1.638 m (5' 4.5\")   Wt 79.4 kg (175 lb)   LMP  (LMP Unknown)   SpO2 97%   BMI 29.57 kg/m      Wait 1 minute between repeat measurements   Heart Rate (bpm) SpO2 (%) Blood Pressure     Assessment Time Result Result  Result    2nd Measurement 09:02:00 67 96 101/62   3rd Measurement 09:03:00 76 95 100/78       Please see Screening ECG for providers rhythm interpretation.    ECG Rhythm Strip Time 09:14:51    Physical Exam Time of Assessment is equivalent to ECG time as DOROTHEA is present for the ECG recording and continues their assessment thereafter.     ENROLLMENT     Subject has met all requirements and is Enrolled in the Study?: Yes    Enrollment Number: [id number]   [employer] User ID: [id number]   [email or web address]    DATA " COLLECTION     Device Information    Study mobile device and laptop synchronized? Yes    Comparative (Holter) device recorder date and time set according to local clock? Yes    Whoop Kit 4: Strap SN: [id number]  Study Phone: [phone]     Was the WHOOP Strap Applied? Yes  Time of Application: 09:48:00     Subject reviewed WHOOP device instructions for use? Yes    Comparative (Holter) ECG device applied? Yes  Time of  Holter Application: 09:58:09    Comparative Holter Kit IDs (insert name Kary)  Study Laptop Device  [id number]  Holter Device S/N  [id number] - [id number]     Lead Placement QC Performed by:  Lela MCKINNON    Practice Strap ECG Taken? Yes  Number of Practice Trials: 1   Data recording for ECG-Resting and ECG-Exercise sections- were directly entered into [facility] during study visit.       END OF STUDY     Was the wrist device removed after all the trials? Yes  Was the 12 lead ECG Holter detached after all the trials? Yes    Any Adverse Events? No  Any Protocol Deviations? No    Any changes in medication since screening visit? No  Any device deficiencies? No If yes complete a device deficiency note    Is all study data for this visit collected? Yes      Date Subject Exited the Study: 20-JUN-2024  Time Subject Exited the Study: 11:00:00    Did the subject successfully complete the study? Yes   If no, Why? N/A        20-JUN-2024    Sara Behmanesh   Clinical Research Coordinator

## 2024-06-25 ENCOUNTER — MYC MEDICAL ADVICE (OUTPATIENT)
Dept: SLEEP MEDICINE | Facility: CLINIC | Age: 74
End: 2024-06-25
Payer: COMMERCIAL

## 2024-06-25 DIAGNOSIS — G47.33 OSA (OBSTRUCTIVE SLEEP APNEA): Primary | ICD-10-CM

## 2024-06-26 NOTE — TELEPHONE ENCOUNTER
Order placed for a replacement auto CPAP 10-13 cm as her current machine is reading a message that the expected motor life has been exceeded.   Bennett Goltz, PA-C   
Patients CPAP displaying end of life message, requesting new machine order.  LOV 9/8/23, order pended.  LOV was virtual, may require a new visit.    
Acute kidney injury superimposed on CKD

## 2024-07-10 ENCOUNTER — DOCUMENTATION ONLY (OUTPATIENT)
Dept: SLEEP MEDICINE | Facility: CLINIC | Age: 74
End: 2024-07-10
Payer: COMMERCIAL

## 2024-07-10 DIAGNOSIS — G47.33 OBSTRUCTIVE SLEEP APNEA (ADULT) (PEDIATRIC): Primary | ICD-10-CM

## 2024-07-11 NOTE — PROGRESS NOTES
Patient was offered choice of vendor and chose [facility].  Patient Ely Humphreys was set up at [facility] on July 11, 2024. Patient received a [employer] Airsense 11 Pressures were set at  10-13 cm H2O.   Patient s ramp is OFF and FLEX/EPR is EPR, 2.  Patient received a [employer] Mask name: DREAMWEAR  Nasal mask size Standard, heated tubing and heated humidifier.  Patient has the following compliance requirements: using and visit requirements  Patient has a follow up on 10/17/2024 with CRISTI Gilliland.    Henny Alexander

## 2024-07-15 ENCOUNTER — DOCUMENTATION ONLY (OUTPATIENT)
Dept: SLEEP MEDICINE | Facility: CLINIC | Age: 74
End: 2024-07-15
Payer: COMMERCIAL

## 2024-07-15 DIAGNOSIS — M54.41 CHRONIC RIGHT-SIDED LOW BACK PAIN WITH RIGHT-SIDED SCIATICA: ICD-10-CM

## 2024-07-15 DIAGNOSIS — G89.29 CHRONIC RIGHT-SIDED LOW BACK PAIN WITH RIGHT-SIDED SCIATICA: ICD-10-CM

## 2024-07-15 RX ORDER — GABAPENTIN 100 MG/1
CAPSULE ORAL
Qty: 240 CAPSULE | Refills: 3 | Status: SHIPPED | OUTPATIENT
Start: 2024-07-15

## 2024-07-15 NOTE — PROGRESS NOTES
3 day Sleep therapy management telephone visit             Confirmed with patient at time of call- N/A Patient is still interested in STM service       PT IS REPLACEMENT SETUP WILL DELETE FROM STM.     Order settings:  CPAP MIN CPAP MAX   10 cm H2O 13 cm H2O       Device settings:  CPAP MIN CPAP MAX EPR RESMED SOFT RESPONSE SETTING   10.0 cm  H20 13.0 cm  H20 TWO OFF       Compliance 100 %    Assessment: Nightly usage over four hours     Patient has the following upcoming sleep appts:  Future Sleep Appointments         Provider Department    10/17/2024 9:30 AM (Arrive by 9:15 AM) Freya Kumar, SARAH [facility]            Replacement device: No  STM ordered by provider: Yes     Total time spent on accessing and  interpreting remote patient PAP therapy data  10 minutes    Total time spent counseling, coaching  and reviewing PAP therapy data with patient  1 minutes    [id number] no

## 2024-07-24 ENCOUNTER — TRANSFERRED RECORDS (OUTPATIENT)
Dept: HEALTH INFORMATION MANAGEMENT | Facility: CLINIC | Age: 74
End: 2024-07-24
Payer: COMMERCIAL

## 2024-07-31 ENCOUNTER — PATIENT OUTREACH (OUTPATIENT)
Dept: CARE COORDINATION | Facility: CLINIC | Age: 74
End: 2024-07-31
Payer: COMMERCIAL

## 2024-08-19 ENCOUNTER — TELEPHONE (OUTPATIENT)
Dept: PEDIATRICS | Facility: CLINIC | Age: 74
End: 2024-08-19
Payer: COMMERCIAL

## 2024-08-19 NOTE — TELEPHONE ENCOUNTER
General Call    Contacts       Contact Date/Time Type Contact Phone/Fax    08/19/2024 02:30 PM CDT Phone (Incoming) Ely Humphreys (Self) [phone] (H)          Reason for Call:  Traveling with CPAP    What are your questions or concerns:  Pt is traveling out of the country (leaving [place]) and is taking her CPAP with her. Pt was told that she should have some type of letter with for the TSA checks with her that states she has to have the machine. Pt is unsure exactly what it needs to say but pt is wondering if Bennett Goltz can provide this.     Date of last appointment with provider:  09/08/2023    Could we send this information to you in [facility] or would you prefer to receive a phone call?:   Patient would prefer a phone call   Okay to leave a detailed message?: Yes at Home number on file [phone] (home)

## 2024-08-21 NOTE — TELEPHONE ENCOUNTER
Letter written from a different encounter as it was pulling in the wrong header.  The letter will be available to her through [facility].  Bennett Goltz, PA-C

## 2024-08-28 ENCOUNTER — PATIENT OUTREACH (OUTPATIENT)
Dept: CARE COORDINATION | Facility: CLINIC | Age: 74
End: 2024-08-28
Payer: COMMERCIAL

## 2024-10-03 ENCOUNTER — ANCILLARY PROCEDURE (OUTPATIENT)
Dept: MAMMOGRAPHY | Facility: CLINIC | Age: 74
End: 2024-10-03
Attending: INTERNAL MEDICINE
Payer: COMMERCIAL

## 2024-10-03 DIAGNOSIS — Z12.31 VISIT FOR SCREENING MAMMOGRAM: ICD-10-CM

## 2024-10-03 PROCEDURE — 77063 BREAST TOMOSYNTHESIS BI: CPT | Mod: TC | Performed by: RADIOLOGY

## 2024-10-03 PROCEDURE — 77067 SCR MAMMO BI INCL CAD: CPT | Mod: TC | Performed by: RADIOLOGY

## 2024-10-12 ASSESSMENT — SLEEP AND FATIGUE QUESTIONNAIRES

## 2024-10-17 ENCOUNTER — VIRTUAL VISIT (OUTPATIENT)
Dept: SLEEP MEDICINE | Facility: CLINIC | Age: 74
End: 2024-10-17
Payer: COMMERCIAL

## 2024-10-17 VITALS — HEIGHT: 65 IN | WEIGHT: 175 LBS | BODY MASS INDEX: 29.16 KG/M2

## 2024-10-17 DIAGNOSIS — G47.33 OSA (OBSTRUCTIVE SLEEP APNEA): Primary | ICD-10-CM

## 2024-10-17 PROCEDURE — 99213 OFFICE O/P EST LOW 20 MIN: CPT | Mod: 95

## 2024-10-17 ASSESSMENT — PAIN SCALES - GENERAL: PAINLEVEL: NO PAIN (0)

## 2024-10-17 NOTE — PROGRESS NOTES
Virtual Visit Details  Type of service: Video Visit   Video Start Time: 9:26 AM  Video End Time: 9:37 AM  Originating Location (pt. Location): Home  Distant Location (provider location): Off-site  Platform used for Video Visit: [employer]    Sleep Apnea - Follow-up Visit:    Impression/Plan:  (G47.33) MIKEL (obstructive sleep apnea) (primary encounter diagnosis)  Comment: Ely Humphreys presents for follow-up of her moderate sleep apnea, managed with CPAP. She is here to document compliance with her machine received on 2024. She has met compliance. She used her machine 30/30 days and 100% of days >4 hours of use. She feels like the pressure at the beginning of the night is different compared to her old machine. Her download shows her apnea is well controlled with a residual AHI of 0.8 events per hour. Her leak is acceptable.     Plan: Comprehensive DME  Continue auto-PAP 10-13 cmH2O. A prescription was written for new supplies. We reviewed recommendations for cleaning and replacing supplies. Review of her old CPAP set up note shows her ramp was on at 5.0 cmH2O for 15 minutes with her old machine. I will adjust her new CPAP settings to match this. If this feels uncomfortable, Ely will send me a [employer] message to make further adjustments.      Ely Humphreys will follow up in about 1 year(s).     Total time spent reviewing medical records, history and physical examination, review of previous testing and interpretation as well as documentation on this date: 22 minutes      CC: Shadia Munroe MD    History of Present Illness:  Chief Complaint   Patient presents with    RECHECK     Ely Humphreys presents for follow-up of her moderate sleep apnea, managed with CPAP. She is here to document compliance with her machine received on 2024.     PS/15/17. Wt: 160#  AHI was 24.6/hour. RDI 30.6/hour.    REM AHI 75/hour (due to limited REM sleep). Supine AHI 27.2/hour.    Lowest O2 saturation: 83.8%. She spent 1.9  minutes below 89% SpO2.      CPAP was titrated to 6 cmH2O with elimination of apneas, hypopneas and desaturations. Supine REM was not noted at this pressure.     DME: [facility]    Do you use a CPAP Machine at home: Yes  Overall, on a scale of 0-10 how would you rate your CPAP (0 poor, 10 great): 9    What type of mask do you use: Nasal Pillow DreamWear nasal mask  Is your mask comfortable: Yes  If not, why:      Is your mask leaking: No  If yes, where do you feel it:    How many night per week does the mask leak (0-7):      Do you notice snoring with mask on: No  Do you notice gasping arousals with mask on: No  Are you having significant oral or nasal dryness: Yes, sometimes   Is the pressure setting comfortable: Yes  If not, why:      What is your typical bedtime: 10pm  How long does it take you to go to sleep on PAP therapy: 15 minutes  What time do you typically get out of bed for the day: 7:30am  How many hours on average per night are you using PAP therapy: 9  How many hours are you sleeping per night: 9  Do you feel well rested in the morning: Yes    She still gets tired in the afternoon. Bennett Goltz, PA-C recommended taking a 10-15 minute nap with her CPAP machine; however, Ely says she is not a person who can nap.     ResMed AirSense 11  Auto-PAP 10 - 13 cmH2O 30 day usage data: 09/15/2024-10/14/2024    100% of days with > 4 hours of use. 0/30 days with no use.   Average use 8 hours 49 minutes per day.   95%ile Leak 11.5 L/min.   CPAP 95% pressure 11.6 cm.   AHI 0.8 events per hour.      EPWORTH SLEEPINESS SCALE         10/12/2024     2:36 PM    [place] Sleepiness Scale ( JEEVAN Yancey  [id number]<br>ESS - [place]/English - Final version - 21 Nov 07 - [facility] [place].)   Sitting and reading Slight chance of dozing   Watching TV Slight chance of dozing   Sitting, inactive in a public place (e.g. a theatre or a meeting) Would never doze   As a passenger in a car for an hour without a break Would never doze    Lying down to rest in the afternoon when circumstances permit Slight chance of dozing   Sitting and talking to someone Would never doze   Sitting quietly after a lunch without alcohol Would never doze   In a car, while stopped for a few minutes in traffic Would never doze   [place] Score (MC) 3   [place] Score (Sleep) 3       INSOMNIA SEVERITY INDEX (ANITRA)          10/12/2024     2:32 PM   Insomnia Severity Index (ANITRA)   Difficulty falling asleep 0   Difficulty staying asleep 0   Problems waking up too early 1   How SATISFIED/DISSATISFIED are you with your CURRENT sleep pattern? 0   How NOTICEABLE to others do you think your sleep problem is in terms of impairing the quality of your life? 0   How WORRIED/DISTRESSED are you about your current sleep problem? 0   To what extent do you consider your sleep problem to INTERFERE with your daily functioning (e.g. daytime fatigue, mood, ability to function at work/daily chores, concentration, memory, mood, etc.) CURRENTLY? 1   ANITRA Total Score 2       Guidelines for Scoring/Interpretation:  Total score categories:  0-7 = No clinically significant insomnia   8-14 = Subthreshold insomnia   15-21 = Clinical insomnia (moderate severity)  22-28 = Clinical insomnia (severe)  Used via courtesy of www.[employer].va.gov with permission from Clarence Dumas PhD., [facility]      Past medical/surgical history, family history, social history, medications and allergies were reviewed.        Problem List:  Patient Active Problem List    Diagnosis Date Noted    Longstanding persistent atrial fibrillation (H) 06/17/2024     Priority: Medium    Stroke-like symptoms 01/06/2024     Priority: Medium    Pulmonary embolism, unspecified chronicity, unspecified pulmonary embolism type, unspecified whether acute cor pulmonale present (H) 01/06/2024     Priority: Medium    Pulmonary embolism (H) 01/06/2024     Priority: Medium    Vitamin D deficiency 12/18/2023     Priority: Medium    Long term  current use of aromatase inhibitor 12/18/2023     Priority: Medium    Estrogen receptor positive status (ER+) 12/18/2023     Priority: Medium    Epidermoid cyst of skin 12/18/2023     Priority: Medium    Acquired dilation of ascending aorta and aortic root (H) 10/02/2021     Priority: Medium    Personal history of malignant neoplasm of breast 09/08/2021     Priority: Medium    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) 08/03/2020     Priority: Medium     Thought due to afib11/21/2017 EF 40-45%. Stress Echo 2019 showed mild residual LV dilation  Echo April 2024 Summary:    1. Atrial fibrillation during study.     2. Normal LV size with mildly increased wall thickness. Calculated 3D LVEF 53%. No regional wall motion abnormalities. (Normal function). Indeterminate diastolic function.     3. Moderate RV enlargement with mildly decreased systolic function. Estimated PASP 40 mmHg (25 mmHg + RA Pressure \R\ 15 mmHg).     4. Severe biatrial enlargement.     5. Left-to-right interatrial shunting consistent with atrial septal defect.     6. Mild-moderate mitral valve regurgitation.     7. There is mild-moderate tricuspid valve regurgitation.     8. The IVC measures >2.1 cm with <50% collapse upon inspiration consistent with elevated right atrial pressure, 15 mmHg.     9. Compared to the prior study from 12/19/2018, the current study reveals RV dilation, worsened MR/TR, and atrial septal defect is present.       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 Hodgkin      Hypothyroidism, unspecified type 08/29/2016     Priority: Medium    Meralgia paresthetica 07/30/2012     Priority: Medium    ACP (advance care planning) 10/19/2011  "    Priority: Medium     Advance Care Planning 2/19/2017: Receipt of ACP document:  Received: Health Care Directive which was witnessed or notarized on 11/17/14.  Document previously scanned on 12/7/16.  Validation form completed and sent to be scanned.  Code Status reflects choices in most recent ACP document.  Confirmed/documented designated decision maker(s).  Added by Ericka Baker   Advance Care Planning Liaison  Advance Care Planning 11/15/2016: ACP Review of Chart / Resources Provided:  Reviewed chart for advance care plan.  Ely Humphreys has no plan or code status on file however states presence of ACP document. Copy requested. Code status updated and sent to provider for review pending receipt of document/advance care plan review.  Confirmed/documented legally designated decision makers.  Added by Bailey Ndiaye  Advance Care Planning 10/19/2011:  Patient states has Advance Directive and will bring in a copy to clinic.       Pure hypercholesterolemia 02/10/2010     Priority: Medium    Postmenopausal atrophic vaginitis 02/21/2007     Priority: Medium    Osteopenia of multiple sites 07/27/2004     Priority: Medium        Ht 1.638 m (5' 4.5\")   Wt 79.4 kg (175 lb)   LMP  (LMP Unknown)   BMI 29.57 kg/m      Freya Kumar, APRN CNP  "

## 2024-10-17 NOTE — NURSING NOTE
Current patient location: [address] [place] [address] [address] [place] [address]    Is the patient currently in the state of [place]? YES    Visit mode:VIDEO    If the visit is dropped, the patient can be reconnected by: VIDEO VISIT: Text to cell phone:   Telephone Information:   Mobile [phone]       Will anyone else be joining the visit? NO  (If patient encounters technical issues they should call [phone]    Are changes needed to the allergy or medication list? No    Are refills needed on medications prescribed by this physician? NO    Rooming Documentation:  Not applicable    Reason for visit: RACHEL Mckay VVF

## 2024-10-29 ENCOUNTER — OFFICE VISIT (OUTPATIENT)
Dept: PEDIATRICS | Facility: CLINIC | Age: 74
End: 2024-10-29
Payer: COMMERCIAL

## 2024-10-29 VITALS
HEART RATE: 74 BPM | TEMPERATURE: 98.2 F | BODY MASS INDEX: 28.66 KG/M2 | HEIGHT: 65 IN | OXYGEN SATURATION: 97 % | SYSTOLIC BLOOD PRESSURE: 103 MMHG | WEIGHT: 172 LBS | DIASTOLIC BLOOD PRESSURE: 68 MMHG

## 2024-10-29 DIAGNOSIS — L25.8 CONTACT DERMATITIS DUE TO OTHER AGENT, UNSPECIFIED CONTACT DERMATITIS TYPE: ICD-10-CM

## 2024-10-29 DIAGNOSIS — I51.7 ACQUIRED DILATION OF RIGHT VENTRICLE OF HEART: ICD-10-CM

## 2024-10-29 DIAGNOSIS — I43 DILATED CARDIOMYOPATHY SECONDARY TO TACHYCARDIA (H): ICD-10-CM

## 2024-10-29 DIAGNOSIS — Z98.1 S/P SPINAL FUSION: ICD-10-CM

## 2024-10-29 DIAGNOSIS — E03.9 HYPOTHYROIDISM, UNSPECIFIED TYPE: ICD-10-CM

## 2024-10-29 DIAGNOSIS — I48.20 CHRONIC ATRIAL FIBRILLATION (H): ICD-10-CM

## 2024-10-29 DIAGNOSIS — I26.09 ACUTE PULMONARY EMBOLISM WITH ACUTE COR PULMONALE, UNSPECIFIED PULMONARY EMBOLISM TYPE (H): ICD-10-CM

## 2024-10-29 DIAGNOSIS — Z85.3 HISTORY OF BREAST CANCER: ICD-10-CM

## 2024-10-29 DIAGNOSIS — Z86.711 HISTORY OF PULMONARY EMBOLISM: ICD-10-CM

## 2024-10-29 DIAGNOSIS — E78.00 PURE HYPERCHOLESTEROLEMIA: Primary | ICD-10-CM

## 2024-10-29 DIAGNOSIS — G47.33 OSA (OBSTRUCTIVE SLEEP APNEA): ICD-10-CM

## 2024-10-29 DIAGNOSIS — R00.0 DILATED CARDIOMYOPATHY SECONDARY TO TACHYCARDIA (H): ICD-10-CM

## 2024-10-29 DIAGNOSIS — Z79.811 LONG TERM CURRENT USE OF AROMATASE INHIBITOR: ICD-10-CM

## 2024-10-29 PROBLEM — M43.16 SPONDYLOLISTHESIS OF LUMBAR REGION: Status: ACTIVE | Noted: 2023-12-22

## 2024-10-29 PROBLEM — Q21.12 PFO (PATENT FORAMEN OVALE): Status: ACTIVE | Noted: 2023-12-26

## 2024-10-29 PROCEDURE — 84481 FREE ASSAY (FT-3): CPT | Performed by: INTERNAL MEDICINE

## 2024-10-29 PROCEDURE — 80053 COMPREHEN METABOLIC PANEL: CPT | Performed by: INTERNAL MEDICINE

## 2024-10-29 PROCEDURE — 99215 OFFICE O/P EST HI 40 MIN: CPT | Performed by: INTERNAL MEDICINE

## 2024-10-29 PROCEDURE — 84439 ASSAY OF FREE THYROXINE: CPT | Performed by: INTERNAL MEDICINE

## 2024-10-29 PROCEDURE — G2211 COMPLEX E/M VISIT ADD ON: HCPCS | Performed by: INTERNAL MEDICINE

## 2024-10-29 PROCEDURE — 84443 ASSAY THYROID STIM HORMONE: CPT | Performed by: INTERNAL MEDICINE

## 2024-10-29 PROCEDURE — 80061 LIPID PANEL: CPT | Performed by: INTERNAL MEDICINE

## 2024-10-29 PROCEDURE — 36415 COLL VENOUS BLD VENIPUNCTURE: CPT | Performed by: INTERNAL MEDICINE

## 2024-10-29 RX ORDER — LEVOTHYROXINE SODIUM 125 UG/1
125 TABLET ORAL DAILY
Qty: 90 TABLET | Refills: 4 | Status: SHIPPED | OUTPATIENT
Start: 2024-10-29

## 2024-10-29 RX ORDER — PRAVASTATIN SODIUM 40 MG
40 TABLET ORAL DAILY
Qty: 90 TABLET | Refills: 4 | Status: SHIPPED | OUTPATIENT
Start: 2024-10-29

## 2024-10-29 RX ORDER — LIOTHYRONINE SODIUM 5 UG/1
5 TABLET ORAL DAILY
Qty: 90 TABLET | Refills: 4 | Status: SHIPPED | OUTPATIENT
Start: 2024-10-29

## 2024-10-29 ASSESSMENT — PAIN SCALES - GENERAL: PAINLEVEL_OUTOF10: NO PAIN (0)

## 2024-10-29 NOTE — PROGRESS NOTES
Assessment & Plan     Hypothyroidism, unspecified type  Does feel fatigued, last TSH upper normal. Will recheck, but discussed avoiding overadjusting medication to suppress TSH as may increase heart rate.   - liothyronine (CYTOMEL) 5 MCG tablet; Take 1 tablet (5 mcg) by mouth daily.  - levothyroxine (SYNTHROID/LEVOTHROID) 125 MCG tablet; Take 1 tablet (125 mcg) by mouth daily.  - T3 Free; Future  - T4 free; Future  - TSH; Future  - T3 Free  - T4 free  - TSH    Pure hypercholesterolemia  Tolerating statin.  - Lipid panel reflex to direct LDL Fasting; Future  - Comprehensive metabolic panel (BMP + Alb, Alk Phos, ALT, AST, Total. Bili, TP); Future  - pravastatin (PRAVACHOL) 40 MG tablet; Take 1 tablet (40 mg) by mouth daily.  - Lipid panel reflex to direct LDL Fasting  - Comprehensive metabolic panel (BMP + Alb, Alk Phos, ALT, AST, Total. Bili, TP)    MIKEL (obstructive sleep apnea)  Using CPAP    Contact dermatitis due to other agent, unspecified contact dermatitis type  Discussed using steroid cream and cover with aquaphor before putting her CPAP mask on until she can get better recommendations from her dermatologist. I recommended she reach out to CPAP supplier to see if she can get a new mask or alternate materials.    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)  Stable. Dilated R ventricle with normal EF.    Chronic atrial fibrillation (H)  Rate controlled.     Acquired dilation of right ventricle of heart      S/P spinal fusion  Improved symptoms significantly. Discussed importance of doing her PT exercises consistently.    History of pulmonary embolism  Has seen hematology, who recommended 6 months of anticoagulation, but wanted cardiology to address lifelong anticoagulation due to her CHADsVASC score of 4 AND small PFO. Thus her stroke risk is high and based on cardiology indications, she might need to stay on indefinite anticoagulation therapy.     History of breast cancer  She and her daughter both  "with breast cancer. She tells me today that her teenage granddaughter has been talking about getting breast cancer. Discussed general screening strategies for high risk patients and encouraged her to have her daughter and granddaughter talk with her PCP.    The longitudinal plan of care for the diagnosis(es)/condition(s) as documented were addressed during this visit. Due to the added complexity in care, I will continue to support Ely in the subsequent management and with ongoing continuity of care.      50 minutes spent by me on the date of the encounter doing chart review, history and exam, documentation and further activities per the note      BMI  Estimated body mass index is 29.07 kg/m  as calculated from the following:    Height as of this encounter: 1.638 m (5' 4.5\").    Weight as of this encounter: 78 kg (172 lb).         See Patient Instructions    Subjective   Ely is a 74 year old, presenting for the following health issues:  RECHECK (Cardio)        10/29/2024    11:09 AM   Additional Questions   Roomed by Maki Alexander CMA   Accompanied by N/.A         10/29/2024    11:09 AM   Patient Reported Additional Medications   Patient reports taking the following new medications N/A     History of Present Illness       Reason for visit:  Yearly physical   She is taking medications regularly.     Spent the month of Sept in [place] Had a wonderful time.    Rash around nose. Did see dermatology and has tried hydrochlorothiazide cream, but comes right back when she stops using it. Is in exactly same spot as her nasal CPAP mask.     Following with cardiology.    Taking Eliquis. Follow up on this.     Low back pain much better. Does still have some back and leg pain, but is tolerable now. Does note she hasn't been doing her exercises as faithfully in the last month since she got back from [facility].    Tolerating pravastatin.     Afib, didn't tolerate increase in diltiazem to 240 mg so is back down on 180 mg and " "feeling better. Still tired, though.      Review of Systems  Constitutional, HEENT, cardiovascular, pulmonary, gi and gu systems are negative, except as otherwise noted.      Objective    /68 (BP Location: Right arm, Patient Position: Sitting, Cuff Size: Adult Regular)   Pulse 74   Temp 98.2  F (36.8  C) (Oral)   Ht 1.638 m (5' 4.5\")   Wt 78 kg (172 lb)   LMP  (LMP Unknown)   SpO2 97%   BMI 29.07 kg/m    Body mass index is 29.07 kg/m .  Physical Exam   GENERAL: alert and no distress  NECK: no adenopathy, no asymmetry, masses, or scars  RESP: lungs clear to auscultation - no rales, rhonchi or wheezes  CV: irregularly irregular rhythm, normal S1 S2, no S3 or S4, no murmur, click or rub, and no peripheral edema  ABDOMEN: soft, nontender, no hepatosplenomegaly, no masses and bowel sounds normal  MS: no gross musculoskeletal defects noted, no edema    Allied Health/Nurse Visit on 06/20/2024   Component Date Value Ref Range Status    Ventricular Rate 06/20/2024 84  BPM Final    Atrial Rate 06/20/2024 101  BPM Final    QRS Duration 06/20/2024 82  ms Final    QT 06/20/2024 368  ms Final    QTc 06/20/2024 434  ms Final    R AXIS 06/20/2024 1  degrees Final    T Axis 06/20/2024 -21  degrees Final    Interpretation ECG 06/20/2024    Final                    Value:Atrial fibrillation  Poor R wave progression  Abnormal ECG  When compared with ECG of 29-JAN-2024 00:12,  Atrial fibrillation has replaced Sinus rhythm  Confirmed by FAIZAN YOUNG MD LOC:JN ([id number]) on 6/20/2024 3:13:06 PM      Ventricular Rate 06/20/2024 86  BPM Incomplete    Atrial Rate 06/20/2024 63  BPM Incomplete    QRS Duration 06/20/2024 78  ms Incomplete    QT 06/20/2024 368  ms Incomplete    QTc 06/20/2024 440  ms Incomplete    R AXIS 06/20/2024 8  degrees Incomplete    T Axis 06/20/2024 -32  degrees Incomplete    Interpretation ECG 06/20/2024    Incomplete                    Value:Atrial fibrillation  Cannot rule out Anterior infarct (cited " on or before 28-JAN-2024)  Abnormal ECG  When compared with ECG of 20-JUN-2024 09:12,  No significant change was found             Signed Electronically by: Shadia Munroe MD

## 2024-10-29 NOTE — LETTER
My Heart Failure Action Plan  Name: Ely Humphreys   YOB: 1950  Date: 10/29/2024   My doctor:   Shadia Munroe [address]  [address] [place] [address] [phone] My Diagnosis: HF-pEF (EF > 40%)  My Ejection Fraction:   Lab Results   Component Value Date    LVEF 55-60% 01/07/2024     Over 50%  My Exercise Goal: Start exercise slowly - to begin, do a few minutes of exercise, several times a day. Increase your time and speed [id number] to build tolerance, with a goal of 30 minutes of exercise daily. Steady, slow, and consistent exercise is both safe and healthy. Stop and rest when you feel tired or become short of breath. Do not push yourself on days when you don t feel well.       My Weight Plan:   Wt Readings from Last 2 Encounters:   10/29/24 78 kg (172 lb)   10/17/24 79.4 kg (175 lb)     Weigh yourself daily using the same scale. If you gain more than 2 pounds in 24 hours or 5 pounds in 7 days. call the clinic    My Diet Goal: No added salt    Emergency Room Visits:    Our goal is to improve your quality of life and help you avoid a visit to the emergency room or hospital.  If we work together, we can achieve this goal. But, if you feel you need to call 911 or go to the emergency room, please do so.  If you go to the emergency room, please bring your list of medicines and your daily weight chart with you.    Each day ask yourself:  Is my weight up?  Do I have swelling?  Do I have trouble breathing?  How did I sleep?  Other problems?       GREEN ZONE     Weight gained is no more than 2 pounds a day or 5 pounds a in 7 days.  No swelling in feet, ankles, legs or stomach.  No more swelling than usual.  No more trouble breathing than usual.  No change in my sleep.  No other problems. What should I do?  I am doing fine. I will take my medicine, follow my diet, see my doctor, exercise, and watch for symptoms.           YELLOW  ZONE         Weight gain of more than 2 pounds in one day or 5 pounds in 7 days.  New swelling in ankle, leg, knee or thigh.  Bloating in belly, pants feel tighter.  Swelling in hands or face.  Coughing or trouble breathing while walking or talking.  Harder to breathe last night.  Have trouble sleeping, wake up short of breath.  Unusually tired.  Not eating.  Nausea, vomiting, or diarrhea  Pain in my chest or bad  leg cramps.  Feel weak or dizzy. What should I do?  I need to take action and call my doctor or nurse today.                 RED ZONE         Weight gain of 5 pounds overnight.  Chest pain or pressure that does not go away.  Feel less alert.  Wheezing or have trouble breathing when at rest.  Cannot sleep lying down.  Cannot take my medicines.  Pass out or faint. What should I do?  I need to call my doctor or nurse now!  Call 911 if I have chest pain or cannot breathe.

## 2024-10-29 NOTE — PATIENT INSTRUCTIONS
OK to try stopping gabapentin at bedtime.    OK to try stopping wellbutrin and see how you do. If your depression or frustration symptoms increase, let me know.    Talk with your cardiologist about whether to continue Eliquis. The hematologist thought you could stop after 6 months from a hematology standpoint, but that cardiology should discuss regarding the afib and PFO.

## 2024-10-30 LAB
ALBUMIN SERPL BCG-MCNC: 4.6 G/DL (ref 3.5–5.2)
ALP SERPL-CCNC: 90 U/L (ref 40–150)
ALT SERPL W P-5'-P-CCNC: 29 U/L (ref 0–50)
ANION GAP SERPL CALCULATED.3IONS-SCNC: 15 MMOL/L (ref 7–15)
AST SERPL W P-5'-P-CCNC: 30 U/L (ref 0–45)
BILIRUB SERPL-MCNC: 1.2 MG/DL
BUN SERPL-MCNC: 19.4 MG/DL (ref 8–23)
CALCIUM SERPL-MCNC: 10.2 MG/DL (ref 8.8–10.4)
CHLORIDE SERPL-SCNC: 101 MMOL/L (ref 98–107)
CHOLEST SERPL-MCNC: 226 MG/DL
CREAT SERPL-MCNC: 0.98 MG/DL (ref 0.51–0.95)
EGFRCR SERPLBLD CKD-EPI 2021: 61 ML/MIN/1.73M2
FASTING STATUS PATIENT QL REPORTED: ABNORMAL
FASTING STATUS PATIENT QL REPORTED: ABNORMAL
GLUCOSE SERPL-MCNC: 91 MG/DL (ref 70–99)
HCO3 SERPL-SCNC: 24 MMOL/L (ref 22–29)
HDLC SERPL-MCNC: 76 MG/DL
LDLC SERPL CALC-MCNC: 131 MG/DL
NONHDLC SERPL-MCNC: 150 MG/DL
POTASSIUM SERPL-SCNC: 4.4 MMOL/L (ref 3.4–5.3)
PROT SERPL-MCNC: 7.7 G/DL (ref 6.4–8.3)
SODIUM SERPL-SCNC: 140 MMOL/L (ref 135–145)
T3FREE SERPL-MCNC: 2.7 PG/ML (ref 2–4.4)
T4 FREE SERPL-MCNC: 1.23 NG/DL (ref 0.9–1.7)
TRIGL SERPL-MCNC: 96 MG/DL
TSH SERPL DL<=0.005 MIU/L-ACNC: 10.3 UIU/ML (ref 0.3–4.2)

## 2024-11-25 ENCOUNTER — DOCUMENTATION ONLY (OUTPATIENT)
Dept: LAB | Facility: CLINIC | Age: 74
End: 2024-11-25
Payer: COMMERCIAL

## 2024-11-25 NOTE — PROGRESS NOTES
She has labs in her chart but the expected date need to be changed in order for us to be able to collect     If no labs are needed at this time please have the Care Team contact patient regarding this information and cancel lab appointment. Thank you.     Gladys Hernandez. TAMANNAT

## 2024-11-25 NOTE — PROGRESS NOTES
She doesn't need lab appt on 11/27. Would recheck at her lab visit already scheduled Jan 2.  Shadia Munroe M.D.

## 2024-12-02 ENCOUNTER — MYC MEDICAL ADVICE (OUTPATIENT)
Dept: PEDIATRICS | Facility: CLINIC | Age: 74
End: 2024-12-02
Payer: COMMERCIAL

## 2024-12-02 DIAGNOSIS — E66.811 CLASS 1 OBESITY DUE TO EXCESS CALORIES WITH SERIOUS COMORBIDITY AND BODY MASS INDEX (BMI) OF 32.0 TO 32.9 IN ADULT: Primary | ICD-10-CM

## 2024-12-02 DIAGNOSIS — E66.09 CLASS 1 OBESITY DUE TO EXCESS CALORIES WITH SERIOUS COMORBIDITY AND BODY MASS INDEX (BMI) OF 32.0 TO 32.9 IN ADULT: Primary | ICD-10-CM

## 2024-12-02 NOTE — TELEPHONE ENCOUNTER
Please see patient MyChart  -requesting [facility] phone number to call and ask questions about weight loss medications    Provided:  [facility] - [place], [place] - [address]  [phone]     Alyssa Hawthorne RN

## 2024-12-03 NOTE — TELEPHONE ENCOUNTER
"Please see patient MyChart  -requesting order for weight loss medication through [employer]    Per chart review  OV 6/17/24 with Dr. Munroe  \"Class 1 obesity due to excess calories with serious comorbidity and body mass index (BMI) of 32.0 to 32.9 in adult  Discussed options. Semaglutide not covered by ins. Discussed option for self pay compounded. Will try oral medications. She has always been very fit, but with menopause, breast cancer and estrogen blockade, gained significant weight she has not been able to lose.   - buPROPion (WELLBUTRIN XL) 150 MG 24 hr tablet; Take 1 tablet (150 mg) by mouth every morning  Start with wellbutrin 150 mg in the morning. If you do well with this, we can increase to 300 mg after a month, if needed. Then add topiramate or naltrexone if you find cravings are more of an issue.\"    -currently no active prescriptions for listed above medications    Dr. Munroe please review and advise. Needs at least VV? Okay to see other primary care providers with availability?    Alyssa Hawthorne RN    "

## 2024-12-22 DIAGNOSIS — E66.09 CLASS 1 OBESITY DUE TO EXCESS CALORIES WITH SERIOUS COMORBIDITY AND BODY MASS INDEX (BMI) OF 32.0 TO 32.9 IN ADULT: ICD-10-CM

## 2024-12-22 DIAGNOSIS — E66.811 CLASS 1 OBESITY DUE TO EXCESS CALORIES WITH SERIOUS COMORBIDITY AND BODY MASS INDEX (BMI) OF 32.0 TO 32.9 IN ADULT: ICD-10-CM

## 2024-12-23 RX ORDER — BUPROPION HYDROCHLORIDE 150 MG/1
TABLET ORAL
Qty: 90 TABLET | Refills: 0 | OUTPATIENT
Start: 2024-12-23

## 2024-12-23 NOTE — TELEPHONE ENCOUNTER
Per chart review, last Office visit w/ PCP notes 10/29/24: OK to try stopping wellbutrin and see how you do. If your depression or frustration symptoms increase, let me know.     RN called and spoke with patient. Patient is planning to discontinue this medication. Patient is using up what she has now and will be out in a couple weeks. Patient does not want refill at this time.     Jamal PALOMARES RN 12/23/2024 at 3:12 PM

## 2025-01-02 ENCOUNTER — LAB (OUTPATIENT)
Dept: LAB | Facility: CLINIC | Age: 75
End: 2025-01-02
Payer: COMMERCIAL

## 2025-01-02 DIAGNOSIS — E78.00 PURE HYPERCHOLESTEROLEMIA: ICD-10-CM

## 2025-01-02 DIAGNOSIS — E03.9 HYPOTHYROIDISM, UNSPECIFIED TYPE: ICD-10-CM

## 2025-01-02 LAB
CHOLEST SERPL-MCNC: 235 MG/DL
FASTING STATUS PATIENT QL REPORTED: YES
HDLC SERPL-MCNC: 87 MG/DL
LDLC SERPL CALC-MCNC: 130 MG/DL
NONHDLC SERPL-MCNC: 148 MG/DL
T4 FREE SERPL-MCNC: 1.71 NG/DL (ref 0.9–1.7)
TRIGL SERPL-MCNC: 91 MG/DL
TSH SERPL DL<=0.005 MIU/L-ACNC: 4.62 UIU/ML (ref 0.3–4.2)

## 2025-01-06 DIAGNOSIS — E03.9 HYPOTHYROIDISM, UNSPECIFIED TYPE: Primary | ICD-10-CM

## 2025-02-03 ENCOUNTER — LAB (OUTPATIENT)
Dept: LAB | Facility: CLINIC | Age: 75
End: 2025-02-03
Payer: COMMERCIAL

## 2025-02-03 DIAGNOSIS — E03.9 HYPOTHYROIDISM, UNSPECIFIED TYPE: ICD-10-CM

## 2025-02-03 LAB
T3FREE SERPL-MCNC: 2.9 PG/ML (ref 2–4.4)
T4 FREE SERPL-MCNC: 1.7 NG/DL (ref 0.9–1.7)
TSH SERPL DL<=0.005 MIU/L-ACNC: 2.15 UIU/ML (ref 0.3–4.2)

## 2025-02-03 PROCEDURE — 36415 COLL VENOUS BLD VENIPUNCTURE: CPT

## 2025-02-03 PROCEDURE — 84439 ASSAY OF FREE THYROXINE: CPT

## 2025-02-03 PROCEDURE — 84443 ASSAY THYROID STIM HORMONE: CPT

## 2025-02-03 PROCEDURE — 84481 FREE ASSAY (FT-3): CPT

## 2025-03-02 SDOH — HEALTH STABILITY: PHYSICAL HEALTH: ON AVERAGE, HOW MANY DAYS PER WEEK DO YOU ENGAGE IN MODERATE TO STRENUOUS EXERCISE (LIKE A BRISK WALK)?: 3 DAYS

## 2025-03-02 SDOH — HEALTH STABILITY: PHYSICAL HEALTH: ON AVERAGE, HOW MANY MINUTES DO YOU ENGAGE IN EXERCISE AT THIS LEVEL?: 40 MIN

## 2025-03-02 ASSESSMENT — SOCIAL DETERMINANTS OF HEALTH (SDOH): HOW OFTEN DO YOU GET TOGETHER WITH FRIENDS OR RELATIVES?: ONCE A WEEK

## 2025-03-05 ENCOUNTER — OFFICE VISIT (OUTPATIENT)
Dept: PEDIATRICS | Facility: CLINIC | Age: 75
End: 2025-03-05
Payer: COMMERCIAL

## 2025-03-05 VITALS
TEMPERATURE: 96.2 F | WEIGHT: 164.5 LBS | OXYGEN SATURATION: 98 % | HEIGHT: 62 IN | BODY MASS INDEX: 30.27 KG/M2 | DIASTOLIC BLOOD PRESSURE: 68 MMHG | HEART RATE: 84 BPM | RESPIRATION RATE: 17 BRPM | SYSTOLIC BLOOD PRESSURE: 101 MMHG

## 2025-03-05 DIAGNOSIS — R42 VERTIGO: ICD-10-CM

## 2025-03-05 DIAGNOSIS — E03.9 HYPOTHYROIDISM, UNSPECIFIED TYPE: ICD-10-CM

## 2025-03-05 DIAGNOSIS — Z00.00 ENCOUNTER FOR MEDICARE ANNUAL WELLNESS EXAM: Primary | ICD-10-CM

## 2025-03-05 DIAGNOSIS — G89.29 CHRONIC RIGHT-SIDED LOW BACK PAIN WITH RIGHT-SIDED SCIATICA: ICD-10-CM

## 2025-03-05 DIAGNOSIS — R00.0 DILATED CARDIOMYOPATHY SECONDARY TO TACHYCARDIA (H): ICD-10-CM

## 2025-03-05 DIAGNOSIS — E66.811 CLASS 1 OBESITY WITH SERIOUS COMORBIDITY AND BODY MASS INDEX (BMI) OF 32.0 TO 32.9 IN ADULT, UNSPECIFIED OBESITY TYPE: ICD-10-CM

## 2025-03-05 DIAGNOSIS — Q21.12 PFO (PATENT FORAMEN OVALE): ICD-10-CM

## 2025-03-05 DIAGNOSIS — I43 DILATED CARDIOMYOPATHY SECONDARY TO TACHYCARDIA (H): ICD-10-CM

## 2025-03-05 DIAGNOSIS — I26.99 PULMONARY EMBOLISM, UNSPECIFIED CHRONICITY, UNSPECIFIED PULMONARY EMBOLISM TYPE, UNSPECIFIED WHETHER ACUTE COR PULMONALE PRESENT (H): ICD-10-CM

## 2025-03-05 DIAGNOSIS — I48.11 LONGSTANDING PERSISTENT ATRIAL FIBRILLATION (H): ICD-10-CM

## 2025-03-05 DIAGNOSIS — G25.0 ESSENTIAL TREMOR: ICD-10-CM

## 2025-03-05 DIAGNOSIS — Z98.890 S/P ABLATION OF ATRIAL FIBRILLATION: ICD-10-CM

## 2025-03-05 DIAGNOSIS — Z86.79 S/P ABLATION OF ATRIAL FIBRILLATION: ICD-10-CM

## 2025-03-05 DIAGNOSIS — M54.41 CHRONIC RIGHT-SIDED LOW BACK PAIN WITH RIGHT-SIDED SCIATICA: ICD-10-CM

## 2025-03-05 DIAGNOSIS — E78.00 PURE HYPERCHOLESTEROLEMIA: ICD-10-CM

## 2025-03-05 DIAGNOSIS — Z78.0 ASYMPTOMATIC MENOPAUSAL STATE: ICD-10-CM

## 2025-03-05 DIAGNOSIS — Z86.79 HISTORY OF ATRIAL FIBRILLATION: ICD-10-CM

## 2025-03-05 DIAGNOSIS — G47.33 OSA (OBSTRUCTIVE SLEEP APNEA): ICD-10-CM

## 2025-03-05 PROCEDURE — G0439 PPPS, SUBSEQ VISIT: HCPCS | Performed by: INTERNAL MEDICINE

## 2025-03-05 PROCEDURE — 1126F AMNT PAIN NOTED NONE PRSNT: CPT | Performed by: INTERNAL MEDICINE

## 2025-03-05 PROCEDURE — 99215 OFFICE O/P EST HI 40 MIN: CPT | Mod: 25 | Performed by: INTERNAL MEDICINE

## 2025-03-05 PROCEDURE — 3078F DIAST BP <80 MM HG: CPT | Performed by: INTERNAL MEDICINE

## 2025-03-05 PROCEDURE — 3074F SYST BP LT 130 MM HG: CPT | Performed by: INTERNAL MEDICINE

## 2025-03-05 RX ORDER — PRAVASTATIN SODIUM 40 MG
40 TABLET ORAL DAILY
Qty: 90 TABLET | Refills: 4 | Status: SHIPPED | OUTPATIENT
Start: 2025-03-05

## 2025-03-05 RX ORDER — LIOTHYRONINE SODIUM 5 UG/1
5 TABLET ORAL DAILY
Qty: 90 TABLET | Refills: 4 | Status: SHIPPED | OUTPATIENT
Start: 2025-03-05

## 2025-03-05 RX ORDER — GABAPENTIN 100 MG/1
100-200 CAPSULE ORAL 3 TIMES DAILY
Qty: 360 CAPSULE | Refills: 3 | Status: SHIPPED | OUTPATIENT
Start: 2025-03-05

## 2025-03-05 ASSESSMENT — PAIN SCALES - GENERAL: PAINLEVEL_OUTOF10: NO PAIN (0)

## 2025-03-05 NOTE — PATIENT INSTRUCTIONS
Try increasing gabapentin to see if this helps with your tremor.  If it's not helpful, let me know and we could try as needed fast acting propranolol.          Patient Education   Preventive Care Advice   This is general advice given by our system to help you stay healthy. However, your care team may have specific advice just for you. Please talk to your care team about your preventive care needs.  Nutrition  Eat 5 or more servings of fruits and vegetables each day.  Try wheat bread, brown rice and whole grain pasta (instead of white bread, rice, and pasta).  Get enough calcium and vitamin D. Check the label on foods and aim for 100% of the RDA (recommended daily allowance).  Lifestyle  Exercise at least 150 minutes each week  (30 minutes a day, 5 days a week).  Do muscle strengthening activities 2 days a week. These help control your weight and prevent disease.  No smoking.  Wear sunscreen to prevent skin cancer.  Have a dental exam and cleaning every 6 months.  Yearly exams  See your health care team every year to talk about:  Any changes in your health.  Any medicines your care team has prescribed.  Preventive care, family planning, and ways to prevent chronic diseases.  Shots (vaccines)   HPV shots (up to age 26), if you've never had them before.  Hepatitis B shots (up to age 59), if you've never had them before.  COVID-19 shot: Get this shot when it's due.  Flu shot: Get a flu shot every year.  Tetanus shot: Get a tetanus shot every 10 years.  Pneumococcal, hepatitis A, and RSV shots: Ask your care team if you need these based on your risk.  Shingles shot (for age 50 and up)  General health tests  Diabetes screening:  Starting at age 35, Get screened for diabetes at least every 3 years.  If you are younger than age 35, ask your care team if you should be screened for diabetes.  Cholesterol test: At age 39, start having a cholesterol test every 5 years, or more often if advised.  Bone density scan (DEXA): At age  50, ask your care team if you should have this scan for osteoporosis (brittle bones).  Hepatitis C: Get tested at least once in your life.  STIs (sexually transmitted infections)  Before age 24: Ask your care team if you should be screened for STIs.  After age 24: Get screened for STIs if you're at risk. You are at risk for STIs (including HIV) if:  You are sexually active with more than one person.  You don't use condoms every time.  You or a partner was diagnosed with a sexually transmitted infection.  If you are at risk for HIV, ask about PrEP medicine to prevent HIV.  Get tested for HIV at least once in your life, whether you are at risk for HIV or not.  Cancer screening tests  Cervical cancer screening: If you have a cervix, begin getting regular cervical cancer screening tests starting at age 21.  Breast cancer scan (mammogram): If you've ever had breasts, begin having regular mammograms starting at age 40. This is a scan to check for breast cancer.  Colon cancer screening: It is important to start screening for colon cancer at age 45.  Have a colonoscopy test every 10 years (or more often if you're at risk) Or, ask your provider about stool tests like a FIT test every year or Cologuard test every 3 years.  To learn more about your testing options, visit:   .  For help making a decision, visit:   https://bit.ly/hd33059.  Prostate cancer screening test: If you have a prostate, ask your care team if a prostate cancer screening test (PSA) at age 55 is right for you.  Lung cancer screening: If you are a current or former smoker ages 50 to 80, ask your care team if ongoing lung cancer screenings are right for you.  For informational purposes only. Not to replace the advice of your health care provider. Copyright   2023 [employer]. All rights reserved. Clinically reviewed by the [facility]. [employer] [id number] - REV 01/24.  Learning About Sleeping Well  What does sleeping well  mean?     Sleeping well means getting enough sleep to feel good and stay healthy. How much sleep is enough varies among people.  The number of hours you sleep and how you feel when you wake up are both important. If you do not feel refreshed, you probably need more sleep. Another sign of not getting enough sleep is feeling tired during the day.  Experts recommend that adults get at least 7 or more hours of sleep per day. Children and older adults need more sleep.  Why is getting enough sleep important?  Getting enough quality sleep is a basic part of good health. When your sleep suffers, your physical health, mood, and your thoughts can suffer too. You may find yourself feeling more grumpy or stressed. Not getting enough sleep also can lead to serious problems, including injury, accidents, anxiety, and depression.  What might cause poor sleeping?  Many things can cause sleep problems, including:  Changes to your sleep schedule.  Stress. Stress can be caused by fear about a single event, such as giving a speech. Or you may have ongoing stress, such as worry about work or school.  Depression, anxiety, and other mental or emotional conditions.  Changes in your sleep habits or surroundings. This includes changes that happen where you sleep, such as noise, light, or sleeping in a different bed. It also includes changes in your sleep pattern, such as having jet lag or working a late shift.  Health problems, such as pain, breathing problems, and restless legs syndrome.  Lack of regular exercise.  Using alcohol, nicotine, or caffeine before bed.  How can you help yourself?  Here are some tips that may help you sleep more soundly and wake up feeling more refreshed.  Your sleeping area   Use your bedroom only for sleeping and sex. A bit of light reading may help you fall asleep. But if it doesn't, do your reading elsewhere in the house. Try not to use your TV, computer, smartphone, or tablet while you are in bed.  Be sure  "your bed is big enough to stretch out comfortably, especially if you have a sleep partner.  Keep your bedroom quiet, dark, and cool. Use curtains, blinds, or a sleep mask to block out light. To block out noise, use earplugs, soothing music, or a \"white noise\" machine.  Your evening and bedtime routine   Create a relaxing bedtime routine. You might want to take a warm shower or bath, or listen to soothing music.  Go to bed at the same time every night. And get up at the same time every morning, even if you feel tired.  What to avoid   Limit caffeine (coffee, tea, caffeinated sodas) during the day, and don't have any for at least 6 hours before bedtime.  Avoid drinking alcohol before bedtime. Alcohol can cause you to wake up more often during the night.  Try not to smoke or use tobacco, especially in the evening. Nicotine can keep you awake.  Limit naps during the day, especially close to bedtime.  Avoid lying in bed awake for too long. If you can't fall asleep or if you wake up in the middle of the night and can't get back to sleep within about 20 minutes, get out of bed and go to another room until you feel sleepy.  Avoid taking medicine right before bed that may keep you awake or make you feel hyper or energized. Your doctor can tell you if your medicine may do this and if you can take it earlier in the day.  If you can't sleep   Imagine yourself in a peaceful, pleasant scene. Focus on the details and feelings of being in a place that is relaxing.  Get up and do a quiet or boring activity until you feel sleepy.  Avoid drinking any liquids before going to bed to help prevent waking up often to use the bathroom.  Where can you learn more?  Go to https://www.[employer].net/patiented  Enter J942 in the search box to learn more about \"Learning About Sleeping Well.\"  Current as of: July 31, 2024  Content Version: 14.3    2024 [facility] [employer].   Care instructions adapted under license by your healthcare " professional. If you have questions about a medical condition or this instruction, always ask your healthcare professional. [employer] disclaims any warranty or liability for your use of this information.

## 2025-03-05 NOTE — PROGRESS NOTES
Preventive Care Visit  [facility] LUKE Munroe MD, Internal Medicine  Mar 5, 2025      Assessment & Plan     Encounter for Medicare annual wellness exam      MIKEL (obstructive sleep apnea)  Doing well on CPAP, does feel worse if she is without it.   - tirzepatide-Weight Management (ZEPBOUND) 2.5 MG/0.5ML prefilled pen; Inject 0.5 mLs (2.5 mg) subcutaneously every 7 days.    Class 1 obesity with serious comorbidity and body mass index (BMI) of 32.0 to 32.9 in adult, unspecified obesity type  Would like to switch to zepbound. Currently paying out of pocket for semaglutide compounded and is getting very expensive. Given her MIKEL, zepbound could be very helpful in reducing her risk of complications.  - tirzepatide-Weight Management (ZEPBOUND) 2.5 MG/0.5ML prefilled pen; Inject 0.5 mLs (2.5 mg) subcutaneously every 7 days.    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    Longstanding persistent atrial fibrillation (H)  Didn't tolerate increase in diltiazem to 240 mg, due to severe fatigue. Decreased back to 180 mg.   - tirzepatide-Weight Management (ZEPBOUND) 2.5 MG/0.5ML prefilled pen; Inject 0.5 mLs (2.5 mg) subcutaneously every 7 days.    Pure hypercholesterolemia  LDL not at goal, but given significant muscle aches with prior statin, she would like to recheck lipids after more time on GLP1  - pravastatin (PRAVACHOL) 40 MG tablet; Take 1 tablet (40 mg) by mouth daily.  - Lipid panel reflex to direct LDL Fasting; Future    Hypothyroidism, unspecified type  Referred to endocrinology but won't be seeing for another 5 months. Recent labs do look better, but previous results revealed elevated TSH and free T4. Would request help from endocrinology with cytomel/levothyroxine.  - T3 Free; Future  - T4 free; Future  - TSH; Future  - liothyronine  (CYTOMEL) 5 MCG tablet; Take 1 tablet (5 mcg) by mouth daily.    Chronic right-sided low back pain with right-sided sciatica  Doing so well she weaned down to 100 mg gabapentin, but on stopping completely, had an increase in low back/leg pain.   - gabapentin (NEURONTIN) 100 MG capsule; Take 1-2 capsules (100-200 mg) by mouth 3 times daily.    Pulmonary embolism, unspecified chronicity, unspecified pulmonary embolism type, unspecified whether acute cor pulmonale present (H)    - apixaban ANTICOAGULANT (ELIQUIS) 5 MG tablet; Take 1 tablet (5 mg) by mouth 2 times daily.    S/P ablation of atrial fibrillation    - apixaban ANTICOAGULANT (ELIQUIS) 5 MG tablet; Take 1 tablet (5 mg) by mouth 2 times daily.    History of atrial fibrillation    - apixaban ANTICOAGULANT (ELIQUIS) 5 MG tablet; Take 1 tablet (5 mg) by mouth 2 times daily.    PFO (patent foramen ovale)  Likely ASD. Followed by cardiology and discussing treatment options.  - apixaban ANTICOAGULANT (ELIQUIS) 5 MG tablet; Take 1 tablet (5 mg) by mouth 2 times daily.    Vertigo  Very brief episodes of dizziness when turning her head. Present for several months. No precipitating events. Given very brief nature of episodes, will do trial on vestibular therapy. If not resolved, could consider other diagnoses  - Physical Therapy  Referral; Future    Essential tremor  Increased symptoms since tapering down on gabapentin. Will increase dose again. If still not well enough controlled, could consider prn propranolol, but would ask her to see neurology. Given she doesn't have hypertension and requires rate control with dilt, may not tolerate higher doses of propranolol.   - Adult Neurology  Referral; Future    Asymptomatic menopausal state    - DX Bone Density; Future    Total time spent 66 minutes. 45 minutes spent by me on the date of the encounter, in addition to the preventive visit, doing chart review, history and exam, documentation and further  activities per the note      Counseling  Appropriate preventive services were addressed with this patient via screening, questionnaire, or discussion as appropriate for fall prevention, nutrition, physical activity, Tobacco-use cessation, social engagement, weight loss and cognition.  Checklist reviewing preventive services available has been given to the patient.  Reviewed patient's diet, addressing concerns and/or questions.   She is at risk for lack of exercise and has been provided with information to increase physical activity for the benefit of her well-being.   Discussed possible causes of fatigue.     See Patient Instructions    Subjective   Ely is a 74 year old, presenting for the following:  Physical        3/5/2025     1:09 PM   Additional Questions   Roomed by BB Vf   Accompanied by          3/5/2025     1:09 PM   Patient Reported Additional Medications   Patient reports taking the following new medications none          HPI     Started wegovy mid December and has lost 10-15 lbs.     Feels like off kilter, usually with turning her head in certain ways. Also can be triggered by fluorescent lights. Lasts up to 30 seconds. Not orthostatic. Not exertional.     Tapered gabapentin to 100 mg at hs, when she stopped, had significant increase in radicular R thigh pain. Restarted and is now better.    Worsened essential tremor in the last year, especially the last few months.     Didn't tolerate higher dose diltiazem.     Advance Care Planning  Patient has a Health Care Directive on file  Advance care planning document is on file and is current.      3/2/2025   General Health   How would you rate your overall physical health? Good   Feel stress (tense, anxious, or unable to sleep) Not at all         3/2/2025   Nutrition   Diet: Regular (no restrictions)         3/2/2025   Exercise   Days per week of moderate/strenous exercise 3 days   Average minutes spent exercising at this level 40 min         3/2/2025    Social Factors   Frequency of gathering with friends or relatives Once a week   Worry food won't last until get money to buy more No   Food not last or not have enough money for food? No   Do you have housing? (Housing is defined as stable permanent housing and does not include staying ouside in a car, in a tent, in an abandoned building, in an overnight shelter, or couch-surfing.) Yes   Are you worried about losing your housing? No   Lack of transportation? No   Unable to get utilities (heat,electricity)? No         3/2/2025   Fall Risk   Fallen 2 or more times in the past year? No   Trouble with walking or balance? No          3/2/2025   Activities of Daily Living- Home Safety   Needs help with the following daily activites None of the above   Safety concerns in the home None of the above         3/2/2025   Dental   Dentist two times every year? Yes         3/2/2025   Hearing Screening   Hearing concerns? None of the above         3/2/2025   Driving Risk Screening   Patient/family members have concerns about driving No         3/2/2025   General Alertness/Fatigue Screening   Have you been more tired than usual lately? (!) YES         3/2/2025   Urinary Incontinence Screening   Bothered by leaking urine in past 6 months No            Today's PHQ-2 Score:       3/5/2025     1:00 PM   PHQ-2 ( 1999 [employer])   Q1: Little interest or pleasure in doing things 0   Q2: Feeling down, depressed or hopeless 0   PHQ-2 Score 0    Q1: Little interest or pleasure in doing things Not at all   Q2: Feeling down, depressed or hopeless Not at all   PHQ-2 Score 0       Patient-reported           3/2/2025   Substance Use   Alcohol more than 3/day or more than 7/wk No   Do you have a current opioid prescription? No   How severe/bad is pain from 1 to 10? 1/10   Do you use any other substances recreationally? No     Social History     Tobacco Use    Smoking status: Never     Passive exposure: Never    Smokeless tobacco: Never    Tobacco  comments:     none   Vaping Use    Vaping status: Never Used   Substance Use Topics    Alcohol use: Yes     Comment: social    Drug use: No           10/3/2024   LAST FHS-7 RESULTS   1st degree relative breast or ovarian cancer Yes   Any relative bilateral breast cancer No   Any male have breast cancer No   Any ONE woman have BOTH breast AND ovarian cancer Yes   Any woman with breast cancer before 50yrs Yes   2 or more relatives with breast AND/OR ovarian cancer Yes   2 or more relatives with breast AND/OR bowel cancer Yes        Mammogram Screening - Annual screen due to history of breast cancer, carcinoma in situ, or hyperplasia    ASCVD Risk   The ASCVD Risk score (Amparo LOVELL, et al., 2019) failed to calculate for the following reasons:    Risk score cannot be calculated because patient has a medical history suggesting prior/existing ASCVD      Reviewed and updated as needed this visit by Provider                    Labs reviewed in EPIC  Current providers sharing in care for this patient include:  Patient Care Team:  Shadia Munroe MD as PCP - General  Randolph Blount MD as MD (Ophthalmology)  Shadia Pickard Od, OD  Shadia Munroe MD as Assigned PCP  Franco Garcia MD as MD (Dermatology)  [facility] as Pharmacist (Pharmacist)  Hodgkin, Douglas, MD as MD (Cardiovascular Disease)  Adam Martinez MD as MD (Neurology)  Usama Samuels PA-C as Assigned Cancer Care Provider  Adam Martinez MD as Assigned Neuroscience Provider  Mamta Badillo MD as Hospitalist (Endocrinology, Diabetes, and Metabolism)    The following health maintenance items are reviewed in Epic and correct as of today:  Health Maintenance   Topic Date Due    MEDICARE ANNUAL WELLNESS VISIT  11/09/2023    ANNUAL REVIEW OF HM ORDERS  01/18/2025    COVID-19 Vaccine (9 - 2024-25 season) 04/16/2025    MAMMO SCREENING  10/03/2025    DEXA  11/28/2025    TSH W/FREE T4 REFLEX   "02/03/2026    FALL RISK ASSESSMENT  03/05/2026    GLUCOSE  10/29/2027    ADVANCE CARE PLANNING  01/18/2029    DTAP/TDAP/TD IMMUNIZATION (5 - Td or Tdap) 07/31/2029    LIPID  01/02/2030    HEPATITIS C SCREENING  Completed    PHQ-2 (once per calendar year)  Completed    INFLUENZA VACCINE  Completed    Pneumococcal Vaccine: 50+ Years  Completed    ZOSTER IMMUNIZATION  Completed    RSV VACCINE  Completed    HPV IMMUNIZATION  Aged Out    MENINGITIS IMMUNIZATION  Aged Out    COLORECTAL CANCER SCREENING  Discontinued         Review of Systems  Constitutional, HEENT, cardiovascular, pulmonary, gi and gu systems are negative, except as otherwise noted.     Objective    Exam  /68 (BP Location: Right arm, Patient Position: Sitting, Cuff Size: Adult Regular)   Pulse 84   Temp (!) 96.2  F (35.7  C) (Temporal)   Resp 17   Ht 1.567 m (5' 1.69\")   Wt 74.6 kg (164 lb 8 oz)   LMP  (LMP Unknown)   SpO2 98%   BMI 30.39 kg/m     Estimated body mass index is 30.39 kg/m  as calculated from the following:    Height as of this encounter: 1.567 m (5' 1.69\").    Weight as of this encounter: 74.6 kg (164 lb 8 oz).    Physical Exam  GENERAL: alert and no distress  EYES: Eyes grossly normal to inspection, PERRL and conjunctivae and sclerae normal  HENT: ear canals and TM's normal, nose and mouth without ulcers or lesions  NECK: no adenopathy, no asymmetry, masses, or scars  RESP: lungs clear to auscultation - no rales, rhonchi or wheezes  BREAST: normal without masses, tenderness or nipple discharge and no palpable axillary masses or adenopathy  CV: regular rate and rhythm, normal S1 S2, no S3 or S4, no murmur, click or rub, no peripheral edema  ABDOMEN: soft, nontender, no hepatosplenomegaly, no masses and bowel sounds normal  MS: no gross musculoskeletal defects noted, no edema  SKIN: no suspicious lesions or rashes  NEURO: Normal strength and tone, mentation intact and speech normal  PSYCH: mentation appears normal, affect " normal/bright        3/5/2025   Mini Cog   Clock Draw Score 2 Normal   3 Item Recall 3 objects recalled   Mini Cog Total Score 5              Signed Electronically by: Shadia Munroe MD

## 2025-03-06 ENCOUNTER — PATIENT OUTREACH (OUTPATIENT)
Dept: CARE COORDINATION | Facility: CLINIC | Age: 75
End: 2025-03-06
Payer: COMMERCIAL

## 2025-03-11 ENCOUNTER — THERAPY VISIT (OUTPATIENT)
Dept: PHYSICAL THERAPY | Facility: CLINIC | Age: 75
End: 2025-03-11
Attending: INTERNAL MEDICINE
Payer: COMMERCIAL

## 2025-03-11 DIAGNOSIS — R26.89 BALANCE PROBLEMS: Primary | ICD-10-CM

## 2025-03-11 DIAGNOSIS — R42 VERTIGO: ICD-10-CM

## 2025-03-11 PROCEDURE — 97112 NEUROMUSCULAR REEDUCATION: CPT | Mod: GP | Performed by: PHYSICAL THERAPIST

## 2025-03-11 PROCEDURE — 97161 PT EVAL LOW COMPLEX 20 MIN: CPT | Mod: GP | Performed by: PHYSICAL THERAPIST

## 2025-03-11 NOTE — PROGRESS NOTES
"PHYSICAL THERAPY EVALUATION  Type of Visit: Evaluation       Fall Risk Screen:  Fall screen completed by: PT  Have you fallen 2 or more times in the past year?: No  Have you fallen and had an injury in the past year?: No  Is patient a fall risk?: No    Subjective         Presenting condition or subjective complaint: Arriving today with complaints of, \"possible vertigo.\"  Date of onset: 03/05/25    Relevant medical history: Cancer; Heart problems; Menopause; Overweight; Radiation treatment; Sleep disorder like apnea; Thyroid problems; Vision problems   Patient Active Problem List   Diagnosis    Osteopenia of multiple sites    Postmenopausal atrophic vaginitis    High cholesterol    ACP (advance care planning)    Meralgia paresthetica    Hypothyroidism, unspecified type    S/P ablation of atrial fibrillation    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    Drusen of optic disc, left    MIKEL (obstructive sleep apnea)    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)    History of breast cancer    Acquired dilation of ascending aorta and aortic root    Vitamin D deficiency    Long term current use of aromatase inhibitor    Estrogen receptor positive status (ER+)    Epidermoid cyst of skin    Stroke-like symptoms    Pulmonary embolism (H)    Longstanding persistent atrial fibrillation (H)    PFO (patent foramen ovale)    Spondylolisthesis of lumbar region     Dates & types of surgery: tubal ligation 1983,  Graves disease (radiation) 2002,  breast cancer 2008, heart ablation 2019, back fusion 12-, coronary embolism 1-2025lism    Prior diagnostic imaging/testing results:    See electronic medical records   Prior therapy history for the same diagnosis, illness or injury: No      Prior Level of Function  Independent with ADLs, IADLs, and functional mobility; enjoys hiking/walking paths at [facility]    Living Environment  Social support: With a significant other or spouse   Type of home: House; Multi-level " "  Stairs to enter the home: Yes 3 Is there a railing: No  Ramp: No   Stairs inside the home: Yes 14 Is there a railing: Yes   Help at home: None    Employment: No    Hobbies/Interests: hiking, writing, socializing with friends and family    Patient goals for therapy: it is just irritating    Pain assessment: Pain denied     Objective      Cognitive Status Examination  Orientation: Oriented to person, place and time   Level of Consciousness: Alert  Follows Commands and Answers Questions: 100% of the time  Personal Safety and Judgement: Intact  Memory: Intact per observation    OBSERVATION: Pleasant and in no acute distress  POSTURE: Rounded posturing with forward head  RANGE OF MOTION: Bilateral upper and lower extremities WFL for tasks performed  STRENGTH: Bilateral upper and lower extremities WFL for tasks performed    BED MOBILITY: Reports independence    TRANSFERS: Modified independence    GAIT: Independent, some mild gait deviations/instability walking with head turns.    BALANCE: WFL static and dynamic balance; mCTSIB: 30 seconds all conditions       VESTIBULAR EVALUATION  ADDITIONAL HISTORY:  Reports that she gets light-headed intermittently and has sensitivity to bright overhead lights.  Symptoms > 6 months.  A fib had been controlled, however, now experiencing it again - use of medication to control.  Had recent wellness medical appointment and mentioned vertigo symptoms, therefore, referred to vestibular physical therapy:   \"Very brief episodes of dizziness when turning her head. Present for several months. No precipitating events. Given very brief nature of episodes, will do trial on vestibular therapy. If not resolved, could consider other diagnoses\"   Description of symptoms: Light-headedness  Dizzy attacks:   Start:     Last attack:     Frequency of occurrences:     Length of attack:    Difficulty hearing:    Noise in ears? No    Alleviates symptoms:    Worsens symptoms:    Activities that bring on " symptoms: Other  bright lights like those is [employer] and [employer]    Pertinent visual history: use of glasses; cataract surgery didn't go well; loss of vision right eye due to medication  Pertinent history of current vestibular problem: reports history of headaches related to stress - no migraine hx; takes dramamine prior to getting on boat and is symptomatic with amusement rides  DHI: Total Score: (Patient-Rptd) 12    Cervicogenic Screen    Neck ROM WFL, decreased rotation towards left vs right   Vertebral Artery Test Normal     Oculomotor Screen    Ocular ROM Normal   Smooth Pursuit Normal - occasional saccadic intrusions   Saccades Normal   VOR Normal        Infrared Goggle Exam Vestibular Suppressant in Last 24 Hours? No  Exam Completed With: Infrared goggles   Spontaneous Nystagmus Intermittent left horizontal beat   Gaze Evoked Nystagmus Underlying intermittent left horizontal beat with all gaze directions   Head Shake Horizontal Nystagmus Horizontal R > 10 beats, ? A few down beats   Positional Testing No symptoms in any of the positions, vague lightheaded upon sitting up.    Left Right   [place]-Hallpike Negative Negative   [facility] Roll [facility] Negative Negative   Patient with intermittent left horizontal beat with all positional tests, most noticeable in right roll     Dynamic Visual Acuity (DVA)    Static Acuity (LogMar) 11   Horizontal Head Movement at 1 Hz (LogMar)    Horizontal Head Movement at 2 Hz (LogMar) 6   No symptoms, patient reporting difficulty with gaze stabilization       Assessment & Plan   CLINICAL IMPRESSIONS  Medical Diagnosis: Vertigo    Treatment Diagnosis: Decreased activity Tolerance; Balance Problems   Impression/Assessment: Patient is a 74 year old female arriving today per recommendation of PCP following recent wellness exam; see above subjective comments.  Patient with positive head shake and abnormal scoring on DVA indicating limitations with VOR/gaze stabilization; patient  asymptomatic.  Negative signs and symptoms for BPPV, question vestibular hypofunction or central etiology (patient with cardiac hx).  Patient also demonstrating some balance problems with higher level balance tasks (static and dynamic).  Following education regarding findings and recommendations, patient feels comfortable implementing HEP recommendations independently at this time and does not think follow-up visits are required.  Patient expressed understanding of when to contact PCP or this provider if symptoms and/or overall function changes.    Clinical Decision Making (Complexity):  Clinical Presentation: Stable/Uncomplicated  Clinical Presentation Rationale: based on medical and personal factors listed in PT evaluation  Clinical Decision Making (Complexity): Low complexity    PLAN OF CARE  Treatment Interventions:  Interventions: Neuromuscular Re-education    Long Term Goals     PT Goal 1  Goal Identifier: HEP and Patient Education  Goal Description: Patient will verbalize understanding of education regarding today s findings and recommendation for treatment including HEP to address balance.  Target Date: 03/11/25  Date Met: 03/11/25      Frequency of Treatment: 1 visit  Duration of Treatment: 1 day    Education Assessment:   Learner/Method: Patient;Listening;Demonstration;No Barriers to Learning    Risks and benefits of evaluation/treatment have been explained.   Patient/Family/caregiver agrees with Plan of Care.     Evaluation Time:     PT Eval, Low Complexity Minutes (21421): 25     Signing Clinician: Sadaf Casillas, PT, DPT        [facility]                                                                                   OUTPATIENT PHYSICAL THERAPY      PLAN OF TREATMENT FOR OUTPATIENT REHABILITATION   Patient's Last Name, First Name, Ely Bustamante YOB: 1950   Provider's Name   [facility]   Medical Record  No.  [id number]     Onset Date: 03/05/25  Start of Care Date: 03/11/25     Medical Diagnosis:  Vertigo      PT Treatment Diagnosis:  Decreased activity Tolerance; Balance Problems Plan of Treatment  Frequency/Duration: 1 visit/ 1 day    Certification date from 03/11/25 to 03/11/25         See note for plan of treatment details and functional goals     Sadaf Casillas, PT, DPT                         I CERTIFY THE NEED FOR THESE SERVICES FURNISHED UNDER        THIS PLAN OF TREATMENT AND WHILE UNDER MY CARE     (Physician attestation of this document indicates review and certification of the therapy plan).              Referring Provider:  Shadia Munroe MD    Initial Assessment  See Epic Evaluation- Start of Care Date: 03/11/25

## 2025-04-13 ENCOUNTER — HEALTH MAINTENANCE LETTER (OUTPATIENT)
Age: 75
End: 2025-04-13

## 2025-04-22 ENCOUNTER — ANCILLARY PROCEDURE (OUTPATIENT)
Dept: BONE DENSITY | Facility: CLINIC | Age: 75
End: 2025-04-22
Attending: INTERNAL MEDICINE
Payer: COMMERCIAL

## 2025-04-22 DIAGNOSIS — Z78.0 ASYMPTOMATIC MENOPAUSAL STATE: ICD-10-CM

## 2025-04-22 PROCEDURE — 77080 DXA BONE DENSITY AXIAL: CPT | Mod: TC | Performed by: PHYSICIAN ASSISTANT

## 2025-04-22 PROCEDURE — 77081 DXA BONE DENSITY APPENDICULR: CPT | Mod: TC | Performed by: PHYSICIAN ASSISTANT

## 2025-05-08 NOTE — PROGRESS NOTES
Medication Therapy Management (MTM) Encounter    ASSESSMENT:                            Medication Adherence/Access: See below for considerations.    Afib/cardiomyopathy:    Stable. Continue to follow-up with cardiology    Hyperlipidemia   Lost 16 lbs or 9% of body weight since starting GLP-1 agonists  Patient will be due for fasting labs in July, consider changing to higher intensity statin if LDL not <70 mg/dL    Hypothyroidism   Stable. Patient would benefit from following with endocrinology as planned.    Obesity/MIKEL  Patient would benefit from increasing Zepbound and continuing lifestyle modifications.     Pain   Stable..     Osteopenia:   Should repeat DEXA in 2 years after starting treatment. Patient is not meeting RDI of calcium 1200mg/day. Patient is exceeding RDI of Vitamin D 1000 IU/day. Patient would benefit from increasing calcium, decreasing vitamin D, checking vitamin D level and bisphosphonate therapy (alendronate) .    Dermatitis: stable    Vaccines: up to date     PLAN:                            Weight Management  Great job on weight loss, continue to be active!  Increase Zepbound 7.5mg to once weekly  Future consideration when stabilize 100 day supply    Osteopenia:    Start alendronate 70mg once weekly, Monday  Check vitamin D at next lab, we added this order  Increase Viactiv 1 twice daily, stop the weekly vitamin D    Bone Health Goals:  Engage in bone building health: Physical activity increases bone mass. Exercise also helps improve your posture and balance and reduces risk of falls. Walking 4 or more hours per week reduces your risk of hip fractures by more than 40%  Getting the right nutrients: Getting adequate calcium and Vitamin-D reduces bone loss that occurs with age and can reduce the risk of hip and non-spine fractures in older adults. Protein and other nutrients, such as minerals phosphorus, sodium and magnesium, also help keep bones strong.  Avoid excessive alcohol: Drinking more  than 2 drinks a day for men and 1 drink a day for women can hasten bone loss and reduce your body's ability to absorb calcium.  How to take your bone health medications: Take Fosamax (alendronate) once weekly in the morning with 8 oz of water. Wait for 30-60 minutes before eating, drinking, or taking other medications. Do not lie down for 30 minutes after taking dose.   Calcium intake goals: Your daily calcium requirement is 1200mg/day.  At this time increase calcium supplement to Viactiv twice daily . Your body can only absorb 500-600mg of calcium at a time, so be sure to separate your calcium supplements by at least 4 hours.   Vitamin D3: Vitamin-D3 is important for your overall health, including your bone health. Vitamin-D3 helps increase the absorption of calcium.     Cholesterol: due to recheck labs in July    Follow-up: Return in 4 weeks (on 6/10/2025) for MTM Pharmacist Visit, phone visit.    SUBJECTIVE/OBJECTIVE:                          Ely Humphreys is a 75 year old female seen for an initial visit. She was referred to me from Dr. Munroe.      Reason for visit: medication review, she reports I work with her  too.    Allergies/ADRs: Reviewed in chart  Past Medical History: Reviewed in chart History of breast cancer  Tobacco: She reports that she has never smoked. She has never been exposed to tobacco smoke. She has never used smokeless tobacco.  Alcohol: 5-7beverages / week    Medication Adherence/Access: Patient uses pill box(es).  Patient takes medications 2 time(s) per day.   Per patient, misses medication 0 time(s) per week.   Medication barriers: none.   The patient fills medications at [place]: NO, fills medications at [facility].      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   Dizziness when taking morning due to low blood pressure   Reports being on spironolactone previously, not currently on, for edema wears compression stocking for long plane ride  Patient does not have a history of GI bleed.   Patient self-monitors blood pressure.  Home BP monitoring 93/67..   [id number]-[id number] score of 4.  Dr. Cooney and changing to Dr. Larry at [facility]  BP Readings from Last 3 Encounters:   03/05/25 101/68   10/29/24 103/68   06/20/24 102/74      Pulse Readings from Last 3 Encounters:   03/05/25 84   10/29/24 74   06/20/24 90        Hyperlipidemia   pravastatin 40mg daily  Patient reports no significant myalgias or other side effects.    Recent Labs   Lab Test 01/02/25  1017 10/29/24  1233   CHOL 235* 226*   HDL 87 76   * 131*   TRIG 91 96        Hypothyroidism   Levothyroxine 125 mcg daily  Liothyronine 5mcg every day   Patient is having the following symptoms: hypothyroidism -  constipation and weight gain, hyperthyroidism -  heat intolerance.   Dr. Badillo will be seeing in a few months  T4 Free   Date Value Ref Range Status   11/04/2020 1.49 (H) 0.76 - 1.46 ng/dL Final     Free T4   Date Value Ref Range Status   02/03/2025 1.70 0.90 - 1.70 ng/dL Final     TSH   Date Value Ref Range Status   02/03/2025 2.15 0.30 - 4.20 uIU/mL Final   11/09/2022 0.73 0.40 - 4.00 mU/L Final   02/16/2021 0.51 0.40 - 4.00 mU/L Final      Weight Management /MIKEL history  Zepbound 5 mg once weekly (finished the 4 doses of this) $100 per month  Melatonin 5mg bedtime as needed (not taking all the time)  On CPAP for MIKEL last visit Freya Kumar CNP with Sleep Medicine 10/24  Patient reports the following medication side effects: constipation.  She feels like this is working for her, some craving but improved  Nutrition/Eating Habits: Reports she eats well, protein person, takes fruit, health .    Exercise/Activity: [employer] three times a week hiking, 3 miles 1 hour.  Not so much  "strength exercises   Medications Tried/Failed:  semaglutide: not effective completely, lost some weight, Zepbound covered  Bupropion not effective    Initial Weight pre-Zepbound: 164 lb  172 lb pre Wegovy in December  Home weight today 156.8 lb       Wt Readings from Last 4 Encounters:   03/05/25 164 lb 8 oz (74.6 kg)   10/29/24 172 lb (78 kg)   10/17/24 175 lb (79.4 kg)   06/20/24 175 lb (79.4 kg)     Estimated body mass index is 30.39 kg/m  as calculated from the following:    Height as of 3/5/25: 5' 1.69\" (1.567 m).    Weight as of 3/5/25: 164 lb 8 oz (74.6 kg).    Constipation   Stool softner 100mg every day   Miralax 17 grams every day as needed   Patient is having bowel movements every 2 days.   Patient feels that current therapy is effective.   Patient reports no current medication side effects.        Pain    Gabapentin 100-200mg every day  Acetaminophen 500mg, 2 tablets as needed   Vitamin B complex for nerve every day (4167 mcg of vitamin B12)  Rarely ibuprofen, tramadol  Physical therapy for balance issues  Pain type/location: right-sided low back pain with sciatica  Taking gabapentin for tremors, not working well.  Pain is a lot better with back surgery  Patient feels that current therapy is effective.   Patient reports the following side effects: some tiredness but finding needing to take gabapentin in the AM   Status post AP fusion L4-S1 with Decomp L5-S1 RT 12/2023  Dr. Cruz, [facility]    Bone Health   Osteopenia   Calcium chewable Viactiv 500/Vitamin D 500 units every day   Multivitamin every day 1000 units of vitamin D  Vitamin D 2000 units once weekly  Patient is not experiencing side effects.  DEXA History: 4/22/2025  FRAX Results: The 10 year probability of major osteoporotic fracture is 15.7%, and of hip fracture is 5.1%, based on left femoral neck BMD  She may be having some dental work done--recapping to teeth.   Patient is getting no yogurt, cheese or " milk.  Risk factors: post-menopausal  Graves disease  Vitamin D Deficiency Screening Results:  Lab Results   Component Value Date    VITDT 65 03/15/2022    VITDT 85 (H) 12/20/2021          Dermatitis  Hydrocortisone 2.5% for CPAP on her nose at times  Rarely takes    Today's Vitals: LMP  (LMP Unknown)   ----------------    I spent 56 minutes with this patient today. All changes were made via collaborative practice agreement with Shadia Munroe MD.     A summary of these recommendations was sent via "[employer].    Katarina Young, PharmD [facility]  Medication Therapy Management Practitioner  Pharmacist    Telemedicine Visit Details  The patient's medications can be safely assessed via a telemedicine encounter.  Type of service:  Telephone visit  Originating Location (pt. Location): Home    Distant Location (provider location):  On-site  Start Time: 8:32am  End Time: 9:24 AM     Medication Therapy Recommendations  Class 1 obesity due to excess calories in adult, unspecified BMI, unspecified whether serious comorbidity present   1 Current Medication: tirzepatide-Weight Management (ZEPBOUND) 5 MG/0.5ML prefilled pen (Discontinued)   Current Medication Sig: Inject 0.5 mLs (5 mg) subcutaneously every 7 days.   Rationale: Dose too low - Dosage too low - Effectiveness   Recommendation: Increase Dose   Status: Accepted per CPA   Identified Date: 5/13/2025 Completed Date: 5/13/2025         Osteopenia of multiple sites   1 Current Medication: calcium-vitamin D-vitamin K (VIACTIV) 500-500-40 MG-UNT-MCG CHEW   Current Medication Sig: Take 1 tablet by mouth 2 times daily (with meals).   Rationale: Dose too low - Dosage too low - Effectiveness   Recommendation: Increase Frequency   Status: Accepted - no CPA Needed   Identified Date: 5/13/2025 Completed Date: 5/13/2025         2 Current Medication: multivitamin w/minerals (MULTI-VITAMIN) tablet   Current Medication Sig: Take 1 tablet by mouth daily   Rationale: Dose too high - Dosage  too high - Safety   Recommendation: Discontinue Medication - stop vitamin D   Status: Accepted - no CPA Needed   Identified Date: 5/13/2025 Completed Date: 5/13/2025         3 Rationale: Untreated condition - Needs additional medication therapy - Indication   Recommendation: Start Medication - alendronate 70 MG tablet   Status: Accepted per CPA   Identified Date: 5/13/2025 Completed Date: 5/13/2025

## 2025-05-13 ENCOUNTER — VIRTUAL VISIT (OUTPATIENT)
Dept: PHARMACY | Facility: CLINIC | Age: 75
End: 2025-05-13
Payer: COMMERCIAL

## 2025-05-13 DIAGNOSIS — Z98.890 S/P ABLATION OF ATRIAL FIBRILLATION: ICD-10-CM

## 2025-05-13 DIAGNOSIS — E66.09 CLASS 1 OBESITY DUE TO EXCESS CALORIES IN ADULT, UNSPECIFIED BMI, UNSPECIFIED WHETHER SERIOUS COMORBIDITY PRESENT: ICD-10-CM

## 2025-05-13 DIAGNOSIS — E03.9 HYPOTHYROIDISM, UNSPECIFIED TYPE: ICD-10-CM

## 2025-05-13 DIAGNOSIS — Z86.79 S/P ABLATION OF ATRIAL FIBRILLATION: ICD-10-CM

## 2025-05-13 DIAGNOSIS — M85.89 OSTEOPENIA OF MULTIPLE SITES: Primary | ICD-10-CM

## 2025-05-13 DIAGNOSIS — G47.33 OSA (OBSTRUCTIVE SLEEP APNEA): ICD-10-CM

## 2025-05-13 DIAGNOSIS — Z71.85 VACCINE COUNSELING: ICD-10-CM

## 2025-05-13 DIAGNOSIS — L30.9 DERMATITIS: ICD-10-CM

## 2025-05-13 DIAGNOSIS — M79.2 NERVE PAIN: ICD-10-CM

## 2025-05-13 DIAGNOSIS — I42.8 DILATED CARDIOMYOPATHY SECONDARY TO TACHYCARDIA (H): ICD-10-CM

## 2025-05-13 DIAGNOSIS — E66.811 CLASS 1 OBESITY DUE TO EXCESS CALORIES IN ADULT, UNSPECIFIED BMI, UNSPECIFIED WHETHER SERIOUS COMORBIDITY PRESENT: ICD-10-CM

## 2025-05-13 DIAGNOSIS — E78.00 HIGH CHOLESTEROL: ICD-10-CM

## 2025-05-13 PROCEDURE — 99605 MTMS BY PHARM NP 15 MIN: CPT | Mod: 93 | Performed by: PHARMACIST

## 2025-05-13 PROCEDURE — 99607 MTMS BY PHARM ADDL 15 MIN: CPT | Mod: 93 | Performed by: PHARMACIST

## 2025-05-13 RX ORDER — DOCUSATE SODIUM 100 MG/1
100 CAPSULE, LIQUID FILLED ORAL DAILY PRN
COMMUNITY

## 2025-05-13 RX ORDER — ALENDRONATE SODIUM 70 MG/1
70 TABLET ORAL
Qty: 12 TABLET | Refills: 3 | Status: SHIPPED | OUTPATIENT
Start: 2025-05-13

## 2025-05-13 RX ORDER — POLYETHYLENE GLYCOL 3350 17 G/17G
1 POWDER, FOR SOLUTION ORAL DAILY PRN
COMMUNITY

## 2025-05-13 NOTE — PATIENT INSTRUCTIONS
Recommendations from today's MTM visit:                                                    MTM (medication therapy management) is a service provided by a clinical pharmacist designed to help you get the most of out of your medicines.     Weight Management  Great job on weight loss, continue to be active!  Increase Zepbound 7.5mg to once weekly  Future consideration when stabilize 100 day supply    Osteopenia:    Start alendronate 70mg once weekly, Monday  Check vitamin D at next lab, we added this order  Increase Viactiv 1 twice daily, stop the weekly vitamin D    Bone Health Goals:  Engage in bone building health: Physical activity increases bone mass. Exercise also helps improve your posture and balance and reduces risk of falls. Walking 4 or more hours per week reduces your risk of hip fractures by more than 40%  Getting the right nutrients: Getting adequate calcium and Vitamin-D reduces bone loss that occurs with age and can reduce the risk of hip and non-spine fractures in older adults. Protein and other nutrients, such as minerals phosphorus, sodium and magnesium, also help keep bones strong.  Avoid excessive alcohol: Drinking more than 2 drinks a day for men and 1 drink a day for women can hasten bone loss and reduce your body's ability to absorb calcium.  How to take your bone health medications: Take Fosamax (alendronate) once weekly in the morning with 8 oz of water. Wait for 30-60 minutes before eating, drinking, or taking other medications. Do not lie down for 30 minutes after taking dose.   Calcium intake goals: Your daily calcium requirement is 1200mg/day.  At this time increase calcium supplement to Viactiv twice daily . Your body can only absorb 500-600mg of calcium at a time, so be sure to separate your calcium supplements by at least 4 hours.   Vitamin D3: Vitamin-D3 is important for your overall health, including your bone health. Vitamin-D3 helps increase the absorption of calcium.      Cholesterol: due to recheck labs in July    Follow-up: Return in 4 weeks (on 6/10/2025) for MTM Pharmacist Visit, phone visit.    To schedule another MTM appointment, please call the clinic directly or you may call the MTM scheduling line at [phone] or toll-free at [phone].     My Clinical Pharmacist's contact information:                                                      It was a pleasure seeing you today!  Please feel free to contact me with any questions or concerns you have.      Katarina Young, PharmD BCPS  Medication Therapy Management Practitioner    It was great to speak with you today.  I value your experience and would be very thankful for your time with providing feedback on our clinic survey. You may receive a survey via email or text message in the next few days.

## 2025-05-13 NOTE — LETTER
"Recommended To-Do List      Prepared on: May 13, 2025       You can get the best results from your medications by completing the items on this \"To-Do List.\"      Bring your To-Do List when you go to your doctor. And, share it with your family or caregivers.    My To-Do List:  What we talked about: What I should do:   Your medication dosage being too low    Increase your dosage of tirzepatide-Weight Management (ZEPBOUND)          What we talked about: What I should do:   Your medication dosage being too low    Increase how often you take calcium-vitamin D-vitamin K (VIACTIV)          What we talked about: What I should do:   Your medication dosage being too high    Stop taking vitamin D           What we talked about: What I should do:   A new medication that may be of benefit to you    Start taking alendronate (FOSAMAX)          What we talked about: What I should do:                     "

## 2025-05-13 NOTE — LETTER
_  Medication List        Prepared on: May 13, 2025     Bring your Medication List when you go to the doctor, hospital, or   emergency room. And, share it with your family or caregivers.     Note any changes to how you take your medications.  Cross out medications when you no longer use them.    Medication How I take it Why I use it Prescriber   acetaminophen (TYLENOL) 500 MG tablet Take 1,000 mg by mouth as needed Take 1-2 tabs by mouth as needed Pain Patient reported   alendronate (FOSAMAX) 70 MG tablet Take 1 tablet (70 mg) by mouth every 7 days. Take the medication on an empty stomach, first thing in the morning with at least 8 ounces of water--do not take with other drinks. Remain upright (do not lie down) and do not eat or take anything else by mouth (including other pills) until at least 30 minutes after taking the medication. Osteopenia of multiple sites Shadia Munroe MD   apixaban ANTICOAGULANT (ELIQUIS) 5 MG tablet Take 1 tablet (5 mg) by mouth 2 times daily. Pulmonary embolism, unspecified chronicity, unspecified pulmonary embolism type, unspecified whether acute cor pulmonale present (H); s/p Ablation of Atrial Fibrillation; History of Atrial Fibrillation; PFO (Patent Foramen Ovale) Shadia Munroe MD   calcium-vitamin D-vitamin K (VIACTIV) 500-500-40 MG-UNT-MCG CHEW Take 1 tablet by mouth 2 times daily (with meals). Osteopenia Patient Reported   diltiazem ER (TIAZAC) 180 MG 24 hr ER beaded capsule Take 180 mg by mouth daily Atrial Fibrillation Dr. Larry    docusate sodium (COLACE) 100 MG capsule Take 100 mg by mouth daily as needed for constipation. Constipation Patient Reported   gabapentin (NEURONTIN) 100 MG capsule Take 1-2 capsules (100-200 mg) by mouth 3 times daily. Chronic right-sided low back pain with right-sided sciatica Shadia Munroe MD   hydrocortisone 2.5 % ointment  Apply as directed as needed  Mouth sores/rash Shadia Munroe MD   levothyroxine (SYNTHROID/LEVOTHROID) 125  MCG tablet Take 1 tablet (125 mcg) by mouth daily. Hypothyroidism, unspecified type Shadia Munroe MD   liothyronine (CYTOMEL) 5 MCG tablet Take 1 tablet (5 mcg) by mouth daily. Hypothyroidism, unspecified type Shadia Munroe MD   melatonin 5 MG tablet Take 5 mg by mouth nightly as needed for sleep. Sleep Patient Reported   multivitamin w/minerals (MULTI-VITAMIN) tablet Take 1 tablet by mouth daily General Health  Patient Reported   polyethylene glycol (MIRALAX) 17 GM/Dose powder Take 1 Capful by mouth daily as needed for constipation. Constipation Patient Reported   pravastatin (PRAVACHOL) 40 MG tablet Take 1 tablet (40 mg) by mouth daily. Pure Hypercholesterolemia Shadia Munroe MD   tirzepatide-Weight Management (ZEPBOUND) 7.5 MG/0.5ML prefilled pen Inject 0.5 mLs (7.5 mg) subcutaneously every 7 days. MIKEL (Obstructive Sleep Apnea); Class 1 obesity due to excess calories in adult, unspecified BMI, unspecified whether serious comorbidity present Shadia Munroe MD   vitamin B-Complex Take 1 tablet by mouth daily. General Health  Patient Reported         Add new medications, over-the-counter drugs, herbals, vitamins, or  minerals in the blank rows below.    Medication How I take it Why I use it Prescriber                                      Allergies:      - Amiodarone - Other (See Comments)        Side effects I have had:      Not on File        Other Information:              My notes and questions:

## 2025-05-13 NOTE — LETTER
May 13, 2025  Ely Humphreys  [address] [place] [address]    Dear DIYA Zapata [facility]     Thank you for talking with me on May 13, 2025 about your health and medications. As a follow-up to our conversation, I have included two documents:      Your Recommended To-Do List has steps you should take to get the best results from your medications.  Your Medication List will help you keep track of your medications and how to take them.    If you want to talk about these documents, please call Katarina Young RPH at phone: [phone], Monday-Friday 8-4:30pm.    I look forward to working with you and your doctors to make sure your medications work well for you.    Sincerely,  Katarina Young RPH  [facility] Pharmacist, [facility]

## 2025-05-13 NOTE — Clinical Note
We started alendronate for her osteopenia recommended per her FRAX score, let me know if you are not okay with this, she was going to talk to you about this at a different visit with you but did not schedule it yet

## 2025-06-09 NOTE — PROGRESS NOTES
Medication Therapy Management (MTM) Encounter    ASSESSMENT:                            Medication Adherence/Access: See below for considerations.    Afib/cardiomyopathy:    Blood pressure is meeting goal but on lower end, recommend she not take spironolactone as needed for leg edema, recommend compression stockings and discuss with cardiology at upcoming appointment in a few weeks.     Hyperlipidemia   Patient will be due for fasting labs in July, consider changing to higher intensity statin if LDL not <70 mg/dL    Hypothyroidism   Stable. Patient would benefit from following with endocrinology as planned.    Constipation  Patient would benefit from adding in Miralax to regimen.     Obesity/MIKEL  Lost  -8 lb, -4.8% from baseline since starting GLP-1 agonists although recent weight loss has reached a plateau.  Patient would benefit from no change on Zepbound and continuing lifestyle modifications such as adding in exercse.     Pain/Tremor  Discussed gabapentin may add to edema and that propanolol can be used for tremors but would ant to discuss change diltiazem to beta-blocker when she sees cardiology in a few weeks.  A few beta-blockers are helpful for tremor, others are not.  She also has follow up with neurology in August    Osteopenia:   Should repeat DEXA May of 2027. She is getting appropriate amount of elemental calcium with her current supplement.    PLAN:                            Weight Management:   Continue Zepbound 7.5mg once weekly and consider increasing Zepbound 10mg once constipation is better  Working try get activity in 150 minutes a week.  Consider muscle or strength building exercises twice a week.    Cholesterol: due to recheck labs in July    Constipation:  start Miralax/polyethylene glycol 1-2 times a week    Leg swelling: ask cardiology about this, try compression stocks, can ask cardiology about tremors and using beta-blocker (propranolol, nadolol, atenolol) instead diltiazem    Follow-up:  Return in about 9 weeks (around 8/12/2025) for MTM Pharmacist Visit, phone visit.  Dr. Munroe 9/3/2025    SUBJECTIVE/OBJECTIVE:                          Ely Humphreys is a 75 year old female seen for a follow-up visit from 5/13/2025.       Reason for visit: weight management.    Allergies/ADRs: Reviewed in chart  Past Medical History: Reviewed in chart  Tobacco: She reports that she has never smoked. She has never been exposed to tobacco smoke. She has never used smokeless tobacco.  Alcohol: 5-7beverages / week    Medication Adherence/Access: Patient uses pill box(es).  Patient takes medications 2 time(s) per day.   Per patient, misses medication 0 time(s) per week.   Medication barriers: none.   The patient fills medications at [place]: NO, fills medications at [facility].    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  Dizziness when taking morning due to low blood pressure   Reports being on spironolactone previously, not currently on, for edema wears compression stocking for long plane ride  Getting some fluid retention lately and tried to get the spironolactone refilled but could not.   Patient does not have a history of GI bleed.   Patient self-monitors blood pressure.  Home BP monitoring 98/73.   [id number] score of 4.  Dr. Cooney and changing to Dr. Larry at [facility]  BP Readings from Last 3 Encounters:   03/05/25 101/68   10/29/24 103/68   06/20/24 102/74      Pulse Readings from Last 3 Encounters:   03/05/25 84   10/29/24 74   06/20/24 90        Hyperlipidemia   pravastatin 40mg daily  Patient reports no significant myalgias or other side effects.    Recent Labs   Lab Test 01/02/25  1017 10/29/24  1233   CHOL 235* 226*   HDL 87 76   * 131*   TRIG 91 96       "  Hypothyroidism   Levothyroxine 125 mcg daily  Liothyronine 5mcg every day   Patient is having the following symptoms: hypothyroidism -  constipation and weight gain, hyperthyroidism -  heat intolerance.   Dr. Badillo will be seeing in a few months  T4 Free   Date Value Ref Range Status   11/04/2020 1.49 (H) 0.76 - 1.46 ng/dL Final     Free T4   Date Value Ref Range Status   02/03/2025 1.70 0.90 - 1.70 ng/dL Final     TSH   Date Value Ref Range Status   02/03/2025 2.15 0.30 - 4.20 uIU/mL Final   11/09/2022 0.73 0.40 - 4.00 mU/L Final   02/16/2021 0.51 0.40 - 4.00 mU/L Final      Weight Management   /MIKEL history  Zepbound 7.5 mg once weekly (finished the 4 doses of this) $100 per month  Melatonin 5mg bedtime as needed (not taking all the time)  On CPAP for MIKEL last visit Freya Kumar CNP with Sleep Medicine 10/24  Patient reports the following medication side effects: constipation.  She feels like this is working for her to keep hunger off.  Nutrition/Eating Habits: Reports she eats well, protein person, takes fruit, health .    Exercise/Activity: Last week she has not been exercise. Was doing [employer] three times a week hiking, 3 miles for 1 hour or treadmill  Not so much strength exercises   Medications Tried/Failed:  semaglutide: not effective completely, lost some weight, Zepbound covered  Bupropion not effective    Initial Weight pre-Zepbound: 164 lb  172 lb pre Wegovy in December  Home weight: 153.6 lb is lowest back to 156lb from retaining water.       Cumulative Weight Loss: -8 lb, -4.8% from baseline    Wt Readings from Last 4 Encounters:   03/05/25 164 lb 8 oz (74.6 kg)   10/29/24 172 lb (78 kg)   10/17/24 175 lb (79.4 kg)   06/20/24 175 lb (79.4 kg)     Estimated body mass index is 30.39 kg/m  as calculated from the following:    Height as of 3/5/25: 5' 1.69\" (1.567 m).    Weight as of 3/5/25: 164 lb 8 oz (74.6 kg).       Constipation   Stool softner 100mg three times a week MWF  Miralax 17 " grams every day as needed -not using  Patient is having bowel movements every 2 days.   Patient feels that current therapy is somewhat effective. Not an easy bowel movement.  Patient reports no current medication side effects.        Pain/Tremor    Gabapentin 200mg twice daily  Acetaminophen 500mg, 2 tablets as needed   Vitamin B complex for nerve every day (4167 mcg of vitamin B12)  Rarely ibuprofen-Advil, tramadol  Physical therapy for balance issues  Pain type/location: right-sided low back pain with sciatica  Taking gabapentin for tremors, not working well, this is getting worse for her.  Pain is a lot better with back surgery  Patient feels that current therapy is effective.   Patient reports the following side effects: leg swelling  Status post AP fusion L4-S1 with Decomp L5-S1 RT 12/2023  Dr. Cruz, [facility]    Bone Health   Osteopenia   Alendronate 70mg once weekly (started May 2025)  Calcium phospate 2 gummes 500/Vitamin D 1000/25mcg units every day   Multivitamin every day 1000 units of vitamin D  Patient is not experiencing side effects.  DEXA History: 4/22/2025  FRAX Results: The 10 year probability of major osteoporotic fracture is 15.7%, and of hip fracture is 5.1%, based on left femoral neck BMD  She may be having some dental work done--recapping to teeth.   Patient is getting no yogurt, cheese or milk.  Risk factors: post-menopausal  Graves disease  Vitamin D Deficiency Screening Results:  Lab Results   Component Value Date    VITDT 65 03/15/2022    VITDT 85 (H) 12/20/2021            Today's Vitals: LMP  (LMP Unknown)   ----------------    I spent 26 minutes with this patient today. All changes were made via collaborative practice agreement with Shadia Munroe MD.     A summary of these recommendations was sent via [employer].    Katarina Young, PharmD BCPS  Medication Therapy Management Practitioner  Pharmacist    Telemedicine Visit Details  The patient's  medications can be safely assessed via a telemedicine encounter.  Type of service:  Telephone visit  Originating Location (pt. Location): Home    Distant Location (provider location):  On-site  Start Time: 9:31am  End Time: 9:57 AM     Medication Therapy Recommendations  Drug-induced constipation   1 Current Medication: polyethylene glycol (MIRALAX) 17 GM/Dose powder   Current Medication Sig: Take 1 Capful by mouth daily as needed for constipation.   Rationale: Dose too low - Dosage too low - Effectiveness   Recommendation: Increase Frequency   Status: Patient Agreed - Adherence/Education   Identified Date: 6/10/2025 Completed Date: 6/10/2025         Tremor   1 Current Medication: diltiazem ER (TIAZAC) 180 MG 24 hr ER beaded capsule   Current Medication Sig: Take 180 mg by mouth daily   Rationale: More effective medication available - Ineffective medication - Effectiveness   Recommendation: Change Medication - PROPRANOLOL HCL ER PO   Status: Contact Provider - Awaiting Response   Identified Date: 6/10/2025

## 2025-06-10 ENCOUNTER — VIRTUAL VISIT (OUTPATIENT)
Dept: PHARMACY | Facility: CLINIC | Age: 75
End: 2025-06-10
Payer: COMMERCIAL

## 2025-06-10 DIAGNOSIS — M79.2 NERVE PAIN: ICD-10-CM

## 2025-06-10 DIAGNOSIS — E66.09 CLASS 1 OBESITY DUE TO EXCESS CALORIES IN ADULT, UNSPECIFIED BMI, UNSPECIFIED WHETHER SERIOUS COMORBIDITY PRESENT: ICD-10-CM

## 2025-06-10 DIAGNOSIS — E78.00 HIGH CHOLESTEROL: ICD-10-CM

## 2025-06-10 DIAGNOSIS — M85.89 OSTEOPENIA OF MULTIPLE SITES: ICD-10-CM

## 2025-06-10 DIAGNOSIS — Z98.890 S/P ABLATION OF ATRIAL FIBRILLATION: Primary | ICD-10-CM

## 2025-06-10 DIAGNOSIS — E03.9 HYPOTHYROIDISM, UNSPECIFIED TYPE: ICD-10-CM

## 2025-06-10 DIAGNOSIS — K59.03 DRUG-INDUCED CONSTIPATION: ICD-10-CM

## 2025-06-10 DIAGNOSIS — E66.811 CLASS 1 OBESITY DUE TO EXCESS CALORIES IN ADULT, UNSPECIFIED BMI, UNSPECIFIED WHETHER SERIOUS COMORBIDITY PRESENT: ICD-10-CM

## 2025-06-10 DIAGNOSIS — Z86.79 S/P ABLATION OF ATRIAL FIBRILLATION: Primary | ICD-10-CM

## 2025-06-10 DIAGNOSIS — G47.33 OSA (OBSTRUCTIVE SLEEP APNEA): ICD-10-CM

## 2025-06-10 DIAGNOSIS — R25.1 TREMOR: ICD-10-CM

## 2025-06-10 DIAGNOSIS — I42.8 DILATED CARDIOMYOPATHY SECONDARY TO TACHYCARDIA (H): ICD-10-CM

## 2025-06-10 PROCEDURE — 99606 MTMS BY PHARM EST 15 MIN: CPT | Mod: 93 | Performed by: PHARMACIST

## 2025-06-10 PROCEDURE — 99607 MTMS BY PHARM ADDL 15 MIN: CPT | Mod: 93 | Performed by: PHARMACIST

## 2025-06-10 RX ORDER — CALCIUM PHOSPHATE, TRIBASIC 100 %
2 POWDER (GRAM) MISCELLANEOUS 2 TIMES DAILY
COMMUNITY

## 2025-06-10 NOTE — PATIENT INSTRUCTIONS
Recommendations from today's MTM visit:                                                      Weight Management:   Continue Zepbound 7.5mg once weekly and consider increasing Zepbound 10mg once constipation is better  Working try get activity in 150 minutes a week.  Consider muscle or strength building exercises twice a week.    Cholesterol: due to recheck labs in July    Constipation:  start Miralax/polyethylene glycol 1-2 times a week    Leg swelling: ask cardiology about this, try compression stocks, can ask cardiology about tremors and using beta-blocker (propranolol, nadolol, atenolol) instead diltiazem    Follow-up: Return in about 9 weeks (around 8/12/2025) for MTM Pharmacist Visit, phone visit.  Dr. Munroe 9/3/2025    To schedule another MTM appointment, please call the clinic directly or you may call the MTM scheduling line at [phone] or toll-free at [phone].     My Clinical Pharmacist's contact information:                                                      It was a pleasure seeing you today!  Please feel free to contact me with any questions or concerns you have.      Katarina Young, PharmD [facility]  Medication Therapy Management Practitioner    It was great to speak with you today.  I value your experience and would be very thankful for your time with providing feedback on our clinic survey. You may receive a survey via email or text message in the next few days.

## 2025-07-21 ENCOUNTER — LAB (OUTPATIENT)
Dept: LAB | Facility: CLINIC | Age: 75
End: 2025-07-21
Payer: COMMERCIAL

## 2025-07-21 DIAGNOSIS — E78.00 PURE HYPERCHOLESTEROLEMIA: ICD-10-CM

## 2025-07-21 DIAGNOSIS — E03.9 HYPOTHYROIDISM, UNSPECIFIED TYPE: ICD-10-CM

## 2025-07-21 DIAGNOSIS — M85.89 OSTEOPENIA OF MULTIPLE SITES: ICD-10-CM

## 2025-07-21 LAB
CHOLEST SERPL-MCNC: 189 MG/DL
FASTING STATUS PATIENT QL REPORTED: YES
HDLC SERPL-MCNC: 82 MG/DL
LDLC SERPL CALC-MCNC: 91 MG/DL
NONHDLC SERPL-MCNC: 107 MG/DL
T3FREE SERPL-MCNC: 2.8 PG/ML (ref 2–4.4)
T4 FREE SERPL-MCNC: 1.3 NG/DL (ref 0.9–1.7)
TRIGL SERPL-MCNC: 82 MG/DL
TSH SERPL DL<=0.005 MIU/L-ACNC: 1.78 UIU/ML (ref 0.3–4.2)
VIT D+METAB SERPL-MCNC: 63 NG/ML (ref 20–50)

## 2025-07-21 PROCEDURE — 82306 VITAMIN D 25 HYDROXY: CPT

## 2025-07-21 PROCEDURE — 36415 COLL VENOUS BLD VENIPUNCTURE: CPT

## 2025-07-21 PROCEDURE — 84481 FREE ASSAY (FT-3): CPT

## 2025-07-21 PROCEDURE — 80061 LIPID PANEL: CPT

## 2025-07-21 PROCEDURE — 84439 ASSAY OF FREE THYROXINE: CPT

## 2025-07-21 PROCEDURE — 84443 ASSAY THYROID STIM HORMONE: CPT

## 2025-08-05 ENCOUNTER — VIRTUAL VISIT (OUTPATIENT)
Dept: PHARMACY | Facility: CLINIC | Age: 75
End: 2025-08-05
Payer: COMMERCIAL

## 2025-08-05 ENCOUNTER — VIRTUAL VISIT (OUTPATIENT)
Dept: ENDOCRINOLOGY | Facility: CLINIC | Age: 75
End: 2025-08-05
Attending: INTERNAL MEDICINE
Payer: COMMERCIAL

## 2025-08-05 DIAGNOSIS — E66.09 CLASS 1 OBESITY DUE TO EXCESS CALORIES IN ADULT, UNSPECIFIED BMI, UNSPECIFIED WHETHER SERIOUS COMORBIDITY PRESENT: ICD-10-CM

## 2025-08-05 DIAGNOSIS — K59.03 DRUG-INDUCED CONSTIPATION: ICD-10-CM

## 2025-08-05 DIAGNOSIS — E66.811 CLASS 1 OBESITY DUE TO EXCESS CALORIES IN ADULT, UNSPECIFIED BMI, UNSPECIFIED WHETHER SERIOUS COMORBIDITY PRESENT: ICD-10-CM

## 2025-08-05 DIAGNOSIS — Z98.890 S/P ABLATION OF ATRIAL FIBRILLATION: Primary | ICD-10-CM

## 2025-08-05 DIAGNOSIS — M85.89 OSTEOPENIA OF MULTIPLE SITES: ICD-10-CM

## 2025-08-05 DIAGNOSIS — E03.9 HYPOTHYROIDISM, UNSPECIFIED TYPE: ICD-10-CM

## 2025-08-05 DIAGNOSIS — Z86.79 S/P ABLATION OF ATRIAL FIBRILLATION: Primary | ICD-10-CM

## 2025-08-05 DIAGNOSIS — I26.09 ACUTE PULMONARY EMBOLISM WITH ACUTE COR PULMONALE, UNSPECIFIED PULMONARY EMBOLISM TYPE (H): ICD-10-CM

## 2025-08-05 DIAGNOSIS — I42.8 DILATED CARDIOMYOPATHY SECONDARY TO TACHYCARDIA (H): ICD-10-CM

## 2025-08-05 DIAGNOSIS — G47.33 OSA (OBSTRUCTIVE SLEEP APNEA): ICD-10-CM

## 2025-08-05 DIAGNOSIS — E78.00 HIGH CHOLESTEROL: ICD-10-CM

## 2025-08-05 PROCEDURE — 99606 MTMS BY PHARM EST 15 MIN: CPT | Mod: 93 | Performed by: PHARMACIST

## 2025-08-05 PROCEDURE — 98006 SYNCH AUDIO-VIDEO EST MOD 30: CPT | Performed by: INTERNAL MEDICINE

## 2025-08-05 PROCEDURE — 1126F AMNT PAIN NOTED NONE PRSNT: CPT | Mod: 95 | Performed by: INTERNAL MEDICINE

## 2025-08-05 PROCEDURE — 99607 MTMS BY PHARM ADDL 15 MIN: CPT | Mod: 93 | Performed by: PHARMACIST

## 2025-08-05 ASSESSMENT — PAIN SCALES - GENERAL: PAINLEVEL_OUTOF10: NO PAIN (0)

## 2025-08-07 ENCOUNTER — OFFICE VISIT (OUTPATIENT)
Dept: NEUROLOGY | Facility: CLINIC | Age: 75
End: 2025-08-07
Attending: INTERNAL MEDICINE
Payer: COMMERCIAL

## 2025-08-07 VITALS
OXYGEN SATURATION: 97 % | SYSTOLIC BLOOD PRESSURE: 101 MMHG | HEART RATE: 53 BPM | BODY MASS INDEX: 28.48 KG/M2 | WEIGHT: 154.2 LBS | DIASTOLIC BLOOD PRESSURE: 65 MMHG

## 2025-08-07 DIAGNOSIS — G62.9 PERIPHERAL POLYNEUROPATHY: ICD-10-CM

## 2025-08-07 DIAGNOSIS — G89.29 CHRONIC RIGHT-SIDED LOW BACK PAIN WITH RIGHT-SIDED SCIATICA: ICD-10-CM

## 2025-08-07 DIAGNOSIS — M54.41 CHRONIC RIGHT-SIDED LOW BACK PAIN WITH RIGHT-SIDED SCIATICA: ICD-10-CM

## 2025-08-07 DIAGNOSIS — G25.0 ESSENTIAL TREMOR: Primary | ICD-10-CM

## 2025-08-07 RX ORDER — GABAPENTIN 100 MG/1
200 CAPSULE ORAL 3 TIMES DAILY
Qty: 180 CAPSULE | Refills: 4 | Status: SHIPPED | OUTPATIENT
Start: 2025-08-07

## 2025-08-20 ENCOUNTER — OFFICE VISIT (OUTPATIENT)
Dept: PEDIATRICS | Facility: CLINIC | Age: 75
End: 2025-08-20
Payer: COMMERCIAL

## 2025-08-20 VITALS
SYSTOLIC BLOOD PRESSURE: 94 MMHG | HEART RATE: 74 BPM | TEMPERATURE: 97.5 F | BODY MASS INDEX: 27.94 KG/M2 | WEIGHT: 151.8 LBS | HEIGHT: 62 IN | OXYGEN SATURATION: 98 % | RESPIRATION RATE: 16 BRPM | DIASTOLIC BLOOD PRESSURE: 67 MMHG

## 2025-08-20 DIAGNOSIS — R19.04 LEFT LOWER QUADRANT ABDOMINAL MASS: Primary | ICD-10-CM

## 2025-08-20 PROCEDURE — G2211 COMPLEX E/M VISIT ADD ON: HCPCS | Performed by: PHYSICIAN ASSISTANT

## 2025-08-20 PROCEDURE — 99213 OFFICE O/P EST LOW 20 MIN: CPT | Performed by: PHYSICIAN ASSISTANT

## 2025-08-20 PROCEDURE — 3078F DIAST BP <80 MM HG: CPT | Performed by: PHYSICIAN ASSISTANT

## 2025-08-20 PROCEDURE — 1126F AMNT PAIN NOTED NONE PRSNT: CPT | Performed by: PHYSICIAN ASSISTANT

## 2025-08-20 PROCEDURE — 3074F SYST BP LT 130 MM HG: CPT | Performed by: PHYSICIAN ASSISTANT

## 2025-08-20 ASSESSMENT — PAIN SCALES - GENERAL: PAINLEVEL_OUTOF10: NO PAIN (0)

## 2025-08-26 ENCOUNTER — ANCILLARY PROCEDURE (OUTPATIENT)
Dept: ULTRASOUND IMAGING | Facility: CLINIC | Age: 75
End: 2025-08-26
Attending: PHYSICIAN ASSISTANT
Payer: COMMERCIAL

## 2025-08-26 DIAGNOSIS — R19.04 LEFT LOWER QUADRANT ABDOMINAL MASS: ICD-10-CM

## 2025-08-26 DIAGNOSIS — M79.89 SOFT TISSUE MASS: ICD-10-CM

## 2025-08-26 PROCEDURE — 76705 ECHO EXAM OF ABDOMEN: CPT

## 2025-09-03 ENCOUNTER — OFFICE VISIT (OUTPATIENT)
Dept: PEDIATRICS | Facility: CLINIC | Age: 75
End: 2025-09-03
Payer: COMMERCIAL

## 2025-09-03 ENCOUNTER — PATIENT OUTREACH (OUTPATIENT)
Dept: CARE COORDINATION | Facility: CLINIC | Age: 75
End: 2025-09-03

## 2025-09-03 VITALS
SYSTOLIC BLOOD PRESSURE: 94 MMHG | BODY MASS INDEX: 28.37 KG/M2 | HEIGHT: 62 IN | WEIGHT: 154.2 LBS | OXYGEN SATURATION: 99 % | RESPIRATION RATE: 16 BRPM | HEART RATE: 82 BPM | DIASTOLIC BLOOD PRESSURE: 67 MMHG | TEMPERATURE: 97.5 F

## 2025-09-03 DIAGNOSIS — R42 LIGHTHEADEDNESS: ICD-10-CM

## 2025-09-03 DIAGNOSIS — I42.8 DILATED CARDIOMYOPATHY SECONDARY TO TACHYCARDIA (H): ICD-10-CM

## 2025-09-03 DIAGNOSIS — R19.04 ABDOMINAL WALL MASS OF LEFT LOWER QUADRANT: Primary | ICD-10-CM

## 2025-09-03 DIAGNOSIS — G25.0 ESSENTIAL TREMOR: ICD-10-CM

## 2025-09-03 DIAGNOSIS — G62.9 PERIPHERAL POLYNEUROPATHY: ICD-10-CM

## 2025-09-03 DIAGNOSIS — I34.0 NONRHEUMATIC MITRAL VALVE REGURGITATION: ICD-10-CM

## 2025-09-03 DIAGNOSIS — I36.1 NONRHEUMATIC TRICUSPID VALVE REGURGITATION: ICD-10-CM

## 2025-09-03 DIAGNOSIS — R20.2 PARESTHESIA: ICD-10-CM

## 2025-09-03 DIAGNOSIS — M79.651 PAIN OF RIGHT THIGH: ICD-10-CM

## 2025-09-03 DIAGNOSIS — Z12.31 VISIT FOR SCREENING MAMMOGRAM: ICD-10-CM

## 2025-09-03 DIAGNOSIS — I48.11 LONGSTANDING PERSISTENT ATRIAL FIBRILLATION (H): ICD-10-CM

## 2025-09-03 PROBLEM — Q21.11 OSTIUM SECUNDUM TYPE ATRIAL SEPTAL DEFECT: Status: ACTIVE | Noted: 2023-12-26

## 2025-09-03 PROCEDURE — 1126F AMNT PAIN NOTED NONE PRSNT: CPT | Performed by: INTERNAL MEDICINE

## 2025-09-03 PROCEDURE — 3074F SYST BP LT 130 MM HG: CPT | Performed by: INTERNAL MEDICINE

## 2025-09-03 PROCEDURE — 99215 OFFICE O/P EST HI 40 MIN: CPT | Performed by: INTERNAL MEDICINE

## 2025-09-03 PROCEDURE — 3078F DIAST BP <80 MM HG: CPT | Performed by: INTERNAL MEDICINE

## 2025-09-03 ASSESSMENT — PAIN SCALES - GENERAL: PAINLEVEL_OUTOF10: NO PAIN (0)

## 2025-09-04 ENCOUNTER — PATIENT OUTREACH (OUTPATIENT)
Dept: CARE COORDINATION | Facility: CLINIC | Age: 75
End: 2025-09-04
Payer: COMMERCIAL

## (undated) RX ORDER — HEPARIN SODIUM 200 [USP'U]/100ML
INJECTION, SOLUTION INTRAVENOUS
Status: DISPENSED
Start: 2024-01-06

## (undated) RX ORDER — PROPOFOL 10 MG/ML
INJECTION, EMULSION INTRAVENOUS
Status: DISPENSED
Start: 2017-11-28

## (undated) RX ORDER — LIDOCAINE HYDROCHLORIDE 10 MG/ML
INJECTION, SOLUTION INFILTRATION; PERINEURAL
Status: DISPENSED
Start: 2024-01-06

## (undated) RX ORDER — FENTANYL CITRATE 50 UG/ML
INJECTION, SOLUTION INTRAMUSCULAR; INTRAVENOUS
Status: DISPENSED
Start: 2017-09-19

## (undated) RX ORDER — FENTANYL CITRATE 50 UG/ML
INJECTION, SOLUTION INTRAMUSCULAR; INTRAVENOUS
Status: DISPENSED
Start: 2024-01-06

## (undated) RX ORDER — EPHEDRINE SULFATE 50 MG/ML
INJECTION, SOLUTION INTRAMUSCULAR; INTRAVENOUS; SUBCUTANEOUS
Status: DISPENSED
Start: 2017-11-28

## (undated) RX ORDER — PHENYLEPHRINE HCL IN 0.9% NACL 1 MG/10 ML
SYRINGE (ML) INTRAVENOUS
Status: DISPENSED
Start: 2017-11-28